# Patient Record
Sex: FEMALE | Race: WHITE | NOT HISPANIC OR LATINO | Employment: UNEMPLOYED | URBAN - NONMETROPOLITAN AREA
[De-identification: names, ages, dates, MRNs, and addresses within clinical notes are randomized per-mention and may not be internally consistent; named-entity substitution may affect disease eponyms.]

---

## 2020-04-17 ENCOUNTER — HISTORICAL (OUTPATIENT)
Dept: ADMINISTRATIVE | Facility: HOSPITAL | Age: 30
End: 2020-04-17

## 2020-04-17 LAB
ALBUMIN SERPL BCP-MCNC: 3.8 G/DL (ref 3.5–5)
ALBUMIN/GLOB SERPL ELPH: 1 {RATIO} (ref 1.5–2.2)
ALP SERPL-CCNC: 98 U/L (ref 45–117)
ALT SERPL W P-5'-P-CCNC: 50 U/L (ref 13–56)
ANION GAP SERPL CALC-SCNC: 11.3 MEQ/L (ref 10–20)
APPEARANCE, UA: NORMAL
AST SERPL-CCNC: 29 U/L (ref 15–37)
B-HCG UR QL: NEGATIVE
BACTERIA SPEC CULT: NORMAL /HPF
BASOPHILS NFR BLD: 0 10 (ref 0–0.1)
BASOPHILS NFR BLD: 0.5 % (ref 0–1.5)
BILIRUB SERPL-MCNC: 0.23 MG/DL (ref 0.2–1)
BILIRUB UR QL STRIP: NEGATIVE MG/DL
BUDDING YEAST: NORMAL /HPF
BUN SERPL-MCNC: 13 MG/DL (ref 7–18)
CALCIUM SERPL-MCNC: 8 MG/DL (ref 8.5–10.1)
CASTS, URINE MICROSCOPIC: >87 /LPF
CHLORIDE SERPL-SCNC: 106 MMOL/L (ref 98–107)
CO2 SERPL-SCNC: 26 MMOL/L (ref 22–32)
COLOR UR: NORMAL
CREAT SERPL-MCNC: 1.18 MG/DL (ref 0.55–1.02)
EGFR: 57 ML/MIN/1.73M
EOSINOPHIL NFR BLD: 0.3 10 (ref 0–0.7)
EOSINOPHIL NFR BLD: 5.3 % (ref 0–7)
EPITHELIAL, URINE MICROSCOPIC: 22 /HPF
ERYTHROCYTE [DISTWIDTH] IN BLOOD BY AUTOMATED COUNT: 12.7 % (ref 11.5–14.5)
GLOBULIN: 3.7 G/DL (ref 2.3–3.5)
GLUCOSE (UA): 100 MG/DL
GLUCOSE SERPL-MCNC: 239 MG/DL (ref 70–99)
GRAN #: 2.42 10 (ref 2–7.5)
GRAN%: 0.2 %
GRAN%: 42.9 % (ref 50–80)
HCT VFR BLD AUTO: 37.3 % (ref 37.7–47.9)
HGB BLD-MCNC: 12.1 G/DL (ref 12.2–16.2)
HGB UR QL STRIP: NORMAL ERY/UL
HYALINE CASTS #/AREA URNS LPF: NORMAL /[LPF]
IMMATURE GRANULOCYTES #: 0.01 10
INTERNAL NEG CONTROL: NEGATIVE
INTERNAL POS CONTROL: POSITIVE
KETONES UR QL STRIP: NEGATIVE MG/DL
LEUKOCYTE ESTERASE UR QL STRIP: NORMAL LEU/UL
LYMPH #: 2.6 10 (ref 1–3.5)
LYMPH%: 45.6 % (ref 12–50)
MCH RBC QN AUTO: 28.4 PG (ref 27–31)
MCHC RBC AUTO-ENTMCNC: 32.4 G% (ref 32–35)
MCV RBC AUTO: 87.6 FL (ref 80–97)
MONO #: 0.3 10 (ref 0–0.8)
MONO%: 5.5 % (ref 0–12)
MUCOUS, URINE MICROSCOPIC: NORMAL /HPF
NITRITE UR QL STRIP: NEGATIVE MG/DL
OSMOC: 287 MOSM/KG (ref 275–295)
PH UR STRIP: 5 [PH] (ref 5–7.5)
PMV BLD AUTO: 377 10 (ref 142–424)
PMV BLD AUTO: 8.9 FL (ref 7.4–10.4)
POTASSIUM SERPL-SCNC: 3.3 MMOL/L (ref 3.5–5.1)
PROT SERPL-MCNC: 7.5 G/DL (ref 6.4–8.2)
PROT UR QL STRIP: 100 MG/DL
RBC # BLD AUTO: 4.26 M/UL (ref 4.04–5.48)
RBC #/AREA URNS HPF: NEGATIVE /HPF
SODIUM BLD-SCNC: 140 MMOL/L (ref 136–145)
SP GR UR STRIP: 1.02 (ref 1–1.03)
SPERM, URINE MICROSCOPIC: NORMAL /HPF
TYPE OF SPECIMEN  (UA): NORMAL
UNCLASSIFIED CRYSTALS, UA: NORMAL /HPF
UROBILINOGEN UR STRIP-ACNC: 1 EU/L
WBC # BLD AUTO: 5.6 10 (ref 4–10.2)
WBC #/AREA URNS HPF: 735 /HPF

## 2020-10-17 ENCOUNTER — HOSPITAL ENCOUNTER (EMERGENCY)
Facility: HOSPITAL | Age: 30
Discharge: HOME | End: 2020-10-17
Attending: EMERGENCY MEDICINE
Payer: MEDICAID

## 2020-10-17 VITALS
RESPIRATION RATE: 20 BRPM | WEIGHT: 96.38 LBS | HEIGHT: 62 IN | HEART RATE: 106 BPM | OXYGEN SATURATION: 98 % | TEMPERATURE: 99 F | BODY MASS INDEX: 17.74 KG/M2 | SYSTOLIC BLOOD PRESSURE: 101 MMHG | DIASTOLIC BLOOD PRESSURE: 59 MMHG

## 2020-10-17 DIAGNOSIS — S91.302D OPEN WOUND OF LEFT FOOT EXCLUDING ONE OR MORE TOES, SUBSEQUENT ENCOUNTER: ICD-10-CM

## 2020-10-17 DIAGNOSIS — F19.10 SUBSTANCE ABUSE: Primary | ICD-10-CM

## 2020-10-17 LAB
ALBUMIN SERPL BCP-MCNC: 2.4 G/DL (ref 3.5–5.2)
ALP SERPL-CCNC: 119 U/L (ref 55–135)
ALT SERPL W/O P-5'-P-CCNC: 18 U/L (ref 10–44)
ANION GAP SERPL CALC-SCNC: 6 MMOL/L (ref 8–16)
AST SERPL-CCNC: 14 U/L (ref 10–40)
BASOPHILS # BLD AUTO: 0.03 K/UL (ref 0–0.2)
BASOPHILS NFR BLD: 0.3 % (ref 0–1.9)
BILIRUB SERPL-MCNC: 0.5 MG/DL (ref 0.1–1)
BUN SERPL-MCNC: 14 MG/DL (ref 6–20)
CALCIUM SERPL-MCNC: 8.6 MG/DL (ref 8.7–10.5)
CHLORIDE SERPL-SCNC: 106 MMOL/L (ref 95–110)
CO2 SERPL-SCNC: 27 MMOL/L (ref 23–29)
CREAT SERPL-MCNC: 0.6 MG/DL (ref 0.5–1.4)
DIFFERENTIAL METHOD: ABNORMAL
EOSINOPHIL # BLD AUTO: 0 K/UL (ref 0–0.5)
EOSINOPHIL NFR BLD: 0 % (ref 0–8)
ERYTHROCYTE [DISTWIDTH] IN BLOOD BY AUTOMATED COUNT: 15.6 % (ref 11.5–14.5)
EST. GFR  (AFRICAN AMERICAN): >60 ML/MIN/1.73 M^2
EST. GFR  (NON AFRICAN AMERICAN): >60 ML/MIN/1.73 M^2
GLUCOSE SERPL-MCNC: 103 MG/DL (ref 70–110)
HCT VFR BLD AUTO: 27.6 % (ref 37–48.5)
HGB BLD-MCNC: 8.7 G/DL (ref 12–16)
IMM GRANULOCYTES # BLD AUTO: 0.09 K/UL (ref 0–0.04)
IMM GRANULOCYTES NFR BLD AUTO: 0.8 % (ref 0–0.5)
LYMPHOCYTES # BLD AUTO: 0.8 K/UL (ref 1–4.8)
LYMPHOCYTES NFR BLD: 7.6 % (ref 18–48)
MCH RBC QN AUTO: 24.6 PG (ref 27–31)
MCHC RBC AUTO-ENTMCNC: 31.5 G/DL (ref 32–36)
MCV RBC AUTO: 78 FL (ref 82–98)
MONOCYTES # BLD AUTO: 0.4 K/UL (ref 0.3–1)
MONOCYTES NFR BLD: 3.8 % (ref 4–15)
NEUTROPHILS # BLD AUTO: 9.5 K/UL (ref 1.8–7.7)
NEUTROPHILS NFR BLD: 87.5 % (ref 38–73)
NRBC BLD-RTO: 0 /100 WBC
PLATELET # BLD AUTO: 209 K/UL (ref 150–350)
PMV BLD AUTO: 8.4 FL (ref 9.2–12.9)
POTASSIUM SERPL-SCNC: 3.8 MMOL/L (ref 3.5–5.1)
PROT SERPL-MCNC: 7.3 G/DL (ref 6–8.4)
RBC # BLD AUTO: 3.53 M/UL (ref 4–5.4)
SODIUM SERPL-SCNC: 139 MMOL/L (ref 136–145)
WBC # BLD AUTO: 10.83 K/UL (ref 3.9–12.7)

## 2020-10-17 PROCEDURE — 80320 DRUG SCREEN QUANTALCOHOLS: CPT

## 2020-10-17 PROCEDURE — 99283 EMERGENCY DEPT VISIT LOW MDM: CPT | Mod: 25

## 2020-10-17 PROCEDURE — 36000 PLACE NEEDLE IN VEIN: CPT

## 2020-10-17 PROCEDURE — 80053 COMPREHEN METABOLIC PANEL: CPT

## 2020-10-17 PROCEDURE — 36415 COLL VENOUS BLD VENIPUNCTURE: CPT

## 2020-10-17 PROCEDURE — 85025 COMPLETE CBC W/AUTO DIFF WBC: CPT

## 2020-10-17 RX ORDER — MUPIROCIN 20 MG/G
OINTMENT TOPICAL 3 TIMES DAILY
Qty: 30 G | Refills: 0 | Status: ON HOLD | OUTPATIENT
Start: 2020-10-17 | End: 2020-11-11 | Stop reason: HOSPADM

## 2020-10-17 NOTE — ED PROVIDER NOTES
"Encounter Date: 10/17/2020       History     Chief Complaint   Patient presents with    Fatigue     Patient states she has been off of heroin for 3 days and started feeling withdrawl so she ate a weed brownie and thinks it may be laced. Patient is drowsy, arousable to voice, denies SI     Pt was brought in by [facility], she was found asleep on side the road.  The pt is awake and alert, pt states she was tired and laid down on side of the road.  Pt denies any injury, syncope or hitting her head. Pt states she used heroin for the past few days and then ate a marijuana brownie she believes "might have been laced" because she has "felt more tired."  Pt denies wanting to harm herself or others.  Pt states she does not want help and would like to be discharged.         Review of patient's allergies indicates:  No Known Allergies  Past Medical History:   Diagnosis Date    Anxiety     Borderline personality disorder     PTSD (post-traumatic stress disorder)     Substance abuse      Past Surgical History:   Procedure Laterality Date     SECTION, LOW TRANSVERSE      x4    COSMETIC SURGERY       History reviewed. No pertinent family history.  Social History     Tobacco Use    Smoking status: Never Smoker   Substance Use Topics    Alcohol use: Yes     Comment: occ    Drug use: Yes     Types: IV, Marijuana     Comment: no heroin x3 days     Review of Systems   Constitutional: Positive for fatigue.   Psychiatric/Behavioral:        + drug use    All other systems reviewed and are negative.      Physical Exam     Initial Vitals [10/17/20 1824]   BP Pulse Resp Temp SpO2   102/69 (!) 117 16 98.5 °F (36.9 °C) 99 %      MAP       --         Physical Exam    Nursing note and vitals reviewed.  Constitutional: She is not diaphoretic. No distress.   HENT:   Head: Normocephalic.   Mouth/Throat: Oropharynx is clear and moist.   Eyes: Pupils are equal, round, and reactive to light.   Neck: Normal range of motion. Neck supple. "   Cardiovascular: Regular rhythm. Tachycardia present.    Pulmonary/Chest: Breath sounds normal. No respiratory distress. She has no wheezes.   Abdominal: Soft. Bowel sounds are normal. She exhibits no distension. There is no abdominal tenderness.   Musculoskeletal: Normal range of motion. No tenderness or edema.   Neurological: She is alert and oriented to person, place, and time. She has normal strength. GCS score is 15. GCS eye subscore is 4. GCS verbal subscore is 5. GCS motor subscore is 6.   Skin: Skin is warm and dry. Capillary refill takes less than 2 seconds.   Bruising and puncture wounds to left arm, pt states that is where she recently shot up heroin, no cellulitis no abscess     Left foot wound - see attached photo          ED Course   Procedures  Labs Reviewed   CBC W/ AUTO DIFFERENTIAL - Abnormal; Notable for the following components:       Result Value    RBC 3.53 (*)     Hemoglobin 8.7 (*)     Hematocrit 27.6 (*)     Mean Corpuscular Volume 78 (*)     Mean Corpuscular Hemoglobin 24.6 (*)     Mean Corpuscular Hemoglobin Conc 31.5 (*)     RDW 15.6 (*)     MPV 8.4 (*)     Immature Granulocytes 0.8 (*)     Gran # (ANC) 9.5 (*)     Immature Grans (Abs) 0.09 (*)     Lymph # 0.8 (*)     Gran% 87.5 (*)     Lymph% 7.6 (*)     Mono% 3.8 (*)     All other components within normal limits   COMPREHENSIVE METABOLIC PANEL - Abnormal; Notable for the following components:    Calcium 8.6 (*)     Albumin 2.4 (*)     Anion Gap 6 (*)     All other components within normal limits   ALCOHOL,MEDICAL (ETHANOL)   URINALYSIS, REFLEX TO URINE CULTURE   PREGNANCY TEST, URINE RAPID   DRUG SCREEN PANEL, URINE EMERGENCY          Imaging Results    None          Medical Decision Making:   Differential Diagnosis:   Poly substance abuse  Viral illness   ETOH use  Near Syncope   Clinical Tests:   Lab Tests: Ordered                   ED Course as of Oct 17 2020   Sat Oct 17, 2020   1922 Pt is awake and alert.  Pt is oriented to were  "she is.  Pt states she would like to be discharged, she does not want to be in the ER.  Discussed with ER MD, OK to dc pt.  Will dc when pt she has transportation.     [NL]   1948 Pt shows me a wound on her left foot/ankle, states it has been there over 1 month.  Pt states it started off as an are where she injected heroin, then turned in to a blister, abscess.  She states she was recently seen in [place] ED and placed on antibiotics.  Pt has not followed up with anyone regarding this wound. Spoke with ER MD Dr. Linares, pt does not tomas to be started on any antibiotics at this time, pt needs referral to wound care.  This was explained to pt who stated "ok."      [NL]   1952 Unable to find local referral for wound care so pt has been referred to podiatry.  Pt verbalized understanding.     [NL]   1952 Pt refusing to give UA sample, states she does not have to urinate.      [NL]   2018 Pt refusing IV, IV fluids, to give UA sample or in and other cath. Awaiting pt's father to get her for transportation.     [NL]      ED Course User Index  [NL] Valeria Wiggins NP            Clinical Impression:     ICD-10-CM ICD-9-CM   1. Substance abuse  F19.10 305.90   2. Open wound of left foot excluding one or more toes, subsequent encounter  S91.302D V58.89     892.0                      Disposition:   Disposition: Discharged  Condition: Stable     ED Disposition Condition    Discharge Stable        ED Prescriptions     Medication Sig Dispense Start Date End Date Auth. Provider    mupirocin (BACTROBAN) 2 % ointment Apply topically 3 (three) times daily. 30 g 10/17/2020  Valeria Wiggins NP        Follow-up Information     Follow up With Specialties Details Why Contact Info    Manuel Park DPM Podiatry Schedule an appointment as soon as possible for a visit in 1 day CONTINUATION OF CARE, fever, Further evaluation and treatment, re-evaluation [address] [place] [id number] [phone]      "                                    Valeria Wiggins, FELICITY  10/17/20 2020

## 2020-10-18 LAB — ETHANOL SERPL-MCNC: <3 MG/DL

## 2020-10-18 NOTE — ED NOTES
Iv attempt x 1 without success. Patient refuses further attempts to iv access and inteventions with ivf and urinalysis.  Patient wishes to go home but wants to have wound to ankle checked out.  Noted large wound to left medial ankle. Notified NP who came to bedside for assessment.

## 2020-10-18 NOTE — ED NOTES
Patient left prior to discharge instructions. Was verbally educated on following up with podiatry.

## 2020-10-19 ENCOUNTER — HOSPITAL ENCOUNTER (EMERGENCY)
Facility: HOSPITAL | Age: 30
Discharge: HOME | End: 2020-10-19
Attending: EMERGENCY MEDICINE
Payer: MEDICAID

## 2020-10-19 VITALS
DIASTOLIC BLOOD PRESSURE: 64 MMHG | RESPIRATION RATE: 16 BRPM | SYSTOLIC BLOOD PRESSURE: 102 MMHG | OXYGEN SATURATION: 98 % | HEART RATE: 105 BPM | WEIGHT: 96.81 LBS | BODY MASS INDEX: 17.81 KG/M2 | HEIGHT: 62 IN | TEMPERATURE: 99 F

## 2020-10-19 DIAGNOSIS — R51.9 ACUTE NONINTRACTABLE HEADACHE, UNSPECIFIED HEADACHE TYPE: Primary | ICD-10-CM

## 2020-10-19 DIAGNOSIS — T14.8XXA OPEN WOUND: ICD-10-CM

## 2020-10-19 LAB
ALBUMIN SERPL BCP-MCNC: 2.2 G/DL (ref 3.5–5.2)
ALP SERPL-CCNC: 115 U/L (ref 55–135)
ALT SERPL W/O P-5'-P-CCNC: 20 U/L (ref 10–44)
ANION GAP SERPL CALC-SCNC: 4 MMOL/L (ref 8–16)
AST SERPL-CCNC: 27 U/L (ref 10–40)
BASOPHILS # BLD AUTO: 0.04 K/UL (ref 0–0.2)
BASOPHILS NFR BLD: 0.3 % (ref 0–1.9)
BILIRUB SERPL-MCNC: 0.5 MG/DL (ref 0.1–1)
BUN SERPL-MCNC: 17 MG/DL (ref 6–20)
CALCIUM SERPL-MCNC: 8.3 MG/DL (ref 8.7–10.5)
CHLORIDE SERPL-SCNC: 100 MMOL/L (ref 95–110)
CO2 SERPL-SCNC: 29 MMOL/L (ref 23–29)
CREAT SERPL-MCNC: 0.9 MG/DL (ref 0.5–1.4)
DIFFERENTIAL METHOD: ABNORMAL
EOSINOPHIL # BLD AUTO: 0 K/UL (ref 0–0.5)
EOSINOPHIL NFR BLD: 0.2 % (ref 0–8)
ERYTHROCYTE [DISTWIDTH] IN BLOOD BY AUTOMATED COUNT: 15.3 % (ref 11.5–14.5)
EST. GFR  (AFRICAN AMERICAN): >60 ML/MIN/1.73 M^2
EST. GFR  (NON AFRICAN AMERICAN): >60 ML/MIN/1.73 M^2
GLUCOSE SERPL-MCNC: 119 MG/DL (ref 70–110)
HCT VFR BLD AUTO: 28.9 % (ref 37–48.5)
HGB BLD-MCNC: 9 G/DL (ref 12–16)
IMM GRANULOCYTES # BLD AUTO: 0.08 K/UL (ref 0–0.04)
IMM GRANULOCYTES NFR BLD AUTO: 0.6 % (ref 0–0.5)
LYMPHOCYTES # BLD AUTO: 0.8 K/UL (ref 1–4.8)
LYMPHOCYTES NFR BLD: 5.6 % (ref 18–48)
MCH RBC QN AUTO: 24.3 PG (ref 27–31)
MCHC RBC AUTO-ENTMCNC: 31.1 G/DL (ref 32–36)
MCV RBC AUTO: 78 FL (ref 82–98)
MONOCYTES # BLD AUTO: 0.4 K/UL (ref 0.3–1)
MONOCYTES NFR BLD: 3.1 % (ref 4–15)
NEUTROPHILS # BLD AUTO: 12.1 K/UL (ref 1.8–7.7)
NEUTROPHILS NFR BLD: 90.2 % (ref 38–73)
NRBC BLD-RTO: 0 /100 WBC
PLATELET # BLD AUTO: 119 K/UL (ref 150–350)
PMV BLD AUTO: 10.2 FL (ref 9.2–12.9)
POTASSIUM SERPL-SCNC: 3.5 MMOL/L (ref 3.5–5.1)
PROT SERPL-MCNC: 7.2 G/DL (ref 6–8.4)
RBC # BLD AUTO: 3.71 M/UL (ref 4–5.4)
SODIUM SERPL-SCNC: 133 MMOL/L (ref 136–145)
WBC # BLD AUTO: 13.46 K/UL (ref 3.9–12.7)

## 2020-10-19 PROCEDURE — 25000003 PHARM REV CODE 250: Performed by: CLINICAL NURSE SPECIALIST

## 2020-10-19 PROCEDURE — 80053 COMPREHEN METABOLIC PANEL: CPT

## 2020-10-19 PROCEDURE — 85025 COMPLETE CBC W/AUTO DIFF WBC: CPT

## 2020-10-19 PROCEDURE — 99284 EMERGENCY DEPT VISIT MOD MDM: CPT | Mod: 25

## 2020-10-19 PROCEDURE — 96372 THER/PROPH/DIAG INJ SC/IM: CPT

## 2020-10-19 PROCEDURE — 36415 COLL VENOUS BLD VENIPUNCTURE: CPT

## 2020-10-19 RX ORDER — ONDANSETRON 4 MG/1
4 TABLET, ORALLY DISINTEGRATING ORAL
Status: COMPLETED | OUTPATIENT
Start: 2020-10-19 | End: 2020-10-19

## 2020-10-19 RX ORDER — BUTALBITAL, ACETAMINOPHEN AND CAFFEINE 50; 325; 40 MG/1; MG/1; MG/1
1 TABLET ORAL
Status: COMPLETED | OUTPATIENT
Start: 2020-10-19 | End: 2020-10-19

## 2020-10-19 RX ORDER — CLINDAMYCIN HYDROCHLORIDE 300 MG/1
300 CAPSULE ORAL
Status: DISCONTINUED | OUTPATIENT
Start: 2020-10-19 | End: 2020-10-19

## 2020-10-19 RX ORDER — CLINDAMYCIN HYDROCHLORIDE 150 MG/1
300 CAPSULE ORAL 2 TIMES DAILY
Qty: 40 CAPSULE | Refills: 0 | Status: ON HOLD | OUTPATIENT
Start: 2020-10-19 | End: 2020-11-11 | Stop reason: HOSPADM

## 2020-10-19 RX ORDER — CLINDAMYCIN PHOSPHATE 150 MG/ML
600 INJECTION, SOLUTION INTRAVENOUS
Status: COMPLETED | OUTPATIENT
Start: 2020-10-19 | End: 2020-10-19

## 2020-10-19 RX ADMIN — CLINDAMYCIN PHOSPHATE 600 MG: 150 INJECTION, SOLUTION INTRAVENOUS at 04:10

## 2020-10-19 RX ADMIN — ONDANSETRON 4 MG: 4 TABLET, ORALLY DISINTEGRATING ORAL at 04:10

## 2020-10-19 RX ADMIN — BUTALBITAL, ACETAMINOPHEN, AND CAFFEINE 1 TABLET: 50; 325; 40 TABLET ORAL at 04:10

## 2020-10-19 NOTE — ED PROVIDER NOTES
Encounter Date: 10/19/2020       History     Chief Complaint   Patient presents with    Recurrent Skin Infections     3 weeks ago diagnosed with cellulitis to left inner ankle.  Completed Bactrim 4 days and not any better.  Also running fever.    Fever    Headache     migraine beginning today.  Reports photophobia and nausea.     30-year-old female presents to the ER with frontal headache with photophobia and nausea.  Headache began today, did not take any medicine over-the-counter for this issue.  Patient also has a history of open wound to left inner ankle.  Previous chart has a picture, looks much improved from last visit 2 days ago.  Patient denies being on any antibiotics currently.  States she had a fever close to 105.  Has been seen at [facility] the [facility] wound.  While reviewing her chart, she was referred to podiatry and wound care which patient did not follow-up for.  Pt states she was on Bactrim at one  point from [facility] which he did not improve.  Patient does have a history of substance abuse, which caused her open wound to left ankle due to injecting heroin.         Review of patient's allergies indicates:  No Known Allergies  Past Medical History:   Diagnosis Date    Anxiety     Borderline personality disorder     PTSD (post-traumatic stress disorder)     Substance abuse      Past Surgical History:   Procedure Laterality Date     SECTION, LOW TRANSVERSE      x4    COSMETIC SURGERY       History reviewed. No pertinent family history.  Social History     Tobacco Use    Smoking status: Never Smoker   Substance Use Topics    Alcohol use: Yes     Comment: occ    Drug use: Yes     Types: IV, Marijuana     Comment: no heroin x3 days     Review of Systems   Constitutional: Negative for fever.   HENT: Negative for sore throat.    Respiratory: Negative for shortness of breath.    Cardiovascular: Negative for chest pain.   Gastrointestinal:  Negative for nausea.   Genitourinary: Negative for dysuria.   Musculoskeletal: Negative for back pain.   Skin: Positive for color change and wound. Negative for rash.   Neurological: Positive for headaches. Negative for weakness.   Hematological: Does not bruise/bleed easily.   All other systems reviewed and are negative.      Physical Exam     Initial Vitals [10/19/20 1610]   BP Pulse Resp Temp SpO2   102/64 (!) 118 16 99.1 °F (37.3 °C) 98 %      MAP       --         Physical Exam    Nursing note and vitals reviewed.  Constitutional: She appears well-developed and well-nourished. She appears ill.   HENT:   Head: Normocephalic. Head is with raccoon's eyes.   Eyes: Pupils are equal, round, and reactive to light.   Neck: Normal range of motion.   Cardiovascular: Normal rate and regular rhythm.   Pulmonary/Chest: Breath sounds normal.   Abdominal: Soft. Bowel sounds are normal.   Musculoskeletal: Normal range of motion.   Neurological: She is alert and oriented to person, place, and time.   Skin: Skin is warm and dry. There is erythema.   Psychiatric: She has a normal mood and affect.         ED Course   Procedures  Labs Reviewed   CBC W/ AUTO DIFFERENTIAL - Abnormal; Notable for the following components:       Result Value    WBC 13.46 (*)     RBC 3.71 (*)     Hemoglobin 9.0 (*)     Hematocrit 28.9 (*)     Mean Corpuscular Volume 78 (*)     Mean Corpuscular Hemoglobin 24.3 (*)     Mean Corpuscular Hemoglobin Conc 31.1 (*)     RDW 15.3 (*)     Platelets 119 (*)     All other components within normal limits   COMPREHENSIVE METABOLIC PANEL - Abnormal; Notable for the following components:    Sodium 133 (*)     Glucose 119 (*)     Calcium 8.3 (*)     Albumin 2.2 (*)     Anion Gap 4 (*)     All other components within normal limits              Imaging Results    None          Medical Decision Making:   Differential Diagnosis:   Healing wound, migraine, headache, cellulitis  Clinical Tests:   Lab Tests: Ordered and  Reviewed                             Clinical Impression:     ICD-10-CM ICD-9-CM   1. Acute nonintractable headache, unspecified headache type  R51.9 784.0   2. Open wound  T14.8XXA 879.8                          ED Disposition Condition    Discharge Stable        ED Prescriptions     Medication Sig Dispense Start Date End Date Auth. Provider    clindamycin (CLEOCIN) 150 MG capsule Take 2 capsules (300 mg total) by mouth 2 (two) times a day. for 10 days 40 capsule 10/19/2020 10/29/2020 Sherry River NP        Follow-up Information     Follow up With Specialties Details Why Contact Info    Luis Felipe Hancock III, MD Cardiovascular Disease Schedule an appointment as soon as possible for a visit  If symptoms worsen [address] [facility] [address]  [phone]                                         Sherry River NP  10/19/20 5467

## 2020-10-23 PROCEDURE — 96374 THER/PROPH/DIAG INJ IV PUSH: CPT

## 2020-10-23 PROCEDURE — 99285 EMERGENCY DEPT VISIT HI MDM: CPT | Mod: 25

## 2020-10-23 PROCEDURE — 96375 TX/PRO/DX INJ NEW DRUG ADDON: CPT

## 2020-10-24 ENCOUNTER — HOSPITAL ENCOUNTER (INPATIENT)
Facility: HOSPITAL | Age: 30
LOS: 2 days | Discharge: ANOTHER HOSPITAL | DRG: 871 | End: 2020-10-26
Attending: EMERGENCY MEDICINE | Admitting: INTERNAL MEDICINE
Payer: MEDICAID

## 2020-10-24 DIAGNOSIS — J18.9 PNEUMONIA OF BOTH LUNGS DUE TO INFECTIOUS ORGANISM: ICD-10-CM

## 2020-10-24 DIAGNOSIS — J18.9 PNEUMONIA OF BOTH LUNGS DUE TO INFECTIOUS ORGANISM, UNSPECIFIED PART OF LUNG: Primary | ICD-10-CM

## 2020-10-24 DIAGNOSIS — R07.9 CHEST PAIN: ICD-10-CM

## 2020-10-24 DIAGNOSIS — L97.309 ULCER OF ANKLE: ICD-10-CM

## 2020-10-24 DIAGNOSIS — I38 ENDOCARDITIS: ICD-10-CM

## 2020-10-24 LAB
ABO + RH BLD: NORMAL
ALBUMIN SERPL BCP-MCNC: 1.5 G/DL (ref 3.5–5.2)
ALBUMIN SERPL BCP-MCNC: 1.8 G/DL (ref 3.5–5.2)
ALP SERPL-CCNC: 151 U/L (ref 55–135)
ALP SERPL-CCNC: 186 U/L (ref 55–135)
ALT SERPL W/O P-5'-P-CCNC: 18 U/L (ref 10–44)
ALT SERPL W/O P-5'-P-CCNC: 20 U/L (ref 10–44)
AMPHET+METHAMPHET UR QL: NEGATIVE
ANION GAP SERPL CALC-SCNC: 9 MMOL/L (ref 8–16)
ANION GAP SERPL CALC-SCNC: 9 MMOL/L (ref 8–16)
APTT BLDCRRT: 32 SEC (ref 21–32)
AST SERPL-CCNC: 33 U/L (ref 10–40)
AST SERPL-CCNC: 38 U/L (ref 10–40)
B-HCG UR QL: NEGATIVE
BACTERIA #/AREA URNS HPF: NEGATIVE /HPF
BARBITURATES UR QL SCN>200 NG/ML: NORMAL
BASOPHILS # BLD AUTO: ABNORMAL K/UL (ref 0–0.2)
BASOPHILS # BLD AUTO: ABNORMAL K/UL (ref 0–0.2)
BASOPHILS NFR BLD: 0 % (ref 0–1.9)
BASOPHILS NFR BLD: 0 % (ref 0–1.9)
BENZODIAZ UR QL SCN>200 NG/ML: NORMAL
BILIRUB SERPL-MCNC: 0.5 MG/DL (ref 0.1–1)
BILIRUB SERPL-MCNC: 0.5 MG/DL (ref 0.1–1)
BILIRUB UR QL STRIP: NEGATIVE
BLD GP AB SCN CELLS X3 SERPL QL: NORMAL
BLD PROD TYP BPU: NORMAL
BLD PROD TYP BPU: NORMAL
BLOOD UNIT EXPIRATION DATE: NORMAL
BLOOD UNIT EXPIRATION DATE: NORMAL
BLOOD UNIT TYPE CODE: 5100
BLOOD UNIT TYPE CODE: 5100
BLOOD UNIT TYPE: NORMAL
BLOOD UNIT TYPE: NORMAL
BUN SERPL-MCNC: 36 MG/DL (ref 6–20)
BUN SERPL-MCNC: 37 MG/DL (ref 6–20)
BZE UR QL SCN: NEGATIVE
CALCIUM SERPL-MCNC: 7.9 MG/DL (ref 8.7–10.5)
CALCIUM SERPL-MCNC: 8.3 MG/DL (ref 8.7–10.5)
CANNABINOIDS UR QL SCN: NORMAL
CHLORIDE SERPL-SCNC: 100 MMOL/L (ref 95–110)
CHLORIDE SERPL-SCNC: 103 MMOL/L (ref 95–110)
CK MB SERPL-MCNC: <1 NG/ML (ref 0.1–6.5)
CK MB SERPL-RTO: ABNORMAL % (ref 0–5)
CK SERPL-CCNC: 17 U/L (ref 20–180)
CK SERPL-CCNC: 17 U/L (ref 20–180)
CLARITY UR: ABNORMAL
CO2 SERPL-SCNC: 22 MMOL/L (ref 23–29)
CO2 SERPL-SCNC: 23 MMOL/L (ref 23–29)
CODING SYSTEM: NORMAL
CODING SYSTEM: NORMAL
COLOR UR: YELLOW
CREAT SERPL-MCNC: 1.3 MG/DL (ref 0.5–1.4)
CREAT SERPL-MCNC: 1.4 MG/DL (ref 0.5–1.4)
CREAT UR-MCNC: 158 MG/DL (ref 15–325)
CRP SERPL-MCNC: 31 MG/DL (ref 0–0.75)
D DIMER PPP IA.FEU-MCNC: 8.22 MG/L FEU
DIFFERENTIAL METHOD: ABNORMAL
DIFFERENTIAL METHOD: ABNORMAL
DISPENSE STATUS: NORMAL
DISPENSE STATUS: NORMAL
EOSINOPHIL # BLD AUTO: ABNORMAL K/UL (ref 0–0.5)
EOSINOPHIL # BLD AUTO: ABNORMAL K/UL (ref 0–0.5)
EOSINOPHIL NFR BLD: 0 % (ref 0–8)
EOSINOPHIL NFR BLD: 0 % (ref 0–8)
ERYTHROCYTE [DISTWIDTH] IN BLOOD BY AUTOMATED COUNT: 16 % (ref 11.5–14.5)
ERYTHROCYTE [DISTWIDTH] IN BLOOD BY AUTOMATED COUNT: 16.2 % (ref 11.5–14.5)
EST. GFR  (AFRICAN AMERICAN): 57.8 ML/MIN/1.73 M^2
EST. GFR  (AFRICAN AMERICAN): >60 ML/MIN/1.73 M^2
EST. GFR  (NON AFRICAN AMERICAN): 50.1 ML/MIN/1.73 M^2
EST. GFR  (NON AFRICAN AMERICAN): 54.8 ML/MIN/1.73 M^2
GLUCOSE SERPL-MCNC: 101 MG/DL (ref 70–110)
GLUCOSE SERPL-MCNC: 105 MG/DL (ref 70–110)
GLUCOSE UR QL STRIP: NEGATIVE
GRAN CASTS #/AREA URNS LPF: 0 /LPF
HCT VFR BLD AUTO: 20.2 % (ref 37–48.5)
HCT VFR BLD AUTO: 22.4 % (ref 37–48.5)
HGB BLD-MCNC: 6.5 G/DL (ref 12–16)
HGB BLD-MCNC: 7.2 G/DL (ref 12–16)
HGB UR QL STRIP: ABNORMAL
HYALINE CASTS #/AREA URNS LPF: 5 /LPF
IMM GRANULOCYTES # BLD AUTO: ABNORMAL K/UL (ref 0–0.04)
IMM GRANULOCYTES # BLD AUTO: ABNORMAL K/UL (ref 0–0.04)
IMM GRANULOCYTES NFR BLD AUTO: ABNORMAL % (ref 0–0.5)
IMM GRANULOCYTES NFR BLD AUTO: ABNORMAL % (ref 0–0.5)
INR PPP: 1.3 (ref 0.8–1.2)
KETONES UR QL STRIP: ABNORMAL
LEUKOCYTE ESTERASE UR QL STRIP: ABNORMAL
LYMPHOCYTES # BLD AUTO: ABNORMAL K/UL (ref 1–4.8)
LYMPHOCYTES # BLD AUTO: ABNORMAL K/UL (ref 1–4.8)
LYMPHOCYTES NFR BLD: 4 % (ref 18–48)
LYMPHOCYTES NFR BLD: 4 % (ref 18–48)
MCH RBC QN AUTO: 24.3 PG (ref 27–31)
MCH RBC QN AUTO: 24.3 PG (ref 27–31)
MCHC RBC AUTO-ENTMCNC: 32.1 G/DL (ref 32–36)
MCHC RBC AUTO-ENTMCNC: 32.2 G/DL (ref 32–36)
MCV RBC AUTO: 76 FL (ref 82–98)
MCV RBC AUTO: 76 FL (ref 82–98)
METHADONE UR QL SCN>300 NG/ML: NEGATIVE
MICROSCOPIC COMMENT: ABNORMAL
MONOCYTES # BLD AUTO: ABNORMAL K/UL (ref 0.3–1)
MONOCYTES # BLD AUTO: ABNORMAL K/UL (ref 0.3–1)
MONOCYTES NFR BLD: 1 % (ref 4–15)
MONOCYTES NFR BLD: 3 % (ref 4–15)
NEUTROPHILS NFR BLD: 74 % (ref 38–73)
NEUTROPHILS NFR BLD: 76 % (ref 38–73)
NEUTS BAND NFR BLD MANUAL: 19 %
NEUTS BAND NFR BLD MANUAL: 19 %
NITRITE UR QL STRIP: NEGATIVE
NRBC BLD-RTO: 0 /100 WBC
NRBC BLD-RTO: 0 /100 WBC
NUM UNITS TRANS PACKED RBC: NORMAL
NUM UNITS TRANS PACKED RBC: NORMAL
OPIATES UR QL SCN: NORMAL
PCP UR QL SCN>25 NG/ML: NEGATIVE
PH UR STRIP: 5 [PH] (ref 5–8)
PLATELET # BLD AUTO: 63 K/UL (ref 150–350)
PLATELET # BLD AUTO: 69 K/UL (ref 150–350)
PLATELET BLD QL SMEAR: ABNORMAL
PMV BLD AUTO: 11.1 FL (ref 9.2–12.9)
PMV BLD AUTO: 11.4 FL (ref 9.2–12.9)
POTASSIUM SERPL-SCNC: 3.8 MMOL/L (ref 3.5–5.1)
POTASSIUM SERPL-SCNC: 4 MMOL/L (ref 3.5–5.1)
PROT SERPL-MCNC: 6 G/DL (ref 6–8.4)
PROT SERPL-MCNC: 6.9 G/DL (ref 6–8.4)
PROT UR QL STRIP: ABNORMAL
PROTHROMBIN TIME: 13.2 SEC (ref 9–12.5)
RBC # BLD AUTO: 2.67 M/UL (ref 4–5.4)
RBC # BLD AUTO: 2.96 M/UL (ref 4–5.4)
RBC #/AREA URNS HPF: 2 /HPF (ref 0–4)
SARS-COV-2 RDRP RESP QL NAA+PROBE: NEGATIVE
SODIUM SERPL-SCNC: 132 MMOL/L (ref 136–145)
SODIUM SERPL-SCNC: 134 MMOL/L (ref 136–145)
SP GR UR STRIP: 1.02 (ref 1–1.03)
SQUAMOUS #/AREA URNS HPF: 12 /HPF
TOXICOLOGY INFORMATION: NORMAL
TROPONIN I SERPL DL<=0.01 NG/ML-MCNC: 0.06 NG/ML (ref 0–0.03)
TROPONIN I SERPL DL<=0.01 NG/ML-MCNC: <0.02 NG/ML (ref 0–0.03)
URN SPEC COLLECT METH UR: ABNORMAL
UROBILINOGEN UR STRIP-ACNC: 1 EU/DL
WBC # BLD AUTO: 16.81 K/UL (ref 3.9–12.7)
WBC # BLD AUTO: 19.67 K/UL (ref 3.9–12.7)
WBC #/AREA URNS HPF: 27 /HPF (ref 0–5)
WBC CASTS #/AREA URNS LPF: 4 /LPF

## 2020-10-24 PROCEDURE — 80053 COMPREHEN METABOLIC PANEL: CPT

## 2020-10-24 PROCEDURE — 85610 PROTHROMBIN TIME: CPT

## 2020-10-24 PROCEDURE — 81000 URINALYSIS NONAUTO W/SCOPE: CPT | Mod: 59

## 2020-10-24 PROCEDURE — 63600175 PHARM REV CODE 636 W HCPCS: Performed by: EMERGENCY MEDICINE

## 2020-10-24 PROCEDURE — 25000242 PHARM REV CODE 250 ALT 637 W/ HCPCS: Performed by: INTERNAL MEDICINE

## 2020-10-24 PROCEDURE — 87077 CULTURE AEROBIC IDENTIFY: CPT

## 2020-10-24 PROCEDURE — 87040 BLOOD CULTURE FOR BACTERIA: CPT

## 2020-10-24 PROCEDURE — 86920 COMPATIBILITY TEST SPIN: CPT

## 2020-10-24 PROCEDURE — U0002 COVID-19 LAB TEST NON-CDC: HCPCS

## 2020-10-24 PROCEDURE — 80307 DRUG TEST PRSMV CHEM ANLYZR: CPT

## 2020-10-24 PROCEDURE — 63600175 PHARM REV CODE 636 W HCPCS: Performed by: INTERNAL MEDICINE

## 2020-10-24 PROCEDURE — 93010 EKG 12-LEAD: ICD-10-PCS | Mod: 76,,, | Performed by: INTERNAL MEDICINE

## 2020-10-24 PROCEDURE — 84484 ASSAY OF TROPONIN QUANT: CPT

## 2020-10-24 PROCEDURE — 93010 ELECTROCARDIOGRAM REPORT: CPT | Mod: ,,, | Performed by: INTERNAL MEDICINE

## 2020-10-24 PROCEDURE — 86140 C-REACTIVE PROTEIN: CPT

## 2020-10-24 PROCEDURE — 80053 COMPREHEN METABOLIC PANEL: CPT | Mod: 91

## 2020-10-24 PROCEDURE — 94640 AIRWAY INHALATION TREATMENT: CPT

## 2020-10-24 PROCEDURE — 25000003 PHARM REV CODE 250: Performed by: INTERNAL MEDICINE

## 2020-10-24 PROCEDURE — 11000001 HC ACUTE MED/SURG PRIVATE ROOM

## 2020-10-24 PROCEDURE — 25500020 PHARM REV CODE 255: Performed by: INTERNAL MEDICINE

## 2020-10-24 PROCEDURE — 85027 COMPLETE CBC AUTOMATED: CPT | Mod: 91

## 2020-10-24 PROCEDURE — 82553 CREATINE MB FRACTION: CPT

## 2020-10-24 PROCEDURE — P9016 RBC LEUKOCYTES REDUCED: HCPCS

## 2020-10-24 PROCEDURE — 27000221 HC OXYGEN, UP TO 24 HOURS

## 2020-10-24 PROCEDURE — 25000003 PHARM REV CODE 250: Performed by: EMERGENCY MEDICINE

## 2020-10-24 PROCEDURE — 87086 URINE CULTURE/COLONY COUNT: CPT

## 2020-10-24 PROCEDURE — 85007 BL SMEAR W/DIFF WBC COUNT: CPT

## 2020-10-24 PROCEDURE — 82550 ASSAY OF CK (CPK): CPT

## 2020-10-24 PROCEDURE — 84484 ASSAY OF TROPONIN QUANT: CPT | Mod: 91

## 2020-10-24 PROCEDURE — 94761 N-INVAS EAR/PLS OXIMETRY MLT: CPT

## 2020-10-24 PROCEDURE — 99900035 HC TECH TIME PER 15 MIN (STAT)

## 2020-10-24 PROCEDURE — 87186 SC STD MICRODIL/AGAR DIL: CPT

## 2020-10-24 PROCEDURE — 93005 ELECTROCARDIOGRAM TRACING: CPT

## 2020-10-24 PROCEDURE — 86901 BLOOD TYPING SEROLOGIC RH(D): CPT

## 2020-10-24 PROCEDURE — 36415 COLL VENOUS BLD VENIPUNCTURE: CPT

## 2020-10-24 PROCEDURE — 85379 FIBRIN DEGRADATION QUANT: CPT

## 2020-10-24 PROCEDURE — 81025 URINE PREGNANCY TEST: CPT

## 2020-10-24 PROCEDURE — 85730 THROMBOPLASTIN TIME PARTIAL: CPT

## 2020-10-24 RX ORDER — MORPHINE SULFATE 2 MG/ML
1 INJECTION, SOLUTION INTRAMUSCULAR; INTRAVENOUS EVERY 4 HOURS PRN
Status: DISCONTINUED | OUTPATIENT
Start: 2020-10-24 | End: 2020-10-26 | Stop reason: HOSPADM

## 2020-10-24 RX ORDER — ACETAMINOPHEN 500 MG
1000 TABLET ORAL
Status: COMPLETED | OUTPATIENT
Start: 2020-10-24 | End: 2020-10-24

## 2020-10-24 RX ORDER — TRAMADOL HYDROCHLORIDE 50 MG/1
50 TABLET ORAL EVERY 6 HOURS PRN
Status: DISCONTINUED | OUTPATIENT
Start: 2020-10-24 | End: 2020-10-26 | Stop reason: HOSPADM

## 2020-10-24 RX ORDER — DIPHENHYDRAMINE HYDROCHLORIDE 50 MG/ML
25 INJECTION INTRAMUSCULAR; INTRAVENOUS ONCE
Status: COMPLETED | OUTPATIENT
Start: 2020-10-24 | End: 2020-10-24

## 2020-10-24 RX ORDER — IPRATROPIUM BROMIDE AND ALBUTEROL SULFATE 2.5; .5 MG/3ML; MG/3ML
3 SOLUTION RESPIRATORY (INHALATION) EVERY 6 HOURS
Status: DISCONTINUED | OUTPATIENT
Start: 2020-10-24 | End: 2020-10-24

## 2020-10-24 RX ORDER — VANCOMYCIN HCL IN 5 % DEXTROSE 1G/250ML
1000 PLASTIC BAG, INJECTION (ML) INTRAVENOUS ONCE
Status: DISCONTINUED | OUTPATIENT
Start: 2020-10-24 | End: 2020-10-24

## 2020-10-24 RX ORDER — VANCOMYCIN HCL IN 5 % DEXTROSE 1G/250ML
1000 PLASTIC BAG, INJECTION (ML) INTRAVENOUS ONCE
Status: COMPLETED | OUTPATIENT
Start: 2020-10-24 | End: 2020-10-24

## 2020-10-24 RX ORDER — ACETAMINOPHEN 325 MG/1
650 TABLET ORAL EVERY 8 HOURS PRN
Status: DISCONTINUED | OUTPATIENT
Start: 2020-10-24 | End: 2020-10-26 | Stop reason: HOSPADM

## 2020-10-24 RX ORDER — ALPRAZOLAM 0.5 MG/1
1 TABLET ORAL 3 TIMES DAILY PRN
Status: DISCONTINUED | OUTPATIENT
Start: 2020-10-24 | End: 2020-10-26 | Stop reason: HOSPADM

## 2020-10-24 RX ORDER — METOPROLOL TARTRATE 1 MG/ML
5 INJECTION, SOLUTION INTRAVENOUS ONCE
Status: COMPLETED | OUTPATIENT
Start: 2020-10-24 | End: 2020-10-24

## 2020-10-24 RX ORDER — HYDROCODONE BITARTRATE AND ACETAMINOPHEN 500; 5 MG/1; MG/1
TABLET ORAL
Status: DISCONTINUED | OUTPATIENT
Start: 2020-10-24 | End: 2020-10-26 | Stop reason: HOSPADM

## 2020-10-24 RX ORDER — SODIUM CHLORIDE 9 MG/ML
INJECTION, SOLUTION INTRAVENOUS CONTINUOUS
Status: DISCONTINUED | OUTPATIENT
Start: 2020-10-24 | End: 2020-10-26 | Stop reason: HOSPADM

## 2020-10-24 RX ORDER — LEVOFLOXACIN 5 MG/ML
500 INJECTION, SOLUTION INTRAVENOUS
Status: DISCONTINUED | OUTPATIENT
Start: 2020-10-24 | End: 2020-10-24

## 2020-10-24 RX ORDER — SODIUM CHLORIDE 0.9 % (FLUSH) 0.9 %
10 SYRINGE (ML) INJECTION
Status: DISCONTINUED | OUTPATIENT
Start: 2020-10-24 | End: 2020-10-26 | Stop reason: HOSPADM

## 2020-10-24 RX ORDER — LEVALBUTEROL 1.25 MG/.5ML
1.25 SOLUTION, CONCENTRATE RESPIRATORY (INHALATION) EVERY 4 HOURS
Status: DISCONTINUED | OUTPATIENT
Start: 2020-10-24 | End: 2020-10-26 | Stop reason: HOSPADM

## 2020-10-24 RX ORDER — VANCOMYCIN HCL IN 5 % DEXTROSE 1G/250ML
1000 PLASTIC BAG, INJECTION (ML) INTRAVENOUS
Status: DISCONTINUED | OUTPATIENT
Start: 2020-10-24 | End: 2020-10-24

## 2020-10-24 RX ORDER — ONDANSETRON 2 MG/ML
4 INJECTION INTRAMUSCULAR; INTRAVENOUS EVERY 8 HOURS PRN
Status: DISCONTINUED | OUTPATIENT
Start: 2020-10-24 | End: 2020-10-26 | Stop reason: HOSPADM

## 2020-10-24 RX ORDER — HYDROCODONE BITARTRATE AND ACETAMINOPHEN 10; 325 MG/1; MG/1
1 TABLET ORAL EVERY 6 HOURS PRN
Status: DISCONTINUED | OUTPATIENT
Start: 2020-10-24 | End: 2020-10-26 | Stop reason: HOSPADM

## 2020-10-24 RX ADMIN — LEVALBUTEROL 1.25 MG: 1.25 SOLUTION, CONCENTRATE RESPIRATORY (INHALATION) at 07:10

## 2020-10-24 RX ADMIN — ALPRAZOLAM 1 MG: 0.5 TABLET ORAL at 11:10

## 2020-10-24 RX ADMIN — ACETAMINOPHEN 1000 MG: 500 TABLET ORAL at 03:10

## 2020-10-24 RX ADMIN — ALPRAZOLAM 1 MG: 0.5 TABLET ORAL at 07:10

## 2020-10-24 RX ADMIN — LEVOFLOXACIN 500 MG: 500 INJECTION, SOLUTION INTRAVENOUS at 03:10

## 2020-10-24 RX ADMIN — LEVALBUTEROL 1.25 MG: 1.25 SOLUTION, CONCENTRATE RESPIRATORY (INHALATION) at 11:10

## 2020-10-24 RX ADMIN — PIPERACILLIN AND TAZOBACTAM 4.5 G: 4; .5 INJECTION, POWDER, LYOPHILIZED, FOR SOLUTION INTRAVENOUS; PARENTERAL at 08:10

## 2020-10-24 RX ADMIN — MORPHINE SULFATE: 2 INJECTION, SOLUTION INTRAMUSCULAR; INTRAVENOUS at 02:10

## 2020-10-24 RX ADMIN — DIPHENHYDRAMINE HYDROCHLORIDE 25 MG: 50 INJECTION INTRAMUSCULAR; INTRAVENOUS at 02:10

## 2020-10-24 RX ADMIN — MORPHINE SULFATE 1 MG: 2 INJECTION, SOLUTION INTRAMUSCULAR; INTRAVENOUS at 07:10

## 2020-10-24 RX ADMIN — HYDROCODONE BITARTRATE AND ACETAMINOPHEN 1 TABLET: 10; 325 TABLET ORAL at 05:10

## 2020-10-24 RX ADMIN — METOPROLOL TARTRATE 5 MG: 5 INJECTION, SOLUTION INTRAVENOUS at 02:10

## 2020-10-24 RX ADMIN — SODIUM CHLORIDE 1000 ML: 0.9 INJECTION, SOLUTION INTRAVENOUS at 02:10

## 2020-10-24 RX ADMIN — LEVALBUTEROL 1.25 MG: 1.25 SOLUTION, CONCENTRATE RESPIRATORY (INHALATION) at 04:10

## 2020-10-24 RX ADMIN — METHYLPREDNISOLONE SODIUM SUCCINATE 40 MG: 40 INJECTION, POWDER, FOR SOLUTION INTRAMUSCULAR; INTRAVENOUS at 02:10

## 2020-10-24 RX ADMIN — SODIUM CHLORIDE: 0.9 INJECTION, SOLUTION INTRAVENOUS at 05:10

## 2020-10-24 RX ADMIN — PIPERACILLIN AND TAZOBACTAM 4.5 G: 4; .5 INJECTION, POWDER, LYOPHILIZED, FOR SOLUTION INTRAVENOUS; PARENTERAL at 12:10

## 2020-10-24 RX ADMIN — IOHEXOL 100 ML: 350 INJECTION, SOLUTION INTRAVENOUS at 08:10

## 2020-10-24 RX ADMIN — HYDROCODONE BITARTRATE AND ACETAMINOPHEN 1 TABLET: 10; 325 TABLET ORAL at 10:10

## 2020-10-24 RX ADMIN — PIPERACILLIN AND TAZOBACTAM 4.5 G: 4; .5 INJECTION, POWDER, LYOPHILIZED, FOR SOLUTION INTRAVENOUS; PARENTERAL at 05:10

## 2020-10-24 RX ADMIN — VANCOMYCIN HYDROCHLORIDE 1000 MG: 1 INJECTION, POWDER, LYOPHILIZED, FOR SOLUTION INTRAVENOUS at 10:10

## 2020-10-24 NOTE — CARE UPDATE
10/24/20 0725   Patient Assessment/Suction   Level of Consciousness (AVPU) alert   Respiratory Effort Normal;Unlabored   Expansion/Accessory Muscles/Retractions no use of accessory muscles   All Lung Fields Breath Sounds clear   Rhythm/Pattern, Respiratory tachypneic   Cough Frequency no cough   PRE-TX-O2   O2 Device (Oxygen Therapy) nasal cannula   $ Is the patient on Low Flow Oxygen? Yes   Flow (L/min) 3  (decreased to 2lpm post neb tx)   Oxygen Concentration (%) 32   SpO2 97 %   Pulse Oximetry Type Intermittent   $ Pulse Oximetry - Multiple Charge Pulse Oximetry - Multiple   Pulse (!) 116   Resp (!) 30   Positioning HOB elevated 45 degrees   Aerosol Therapy   $ Aerosol Therapy Charges Aerosol Treatment   Daily Review of Necessity (SVN) completed   Respiratory Treatment Status (SVN) given   Treatment Route (SVN) mask   Patient Position (SVN) semi-Chatterjee's   Post Treatment Assessment (SVN) breath sounds unchanged   Signs of Intolerance (SVN) none   Breath Sounds Post-Respiratory Treatment   Throughout All Fields Post-Treatment All Fields   Throughout All Fields Post-Treatment no change   Post-treatment Heart Rate (beats/min) 107   Post-treatment Resp Rate (breaths/min) 30   Respiratory Evaluation   $ Care Plan Tech Time 15 min

## 2020-10-24 NOTE — H&P
[facility]    History & Physical      Patient Name: Марина Britton  MRN: [id number]  Admission Date: 10/24/2020  Attending Physician: Delmer Garcia III, MD   Primary Care Provider: Luis Felipe Jose Iii, MD         Patient information was obtained from patient and ER records.     Subjective:     Principal Problem:<principal problem not specified>    Chief Complaint:   Chief Complaint   Patient presents with    Foot Pain     Fever and left foot pain for the past 3 days. Foot pain worse today Here 3 days ago for foot abscess, prescribed clindamycin.    Chest Pain     Sharp chest pain when taking a deep breath that started earlier today, Denies n/v. +SOB        HPI: 31 y/o female with history of iv drug abuse presented to the ed with complaints of worsening cough and sob and nonhealing left ankle ulcera nd further work up suggestive of severe sepsis with multifocal pneumonia and symptomatic anemia and furthera dmitted on ivf along with iv abx and blood transfusions    Past Medical History:   Diagnosis Date    Anxiety     Borderline personality disorder     PTSD (post-traumatic stress disorder)     Substance abuse        Past Surgical History:   Procedure Laterality Date     SECTION, LOW TRANSVERSE      x4    COSMETIC SURGERY         Review of patient's allergies indicates:  No Known Allergies    No current facility-administered medications on file prior to encounter.      Current Outpatient Medications on File Prior to Encounter   Medication Sig    alprazolam (XANAX) 1 MG tablet Take 1 mg by mouth 3 (three) times daily as needed for Anxiety.    clindamycin (CLEOCIN) 150 MG capsule Take 2 capsules (300 mg total) by mouth 2 (two) times a day. for 10 days    hydrocodone-acetaminophen 10-325mg (NORCO)  mg Tab Take 1 tablet by mouth every 6 (six) hours as needed for Pain (as needed for pain).    mupirocin (BACTROBAN) 2 % ointment Apply topically 3 (three) times daily.     Family  History     None        Tobacco Use    Smoking status: Never Smoker   Substance and Sexual Activity    Alcohol use: Yes     Comment: occ    Drug use: Yes     Types: IV, Marijuana     Comment: no heroin x3 days    Sexual activity: Yes     Partners: Male     Birth control/protection: Partner-Vasectomy     Review of Systems   Constitutional: Positive for fatigue.   HENT: Negative.    Eyes: Negative.    Respiratory: Positive for shortness of breath.    Cardiovascular: Positive for palpitations.   Gastrointestinal: Negative.    Endocrine: Negative.    Genitourinary: Negative.    Musculoskeletal: Negative.    Skin: Positive for color change and wound.   Allergic/Immunologic: Negative.    Neurological: Negative.    Hematological: Negative.    Psychiatric/Behavioral: The patient is nervous/anxious.      Objective:     Vital Signs (Most Recent):  Temp: 98 °F (36.7 °C) (10/24/20 0709)  Pulse: (!) 116 (10/24/20 0725)  Resp: (!) 30 (10/24/20 0725)  BP: 115/84 (10/24/20 0709)  SpO2: 97 % (10/24/20 0725) Vital Signs (24h Range):  Temp:  [98 °F (36.7 °C)-99.9 °F (37.7 °C)] 98 °F (36.7 °C)  Pulse:  [116-155] 116  Resp:  [20-44] 30  SpO2:  [88 %-98 %] 97 %  BP: (101-121)/(71-84) 115/84     Weight: 43.9 kg (96 lb 12.5 oz)  Body mass index is 17.7 kg/m².    Physical Exam  Constitutional:       Appearance: Normal appearance.   HENT:      Head: Normocephalic and atraumatic.      Right Ear: Tympanic membrane normal.      Left Ear: Tympanic membrane normal.      Nose: Nose normal.      Mouth/Throat:      Mouth: Mucous membranes are dry.   Eyes:      Pupils: Pupils are equal, round, and reactive to light.   Neck:      Musculoskeletal: Normal range of motion and neck supple.   Cardiovascular:      Rate and Rhythm: Regular rhythm. Tachycardia present.      Pulses: Normal pulses.      Heart sounds: Normal heart sounds.   Pulmonary:      Effort: Respiratory distress present.      Breath sounds: Normal breath sounds.   Abdominal:       General: Abdomen is flat. Bowel sounds are normal.      Palpations: Abdomen is soft.   Musculoskeletal: Normal range of motion.         General: Swelling and tenderness present.   Skin:     General: Skin is warm and dry.      Capillary Refill: Capillary refill takes less than 2 seconds.   Neurological:      General: No focal deficit present.      Mental Status: She is alert.   Psychiatric:         Mood and Affect: Mood normal.         Behavior: Behavior normal.           CRANIAL NERVES     CN III, IV, VI   Pupils are equal, round, and reactive to light.      Significant Labs: All pertinent labs within the past 24 hours have been reviewed.  Lab Results   Component Value Date    WBC 16.81 (H) 10/24/2020    HGB 6.5 (L) 10/24/2020    HCT 20.2 (L) 10/24/2020    MCV 76 (L) 10/24/2020    PLT 63 (L) 10/24/2020       Recent Labs   Lab 10/24/20  0610   *   K 4.0      CO2 22*   BUN 36*   CREATININE 1.3   CALCIUM 7.9*       Significant Imaging: I have reviewed and interpreted all pertinent imaging results/findings within the past 24 hours.    Assessment/Plan:     Active Diagnoses:    Diagnosis Date Noted POA    Pneumonia of both lungs due to infectious organism [J18.9] 10/24/2020 Yes      Problems Resolved During this Admission:     VTE Risk Mitigation (From admission, onward)         Ordered     IP VTE LOW RISK PATIENT  Once      10/24/20 0501     Place KATHIA hose  Until discontinued      10/24/20 0501              Severe sepsis  Multifocal pneumonia  Anxiety  bioplar  H/o iv drug abuse  Symptomatic anemia  Tachycardia  thrombocytopenia    Plan :  ivf  Iv abx  Wean off o2 as yuliana  Labs in am  Transfuse 2 units prbc  Guarded prognosis  scds for dvt ppx  Code status full    Randal Ren MD  Department of Hospital Medicine   [facility]

## 2020-10-24 NOTE — NURSING
Patient complaints of chest pain unrelieved since dose of Lortab and Ativan. Patient has tremors. Appears pale, hr elevated, diaphoretic, SpO2 and blood pressure stable therefore I started blood transfusion at this time.   Respiratory notified for EKG, Dr. Tee notified of patients appearance and complaints of chest pain. New orders to pause blood and draw cardiac enzymes, administer solu-medrol 40mg once, benadryl 25mg once, morphine 1mg q4prn. EKG results given to Dr. Tee. Will continue to monitor.

## 2020-10-24 NOTE — PROGRESS NOTES
Pharmacokinetic Initial Assessment: IV Vancomycin    Assessment/Plan:    Initiate intravenous vancomycin with loading dose of 1000 mg once followed by a maintenance dose of vancomycin 750mg IV every 24 hours  Desired empiric serum trough concentration is 15 to 20 mcg/mL  Draw vancomycin trough prior to dose on 10/27 at approximately 1000.   Pharmacy will continue to follow and monitor vancomycin.      Please contact pharmacy at extension [phone] with any questions regarding this assessment.     Thank you for the consult,   BOLIVAR PACK       Patient brief summary:  Марина Britton is a 30 y.o. female initiated on antimicrobial therapy with IV Vancomycin for treatment of suspected  upper respiratory infection.     Drug Allergies:   Review of patient's allergies indicates:  No Known Allergies    Actual Body Weight:   43.9kg     Renal Function:   Estimated Creatinine Clearance: 43.5 mL/min (based on SCr of 1.3 mg/dL).,     Dialysis Method (if applicable):  N/A    CBC (last 72 hours):  Recent Labs   Lab Result Units 10/24/20  0145 10/24/20  0610   WBC K/uL 19.67* 16.81*   Hemoglobin g/dL 7.2* 6.5*   Hematocrit % 22.4* 20.2*   Platelets K/uL 69* 63*   Gran% % 74.0* 76.0*   Lymph% % 4.0* 4.0*   Mono% % 3.0* 1.0*   Eosinophil% % 0.0 0.0   Basophil% % 0.0 0.0   Differential Method  Manual Manual       Metabolic Panel (last 72 hours):  Recent Labs   Lab Result Units 10/24/20  0145 10/24/20  0224 10/24/20  0225 10/24/20  0610   Sodium mmol/L 132*  --   --  134*   Potassium mmol/L 3.8  --   --  4.0   Chloride mmol/L 100  --   --  103   CO2 mmol/L 23  --   --  22*   Glucose mg/dL 101  --   --  105   Glucose, UA   --   --  Negative  --    BUN, Bld mg/dL 37*  --   --  36*   Creatinine mg/dL 1.4  --   --  1.3   Creatinine, Random Ur mg/dL  --  158.0  --   --    Albumin g/dL 1.8*  --   --  1.5*   Total Bilirubin mg/dL 0.5  --   --  0.5   Alkaline Phosphatase U/L 186*  --   --  151*   AST U/L 38  --   --  33   ALT U/L 20  --   --  18        Drug levels (last 3 results):  No results for input(s): VANCOMYCINRA, VANCOMYCINPE, VANCOMYCINTR in the last 72 hours.    Microbiologic Results:  Microbiology Results (last 7 days)       Procedure Component Value Units Date/Time    Blood culture #2 **CANNOT BE ORDERED STAT** [[id number]] Collected: 10/24/20 0610    Order Status: Sent Specimen: Blood Updated: 10/24/20 0611    Urine culture [[id number]] Collected: 10/24/20 0225    Order Status: No result Specimen: Urine Updated: 10/24/20 0310    Blood culture #1 **CANNOT BE ORDERED STAT** [[id number]] Collected: 10/24/20 0145    Order Status: Sent Specimen: Blood Updated: 10/24/20 0145

## 2020-10-24 NOTE — PLAN OF CARE
Pt noted tachypneic, new order for neb treatment due to elevated heart rate. Wound to left foot/ankle clean and dry with slight redness and edema to the edges. HOB elevated, room temp adjusted, cool cloth to the forehead to cool pt off, voices feeling warm. O2 93% and temp 98.4. continue to observe.

## 2020-10-24 NOTE — NURSING
Pt requesting neb treatment, elevated heart rate. Dr. Tee informed, new order for Xopenex 1.25mg every 4 hours for now. No further orders at this time.

## 2020-10-24 NOTE — ED PROVIDER NOTES
Encounter Date: 10/23/2020       History     Chief Complaint   Patient presents with    Foot Pain     Fever and left foot pain for the past 3 days. Foot pain worse today Here 3 days ago for foot abscess, prescribed clindamycin.    Chest Pain     Sharp chest pain when taking a deep breath that started earlier today, Denies n/v. +SOB     30-year-old female presents with ulcer to left ankle.  Patient states has been present for over a month.  She states that she has been a a intravenous heroin user last use 6 days ago per patient.  She has been evaluated multiple times at [facility] for same ulcer.  She has been on several rounds of antibiotics clindamycin and Bactrim but has not followed up with Podiatry as instructed.  She states that several days ago she was running a fever of 105° F. she has no fever today.  She is also complaining of chest pains and palpitations        Review of patient's allergies indicates:  No Known Allergies  Past Medical History:   Diagnosis Date    Anxiety     Borderline personality disorder     PTSD (post-traumatic stress disorder)     Substance abuse      Past Surgical History:   Procedure Laterality Date     SECTION, LOW TRANSVERSE      x4    COSMETIC SURGERY       History reviewed. No pertinent family history.  Social History     Tobacco Use    Smoking status: Never Smoker   Substance Use Topics    Alcohol use: Yes     Comment: occ    Drug use: Yes     Types: IV, Marijuana     Comment: no heroin x3 days     Review of Systems   Constitutional: Positive for fever.   Cardiovascular: Positive for chest pain and palpitations.   Musculoskeletal:        Left ankle ulcer   All other systems reviewed and are negative.      Physical Exam     Initial Vitals   BP Pulse Resp Temp SpO2   10/24/20 0007 10/24/20 0028 10/24/20 0028 10/24/20 0007 10/24/20 0028   121/73 (!) 155 (!) 22 98.2 °F (36.8 °C) (!) 88 %      MAP       --                Physical  Exam    Nursing note and vitals reviewed.  Constitutional: She appears well-developed and well-nourished.   HENT:   Head: Atraumatic.   Mouth/Throat: Oropharynx is clear and moist.   Eyes: EOM are normal. Pupils are equal, round, and reactive to light.   Neck: Neck supple.   Cardiovascular: Regular rhythm and normal heart sounds.   Tachycardia heart rate approximately 150 beats per min   Pulmonary/Chest: Breath sounds normal. No respiratory distress.   Abdominal: Soft. There is no abdominal tenderness.   Musculoskeletal:      Comments: 2 cm ulcers to left medial ankle, there is no surrounding cellulitis, there is no purulence exudate   Neurological: She is alert and oriented to person, place, and time. GCS score is 15. GCS eye subscore is 4. GCS verbal subscore is 5. GCS motor subscore is 6.   Skin: Skin is warm and dry.         ED Course   Procedures  Labs Reviewed   CBC W/ AUTO DIFFERENTIAL - Abnormal; Notable for the following components:       Result Value    WBC 19.67 (*)     RBC 2.96 (*)     Hemoglobin 7.2 (*)     Hematocrit 22.4 (*)     Mean Corpuscular Volume 76 (*)     Mean Corpuscular Hemoglobin 24.3 (*)     RDW 16.2 (*)     Platelets 69 (*)     Gran% 74.0 (*)     Lymph% 4.0 (*)     Mono% 3.0 (*)     Platelet Estimate Decreased (*)     All other components within normal limits   COMPREHENSIVE METABOLIC PANEL - Abnormal; Notable for the following components:    Sodium 132 (*)     BUN, Bld 37 (*)     Calcium 8.3 (*)     Albumin 1.8 (*)     Alkaline Phosphatase 186 (*)     eGFR if  57.8 (*)     eGFR if non  50.1 (*)     All other components within normal limits   C-REACTIVE PROTEIN - Abnormal; Notable for the following components:    CRP 31.00 (*)     All other components within normal limits   D DIMER, QUANTITATIVE - Abnormal; Notable for the following components:    D-Dimer 8.22 (*)     All other components within normal limits   TROPONIN I - Abnormal; Notable for the  following components:    Troponin I 0.055 (*)     All other components within normal limits   PROTIME-INR - Abnormal; Notable for the following components:    Prothrombin Time 13.2 (*)     INR 1.3 (*)     All other components within normal limits   URINALYSIS, REFLEX TO URINE CULTURE - Abnormal; Notable for the following components:    Appearance, UA Cloudy (*)     Protein, UA 2+ (*)     Ketones, UA Trace (*)     Occult Blood UA Trace (*)     Leukocytes, UA 1+ (*)     All other components within normal limits    Narrative:     Specimen Source->Urine   URINALYSIS MICROSCOPIC - Abnormal; Notable for the following components:    WBC, UA 27 (*)     Hyaline Casts, UA 5 (*)     Wbc Casts, Ua 4 (*)     All other components within normal limits    Narrative:     Specimen Source->Urine   CULTURE, BLOOD   CULTURE, BLOOD   CULTURE, URINE   APTT   DRUG SCREEN PANEL, URINE EMERGENCY    Narrative:     Specimen Source->Urine   PREGNANCY TEST, URINE RAPID    Narrative:     Specimen Source->Urine   SARS-COV-2 RNA AMPLIFICATION, QUAL          Imaging Results          X-Ray Foot Complete Left (In process)                X-Ray Chest 1 View (Preliminary result)  Result time 10/24/20 01:59:52    ED Interpretation by Leonel Cochran MD (10/24/20 01:59:52, [facility], Emergency Medicine)    Bilateral patchy infiltrates                               Medical Decision Making:   ED Management:  Patient noted to have bilateral patchy infiltrates on chest x-ray.  He is also found to be anemic in with a [place] count of 71971.  Her chemistry also suggest a degree of dehydration she was given intravenous fluids and IV antibiotics and will be admitted to Internal Med                             Clinical Impression:       ICD-10-CM ICD-9-CM   1. Pneumonia of both lungs due to infectious organism, unspecified part of lung  J18.9 483.8   2. Ulcer of ankle  L97.309 707.13   3. Chest pain  R07.9 786.50                           ED Disposition Condition    Admit                             Leonel Cochran MD  10/24/20 5749

## 2020-10-25 LAB
ALBUMIN SERPL BCP-MCNC: 1.4 G/DL (ref 3.5–5.2)
ALP SERPL-CCNC: 154 U/L (ref 55–135)
ALT SERPL W/O P-5'-P-CCNC: 13 U/L (ref 10–44)
ANION GAP SERPL CALC-SCNC: 9 MMOL/L (ref 8–16)
AST SERPL-CCNC: 25 U/L (ref 10–40)
BASOPHILS # BLD AUTO: ABNORMAL K/UL (ref 0–0.2)
BASOPHILS NFR BLD: 0 % (ref 0–1.9)
BILIRUB SERPL-MCNC: 0.3 MG/DL (ref 0.1–1)
BLD PROD TYP BPU: NORMAL
BLD PROD TYP BPU: NORMAL
BLOOD UNIT EXPIRATION DATE: NORMAL
BLOOD UNIT EXPIRATION DATE: NORMAL
BLOOD UNIT TYPE CODE: 5100
BLOOD UNIT TYPE CODE: 5100
BLOOD UNIT TYPE: NORMAL
BLOOD UNIT TYPE: NORMAL
BUN SERPL-MCNC: 25 MG/DL (ref 6–20)
CALCIUM SERPL-MCNC: 7.7 MG/DL (ref 8.7–10.5)
CHLORIDE SERPL-SCNC: 109 MMOL/L (ref 95–110)
CO2 SERPL-SCNC: 23 MMOL/L (ref 23–29)
CODING SYSTEM: NORMAL
CODING SYSTEM: NORMAL
CORRECTED TEMPERATURE (PCO2): 33.7 MMHG
CORRECTED TEMPERATURE (PH): 7.43
CORRECTED TEMPERATURE (PO2): 67.9 MMHG
CREAT SERPL-MCNC: 0.8 MG/DL (ref 0.5–1.4)
DIFFERENTIAL METHOD: ABNORMAL
DISPENSE STATUS: NORMAL
DISPENSE STATUS: NORMAL
EOSINOPHIL # BLD AUTO: ABNORMAL K/UL (ref 0–0.5)
EOSINOPHIL NFR BLD: 0 % (ref 0–8)
ERYTHROCYTE [DISTWIDTH] IN BLOOD BY AUTOMATED COUNT: 16.4 % (ref 11.5–14.5)
EST. GFR  (AFRICAN AMERICAN): >60 ML/MIN/1.73 M^2
EST. GFR  (NON AFRICAN AMERICAN): >60 ML/MIN/1.73 M^2
FIO2: 28 %
GLUCOSE SERPL-MCNC: 154 MG/DL (ref 70–110)
HCO3 UR-SCNC: 23.3 MMOL/L
HCT VFR BLD AUTO: 23 % (ref 37–48.5)
HGB BLD-MCNC: 7.7 G/DL (ref 12–16)
IMM GRANULOCYTES # BLD AUTO: ABNORMAL K/UL (ref 0–0.04)
IMM GRANULOCYTES NFR BLD AUTO: ABNORMAL % (ref 0–0.5)
LPM: 2
LYMPHOCYTES # BLD AUTO: ABNORMAL K/UL (ref 1–4.8)
LYMPHOCYTES NFR BLD: 8 % (ref 18–48)
MCH RBC QN AUTO: 25.9 PG (ref 27–31)
MCHC RBC AUTO-ENTMCNC: 33.5 G/DL (ref 32–36)
MCV RBC AUTO: 77 FL (ref 82–98)
MONOCYTES # BLD AUTO: ABNORMAL K/UL (ref 0.3–1)
MONOCYTES NFR BLD: 2 % (ref 4–15)
NEUTROPHILS NFR BLD: 61 % (ref 38–73)
NEUTS BAND NFR BLD MANUAL: 29 %
NOTIFIED BY: ABNORMAL
NRBC BLD-RTO: 0 /100 WBC
NUM UNITS TRANS PACKED RBC: NORMAL
NUM UNITS TRANS PACKED RBC: NORMAL
O2DEVICE: ABNORMAL
PCO2 BLDA: 33.7 MMHG (ref 35–45)
PH SMN: 7.43 [PH] (ref 7.34–7.45)
PLATELET # BLD AUTO: 68 K/UL (ref 150–350)
PMV BLD AUTO: 11.5 FL (ref 9.2–12.9)
PO2 BLDA: 67.9 MMHG (ref 80–100)
POC BASE DEFICIT: -1.6 MMOL/L
POC NOTIFIED NOTE: ABNORMAL
POC PERFORMED BY: ABNORMAL
POC SATURATED O2: 94.7 %
POC TCO2: 21.5 MMOL/L
POC TEMPERATURE: 37 C
POTASSIUM SERPL-SCNC: 4 MMOL/L (ref 3.5–5.1)
PROT SERPL-MCNC: 6.1 G/DL (ref 6–8.4)
PROVIDER NOTIFIED: ABNORMAL
RBC # BLD AUTO: 2.97 M/UL (ref 4–5.4)
SODIUM SERPL-SCNC: 141 MMOL/L (ref 136–145)
SPECIMEN SOURCE: ABNORMAL
WBC # BLD AUTO: 25.66 K/UL (ref 3.9–12.7)

## 2020-10-25 PROCEDURE — 63600175 PHARM REV CODE 636 W HCPCS: Performed by: INTERNAL MEDICINE

## 2020-10-25 PROCEDURE — 25000003 PHARM REV CODE 250: Performed by: INTERNAL MEDICINE

## 2020-10-25 PROCEDURE — 82803 BLOOD GASES ANY COMBINATION: CPT

## 2020-10-25 PROCEDURE — 36600 WITHDRAWAL OF ARTERIAL BLOOD: CPT

## 2020-10-25 PROCEDURE — 20000000 HC ICU ROOM

## 2020-10-25 PROCEDURE — 80053 COMPREHEN METABOLIC PANEL: CPT

## 2020-10-25 PROCEDURE — 94640 AIRWAY INHALATION TREATMENT: CPT

## 2020-10-25 PROCEDURE — 36415 COLL VENOUS BLD VENIPUNCTURE: CPT

## 2020-10-25 PROCEDURE — 27000221 HC OXYGEN, UP TO 24 HOURS

## 2020-10-25 PROCEDURE — 86703 HIV-1/HIV-2 1 RESULT ANTBDY: CPT

## 2020-10-25 PROCEDURE — 85007 BL SMEAR W/DIFF WBC COUNT: CPT

## 2020-10-25 PROCEDURE — 99900035 HC TECH TIME PER 15 MIN (STAT)

## 2020-10-25 PROCEDURE — 94761 N-INVAS EAR/PLS OXIMETRY MLT: CPT

## 2020-10-25 PROCEDURE — 25000242 PHARM REV CODE 250 ALT 637 W/ HCPCS: Performed by: INTERNAL MEDICINE

## 2020-10-25 PROCEDURE — 85027 COMPLETE CBC AUTOMATED: CPT

## 2020-10-25 PROCEDURE — 80074 ACUTE HEPATITIS PANEL: CPT

## 2020-10-25 PROCEDURE — 36430 TRANSFUSION BLD/BLD COMPNT: CPT

## 2020-10-25 PROCEDURE — P9016 RBC LEUKOCYTES REDUCED: HCPCS

## 2020-10-25 RX ORDER — ENOXAPARIN SODIUM 100 MG/ML
1 INJECTION SUBCUTANEOUS ONCE
Status: COMPLETED | OUTPATIENT
Start: 2020-10-25 | End: 2020-10-25

## 2020-10-25 RX ORDER — ENOXAPARIN SODIUM 100 MG/ML
40 INJECTION SUBCUTANEOUS EVERY 12 HOURS
Status: DISCONTINUED | OUTPATIENT
Start: 2020-10-25 | End: 2020-10-26

## 2020-10-25 RX ORDER — LEVOFLOXACIN 5 MG/ML
500 INJECTION, SOLUTION INTRAVENOUS
Status: DISCONTINUED | OUTPATIENT
Start: 2020-10-25 | End: 2020-10-26 | Stop reason: HOSPADM

## 2020-10-25 RX ORDER — HYDROCODONE BITARTRATE AND ACETAMINOPHEN 500; 5 MG/1; MG/1
TABLET ORAL
Status: DISCONTINUED | OUTPATIENT
Start: 2020-10-25 | End: 2020-10-26 | Stop reason: HOSPADM

## 2020-10-25 RX ORDER — DIPHENHYDRAMINE HYDROCHLORIDE 50 MG/ML
25 INJECTION INTRAMUSCULAR; INTRAVENOUS EVERY 6 HOURS PRN
Status: DISCONTINUED | OUTPATIENT
Start: 2020-10-25 | End: 2020-10-26 | Stop reason: HOSPADM

## 2020-10-25 RX ADMIN — ALPRAZOLAM 1 MG: 0.5 TABLET ORAL at 08:10

## 2020-10-25 RX ADMIN — MORPHINE SULFATE 1 MG: 2 INJECTION, SOLUTION INTRAMUSCULAR; INTRAVENOUS at 03:10

## 2020-10-25 RX ADMIN — LEVALBUTEROL 1.25 MG: 1.25 SOLUTION, CONCENTRATE RESPIRATORY (INHALATION) at 08:10

## 2020-10-25 RX ADMIN — VANCOMYCIN HYDROCHLORIDE 750 MG: 750 INJECTION, POWDER, LYOPHILIZED, FOR SOLUTION INTRAVENOUS at 10:10

## 2020-10-25 RX ADMIN — LEVALBUTEROL 1.25 MG: 1.25 SOLUTION, CONCENTRATE RESPIRATORY (INHALATION) at 11:10

## 2020-10-25 RX ADMIN — TRAMADOL HYDROCHLORIDE 50 MG: 50 TABLET ORAL at 03:10

## 2020-10-25 RX ADMIN — TRAMADOL HYDROCHLORIDE 50 MG: 50 TABLET ORAL at 11:10

## 2020-10-25 RX ADMIN — ALPRAZOLAM 1 MG: 0.5 TABLET ORAL at 03:10

## 2020-10-25 RX ADMIN — PIPERACILLIN AND TAZOBACTAM 4.5 G: 4; .5 INJECTION, POWDER, LYOPHILIZED, FOR SOLUTION INTRAVENOUS; PARENTERAL at 01:10

## 2020-10-25 RX ADMIN — ENOXAPARIN SODIUM 40 MG: 40 INJECTION SUBCUTANEOUS at 09:10

## 2020-10-25 RX ADMIN — LEVALBUTEROL 1.25 MG: 1.25 SOLUTION, CONCENTRATE RESPIRATORY (INHALATION) at 03:10

## 2020-10-25 RX ADMIN — SODIUM CHLORIDE: 0.9 INJECTION, SOLUTION INTRAVENOUS at 02:10

## 2020-10-25 RX ADMIN — PIPERACILLIN AND TAZOBACTAM 4.5 G: 4; .5 INJECTION, POWDER, LYOPHILIZED, FOR SOLUTION INTRAVENOUS; PARENTERAL at 06:10

## 2020-10-25 RX ADMIN — MORPHINE SULFATE 1 MG: 2 INJECTION, SOLUTION INTRAMUSCULAR; INTRAVENOUS at 12:10

## 2020-10-25 RX ADMIN — PIPERACILLIN AND TAZOBACTAM 4.5 G: 4; .5 INJECTION, POWDER, LYOPHILIZED, FOR SOLUTION INTRAVENOUS; PARENTERAL at 09:10

## 2020-10-25 RX ADMIN — MORPHINE SULFATE 1 MG: 2 INJECTION, SOLUTION INTRAMUSCULAR; INTRAVENOUS at 08:10

## 2020-10-25 RX ADMIN — LEVOFLOXACIN 500 MG: 500 INJECTION, SOLUTION INTRAVENOUS at 12:10

## 2020-10-25 RX ADMIN — ENOXAPARIN SODIUM 40 MG: 60 INJECTION SUBCUTANEOUS at 12:10

## 2020-10-25 RX ADMIN — ALPRAZOLAM 1 MG: 0.5 TABLET ORAL at 11:10

## 2020-10-25 RX ADMIN — HYDROCODONE BITARTRATE AND ACETAMINOPHEN 1 TABLET: 10; 325 TABLET ORAL at 06:10

## 2020-10-25 RX ADMIN — DIPHENHYDRAMINE HYDROCHLORIDE 25 MG: 50 INJECTION INTRAMUSCULAR; INTRAVENOUS at 01:10

## 2020-10-25 RX ADMIN — DIPHENHYDRAMINE HYDROCHLORIDE 25 MG: 50 INJECTION INTRAMUSCULAR; INTRAVENOUS at 07:10

## 2020-10-25 RX ADMIN — MORPHINE SULFATE 1 MG: 2 INJECTION, SOLUTION INTRAMUSCULAR; INTRAVENOUS at 11:10

## 2020-10-25 RX ADMIN — ENOXAPARIN SODIUM 40 MG: 40 INJECTION SUBCUTANEOUS at 08:10

## 2020-10-25 RX ADMIN — MORPHINE SULFATE 1 MG: 2 INJECTION, SOLUTION INTRAMUSCULAR; INTRAVENOUS at 04:10

## 2020-10-25 RX ADMIN — MORPHINE SULFATE 1 MG: 2 INJECTION, SOLUTION INTRAMUSCULAR; INTRAVENOUS at 07:10

## 2020-10-25 RX ADMIN — HYDROCODONE BITARTRATE AND ACETAMINOPHEN 1 TABLET: 10; 325 TABLET ORAL at 02:10

## 2020-10-25 NOTE — PROGRESS NOTES
Principal Problem:<principal problem not specified>     Chief Complaint:        Chief Complaint   Patient presents with    Foot Pain       Fever and left foot pain for the past 3 days. Foot pain worse today Here 3 days ago for foot abscess, prescribed clindamycin.    Chest Pain       Sharp chest pain when taking a deep breath that started earlier today, Denies n/v. +SOB         HPI: 29 y/o female with history of iv drug abuse presented to the ed with complaints of worsening cough and sob and nonhealing left ankle ulcera nd further work up suggestive of severe sepsis with multifocal pneumonia and symptomatic anemia and furthera dmitted on ivf along with iv abx and blood transfusions    Today's info ; seen and examined, mirlande pe on ct, worse respi status  h and h not much improvement  Labile respi statsus          Past Medical History:   Diagnosis Date    Anxiety      Borderline personality disorder      PTSD (post-traumatic stress disorder)      Substance abuse                 Past Surgical History:   Procedure Laterality Date     SECTION, LOW TRANSVERSE         x4    COSMETIC SURGERY             Review of patient's allergies indicates:  No Known Allergies     No current facility-administered medications on file prior to encounter.            Current Outpatient Medications on File Prior to Encounter   Medication Sig    alprazolam (XANAX) 1 MG tablet Take 1 mg by mouth 3 (three) times daily as needed for Anxiety.    clindamycin (CLEOCIN) 150 MG capsule Take 2 capsules (300 mg total) by mouth 2 (two) times a day. for 10 days    hydrocodone-acetaminophen 10-325mg (NORCO)  mg Tab Take 1 tablet by mouth every 6 (six) hours as needed for Pain (as needed for pain).    mupirocin (BACTROBAN) 2 % ointment Apply topically 3 (three) times daily.          Family History      None                Tobacco Use    Smoking status: Never Smoker   Substance and Sexual Activity    Alcohol use: Yes       Comment:  occ    Drug use: Yes       Types: IV, Marijuana       Comment: no heroin x3 days    Sexual activity: Yes       Partners: Male       Birth control/protection: Partner-Vasectomy      Review of Systems   Constitutional: Positive for fatigue.   HENT: Negative.    Eyes: Negative.    Respiratory: Positive for shortness of breath.    Cardiovascular: Positive for palpitations.   Gastrointestinal: Negative.    Endocrine: Negative.    Genitourinary: Negative.    Musculoskeletal: Negative.    Skin: Positive for color change and wound.   Allergic/Immunologic: Negative.    Neurological: Negative.    Hematological: Negative.    Psychiatric/Behavioral: The patient is nervous/anxious.       Objective:      Vitals:    10/25/20 0408 10/25/20 0650 10/25/20 0807 10/25/20 0812   BP:  124/83     BP Location:  Left arm     Patient Position:  Lying     Pulse:  102  104   Resp: (!) 44 (!) 24 (!) 22 (!) 22   Temp:  98.2 °F (36.8 °C)     TempSrc:  Oral     SpO2:  96%  96%   Weight:       Height:           Weight: 43.9 kg (96 lb 12.5 oz)  Body mass index is 17.7 kg/m².     Physical Exam  Constitutional:       Appearance: Normal appearance.   HENT:      Head: Normocephalic and atraumatic.      Right Ear: Tympanic membrane normal.      Left Ear: Tympanic membrane normal.      Nose: Nose normal.      Mouth/Throat:      Mouth: Mucous membranes are dry.   Eyes:      Pupils: Pupils are equal, round, and reactive to light.   Neck:      Musculoskeletal: Normal range of motion and neck supple.   Cardiovascular:      Rate and Rhythm: Regular rhythm. Tachycardia present.      Pulses: Normal pulses.      Heart sounds: Normal heart sounds.   Pulmonary:      Effort: Respiratory distress present.      Breath sounds: Normal breath sounds.   Abdominal:      General: Abdomen is flat. Bowel sounds are normal.      Palpations: Abdomen is soft.   Musculoskeletal: Normal range of motion.         General: Swelling and tenderness present.   Skin:     General: Skin  is warm and dry.      Capillary Refill: Capillary refill takes less than 2 seconds.   Neurological:      General: No focal deficit present.      Mental Status: She is alert.   Psychiatric:         Mood and Affect: Mood normal.         Behavior: Behavior normal.               CRANIAL NERVES      CN III, IV, VI   Pupils are equal, round, and reactive to light.        Significant Labs: All pertinent labs within the past 24 hours have been reviewed.  Lab Results   Component Value Date    WBC 25.66 (H) 10/25/2020    HGB 7.7 (L) 10/25/2020    HCT 23.0 (L) 10/25/2020    MCV 77 (L) 10/25/2020    PLT 68 (L) 10/25/2020       Recent Labs   Lab 10/25/20  0528      K 4.0      CO2 23   BUN 25*   CREATININE 0.8   CALCIUM 7.7*          Significant Imaging: I have reviewed and interpreted all pertinent imaging results/findings within the past 24 hours.     Assessment/Plan:            Active Diagnoses:     Diagnosis Date Noted POA    Pneumonia of both lungs due to infectious organism [J18.9] 10/24/2020 Yes       Problems Resolved During this Admission:          VTE Risk Mitigation (From admission, onward)                  Ordered        IP VTE LOW RISK PATIENT  Once      10/24/20 0501        Place KATHIA hose  Until discontinued      10/24/20 0501                  Severe sepsis  Multifocal pneumonia  Anxiety  bioplar  H/o iv drug abuse  Symptomatic anemia  Tachycardia  thrombocytopenia     Plan :  ivf  Iv abx  Wean off o2 as yuliana  Labs in am  Transfuse 2 units prbc  Guarded prognosis  scds for dvt ppx  Code status full    dispo : bilateral pe on ct  transfuse 2 more units  Add levaquin for broader coverage  Transfer to icu  appreciate cardio input  Echo  abg stat  lovenox full dose  hiv and hep panel  ?opportunist infection cannot be excluded considering her iv drug use

## 2020-10-25 NOTE — NURSING
First unit of PRBCs complete. Line flushing with normal saline. Stop vitals WNL. Second unit requested from lab.

## 2020-10-25 NOTE — PLAN OF CARE
Pt noted with pulmonary emboli, in addition to pneumonia. Elevated respirations. No coughing noted. Increase c/o and request for pain med. Voices not feeling the pain med pain. Pt assured with prescribed meds we will manage her pain, pt in agreement. Will continue to observe.

## 2020-10-25 NOTE — NURSING
Lab unable to receive order for a 2nd unit of blood to transfuse. Order for both units have been released earlier today for preparation however only 1 unit administered. Dr. Tee called and verified that patient is to receive 2 units total. I placed an additional order for 1 unit of prbcs to prevent any further delay. New order for CTA of chest for possible PE. Will continue to monitor.

## 2020-10-25 NOTE — NURSING
Patient transferred to icu. Report given to MILLY Mike. Stable upon transfer with 2l of o2 via bed.

## 2020-10-25 NOTE — NURSING
Dr. Tee informed of positive blood cultures and pt's request to increase pain med. No new orders at this time.

## 2020-10-25 NOTE — NURSING
Patient denies any complaints. Tolerating blood transfusion administration well. 15 minute vitals WNL. Rate increased, infusing via pump. Instructed patient to call if needing anything, patient verbalized understanding.

## 2020-10-25 NOTE — CONSULTS
Cardiology Consultation Note  [facility]    Today's Date:  10/25/2020  Patient Name: Марина Britton    MRN: [id number]    Code Status: Full Code     Attending Physician: Delmer Garcia III, MD   Consulting Physician: BLANCA Marr MD    Reason for Consult:  1.  Pulmonary embolus  2.  Elevated troponin  3.  Sepsis  4.  Dyspnea  5.  Anemia    Subjective:   Patient doing very poorly this morning.  She is complaining of shortness of breath, and chest pain radiates throughout her upper chest.    Past Medical History:  Past Medical History:   Diagnosis Date    Anxiety     Borderline personality disorder     PTSD (post-traumatic stress disorder)     Substance abuse        Past Surgical History:   Procedure Laterality Date     SECTION, LOW TRANSVERSE      x4    COSMETIC SURGERY         History reviewed. No pertinent family history.    Social History     Socioeconomic History    Marital status: Single     Spouse name: Not on file    Number of children: Not on file    Years of education: Not on file    Highest education level: Not on file   Occupational History    Not on file   Social Needs    Financial resource strain: Not on file    Food insecurity     Worry: Not on file     Inability: Not on file    Transportation needs     Medical: Not on file     Non-medical: Not on file   Tobacco Use    Smoking status: Never Smoker   Substance and Sexual Activity    Alcohol use: Yes     Comment: occ    Drug use: Yes     Types: IV, Marijuana     Comment: no heroin x3 days    Sexual activity: Yes     Partners: Male     Birth control/protection: Partner-Vasectomy   Lifestyle    Physical activity     Days per week: Not on file     Minutes per session: Not on file    Stress: Not on file   Relationships    Social connections     Talks on phone: Not on file     Gets together: Not on file     Attends Jew service: Not on file     Active member of club or organization: Not on file     Attends meetings  of clubs or organizations: Not on file     Relationship status: Not on file   Other Topics Concern    Not on file   Social History Narrative    Not on file       Medications:  Current Facility-Administered Medications   Medication Dose Route Frequency Provider Last Rate Last Dose    0.9%  NaCl infusion (for blood administration)   Intravenous Q24H PRN Randal Ren MD        0.9%  NaCl infusion (for blood administration)   Intravenous Q24H PRN Randal Ren MD        0.9%  NaCl infusion   Intravenous Continuous Randal Ren  mL/hr at 10/24/20 0542      acetaminophen tablet 650 mg  650 mg Oral Q8H PRN Randal Ren MD        acetaminophen tablet 650 mg  650 mg Oral Q8H PRN Randal Ren MD        ALPRAZolam tablet 1 mg  1 mg Oral TID PRN Randal Ren MD   1 mg at 10/25/20 0808    enoxaparin injection 40 mg  40 mg Subcutaneous Q12H Randal Ren MD   40 mg at 10/25/20 0806    HYDROcodone-acetaminophen  mg per tablet 1 tablet  1 tablet Oral Q6H PRN Randal Ren MD   1 tablet at 10/25/20 0251    levalbuterol nebulizer solution 1.25 mg  1.25 mg Nebulization Q4H Randal Ren MD   1.25 mg at 10/25/20 0812    morphine injection 1 mg  1 mg Intravenous Q4H PRN Randal Ren MD   1 mg at 10/25/20 0807    ondansetron injection 4 mg  4 mg Intravenous Q8H PRN Randal Ren MD        piperacillin-tazobactam 4.5 g in dextrose 5 % 100 mL IVPB (ready to mix system)  4.5 g Intravenous Q8H Randal Ren MD 25 mL/hr at 10/25/20 0611 4.5 g at 10/25/20 0611    sodium chloride 0.9% flush 10 mL  10 mL Intravenous PRN Randal Ren MD        traMADoL tablet 50 mg  50 mg Oral Q6H PRN Randal Ren MD        vancomycin 750 mg in dextrose 5 % 250 mL IVPB (ready to mix system)  750 mg Intravenous Q24H Randal Ren  mL/hr at 10/25/20 1026 750 mg at 10/25/20 1026       Allergies:  Review of patient's  allergies indicates:  No Known Allergies     Review of Systems   Constitution: Positive for decreased appetite and malaise/fatigue. Negative for fever.   Cardiovascular: Positive for chest pain and dyspnea on exertion.   Respiratory: Positive for cough and shortness of breath.    Skin: Positive for suspicious lesions. Negative for nail changes.   Neurological: Positive for weakness.   Psychiatric/Behavioral: Positive for substance abuse.   Allergic/Immunologic: Positive for persistent infections.       Vital Signs (Most Recent):  Temp: 98.2 °F (36.8 °C) (10/25/20 0650)  Pulse: 104 (10/25/20 0812)  Resp: (!) 22 (10/25/20 0812)  BP: 124/83 (10/25/20 0650)  SpO2: 96 % (10/25/20 0812) Vital Signs (24h Range):  Temp:  [97.5 °F (36.4 °C)-99 °F (37.2 °C)] 98.2 °F (36.8 °C)  Pulse:  [102-126] 104  Resp:  [20-48] 22  SpO2:  [91 %-100 %] 96 %  BP: (101-124)/(58-83) 124/83     Weight: 43.9 kg (96 lb 12.5 oz)  Body mass index is 17.7 kg/m².    SpO2: 96 %  O2 Device (Oxygen Therapy): nasal cannula      Intake/Output Summary (Last 24 hours) at 10/25/2020 1029  Last data filed at 10/25/2020 0600  Gross per 24 hour   Intake 3164.25 ml   Output 3200 ml   Net -35.75 ml       Physical Exam  General:  Dyspneic; pale; emaciated; dry oral mucosa.  Heent:  No jugular venous distention or carotid bruits.  Lungs:  Without wheezing.  Heart:  Regular rhythm; tachycardic, grade 2 holosystolic murmer.  Extremities:  No lower extremity edema; track marks on both upper extremities.  No evidence of embolic phenomenon.    Labs:   CMP   Recent Labs   Lab 10/24/20  0145 10/24/20  0610 10/25/20  0528   * 134* 141   K 3.8 4.0 4.0    103 109   CO2 23 22* 23    105 154*   BUN 37* 36* 25*   CREATININE 1.4 1.3 0.8   CALCIUM 8.3* 7.9* 7.7*   PROT 6.9 6.0 6.1   ALBUMIN 1.8* 1.5* 1.4*   BILITOT 0.5 0.5 0.3   ALKPHOS 186* 151* 154*   AST 38 33 25   ALT 20 18 13   ANIONGAP 9 9 9   ESTGFRAFRICA 57.8* >60.0 >60.0   EGFRNONAA 50.1* 54.8* >60.0    , CBC   Recent Labs   Lab 10/24/20  0145 10/24/20  0610 10/25/20  0528   WBC 19.67* 16.81* 25.66*   HGB 7.2* 6.5* 7.7*   HCT 22.4* 20.2* 23.0*   PLT 69* 63* 68*    and INR   Recent Labs   Lab 10/24/20  0145   INR 1.3*     TECHNIQUE:  Pulmonary CT angiogram with IV contrast.  Coronal MIP reconstructions prepared.  Iterative reconstruction utilized.     FINDINGS:  Filling defects within left lower lobe pulmonary arterial branches and right upper lobe compatible with emboli.  No large central embolus.  Otherwise, suboptimal opacification of the pulmonary arterial system along with artifact degrading image quality.     Extensive multifocal areas consolidation within both lungs, small nodular opacities, and a few small cavitary lesions within the right upper and left lower lobes.     Mildly enlarged subcarinal lymph node.     Visualized liver is enlarged and fatty infiltrated.     Impression:     Positive for pulmonary emboli.  Peripheral right upper and left lower lobe emboli.  No large central emboli.     Extensive bilateral lung opacities compatible with pneumonia.  Given nodules and small cavitary lesions, septic emboli and metastatic disease are also within the differential     Minimal bilateral effusions, hepatic steatosis        Electronically signed by: Rubi Douglas MD  Date:                                            10/24/2020  Time:                                           20:31      COVID negative.    Assessment and Plan:   1.  Pneumonia:  Patient with bilateral infiltrates, with an elevated white count.  · Continue with supportive care i.e., antibiotic, fluid, and oxygen.    2.  Anemia:  Unsure of chronicity.  She is status post transfusion yesterday, but the patient remained dyspneic and profoundly anemic.  · Transfuse with 2 more units of packed red blood cells.    3.  Bilateral pulmonary emboli:  Patient with symptoms of chest pain, shortness of breath and probable hypoxemia.  Given history of iv  drug abuse, very concerned about septic emboli secondary to bacterial endocarditis (right side) with staph high in the differential.  · Arterial blood gas.  · Blood cultures  · Echocardiogram  · Blood transfusion  · Full dose anticoagulation for now.  · Pain management  · ransfer to the intensive care unit.    IV drug abuse:  Patient last used heroin approximately 1 week ago.  · Will need intensive counseling once clinically stable.      Discussed the case with Internal Medicine, who agrees with ICU transfer.  May need to transfer to larger facility for multi-team approach including cardiothoracic surgery.

## 2020-10-25 NOTE — NURSING
First unit of PRBCs started. Patient pretransfusion vitals WNL. Educated on blood transfusion and adverse effects to report. Patient verbalized understanding. Mother at bedside. Nurse to stay at bedside for first 15 minutes of transfusion to monitor patient.

## 2020-10-25 NOTE — NURSING
Beto adams at [facility] called with positive blood cultures in all four bottles; aerobic and anaerobic; gram positive cocci in cluster resembling staph.

## 2020-10-25 NOTE — NURSING
Dr. Tee informed of CTA chest results, new order to give full dose of Lovenox and consult cardiology in the AM.

## 2020-10-26 ENCOUNTER — CLINICAL SUPPORT (OUTPATIENT)
Dept: CARDIOLOGY | Facility: HOSPITAL | Age: 30
DRG: 871 | End: 2020-10-26
Attending: INTERNAL MEDICINE
Payer: MEDICAID

## 2020-10-26 ENCOUNTER — HOSPITAL ENCOUNTER (INPATIENT)
Facility: HOSPITAL | Age: 30
LOS: 29 days | Discharge: NURSING, REHAB OR LONG-TERM CARE | DRG: 870 | End: 2020-11-24
Attending: INTERNAL MEDICINE | Admitting: INTERNAL MEDICINE
Payer: MEDICAID

## 2020-10-26 VITALS
RESPIRATION RATE: 60 BRPM | WEIGHT: 96.81 LBS | SYSTOLIC BLOOD PRESSURE: 132 MMHG | DIASTOLIC BLOOD PRESSURE: 81 MMHG | HEIGHT: 62 IN | OXYGEN SATURATION: 90 % | TEMPERATURE: 102 F | BODY MASS INDEX: 17.81 KG/M2 | HEART RATE: 132 BPM

## 2020-10-26 DIAGNOSIS — I38 ENDOCARDITIS, UNSPECIFIED CHRONICITY, UNSPECIFIED ENDOCARDITIS TYPE: ICD-10-CM

## 2020-10-26 DIAGNOSIS — U07.1 COVID-19 VIRUS DETECTED: ICD-10-CM

## 2020-10-26 DIAGNOSIS — R00.0 TACHYCARDIA: ICD-10-CM

## 2020-10-26 DIAGNOSIS — F13.20 SEDATIVE, HYPNOTIC OR ANXIOLYTIC USE DISORDER, SEVERE, DEPENDENCE: Chronic | ICD-10-CM

## 2020-10-26 DIAGNOSIS — I26.99 PULMONARY EMBOLI: ICD-10-CM

## 2020-10-26 DIAGNOSIS — I26.99 PULMONARY EMBOLISM, UNSPECIFIED CHRONICITY, UNSPECIFIED PULMONARY EMBOLISM TYPE, UNSPECIFIED WHETHER ACUTE COR PULMONALE PRESENT: ICD-10-CM

## 2020-10-26 DIAGNOSIS — F12.20 CANNABIS USE DISORDER, MODERATE, DEPENDENCE: Chronic | ICD-10-CM

## 2020-10-26 DIAGNOSIS — I07.1 SEVERE TRICUSPID REGURGITATION BY PRIOR ECHOCARDIOGRAM: ICD-10-CM

## 2020-10-26 DIAGNOSIS — R78.81 BACTEREMIA: ICD-10-CM

## 2020-10-26 DIAGNOSIS — I07.1 TRICUSPID VALVE INSUFFICIENCY, UNSPECIFIED ETIOLOGY: ICD-10-CM

## 2020-10-26 DIAGNOSIS — J18.9 PNEUMONIA OF BOTH LUNGS DUE TO INFECTIOUS ORGANISM, UNSPECIFIED PART OF LUNG: ICD-10-CM

## 2020-10-26 DIAGNOSIS — B95.62 MRSA BACTEREMIA: ICD-10-CM

## 2020-10-26 DIAGNOSIS — F19.94 SUBSTANCE INDUCED MOOD DISORDER: Chronic | ICD-10-CM

## 2020-10-26 DIAGNOSIS — I38 ENDOCARDITIS: ICD-10-CM

## 2020-10-26 DIAGNOSIS — Z91.89 AT RISK FOR PROLONGED QT INTERVAL SYNDROME: ICD-10-CM

## 2020-10-26 DIAGNOSIS — I33.0 BACTERIAL ENDOCARDITIS, UNSPECIFIED CHRONICITY: ICD-10-CM

## 2020-10-26 DIAGNOSIS — I07.9 ENDOCARDITIS OF TRICUSPID VALVE: ICD-10-CM

## 2020-10-26 DIAGNOSIS — F19.90 IVDU (INTRAVENOUS DRUG USER): ICD-10-CM

## 2020-10-26 DIAGNOSIS — F11.20 OPIOID USE DISORDER, SEVERE, DEPENDENCE: Chronic | ICD-10-CM

## 2020-10-26 DIAGNOSIS — R78.81 MRSA BACTEREMIA: ICD-10-CM

## 2020-10-26 PROBLEM — A41.9 SEVERE SEPSIS: Status: ACTIVE | Noted: 2020-10-26

## 2020-10-26 PROBLEM — R06.82 TACHYPNEA: Status: ACTIVE | Noted: 2020-10-26

## 2020-10-26 PROBLEM — R65.20 SEVERE SEPSIS: Status: ACTIVE | Noted: 2020-10-26

## 2020-10-26 LAB
ALBUMIN SERPL BCP-MCNC: 1.3 G/DL (ref 3.5–5.2)
ALBUMIN SERPL BCP-MCNC: 1.5 G/DL (ref 3.5–5.2)
ALLENS TEST: ABNORMAL
ALLENS TEST: ABNORMAL
ALP SERPL-CCNC: 226 U/L (ref 55–135)
ALP SERPL-CCNC: 228 U/L (ref 55–135)
ALT SERPL W/O P-5'-P-CCNC: 34 U/L (ref 10–44)
ALT SERPL W/O P-5'-P-CCNC: 35 U/L (ref 10–44)
ANION GAP SERPL CALC-SCNC: 7 MMOL/L (ref 8–16)
ANION GAP SERPL CALC-SCNC: 9 MMOL/L (ref 8–16)
AORTIC ROOT ANNULUS: 2.34 CM
APTT BLDCRRT: 26.8 SEC (ref 21–32)
APTT BLDCRRT: 26.8 SEC (ref 21–32)
APTT BLDCRRT: 37.3 SEC (ref 21–32)
AST SERPL-CCNC: 70 U/L (ref 10–40)
AST SERPL-CCNC: 93 U/L (ref 10–40)
AV INDEX (PROSTH): 1.1
AV MEAN GRADIENT: 3 MMHG
AV PEAK GRADIENT: 8 MMHG
AV VALVE AREA: 3.34 CM2
AV VELOCITY RATIO: 0.94
BACTERIA UR CULT: NORMAL
BASOPHILS # BLD AUTO: 0.02 K/UL (ref 0–0.2)
BASOPHILS # BLD AUTO: 0.03 K/UL (ref 0–0.2)
BASOPHILS # BLD AUTO: 0.05 K/UL (ref 0–0.2)
BASOPHILS # BLD AUTO: 0.05 K/UL (ref 0–0.2)
BASOPHILS NFR BLD: 0.1 % (ref 0–1.9)
BASOPHILS NFR BLD: 0.2 % (ref 0–1.9)
BASOPHILS NFR BLD: 0.3 % (ref 0–1.9)
BASOPHILS NFR BLD: 0.3 % (ref 0–1.9)
BILIRUB SERPL-MCNC: 0.4 MG/DL (ref 0.1–1)
BILIRUB SERPL-MCNC: 0.9 MG/DL (ref 0.1–1)
BSA FOR ECHO PROCEDURE: 1.39 M2
BUN SERPL-MCNC: 25 MG/DL (ref 6–20)
BUN SERPL-MCNC: 26 MG/DL (ref 6–20)
CALCIUM SERPL-MCNC: 7.5 MG/DL (ref 8.7–10.5)
CALCIUM SERPL-MCNC: 7.8 MG/DL (ref 8.7–10.5)
CHLORIDE SERPL-SCNC: 103 MMOL/L (ref 95–110)
CHLORIDE SERPL-SCNC: 107 MMOL/L (ref 95–110)
CO2 SERPL-SCNC: 20 MMOL/L (ref 23–29)
CO2 SERPL-SCNC: 23 MMOL/L (ref 23–29)
CORRECTED TEMPERATURE (PCO2): 35.5 MMHG
CORRECTED TEMPERATURE (PCO2): 36.3 MMHG
CORRECTED TEMPERATURE (PH): 7.39
CORRECTED TEMPERATURE (PH): 7.41
CORRECTED TEMPERATURE (PO2): 61.4 MMHG
CORRECTED TEMPERATURE (PO2): 67.8 MMHG
CREAT SERPL-MCNC: 0.8 MG/DL (ref 0.5–1.4)
CREAT SERPL-MCNC: 0.8 MG/DL (ref 0.5–1.4)
CV ECHO LV RWT: 0.45 CM
DELSYS: ABNORMAL
DIFFERENTIAL METHOD: ABNORMAL
DOP CALC AO PEAK VEL: 1.38 M/S
DOP CALC AO VTI: 18.93 CM
DOP CALC LVOT AREA: 3 CM2
DOP CALC LVOT DIAMETER: 1.97 CM
DOP CALC LVOT PEAK VEL: 1.3 M/S
DOP CALC LVOT STROKE VOLUME: 63.22 CM3
DOP CALCLVOT PEAK VEL VTI: 20.75 CM
ECHO LV POSTERIOR WALL: 0.99 CM (ref 0.6–1.1)
EOSINOPHIL # BLD AUTO: 0 K/UL (ref 0–0.5)
EOSINOPHIL # BLD AUTO: 0 K/UL (ref 0–0.5)
EOSINOPHIL # BLD AUTO: 0.1 K/UL (ref 0–0.5)
EOSINOPHIL # BLD AUTO: 0.1 K/UL (ref 0–0.5)
EOSINOPHIL NFR BLD: 0 % (ref 0–8)
EOSINOPHIL NFR BLD: 0.1 % (ref 0–8)
EOSINOPHIL NFR BLD: 0.3 % (ref 0–8)
EOSINOPHIL NFR BLD: 0.3 % (ref 0–8)
ERYTHROCYTE [DISTWIDTH] IN BLOOD BY AUTOMATED COUNT: 16.3 % (ref 11.5–14.5)
ERYTHROCYTE [DISTWIDTH] IN BLOOD BY AUTOMATED COUNT: 16.5 % (ref 11.5–14.5)
ERYTHROCYTE [DISTWIDTH] IN BLOOD BY AUTOMATED COUNT: 16.8 % (ref 11.5–14.5)
ERYTHROCYTE [DISTWIDTH] IN BLOOD BY AUTOMATED COUNT: 16.8 % (ref 11.5–14.5)
ERYTHROCYTE [SEDIMENTATION RATE] IN BLOOD BY WESTERGREN METHOD: 30 MM/H
EST. GFR  (AFRICAN AMERICAN): >60 ML/MIN/1.73 M^2
EST. GFR  (AFRICAN AMERICAN): >60 ML/MIN/1.73 M^2
EST. GFR  (NON AFRICAN AMERICAN): >60 ML/MIN/1.73 M^2
EST. GFR  (NON AFRICAN AMERICAN): >60 ML/MIN/1.73 M^2
FIO2: 35 %
FIO2: 35 %
FIO2: 60
FRACTIONAL SHORTENING: 34 % (ref 28–44)
GLUCOSE SERPL-MCNC: 110 MG/DL (ref 70–110)
GLUCOSE SERPL-MCNC: 87 MG/DL (ref 70–110)
HAV IGM SERPL QL IA: NEGATIVE
HBV CORE IGM SERPL QL IA: NEGATIVE
HBV SURFACE AG SERPL QL IA: NEGATIVE
HCO3 UR-SCNC: 21.6 MMOL/L (ref 24–28)
HCO3 UR-SCNC: 22.3 MMOL/L
HCO3 UR-SCNC: 22.8 MMOL/L
HCO3 UR-SCNC: 22.9 MMOL/L (ref 24–28)
HCT VFR BLD AUTO: 24.7 % (ref 37–48.5)
HCT VFR BLD AUTO: 27.6 % (ref 37–48.5)
HCT VFR BLD AUTO: 27.6 % (ref 37–48.5)
HCT VFR BLD AUTO: 28.1 % (ref 37–48.5)
HCV AB SERPL QL IA: POSITIVE
HGB BLD-MCNC: 8.1 G/DL (ref 12–16)
HGB BLD-MCNC: 9.1 G/DL (ref 12–16)
HGB BLD-MCNC: 9.1 G/DL (ref 12–16)
HGB BLD-MCNC: 9.4 G/DL (ref 12–16)
HIV 1+2 AB+HIV1 P24 AG SERPL QL IA: NEGATIVE
IMM GRANULOCYTES # BLD AUTO: 0.4 K/UL (ref 0–0.04)
IMM GRANULOCYTES # BLD AUTO: 0.74 K/UL (ref 0–0.04)
IMM GRANULOCYTES # BLD AUTO: 0.74 K/UL (ref 0–0.04)
IMM GRANULOCYTES # BLD AUTO: 0.87 K/UL (ref 0–0.04)
IMM GRANULOCYTES NFR BLD AUTO: 2.6 % (ref 0–0.5)
IMM GRANULOCYTES NFR BLD AUTO: 3.8 % (ref 0–0.5)
IMM GRANULOCYTES NFR BLD AUTO: 3.8 % (ref 0–0.5)
IMM GRANULOCYTES NFR BLD AUTO: 4.7 % (ref 0–0.5)
INR PPP: 1.2 (ref 0.8–1.2)
INTERVENTRICULAR SEPTUM: 0.99 CM (ref 0.6–1.1)
LACTATE SERPL-SCNC: 1.8 MMOL/L (ref 0.5–2.2)
LEFT ATRIUM SIZE: 3.69 CM
LEFT INTERNAL DIMENSION IN SYSTOLE: 2.87 CM (ref 2.1–4)
LEFT VENTRICLE DIASTOLIC VOLUME INDEX: 61.69 ML/M2
LEFT VENTRICLE DIASTOLIC VOLUME: 86.62 ML
LEFT VENTRICLE MASS INDEX: 103 G/M2
LEFT VENTRICLE SYSTOLIC VOLUME INDEX: 22.4 ML/M2
LEFT VENTRICLE SYSTOLIC VOLUME: 31.47 ML
LEFT VENTRICULAR INTERNAL DIMENSION IN DIASTOLE: 4.38 CM (ref 3.5–6)
LEFT VENTRICULAR MASS: 144.73 G
LPM: 6
LYMPHOCYTES # BLD AUTO: 1.2 K/UL (ref 1–4.8)
LYMPHOCYTES # BLD AUTO: 1.6 K/UL (ref 1–4.8)
LYMPHOCYTES # BLD AUTO: 1.6 K/UL (ref 1–4.8)
LYMPHOCYTES # BLD AUTO: 1.8 K/UL (ref 1–4.8)
LYMPHOCYTES NFR BLD: 8 % (ref 18–48)
LYMPHOCYTES NFR BLD: 8.2 % (ref 18–48)
LYMPHOCYTES NFR BLD: 8.2 % (ref 18–48)
LYMPHOCYTES NFR BLD: 9.6 % (ref 18–48)
MAGNESIUM SERPL-MCNC: 2.1 MG/DL (ref 1.6–2.6)
MCH RBC QN AUTO: 26.2 PG (ref 27–31)
MCH RBC QN AUTO: 26.6 PG (ref 27–31)
MCH RBC QN AUTO: 26.8 PG (ref 27–31)
MCH RBC QN AUTO: 26.8 PG (ref 27–31)
MCHC RBC AUTO-ENTMCNC: 32.8 G/DL (ref 32–36)
MCHC RBC AUTO-ENTMCNC: 33 G/DL (ref 32–36)
MCHC RBC AUTO-ENTMCNC: 33 G/DL (ref 32–36)
MCHC RBC AUTO-ENTMCNC: 33.5 G/DL (ref 32–36)
MCV RBC AUTO: 80 FL (ref 82–98)
MCV RBC AUTO: 80 FL (ref 82–98)
MCV RBC AUTO: 81 FL (ref 82–98)
MCV RBC AUTO: 81 FL (ref 82–98)
MODE: ABNORMAL
MONOCYTES # BLD AUTO: 0.7 K/UL (ref 0.3–1)
MONOCYTES # BLD AUTO: 0.7 K/UL (ref 0.3–1)
MONOCYTES # BLD AUTO: 0.8 K/UL (ref 0.3–1)
MONOCYTES # BLD AUTO: 0.8 K/UL (ref 0.3–1)
MONOCYTES NFR BLD: 3.6 % (ref 4–15)
MONOCYTES NFR BLD: 4.2 % (ref 4–15)
MONOCYTES NFR BLD: 4.2 % (ref 4–15)
MONOCYTES NFR BLD: 4.3 % (ref 4–15)
NEUTROPHILS # BLD AUTO: 13.1 K/UL (ref 1.8–7.7)
NEUTROPHILS # BLD AUTO: 15.3 K/UL (ref 1.8–7.7)
NEUTROPHILS # BLD AUTO: 16.1 K/UL (ref 1.8–7.7)
NEUTROPHILS # BLD AUTO: 16.1 K/UL (ref 1.8–7.7)
NEUTROPHILS NFR BLD: 81.8 % (ref 38–73)
NEUTROPHILS NFR BLD: 83.2 % (ref 38–73)
NEUTROPHILS NFR BLD: 83.2 % (ref 38–73)
NEUTROPHILS NFR BLD: 85 % (ref 38–73)
NRBC BLD-RTO: 0 /100 WBC
NRBC BLD-RTO: 1 /100 WBC
O2DEVICE: ABNORMAL
O2DEVICE: ABNORMAL
PCO2 BLDA: 34 MMHG (ref 35–45)
PCO2 BLDA: 35.5 MMHG (ref 35–45)
PCO2 BLDA: 36.3 MMHG (ref 35–45)
PCO2 BLDA: 41.8 MMHG (ref 35–45)
PEAK FLOW: 6
PEEP: 8
PH SMN: 7.35 [PH] (ref 7.35–7.45)
PH SMN: 7.39 [PH] (ref 7.34–7.45)
PH SMN: 7.41 [PH] (ref 7.34–7.45)
PH SMN: 7.41 [PH] (ref 7.35–7.45)
PHOSPHATE SERPL-MCNC: 4.1 MG/DL (ref 2.7–4.5)
PISA MRMAX VEL: 0.03 M/S
PISA TR MAX VEL: 3.01 M/S
PLATELET # BLD AUTO: 52 K/UL (ref 150–350)
PLATELET # BLD AUTO: 71 K/UL (ref 150–350)
PLATELET # BLD AUTO: 76 K/UL (ref 150–350)
PLATELET # BLD AUTO: 76 K/UL (ref 150–350)
PLATELET BLD QL SMEAR: ABNORMAL
PMV BLD AUTO: 10.8 FL (ref 9.2–12.9)
PMV BLD AUTO: 10.9 FL (ref 9.2–12.9)
PMV BLD AUTO: 10.9 FL (ref 9.2–12.9)
PMV BLD AUTO: 11.4 FL (ref 9.2–12.9)
PO2 BLDA: 31 MMHG (ref 40–60)
PO2 BLDA: 61.4 MMHG (ref 80–100)
PO2 BLDA: 67.8 MMHG (ref 80–100)
PO2 BLDA: 74 MMHG (ref 80–100)
POC BASE DEFICIT: -2.2 MMOL/L
POC BASE DEFICIT: -2.7 MMOL/L
POC BE: -3 MMOL/L
POC BE: -3 MMOL/L
POC PERFORMED BY: ABNORMAL
POC PERFORMED BY: ABNORMAL
POC SATURATED O2: 56 % (ref 95–100)
POC SATURATED O2: 91.9 %
POC SATURATED O2: 94.1 %
POC SATURATED O2: 95 % (ref 95–100)
POC TCO2: 21.1 MMOL/L
POC TCO2: 21.3 MMOL/L
POC TCO2: 23 MMOL/L (ref 23–27)
POC TCO2: 24 MMOL/L (ref 24–29)
POC TEMPERATURE: 37 C
POC TEMPERATURE: 37 C
POCT GLUCOSE: 151 MG/DL (ref 70–110)
POTASSIUM SERPL-SCNC: 4.2 MMOL/L (ref 3.5–5.1)
POTASSIUM SERPL-SCNC: 5 MMOL/L (ref 3.5–5.1)
PROT SERPL-MCNC: 5.8 G/DL (ref 6–8.4)
PROT SERPL-MCNC: 6.4 G/DL (ref 6–8.4)
PROTHROMBIN TIME: 12.3 SEC (ref 9–12.5)
PV PEAK VELOCITY: 1.23 CM/S
RA PRESSURE: 15 MMHG
RBC # BLD AUTO: 3.09 M/UL (ref 4–5.4)
RBC # BLD AUTO: 3.39 M/UL (ref 4–5.4)
RBC # BLD AUTO: 3.39 M/UL (ref 4–5.4)
RBC # BLD AUTO: 3.53 M/UL (ref 4–5.4)
RIGHT VENTRICULAR END-DIASTOLIC DIMENSION: 3.27 CM
SAMPLE: ABNORMAL
SAMPLE: ABNORMAL
SARS-COV-2 RDRP RESP QL NAA+PROBE: NEGATIVE
SITE: ABNORMAL
SITE: ABNORMAL
SODIUM SERPL-SCNC: 132 MMOL/L (ref 136–145)
SODIUM SERPL-SCNC: 137 MMOL/L (ref 136–145)
SP02: 100
SPECIMEN SOURCE: ABNORMAL
SPECIMEN SOURCE: ABNORMAL
TR MAX PG: 36 MMHG
TV REST PULMONARY ARTERY PRESSURE: 51 MMHG
VT: 400
WBC # BLD AUTO: 15.45 K/UL (ref 3.9–12.7)
WBC # BLD AUTO: 18.64 K/UL (ref 3.9–12.7)
WBC # BLD AUTO: 19.32 K/UL (ref 3.9–12.7)
WBC # BLD AUTO: 19.32 K/UL (ref 3.9–12.7)

## 2020-10-26 PROCEDURE — 25000003 PHARM REV CODE 250

## 2020-10-26 PROCEDURE — 36600 WITHDRAWAL OF ARTERIAL BLOOD: CPT

## 2020-10-26 PROCEDURE — 80053 COMPREHEN METABOLIC PANEL: CPT | Mod: 91

## 2020-10-26 PROCEDURE — 94002 VENT MGMT INPAT INIT DAY: CPT

## 2020-10-26 PROCEDURE — 94761 N-INVAS EAR/PLS OXIMETRY MLT: CPT

## 2020-10-26 PROCEDURE — 25000003 PHARM REV CODE 250: Performed by: INTERNAL MEDICINE

## 2020-10-26 PROCEDURE — 85730 THROMBOPLASTIN TIME PARTIAL: CPT

## 2020-10-26 PROCEDURE — U0002 COVID-19 LAB TEST NON-CDC: HCPCS

## 2020-10-26 PROCEDURE — 20000000 HC ICU ROOM

## 2020-10-26 PROCEDURE — 94640 AIRWAY INHALATION TREATMENT: CPT

## 2020-10-26 PROCEDURE — 63600175 PHARM REV CODE 636 W HCPCS: Performed by: INTERNAL MEDICINE

## 2020-10-26 PROCEDURE — 99900035 HC TECH TIME PER 15 MIN (STAT)

## 2020-10-26 PROCEDURE — 83735 ASSAY OF MAGNESIUM: CPT

## 2020-10-26 PROCEDURE — 99291 CRITICAL CARE FIRST HOUR: CPT | Mod: ,,, | Performed by: EMERGENCY MEDICINE

## 2020-10-26 PROCEDURE — 36415 COLL VENOUS BLD VENIPUNCTURE: CPT

## 2020-10-26 PROCEDURE — 85730 THROMBOPLASTIN TIME PARTIAL: CPT | Mod: 91

## 2020-10-26 PROCEDURE — 99291 PR CRITICAL CARE, E/M 30-74 MINUTES: ICD-10-PCS | Mod: ,,, | Performed by: EMERGENCY MEDICINE

## 2020-10-26 PROCEDURE — 85025 COMPLETE CBC W/AUTO DIFF WBC: CPT | Mod: 91

## 2020-10-26 PROCEDURE — 63600175 PHARM REV CODE 636 W HCPCS: Performed by: STUDENT IN AN ORGANIZED HEALTH CARE EDUCATION/TRAINING PROGRAM

## 2020-10-26 PROCEDURE — 94660 CPAP INITIATION&MGMT: CPT

## 2020-10-26 PROCEDURE — 82803 BLOOD GASES ANY COMBINATION: CPT

## 2020-10-26 PROCEDURE — 84100 ASSAY OF PHOSPHORUS: CPT

## 2020-10-26 PROCEDURE — 87040 BLOOD CULTURE FOR BACTERIA: CPT | Mod: 59

## 2020-10-26 PROCEDURE — 63600175 PHARM REV CODE 636 W HCPCS

## 2020-10-26 PROCEDURE — 99900031 HC PATIENT EDUCATION (STAT)

## 2020-10-26 PROCEDURE — 93306 TTE W/DOPPLER COMPLETE: CPT

## 2020-10-26 PROCEDURE — 83605 ASSAY OF LACTIC ACID: CPT

## 2020-10-26 PROCEDURE — 80053 COMPREHEN METABOLIC PANEL: CPT

## 2020-10-26 PROCEDURE — 27000190 HC CPAP FULL FACE MASK W/VALVE

## 2020-10-26 PROCEDURE — 25000242 PHARM REV CODE 250 ALT 637 W/ HCPCS: Performed by: INTERNAL MEDICINE

## 2020-10-26 PROCEDURE — 27000221 HC OXYGEN, UP TO 24 HOURS

## 2020-10-26 PROCEDURE — 85610 PROTHROMBIN TIME: CPT

## 2020-10-26 RX ORDER — HEPARIN SODIUM,PORCINE/D5W 25000/250
18 INTRAVENOUS SOLUTION INTRAVENOUS CONTINUOUS
Status: DISCONTINUED | OUTPATIENT
Start: 2020-10-26 | End: 2020-10-27

## 2020-10-26 RX ORDER — HEPARIN SODIUM 10000 [USP'U]/100ML
17 INJECTION, SOLUTION INTRAVENOUS CONTINUOUS
Status: DISCONTINUED | OUTPATIENT
Start: 2020-10-26 | End: 2020-10-26

## 2020-10-26 RX ORDER — HEPARIN SODIUM,PORCINE/D5W 25000/250
18 INTRAVENOUS SOLUTION INTRAVENOUS CONTINUOUS
Status: DISCONTINUED | OUTPATIENT
Start: 2020-10-26 | End: 2020-10-26 | Stop reason: HOSPADM

## 2020-10-26 RX ORDER — PROPOFOL 10 MG/ML
INJECTION, EMULSION INTRAVENOUS
Status: COMPLETED
Start: 2020-10-26 | End: 2020-10-26

## 2020-10-26 RX ORDER — FENTANYL CITRATE-0.9 % NACL/PF 10 MCG/ML
PLASTIC BAG, INJECTION (ML) INTRAVENOUS CONTINUOUS
Status: DISCONTINUED | OUTPATIENT
Start: 2020-10-27 | End: 2020-10-31

## 2020-10-26 RX ORDER — SUCCINYLCHOLINE CHLORIDE 20 MG/ML
INJECTION INTRAMUSCULAR; INTRAVENOUS
Status: DISCONTINUED
Start: 2020-10-26 | End: 2020-10-26 | Stop reason: WASHOUT

## 2020-10-26 RX ORDER — PHENYLEPHRINE HCL IN 0.9% NACL 1 MG/10 ML
SYRINGE (ML) INTRAVENOUS
Status: DISCONTINUED
Start: 2020-10-26 | End: 2020-10-26 | Stop reason: WASHOUT

## 2020-10-26 RX ORDER — ROCURONIUM BROMIDE 10 MG/ML
INJECTION, SOLUTION INTRAVENOUS
Status: COMPLETED
Start: 2020-10-26 | End: 2020-10-26

## 2020-10-26 RX ORDER — ETOMIDATE 2 MG/ML
INJECTION INTRAVENOUS
Status: COMPLETED
Start: 2020-10-26 | End: 2020-10-26

## 2020-10-26 RX ORDER — NOREPINEPHRINE BITARTRATE/D5W 4MG/250ML
PLASTIC BAG, INJECTION (ML) INTRAVENOUS
Status: DISPENSED
Start: 2020-10-26 | End: 2020-10-27

## 2020-10-26 RX ORDER — CEFAZOLIN SODIUM 1 G/3ML
2 INJECTION, POWDER, FOR SOLUTION INTRAMUSCULAR; INTRAVENOUS
Status: DISCONTINUED | OUTPATIENT
Start: 2020-10-26 | End: 2020-10-27

## 2020-10-26 RX ORDER — PROPOFOL 10 MG/ML
INJECTION, EMULSION INTRAVENOUS
Status: COMPLETED
Start: 2020-10-26 | End: 2020-10-27

## 2020-10-26 RX ORDER — FENTANYL CITRATE 50 UG/ML
100 INJECTION, SOLUTION INTRAMUSCULAR; INTRAVENOUS ONCE AS NEEDED
Status: COMPLETED | OUTPATIENT
Start: 2020-10-26 | End: 2020-10-26

## 2020-10-26 RX ORDER — PHENYLEPHRINE HCL IN 0.9% NACL 1 MG/10 ML
100 SYRINGE (ML) INTRAVENOUS ONCE AS NEEDED
Status: DISCONTINUED | OUTPATIENT
Start: 2020-10-26 | End: 2020-11-03

## 2020-10-26 RX ORDER — NOREPINEPHRINE BITARTRATE/D5W 4MG/250ML
0.02 PLASTIC BAG, INJECTION (ML) INTRAVENOUS CONTINUOUS
Status: DISCONTINUED | OUTPATIENT
Start: 2020-10-27 | End: 2020-10-29

## 2020-10-26 RX ORDER — ENOXAPARIN SODIUM 100 MG/ML
1 INJECTION SUBCUTANEOUS
Status: DISCONTINUED | OUTPATIENT
Start: 2020-10-26 | End: 2020-10-26

## 2020-10-26 RX ORDER — SODIUM CHLORIDE 0.9 % (FLUSH) 0.9 %
10 SYRINGE (ML) INJECTION
Status: DISCONTINUED | OUTPATIENT
Start: 2020-10-26 | End: 2020-11-24 | Stop reason: HOSPADM

## 2020-10-26 RX ORDER — PROPOFOL 10 MG/ML
0-50 INJECTION, EMULSION INTRAVENOUS CONTINUOUS
Status: DISCONTINUED | OUTPATIENT
Start: 2020-10-27 | End: 2020-11-02

## 2020-10-26 RX ORDER — FENTANYL CITRATE 50 UG/ML
INJECTION, SOLUTION INTRAMUSCULAR; INTRAVENOUS
Status: COMPLETED
Start: 2020-10-26 | End: 2020-10-26

## 2020-10-26 RX ADMIN — ENOXAPARIN SODIUM 40 MG: 40 INJECTION SUBCUTANEOUS at 08:10

## 2020-10-26 RX ADMIN — HYDROCODONE BITARTRATE AND ACETAMINOPHEN 1 TABLET: 10; 325 TABLET ORAL at 12:10

## 2020-10-26 RX ADMIN — LEVALBUTEROL 1.25 MG: 1.25 SOLUTION, CONCENTRATE RESPIRATORY (INHALATION) at 11:10

## 2020-10-26 RX ADMIN — HYDROCODONE BITARTRATE AND ACETAMINOPHEN 1 TABLET: 10; 325 TABLET ORAL at 07:10

## 2020-10-26 RX ADMIN — ALPRAZOLAM 1 MG: 0.5 TABLET ORAL at 11:10

## 2020-10-26 RX ADMIN — PROPOFOL 30 MCG/KG/MIN: 10 INJECTION, EMULSION INTRAVENOUS at 11:10

## 2020-10-26 RX ADMIN — FENTANYL CITRATE 100 MCG: 50 INJECTION, SOLUTION INTRAMUSCULAR; INTRAVENOUS at 11:10

## 2020-10-26 RX ADMIN — MORPHINE SULFATE 1 MG: 2 INJECTION, SOLUTION INTRAMUSCULAR; INTRAVENOUS at 12:10

## 2020-10-26 RX ADMIN — ETOMIDATE: 40 INJECTION, SOLUTION INTRAVENOUS at 10:10

## 2020-10-26 RX ADMIN — DIPHENHYDRAMINE HYDROCHLORIDE 25 MG: 50 INJECTION INTRAMUSCULAR; INTRAVENOUS at 12:10

## 2020-10-26 RX ADMIN — TRAMADOL HYDROCHLORIDE 50 MG: 50 TABLET ORAL at 12:10

## 2020-10-26 RX ADMIN — LEVALBUTEROL 1.25 MG: 1.25 SOLUTION, CONCENTRATE RESPIRATORY (INHALATION) at 04:10

## 2020-10-26 RX ADMIN — CEFAZOLIN 2 G: 1 INJECTION, POWDER, FOR SOLUTION INTRAMUSCULAR; INTRAVENOUS at 11:10

## 2020-10-26 RX ADMIN — ROCURONIUM BROMIDE 50 MG: 10 INJECTION, SOLUTION INTRAVENOUS at 10:10

## 2020-10-26 RX ADMIN — PIPERACILLIN AND TAZOBACTAM 4.5 G: 4; .5 INJECTION, POWDER, LYOPHILIZED, FOR SOLUTION INTRAVENOUS; PARENTERAL at 04:10

## 2020-10-26 RX ADMIN — VANCOMYCIN HYDROCHLORIDE 750 MG: 750 INJECTION, POWDER, LYOPHILIZED, FOR SOLUTION INTRAVENOUS at 10:10

## 2020-10-26 RX ADMIN — MORPHINE SULFATE 1 MG: 2 INJECTION, SOLUTION INTRAMUSCULAR; INTRAVENOUS at 04:10

## 2020-10-26 RX ADMIN — TRAMADOL HYDROCHLORIDE 50 MG: 50 TABLET ORAL at 07:10

## 2020-10-26 RX ADMIN — ENOXAPARIN SODIUM 50 MG: 60 INJECTION SUBCUTANEOUS at 09:10

## 2020-10-26 RX ADMIN — FENTANYL CITRATE 100 MCG: 50 INJECTION INTRAMUSCULAR; INTRAVENOUS at 11:10

## 2020-10-26 RX ADMIN — MORPHINE SULFATE 1 MG: 2 INJECTION, SOLUTION INTRAMUSCULAR; INTRAVENOUS at 08:10

## 2020-10-26 RX ADMIN — LEVALBUTEROL 1.25 MG: 1.25 SOLUTION, CONCENTRATE RESPIRATORY (INHALATION) at 07:10

## 2020-10-26 RX ADMIN — LORAZEPAM 1 MG: 2 INJECTION INTRAMUSCULAR; INTRAVENOUS at 01:10

## 2020-10-26 RX ADMIN — PIPERACILLIN AND TAZOBACTAM 4.5 G: 4; .5 INJECTION, POWDER, LYOPHILIZED, FOR SOLUTION INTRAVENOUS; PARENTERAL at 05:10

## 2020-10-26 RX ADMIN — Medication 25 MCG: at 11:10

## 2020-10-26 RX ADMIN — HEPARIN SODIUM 18 UNITS/KG/HR: 10000 INJECTION, SOLUTION INTRAVENOUS at 11:10

## 2020-10-26 RX ADMIN — DIPHENHYDRAMINE HYDROCHLORIDE 25 MG: 50 INJECTION INTRAMUSCULAR; INTRAVENOUS at 07:10

## 2020-10-26 NOTE — PROGRESS NOTES
[facility] Hospital Medicine  Progress Note    Patient Name: Марина Britton  MRN: [id number]  Patient Class: IP- Inpatient   Admission Date: 10/24/2020  Length of Stay: 2 days  Attending Physician: Delmer Garcia III, MD  Primary Care Provider: Luis Felipe Jose Iii, MD        Subjective:     Principal Problem:Pneumonia of both lungs due to infectious organism        HPI:   31 y/o female with history of iv drug abuse presented to the ed with complaints of worsening cough and sob and nonhealing left ankle ulcera nd further work up suggestive of severe sepsis with multifocal pneumonia and symptomatic anemia and furthera dmitted on ivf along with iv abx and blood transfusions     Today's info ; seen and examined, mirlande pe on ct, worse respi status  h and h not much improvement  Labile respi statsus    Overview/Hospital Course:  10/26/20 FM:  Events of the weekend are noted.  Patient was admitted to our acute care facility with bilateral pulmonary emboli and there is a strong suspicion for infectious related emboli.  She has a history of 2 years of IV opiate abuse and Cardiology is what concerned with right-sided bacterial endocarditis.  The patient is severely tachypneic and we are attempting transfer to a higher level of care.    Interval History:  See HC.    Review of Systems   Constitutional: Positive for activity change, appetite change, chills and fever.   HENT: Negative for ear pain, mouth sores, nosebleeds and sore throat.    Eyes: Negative for visual disturbance.   Respiratory: Positive for cough, shortness of breath and wheezing.    Cardiovascular: Positive for chest pain. Negative for palpitations and leg swelling.   Gastrointestinal: Negative for abdominal distention, abdominal pain, blood in stool, constipation, diarrhea, nausea and vomiting.   Endocrine: Negative for polydipsia, polyphagia and polyuria.   Genitourinary: Negative for difficulty urinating, dysuria, flank pain and frequency.    Musculoskeletal: Positive for arthralgias and back pain. Negative for myalgias.   Neurological: Positive for weakness. Negative for dizziness, tremors, seizures, syncope, facial asymmetry, speech difficulty and headaches.   Hematological: Negative for adenopathy.   Psychiatric/Behavioral: Positive for agitation. Negative for confusion and hallucinations. The patient is nervous/anxious.      Objective:     Vital Signs (Most Recent):  Temp: 98.9 °F (37.2 °C) (10/26/20 0400)  Pulse: (!) 114 (10/26/20 0725)  Resp: (!) 70 (10/26/20 0831)  BP: (!) 144/109 (10/26/20 0600)  SpO2: (!) 93 % (10/26/20 0725) Vital Signs (24h Range):  Temp:  [97.7 °F (36.5 °C)-99.1 °F (37.3 °C)] 98.9 °F (37.2 °C)  Pulse:  [] 114  Resp:  [20-79] 70  SpO2:  [71 %-100 %] 93 %  BP: (115-149)/() 144/109     Weight: 43.9 kg (96 lb 12.5 oz)  Body mass index is 17.7 kg/m².    Intake/Output Summary (Last 24 hours) at 10/26/2020 0842  Last data filed at 10/26/2020 0500  Gross per 24 hour   Intake 1797.08 ml   Output 300 ml   Net 1497.08 ml      Physical Exam  Constitutional:       General: She is in acute distress.      Appearance: She is ill-appearing and toxic-appearing.   HENT:      Nose: No congestion or rhinorrhea.      Mouth/Throat:      Pharynx: No oropharyngeal exudate.   Eyes:      General: No scleral icterus.  Neck:      Musculoskeletal: No neck rigidity.      Vascular: No carotid bruit.   Cardiovascular:      Rate and Rhythm: Regular rhythm. Tachycardia present.      Heart sounds: Murmur present. No friction rub. No gallop.    Pulmonary:      Effort: Respiratory distress present.      Breath sounds: No stridor. Wheezing and rales present. No rhonchi.   Chest:      Chest wall: No tenderness.   Abdominal:      General: There is no distension.      Palpations: There is no mass.      Tenderness: There is no abdominal tenderness. There is no right CVA tenderness, left CVA tenderness, guarding or rebound.      Hernia: No hernia is  present.   Musculoskeletal:         General: No swelling, tenderness or deformity.      Right lower leg: No edema.      Left lower leg: No edema.   Lymphadenopathy:      Cervical: No cervical adenopathy.   Skin:     Capillary Refill: Capillary refill takes less than 2 seconds.      Coloration: Skin is pale. Skin is not jaundiced.      Findings: No rash.   Neurological:      Cranial Nerves: No cranial nerve deficit.      Sensory: No sensory deficit.      Motor: Weakness present.      Coordination: Coordination normal.      Gait: Gait normal.         Significant Labs: All pertinent labs within the past 24 hours have been reviewed.    Significant Imaging: I have reviewed all pertinent imaging results/findings within the past 24 hours.      Assessment/Plan:      * Pneumonia of both lungs due to infectious organism  Strong suspicion for bacterial emboli.  Patient is on broad-spectrum antibiotics and on antibiotics for MRSA infection.  Cultures are pending, echocardiogram is pending.  Cardiology is recommending transfer to a higher level of care.    IVDU (intravenous drug user)  Patient has visible track marks admits to IV drug use for the last 2 years.  I have attempted to contact her family an no phone calls were answered and voice mailbox is full.      Severe sepsis  On appropriate antibiotics and supportive care      Pulmonary embolism  Changing treatment to IV heparin which I do feel be more effective in this critically ill patient.  Again the suspicion is strong for bacterial emboli.        VTE Risk Mitigation (From admission, onward)         Ordered     heparin 25,000 units in dextrose 5% 250 mL (100 units/mL) infusion (heparin infusion - NO NOMOGRAM)  Continuous      10/26/20 0834     heparin 25,000 units in dextrose 5% (100 units/ml) IV bolus from bag - ADDITIONAL PRN BOLUS - 60 units/kg  As needed (PRN)     Question:  Heparin Infusion Adjustment (DO NOT MODIFY ANSWER)  Answer:   \\[email or web address]\epic\Images\Pharmacy\HeparinInfusions\heparin HIGH INTENSITY nomogram for [facility] [id number].pdf    10/26/20 0836     heparin 25,000 units in dextrose 5% (100 units/ml) IV bolus from bag - ADDITIONAL PRN BOLUS - 30 units/kg  As needed (PRN)     Question:  Heparin Infusion Adjustment (DO NOT MODIFY ANSWER)  Answer:  \\[email or web address]\epic\Images\Pharmacy\HeparinInfusions\heparin HIGH INTENSITY nomogram for [facility] [id number].pdf    10/26/20 0836     IP VTE LOW RISK PATIENT  Once      10/24/20 0501     Place KATHIA hose  Until discontinued      10/24/20 0501                Discharge Planning   VIANNEY:      Code Status: Full Code   Is the patient medically ready for discharge?:     Reason for patient still in hospital (select all that apply): Patient unstable                     Delmer Garcia Iii, MD  Department of Hospital Medicine   [facility] - ICU

## 2020-10-26 NOTE — PROGRESS NOTES
Cardiology Progress Note  [facility]    Today's Date:  10/26/2020  Patient Name: Марина Britton    MRN: [id number] status post 2 units packed red blood cells    Code Status: Full Code     Attending Physician: Delmer Garcia III, MD   Consulting Physician: BLANCA Marr MD    Hospital Course:   · No interval improvement clinically.  · Transfer to ICU.  · Hypoxic with continued respiratory failure:  NC increased to VM @ 35%.  · Blood pressure remains stable  · Antibiotic therapy has been expanded.  · Status post 3 units of packed red blood cells with persistent anemia.  · Continues to complain of chest pain particularly with deep inspiration.  Patient also complaining of bilateral knee pain this morning.   · Echocardiogram pending.    Review of Systems   Constitution: Positive for decreased appetite, malaise/fatigue and weight loss. Negative for chills and fever.   Cardiovascular: Positive for chest pain, dyspnea on exertion and palpitations.   Respiratory: Positive for shortness of breath. Negative for cough and wheezing.    Skin: Positive for poor wound healing and suspicious lesions. Negative for nail changes.   Musculoskeletal: Positive for joint pain. Negative for arthritis.   Neurological: Positive for weakness.   Psychiatric/Behavioral: The patient is nervous/anxious.          Vital Signs (Most Recent):  Temp: 98.9 °F (37.2 °C) (10/26/20 0400)  Pulse: (!) 114 (10/26/20 0725)  Resp: (!) 70 (10/26/20 0831)  BP: (!) 144/109 (10/26/20 0600)  SpO2: (!) 93 % (10/26/20 0725) Vital Signs (24h Range):  Temp:  [97.7 °F (36.5 °C)-99.1 °F (37.3 °C)] 98.9 °F (37.2 °C)  Pulse:  [] 114  Resp:  [20-79] 70  SpO2:  [71 %-100 %] 93 %  BP: (115-149)/() 144/109     Weight: 43.9 kg (96 lb 12.5 oz)  Body mass index is 17.7 kg/m².    SpO2: (!) 93 %  O2 Device (Oxygen Therapy): venti mask      Intake/Output Summary (Last 24 hours) at 10/26/2020 0833  Last data filed at 10/26/2020 0500  Gross per 24 hour   Intake  1797.08 ml   Output 300 ml   Net 1497.08 ml       Physical Exam  General:  Moderate respiratory distress; poor inspiratory effort; splinting from chest pain.  Heent:  No JVD.  Lungs:  Decreased breath sounds; no wheezing; grade 2 holosystolic murmer.  Heart:  Regular rhythm; tachycardic at 114 beats per minute.  Extremities:  No stigmata to suggest septic emboli.  Skin:  No evidence of embolic phenomenon.    Labs:   CMP   Recent Labs   Lab 10/25/20  0528 10/26/20  0356    137   K 4.0 4.2    107   CO2 23 23   * 110   BUN 25* 26*   CREATININE 0.8 0.8   CALCIUM 7.7* 7.8*   PROT 6.1 5.8*   ALBUMIN 1.4* 1.3*   BILITOT 0.3 0.4   ALKPHOS 154* 228*   AST 25 93*   ALT 13 35   ANIONGAP 9 7*   ESTGFRAFRICA >60.0 >60.0   EGFRNONAA >60.0 >60.0    and CBC   Recent Labs   Lab 10/25/20  0528 10/26/20  0356   WBC 25.66* 15.45*   HGB 7.7* 8.1*   HCT 23.0* 24.7*   PLT 68* 52*       Assessment and Plan:   1.  Sepsis:  Very concerned about right-sided endocarditis given history of IV drug abuse.  Her chest x-ray shows bilateral infiltrates (also see chest CT).  Clinical picture is consistent with right-sided endocarditis with septic emboli.  Antibiotic therapy has been expanded to cover staph..  (WBC has improved.)  Clinically she remains decompensated (tachypniec and hypoxemic).  · Echocardiogram pending.  · Would recommend consideration of  transfer to facility with Infectious Disease and cardiothoracic surgery.    · Will await confirmatory echocardiogram.     2.  Anemia:   s/p 3 units of packed red blood cells.    · Crossed over from lovenox to heparin.  · If echocardiogram confirms endocarditis, would recommend prophylactic doses of anticoagulation.      IV drug abuse:  Patient last used heroin approximately 1 week ago.  · Will need intensive counseling once clinically stable.     **Will make further recommendations after echocardiogram.     Addendum:    Echocardiogram:  · Mobile vegatation on tricuspid valve with  severe TR.  · Preserved LV function.  · Right-sided dilatation.

## 2020-10-26 NOTE — HOSPITAL COURSE
10/26/20 FM:  Events of the weekend are noted.  Patient was admitted to our acute care facility with bilateral pulmonary emboli and there is a strong suspicion for infectious related emboli.  She has a history of 2 years of IV opiate abuse and Cardiology is what concerned with right-sided bacterial endocarditis.  The patient is severely tachypneic and we are attempting transfer to a higher level of care.    Discharge Note FM:  Patient continued to deteriorate and her echocardiogram showed a large right-sided native valve vegetation.  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  Patient was transferred to high level of care for critical care services and infectious disease.

## 2020-10-26 NOTE — ASSESSMENT & PLAN NOTE
Patient has visible track marks admits to IV drug use for the last 2 years.  I have attempted to contact her family an no phone calls were answered and voice mailbox is full.

## 2020-10-26 NOTE — HPI
31 y/o female with history of iv drug abuse presented to the ed with complaints of worsening cough and sob and nonhealing left ankle ulcera nd further work up suggestive of severe sepsis with multifocal pneumonia and symptomatic anemia and furthera dmitted on ivf along with iv abx and blood transfusions     Today's info ; seen and examined, mirlande pe on ct, worse respi status  h and h not much improvement  Labile respi statsus

## 2020-10-26 NOTE — ASSESSMENT & PLAN NOTE
Strong suspicion for bacterial emboli.  Patient is on broad-spectrum antibiotics and on antibiotics for MRSA infection.  Cultures are pending, echocardiogram is pending.  Cardiology is recommending transfer to a higher level of care.

## 2020-10-26 NOTE — PLAN OF CARE
Problem: Adult Inpatient Plan of Care  Goal: Plan of Care Review  Outcome: Ongoing, Progressing  Goal: Patient-Specific Goal (Individualization)  Outcome: Ongoing, Progressing  Goal: Absence of Hospital-Acquired Illness or Injury  Outcome: Ongoing, Progressing  Goal: Optimal Comfort and Wellbeing  Outcome: Ongoing, Progressing  Goal: Readiness for Transition of Care  Outcome: Ongoing, Progressing  Goal: Rounds/Family Conference  Outcome: Ongoing, Progressing     Problem: Fluid Imbalance (Pneumonia)  Goal: Fluid Balance  Outcome: Ongoing, Progressing     Problem: Infection (Pneumonia)  Goal: Resolution of Infection Signs/Symptoms  Outcome: Ongoing, Progressing     Problem: Respiratory Compromise (Pneumonia)  Goal: Effective Oxygenation and Ventilation  Outcome: Ongoing, Progressing     Problem: Infection  Goal: Infection Symptom Resolution  Outcome: Ongoing, Progressing     Problem: Wound  Goal: Optimal Wound Healing  Outcome: Ongoing, Progressing     Problem: Fall Injury Risk  Goal: Absence of Fall and Fall-Related Injury  Outcome: Ongoing, Progressing     Problem: Skin Injury Risk Increased  Goal: Skin Health and Integrity  Outcome: Ongoing, Progressing     Problem: Gas Exchange Impaired  Goal: Optimal Gas Exchange  Outcome: Ongoing, Progressing     Problem: Pain Acute  Goal: Optimal Pain Control  Outcome: Ongoing, Progressing

## 2020-10-26 NOTE — ASSESSMENT & PLAN NOTE
Changing treatment to IV heparin which I do feel be more effective in this critically ill patient.  Again the suspicion is strong for bacterial emboli.

## 2020-10-26 NOTE — NURSING
Rescue squad to transfer. Report called to MILLY Powell for rm [id number]. Patient tachy @125hr, 50resp, and 95 [id number]..

## 2020-10-26 NOTE — NURSING
Pt has moaned all night calling help me. Has slept maybe 1 hr all night.  Several times she pulled mask apart and had it very loose on her face. This nurse adjusted it numerous times.  Pt asks for pain med frequently and says its just not working. I hurt horribly.  Dr. Tee called and ordered abg

## 2020-10-26 NOTE — SUBJECTIVE & OBJECTIVE
Interval History:  See HC.    Review of Systems   Constitutional: Positive for activity change, appetite change, chills and fever.   HENT: Negative for ear pain, mouth sores, nosebleeds and sore throat.    Eyes: Negative for visual disturbance.   Respiratory: Positive for cough, shortness of breath and wheezing.    Cardiovascular: Positive for chest pain. Negative for palpitations and leg swelling.   Gastrointestinal: Negative for abdominal distention, abdominal pain, blood in stool, constipation, diarrhea, nausea and vomiting.   Endocrine: Negative for polydipsia, polyphagia and polyuria.   Genitourinary: Negative for difficulty urinating, dysuria, flank pain and frequency.   Musculoskeletal: Positive for arthralgias and back pain. Negative for myalgias.   Neurological: Positive for weakness. Negative for dizziness, tremors, seizures, syncope, facial asymmetry, speech difficulty and headaches.   Hematological: Negative for adenopathy.   Psychiatric/Behavioral: Positive for agitation. Negative for confusion and hallucinations. The patient is nervous/anxious.      Objective:     Vital Signs (Most Recent):  Temp: 98.9 °F (37.2 °C) (10/26/20 0400)  Pulse: (!) 114 (10/26/20 0725)  Resp: (!) 70 (10/26/20 0831)  BP: (!) 144/109 (10/26/20 0600)  SpO2: (!) 93 % (10/26/20 0725) Vital Signs (24h Range):  Temp:  [97.7 °F (36.5 °C)-99.1 °F (37.3 °C)] 98.9 °F (37.2 °C)  Pulse:  [] 114  Resp:  [20-79] 70  SpO2:  [71 %-100 %] 93 %  BP: (115-149)/() 144/109     Weight: 43.9 kg (96 lb 12.5 oz)  Body mass index is 17.7 kg/m².    Intake/Output Summary (Last 24 hours) at 10/26/2020 0842  Last data filed at 10/26/2020 0500  Gross per 24 hour   Intake 1797.08 ml   Output 300 ml   Net 1497.08 ml      Physical Exam  Constitutional:       General: She is in acute distress.      Appearance: She is ill-appearing and toxic-appearing.   HENT:      Nose: No congestion or rhinorrhea.      Mouth/Throat:      Pharynx: No oropharyngeal  exudate.   Eyes:      General: No scleral icterus.  Neck:      Musculoskeletal: No neck rigidity.      Vascular: No carotid bruit.   Cardiovascular:      Rate and Rhythm: Regular rhythm. Tachycardia present.      Heart sounds: Murmur present. No friction rub. No gallop.    Pulmonary:      Effort: Respiratory distress present.      Breath sounds: No stridor. Wheezing and rales present. No rhonchi.   Chest:      Chest wall: No tenderness.   Abdominal:      General: There is no distension.      Palpations: There is no mass.      Tenderness: There is no abdominal tenderness. There is no right CVA tenderness, left CVA tenderness, guarding or rebound.      Hernia: No hernia is present.   Musculoskeletal:         General: No swelling, tenderness or deformity.      Right lower leg: No edema.      Left lower leg: No edema.   Lymphadenopathy:      Cervical: No cervical adenopathy.   Skin:     Capillary Refill: Capillary refill takes less than 2 seconds.      Coloration: Skin is pale. Skin is not jaundiced.      Findings: No rash.   Neurological:      Cranial Nerves: No cranial nerve deficit.      Sensory: No sensory deficit.      Motor: Weakness present.      Coordination: Coordination normal.      Gait: Gait normal.         Significant Labs: All pertinent labs within the past 24 hours have been reviewed.    Significant Imaging: I have reviewed all pertinent imaging results/findings within the past 24 hours.

## 2020-10-26 NOTE — PLAN OF CARE
Late Entry:     Spoke to Dr. Garcia who would like to initiate a transfer for higher level of care. Went to ICU and spoke to the patient who is okay with me beginning the process of transfer with the [facility]. Asked her if there was anyone I could call. She says her dad, America, at [phone]. No answer. Notified ICU nurse of this. Called the [facility] and spoke to Zoie about initiating transfer. She will begin the process. She has my number and Dr. Garcia's phone number. Will wait to hear back.     Addendum:   1125: I have been tracking the transfer progression through epic. It looks like a physician has accepted and we are waiting on a bed. Notified nurse in ICU.     Addendum:   1230: Updated patient and her mother who is at the bedside.    Addendum:   6358: Called and spoke with Arnie with the transfer center who says patient is accepted we are just waiting on a bed at [facility].     1544: Called and spoke to Nhi with the transfer center. Gave her the number to ICU to call when she gets a bed since I will be getting off shortly.    1545: Received a call from Wilda with the [facility] who says they will have a bed shortly. They will call ICU. She is unsure how long it will take to get the room cleaned and ready but will call ICU when the room is ready. Notified Mrs. Santa in ICU. Mrs. Santa will call me at home if she needs anything.

## 2020-10-26 NOTE — PLAN OF CARE
Outside Transfer Note / [facility]    Date of acceptance: 10/26/2020    Referring facility/ provider:[facility] Dr Delmer Garcia       Accepting Physician: Dr Abdoul Quick    Reason for transfer: Higher level of care /  Embolic pneumonia    Report from referring provider:    29 y/o woman with history of iv drug abuse admitted to [facility] on 10/24 with fever, cough, SOB and nonhealing left ankle ulcer.  On admission T 99.9°  /73 R 22 SpO2 88 (RA) > 93% (6 L).  WBC 16.8 Hb 6.5 platelets 63 D-dimer 8.2 Cr 1.3 LA 1.8 UPT neg HIV neg COVID neg ABGs pH 7.4 pCO2 33.7 pO2 67 FiO2 28. CTA chest pos for septic pulmonary emboli.  BC pos for Gram-pos cocci in clusters resembling Staph.  Patient started on IVF, heparin, vancomycin 750 Q 24, Zosyn 4.5 Q 8 levofloxacin 500 Q 24.  Over the next several days SBP has been in the 120s to 140s, HR in 110s to 140s, RR has increased to the 40s with SpO2 in the low 90s on 6 L (Venti mask).  ABG today pH 7.4 pO2 35.5 pO2 67 0.8 FiO2 35 on Venti mask.  Cr has improved from 1.3-0.8.  She has been given 4 U of pRBC with Hb 6.5 > > 9.4 (10/26 0852 h)     Temp:  [97.7 °F (36.5 °C)-99.1 °F (37.3 °C)]   Pulse:  []   Resp:  [21-79]   BP: (121-149)/()   SpO2:  [71 %-100 %]     Recent Labs   Lab 10/25/20  0528 10/26/20  0356 10/26/20  0852   WBC 25.66* 15.45* 18.64*   HGB 7.7* 8.1* 9.4*   HCT 23.0* 24.7* 28.1*   PLT 68* 52* 71*     Recent Labs   Lab 10/24/20  0610 10/25/20  0528 10/26/20  0356   * 141 137   K 4.0 4.0 4.2    109 107   CO2 22* 23 23   BUN 36* 25* 26*   CREATININE 1.3 0.8 0.8    154* 110   CALCIUM 7.9* 7.7* 7.8*     Recent Labs   Lab 10/24/20  0145 10/24/20  0610 10/25/20  0528 10/26/20  0356 10/26/20  0852   ALKPHOS 186* 151* 154* 228*  --    ALT 20 18 13 35  --    AST 38 33 25 93*  --    ALBUMIN 1.8* 1.5* 1.4* 1.3*  --    PROT 6.9 6.0 6.1 5.8*  --    BILITOT 0.5 0.5 0.3 0.4  --    INR  1.3*  --   --   --  1.2         Paulo Osullivan MD [facility]  / [facility]   [phone]

## 2020-10-27 PROBLEM — B95.62 MRSA BACTEREMIA: Status: ACTIVE | Noted: 2020-10-27

## 2020-10-27 PROBLEM — A41.9 SEPSIS WITH ACUTE HYPOXIC RESPIRATORY FAILURE: Status: RESOLVED | Noted: 2020-10-27 | Resolved: 2020-10-27

## 2020-10-27 PROBLEM — J96.01 SEPSIS WITH ACUTE HYPOXIC RESPIRATORY FAILURE: Status: ACTIVE | Noted: 2020-10-27

## 2020-10-27 PROBLEM — A41.9 SEPSIS WITH ACUTE HYPOXIC RESPIRATORY FAILURE: Status: ACTIVE | Noted: 2020-10-27

## 2020-10-27 PROBLEM — R78.81 MRSA BACTEREMIA: Status: ACTIVE | Noted: 2020-10-27

## 2020-10-27 PROBLEM — R65.20 SEPSIS WITH ACUTE HYPOXIC RESPIRATORY FAILURE: Status: RESOLVED | Noted: 2020-10-27 | Resolved: 2020-10-27

## 2020-10-27 PROBLEM — J96.01 SEPSIS WITH ACUTE HYPOXIC RESPIRATORY FAILURE: Status: RESOLVED | Noted: 2020-10-27 | Resolved: 2020-10-27

## 2020-10-27 PROBLEM — R65.20 SEPSIS WITH ACUTE HYPOXIC RESPIRATORY FAILURE: Status: ACTIVE | Noted: 2020-10-27

## 2020-10-27 LAB
ALBUMIN SERPL BCP-MCNC: 1.4 G/DL (ref 3.5–5.2)
ALLENS TEST: ABNORMAL
ALLENS TEST: ABNORMAL
ALP SERPL-CCNC: 202 U/L (ref 55–135)
ALT SERPL W/O P-5'-P-CCNC: 40 U/L (ref 10–44)
AMORPH CRY UR QL COMP ASSIST: ABNORMAL
ANION GAP SERPL CALC-SCNC: 10 MMOL/L (ref 8–16)
APTT BLDCRRT: 40.8 SEC (ref 21–32)
APTT BLDCRRT: 94.5 SEC (ref 21–32)
AST SERPL-CCNC: 100 U/L (ref 10–40)
B-HCG UR QL: NEGATIVE
BACTERIA #/AREA URNS AUTO: ABNORMAL /HPF
BACTERIA BLD CULT: ABNORMAL
BASOPHILS # BLD AUTO: 0.03 K/UL (ref 0–0.2)
BASOPHILS NFR BLD: 0.2 % (ref 0–1.9)
BILIRUB SERPL-MCNC: 0.8 MG/DL (ref 0.1–1)
BILIRUB UR QL STRIP: NEGATIVE
BUN SERPL-MCNC: 26 MG/DL (ref 6–20)
CALCIUM SERPL-MCNC: 7.7 MG/DL (ref 8.7–10.5)
CHLORIDE SERPL-SCNC: 104 MMOL/L (ref 95–110)
CLARITY UR REFRACT.AUTO: ABNORMAL
CO2 SERPL-SCNC: 20 MMOL/L (ref 23–29)
COLOR UR AUTO: ABNORMAL
CREAT SERPL-MCNC: 0.8 MG/DL (ref 0.5–1.4)
DELSYS: ABNORMAL
DELSYS: ABNORMAL
DIFFERENTIAL METHOD: ABNORMAL
EOSINOPHIL # BLD AUTO: 0.2 K/UL (ref 0–0.5)
EOSINOPHIL NFR BLD: 0.8 % (ref 0–8)
ERYTHROCYTE [DISTWIDTH] IN BLOOD BY AUTOMATED COUNT: 16.7 % (ref 11.5–14.5)
ERYTHROCYTE [SEDIMENTATION RATE] IN BLOOD BY WESTERGREN METHOD: 32 MM/H
ERYTHROCYTE [SEDIMENTATION RATE] IN BLOOD BY WESTERGREN METHOD: 32 MM/H
EST. GFR  (AFRICAN AMERICAN): >60 ML/MIN/1.73 M^2
EST. GFR  (NON AFRICAN AMERICAN): >60 ML/MIN/1.73 M^2
FIO2: 30
FIO2: 40
GLUCOSE SERPL-MCNC: 85 MG/DL (ref 70–110)
GLUCOSE UR QL STRIP: NEGATIVE
GRAN CASTS UR QL COMP ASSIST: 6 /LPF
HCO3 UR-SCNC: 20.8 MMOL/L (ref 24–28)
HCO3 UR-SCNC: 21.8 MMOL/L (ref 24–28)
HCT VFR BLD AUTO: 25.7 % (ref 37–48.5)
HGB BLD-MCNC: 8.4 G/DL (ref 12–16)
HGB UR QL STRIP: ABNORMAL
HYALINE CASTS UR QL AUTO: 1 /LPF
IMM GRANULOCYTES # BLD AUTO: 0.53 K/UL (ref 0–0.04)
IMM GRANULOCYTES NFR BLD AUTO: 3 % (ref 0–0.5)
INR PPP: 1.2 (ref 0.8–1.2)
KETONES UR QL STRIP: NEGATIVE
LEUKOCYTE ESTERASE UR QL STRIP: ABNORMAL
LYMPHOCYTES # BLD AUTO: 1.9 K/UL (ref 1–4.8)
LYMPHOCYTES NFR BLD: 10.9 % (ref 18–48)
MAGNESIUM SERPL-MCNC: 2 MG/DL (ref 1.6–2.6)
MCH RBC QN AUTO: 26.6 PG (ref 27–31)
MCHC RBC AUTO-ENTMCNC: 32.7 G/DL (ref 32–36)
MCV RBC AUTO: 81 FL (ref 82–98)
MICROSCOPIC COMMENT: ABNORMAL
MODE: ABNORMAL
MODE: ABNORMAL
MONOCYTES # BLD AUTO: 0.5 K/UL (ref 0.3–1)
MONOCYTES NFR BLD: 2.7 % (ref 4–15)
NEUTROPHILS # BLD AUTO: 14.6 K/UL (ref 1.8–7.7)
NEUTROPHILS NFR BLD: 82.4 % (ref 38–73)
NITRITE UR QL STRIP: NEGATIVE
NON-SQ EPI CELLS #/AREA URNS AUTO: <1 /HPF
NRBC BLD-RTO: 1 /100 WBC
PCO2 BLDA: 29.4 MMHG (ref 35–45)
PCO2 BLDA: 33.9 MMHG (ref 35–45)
PEEP: 8
PEEP: 8
PH SMN: 7.42 [PH] (ref 7.35–7.45)
PH SMN: 7.46 [PH] (ref 7.35–7.45)
PH UR STRIP: 5 [PH] (ref 5–8)
PHOSPHATE SERPL-MCNC: 4 MG/DL (ref 2.7–4.5)
PLATELET # BLD AUTO: 86 K/UL (ref 150–350)
PMV BLD AUTO: 11 FL (ref 9.2–12.9)
PO2 BLDA: 24 MMHG (ref 40–60)
PO2 BLDA: 67 MMHG (ref 80–100)
POC BE: -3 MMOL/L
POC BE: -3 MMOL/L
POC SATURATED O2: 45 % (ref 95–100)
POC SATURATED O2: 94 % (ref 95–100)
POC TCO2: 22 MMOL/L (ref 23–27)
POC TCO2: 23 MMOL/L (ref 24–29)
POTASSIUM SERPL-SCNC: 4.6 MMOL/L (ref 3.5–5.1)
PROT SERPL-MCNC: 6.1 G/DL (ref 6–8.4)
PROT UR QL STRIP: ABNORMAL
PROTHROMBIN TIME: 13.3 SEC (ref 9–12.5)
RBC # BLD AUTO: 3.16 M/UL (ref 4–5.4)
RBC #/AREA URNS AUTO: 66 /HPF (ref 0–4)
SAMPLE: ABNORMAL
SAMPLE: ABNORMAL
SITE: ABNORMAL
SITE: ABNORMAL
SODIUM SERPL-SCNC: 134 MMOL/L (ref 136–145)
SP GR UR STRIP: 1.02 (ref 1–1.03)
SP02: 100
SP02: 99
SQUAMOUS #/AREA URNS AUTO: 1 /HPF
URN SPEC COLLECT METH UR: ABNORMAL
VANCOMYCIN TROUGH SERPL-MCNC: 3.4 UG/ML (ref 10–22)
VT: 360
VT: 390
WBC # BLD AUTO: 17.71 K/UL (ref 3.9–12.7)
WBC #/AREA URNS AUTO: 25 /HPF (ref 0–5)

## 2020-10-27 PROCEDURE — 85610 PROTHROMBIN TIME: CPT

## 2020-10-27 PROCEDURE — 25000003 PHARM REV CODE 250: Performed by: EMERGENCY MEDICINE

## 2020-10-27 PROCEDURE — 25000003 PHARM REV CODE 250: Performed by: INTERNAL MEDICINE

## 2020-10-27 PROCEDURE — 99900035 HC TECH TIME PER 15 MIN (STAT)

## 2020-10-27 PROCEDURE — 99233 PR SUBSEQUENT HOSPITAL CARE,LEVL III: ICD-10-PCS | Mod: ,,, | Performed by: PHYSICIAN ASSISTANT

## 2020-10-27 PROCEDURE — 94761 N-INVAS EAR/PLS OXIMETRY MLT: CPT

## 2020-10-27 PROCEDURE — 63600175 PHARM REV CODE 636 W HCPCS: Performed by: INTERNAL MEDICINE

## 2020-10-27 PROCEDURE — 84100 ASSAY OF PHOSPHORUS: CPT

## 2020-10-27 PROCEDURE — 25000003 PHARM REV CODE 250: Performed by: STUDENT IN AN ORGANIZED HEALTH CARE EDUCATION/TRAINING PROGRAM

## 2020-10-27 PROCEDURE — 99291 PR CRITICAL CARE, E/M 30-74 MINUTES: ICD-10-PCS | Mod: ,,, | Performed by: INTERNAL MEDICINE

## 2020-10-27 PROCEDURE — 82803 BLOOD GASES ANY COMBINATION: CPT

## 2020-10-27 PROCEDURE — 27200966 HC CLOSED SUCTION SYSTEM

## 2020-10-27 PROCEDURE — 20000000 HC ICU ROOM

## 2020-10-27 PROCEDURE — 85025 COMPLETE CBC W/AUTO DIFF WBC: CPT

## 2020-10-27 PROCEDURE — 99291 CRITICAL CARE FIRST HOUR: CPT | Mod: ,,, | Performed by: INTERNAL MEDICINE

## 2020-10-27 PROCEDURE — 31500 INSERT EMERGENCY AIRWAY: CPT | Mod: ,,, | Performed by: EMERGENCY MEDICINE

## 2020-10-27 PROCEDURE — 83735 ASSAY OF MAGNESIUM: CPT

## 2020-10-27 PROCEDURE — 87086 URINE CULTURE/COLONY COUNT: CPT

## 2020-10-27 PROCEDURE — 63600175 PHARM REV CODE 636 W HCPCS: Performed by: STUDENT IN AN ORGANIZED HEALTH CARE EDUCATION/TRAINING PROGRAM

## 2020-10-27 PROCEDURE — 25000003 PHARM REV CODE 250

## 2020-10-27 PROCEDURE — 27000221 HC OXYGEN, UP TO 24 HOURS

## 2020-10-27 PROCEDURE — 81001 URINALYSIS AUTO W/SCOPE: CPT

## 2020-10-27 PROCEDURE — 63600175 PHARM REV CODE 636 W HCPCS: Performed by: EMERGENCY MEDICINE

## 2020-10-27 PROCEDURE — 80202 ASSAY OF VANCOMYCIN: CPT

## 2020-10-27 PROCEDURE — 63600175 PHARM REV CODE 636 W HCPCS

## 2020-10-27 PROCEDURE — 80053 COMPREHEN METABOLIC PANEL: CPT

## 2020-10-27 PROCEDURE — 85730 THROMBOPLASTIN TIME PARTIAL: CPT | Mod: 91

## 2020-10-27 PROCEDURE — 99233 SBSQ HOSP IP/OBS HIGH 50: CPT | Mod: ,,, | Performed by: PHYSICIAN ASSISTANT

## 2020-10-27 PROCEDURE — 31500 PR INSERT, EMERGENCY ENDOTRACH AIRWAY: ICD-10-PCS | Mod: ,,, | Performed by: EMERGENCY MEDICINE

## 2020-10-27 PROCEDURE — 85730 THROMBOPLASTIN TIME PARTIAL: CPT

## 2020-10-27 PROCEDURE — 81025 URINE PREGNANCY TEST: CPT

## 2020-10-27 PROCEDURE — 99900026 HC AIRWAY MAINTENANCE (STAT)

## 2020-10-27 RX ORDER — LIDOCAINE HYDROCHLORIDE 10 MG/ML
10 INJECTION INFILTRATION; PERINEURAL ONCE
Status: COMPLETED | OUTPATIENT
Start: 2020-10-27 | End: 2020-10-27

## 2020-10-27 RX ORDER — LIDOCAINE HYDROCHLORIDE 10 MG/ML
INJECTION INFILTRATION; PERINEURAL
Status: COMPLETED
Start: 2020-10-27 | End: 2020-10-27

## 2020-10-27 RX ORDER — FAMOTIDINE 20 MG/1
20 TABLET, FILM COATED ORAL 2 TIMES DAILY
Status: DISCONTINUED | OUTPATIENT
Start: 2020-10-27 | End: 2020-10-28

## 2020-10-27 RX ORDER — HEPARIN SODIUM 5000 [USP'U]/ML
5000 INJECTION, SOLUTION INTRAVENOUS; SUBCUTANEOUS EVERY 8 HOURS
Status: DISCONTINUED | OUTPATIENT
Start: 2020-10-27 | End: 2020-11-08

## 2020-10-27 RX ORDER — FAMOTIDINE 20 MG/1
20 TABLET, FILM COATED ORAL DAILY
Status: DISCONTINUED | OUTPATIENT
Start: 2020-10-28 | End: 2020-10-27

## 2020-10-27 RX ORDER — ACETAMINOPHEN 650 MG/1
650 SUPPOSITORY RECTAL EVERY 8 HOURS PRN
Status: DISCONTINUED | OUTPATIENT
Start: 2020-10-27 | End: 2020-10-28

## 2020-10-27 RX ADMIN — VANCOMYCIN HYDROCHLORIDE 750 MG: 750 INJECTION, POWDER, LYOPHILIZED, FOR SOLUTION INTRAVENOUS at 11:10

## 2020-10-27 RX ADMIN — PROPOFOL 50 MCG/KG/MIN: 10 INJECTION, EMULSION INTRAVENOUS at 03:10

## 2020-10-27 RX ADMIN — CEFAZOLIN 2 G: 1 INJECTION, POWDER, FOR SOLUTION INTRAMUSCULAR; INTRAVENOUS at 01:10

## 2020-10-27 RX ADMIN — SODIUM CHLORIDE 1000 ML: 0.9 INJECTION, SOLUTION INTRAVENOUS at 10:10

## 2020-10-27 RX ADMIN — SODIUM CHLORIDE, SODIUM LACTATE, POTASSIUM CHLORIDE, AND CALCIUM CHLORIDE 500 ML: .6; .31; .03; .02 INJECTION, SOLUTION INTRAVENOUS at 10:10

## 2020-10-27 RX ADMIN — VANCOMYCIN HYDROCHLORIDE 750 MG: 750 INJECTION, POWDER, LYOPHILIZED, FOR SOLUTION INTRAVENOUS at 04:10

## 2020-10-27 RX ADMIN — LIDOCAINE HYDROCHLORIDE 10 ML: 10 INJECTION, SOLUTION INFILTRATION; PERINEURAL at 12:10

## 2020-10-27 RX ADMIN — SODIUM CHLORIDE, SODIUM LACTATE, POTASSIUM CHLORIDE, AND CALCIUM CHLORIDE 500 ML: .6; .31; .03; .02 INJECTION, SOLUTION INTRAVENOUS at 04:10

## 2020-10-27 RX ADMIN — Medication 200 MCG/HR: at 05:10

## 2020-10-27 RX ADMIN — PROPOFOL 50 MCG/KG/MIN: 10 INJECTION, EMULSION INTRAVENOUS at 10:10

## 2020-10-27 RX ADMIN — ACETAMINOPHEN 650 MG: 650 SUPPOSITORY RECTAL at 10:10

## 2020-10-27 RX ADMIN — HEPARIN SODIUM 5000 UNITS: 5000 INJECTION INTRAVENOUS; SUBCUTANEOUS at 09:10

## 2020-10-27 RX ADMIN — FAMOTIDINE 20 MG: 20 TABLET, FILM COATED ORAL at 08:10

## 2020-10-27 RX ADMIN — HEPARIN SODIUM AND DEXTROSE 18 UNITS/KG/HR: 10000; 5 INJECTION INTRAVENOUS at 02:10

## 2020-10-27 RX ADMIN — LIDOCAINE HYDROCHLORIDE 10 ML: 10 INJECTION INFILTRATION; PERINEURAL at 12:10

## 2020-10-27 RX ADMIN — CEFAZOLIN 2 G: 1 INJECTION, POWDER, FOR SOLUTION INTRAMUSCULAR; INTRAVENOUS at 06:10

## 2020-10-27 RX ADMIN — SODIUM CHLORIDE, SODIUM LACTATE, POTASSIUM CHLORIDE, AND CALCIUM CHLORIDE 500 ML: .6; .31; .03; .02 INJECTION, SOLUTION INTRAVENOUS at 08:10

## 2020-10-27 RX ADMIN — Medication 200 MCG/HR: at 07:10

## 2020-10-27 RX ADMIN — PROPOFOL 40 MCG/KG/MIN: 10 INJECTION, EMULSION INTRAVENOUS at 04:10

## 2020-10-27 RX ADMIN — HEPARIN SODIUM AND DEXTROSE 18 UNITS/KG/HR: 10000; 5 INJECTION INTRAVENOUS at 08:10

## 2020-10-27 NOTE — PROCEDURES
"Марина Britton is a 30 y.o. female patient.    Temp: 97.8 °F (36.6 °C) (10/26/20 2148)  Pulse: 101 (10/27/20 0017)  Resp: (!) 30 (10/27/20 0017)  BP: 113/77 (10/27/20 0017)  SpO2: 100 % (10/27/20 0017)  Weight: 53.2 kg (117 lb 4.6 oz) (10/26/20 2148)  Height: 5' 2" (157.5 cm) (10/26/20 2148)       Intubation    Date/Time: 10/27/2020 1:24 AM  Location procedure was performed: [facility] CARDIAC MEDICAL ICU (ICU)  Performed by: Sepideh Chappell MD  Authorized by: Sepideh Chappell MD   Consent Done: Emergent Situation  Indications: respiratory failure and  airway protection  Intubation method: oral  Patient status: paralyzed (RSI)  Preoxygenation: BVM (+BIPAP)  Sedatives: etomidate  Paralytic: rocuronium  Laryngoscope size: Mac 3  Tube size: 7.5 mm  Tube type: cuffed  Number of attempts: 1  Cricoid pressure: no  Cords visualized: yes  Post-procedure assessment: chest rise and CO2 detector  Breath sounds: rales/crackles,  equal and equal and absent over the epigastrium  Cuff inflated: yes  ETT to teeth: 23 cm  Tube secured with: ETT oneal  Chest x-ray interpreted by me.  Chest x-ray findings: endotracheal tube too low  Tube repositioned: tube repositioned successfully  Patient tolerance: Patient tolerated the procedure well with no immediate complications  Complications: No    Central Line    Date/Time: 10/27/2020 1:25 AM  Performed by: Sepideh Chappell MD  Authorized by: Sepideh Chappell MD     Location procedure was performed:  [facility] CARDIAC MEDICAL ICU (ICU)  Consent Done ?:  Emergent Situation  Time out complete?: Verified correct patient, procedure, equipment, staff, and site/side    Indications:  Vascular access and med administration  Anesthesia:  Local infiltration  Local anesthetic:  Lidocaine 1% without epinephrine  Anesthetic total (ml):  5  Preparation:  Skin prepped with ChloraPrep  Skin prep agent dried: Skin prep agent completely dried prior to procedure    Sterile barriers: All five maximal sterile " barriers used - gloves, gown, cap, mask and large sterile sheet    Hand hygiene: Hand hygiene performed immediately prior to central venous catheter insertion    Location:  Right internal jugular  Catheter type:  Triple lumen  Catheter size:  12 Fr  Inserted Catheter Length (cm):  16  Ultrasound guidance: Yes    Vessel Caliber:  Large   patent  Comprressibility:  Normal  Needle advanced into vessel with real time ultrasound guidance.    Guidewire confirmed in vessel.    Steril sheath on probe.    Manometry: No    Number of attempts:  1  Securement:  Line sutured, chlorhexidine patch, sterile dressing applied and blood return through all ports  Estimated blood loss (mL):  5  XRay:  Placement verified by x-ray  Adverse Events:  None  Other Complications:  CXR demonstrated tip in right atrium.  CVC withdrawn 2cm.   CXR demonstrated tip in right atrium.  CVC withdrawn 2cm.        Sepideh Chappell  10/27/2020

## 2020-10-27 NOTE — PLAN OF CARE
10/27/20 0745   Final Note   Assessment Type Final Discharge Note   Anticipated Discharge Disposition Short Term  (Patient transferred to [facility] on 10/26/2020.)

## 2020-10-27 NOTE — PROGRESS NOTES
[facility]  Critical Care Medicine  Progress Note    Patient Name: Марина Britton  MRN: [id number]  Admission Date: 10/26/2020  Hospital Length of Stay: 1 days  Code Status: Full Code  Attending Provider: Abdoul Quick MD  Primary Care Provider: Luis Felipe Jose Iii, MD   Principal Problem: <principal problem not specified>    Subjective:     HPI:  30F with PMHx IVUD heroin (last use approx 2wk ago) presents as transfer from H for septic endocarditis with b/l PE requiring higher level of care.  Per patient and chart review, she has had progressively worsening chest and back pain over the past several days with a Tmax of 105F at home.  She presented to [facility] and was admitted for sepsis on 10/24.  Bcx showed GPC consistent with Staph Aureus, so she was started on Vanc/Zosyn.  She was stepped up to the ICU on 10/25 for worsening tachypnea.  She also received 2U pRBC x2 for anemia with a pavel Hb of 6.5.  She was transferred to [facility] 10/26 for a higher level of care.    Hospital/ICU Course:  10/27/2020 Patient extremely tachypneic >50 breaths per minute on arrival. Concern for respiratory distress and airway protection led to intubation. WBC trending down, persistently febrile tmax 103.2F, repeat cultures pending. OG tube to suction with bilious output. ID consulted    Interval History/Significant Events: Intubated overnight due to concerns for airway protection. Somewhat arousable but not following commands. Sedated on fentanyl, propofol    Review of Systems   Unable to perform ROS: Intubated     Objective:     Vital Signs (Most Recent):  Temp: (!) 101.1 °F (38.4 °C) (10/27/20 1145)  Pulse: (!) 122 (10/27/20 1200)  Resp: (!) 32 (10/27/20 1200)  BP: 123/77 (10/27/20 1200)  SpO2: 97 % (10/27/20 1200) Vital Signs (24h Range):  Temp:  [97.8 °F (36.6 °C)-103.2 °F (39.6 °C)] 101.1 °F (38.4 °C)  Pulse:  [] 122  Resp:  [30-70] 32  SpO2:  [90 %-100 %] 97 %  BP:  (110-141)/() 123/77   Weight: 53.2 kg (117 lb 4.6 oz)  Body mass index is 21.45 kg/m².      Intake/Output Summary (Last 24 hours) at 10/27/2020 1320  Last data filed at 10/27/2020 1200  Gross per 24 hour   Intake 2010.47 ml   Output 1125 ml   Net 885.47 ml       Physical Exam  Vitals signs and nursing note reviewed.   Constitutional:       Appearance: She is ill-appearing and toxic-appearing.      Comments: Intubated, sedated   HENT:      Head: Normocephalic.      Right Ear: External ear normal.      Left Ear: External ear normal.      Nose: Nose normal.      Mouth/Throat:      Mouth: Mucous membranes are moist.   Eyes:      Pupils: Pupils are equal, round, and reactive to light.   Neck:      Musculoskeletal: Normal range of motion. No neck rigidity.   Cardiovascular:      Rate and Rhythm: Regular rhythm. Tachycardia present.      Heart sounds: No murmur. No friction rub. No gallop.    Pulmonary:      Breath sounds: Rhonchi and rales present.      Comments: Intubated  Abdominal:      General: There is no distension.      Palpations: Abdomen is soft.      Tenderness: There is no abdominal tenderness.   Musculoskeletal:      Right lower leg: No edema.      Left lower leg: No edema.   Skin:     Capillary Refill: Capillary refill takes less than 2 seconds.      Comments: Left medial ankle ulcer   Neurological:      Mental Status: She is oriented to person, place, and time.         Vents:  Vent Mode: A/C (10/27/20 0709)  Ventilator Initiated: Yes (10/26/20 2315)  Set Rate: 32 BPM (10/27/20 0709)  Vt Set: 360 mL (10/27/20 0709)  Pressure Support: 0 cmH20 (10/27/20 0709)  PEEP/CPAP: 8 cmH20 (10/27/20 0709)  Oxygen Concentration (%): 30 (10/27/20 1200)  Peak Airway Pressure: 29 cmH2O (10/27/20 0709)  Plateau Pressure: 27 cmH20 (10/27/20 0709)  Total Ve: 12.5 mL (10/27/20 0709)  F/VT Ratio<105 (RSBI): (!) 85.79 (10/27/20 0709)  Lines/Drains/Airways     Central Venous Catheter Line            Percutaneous Central Line  Insertion/Assessment - Triple Lumen  10/27/20 0058 right internal jugular less than 1 day          Drain                 NG/OG Tube 10/26/20 2302 orogastric Center mouth less than 1 day         Urethral Catheter 10/26/20 2330 16 Fr. less than 1 day          Airway                 Airway - Non-Surgical 10/26/20 2259 Endotracheal Tube less than 1 day              Significant Labs:    CBC/Anemia Profile:  Recent Labs   Lab 10/26/20  0852 10/26/20  2034 10/27/20  0505   WBC 18.64* 19.32*  19.32* 17.71*   HGB 9.4* 9.1*  9.1* 8.4*   HCT 28.1* 27.6*  27.6* 25.7*   PLT 71* 76*  76* 86*   MCV 80* 81*  81* 81*   RDW 16.5* 16.8*  16.8* 16.7*        Chemistries:  Recent Labs   Lab 10/26/20  0356 10/26/20  2034 10/27/20  0323    132* 134*   K 4.2 5.0 4.6    103 104   CO2 23 20* 20*   BUN 26* 25* 26*   CREATININE 0.8 0.8 0.8   CALCIUM 7.8* 7.5* 7.7*   ALBUMIN 1.3* 1.5* 1.4*   PROT 5.8* 6.4 6.1   BILITOT 0.4 0.9 0.8   ALKPHOS 228* 226* 202*   ALT 35 34 40   AST 93* 70* 100*   MG  --  2.1 2.0   PHOS  --  4.1 4.0         Significant Imaging:  X-Ray Chest AP Portable [[id number]] Resulted: 10/27/20 0148   Order Status: Completed Updated: 10/27/20 0151   Narrative:     EXAMINATION:   XR CHEST AP PORTABLE     CLINICAL HISTORY:   s/p triple lumen cath insertion right IJ;     TECHNIQUE:   Single frontal view of the chest was performed.     COMPARISON:   Chest radiograph performed 10/26/2020, 23:10 hours     FINDINGS:   EKG leads overlie the chest.  A new right internal jugular approach central venous catheter tip likely projects within the right atrium.  Tip of endotracheal tube between thoracic inlet and lucien.  Side port and tip of a nasogastric tube beyond the field-of-view of this examination.  The appearance of the cardiomediastinal silhouette is unchanged.  Diffuse bilateral patchy airspace and interstitial opacities are again noted.  There may be some minor clearing at the lung bases relative to the prior study.   No definite pneumothorax is seen.  Query right pleural effusion.  Additional findings elsewhere not substantially changed.            ABG  Recent Labs   Lab 10/27/20  0453   PH 7.458*   PO2 67*   PCO2 29.4*   HCO3 20.8*   BE -3     Assessment/Plan:     Psychiatric  IVDU (intravenous drug user)  - will need counseling/resources at discharge  - monitor for withdrawal symptoms     Pulmonary  Pneumonia of both lungs due to infectious organism  CT chest demonstrating bilateral scattered opacities of varying size with notable cavitations concerning for septic emboli from endocarditis (large tricuspid vegetation noted on echocardiogram    - continue vanc, cefazolin  -intubated 10/27 for respiratory distress    Cardiac/Vascular  Endocarditis  Large tricuspid vegetation noted on TTE with eptic emboli to lungs bilaterally  -no need for AC at this time, but will continue to monitor  -will consider need for HARI    ID  Severe sepsis  WBC peaked at 25.66, persistently febrile to 103F, tachycardic to 120s, lactate wnl. Encephalopathic on arrival concerning for end-organ damage  -blood cultures 10/24 positive for MRSA, repeat pending  - S/p fluid resuscitation at [place]  - continue vanc, cefazolin  - ID consulted       Critical Care Daily Checklist:    A: Awake: RASS Goal/Actual Goal:    Actual: [place] Agitation Sedation Scale (RASS): Light sedation   B: Spontaneous Breathing Trial Performed?   n/a   C: SAT & SBT Coordinated?  n/a                      D: Delirium: CAM-ICU Overall CAM-ICU: Positive   E: Early Mobility Performed? No   F: Feeding Goal:    Status:     Current Diet Order   Procedures    Diet Adult Regular (IDDSI Level 7)      AS: Analgesia/Sedation Fentanyl, propofol   T: Thromboembolic Prophylaxis Heparin sq   H: HOB > 300 Yes   U: Stress Ulcer Prophylaxis (if needed) famotidine   G: Glucose Control n/a   B: Bowel Function Stool Occurrence: 0   I: Indwelling Catheter (Lines & Adkins) Necessity RIJ triple lumen,  zhen SABILLON: De-escalation of Antimicrobials/Pharmacotherapies Vanc, cefazolin    Plan for the day/ETD     Code Status:  Family/Goals of Care: Full Code         Critical secondary to Patient has a condition that poses threat to life and bodily function: Pulmonary Embolism and Severe Respiratory Distress      30 mins Critical care was time spent personally by me on the following activities: development of treatment plan with patient or surrogate and bedside caregivers, discussions with consultants, evaluation of patient's response to treatment, examination of patient, ordering and performing treatments and interventions, ordering and review of laboratory studies, ordering and review of radiographic studies, pulse oximetry, re-evaluation of patient's condition. This critical care time did not overlap with that of any other provider or involve time for any procedures.     Tito Mcintosh MD  Critical Care Medicine  [facility]

## 2020-10-27 NOTE — H&P
[facility]  Critical Care Medicine  History & Physical    Patient Name: Марина Britton  MRN: [id number]  Admission Date: 10/26/2020  Hospital Length of Stay: 0 days  Code Status: Full Code  Attending Physician: Abdoul Quick MD   Primary Care Provider: Luis Felipe Jose Iii, MD   Principal Problem: <principal problem not specified>    Subjective:     HPI:  30F with PMHx IVUD heroin (last use approx 2wk ago) presents as transfer from OSH for septic endocarditis with b/l PE requiring higher level of care.  Per patient and chart review, she has had progressively worsening chest and back pain over the past several days with a Tmax of 105F at home.  She presented to [facility] and was admitted for sepsis on 10/24.  Bcx showed GPC consistent with Staph Aureus, so she was started on Vanc/Zosyn.  She was stepped up to the ICU on 10/25 for worsening tachypnea.  She also received 2U pRBC x2 for anemia with a pavel Hb of 6.5.  She was transferred to [facility] 10/26 for a higher level of care.    Hospital/ICU Course:  No notes on file     No new subjective & objective note has been filed under this hospital service since the last note was generated.    Assessment/Plan:     Psychiatric  IVDU (intravenous drug user)  - will need counseling/resources at discharge  - monitor for withdrawal symptoms    Pulmonary  Pneumonia of both lungs due to infectious organism  - bcx with GPC, likely staph  - continue vanc, DC zosyn    ID  Severe sepsis  - S/p fluid resuscitation and abx at [place]  - continue treatment as above    Hematology  Pulmonary emboli  - diagnosed by CT at [place]  - full dose lovenox      Critical Care Daily Checklist:    A: Awake: RASS Goal/Actual Goal:    Actual:     B: Spontaneous Breathing Trial Performed?     C: SAT & SBT Coordinated?  Not intubated                      D: Delirium: CAM-ICU     E: Early Mobility Performed? Yes   F: Feeding Goal:    Status:     Current Diet Order    Procedures    Diet Adult Regular (IDDSI Level 7)      AS: Analgesia/Sedation None ordered   T: Thromboembolic Prophylaxis Full dose lovenox   H: HOB > 300 Yes   U: Stress Ulcer Prophylaxis (if needed) Not indicated   G: Glucose Control adequate   B: Bowel Function     I: Indwelling Catheter (Lines & Fontaine) Necessity No fontaine   D: De-escalation of Antimicrobials/Pharmacotherapies Pending sensititvities    Plan for the day/ETD Admit to ICU    Code Status:  Family/Goals of Care: Full Code         Critical secondary to Patient has a condition that poses threat to life and bodily function: Severe Respiratory Distress     Critical care was time spent personally by me on the following activities: development of treatment plan with patient or surrogate and bedside caregivers, discussions with consultants, evaluation of patient's response to treatment, examination of patient, ordering and performing treatments and interventions, ordering and review of laboratory studies, ordering and review of radiographic studies, pulse oximetry, re-evaluation of patient's condition. This critical care time did not overlap with that of any other provider or involve time for any procedures.     Sepideh Chappell MD  Critical Care Medicine  [facility]

## 2020-10-27 NOTE — HPI
"History obtained per chart review. Pt critically ill. Intubated, sedated.       30F with PMHx IVUD heroin (last use approx 2wk ago), Hepatitis C presents as transfer from OSH for septic endocarditis with b/l PE requiring higher level of care.  Per patient and chart review, she has had progressively worsening chest and back pain over the past several days with a Tmax of 105F at home.  She presented to [facility] and was admitted for sepsis on 10/24.  Bcx showed +MRSA and found to have large vegetation on tricuspid valve on TTE. Noted to have septic emobli on imaging studies. She was started on empiric vancomycin and zosyn. She was stepped up to the ICU on 10/25 for worsening tachypnea.  She also received 2U pRBC x2 for anemia with a pavel Hb of 6.5.  She was transferred to [facility] 10/26 for a higher level of care.    Per chart review. She has been seen in ED for fatigue and wound of her left foot. Per nursing note," Pt shows me a wound on her left foot/ankle, states it has been there over 1 month.  Pt states it started off as an are where she injected heroin, then turned in to a blister, abscess.  She states she was recently seen in [place] ED and placed on antibiotics.  Pt has not followed up with anyone regarding this wound. Spoke with ER MD Dr. Linares, pt does not tomas to be started on any antibiotics at this time, pt needs referral to wound care.  This was explained to pt who stated "ok."   she has been on different abx including bactrim and clindamycin without improvement. No follow up.   "

## 2020-10-27 NOTE — PLAN OF CARE
CM met with patient at the bedside to discuss D/C POC needs. Patient intubated and unable to verify demographics in the chart are correct. Called and spoke w/ mother (Carmel) via phone to complete admission assessment.  CM name and contact number listed on the patient's white board.  CM provided explanation of discharge plan process. CM left blue folder at the bedside with explanation of qualification for placement and facility resources. Patient/family expressed understanding. CM remains available for any further patient needs or concerns.   States that patient's father (Judi) lives at the home with her, the patient, and her 2 dependent kids.  Everyone can be reached at [phone].    Luis Felipe Jose Iii, MD  [address] [facility] [address]      [facility] [address] [place], [place] - [address] [facility]  [address] [facility] [address]  Phone: [phone] Fax: [phone]    [id number] [address] AT [facility] [address] [facility] [address]  [address]  [facility] [address]  Phone: [phone] Fax: [phone]    Extended Emergency Contact Information  Primary Emergency Contact: Mc Maloneraudel  Mobile Phone: [phone]  Relation: None   needed? No  Secondary Emergency Contact: APRIL TOMI  Mobile Phone: [phone]  Relation: Father  Preferred language: English   needed? No    Payor: MEDICAID / Plan: [place] [facility] [employer] / Product Type: Managed Medicaid /     Pulmonary emboli [I26.99]         10/27/20 1042   Discharge Assessment   Assessment Type Discharge Planning Assessment   Confirmed/corrected address and phone number on facesheet? Yes   Assessment information obtained from? Caregiver   Prior to hospitilization cognitive status: Alert/Oriented   Prior to hospitalization functional status: Independent   Current cognitive status: Unable to Assess;Coma/Sedated/Intubated   Current Functional Status: Completely Dependent   Facility  Arrived From: [facility]   Lives With parent(s);child(wolf), dependent   Able to Return to Prior Arrangements other (see comments)  (TBD)   Is patient able to care for self after discharge? Unable to determine at this time (comments)   Who are your caregiver(s) and their phone number(s)? America Malone (ftr) Carmel Malone ([facility] [phone]   Readmission Within the Last 30 Days no previous admission in last 30 days   Patient currently being followed by outpatient case management? No   Patient currently receives any other outside agency services? No   Equipment Currently Used at Home none   Do you have any problems affording any of your prescribed medications? No   Is the patient taking medications as prescribed? yes   Does the patient have transportation home? Yes   Transportation Anticipated family or friend will provide   Does the patient receive services at the [facility]? No   Discharge Plan A Home with family   Discharge Plan B Rehab       Seng Jimenes MSN, RN-BC  Critical Care-   Ext. [phone]

## 2020-10-27 NOTE — EICU
Intervention Initiated From:  Bedside    Giselle Communicated with Bedside Nurse regarding:  Time-Out      Comments: eLert received for timeout for emergent intubation.  Completed & documented.  No complications during procedure.  Given 50 mg rocuronium & 15 mg etomidate IV for induction.  Positive color change & equal mirlande breath sounds auscultated per MD.  CXR ordered per MD request.  No other needs or requests for eICU at this time.  eICU time in room [id number].

## 2020-10-27 NOTE — CONSULTS
Wound care consulted for left medial ankle ulceration  PMH:  Anxiety disorder, bipolar, PTSD, IVDU- heroin- pneumonia of both lungs, severe sepsis, pulmonary emboli endocarditis, tachypnea, MRSA bacteremia  Assessment:  Patient is lying on a low air loss surface, endo tube to vent, responds to painful stimuli.  The left medial ankle/foot ulceration has slough covering the wound bed which was easily removed during cleansing. The wound bed revealed is red/moist with open wound edges.  Recommendations:  Left foot- nursing to cleanse with sterile normal saline, place HydroferaBlue transfer dressing over the wound bed, cover with a foam dressing every Tues/Fri   Continue pressure prevention measures   Nursing to continue care  Wound care will follow-up prleo Flanagan RN, CWCN  [id number]      Before cleansing    After cleansing left medial foot   2 cm L x 2 cm W x 0.4 cm D

## 2020-10-27 NOTE — SUBJECTIVE & OBJECTIVE
Past Medical History:   Diagnosis Date    Anxiety     Borderline personality disorder     PTSD (post-traumatic stress disorder)     Substance abuse        Past Surgical History:   Procedure Laterality Date     SECTION, LOW TRANSVERSE      x4    COSMETIC SURGERY         Review of patient's allergies indicates:  No Known Allergies    Family History     None        Tobacco Use    Smoking status: Never Smoker   Substance and Sexual Activity    Alcohol use: Yes     Comment: occ    Drug use: Yes     Types: IV, Marijuana     Comment: no heroin x3 days    Sexual activity: Yes     Partners: Male     Birth control/protection: Partner-Vasectomy      Review of Systems   Constitutional: Positive for chills, fatigue and fever.   HENT: Negative for facial swelling.    Eyes: Negative for discharge.   Respiratory: Positive for shortness of breath.    Cardiovascular: Positive for chest pain.   Gastrointestinal: Negative for abdominal pain.   Genitourinary: Negative for dysuria.   Musculoskeletal: Positive for myalgias.   Skin: Positive for pallor.   Neurological: Negative for dizziness.   Psychiatric/Behavioral: Positive for confusion.     Objective:     Vital Signs (Most Recent):    Vital Signs (24h Range):  Temp:  [98 °F (36.7 °C)-101.9 °F (38.8 °C)] 101.9 °F (38.8 °C)  Pulse:  [] 132  Resp:  [21-74] 60  SpO2:  [71 %-100 %] 90 %  BP: (124-149)/() 132/81   Weight: 53.2 kg (117 lb 4.6 oz)  Body mass index is 21.45 kg/m².    No intake or output data in the 24 hours ending 10/26/20 2118    Physical Exam  Vitals signs reviewed.   Constitutional:       Appearance: She is ill-appearing.      Comments: Somnolent but arousable   HENT:      Head: Normocephalic.      Right Ear: External ear normal.      Left Ear: External ear normal.      Nose: Nose normal.      Mouth/Throat:      Mouth: Mucous membranes are moist.   Eyes:      Pupils: Pupils are equal, round, and reactive to light.   Neck:      Musculoskeletal:  Normal range of motion. No neck rigidity.   Cardiovascular:      Rate and Rhythm: Regular rhythm. Tachycardia present.      Heart sounds: No murmur. No friction rub. No gallop.    Pulmonary:      Breath sounds: Rales present.      Comments: Tachypnea, increased WOB  Abdominal:      General: There is no distension.      Palpations: Abdomen is soft.      Tenderness: There is no abdominal tenderness.   Musculoskeletal:      Right lower leg: No edema.      Left lower leg: No edema.   Skin:     Capillary Refill: Capillary refill takes less than 2 seconds.      Comments: Left medial ankle ulcer   Neurological:      Mental Status: She is oriented to person, place, and time.         Vents:     Lines/Drains/Airways     Peripheral Intravenous Line                 Midline Catheter Insertion/Assessment  - Single Lumen 10/24/20 0143 Right basilic vein (medial side of arm) 18g x 10cm 2 days              Significant Labs:    CBC/Anemia Profile:  Recent Labs   Lab 10/26/20  0356 10/26/20  0852 10/26/20  2034   WBC 15.45* 18.64* 19.32*  19.32*   HGB 8.1* 9.4* 9.1*  9.1*   HCT 24.7* 28.1* 27.6*  27.6*   PLT 52* 71* 76*  76*   MCV 80* 80* 81*  81*   RDW 16.3* 16.5* 16.8*  16.8*        Chemistries:  Recent Labs   Lab 10/25/20  0528 10/26/20  0356    137   K 4.0 4.2    107   CO2 23 23   BUN 25* 26*   CREATININE 0.8 0.8   CALCIUM 7.7* 7.8*   ALBUMIN 1.4* 1.3*   PROT 6.1 5.8*   BILITOT 0.3 0.4   ALKPHOS 154* 228*   ALT 13 35   AST 25 93*       All pertinent labs within the past 24 hours have been reviewed.    Significant Imaging: I have reviewed and interpreted all pertinent imaging results/findings within the past 24 hours.

## 2020-10-27 NOTE — NURSING
Notified Dr. Rhodes of pt's temp 103.2 oral, no acetaminophen orders on the MAR. She advised she will put in an order.

## 2020-10-27 NOTE — PLAN OF CARE
Attempted to call patient's listed contact, America Malone, to update him on the status of his daughter.  The person who answered the phone informed me that I had the incorrect number.      Williamsvillelynette Chappell  [facility] PGY-2  Emergency Medicine  11:52 PM 10/26/2020

## 2020-10-27 NOTE — SUBJECTIVE & OBJECTIVE
Past Medical History:   Diagnosis Date    Anxiety     Borderline personality disorder     PTSD (post-traumatic stress disorder)     Substance abuse        Past Surgical History:   Procedure Laterality Date     SECTION, LOW TRANSVERSE      x4    COSMETIC SURGERY         Review of patient's allergies indicates:  No Known Allergies    Medications:  Medications Prior to Admission   Medication Sig    alprazolam (XANAX) 1 MG tablet Take 1 mg by mouth 3 (three) times daily as needed for Anxiety.    clindamycin (CLEOCIN) 150 MG capsule Take 2 capsules (300 mg total) by mouth 2 (two) times a day. for 10 days    hydrocodone-acetaminophen 10-325mg (NORCO)  mg Tab Take 1 tablet by mouth every 6 (six) hours as needed for Pain (as needed for pain).    mupirocin (BACTROBAN) 2 % ointment Apply topically 3 (three) times daily.     Antibiotics (From admission, onward)    Start     Stop Route Frequency Ordered    10/27/20 1600  vancomycin 750 mg in dextrose 5 % 250 mL IVPB (ready to mix system)      -- IV Every 8 hours (non-standard times) 10/27/20 1438    10/26/20 2230  ceFAZolin injection 2 g      -- IV Every 8 hours (non-standard times) 10/26/20 2118        Antifungals (From admission, onward)    None        Antivirals (From admission, onward)    None             There is no immunization history on file for this patient.    Family History     None        Social History     Socioeconomic History    Marital status: Single     Spouse name: Not on file    Number of children: Not on file    Years of education: Not on file    Highest education level: Not on file   Occupational History    Not on file   Social Needs    Financial resource strain: Not on file    Food insecurity     Worry: Not on file     Inability: Not on file    Transportation needs     Medical: Not on file     Non-medical: Not on file   Tobacco Use    Smoking status: Never Smoker   Substance and Sexual Activity    Alcohol use: Yes      Comment: occ    Drug use: Yes     Types: IV, Marijuana     Comment: no heroin x3 days    Sexual activity: Yes     Partners: Male     Birth control/protection: Partner-Vasectomy   Lifestyle    Physical activity     Days per week: Not on file     Minutes per session: Not on file    Stress: Not on file   Relationships    Social connections     Talks on phone: Not on file     Gets together: Not on file     Attends [employer] service: Not on file     Active member of club or organization: Not on file     Attends meetings of clubs or organizations: Not on file     Relationship status: Not on file   Other Topics Concern    Not on file   Social History Narrative    Not on file     Review of Systems   Unable to perform ROS: Intubated     Objective:     Vital Signs (Most Recent):  Temp: (!) 101.1 °F (38.4 °C) (10/27/20 1145)  Pulse: (!) 121 (10/27/20 1400)  Resp: (!) 33 (10/27/20 1400)  BP: 120/73 (10/27/20 1400)  SpO2: 98 % (10/27/20 1400) Vital Signs (24h Range):  Temp:  [97.8 °F (36.6 °C)-103.2 °F (39.6 °C)] 101.1 °F (38.4 °C)  Pulse:  [] 121  Resp:  [30-70] 33  SpO2:  [90 %-100 %] 98 %  BP: (110-141)/() 120/73     Weight: 53.2 kg (117 lb 4.6 oz)  Body mass index is 21.45 kg/m².    Estimated Creatinine Clearance: 80.6 mL/min (based on SCr of 0.8 mg/dL).    Physical Exam  Vitals signs and nursing note reviewed.   Constitutional:       Comments: Intubated. Sedated. Critically ill   Cardiovascular:      Rate and Rhythm: Tachycardia present.      Heart sounds: Murmur present.   Pulmonary:      Comments: intubated  Abdominal:      General: Abdomen is flat. There is no distension.      Palpations: There is no mass.   Skin:     Comments: Janeway lesions on digits    Wound on ankle noted    Injection sites noted of upper extremities              Significant Labs: All pertinent labs within the past 24 hours have been reviewed.    Significant Imaging: I have reviewed all pertinent imaging results/findings within  the past 24 hours.

## 2020-10-27 NOTE — HOSPITAL COURSE
10/27/2020 Patient extremely tachypneic >50 breaths per minute on arrival. Concern for respiratory distress and airway protection led to intubation. WBC trending down, persistently febrile tmax 103.2F, repeat cultures pending. OG tube to suction with bilious output. ID consulted    10/28/2020 Blood cultures remain positive for MRSA. CTS evaluated and recommending 6 week IV abx prior to re-eval in clinic. ID following.     10/29/2020 No acute changes, continues to fail SBT due to apparent anxiety/discomfort. Added precedex and oral pain regimen. Hgb 6.8 given 1 unit PRBC, unclear source of bleeding, some hematuria. Hemolysis labs negative. Retroperitoneal u/s ordered. Started enteral feeds, tolerating well.    10/30/2020  Low dose levophed started due to low MAPs, uptitrating pain regimen. Continues to fail SBT despite minimal vent settings, remains 3/30%.    11/1/2020  No acute events. Failed SBT due to rapid, shallow breathing. Mild DERICK and fluids given    11/2/2020  SBT succeeded and patient tolerated extubation well. Midodrine added for BP support and completely weaned off of levophed. Cr continuing to rise to 1.5, held fluids for now.

## 2020-10-27 NOTE — EICU
Intervention Initiated From:  Bedside    Giselle Communicated with Bedside Nurse regarding:  Time-Out      Comments: eLert received for timeout for emergent right IJ triple lumen cath placement.  Completed & documented.  No complications during procedure.  VSS throughout.  Positive blood return & easy flush to all 3 ports.  CXR ordered per MD request.  No further needs or requests for eICU at this time.  eICU time in room [id number].

## 2020-10-27 NOTE — ASSESSMENT & PLAN NOTE
WBC peaked at 25.66, persistently febrile to 103F, tachycardic to 120s, lactate wnl. Encephalopathic on arrival concerning for end-organ damage  -blood cultures 10/24 positive for MRSA, repeat pending  - S/p fluid resuscitation at [place]  - continue vanc, cefazolin  - ID consulted

## 2020-10-27 NOTE — ASSESSMENT & PLAN NOTE
CT chest demonstrating bilateral scattered opacities of varying size with notable cavitations concerning for septic emboli from endocarditis (large tricuspid vegetation noted on echocardiogram    - continue vanc, cefazolin  -intubated 10/27 for respiratory distress

## 2020-10-27 NOTE — H&P
[facility]  Critical Care Medicine  History & Physical    Patient Name: Марина Britton  MRN: [id number]  Admission Date: 10/26/2020  Hospital Length of Stay: 0 days  Code Status: Full Code  Attending Physician: Abdoul Quick MD   Primary Care Provider: Luis Felipe Jose Iii, MD   Principal Problem: <principal problem not specified>    Subjective:     HPI:  30F with PMHx IVUD heroin (last use approx 2wk ago) presents as transfer from [facility] for septic endocarditis with b/l PE requiring higher level of care.  Per patient and chart review, she has had progressively worsening chest and back pain over the past several days with a Tmax of 105F at home.  She presented to [facility] and was admitted for sepsis on 10/24.  Bcx showed GPC consistent with Staph Aureus, so she was started on Vanc/Zosyn.  She was stepped up to the ICU on 10/25 for worsening tachypnea.  She also received 2U pRBC x2 for anemia with a pavel Hb of 6.5.  She was transferred to [facility] 10/26 for a higher level of care.    Hospital/ICU Course:  No notes on file     Past Medical History:   Diagnosis Date    Anxiety     Borderline personality disorder     PTSD (post-traumatic stress disorder)     Substance abuse        Past Surgical History:   Procedure Laterality Date     SECTION, LOW TRANSVERSE      x4    COSMETIC SURGERY         Review of patient's allergies indicates:  No Known Allergies    Family History     None        Tobacco Use    Smoking status: Never Smoker   Substance and Sexual Activity    Alcohol use: Yes     Comment: occ    Drug use: Yes     Types: IV, Marijuana     Comment: no heroin x3 days    Sexual activity: Yes     Partners: Male     Birth control/protection: Partner-Vasectomy      Review of Systems   Constitutional: Positive for chills, fatigue and fever.   HENT: Negative for facial swelling.    Eyes: Negative for discharge.   Respiratory: Positive for shortness of breath.     Cardiovascular: Positive for chest pain.   Gastrointestinal: Negative for abdominal pain.   Genitourinary: Negative for dysuria.   Musculoskeletal: Positive for myalgias.   Skin: Positive for pallor.   Neurological: Negative for dizziness.   Psychiatric/Behavioral: Positive for confusion.     Objective:     Vital Signs (Most Recent):    Vital Signs (24h Range):  Temp:  [98 °F (36.7 °C)-101.9 °F (38.8 °C)] 101.9 °F (38.8 °C)  Pulse:  [] 132  Resp:  [21-74] 60  SpO2:  [71 %-100 %] 90 %  BP: (124-149)/() 132/81   Weight: 53.2 kg (117 lb 4.6 oz)  Body mass index is 21.45 kg/m².    No intake or output data in the 24 hours ending 10/26/20 2118    Physical Exam  Vitals signs reviewed.   Constitutional:       Appearance: She is ill-appearing.      Comments: Somnolent but arousable   HENT:      Head: Normocephalic.      Right Ear: External ear normal.      Left Ear: External ear normal.      Nose: Nose normal.      Mouth/Throat:      Mouth: Mucous membranes are moist.   Eyes:      Pupils: Pupils are equal, round, and reactive to light.   Neck:      Musculoskeletal: Normal range of motion. No neck rigidity.   Cardiovascular:      Rate and Rhythm: Regular rhythm. Tachycardia present.      Heart sounds: No murmur. No friction rub. No gallop.    Pulmonary:      Breath sounds: Rales present.      Comments: Tachypnea, increased WOB  Abdominal:      General: There is no distension.      Palpations: Abdomen is soft.      Tenderness: There is no abdominal tenderness.   Musculoskeletal:      Right lower leg: No edema.      Left lower leg: No edema.   Skin:     Capillary Refill: Capillary refill takes less than 2 seconds.      Comments: Left medial ankle ulcer   Neurological:      Mental Status: She is oriented to person, place, and time.         Vents:     Lines/Drains/Airways     Peripheral Intravenous Line                 Midline Catheter Insertion/Assessment  - Single Lumen 10/24/20 0143 Right basilic vein (medial  side of arm) 18g x 10cm 2 days              Significant Labs:    CBC/Anemia Profile:  Recent Labs   Lab 10/26/20  0356 10/26/20  0852 10/26/20  2034   WBC 15.45* 18.64* 19.32*  19.32*   HGB 8.1* 9.4* 9.1*  9.1*   HCT 24.7* 28.1* 27.6*  27.6*   PLT 52* 71* 76*  76*   MCV 80* 80* 81*  81*   RDW 16.3* 16.5* 16.8*  16.8*        Chemistries:  Recent Labs   Lab 10/25/20  0528 10/26/20  0356    137   K 4.0 4.2    107   CO2 23 23   BUN 25* 26*   CREATININE 0.8 0.8   CALCIUM 7.7* 7.8*   ALBUMIN 1.4* 1.3*   PROT 6.1 5.8*   BILITOT 0.3 0.4   ALKPHOS 154* 228*   ALT 13 35   AST 25 93*       All pertinent labs within the past 24 hours have been reviewed.    Significant Imaging: I have reviewed and interpreted all pertinent imaging results/findings within the past 24 hours.    Assessment/Plan:     Psychiatric  IVDU (intravenous drug user)  - will need counseling/resources at discharge  - monitor for withdrawal symptoms    Pulmonary  Pneumonia of both lungs due to infectious organism  - bcx with GPC, likely staph  - continue vanc, DC zosyn    Cardiac/Vascular  Endocarditis  As above    ID  Severe sepsis  - S/p fluid resuscitation and abx at [place]  - continue treatment as above    Hematology  Pulmonary emboli  - diagnosed by CT at [place]  - full dose lovenox        Critical Care Daily Checklist:    A: Awake: RASS Goal/Actual Goal:    Actual:     B: Spontaneous Breathing Trial Performed?     C: SAT & SBT Coordinated?  N/A                      D: Delirium: CAM-ICU     E: Early Mobility Performed? Yes   F: Feeding Goal:    Status:     Current Diet Order   Procedures    Diet Adult Regular (IDDSI Level 7)      AS: Analgesia/Sedation N/A   T: Thromboembolic Prophylaxis UFH full dose   H: HOB > 300 Yes   U: Stress Ulcer Prophylaxis (if needed) N/A   G: Glucose Control adequate   B: Bowel Function     I: Indwelling Catheter (Lines & Adkins) Necessity N/A   D: De-escalation of Antimicrobials/Pharmacotherapies Pending  suceptibilities    Plan for the day/ETD Abx, f/u cultures/labs    Code Status:  Family/Goals of Care: Full Code         Critical secondary to Patient has a condition that poses threat to life and bodily function: Pulmonary Embolism     Critical care was time spent personally by me on the following activities: development of treatment plan with patient or surrogate and bedside caregivers, discussions with consultants, evaluation of patient's response to treatment, examination of patient, ordering and performing treatments and interventions, ordering and review of laboratory studies, ordering and review of radiographic studies, pulse oximetry, re-evaluation of patient's condition. This critical care time did not overlap with that of any other provider or involve time for any procedures.     eSpideh Chappell MD  Critical Care Medicine  [facility]

## 2020-10-27 NOTE — PROGRESS NOTES
Charge RN, Tete Phillips, called critical care fellow to experess concern of patient's work of breathing. MD states that he will come to bedside.    2250: MD at bedside. Will intubate per Dr. Trevizo. Both charge nurses at bedside. EICU called.     2255: entered all sedation orders per Dr. Trevizo verbal order.

## 2020-10-27 NOTE — HPI
30F with PMHx IVUD heroin (last use approx 2wk ago) presents as transfer from [facility] for septic endocarditis with b/l PE requiring higher level of care.  Per patient and chart review, she has had progressively worsening chest and back pain over the past several days with a Tmax of 105F at home.  She presented to [facility] and was admitted for sepsis on 10/24.  Bcx showed GPC consistent with Staph Aureus, so she was started on Vanc/Zosyn.  She was stepped up to the ICU on 10/25 for worsening tachypnea.  She also received 2U pRBC x2 for anemia with a pavel Hb of 6.5.  She was transferred to [facility] 10/26 for a higher level of care.

## 2020-10-27 NOTE — ASSESSMENT & PLAN NOTE
Large tricuspid vegetation noted on TTE with eptic emboli to lungs bilaterally  -no need for AC at this time, but will continue to monitor  -will consider need for HARI

## 2020-10-27 NOTE — SUBJECTIVE & OBJECTIVE
Interval History/Significant Events: Intubated overnight due to concerns for airway protection. Somewhat arousable but not following commands. Sedated on fentanyl, propofol    Review of Systems   Unable to perform ROS: Intubated     Objective:     Vital Signs (Most Recent):  Temp: (!) 101.1 °F (38.4 °C) (10/27/20 1145)  Pulse: (!) 122 (10/27/20 1200)  Resp: (!) 32 (10/27/20 1200)  BP: 123/77 (10/27/20 1200)  SpO2: 97 % (10/27/20 1200) Vital Signs (24h Range):  Temp:  [97.8 °F (36.6 °C)-103.2 °F (39.6 °C)] 101.1 °F (38.4 °C)  Pulse:  [] 122  Resp:  [30-70] 32  SpO2:  [90 %-100 %] 97 %  BP: (110-141)/() 123/77   Weight: 53.2 kg (117 lb 4.6 oz)  Body mass index is 21.45 kg/m².      Intake/Output Summary (Last 24 hours) at 10/27/2020 1320  Last data filed at 10/27/2020 1200  Gross per 24 hour   Intake 2010.47 ml   Output 1125 ml   Net 885.47 ml       Physical Exam  Vitals signs and nursing note reviewed.   Constitutional:       Appearance: She is ill-appearing and toxic-appearing.      Comments: Intubated, sedated   HENT:      Head: Normocephalic.      Right Ear: External ear normal.      Left Ear: External ear normal.      Nose: Nose normal.      Mouth/Throat:      Mouth: Mucous membranes are moist.   Eyes:      Pupils: Pupils are equal, round, and reactive to light.   Neck:      Musculoskeletal: Normal range of motion. No neck rigidity.   Cardiovascular:      Rate and Rhythm: Regular rhythm. Tachycardia present.      Heart sounds: No murmur. No friction rub. No gallop.    Pulmonary:      Breath sounds: Rhonchi and rales present.      Comments: Intubated  Abdominal:      General: There is no distension.      Palpations: Abdomen is soft.      Tenderness: There is no abdominal tenderness.   Musculoskeletal:      Right lower leg: No edema.      Left lower leg: No edema.   Skin:     Capillary Refill: Capillary refill takes less than 2 seconds.      Comments: Left medial ankle ulcer   Neurological:      Mental  Status: She is oriented to person, place, and time.         Vents:  Vent Mode: A/C (10/27/20 0709)  Ventilator Initiated: Yes (10/26/20 2315)  Set Rate: 32 BPM (10/27/20 0709)  Vt Set: 360 mL (10/27/20 0709)  Pressure Support: 0 cmH20 (10/27/20 0709)  PEEP/CPAP: 8 cmH20 (10/27/20 0709)  Oxygen Concentration (%): 30 (10/27/20 1200)  Peak Airway Pressure: 29 cmH2O (10/27/20 0709)  Plateau Pressure: 27 cmH20 (10/27/20 0709)  Total Ve: 12.5 mL (10/27/20 0709)  F/VT Ratio<105 (RSBI): (!) 85.79 (10/27/20 0709)  Lines/Drains/Airways     Central Venous Catheter Line            Percutaneous Central Line Insertion/Assessment - Triple Lumen  10/27/20 0058 right internal jugular less than 1 day          Drain                 NG/OG Tube 10/26/20 2302 orogastric Center mouth less than 1 day         Urethral Catheter 10/26/20 2330 16 Fr. less than 1 day          Airway                 Airway - Non-Surgical 10/26/20 2259 Endotracheal Tube less than 1 day              Significant Labs:    CBC/Anemia Profile:  Recent Labs   Lab 10/26/20  0852 10/26/20  2034 10/27/20  0505   WBC 18.64* 19.32*  19.32* 17.71*   HGB 9.4* 9.1*  9.1* 8.4*   HCT 28.1* 27.6*  27.6* 25.7*   PLT 71* 76*  76* 86*   MCV 80* 81*  81* 81*   RDW 16.5* 16.8*  16.8* 16.7*        Chemistries:  Recent Labs   Lab 10/26/20  0356 10/26/20  2034 10/27/20  0323    132* 134*   K 4.2 5.0 4.6    103 104   CO2 23 20* 20*   BUN 26* 25* 26*   CREATININE 0.8 0.8 0.8   CALCIUM 7.8* 7.5* 7.7*   ALBUMIN 1.3* 1.5* 1.4*   PROT 5.8* 6.4 6.1   BILITOT 0.4 0.9 0.8   ALKPHOS 228* 226* 202*   ALT 35 34 40   AST 93* 70* 100*   MG  --  2.1 2.0   PHOS  --  4.1 4.0         Significant Imaging:  X-Ray Chest AP Portable [[id number]] Resulted: 10/27/20 0148   Order Status: Completed Updated: 10/27/20 0151   Narrative:     EXAMINATION:   XR CHEST AP PORTABLE     CLINICAL HISTORY:   s/p triple lumen cath insertion right IJ;     TECHNIQUE:   Single frontal view of the chest was  performed.     COMPARISON:   Chest radiograph performed 10/26/2020, 23:10 hours     FINDINGS:   EKG leads overlie the chest.  A new right internal jugular approach central venous catheter tip likely projects within the right atrium.  Tip of endotracheal tube between thoracic inlet and lucien.  Side port and tip of a nasogastric tube beyond the field-of-view of this examination.  The appearance of the cardiomediastinal silhouette is unchanged.  Diffuse bilateral patchy airspace and interstitial opacities are again noted.  There may be some minor clearing at the lung bases relative to the prior study.  No definite pneumothorax is seen.  Query right pleural effusion.  Additional findings elsewhere not substantially changed.

## 2020-10-27 NOTE — CONSULTS
[facility]  Infectious Disease  Consult Note    Patient Name: Марина Britton  MRN: [id number]  Admission Date: 10/26/2020  Hospital Length of Stay: 1 days  Attending Physician: Abdoul Quick MD  Primary Care Provider: Luis Felipe Jose Iii, MD     Isolation Status: Contact    Inpatient consult to Infectious Diseases  Consult performed by: Vania Baxter PA-C  Consult ordered by: Sepideh Chappell MD        Assessment/Plan:     MRSA bacteremia  See endocarditis    Endocarditis  31 y/o female with IVDU, hepatitis C presents as a transfer from [facility] for septic shock secondary to MRSA endocarditis with associated septic emboli. She is currently on vancomycin and cefazolin. Blood cultures 10/24 +MRSA. TTE with Large mobile vegetation present on the posterior tricuspid leaflet. Remains febrile, intubated, sedated.      Recommendations  1. Continue vancomycin. Trough subtherapeutic. Pharmacy to dose vancomycin. Goal closer to 20. May discontinue cefazolin at this time  2. Repeat blood cultures 10/26 pending. Repeat blood cultures daily until clear.   3. CTS evaluation   4. No CT evidence of acute intracranial abnormality.  5. ID will follow with you     Pulmonary emboli  See endocarditis        Thank you for your consult. I will follow-up with patient. Please contact us if you have any additional questions.    Vania Baxter PA-C  Infectious Disease  [facility]  [id number]    Subjective:     Principal Problem: <principal problem not specified>    HPI: History obtained per chart review. Pt critically ill. Intubated, sedated.       30F with PMHx IVUD heroin (last use approx 2wk ago), Hepatitis C presents as transfer from [facility] for septic endocarditis with b/l PE requiring higher level of care.  Per patient and chart review, she has had progressively worsening chest and back pain over the past several days with a Tmax of 105F at home.  She presented to [facility] and  "was admitted for sepsis on 10/24.  Bcx showed +MRSA and found to have large vegetation on tricuspid valve on TTE. Noted to have septic emobli on imaging studies. She was started on empiric vancomycin and zosyn. She was stepped up to the ICU on 10/25 for worsening tachypnea.  She also received 2U pRBC x2 for anemia with a pavel Hb of 6.5.  She was transferred to [facility] 10/26 for a higher level of care.    Per chart review. She has been seen in ED for fatigue and wound of her left foot. Per nursing note," Pt shows me a wound on her left foot/ankle, states it has been there over 1 month.  Pt states it started off as an are where she injected heroin, then turned in to a blister, abscess.  She states she was recently seen in [place] ED and placed on antibiotics.  Pt has not followed up with anyone regarding this wound. Spoke with ER MD Dr. Linares, pt does not tomas to be started on any antibiotics at this time, pt needs referral to wound care.  This was explained to pt who stated "ok."   she has been on different abx including bactrim and clindamycin without improvement. No follow up.     Past Medical History:   Diagnosis Date    Anxiety     Borderline personality disorder     PTSD (post-traumatic stress disorder)     Substance abuse        Past Surgical History:   Procedure Laterality Date     SECTION, LOW TRANSVERSE      x4    COSMETIC SURGERY         Review of patient's allergies indicates:  No Known Allergies    Medications:  Medications Prior to Admission   Medication Sig    alprazolam (XANAX) 1 MG tablet Take 1 mg by mouth 3 (three) times daily as needed for Anxiety.    clindamycin (CLEOCIN) 150 MG capsule Take 2 capsules (300 mg total) by mouth 2 (two) times a day. for 10 days    hydrocodone-acetaminophen 10-325mg (NORCO)  mg Tab Take 1 tablet by mouth every 6 (six) hours as needed for Pain (as needed for pain).    mupirocin (BACTROBAN) 2 % ointment Apply topically 3 (three) times daily. "     Antibiotics (From admission, onward)    Start     Stop Route Frequency Ordered    10/27/20 1600  vancomycin 750 mg in dextrose 5 % 250 mL IVPB (ready to mix system)      -- IV Every 8 hours (non-standard times) 10/27/20 1438    10/26/20 2230  ceFAZolin injection 2 g      -- IV Every 8 hours (non-standard times) 10/26/20 2118        Antifungals (From admission, onward)    None        Antivirals (From admission, onward)    None             There is no immunization history on file for this patient.    Family History     None        Social History     Socioeconomic History    Marital status: Single     Spouse name: Not on file    Number of children: Not on file    Years of education: Not on file    Highest education level: Not on file   Occupational History    Not on file   Social Needs    Financial resource strain: Not on file    Food insecurity     Worry: Not on file     Inability: Not on file    Transportation needs     Medical: Not on file     Non-medical: Not on file   Tobacco Use    Smoking status: Never Smoker   Substance and Sexual Activity    Alcohol use: Yes     Comment: occ    Drug use: Yes     Types: IV, Marijuana     Comment: no heroin x3 days    Sexual activity: Yes     Partners: Male     Birth control/protection: Partner-Vasectomy   Lifestyle    Physical activity     Days per week: Not on file     Minutes per session: Not on file    Stress: Not on file   Relationships    Social connections     Talks on phone: Not on file     Gets together: Not on file     Attends Sabianism service: Not on file     Active member of club or organization: Not on file     Attends meetings of clubs or organizations: Not on file     Relationship status: Not on file   Other Topics Concern    Not on file   Social History Narrative    Not on file     Review of Systems   Unable to perform ROS: Intubated     Objective:     Vital Signs (Most Recent):  Temp: (!) 101.1 °F (38.4 °C) (10/27/20 1145)  Pulse: (!) 121  (10/27/20 1400)  Resp: (!) 33 (10/27/20 1400)  BP: 120/73 (10/27/20 1400)  SpO2: 98 % (10/27/20 1400) Vital Signs (24h Range):  Temp:  [97.8 °F (36.6 °C)-103.2 °F (39.6 °C)] 101.1 °F (38.4 °C)  Pulse:  [] 121  Resp:  [30-70] 33  SpO2:  [90 %-100 %] 98 %  BP: (110-141)/() 120/73     Weight: 53.2 kg (117 lb 4.6 oz)  Body mass index is 21.45 kg/m².    Estimated Creatinine Clearance: 80.6 mL/min (based on SCr of 0.8 mg/dL).    Physical Exam  Vitals signs and nursing note reviewed.   Constitutional:       Comments: Intubated. Sedated. Critically ill   Cardiovascular:      Rate and Rhythm: Tachycardia present.      Heart sounds: Murmur present.   Pulmonary:      Comments: intubated  Abdominal:      General: Abdomen is flat. There is no distension.      Palpations: There is no mass.   Skin:     Comments: Janeway lesions on digits    Wound on ankle noted    Injection sites noted of upper extremities              Significant Labs: All pertinent labs within the past 24 hours have been reviewed.    Significant Imaging: I have reviewed all pertinent imaging results/findings within the past 24 hours.

## 2020-10-27 NOTE — PROGRESS NOTES
Pharmacokinetic Assessment Follow Up: IV Vancomycin    Vancomycin serum concentration assessment(s):    Vancomycin trough level resulted at 3.4 mcg/mL drawn appropriately. Goal level is 15 to 20 mcg/mL, but closer to 20 mcg/mL per ID.     Drug levels (last 3 results):  Recent Labs   Lab Result Units 10/27/20  0959   Vancomycin-Trough ug/mL 3.4*     Vancomycin Regimen Plan:    Increase to vancomycin 750 mg IV every 8 hours to start now with next serum trough concentration measured on 10/28 1530.     Pharmacy will continue to follow and monitor vancomycin.    Please contact pharmacy at extension [phone] for questions regarding this assessment.    Thank you for the consult,   Suellen Mcnulty, PharmD, BCCCP             Patient brief summary:  Марина Britton is a 30 y.o. female initiated on antimicrobial therapy with IV vancomycin for treatment of MRSA endocarditis    Drug Allergies:   Review of patient's allergies indicates:  No Known Allergies    Actual Body Weight:   53.2 kg     Renal Function:   Estimated Creatinine Clearance: 80.6 mL/min (based on SCr of 0.8 mg/dL).    Dialysis Method (if applicable):  N/A    CBC (last 72 hours):  Recent Labs   Lab Result Units 10/25/20  0528 10/26/20  0356 10/26/20  0852 10/26/20  2034 10/27/20  0505   WBC K/uL 25.66* 15.45* 18.64* 19.32*  19.32* 17.71*   Hemoglobin g/dL 7.7* 8.1* 9.4* 9.1*  9.1* 8.4*   Hematocrit % 23.0* 24.7* 28.1* 27.6*  27.6* 25.7*   Platelets K/uL 68* 52* 71* 76*  76* 86*   Gran % % 61.0 85.0* 81.8* 83.2*  83.2* 82.4*   Lymph % % 8.0* 8.0* 9.6* 8.2*  8.2* 10.9*   Mono % % 2.0* 4.3 3.6* 4.2  4.2 2.7*   Eosinophil % % 0.0 0.0 0.1 0.3  0.3 0.8   Basophil % % 0.0 0.1 0.2 0.3  0.3 0.2   Differential Method  Manual Automated Automated Automated  Automated Automated       Metabolic Panel (last 72 hours):  Recent Labs   Lab Result Units 10/25/20  0528 10/26/20  0356 10/26/20  2034 10/27/20  0323 10/27/20  0455   Sodium mmol/L 141 137 132* 134*  --     Potassium mmol/L 4.0 4.2 5.0 4.6  --    Chloride mmol/L 109 107 103 104  --    CO2 mmol/L 23 23 20* 20*  --    Glucose mg/dL 154* 110 87 85  --    Glucose, UA   --   --   --   --  Negative   BUN mg/dL 25* 26* 25* 26*  --    Creatinine mg/dL 0.8 0.8 0.8 0.8  --    Albumin g/dL 1.4* 1.3* 1.5* 1.4*  --    Total Bilirubin mg/dL 0.3 0.4 0.9 0.8  --    Alkaline Phosphatase U/L 154* 228* 226* 202*  --    AST U/L 25 93* 70* 100*  --    ALT U/L 13 35 34 40  --    Magnesium mg/dL  --   --  2.1 2.0  --    Phosphorus mg/dL  --   --  4.1 4.0  --        Vancomycin Administrations:  vancomycin given in the last 96 hours                   vancomycin 750 mg in dextrose 5 % 250 mL IVPB (ready to mix system) (mg) 750 mg New Bag 10/27/20 1639    vancomycin 750 mg in dextrose 5 % 250 mL IVPB (ready to mix system) (mg) 750 mg New Bag 10/27/20 1123    vancomycin 750 mg in dextrose 5 % 250 mL IVPB (ready to mix system) (mg) 750 mg New Bag 10/26/20 1059     750 mg New Bag 10/25/20 1026    vancomycin in dextrose 5 % 1 gram/250 mL IVPB 1,000 mg (mg) 1,000 mg New Bag 10/24/20 1016                Microbiologic Results:  Microbiology Results (last 7 days)     Procedure Component Value Units Date/Time    Blood culture [[id number]] Collected: 10/26/20 2232    Order Status: Completed Specimen: Blood from Peripheral, Antecubital, Left Updated: 10/27/20 0915     Blood Culture, Routine No Growth to date    Urine culture [[id number]] Collected: 10/27/20 0455    Order Status: No result Specimen: Urine Updated: 10/27/20 0525    Blood culture [[id number]] Collected: 10/26/20 2035    Order Status: Completed Specimen: Blood from Peripheral, Forearm, Right Updated: 10/27/20 0515     Blood Culture, Routine No Growth to date    Blood culture [[id number]]     Order Status: Sent Specimen: Blood

## 2020-10-27 NOTE — PLAN OF CARE
Pt is sedated and intubated. Breath sounds are rhonchus and coarse. Pt received Tylenol today for a fever of 103.2 orally and temp came down to 100.1 orally. Pt is receiving IV antibiotics and is on contact precautions for MRSA in the blood. Pt received some fluid boluses today, for sepsis and low UOP. UOP improved to 30-40 ml/hr per fontaine. Urine is thom and cloudy. No BM, hypoactive bowel sounds. OGT to LIS and drained 475ml of green fluid. Heparin drip was discontinued. Pt is on Propofol and Fentanyl. Pt is restrained and withdraws to pain.

## 2020-10-27 NOTE — PROGRESS NOTES
Pharmacokinetic Initial Assessment: IV Vancomycin    Assessment/Plan:    NOTE: VANCOMYCIN WAS STARTED ON 10/24/2020 AT [facility] AND WE ARE CONTINUING THE SAME DOSAGE THAT PATIENT RECEIVED AT THAT FACILITY.  Initiate intravenous vancomycin with loading dose of 1000 mg once followed by a maintenance dose of vancomycin 750mg IV every 24 hours  Desired empiric serum trough concentration is 15 to 20 mcg/mL  Draw vancomycin trough level 60 min prior to fourth dose on 10/27/20 at approximately 1000.  Pharmacy will continue to follow and monitor vancomycin.      Please contact pharmacy at extension [phone] with any questions regarding this assessment.     Thank you for the consult,   Suresh Gasca       Patient brief summary:  Марина Britton is a 30 y.o. female initiated on antimicrobial therapy with IV Vancomycin for treatment of suspected endocarditis    Drug Allergies:   Review of patient's allergies indicates:  No Known Allergies    Actual Body Weight:   53.2 KG    Renal Function:   Estimated Creatinine Clearance: 80.6 mL/min (based on SCr of 0.8 mg/dL).,     Dialysis Method (if applicable):  N/A    CBC (last 72 hours):  Recent Labs   Lab Result Units 10/24/20  0145 10/24/20  0610 10/25/20  0528 10/26/20  0356 10/26/20  0852   WBC K/uL 19.67* 16.81* 25.66* 15.45* 18.64*   Hemoglobin g/dL 7.2* 6.5* 7.7* 8.1* 9.4*   Hematocrit % 22.4* 20.2* 23.0* 24.7* 28.1*   Platelets K/uL 69* 63* 68* 52* 71*   Gran% % 74.0* 76.0* 61.0 85.0* 81.8*   Lymph% % 4.0* 4.0* 8.0* 8.0* 9.6*   Mono% % 3.0* 1.0* 2.0* 4.3 3.6*   Eosinophil% % 0.0 0.0 0.0 0.0 0.1   Basophil% % 0.0 0.0 0.0 0.1 0.2   Differential Method  Manual Manual Manual Automated Automated       Metabolic Panel (last 72 hours):  Recent Labs   Lab Result Units 10/24/20  0145 10/24/20  0224 10/24/20  0225 10/24/20  0610 10/25/20  0528 10/26/20  0356   Sodium mmol/L 132*  --   --  134* 141 137   Potassium mmol/L 3.8  --   --  4.0 4.0 4.2   Chloride mmol/L 100  --   --  103 109 107    CO2 mmol/L 23  --   --  22* 23 23   Glucose mg/dL 101  --   --  105 154* 110   Glucose, UA   --   --  Negative  --   --   --    BUN, Bld mg/dL 37*  --   --  36* 25* 26*   Creatinine mg/dL 1.4  --   --  1.3 0.8 0.8   Creatinine, Random Ur mg/dL  --  158.0  --   --   --   --    Albumin g/dL 1.8*  --   --  1.5* 1.4* 1.3*   Total Bilirubin mg/dL 0.5  --   --  0.5 0.3 0.4   Alkaline Phosphatase U/L 186*  --   --  151* 154* 228*   AST U/L 38  --   --  33 25 93*   ALT U/L 20  --   --  18 13 35       Drug levels (last 3 results):  No results for input(s): VANCOMYCINRA, VANCOMYCINPE, VANCOMYCINTR in the last 72 hours.    Microbiologic Results:  Microbiology Results (last 7 days)       Procedure Component Value Units Date/Time    Blood culture [[id number]] Collected: 10/26/20 2035    Order Status: Sent Specimen: Blood from Peripheral, Forearm, Right Updated: 10/26/20 2036    Blood culture [[id number]]     Order Status: Sent Specimen: Blood     Blood culture [[id number]]     Order Status: Sent Specimen: Blood

## 2020-10-27 NOTE — ASSESSMENT & PLAN NOTE
31 y/o female with IVDU, hepatitis C presents as a transfer from [facility] for septic shock secondary to MRSA endocarditis with associated septic emboli. She is currently on vancomycin and cefazolin. Blood cultures 10/24 +MRSA. TTE with Large mobile vegetation present on the posterior tricuspid leaflet. Remains febrile, intubated, sedated.      Recommendations  1. Continue vancomycin. Trough subtherapeutic. Pharmacy to dose vancomycin. Goal closer to 20. May discontinue cefazolin at this time  2. Repeat blood cultures 10/26 pending. Repeat blood cultures daily until clear.   3. CTS evaluation   4. No CT evidence of acute intracranial abnormality.  5. ID will follow with you

## 2020-10-28 LAB
ALBUMIN SERPL BCP-MCNC: 1.1 G/DL (ref 3.5–5.2)
ALP SERPL-CCNC: 149 U/L (ref 55–135)
ALT SERPL W/O P-5'-P-CCNC: 39 U/L (ref 10–44)
ANION GAP SERPL CALC-SCNC: 9 MMOL/L (ref 8–16)
AST SERPL-CCNC: 89 U/L (ref 10–40)
BACTERIA UR CULT: NO GROWTH
BASOPHILS # BLD AUTO: 0.01 K/UL (ref 0–0.2)
BASOPHILS NFR BLD: 0.1 % (ref 0–1.9)
BILIRUB SERPL-MCNC: 2.4 MG/DL (ref 0.1–1)
BUN SERPL-MCNC: 22 MG/DL (ref 6–20)
CALCIUM SERPL-MCNC: 7 MG/DL (ref 8.7–10.5)
CHLORIDE SERPL-SCNC: 103 MMOL/L (ref 95–110)
CO2 SERPL-SCNC: 19 MMOL/L (ref 23–29)
CREAT SERPL-MCNC: 0.7 MG/DL (ref 0.5–1.4)
DIFFERENTIAL METHOD: ABNORMAL
EOSINOPHIL # BLD AUTO: 0.2 K/UL (ref 0–0.5)
EOSINOPHIL NFR BLD: 0.8 % (ref 0–8)
ERYTHROCYTE [DISTWIDTH] IN BLOOD BY AUTOMATED COUNT: 17.2 % (ref 11.5–14.5)
EST. GFR  (AFRICAN AMERICAN): >60 ML/MIN/1.73 M^2
EST. GFR  (NON AFRICAN AMERICAN): >60 ML/MIN/1.73 M^2
GLUCOSE SERPL-MCNC: 95 MG/DL (ref 70–110)
HAPTOGLOB SERPL-MCNC: 284 MG/DL (ref 30–250)
HCT VFR BLD AUTO: 23.1 % (ref 37–48.5)
HGB BLD-MCNC: 7.4 G/DL (ref 12–16)
IMM GRANULOCYTES # BLD AUTO: 0.48 K/UL (ref 0–0.04)
IMM GRANULOCYTES NFR BLD AUTO: 2.4 % (ref 0–0.5)
LDH SERPL L TO P-CCNC: 382 U/L (ref 110–260)
LYMPHOCYTES # BLD AUTO: 1.4 K/UL (ref 1–4.8)
LYMPHOCYTES NFR BLD: 7.2 % (ref 18–48)
MAGNESIUM SERPL-MCNC: 1.9 MG/DL (ref 1.6–2.6)
MCH RBC QN AUTO: 26.7 PG (ref 27–31)
MCHC RBC AUTO-ENTMCNC: 32 G/DL (ref 32–36)
MCV RBC AUTO: 83 FL (ref 82–98)
MONOCYTES # BLD AUTO: 0.6 K/UL (ref 0.3–1)
MONOCYTES NFR BLD: 3.1 % (ref 4–15)
NEUTROPHILS # BLD AUTO: 17.2 K/UL (ref 1.8–7.7)
NEUTROPHILS NFR BLD: 86.4 % (ref 38–73)
NRBC BLD-RTO: 0 /100 WBC
PHOSPHATE SERPL-MCNC: 3.8 MG/DL (ref 2.7–4.5)
PLATELET # BLD AUTO: 79 K/UL (ref 150–350)
PMV BLD AUTO: 10.7 FL (ref 9.2–12.9)
POTASSIUM SERPL-SCNC: 4.4 MMOL/L (ref 3.5–5.1)
PROT SERPL-MCNC: 5.3 G/DL (ref 6–8.4)
RBC # BLD AUTO: 2.77 M/UL (ref 4–5.4)
RETICS/RBC NFR AUTO: 2.4 % (ref 0.5–2.5)
SODIUM SERPL-SCNC: 131 MMOL/L (ref 136–145)
WBC # BLD AUTO: 19.92 K/UL (ref 3.9–12.7)

## 2020-10-28 PROCEDURE — 99291 PR CRITICAL CARE, E/M 30-74 MINUTES: ICD-10-PCS | Mod: ,,, | Performed by: INTERNAL MEDICINE

## 2020-10-28 PROCEDURE — 87077 CULTURE AEROBIC IDENTIFY: CPT

## 2020-10-28 PROCEDURE — 83010 ASSAY OF HAPTOGLOBIN QUANT: CPT

## 2020-10-28 PROCEDURE — 27200966 HC CLOSED SUCTION SYSTEM

## 2020-10-28 PROCEDURE — 87040 BLOOD CULTURE FOR BACTERIA: CPT | Mod: 59

## 2020-10-28 PROCEDURE — 25000003 PHARM REV CODE 250: Performed by: STUDENT IN AN ORGANIZED HEALTH CARE EDUCATION/TRAINING PROGRAM

## 2020-10-28 PROCEDURE — 99900035 HC TECH TIME PER 15 MIN (STAT)

## 2020-10-28 PROCEDURE — 94003 VENT MGMT INPAT SUBQ DAY: CPT

## 2020-10-28 PROCEDURE — 94761 N-INVAS EAR/PLS OXIMETRY MLT: CPT

## 2020-10-28 PROCEDURE — 83735 ASSAY OF MAGNESIUM: CPT

## 2020-10-28 PROCEDURE — 99233 SBSQ HOSP IP/OBS HIGH 50: CPT | Mod: ,,, | Performed by: NURSE PRACTITIONER

## 2020-10-28 PROCEDURE — 25000003 PHARM REV CODE 250: Performed by: EMERGENCY MEDICINE

## 2020-10-28 PROCEDURE — 99291 CRITICAL CARE FIRST HOUR: CPT | Mod: ,,, | Performed by: INTERNAL MEDICINE

## 2020-10-28 PROCEDURE — 63600175 PHARM REV CODE 636 W HCPCS: Performed by: EMERGENCY MEDICINE

## 2020-10-28 PROCEDURE — 85025 COMPLETE CBC W/AUTO DIFF WBC: CPT

## 2020-10-28 PROCEDURE — 27000221 HC OXYGEN, UP TO 24 HOURS

## 2020-10-28 PROCEDURE — 85045 AUTOMATED RETICULOCYTE COUNT: CPT

## 2020-10-28 PROCEDURE — 20000000 HC ICU ROOM

## 2020-10-28 PROCEDURE — 83615 LACTATE (LD) (LDH) ENZYME: CPT

## 2020-10-28 PROCEDURE — 25000003 PHARM REV CODE 250: Performed by: INTERNAL MEDICINE

## 2020-10-28 PROCEDURE — 63600175 PHARM REV CODE 636 W HCPCS: Performed by: INTERNAL MEDICINE

## 2020-10-28 PROCEDURE — 87186 SC STD MICRODIL/AGAR DIL: CPT

## 2020-10-28 PROCEDURE — 80053 COMPREHEN METABOLIC PANEL: CPT

## 2020-10-28 PROCEDURE — 84100 ASSAY OF PHOSPHORUS: CPT

## 2020-10-28 PROCEDURE — 63600175 PHARM REV CODE 636 W HCPCS: Performed by: STUDENT IN AN ORGANIZED HEALTH CARE EDUCATION/TRAINING PROGRAM

## 2020-10-28 PROCEDURE — 99900026 HC AIRWAY MAINTENANCE (STAT)

## 2020-10-28 PROCEDURE — 99233 PR SUBSEQUENT HOSPITAL CARE,LEVL III: ICD-10-PCS | Mod: ,,, | Performed by: NURSE PRACTITIONER

## 2020-10-28 RX ORDER — ACETAMINOPHEN 325 MG/1
650 TABLET ORAL EVERY 8 HOURS PRN
Status: DISCONTINUED | OUTPATIENT
Start: 2020-10-28 | End: 2020-11-03

## 2020-10-28 RX ORDER — FAMOTIDINE 10 MG/ML
20 INJECTION INTRAVENOUS 2 TIMES DAILY
Status: DISCONTINUED | OUTPATIENT
Start: 2020-10-28 | End: 2020-10-30

## 2020-10-28 RX ORDER — METOPROLOL TARTRATE 1 MG/ML
2.5 INJECTION, SOLUTION INTRAVENOUS ONCE
Status: COMPLETED | OUTPATIENT
Start: 2020-10-28 | End: 2020-10-28

## 2020-10-28 RX ADMIN — FAMOTIDINE 20 MG: 10 INJECTION INTRAVENOUS at 08:10

## 2020-10-28 RX ADMIN — HEPARIN SODIUM 5000 UNITS: 5000 INJECTION INTRAVENOUS; SUBCUTANEOUS at 05:10

## 2020-10-28 RX ADMIN — METOROPROLOL TARTRATE 2.5 MG: 5 INJECTION, SOLUTION INTRAVENOUS at 03:10

## 2020-10-28 RX ADMIN — ACETAMINOPHEN 650 MG: 325 TABLET ORAL at 05:10

## 2020-10-28 RX ADMIN — ACETAMINOPHEN 650 MG: 325 TABLET ORAL at 03:10

## 2020-10-28 RX ADMIN — FAMOTIDINE 20 MG: 10 INJECTION INTRAVENOUS at 09:10

## 2020-10-28 RX ADMIN — HEPARIN SODIUM 5000 UNITS: 5000 INJECTION INTRAVENOUS; SUBCUTANEOUS at 11:10

## 2020-10-28 RX ADMIN — SODIUM CHLORIDE, SODIUM LACTATE, POTASSIUM CHLORIDE, AND CALCIUM CHLORIDE 1000 ML: .6; .31; .03; .02 INJECTION, SOLUTION INTRAVENOUS at 06:10

## 2020-10-28 RX ADMIN — HEPARIN SODIUM 5000 UNITS: 5000 INJECTION INTRAVENOUS; SUBCUTANEOUS at 03:10

## 2020-10-28 RX ADMIN — PROPOFOL 40 MCG/KG/MIN: 10 INJECTION, EMULSION INTRAVENOUS at 03:10

## 2020-10-28 RX ADMIN — VANCOMYCIN HYDROCHLORIDE 750 MG: 750 INJECTION, POWDER, LYOPHILIZED, FOR SOLUTION INTRAVENOUS at 05:10

## 2020-10-28 RX ADMIN — Medication 85 MCG/HR: at 08:10

## 2020-10-28 RX ADMIN — VANCOMYCIN HYDROCHLORIDE 750 MG: 750 INJECTION, POWDER, LYOPHILIZED, FOR SOLUTION INTRAVENOUS at 08:10

## 2020-10-28 NOTE — ASSESSMENT & PLAN NOTE
WBC peaked at 25.66, persistently febrile to 103F, tachycardic to 120s, lactate wnl. Encephalopathic on arrival concerning for end-organ damage    WBC 25>18>19>17>19.9    -blood cultures 10/24 and 10/26 positive for MRSA, will continue daily blood cultures  - S/p fluid resuscitation at [place]  - continue vanc, cefazolin  - ID consulted- recommended CTS consult and daily blood cultures until clearance

## 2020-10-28 NOTE — PROGRESS NOTES
[facility]  Critical Care Medicine  Progress Note    Patient Name: Марина Britton  MRN: [id number]  Admission Date: 10/26/2020  Hospital Length of Stay: 2 days  Code Status: Full Code  Attending Provider: Abdoul Quick MD  Primary Care Provider: Luis Felipe Jose Iii, MD   Principal Problem: <principal problem not specified>    Subjective:     HPI:  30F with PMHx IVUD heroin (last use approx 2wk ago) [facility] for septic endocarditis with b/l PE requiring higher level of care.  Per patient and chart review, she has had progressively worsening chest and back pain over the past several days with a Tmax of 105F at home.  She presented to [facility] and was admitted for sepsis on 10/24.  Bcx showed GPC consistent with Staph Aureus, so she was started on Vanc/Zosyn.  She was stepped up to the ICU on 10/25 for worsening tachypnea.  She also received 2U pRBC x2 for anemia with a pavel Hb of 6.5.  She was transferred to [facility] 10/26 for a higher level of care.    Hospital/ICU Course:  10/27/2020 Patient extremely tachypneic >50 breaths per minute on arrival. Concern for respiratory distress and airway protection led to intubation. WBC trending down, persistently febrile tmax 103.2F, repeat cultures pending. OG tube to suction with bilious output. ID consulted    10/28/2020 Blood cultures remain positive for MRSA. CTS evaluated and recommending 6 week IV abx prior to re-eval in clinic. ID following.     Interval History/Significant Events: Intermittently tachycardic. Not on pressors. Sedated but arousable to verbal stimulation. Nods yes and no to questions, follows commands. Nods yes to being uncomfortable, will uptitrate fentanyl, propofol. Failed SBT this AM, will re-attempt daily.      Objective:     Vital Signs (Most Recent):  Temp: 98.3 °F (36.8 °C) (10/28/20 1130)  Pulse: (!) 114 (10/28/20 1235)  Resp: (!) 32 (10/28/20 1235)  BP: 100/66 (10/28/20 1230)  SpO2: 100 %  (10/28/20 1235) Vital Signs (24h Range):  Temp:  [98.3 °F (36.8 °C)-100.7 °F (38.2 °C)] 98.3 °F (36.8 °C)  Pulse:  [] 114  Resp:  [32-35] 32  SpO2:  [97 %-100 %] 100 %  BP: ()/(55-76) 100/66   Weight: 53.2 kg (117 lb 4.6 oz)  Body mass index is 21.45 kg/m².      Intake/Output Summary (Last 24 hours) at 10/28/2020 1359  Last data filed at 10/28/2020 1200  Gross per 24 hour   Intake 1976.81 ml   Output 1228 ml   Net 748.81 ml       Physical Exam  Vitals signs and nursing note reviewed.   Constitutional:       Appearance: She is ill-appearing and toxic-appearing.      Comments: Intubated, sedated   HENT:      Head: Normocephalic.      Right Ear: External ear normal.      Left Ear: External ear normal.      Nose: Nose normal.      Mouth/Throat:      Mouth: Mucous membranes are moist.   Eyes:      Pupils: Pupils are equal, round, and reactive to light.   Neck:      Musculoskeletal: Normal range of motion. No neck rigidity.   Cardiovascular:      Rate and Rhythm: Regular rhythm. Tachycardia present.      Heart sounds: No murmur. No friction rub. No gallop.    Pulmonary:      Breath sounds: Rhonchi and rales present.      Comments: Intubated  Abdominal:      General: There is no distension.      Palpations: Abdomen is soft.      Tenderness: There is no abdominal tenderness.   Musculoskeletal:      Right lower leg: No edema.      Left lower leg: No edema.   Skin:     Capillary Refill: Capillary refill takes less than 2 seconds.      Comments: Left medial ankle ulcer   Neurological:      Comments: Arousable, nods yes and no to questions, follows commands.         Vents:  Vent Mode: A/C (10/28/20 1235)  Ventilator Initiated: Yes(chart correction) (10/26/20 2315)  Set Rate: 32 BPM (10/28/20 1235)  Vt Set: 360 mL (10/28/20 1235)  Pressure Support: 0 cmH20 (10/28/20 1235)  PEEP/CPAP: 8 cmH20 (10/28/20 1235)  Oxygen Concentration (%): 30 (10/28/20 1235)  Peak Airway Pressure: 28 cmH2O (10/28/20 1235)  Plateau  Pressure: 27 cmH20 (10/28/20 1235)  Total Ve: 12.2 mL (10/28/20 1235)  F/VT Ratio<105 (RSBI): (!) 86.96 (10/28/20 1235)  Lines/Drains/Airways     Central Venous Catheter Line            Percutaneous Central Line Insertion/Assessment - Triple Lumen  10/27/20 0058 right internal jugular 1 day          Drain                 NG/OG Tube 10/26/20 2302 orogastric Center mouth 1 day         Urethral Catheter 10/26/20 2330 16 Fr. 1 day          Airway                 Airway - Non-Surgical 10/26/20 2259 Endotracheal Tube 1 day              Significant Labs:    CBC/Anemia Profile:  Recent Labs   Lab 10/26/20  2034 10/27/20  0505 10/28/20  0247 10/28/20  1014   WBC 19.32*  19.32* 17.71* 19.92*  --    HGB 9.1*  9.1* 8.4* 7.4*  --    HCT 27.6*  27.6* 25.7* 23.1*  --    PLT 76*  76* 86* 79*  --    MCV 81*  81* 81* 83  --    RDW 16.8*  16.8* 16.7* 17.2*  --    RETIC  --   --   --  2.4        Chemistries:  Recent Labs   Lab 10/26/20  2034 10/27/20  0323 10/28/20  0247   * 134* 131*   K 5.0 4.6 4.4    104 103   CO2 20* 20* 19*   BUN 25* 26* 22*   CREATININE 0.8 0.8 0.7   CALCIUM 7.5* 7.7* 7.0*   ALBUMIN 1.5* 1.4* 1.1*   PROT 6.4 6.1 5.3*   BILITOT 0.9 0.8 2.4*   ALKPHOS 226* 202* 149*   ALT 34 40 39   AST 70* 100* 89*   MG 2.1 2.0 1.9   PHOS 4.1 4.0 3.8               ABG  Recent Labs   Lab 10/27/20  0453   PH 7.458*   PO2 67*   PCO2 29.4*   HCO3 20.8*   BE -3     Assessment/Plan:     Psychiatric  IVDU (intravenous drug user)  - will need counseling/resources at discharge  - monitor for withdrawal symptoms     Pulmonary  Pneumonia of both lungs due to infectious organism  CT chest demonstrating bilateral scattered opacities of varying size with notable cavitations concerning for septic emboli from endocarditis (large tricuspid vegetation noted on echocardiogram    - continue vanc, cefazolin  -intubated 10/27 for respiratory distress    Cardiac/Vascular  Endocarditis  Large tricuspid vegetation noted on TTE with eptic  emboli to lungs bilaterally  -no need for AC at this time, but will continue to monitor  -will consider need for HARI     ID  Severe sepsis  WBC peaked at 25.66, persistently febrile to 103F, tachycardic to 120s, lactate wnl. Encephalopathic on arrival concerning for end-organ damage    WBC 25>18>19>17>19.9    -blood cultures 10/24 and 10/26 positive for MRSA, will continue daily blood cultures  - S/p fluid resuscitation at [place]  - continue vanc, cefazolin  - ID consulted- recommended CTS consult and daily blood cultures until clearance       Critical Care Daily Checklist:    A: Awake: RASS Goal/Actual Goal:    Actual: [place] Agitation Sedation Scale (RASS): Drowsy   B: Spontaneous Breathing Trial Performed? Spon. Breathing Trial Initiated?: Not initiated (10/28/20 1235)   C: SAT & SBT Coordinated?  Failed                      D: Delirium: CAM-ICU Overall CAM-ICU: Positive   E: Early Mobility Performed? No   F: Feeding Goal:    Status:     Current Diet Order   Procedures    Diet Adult Regular (IDDSI Level 7)      AS: Analgesia/Sedation Fentanyl, propofol   T: Thromboembolic Prophylaxis heparin   H: HOB > 300 Yes   U: Stress Ulcer Prophylaxis (if needed) famotidine   G: Glucose Control    B: Bowel Function Stool Occurrence: 0   I: Indwelling Catheter (Lines & Fontaine) Necessity RIJ triple lumen, fontaine   D: De-escalation of Antimicrobials/Pharmacotherapies Vanc, cefazolin    Plan for the day/ETD     Code Status:  Family/Goals of Care: Full Code         Critical secondary to Patient has a condition that poses threat to life and bodily function: Pulmonary Embolism      Critical care was time spent personally by me on the following activities: development of treatment plan with patient or surrogate and bedside caregivers, discussions with consultants, evaluation of patient's response to treatment, examination of patient, ordering and performing treatments and interventions, ordering and review of laboratory studies,  ordering and review of radiographic studies, pulse oximetry, re-evaluation of patient's condition. This critical care time did not overlap with that of any other provider or involve time for any procedures.       Tito Mcintosh MD  Critical Care Medicine  [facility]

## 2020-10-28 NOTE — HPI
30F with PMHx IVUD heroin (last use approx 2wk ago) presents as transfer from OSH for septic endocarditis with b/l PE requiring higher level of care.  Per patient and chart review, she has had progressively worsening chest and back pain over the past several days with a Tmax of 105F at home.  She presented to [facility] and was admitted for sepsis on 10/24.  Bcx showed GPC consistent with Staph Aureus, so she was started on Vanc/Zosyn.  She was stepped up to the ICU on 10/25 for worsening tachypnea.  She also received 2U pRBC x2 for anemia  Hb of 6.5.  She was [place] [facility] [place]26 for a higher level of care. CTS was consulted for endocarditis.

## 2020-10-28 NOTE — ASSESSMENT & PLAN NOTE
Concerns with patient needing intubating this am, thrombocytopenia plt 79, HEP C hx T bili 2.4,   Dr. Pritchett to review and final recommendations

## 2020-10-28 NOTE — PLAN OF CARE
CMICU DAILY GOALS       A: Awake    RASS: Goal -  RASS -4  Actual - RASS (Diallo Agitation-Sedation Scale): -4-->deep sedation   Restraint necessity: Clinical Justification: Removing medical devices  B: Breath   SBT: Not attempted   C: Coordinate A & B, analgesics/sedatives   Pain: managed    SAT: Not attempted  D: Delirium   CAM-ICU: Overall CAM-ICU: Positive  E: Early(intubated/ Progressive (non-intubated) Mobility   MOVE Screen: Fail   Activity: Activity Management: patient unable to perform activities  FAS: Feeding/Nutrition   Diet order: Diet/Nutrition Received: NPO,   Fluid restriction:    T: Thrombus   DVT prophylaxis: VTE Required Core Measure: Pharmacological prophylaxis initiated/maintained  H: HOB Elevation   Head of Bed (HOB): HOB at 30 degrees  U: Ulcer Prophylaxis   GI: yes  G: Glucose control   managed Glycemic Management: blood glucose monitoring  S: Skin   Bundle compliance: yes   Bathing/Skin Care: bath, chlorhexidine, linen changed Date: 10/27 AM  B: Bowel Function   constipation   I: Indwelling Catheters   Adkins necessity:      Urethral Catheter 10/26/20 2330 16 Fr.-Reason for Continuing Urinary Catheterization: Critically ill in ICU requiring intensive monitoring   CVC necessity: Yes   IPAD offered: Not appropriate  D: De-escalation Antibx   Yes  Plan for the day   SBT/SAT trial to be done in the AM shift. Propofol and fentanyl weaned accordingly. Patient max temp 100.7.  Family/Goals of care/Code Status   Code Status: Full Code     No acute events throughout day, VS and assessment per flow sheet, patient progressing towards goals as tolerated, plan of care reviewed with Марина Britton and family, all concerns addressed, will continue to monitor.

## 2020-10-28 NOTE — PHYSICIAN QUERY
PT Name: Марина Britton  MR #: [id number]    CAUSE AND EFFECT RELATIONSHIP CLARIFICATION     CDS: Galindo Chambers RN CCDS               Contact information: [email or web address]     This form is a permanent document in the medical record.     Query Date: October 28, 2020    By submitting this query, we are merely seeking further clarification of documentation. Please utilize your independent clinical judgment when addressing the question(s) below.    Supporting Clinical Findings Location in Medical Record   Severe sepsis    Antibiotic therapy has been expanded to cover staph..  (WBC has improved.)   10/24/2020 H&P Randal Ren MD   concerned with right-sided bacterial endocarditis  IVDU (intravenous drug user)  Patient is on broad-spectrum antibiotics and on antibiotics for MRSA infection. 10/26/2020 Hospital Medicine progress notes Delmer Garcia III, MD   Blood cultures x 2 positive for METHICILLIN RESISTANT STAPHYLOCOCCUS AUREUS  Susceptibility     Methicillin resistant Staphylococcus aureus    CULTURE, BLOOD    Vancomycin 1 mcg/mL Sensitive 10/24/2020 Microbiology reports   Large mobile vegetation present on the posterior tricuspid leaflet 10/26/2020 Echo report   vancomycin 750 mg IVPB q24h  levoFLOXacin 500 mg IVPB daily  piperacillin-tazobactam 4.5 g IVPB q8h 10/24-10/27/2020 Medication  10/24-10/25/2020 Medication  10/24-10/26/2020 Medication       Provider, please clarify if there is any clinical correlation between Severe sepsis and MRSA.           Are the conditions:      [  x ] Due to or associated with each other   [   ] Unrelated to each other   [   ] Other explanation (Please Specify): ______________   [  ] Clinically Undetermined                                                                               Please document in your progress notes daily for the duration of treatment until resolved and include in your discharge summary.

## 2020-10-28 NOTE — CONSULTS
[facility]  Cardiothoracic Surgery  Consult Note    Patient Name: Марина Britton  MRN: [id number]  Admission Date: 10/26/2020  Attending Physician: Abdoul Quick MD  Referring Provider: Delmer Garcia III, MD    Patient information was obtained from past medical records.     Inpatient consult to Cardiothoracic Surgery  Consult performed by: Leslie Silva NP  Consult ordered by: Tito Mcintosh MD  Reason for consult: Endocarditits         Subjective:     Principal Problem: <principal problem not specified>    History of Present Illness: 30F with PMHx IVUD heroin (last use approx 2wk ago) presents as transfer from [facility] for septic endocarditis with b/l PE requiring higher level of care.  Per patient and chart review, she has had progressively worsening chest and back pain over the past several days with a Tmax of 105F at home.  She presented to [facility] and was admitted for sepsis on 10/24.  Bcx showed GPC consistent with Staph Aureus, so she was started on Vanc/Zosyn.  She was stepped up to the ICU on 10/25 for worsening tachypnea.  She also received 2U pRBC x2 for anemia  Hb of 6.5.  She was transferred to [facility] 10/26 for a higher level of care. CTS was consulted for endocarditis.     No current facility-administered medications on file prior to encounter.      Current Outpatient Medications on File Prior to Encounter   Medication Sig    alprazolam (XANAX) 1 MG tablet Take 1 mg by mouth 3 (three) times daily as needed for Anxiety.    clindamycin (CLEOCIN) 150 MG capsule Take 2 capsules (300 mg total) by mouth 2 (two) times a day. for 10 days    hydrocodone-acetaminophen 10-325mg (NORCO)  mg Tab Take 1 tablet by mouth every 6 (six) hours as needed for Pain (as needed for pain).    mupirocin (BACTROBAN) 2 % ointment Apply topically 3 (three) times daily.       Review of patient's allergies indicates:  No Known Allergies    Past Medical History:   Diagnosis Date     Anxiety     Borderline personality disorder     PTSD (post-traumatic stress disorder)     Substance abuse      Past Surgical History:   Procedure Laterality Date     SECTION, LOW TRANSVERSE      x4    COSMETIC SURGERY       Family History     None        Tobacco Use    Smoking status: Never Smoker   Substance and Sexual Activity    Alcohol use: Yes     Comment: occ    Drug use: Yes     Types: IV, Marijuana     Comment: no heroin x3 days    Sexual activity: Yes     Partners: Male     Birth control/protection: Partner-Vasectomy     Review of Systems   Unable to perform ROS: Intubated     Objective:     Vital Signs (Most Recent):  Temp: 98.3 °F (36.8 °C) (10/28/20 0800)  Pulse: 93 (10/28/20 09)  Resp: (!) 32 (10/28/20 0900)  BP: 91/60 (10/28/20 0900)  SpO2: 100 % (10/28/20 0900) Vital Signs (24h Range):  Temp:  [98.3 °F (36.8 °C)-101.1 °F (38.4 °C)] 98.3 °F (36.8 °C)  Pulse:  [] 93  Resp:  [32-35] 32  SpO2:  [96 %-100 %] 100 %  BP: ()/(55-77) 91/60     Weight: 53.2 kg (117 lb 4.6 oz)  Body mass index is 21.45 kg/m².    SpO2: 100 %  O2 Device (Oxygen Therapy): ventilator     Intake/Output - Last 3 Shifts       10/26 0700 - 10/27 0659 10/27 07 - 10/28 0659 10/28 07 - 10/29 0659    P.O.   0    I.V. (mL/kg)  870.5 (16.4) 76.7 (1.4)    IV Piggyback  2750 250    Total Intake(mL/kg)  3620.5 (68.1) 326.7 (6.1)    Urine (mL/kg/hr) 950 788 (0.6) 90 (0.4)    Drains  475     Stool 0 0     Total Output 950 1263 90    Net -950 +2357.5 +236.7           Stool Occurrence 0 x 0 x            Lines/Drains/Airways     Central Venous Catheter Line            Percutaneous Central Line Insertion/Assessment - Triple Lumen  10/27/20 0058 right internal jugular 1 day          Drain                 NG/OG Tube 10/26/20 2302 orogastric Center mouth 1 day         Urethral Catheter 10/26/20 2330 16 Fr. 1 day          Airway                 Airway - Non-Surgical 10/26/20 7968 Endotracheal Tube 1 day               Physical Exam  Constitutional:       Appearance: She is well-developed.   HENT:      Head: Normocephalic.   Eyes:      Pupils: Pupils are equal, round, and reactive to light.   Cardiovascular:      Rate and Rhythm: Regular rhythm. Tachycardia present.      Heart sounds: Normal heart sounds.   Pulmonary:      Effort: Pulmonary effort is normal.      Breath sounds: Normal breath sounds.   Abdominal:      Palpations: Abdomen is soft.   Musculoskeletal: Normal range of motion.   Skin:     General: Skin is warm and dry.      Capillary Refill: Capillary refill takes 2 to 3 seconds.   Neurological:      Mental Status: She is alert and oriented to person, place, and time.         Significant Labs:  BMP:   Recent Labs   Lab 10/28/20  0247   GLU 95   *   K 4.4      CO2 19*   BUN 22*   CREATININE 0.7   CALCIUM 7.0*   MG 1.9     CBC:   Recent Labs   Lab 10/28/20  0247   WBC 19.92*   RBC 2.77*   HGB 7.4*   HCT 23.1*   PLT 79*   MCV 83   MCH 26.7*   MCHC 32.0     Coagulation:   Recent Labs   Lab 10/27/20  0323 10/27/20  0922   INR 1.2  --    APTT 94.5* 40.8*       Significant Diagnostics:  I have reviewed all pertinent imaging results/findings within the past 24 hours.     ECHO:   · The left ventricle is mildly enlarged with normal systolic function. The estimated ejection fraction is 65%.  · Moderate right atrial enlargement.  · Large mobile vegetation present on the posterior tricuspid leaflet.  · Severe tricuspid regurgitation.  · There is pulmonary hypertension.  · Mild right ventricular enlargement with ventricular septal wall flattening.  · Normal right ventricular systolic function.  · Mild left atrial enlargement.  · There is no right atrial thrombus present.  · No right atrial mass present.  · Small posterior pericardial effusion. Effusion is fluid.    Assessment/Plan:     NYHA Score: NYHA I: cardiac disease, but without resulting limitations of physical activity    Endocarditis  Concerns with patient  needing intubating this am, thrombocytopenia plt 79 and HEP C hx T bili 2.4 for surgical intervention. Blood culture + 10/24 for MRSA, repeat cultures pending. Dr. Pritchett to review and final recommendations    Thank you for your consult. I will follow-up with patient. Please contact us if you have any additional questions.    Leslie Silva NP  Cardiothoracic Surgery  [facility]    I have seen the patient and reviewed the nurse practitioner's note above. I have personally interviewed and examined the patient at bedside and agree with the findings.     Ms. Britton is a 30 year old female IV drug abuser with hepatitis C who presented with tricuspid valve endocarditis, MRSA sepsis, and bilateral pulmonary emboli, then went into respiratory failure and intubated.  At this time, I recommend medical management with IV antibiotics x 6 weeks and she can see me in clinic with a repeat transthoracic echocardiogram on December 9, 2020.      Abdoul Pritchett MD  Cardiothoracic Surgery  [facility]

## 2020-10-28 NOTE — PLAN OF CARE
TV endocarditis, MRSA bacteremia on vanc.  Last trough 3.4 adjusted to 750 q 8 per pharmacy.      BC + 10/24, 10/26; repeat cultures x 2 10/28 have been drawn    No additional recommendations at this time, continue current Rx.

## 2020-10-28 NOTE — SUBJECTIVE & OBJECTIVE
Interval History/Significant Events: Intermittently tachycardic. Not on pressors. Sedated but arousable to verbal stimulation. Nods yes and no to questions, follows commands. Nods yes to being uncomfortable, will uptitrate fentanyl, propofol. Failed SBT this AM, will re-attempt daily.      Objective:     Vital Signs (Most Recent):  Temp: 98.3 °F (36.8 °C) (10/28/20 1130)  Pulse: (!) 114 (10/28/20 1235)  Resp: (!) 32 (10/28/20 1235)  BP: 100/66 (10/28/20 1230)  SpO2: 100 % (10/28/20 1235) Vital Signs (24h Range):  Temp:  [98.3 °F (36.8 °C)-100.7 °F (38.2 °C)] 98.3 °F (36.8 °C)  Pulse:  [] 114  Resp:  [32-35] 32  SpO2:  [97 %-100 %] 100 %  BP: ()/(55-76) 100/66   Weight: 53.2 kg (117 lb 4.6 oz)  Body mass index is 21.45 kg/m².      Intake/Output Summary (Last 24 hours) at 10/28/2020 1359  Last data filed at 10/28/2020 1200  Gross per 24 hour   Intake 1976.81 ml   Output 1228 ml   Net 748.81 ml       Physical Exam  Vitals signs and nursing note reviewed.   Constitutional:       Appearance: She is ill-appearing and toxic-appearing.      Comments: Intubated, sedated   HENT:      Head: Normocephalic.      Right Ear: External ear normal.      Left Ear: External ear normal.      Nose: Nose normal.      Mouth/Throat:      Mouth: Mucous membranes are moist.   Eyes:      Pupils: Pupils are equal, round, and reactive to light.   Neck:      Musculoskeletal: Normal range of motion. No neck rigidity.   Cardiovascular:      Rate and Rhythm: Regular rhythm. Tachycardia present.      Heart sounds: No murmur. No friction rub. No gallop.    Pulmonary:      Breath sounds: Rhonchi and rales present.      Comments: Intubated  Abdominal:      General: There is no distension.      Palpations: Abdomen is soft.      Tenderness: There is no abdominal tenderness.   Musculoskeletal:      Right lower leg: No edema.      Left lower leg: No edema.   Skin:     Capillary Refill: Capillary refill takes less than 2 seconds.      Comments:  Left medial ankle ulcer   Neurological:      Comments: Arousable, nods yes and no to questions, follows commands.         Vents:  Vent Mode: A/C (10/28/20 1235)  Ventilator Initiated: Yes(chart correction) (10/26/20 2315)  Set Rate: 32 BPM (10/28/20 1235)  Vt Set: 360 mL (10/28/20 1235)  Pressure Support: 0 cmH20 (10/28/20 1235)  PEEP/CPAP: 8 cmH20 (10/28/20 1235)  Oxygen Concentration (%): 30 (10/28/20 1235)  Peak Airway Pressure: 28 cmH2O (10/28/20 1235)  Plateau Pressure: 27 cmH20 (10/28/20 1235)  Total Ve: 12.2 mL (10/28/20 1235)  F/VT Ratio<105 (RSBI): (!) 86.96 (10/28/20 1235)  Lines/Drains/Airways     Central Venous Catheter Line            Percutaneous Central Line Insertion/Assessment - Triple Lumen  10/27/20 0058 right internal jugular 1 day          Drain                 NG/OG Tube 10/26/20 2302 orogastric Center mouth 1 day         Urethral Catheter 10/26/20 2330 16 Fr. 1 day          Airway                 Airway - Non-Surgical 10/26/20 2259 Endotracheal Tube 1 day              Significant Labs:    CBC/Anemia Profile:  Recent Labs   Lab 10/26/20  2034 10/27/20  0505 10/28/20  0247 10/28/20  1014   WBC 19.32*  19.32* 17.71* 19.92*  --    HGB 9.1*  9.1* 8.4* 7.4*  --    HCT 27.6*  27.6* 25.7* 23.1*  --    PLT 76*  76* 86* 79*  --    MCV 81*  81* 81* 83  --    RDW 16.8*  16.8* 16.7* 17.2*  --    RETIC  --   --   --  2.4        Chemistries:  Recent Labs   Lab 10/26/20  2034 10/27/20  0323 10/28/20  0247   * 134* 131*   K 5.0 4.6 4.4    104 103   CO2 20* 20* 19*   BUN 25* 26* 22*   CREATININE 0.8 0.8 0.7   CALCIUM 7.5* 7.7* 7.0*   ALBUMIN 1.5* 1.4* 1.1*   PROT 6.4 6.1 5.3*   BILITOT 0.9 0.8 2.4*   ALKPHOS 226* 202* 149*   ALT 34 40 39   AST 70* 100* 89*   MG 2.1 2.0 1.9   PHOS 4.1 4.0 3.8

## 2020-10-28 NOTE — PHYSICIAN QUERY
PT Name: Марина Britton  MR #: [id number]     RESPIRATORY CONDITION CLARIFICATION     CDS: Galindo Chambers RN CCDS               Contact information: [email or web address]   This form is a permanent document in the medical record.     Query Date: October 28, 2020    By submitting this query, we are merely seeking further clarification of documentation.  Please utilize your independent clinical judgment when addressing the question(s) below.  The Medical Record contains the following   Indicators   Supporting Clinical Findings Location in Medical Record     x SOB, TUCKER, Wheezing, Productive Cough, Use of Accessory Muscles, etc. worsening cough and sob  Wheezing and rales present 10/24/2020 H&P Randal Ren MD  10/26/2020 Hospital Medicine progress notes Delmer Garcia III, MD     x RR         ABGs         O2 sat O2 sat 88% on RA, RR 22-44  71% O2 sat on ventimask with FIO2 @ 35%, RR 21-71     10/26/2020 06:04   POC PH 7.395   POC PCO2 36.3   POC PO2 61.4 (L)   POC HCO3 22.3   POC SATURATED O2 91.9   POC TCO2 21.1   FiO2 35.0   O2DEVICE Venti Mask    10/24/2020 Vitals  10/26/2020 Vitals    ABGs on FIO2 @ 35%     x Hypoxia/Hypercapnia Hypoxic 10/26/2020 Cardiology progress notes Francisco Marr MD    BiPAP/Intubation/Mechanical Ventilation       x Supplemental O2 Supplemental O2 @ 2L  FIO2 @ 40% via ventimask 10/24/2020 Vitals  10/26/2020 Vitals    Home O2, Oxygen Dependence       x Respiratory Distress or Failure respiratory failure    Respiratory distress 10/26/2020 Cardiology progress notes Francisco Marr MD  10/24/2020 H&P Randal Ren MD     x Radiology Findings Diffuse heterogeneous bilateral pulmonary infiltrates. 10/24/2020 Chest x-ray     x Acute/Chronic Illness Severe sepsis  Multifocal pneumonia  Symptomatic anemia  Clinical picture is consistent with right-sided endocarditis with septic emboli.    Bilateral pulmonary emboli 10/24/2020 H&P Randal Ren MD      10/26/2020  Cardiology progress notes Francisco Marr MD  10/26/2020 [facility] progress notes Delmer Garcia III, MD    Treatment      Other       Respiratory failure can be acute, chronic or both. It is generally further specified as hypoxic, hypercapnic or both. Lastly, it is important to identify an etiology, if known or suspected.   References:: https://www.acphospitalist.org/archives/2013/10/coding.htm    http://[employer]/acute-respiratory-failure-know    The noted clinical guidelines are only system guidelines and do not replace the providers clinical judgment.    Provider, please specify diagnosis or diagnoses associated with above clinical findings.     Provider, please further specify the Type and Acuity of Respiratory failure associated with above clinical findings.       [  x  ] Acute Respiratory Failure with Hypoxia - ABG pO2 < 60 mmHg or O2 sat of <91% on room air and respiratory symptoms documented   [    ] Acute Respiratory Failure, unspecified whether with hypoxia or hypercapnia   [    ] Other Respiratory Diagnosis (please specify): _________________   [   ] Clinically Undetermined            Please document in your progress notes daily for the duration of treatment until resolved and include in your discharge summary.

## 2020-10-28 NOTE — SUBJECTIVE & OBJECTIVE
No current facility-administered medications on file prior to encounter.      Current Outpatient Medications on File Prior to Encounter   Medication Sig    alprazolam (XANAX) 1 MG tablet Take 1 mg by mouth 3 (three) times daily as needed for Anxiety.    clindamycin (CLEOCIN) 150 MG capsule Take 2 capsules (300 mg total) by mouth 2 (two) times a day. for 10 days    hydrocodone-acetaminophen 10-325mg (NORCO)  mg Tab Take 1 tablet by mouth every 6 (six) hours as needed for Pain (as needed for pain).    mupirocin (BACTROBAN) 2 % ointment Apply topically 3 (three) times daily.       Review of patient's allergies indicates:  No Known Allergies    Past Medical History:   Diagnosis Date    Anxiety     Borderline personality disorder     PTSD (post-traumatic stress disorder)     Substance abuse      Past Surgical History:   Procedure Laterality Date     SECTION, LOW TRANSVERSE      x4    COSMETIC SURGERY       Family History     None        Tobacco Use    Smoking status: Never Smoker   Substance and Sexual Activity    Alcohol use: Yes     Comment: occ    Drug use: Yes     Types: IV, Marijuana     Comment: no heroin x3 days    Sexual activity: Yes     Partners: Male     Birth control/protection: Partner-Vasectomy     Review of Systems   Unable to perform ROS: Intubated     Objective:     Vital Signs (Most Recent):  Temp: 98.3 °F (36.8 °C) (10/28/20 0800)  Pulse: 93 (10/28/20 0900)  Resp: (!) 32 (10/28/20 09)  BP: 91/60 (10/28/20 0900)  SpO2: 100 % (10/28/20 0900) Vital Signs (24h Range):  Temp:  [98.3 °F (36.8 °C)-101.1 °F (38.4 °C)] 98.3 °F (36.8 °C)  Pulse:  [] 93  Resp:  [32-35] 32  SpO2:  [96 %-100 %] 100 %  BP: ()/(55-77) 91/60     Weight: 53.2 kg (117 lb 4.6 oz)  Body mass index is 21.45 kg/m².    SpO2: 100 %  O2 Device (Oxygen Therapy): ventilator     Intake/Output - Last 3 Shifts       10/26 0700 - 10/27 0659 10/27 0700 - 10/28 0659 10/28 0700 - 10/29 06    P.O.   0    I.V.  (mL/kg)  870.5 (16.4) 76.7 (1.4)    IV Piggyback  2750 250    Total Intake(mL/kg)  3620.5 (68.1) 326.7 (6.1)    Urine (mL/kg/hr) 950 788 (0.6) 90 (0.4)    Drains  475     Stool 0 0     Total Output 950 1263 90    Net -950 +2357.5 +236.7           Stool Occurrence 0 x 0 x            Lines/Drains/Airways     Central Venous Catheter Line            Percutaneous Central Line Insertion/Assessment - Triple Lumen  10/27/20 0058 right internal jugular 1 day          Drain                 NG/OG Tube 10/26/20 2302 orogastric Center mouth 1 day         Urethral Catheter 10/26/20 2330 16 Fr. 1 day          Airway                 Airway - Non-Surgical 10/26/20 2259 Endotracheal Tube 1 day              Physical Exam  Constitutional:       Appearance: She is well-developed.   HENT:      Head: Normocephalic.   Eyes:      Pupils: Pupils are equal, round, and reactive to light.   Cardiovascular:      Rate and Rhythm: Regular rhythm. Tachycardia present.      Heart sounds: Normal heart sounds.   Pulmonary:      Effort: Pulmonary effort is normal.      Breath sounds: Normal breath sounds.   Abdominal:      Palpations: Abdomen is soft.   Musculoskeletal: Normal range of motion.   Skin:     General: Skin is warm and dry.      Capillary Refill: Capillary refill takes 2 to 3 seconds.   Neurological:      Mental Status: She is alert and oriented to person, place, and time.         Significant Labs:  BMP:   Recent Labs   Lab 10/28/20  0247   GLU 95   *   K 4.4      CO2 19*   BUN 22*   CREATININE 0.7   CALCIUM 7.0*   MG 1.9     CBC:   Recent Labs   Lab 10/28/20  0247   WBC 19.92*   RBC 2.77*   HGB 7.4*   HCT 23.1*   PLT 79*   MCV 83   MCH 26.7*   MCHC 32.0     Coagulation:   Recent Labs   Lab 10/27/20  0323 10/27/20  0922   INR 1.2  --    APTT 94.5* 40.8*       Significant Diagnostics:  I have reviewed all pertinent imaging results/findings within the past 24 hours.     ECHO:   · The left ventricle is mildly enlarged with normal  systolic function. The estimated ejection fraction is 65%.  · Moderate right atrial enlargement.  · Large mobile vegetation present on the posterior tricuspid leaflet.  · Severe tricuspid regurgitation.  · There is pulmonary hypertension.  · Mild right ventricular enlargement with ventricular septal wall flattening.  · Normal right ventricular systolic function.  · Mild left atrial enlargement.  · There is no right atrial thrombus present.  · No right atrial mass present.  · Small posterior pericardial effusion. Effusion is fluid.

## 2020-10-29 DIAGNOSIS — B33.21 SUBACUTE VIRAL ENDOCARDITIS: Primary | ICD-10-CM

## 2020-10-29 LAB
ABO + RH BLD: NORMAL
ALBUMIN SERPL BCP-MCNC: 1.1 G/DL (ref 3.5–5.2)
ALP SERPL-CCNC: 125 U/L (ref 55–135)
ALT SERPL W/O P-5'-P-CCNC: 22 U/L (ref 10–44)
AMORPH CRY UR QL COMP ASSIST: ABNORMAL
ANION GAP SERPL CALC-SCNC: 5 MMOL/L (ref 8–16)
AST SERPL-CCNC: 31 U/L (ref 10–40)
BACTERIA #/AREA URNS AUTO: ABNORMAL /HPF
BACTERIA BLD CULT: ABNORMAL
BASOPHILS # BLD AUTO: 0.02 K/UL (ref 0–0.2)
BASOPHILS NFR BLD: 0.1 % (ref 0–1.9)
BILIRUB SERPL-MCNC: 1.5 MG/DL (ref 0.1–1)
BILIRUB UR QL STRIP: NEGATIVE
BLD GP AB SCN CELLS X3 SERPL QL: NORMAL
BLD PROD TYP BPU: NORMAL
BLOOD UNIT EXPIRATION DATE: NORMAL
BLOOD UNIT TYPE CODE: 5100
BLOOD UNIT TYPE: NORMAL
BUN SERPL-MCNC: 28 MG/DL (ref 6–20)
CALCIUM SERPL-MCNC: 7 MG/DL (ref 8.7–10.5)
CHLORIDE SERPL-SCNC: 103 MMOL/L (ref 95–110)
CK SERPL-CCNC: 55 U/L (ref 20–180)
CLARITY UR REFRACT.AUTO: ABNORMAL
CO2 SERPL-SCNC: 21 MMOL/L (ref 23–29)
CODING SYSTEM: NORMAL
COLOR UR AUTO: YELLOW
CREAT SERPL-MCNC: 0.9 MG/DL (ref 0.5–1.4)
DIFFERENTIAL METHOD: ABNORMAL
DISPENSE STATUS: NORMAL
EOSINOPHIL # BLD AUTO: 0.2 K/UL (ref 0–0.5)
EOSINOPHIL NFR BLD: 0.8 % (ref 0–8)
ERYTHROCYTE [DISTWIDTH] IN BLOOD BY AUTOMATED COUNT: 16.8 % (ref 11.5–14.5)
EST. GFR  (AFRICAN AMERICAN): >60 ML/MIN/1.73 M^2
EST. GFR  (NON AFRICAN AMERICAN): >60 ML/MIN/1.73 M^2
GLUCOSE SERPL-MCNC: 86 MG/DL (ref 70–110)
GLUCOSE UR QL STRIP: NEGATIVE
HCT VFR BLD AUTO: 21.4 % (ref 37–48.5)
HGB BLD-MCNC: 6.8 G/DL (ref 12–16)
HGB BLD-MCNC: 8.5 G/DL (ref 12–16)
HGB UR QL STRIP: ABNORMAL
HYALINE CASTS UR QL AUTO: 0 /LPF
IMM GRANULOCYTES # BLD AUTO: 0.7 K/UL (ref 0–0.04)
IMM GRANULOCYTES NFR BLD AUTO: 3.5 % (ref 0–0.5)
KETONES UR QL STRIP: NEGATIVE
LEUKOCYTE ESTERASE UR QL STRIP: ABNORMAL
LYMPHOCYTES # BLD AUTO: 1.6 K/UL (ref 1–4.8)
LYMPHOCYTES NFR BLD: 8.2 % (ref 18–48)
MAGNESIUM SERPL-MCNC: 1.9 MG/DL (ref 1.6–2.6)
MCH RBC QN AUTO: 26.2 PG (ref 27–31)
MCHC RBC AUTO-ENTMCNC: 31.8 G/DL (ref 32–36)
MCV RBC AUTO: 82 FL (ref 82–98)
MICROSCOPIC COMMENT: ABNORMAL
MONOCYTES # BLD AUTO: 0.9 K/UL (ref 0.3–1)
MONOCYTES NFR BLD: 4.7 % (ref 4–15)
NEUTROPHILS # BLD AUTO: 16.5 K/UL (ref 1.8–7.7)
NEUTROPHILS NFR BLD: 82.7 % (ref 38–73)
NITRITE UR QL STRIP: NEGATIVE
NRBC BLD-RTO: 0 /100 WBC
PH UR STRIP: 6 [PH] (ref 5–8)
PHOSPHATE SERPL-MCNC: 4.1 MG/DL (ref 2.7–4.5)
PLATELET # BLD AUTO: 92 K/UL (ref 150–350)
PMV BLD AUTO: 11.9 FL (ref 9.2–12.9)
POTASSIUM SERPL-SCNC: 4.4 MMOL/L (ref 3.5–5.1)
PROT SERPL-MCNC: 5.4 G/DL (ref 6–8.4)
PROT UR QL STRIP: ABNORMAL
RBC # BLD AUTO: 2.6 M/UL (ref 4–5.4)
RBC #/AREA URNS AUTO: 8 /HPF (ref 0–4)
SODIUM SERPL-SCNC: 129 MMOL/L (ref 136–145)
SP GR UR STRIP: 1.02 (ref 1–1.03)
SQUAMOUS #/AREA URNS AUTO: 5 /HPF
TRANS ERYTHROCYTES VOL PATIENT: NORMAL ML
URN SPEC COLLECT METH UR: ABNORMAL
VANCOMYCIN TROUGH SERPL-MCNC: 21.5 UG/ML (ref 10–22)
WBC # BLD AUTO: 19.89 K/UL (ref 3.9–12.7)
WBC #/AREA URNS AUTO: 16 /HPF (ref 0–5)

## 2020-10-29 PROCEDURE — 27000221 HC OXYGEN, UP TO 24 HOURS

## 2020-10-29 PROCEDURE — 25000003 PHARM REV CODE 250: Performed by: STUDENT IN AN ORGANIZED HEALTH CARE EDUCATION/TRAINING PROGRAM

## 2020-10-29 PROCEDURE — 80202 ASSAY OF VANCOMYCIN: CPT

## 2020-10-29 PROCEDURE — 25000003 PHARM REV CODE 250: Performed by: INTERNAL MEDICINE

## 2020-10-29 PROCEDURE — 99233 PR SUBSEQUENT HOSPITAL CARE,LEVL III: ICD-10-PCS | Mod: ,,, | Performed by: PHYSICIAN ASSISTANT

## 2020-10-29 PROCEDURE — 81001 URINALYSIS AUTO W/SCOPE: CPT

## 2020-10-29 PROCEDURE — 85025 COMPLETE CBC W/AUTO DIFF WBC: CPT

## 2020-10-29 PROCEDURE — 99291 PR CRITICAL CARE, E/M 30-74 MINUTES: ICD-10-PCS | Mod: ,,, | Performed by: INTERNAL MEDICINE

## 2020-10-29 PROCEDURE — 20000000 HC ICU ROOM

## 2020-10-29 PROCEDURE — 85018 HEMOGLOBIN: CPT

## 2020-10-29 PROCEDURE — 80053 COMPREHEN METABOLIC PANEL: CPT

## 2020-10-29 PROCEDURE — 87086 URINE CULTURE/COLONY COUNT: CPT

## 2020-10-29 PROCEDURE — 84100 ASSAY OF PHOSPHORUS: CPT

## 2020-10-29 PROCEDURE — 86920 COMPATIBILITY TEST SPIN: CPT

## 2020-10-29 PROCEDURE — 63600175 PHARM REV CODE 636 W HCPCS: Performed by: EMERGENCY MEDICINE

## 2020-10-29 PROCEDURE — P9021 RED BLOOD CELLS UNIT: HCPCS

## 2020-10-29 PROCEDURE — 94761 N-INVAS EAR/PLS OXIMETRY MLT: CPT

## 2020-10-29 PROCEDURE — 25000003 PHARM REV CODE 250: Performed by: EMERGENCY MEDICINE

## 2020-10-29 PROCEDURE — 63600175 PHARM REV CODE 636 W HCPCS: Performed by: STUDENT IN AN ORGANIZED HEALTH CARE EDUCATION/TRAINING PROGRAM

## 2020-10-29 PROCEDURE — 83735 ASSAY OF MAGNESIUM: CPT

## 2020-10-29 PROCEDURE — 31720 CLEARANCE OF AIRWAYS: CPT

## 2020-10-29 PROCEDURE — 82550 ASSAY OF CK (CPK): CPT

## 2020-10-29 PROCEDURE — 99900035 HC TECH TIME PER 15 MIN (STAT)

## 2020-10-29 PROCEDURE — 99900026 HC AIRWAY MAINTENANCE (STAT)

## 2020-10-29 PROCEDURE — 86901 BLOOD TYPING SEROLOGIC RH(D): CPT

## 2020-10-29 PROCEDURE — 63600175 PHARM REV CODE 636 W HCPCS: Performed by: INTERNAL MEDICINE

## 2020-10-29 PROCEDURE — 94003 VENT MGMT INPAT SUBQ DAY: CPT

## 2020-10-29 PROCEDURE — 99291 CRITICAL CARE FIRST HOUR: CPT | Mod: ,,, | Performed by: INTERNAL MEDICINE

## 2020-10-29 PROCEDURE — 99233 SBSQ HOSP IP/OBS HIGH 50: CPT | Mod: ,,, | Performed by: PHYSICIAN ASSISTANT

## 2020-10-29 RX ORDER — OXYCODONE HYDROCHLORIDE 10 MG/1
10 TABLET ORAL EVERY 6 HOURS
Status: DISCONTINUED | OUTPATIENT
Start: 2020-10-29 | End: 2020-10-30

## 2020-10-29 RX ORDER — OXYCODONE HYDROCHLORIDE 5 MG/1
5 TABLET ORAL EVERY 4 HOURS PRN
Status: DISCONTINUED | OUTPATIENT
Start: 2020-10-29 | End: 2020-11-01

## 2020-10-29 RX ORDER — HYDROCODONE BITARTRATE AND ACETAMINOPHEN 500; 5 MG/1; MG/1
TABLET ORAL
Status: DISCONTINUED | OUTPATIENT
Start: 2020-10-29 | End: 2020-10-29

## 2020-10-29 RX ORDER — CEFTRIAXONE 1 G/1
1 INJECTION, POWDER, FOR SOLUTION INTRAMUSCULAR; INTRAVENOUS
Status: COMPLETED | OUTPATIENT
Start: 2020-10-29 | End: 2020-10-31

## 2020-10-29 RX ORDER — VANCOMYCIN HCL IN 5 % DEXTROSE 1G/250ML
20 PLASTIC BAG, INJECTION (ML) INTRAVENOUS
Status: DISCONTINUED | OUTPATIENT
Start: 2020-10-29 | End: 2020-10-30

## 2020-10-29 RX ORDER — DEXMEDETOMIDINE HYDROCHLORIDE 4 UG/ML
0.2 INJECTION, SOLUTION INTRAVENOUS CONTINUOUS
Status: DISCONTINUED | OUTPATIENT
Start: 2020-10-29 | End: 2020-11-03

## 2020-10-29 RX ORDER — OXYCODONE HYDROCHLORIDE 10 MG/1
10 TABLET ORAL EVERY 6 HOURS
Status: DISCONTINUED | OUTPATIENT
Start: 2020-10-29 | End: 2020-10-29

## 2020-10-29 RX ORDER — SENNOSIDES 8.6 MG/1
8.6 TABLET ORAL DAILY
Status: DISCONTINUED | OUTPATIENT
Start: 2020-10-29 | End: 2020-11-08

## 2020-10-29 RX ORDER — POLYETHYLENE GLYCOL 3350 17 G/17G
17 POWDER, FOR SOLUTION ORAL DAILY
Status: DISCONTINUED | OUTPATIENT
Start: 2020-10-29 | End: 2020-11-02

## 2020-10-29 RX ADMIN — ACETAMINOPHEN 650 MG: 325 TABLET ORAL at 02:10

## 2020-10-29 RX ADMIN — PROPOFOL 30 MCG/KG/MIN: 10 INJECTION, EMULSION INTRAVENOUS at 07:10

## 2020-10-29 RX ADMIN — HEPARIN SODIUM 5000 UNITS: 5000 INJECTION INTRAVENOUS; SUBCUTANEOUS at 05:10

## 2020-10-29 RX ADMIN — DEXMEDETOMIDINE HYDROCHLORIDE 0.2 MCG/KG/HR: 4 INJECTION, SOLUTION INTRAVENOUS at 10:10

## 2020-10-29 RX ADMIN — HEPARIN SODIUM 5000 UNITS: 5000 INJECTION INTRAVENOUS; SUBCUTANEOUS at 02:10

## 2020-10-29 RX ADMIN — VANCOMYCIN HYDROCHLORIDE 1000 MG: 1 INJECTION, POWDER, LYOPHILIZED, FOR SOLUTION INTRAVENOUS at 05:10

## 2020-10-29 RX ADMIN — Medication 100 MCG: at 06:10

## 2020-10-29 RX ADMIN — PROPOFOL 30 MCG/KG/MIN: 10 INJECTION, EMULSION INTRAVENOUS at 12:10

## 2020-10-29 RX ADMIN — CEFTRIAXONE SODIUM 1 G: 1 INJECTION, POWDER, FOR SOLUTION INTRAMUSCULAR; INTRAVENOUS at 06:10

## 2020-10-29 RX ADMIN — POLYETHYLENE GLYCOL 3350 17 G: 17 POWDER, FOR SOLUTION ORAL at 10:10

## 2020-10-29 RX ADMIN — VANCOMYCIN HYDROCHLORIDE 1000 MG: 1 INJECTION, POWDER, LYOPHILIZED, FOR SOLUTION INTRAVENOUS at 06:10

## 2020-10-29 RX ADMIN — OXYCODONE 5 MG: 5 TABLET ORAL at 10:10

## 2020-10-29 RX ADMIN — HEPARIN SODIUM 5000 UNITS: 5000 INJECTION INTRAVENOUS; SUBCUTANEOUS at 10:10

## 2020-10-29 RX ADMIN — SENNOSIDES 8.6 MG: 8.6 TABLET, FILM COATED ORAL at 10:10

## 2020-10-29 RX ADMIN — FAMOTIDINE 20 MG: 10 INJECTION INTRAVENOUS at 10:10

## 2020-10-29 RX ADMIN — OXYCODONE HYDROCHLORIDE 10 MG: 10 TABLET ORAL at 06:10

## 2020-10-29 NOTE — PLAN OF CARE
CMICU DAILY GOALS       A: Awake    RASS: Goal - RASS Goal: 0-->alert and calm  Actual - RASS (Diallo Agitation-Sedation Scale): -1-->drowsy   Restraint necessity: Clinical Justification: Removing medical devices, Climbing out of bed, Treatment Interference  B: Breath   SBT: Not attempted   C: Coordinate A & B, analgesics/sedatives   Pain: managed    SAT: Not attempted  D: Delirium   CAM-ICU: Overall CAM-ICU: Positive  E: Early(intubated/ Progressive (non-intubated) Mobility   MOVE Screen: Fail   Activity: Activity Management: rolling - L1  FAS: Feeding/Nutrition   Diet order: Diet/Nutrition Received: NPO,   Fluid restriction:    T: Thrombus   DVT prophylaxis: VTE Required Core Measure: (SCDs) Sequential compression device initiated/maintained, Pharmacological prophylaxis initiated/maintained  H: HOB Elevation   Head of Bed (HOB): HOB at 30 degrees  U: Ulcer Prophylaxis   GI: yes  G: Glucose control   uncontrolled Glycemic Management: blood glucose monitoring  S: Skin   Bundle compliance: yes   Bathing/Skin Care: bath, chlorhexidine, linen changed Date: Will be night bath on 10292020  B: Bowel Function   constipation   I: Indwelling Catheters   Adkins necessity:      Urethral Catheter 10/26/20 2330 16 Fr.-Reason for Continuing Urinary Catheterization: Critically ill in ICU requiring intensive monitoring   CVC necessity: Yes   IPAD offered: No  D: De-escalation Antibx   Yes  Plan for the day   Possible SBT  Family/Goals of care/Code Status   Code Status: Full Code     No acute events throughout day, VS and assessment per flow sheet, patient progressing towards goals as tolerated, plan of care reviewed with Марина Britton and family, all concerns addressed, will continue to monitor.

## 2020-10-29 NOTE — DISCHARGE SUMMARY
[facility] ICU Hospital Medicine  Discharge Summary      Patient Name: Марина Britton  MRN: [id number]  Admission Date: 10/24/2020  Hospital Length of Stay: 2 days  Discharge Date and Time: 10/26/2020  6:33 PM  Attending Physician: No att. providers found   Discharging Provider: Delmer Garcia Iii, MD  Primary Care Provider: Luis Felipe Jose Iii, MD      HPI:    31 y/o female with history of iv drug abuse presented to the ed with complaints of worsening cough and sob and nonhealing left ankle ulcera nd further work up suggestive of severe sepsis with multifocal pneumonia and symptomatic anemia and furthera dmitted on ivf along with iv abx and blood transfusions     Today's info ; seen and examined, mirlande pe on ct, worse respi status  h and h not much improvement  Labile respi statsus    * No surgery found *      Hospital Course:   10/26/20 FM:  Events of the weekend are noted.  Patient was admitted to our acute care facility with bilateral pulmonary emboli and there is a strong suspicion for infectious related emboli.  She has a history of 2 years of IV opiate abuse and Cardiology is what concerned with right-sided bacterial endocarditis.  The patient is severely tachypneic and we are attempting transfer to a higher level of care.    Discharge Note FM:  Patient continued to deteriorate and her echocardiogram showed a large right-sided native valve vegetation.  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  Patient was transferred to high level of care for critical care services and infectious disease.     Consults:   Consults (From admission, onward)        Status Ordering Provider     Inpatient consult to Cardiology  Once     Provider:  Francisco Marr MD    Completed ROSIO LARRY          * Pneumonia of both lungs due to infectious organism  Strong suspicion for bacterial emboli.  Patient is on broad-spectrum antibiotics and on antibiotics for MRSA  infection.  Cultures are pending, echocardiogram is pending.  Cardiology is recommending transfer to a higher level of care.    IVDU (intravenous drug user)  Patient has visible track marks admits to IV drug use for the last 2 years.  I have attempted to contact her family an no phone calls were answered and voice mailbox is full.      Severe sepsis  On appropriate antibiotics and supportive care        Final Active Diagnoses:    Diagnosis Date Noted POA    PRINCIPAL PROBLEM:  Pneumonia of both lungs due to infectious organism [J18.9] 10/24/2020 Yes    Pulmonary embolism [I26.99] 10/26/2020 Yes    Severe sepsis [A41.9, R65.20] 10/26/2020 Yes    IVDU (intravenous drug user) [F19.90] 10/26/2020 Yes      Problems Resolved During this Admission:       Discharged Condition: critical    Disposition: Discharged to Other Faci*    Follow Up:    Patient Instructions:   No discharge procedures on file.    Significant Diagnostic Studies: Noted    Pending Diagnostic Studies:     None         Medications:  Transfer Medications (for Discharge Readmit only):   No current facility-administered medications for this encounter.      No current outpatient medications on file.     Facility-Administered Medications Ordered in Other Encounters   Medication Dose Route Frequency Provider Last Rate Last Dose    0.9%  NaCl infusion (for blood administration)   Intravenous Q24H PRN Sepideh Chappell MD        acetaminophen tablet 650 mg  650 mg Per NG tube Q8H PRN Sepideh Chappell MD   650 mg at 10/28/20 1753    famotidine (PF) injection 20 mg  20 mg Intravenous BID Sepideh Chappell MD   20 mg at 10/28/20 2131    fentaNYL 2500 mcg in 0.9% sodium chloride 250 mL infusion premix (titrating)   Intravenous Continuous Garrett Trevizo MD 10 mL/hr at 10/29/20 0636 100 mcg at 10/29/20 0636    heparin (porcine) injection 5,000 Units  5,000 Units Subcutaneous Q8H iTto Mcintosh MD   5,000 Units at 10/29/20 0543    norepinephrine 4 mg in  dextrose 5% 250 mL infusion (premix) (titrating)  0.02 mcg/kg/min Intravenous Continuous Garrett Trevizo MD   Stopped at 10/26/20 0046    phenylephrine HCl in 0.9% NaCl 1 mg/10 mL (100 mcg/mL) syringe 100 mcg  100 mcg Intravenous Once PRN Garrett Trevizo MD        polyethylene glycol packet 17 g  17 g Per NG tube Daily Ttio Mcintosh MD        propofol (DIPRIVAN) 10 mg/mL infusion  0-50 mcg/kg/min Intravenous Continuous Garrett Trevizo MD 9.6 mL/hr at 10/29/20 0735 30 mcg/kg/min at 10/29/20 0735    senna tablet 8.6 mg  8.6 mg Per OG tube Daily Tito Mcintosh MD        sodium chloride 0.9% flush 10 mL  10 mL Intravenous PRN Sepideh Chappell MD        vancomycin in dextrose 5 % 1 gram/250 mL IVPB 1,000 mg  20 mg/kg Intravenous Q12H Abdoul Quick .7 mL/hr at 10/29/20 0541 1,000 mg at 10/29/20 0541       Indwelling Lines/Drains at time of discharge:   Lines/Drains/Airways     None                 Time spent on the discharge of patient: 45 minutes  Patient was seen and examined on the date of discharge and determined to be suitable for discharge.         Delmer Garcia Iii, MD  Department of Hospital Medicine  [facility] - ICU

## 2020-10-29 NOTE — SUBJECTIVE & OBJECTIVE
Interval History:   Remains intubated. Failed SBT today   Blood cultures are persistently positive  ua with hematuria       Review of Systems   Unable to perform ROS: Intubated     Objective:     Vital Signs (Most Recent):  Temp: (!) 101.9 °F (38.8 °C) (10/29/20 1449)  Pulse: (!) 114 (10/29/20 1442)  Resp: (!) 27 (10/29/20 1442)  BP: 107/60 (10/29/20 1442)  SpO2: 98 % (10/29/20 1618) Vital Signs (24h Range):  Temp:  [98 °F (36.7 °C)-102 °F (38.9 °C)] 101.9 °F (38.8 °C)  Pulse:  [] 114  Resp:  [24-45] 27  SpO2:  [97 %-100 %] 98 %  BP: ()/(51-68) 107/60     Weight: 53.2 kg (117 lb 4.6 oz)  Body mass index is 21.45 kg/m².    Estimated Creatinine Clearance: 71.6 mL/min (based on SCr of 0.9 mg/dL).    Physical Exam  Vitals signs and nursing note reviewed.   Constitutional:       Comments: Intubated. Alert.    Cardiovascular:      Rate and Rhythm: Tachycardia present.      Heart sounds: Murmur present.   Pulmonary:      Comments: intubated  Abdominal:      General: Abdomen is flat. There is no distension.      Palpations: There is no mass.   Skin:     Comments: Janeway lesions on digits    Wound on ankle noted    Injection sites noted of upper extremities          Significant Labs: All pertinent labs within the past 24 hours have been reviewed.    Significant Imaging: I have reviewed all pertinent imaging results/findings within the past 24 hours.

## 2020-10-29 NOTE — SUBJECTIVE & OBJECTIVE
Interval History/Significant Events: NAEO. Hg 6.8 transfused 1 unit, Started precedex, failed SBT. Following blood cultures daily      Objective:     Vital Signs (Most Recent):  Temp: (!) 101.9 °F (38.8 °C) (10/29/20 1449)  Pulse: (!) 114 (10/29/20 1442)  Resp: (!) 27 (10/29/20 1442)  BP: 107/60 (10/29/20 1442)  SpO2: 97 % (10/29/20 1442) Vital Signs (24h Range):  Temp:  [98 °F (36.7 °C)-102 °F (38.9 °C)] 101.9 °F (38.8 °C)  Pulse:  [] 114  Resp:  [24-45] 27  SpO2:  [97 %-100 %] 97 %  BP: ()/(51-68) 107/60   Weight: 53.2 kg (117 lb 4.6 oz)  Body mass index is 21.45 kg/m².      Intake/Output Summary (Last 24 hours) at 10/29/2020 1508  Last data filed at 10/29/2020 1300  Gross per 24 hour   Intake 2323.76 ml   Output 737 ml   Net 1586.76 ml       Physical Exam  Vitals signs and nursing note reviewed.   Constitutional:       Appearance: She is ill-appearing and toxic-appearing.      Comments: Intubated, sedated   HENT:      Head: Normocephalic.      Right Ear: External ear normal.      Left Ear: External ear normal.      Nose: Nose normal.      Mouth/Throat:      Mouth: Mucous membranes are moist.   Eyes:      Pupils: Pupils are equal, round, and reactive to light.   Neck:      Musculoskeletal: Normal range of motion. No neck rigidity.   Cardiovascular:      Rate and Rhythm: Regular rhythm. Tachycardia present.      Heart sounds: No murmur. No friction rub. No gallop.    Pulmonary:      Breath sounds: Rhonchi and rales present.      Comments: Intubated  Abdominal:      General: There is no distension.      Palpations: Abdomen is soft.      Tenderness: There is no abdominal tenderness.   Musculoskeletal:      Right lower leg: No edema.      Left lower leg: No edema.   Skin:     Capillary Refill: Capillary refill takes less than 2 seconds.      Comments: Left medial ankle ulcer   Neurological:      Comments: Arousable, nods yes and no to questions, follows commands.         Vents:  Vent Mode: A/C (10/29/20  1134)  Ventilator Initiated: Yes(chart correction) (10/26/20 2315)  Set Rate: 24 BPM (10/29/20 1134)  Vt Set: 360 mL (10/29/20 1134)  Pressure Support: 0 cmH20 (10/29/20 1134)  PEEP/CPAP: 5 cmH20 (10/29/20 1134)  Oxygen Concentration (%): 30 (10/29/20 1442)  Peak Airway Pressure: 25 cmH2O (10/29/20 1134)  Plateau Pressure: 27 cmH20 (10/29/20 1134)  Total Ve: 2.68 mL (10/29/20 1134)  F/VT Ratio<105 (RSBI): (!) 50.19 (10/29/20 1134)  Lines/Drains/Airways     Central Venous Catheter Line            Percutaneous Central Line Insertion/Assessment - Triple Lumen  10/27/20 0058 right internal jugular 2 days          Drain                 NG/OG Tube 10/26/20 2302 orogastric Center mouth 2 days         Urethral Catheter 10/26/20 2330 16 Fr. 2 days          Airway                 Airway - Non-Surgical 10/26/20 2259 Endotracheal Tube 2 days              Significant Labs:    CBC/Anemia Profile:  Recent Labs   Lab 10/28/20  0247 10/28/20  1014 10/29/20  0132 10/29/20  1357   WBC 19.92*  --  19.89*  --    HGB 7.4*  --  6.8* 8.5*   HCT 23.1*  --  21.4*  --    PLT 79*  --  92*  --    MCV 83  --  82  --    RDW 17.2*  --  16.8*  --    RETIC  --  2.4  --   --         Chemistries:  Recent Labs   Lab 10/28/20  0247 10/29/20  0132   * 129*   K 4.4 4.4    103   CO2 19* 21*   BUN 22* 28*   CREATININE 0.7 0.9   CALCIUM 7.0* 7.0*   ALBUMIN 1.1* 1.1*   PROT 5.3* 5.4*   BILITOT 2.4* 1.5*   ALKPHOS 149* 125   ALT 39 22   AST 89* 31   MG 1.9 1.9   PHOS 3.8 4.1       Blood Culture:   Recent Labs   Lab 10/28/20  0955 10/28/20  1018   LABBLOO Gram stain richard bottle: Gram positive cocci in clusters resembling Staph   Results called to and read back by: Rica Medrano RN 10/29/2020  12:32 No Growth to date  No Growth to date

## 2020-10-29 NOTE — CARE UPDATE
Called and spoke with patient's sister, Estrellita regarding her care. Discussed that we are continuing to treat the bacteria in her heart, blood, and lungs with antibiotics and that overall, her condition, vitals and labs have been relatively stable. Sister noted that patient is an heavy IV heroin user. Also addressed that surgery was not currently indicated but patient was evaluated by the cardiothoracic surgeons who will follow-up.    Patient's sister's questions and concerns were addressed. Will continue with ongoing communication.      Tito Mcintosh M.D.  Internal Medicine PGY-2  [facility]

## 2020-10-29 NOTE — ASSESSMENT & PLAN NOTE
31 y/o female with IVDU, hepatitis C presents as a transfer from [facility] for septic shock secondary to MRSA endocarditis with associated septic emboli. She is currently on vancomycin and cefazolin. Blood cultures 10/24 +MRSA. TTE with Large mobile vegetation present on the posterior tricuspid leaflet. Remains febrile, intubated. Blood cultures persisently positive.       Recommendations  1. Continue vancomycin.  Pharmacy to dose vancomycin. Goal closer to 20.   2. Repeat blood cultures daily until clear .   3. CTS evaluation -no acute surgical intervention at this time    4. No CT evidence of acute intracranial abnormality.  5. Anticipate 6 weeks of IV abx therapy  6. Ceftriaxone added per primary team for UTI.  Discussed with primary team and ID staff.

## 2020-10-29 NOTE — PROGRESS NOTES
Pharmacokinetic Assessment Follow Up: IV Vancomycin    Vancomycin serum concentration assessment(s):    The trough level was drawn correctly and can be used to guide therapy at this time. The measurement is above the desired definitive target range of 15 to 20 mcg/mL.    Vancomycin Regimen Plan:    Change regimen to Vancomycin 1000 mg IV every 12 hours with next serum trough concentration measured at 0500 prior to third dose on 10/30.     Drug levels (last 3 results):  Recent Labs   Lab Result Units 10/27/20  0959 10/29/20  0128   Vancomycin-Trough ug/mL 3.4* 21.5       Pharmacy will continue to follow and monitor vancomycin.    Please contact pharmacy at extension [phone] for questions regarding this assessment.    Thank you for the consult,   Fletcher Oliva, PharmD, [facility]       Patient brief summary:  Марина Britton is a 30 y.o. female initiated on antimicrobial therapy with IV Vancomycin for treatment of endocarditis    The patient's current regimen is 750 mg IV q8h however the patient resulted slightly supratherapeutic. Will change patient to 20 mg/kg (1000 mg) IV q12h. Plan for next trough before third dose on 10/30 at 0500. Will start vancomycin 1000 mg IV q12h twelve hours after last 750 mg dose given 10/29 at 1755.    Drug Allergies:   Review of patient's allergies indicates:  No Known Allergies    Actual Body Weight:   53.2 kg    Renal Function:   Estimated Creatinine Clearance: 71.6 mL/min (based on SCr of 0.9 mg/dL).,     Dialysis Method (if applicable):  N/A    CBC (last 72 hours):  Recent Labs   Lab Result Units 10/26/20  0356 10/26/20  0852 10/26/20  2034 10/27/20  0505 10/28/20  0247 10/29/20  0132   WBC K/uL 15.45* 18.64* 19.32*  19.32* 17.71* 19.92* 19.89*   Hemoglobin g/dL 8.1* 9.4* 9.1*  9.1* 8.4* 7.4* 6.8*   Hematocrit % 24.7* 28.1* 27.6*  27.6* 25.7* 23.1* 21.4*   Platelets K/uL 52* 71* 76*  76* 86* 79* 92*   Gran % % 85.0* 81.8* 83.2*  83.2* 82.4* 86.4* 82.7*   Lymph % % 8.0* 9.6* 8.2*   8.2* 10.9* 7.2* 8.2*   Mono % % 4.3 3.6* 4.2  4.2 2.7* 3.1* 4.7   Eosinophil % % 0.0 0.1 0.3  0.3 0.8 0.8 0.8   Basophil % % 0.1 0.2 0.3  0.3 0.2 0.1 0.1   Differential Method  Automated Automated Automated  Automated Automated Automated Automated       Metabolic Panel (last 72 hours):  Recent Labs   Lab Result Units 10/26/20  0356 10/26/20  2034 10/27/20  0323 10/27/20  0455 10/28/20  0247 10/29/20  0132   Sodium mmol/L 137 132* 134*  --  131* 129*   Potassium mmol/L 4.2 5.0 4.6  --  4.4 4.4   Chloride mmol/L 107 103 104  --  103 103   CO2 mmol/L 23 20* 20*  --  19* 21*   Glucose mg/dL 110 87 85  --  95 86   Glucose, UA   --   --   --  Negative  --   --    BUN mg/dL 26* 25* 26*  --  22* 28*   Creatinine mg/dL 0.8 0.8 0.8  --  0.7 0.9   Albumin g/dL 1.3* 1.5* 1.4*  --  1.1* 1.1*   Total Bilirubin mg/dL 0.4 0.9 0.8  --  2.4* 1.5*   Alkaline Phosphatase U/L 228* 226* 202*  --  149* 125   AST U/L 93* 70* 100*  --  89* 31   ALT U/L 35 34 40  --  39 22   Magnesium mg/dL  --  2.1 2.0  --  1.9 1.9   Phosphorus mg/dL  --  4.1 4.0  --  3.8 4.1       Vancomycin Administrations:  vancomycin given in the last 96 hours                     vancomycin 750 mg in dextrose 5 % 250 mL IVPB (ready to mix system) (mg) 750 mg New Bag 10/28/20 1755     750 mg New Bag  0847     750 mg New Bag 10/27/20 2305     750 mg New Bag  1639    vancomycin 750 mg in dextrose 5 % 250 mL IVPB (ready to mix system) (mg) 750 mg New Bag 10/27/20 1123    vancomycin 750 mg in dextrose 5 % 250 mL IVPB (ready to mix system) (mg) 750 mg New Bag 10/26/20 1059     750 mg New Bag 10/25/20 1026                    Microbiologic Results:  Microbiology Results (last 7 days)       Procedure Component Value Units Date/Time    Blood culture [[id number]]     Order Status: No result Specimen: Blood     Blood culture [[id number]]     Order Status: No result Specimen: Blood     Blood culture [[id number]] Collected: 10/28/20 0955    Order Status: Completed Specimen: Blood  from Peripheral, Antecubital, Right Updated: 10/28/20 1915     Blood Culture, Routine No Growth to date    Blood culture [[id number]] Collected: 10/28/20 1018    Order Status: Completed Specimen: Blood from Peripheral, Antecubital, Left Updated: 10/28/20 1915     Blood Culture, Routine No Growth to date    Urine culture [[id number]] Collected: 10/27/20 0455    Order Status: Completed Specimen: Urine Updated: 10/28/20 0849     Urine Culture, Routine No growth    Narrative:      Specimen Source->Urine    Blood culture [[id number]]  (Abnormal) Collected: 10/26/20 2232    Order Status: Completed Specimen: Blood from Peripheral, Antecubital, Left Updated: 10/28/20 0819     Blood Culture, Routine Gram stain aer bottle: Gram positive cocci in clusters resembling Staph      Results called to and read back by:Sherry Cohen RN 10/27/2020  18:07      METHICILLIN RESISTANT STAPHYLOCOCCUS AUREUS  ID consult required at [facility].  For susceptibility see order #[id number]      Blood culture [[id number]] Collected: 10/26/20 2035    Order Status: Completed Specimen: Blood from Peripheral, Forearm, Right Updated: 10/28/20 0502     Blood Culture, Routine Gram stain aer bottle: Gram positive cocci in clusters resembling Staph       Positive results previously called 10/28/2020  05:02    Blood culture [[id number]]     Order Status: Canceled Specimen: Blood

## 2020-10-29 NOTE — PROGRESS NOTES
[facility]  Infectious Disease  Progress Note    Patient Name: Марина Britton  MRN: [id number]  Admission Date: 10/26/2020  Length of Stay: 3 days  Attending Physician: Abdoul Quick MD  Primary Care Provider: Luis Felipe Jose Iii, MD    Isolation Status: Contact  Assessment/Plan:      Endocarditis  29 y/o female with IVDU, hepatitis C presents as a transfer from [facility] for septic shock secondary to MRSA endocarditis with associated septic emboli. She is currently on vancomycin and cefazolin. Blood cultures 10/24 +MRSA. TTE with Large mobile vegetation present on the posterior tricuspid leaflet. Remains febrile, intubated. Blood cultures persisently positive.       Recommendations  1. Continue vancomycin.  Pharmacy to dose vancomycin. Goal closer to 20.   2. Repeat blood cultures daily until clear .   3. CTS evaluation -no acute surgical intervention at this time    4. No CT evidence of acute intracranial abnormality.  5. Anticipate 6 weeks of IV abx therapy  6. Ceftriaxone added per primary team for UTI.  Discussed with primary team and ID staff.     Pulmonary emboli  See endocarditis        Thank you for your consult. I will follow-up with patient. Please contact us if you have any additional questions.    Vania Baxter PA-C  Infectious Disease  [facility]    Subjective:     Principal Problem:<principal problem not specified>    HPI: History obtained per chart review. Pt critically ill. Intubated, sedated.       30F with PMHx IVUD heroin (last use approx 2wk ago), Hepatitis C presents as transfer from [facility] for septic endocarditis with b/l PE requiring higher level of care.  Per patient and chart review, she has had progressively worsening chest and back pain over the past several days with a Tmax of 105F at home.  She presented to [facility] and was admitted for sepsis on 10/24.  Bcx showed +MRSA and found to have large vegetation on tricuspid valve on  "TTE. Noted to have septic emobli on imaging studies. She was started on empiric vancomycin and zosyn. She was stepped up to the ICU on 10/25 for worsening tachypnea.  She also received 2U pRBC x2 for anemia with a pavel Hb of 6.5.  She was transferred to [facility] 10/26 for a higher level of care.    Per chart review. She has been seen in ED for fatigue and wound of her left foot. Per nursing note," Pt shows me a wound on her left foot/ankle, states it has been there over 1 month.  Pt states it started off as an are where she injected heroin, then turned in to a blister, abscess.  She states she was recently seen in [place] ED and placed on antibiotics.  Pt has not followed up with anyone regarding this wound. Spoke with ER MD Dr. Linares, pt does not tomas to be started on any antibiotics at this time, pt needs referral to wound care.  This was explained to pt who stated "ok."   she has been on different abx including bactrim and clindamycin without improvement. No follow up.   Interval History:   Remains intubated. Failed SBT today   Blood cultures are persistently positive  ua with hematuria       Review of Systems   Unable to perform ROS: Intubated     Objective:     Vital Signs (Most Recent):  Temp: (!) 101.9 °F (38.8 °C) (10/29/20 1449)  Pulse: (!) 114 (10/29/20 1442)  Resp: (!) 27 (10/29/20 1442)  BP: 107/60 (10/29/20 1442)  SpO2: 98 % (10/29/20 1618) Vital Signs (24h Range):  Temp:  [98 °F (36.7 °C)-102 °F (38.9 °C)] 101.9 °F (38.8 °C)  Pulse:  [] 114  Resp:  [24-45] 27  SpO2:  [97 %-100 %] 98 %  BP: ()/(51-68) 107/60     Weight: 53.2 kg (117 lb 4.6 oz)  Body mass index is 21.45 kg/m².    Estimated Creatinine Clearance: 71.6 mL/min (based on SCr of 0.9 mg/dL).    Physical Exam  Vitals signs and nursing note reviewed.   Constitutional:       Comments: Intubated. Alert.    Cardiovascular:      Rate and Rhythm: Tachycardia present.      Heart sounds: Murmur present.   Pulmonary:      Comments: " intubated  Abdominal:      General: Abdomen is flat. There is no distension.      Palpations: There is no mass.   Skin:     Comments: Janeway lesions on digits    Wound on ankle noted    Injection sites noted of upper extremities          Significant Labs: All pertinent labs within the past 24 hours have been reviewed.    Significant Imaging: I have reviewed all pertinent imaging results/findings within the past 24 hours.

## 2020-10-29 NOTE — PROGRESS NOTES
[facility]  Critical Care Medicine  Progress Note    Patient Name: Марина Britton  MRN: [id number]  Admission Date: 10/26/2020  Hospital Length of Stay: 3 days  Code Status: Full Code  Attending Provider: Abdoul Quick MD  Primary Care Provider: Luis Felipe Jose Iii, MD   Principal Problem: <principal problem not specified>    Subjective:     HPI:  30F with PMHx IVUD heroin (last use approx 2wk ago) presents as transfer from OSH for septic endocarditis with b/l PE requiring higher level of care.  Per patient and chart review, she has had progressively worsening chest and back pain over the past several days with a Tmax of 105F at home.  She presented to [facility] and was admitted for sepsis on 10/24.  Bcx showed GPC consistent with Staph Aureus, so she was started on Vanc/Zosyn.  She was stepped up to the ICU on 10/25 for worsening tachypnea.  She also received 2U pRBC x2 for anemia with a pavel Hb of 6.5.  She was transferred to [facility] 10/26 for a higher level of care.    Hospital/ICU Course:  10/27/2020 Patient extremely tachypneic >50 breaths per minute on arrival. Concern for respiratory distress and airway protection led to intubation. WBC trending down, persistently febrile tmax 103.2F, repeat cultures pending. OG tube to suction with bilious output. ID consulted    10/28/2020 Blood cultures remain positive for MRSA. CTS evaluated and recommending 6 week IV abx prior to re-eval in clinic. ID following.     10/29/2020 No acute changes, continues to fail SBT due to apparent anxiety/discomfort. Added precedex and oral pain regimen. Hgb 6.8 given 1 unit PRBC, unclear source of bleeding, some hematuria. Hemolysis labs negative. Retroperitoneal u/s ordered. Started enteral trickle feeds    Interval History/Significant Events: NAEO. Hg 6.8 transfused 1 unit, Started precedex, failed SBT. Following blood cultures daily. Trickle feeds started.      Objective:     Vital Signs (Most  Recent):  Temp: (!) 101.9 °F (38.8 °C) (10/29/20 1449)  Pulse: (!) 114 (10/29/20 1442)  Resp: (!) 27 (10/29/20 1442)  BP: 107/60 (10/29/20 1442)  SpO2: 97 % (10/29/20 1442) Vital Signs (24h Range):  Temp:  [98 °F (36.7 °C)-102 °F (38.9 °C)] 101.9 °F (38.8 °C)  Pulse:  [] 114  Resp:  [24-45] 27  SpO2:  [97 %-100 %] 97 %  BP: ()/(51-68) 107/60   Weight: 53.2 kg (117 lb 4.6 oz)  Body mass index is 21.45 kg/m².      Intake/Output Summary (Last 24 hours) at 10/29/2020 1508  Last data filed at 10/29/2020 1300  Gross per 24 hour   Intake 2323.76 ml   Output 737 ml   Net 1586.76 ml       Physical Exam  Vitals signs and nursing note reviewed.   Constitutional:       Appearance: She is ill-appearing and toxic-appearing.      Comments: Intubated, sedated   HENT:      Head: Normocephalic.      Right Ear: External ear normal.      Left Ear: External ear normal.      Nose: Nose normal.      Mouth/Throat:      Mouth: Mucous membranes are moist.   Eyes:      Pupils: Pupils are equal, round, and reactive to light.   Neck:      Musculoskeletal: Normal range of motion. No neck rigidity.   Cardiovascular:      Rate and Rhythm: Regular rhythm. Tachycardia present.      Heart sounds: No murmur. No friction rub. No gallop.    Pulmonary:      Breath sounds: Rhonchi and rales present.      Comments: Intubated  Abdominal:      General: There is no distension.      Palpations: Abdomen is soft.      Tenderness: There is no abdominal tenderness.   Musculoskeletal:      Right lower leg: No edema.      Left lower leg: No edema.   Skin:     Capillary Refill: Capillary refill takes less than 2 seconds.      Comments: Left medial ankle ulcer   Neurological:      Comments: Arousable, nods yes and no to questions, follows commands.         Vents:  Vent Mode: A/C (10/29/20 0394)  Ventilator Initiated: Yes(chart correction) (10/26/20 2975)  Set Rate: 24 BPM (10/29/20 1134)  Vt Set: 360 mL (10/29/20 1134)  Pressure Support: 0 cmH20 (10/29/20  1134)  PEEP/CPAP: 5 cmH20 (10/29/20 1134)  Oxygen Concentration (%): 30 (10/29/20 1442)  Peak Airway Pressure: 25 cmH2O (10/29/20 1134)  Plateau Pressure: 27 cmH20 (10/29/20 1134)  Total Ve: 2.68 mL (10/29/20 1134)  F/VT Ratio<105 (RSBI): (!) 50.19 (10/29/20 1134)  Lines/Drains/Airways     Central Venous Catheter Line            Percutaneous Central Line Insertion/Assessment - Triple Lumen  10/27/20 0058 right internal jugular 2 days          Drain                 NG/OG Tube 10/26/20 2302 orogastric Center mouth 2 days         Urethral Catheter 10/26/20 2330 16 Fr. 2 days          Airway                 Airway - Non-Surgical 10/26/20 2259 Endotracheal Tube 2 days              Significant Labs:    CBC/Anemia Profile:  Recent Labs   Lab 10/28/20  0247 10/28/20  1014 10/29/20  0132 10/29/20  1357   WBC 19.92*  --  19.89*  --    HGB 7.4*  --  6.8* 8.5*   HCT 23.1*  --  21.4*  --    PLT 79*  --  92*  --    MCV 83  --  82  --    RDW 17.2*  --  16.8*  --    RETIC  --  2.4  --   --         Chemistries:  Recent Labs   Lab 10/28/20  0247 10/29/20  0132   * 129*   K 4.4 4.4    103   CO2 19* 21*   BUN 22* 28*   CREATININE 0.7 0.9   CALCIUM 7.0* 7.0*   ALBUMIN 1.1* 1.1*   PROT 5.3* 5.4*   BILITOT 2.4* 1.5*   ALKPHOS 149* 125   ALT 39 22   AST 89* 31   MG 1.9 1.9   PHOS 3.8 4.1       Blood Culture:   Recent Labs   Lab 10/28/20  0955 10/28/20  1018   LABBLOO Gram stain richard bottle: Gram positive cocci in clusters resembling Staph   Results called to and read back by: Rica Medrano RN 10/29/2020  12:32 No Growth to date  No Growth to date           ABG  Recent Labs   Lab 10/27/20  0453   PH 7.458*   PO2 67*   PCO2 29.4*   HCO3 20.8*   BE -3     Assessment/Plan:     Psychiatric  IVDU (intravenous drug user)  - will need counseling/resources at discharge  - monitor for withdrawal symptoms      Pulmonary  Pneumonia of both lungs due to infectious organism  CT chest demonstrating bilateral scattered opacities of varying size  with notable cavitations concerning for septic emboli from endocarditis (large tricuspid vegetation noted on echocardiogram    - continue vanc, cefazolin   -intubated 10/27 for respiratory distress    Cardiac/Vascular  Endocarditis  Large tricuspid vegetation noted on TTE with eptic emboli to lungs bilaterally  -no need for AC at this time, but will continue to monitor  -will consider need for HARI      ID  Severe sepsis  WBC peaked at 25.66, persistently febrile to 103F, tachycardic to 120s, lactate wnl. Encephalopathic on arrival concerning for end-organ damage    WBC 25>18>19>17>19.9> 19.8    -blood cultures 10/24,10/26, 10/28 positive for MRSA, will continue daily blood cultures  - S/p fluid resuscitation at [place]  - continue vanc day 5, cefazolin d/c'd  -added ceftriaxone 10/29 due to bacteruria with hematuria  - ID on board- recommended CTS consult and daily blood cultures until clearance       Critical Care Daily Checklist:    A: Awake: RASS Goal/Actual Goal: RASS Goal: 0-->alert and calm  Actual: Diallo Agitation Sedation Scale (RASS): Drowsy   B: Spontaneous Breathing Trial Performed? Spon. Breathing Trial Initiated?: Initiated (10/29/20 0938)   C: SAT & SBT Coordinated?  Failed                      D: Delirium: CAM-ICU Overall CAM-ICU: Positive   E: Early Mobility Performed? No   F: Feeding Goal:    Status:     Current Diet Order   Procedures    Diet Adult Regular (IDDSI Level 7)      AS: Analgesia/Sedation Precedex, fentanyl, propofol   T: Thromboembolic Prophylaxis heparin   H: HOB > 300 Yes   U: Stress Ulcer Prophylaxis (if needed) famotidine   G: Glucose Control n/a   B: Bowel Function Stool Occurrence: 0   I: Indwelling Catheter (Lines & Adkins) Necessity NATHAN Adkins   D: De-escalation of Antimicrobials/Pharmacotherapies Vanc, ceftriaxone    Plan for the day/ETD     Code Status:  Family/Goals of Care: Full Code         Critical secondary to Patient has a condition that poses threat to life and  bodily function: Pulmonary Embolism and Endocarditis      Critical care was time spent personally by me on the following activities: development of treatment plan with patient or surrogate and bedside caregivers, discussions with consultants, evaluation of patient's response to treatment, examination of patient, ordering and performing treatments and interventions, ordering and review of laboratory studies, ordering and review of radiographic studies, pulse oximetry, re-evaluation of patient's condition. This critical care time did not overlap with that of any other provider or involve time for any procedures.       Tito Mcintosh MD  Critical Care Medicine  [facility]

## 2020-10-29 NOTE — ASSESSMENT & PLAN NOTE
WBC peaked at 25.66, persistently febrile to 103F, tachycardic to 120s, lactate wnl. Encephalopathic on arrival concerning for end-organ damage    WBC 25>18>19>17>19.9> 19.8    -blood cultures 10/24,10/26, 10/28 positive for MRSA, will continue daily blood cultures  - S/p fluid resuscitation at [place]  - continue vanc day 5, cefazolin d/c'd  -added ceftriaxone 10/29 due to bacteruria with hematuria  - ID on board- recommended CTS consult and daily blood cultures until clearance

## 2020-10-29 NOTE — NURSING
CMICU DAILY GOALS       A: Awake    RASS: Goal -    Actual - RASS (Diallo Agitation-Sedation Scale): -1-->drowsy   Restraint necessity: Clinical Justification: Removing medical devices, Treatment Interference Restraints in place.  Order good until tonight at 2228   B: Breath   SBT: Not done   C: Coordinate A & B, analgesics/sedatives   Pain: Managed with PRN fentanyl pushes    SAT: None done.  Decreased both sedatives to assess neuro status - patient appears neuro intact   D: Delirium   CAM-ICU: Overall CAM-ICU: Positive  E: Early(intubated/ Progressive (non-intubated) Mobility   MOVE Screen: Pass    Activity: Activity Management: rolling - L1  FAS: Feeding/Nutrition   Diet order: Diet/Nutrition Received: NPO,   Fluid restriction:  None ordered   T: Thrombus   DVT prophylaxis: VTE Required Core Measure: (SCDs) Sequential compression device initiated/maintained  H: HOB Elevation   Head of Bed (HOB): HOB at 30-45 degrees  U: Ulcer Prophylaxis   GI: IV pepcid per eMAR   G: Glucose control   Managed per labs  Glycemic Management: blood glucose monitoring  S: Skin   Bundle compliance: Yes - waffle mattress in place, heel boots, foam dressings, patient turned q 2 hours   Bathing/Skin Care: bath, chlorhexidine, linen changed Date: Patient to be bathed tonight (10/28)   B: Bowel Function   Per Epic, last BM 10/22 - unsure of the accuracy of this info   I: Indwelling Catheters   Adkins necessity:      Urethral Catheter 10/26/20 2330 16 Fr.-Reason for Continuing Urinary Catheterization: Critically ill in ICU requiring intensive monitoring - Adkins intact.  Low UO today (total of 275cc of thom urine) -  1L LR bolus administered    CVC necessity: Perhaps we can remove in the coming days?  Patient on sedation and ABX per eMAR    IPAD offered: Not appropriate   D: De-escalation Antibx   Vanc per eMAR   Plan for the day   Keep patient comfortable with sedation.  ABX per eMAR.    Family/Goals of care/Code Status   Code Status:  Full Code     No acute events throughout day, VS and assessment per flow sheet, patient progressing towards goals as tolerated, plan of care reviewed with Марина Britton and sister, all concerns addressed, will continue to monitor.

## 2020-10-30 LAB
ALBUMIN SERPL BCP-MCNC: 1.1 G/DL (ref 3.5–5.2)
ALP SERPL-CCNC: 127 U/L (ref 55–135)
ALT SERPL W/O P-5'-P-CCNC: 17 U/L (ref 10–44)
ANION GAP SERPL CALC-SCNC: 7 MMOL/L (ref 8–16)
AST SERPL-CCNC: 24 U/L (ref 10–40)
BACTERIA BLD CULT: ABNORMAL
BACTERIA UR CULT: NO GROWTH
BASOPHILS # BLD AUTO: 0.04 K/UL (ref 0–0.2)
BASOPHILS NFR BLD: 0.2 % (ref 0–1.9)
BILIRUB SERPL-MCNC: 1.1 MG/DL (ref 0.1–1)
BUN SERPL-MCNC: 38 MG/DL (ref 6–20)
CALCIUM SERPL-MCNC: 7.3 MG/DL (ref 8.7–10.5)
CHLORIDE SERPL-SCNC: 101 MMOL/L (ref 95–110)
CO2 SERPL-SCNC: 20 MMOL/L (ref 23–29)
CREAT SERPL-MCNC: 0.9 MG/DL (ref 0.5–1.4)
DIFFERENTIAL METHOD: ABNORMAL
EOSINOPHIL # BLD AUTO: 0.1 K/UL (ref 0–0.5)
EOSINOPHIL NFR BLD: 0.5 % (ref 0–8)
ERYTHROCYTE [DISTWIDTH] IN BLOOD BY AUTOMATED COUNT: 16.5 % (ref 11.5–14.5)
EST. GFR  (AFRICAN AMERICAN): >60 ML/MIN/1.73 M^2
EST. GFR  (NON AFRICAN AMERICAN): >60 ML/MIN/1.73 M^2
GLUCOSE SERPL-MCNC: 97 MG/DL (ref 70–110)
HCT VFR BLD AUTO: 27.1 % (ref 37–48.5)
HGB BLD-MCNC: 9 G/DL (ref 12–16)
IMM GRANULOCYTES # BLD AUTO: 0.56 K/UL (ref 0–0.04)
IMM GRANULOCYTES NFR BLD AUTO: 2.8 % (ref 0–0.5)
LYMPHOCYTES # BLD AUTO: 1.3 K/UL (ref 1–4.8)
LYMPHOCYTES NFR BLD: 6.5 % (ref 18–48)
MAGNESIUM SERPL-MCNC: 2.1 MG/DL (ref 1.6–2.6)
MCH RBC QN AUTO: 27.6 PG (ref 27–31)
MCHC RBC AUTO-ENTMCNC: 33.2 G/DL (ref 32–36)
MCV RBC AUTO: 83 FL (ref 82–98)
MONOCYTES # BLD AUTO: 0.9 K/UL (ref 0.3–1)
MONOCYTES NFR BLD: 4.5 % (ref 4–15)
NEUTROPHILS # BLD AUTO: 17.1 K/UL (ref 1.8–7.7)
NEUTROPHILS NFR BLD: 85.5 % (ref 38–73)
NRBC BLD-RTO: 0 /100 WBC
PHOSPHATE SERPL-MCNC: 3.9 MG/DL (ref 2.7–4.5)
PLATELET # BLD AUTO: 157 K/UL (ref 150–350)
PMV BLD AUTO: 11.3 FL (ref 9.2–12.9)
POTASSIUM SERPL-SCNC: 4.8 MMOL/L (ref 3.5–5.1)
PROT SERPL-MCNC: 5.9 G/DL (ref 6–8.4)
RBC # BLD AUTO: 3.26 M/UL (ref 4–5.4)
SODIUM SERPL-SCNC: 128 MMOL/L (ref 136–145)
VANCOMYCIN TROUGH SERPL-MCNC: 26.6 UG/ML (ref 10–22)
WBC # BLD AUTO: 19.96 K/UL (ref 3.9–12.7)

## 2020-10-30 PROCEDURE — 83735 ASSAY OF MAGNESIUM: CPT

## 2020-10-30 PROCEDURE — 25000003 PHARM REV CODE 250: Performed by: STUDENT IN AN ORGANIZED HEALTH CARE EDUCATION/TRAINING PROGRAM

## 2020-10-30 PROCEDURE — 27000221 HC OXYGEN, UP TO 24 HOURS

## 2020-10-30 PROCEDURE — 99233 SBSQ HOSP IP/OBS HIGH 50: CPT | Mod: ,,, | Performed by: PHYSICIAN ASSISTANT

## 2020-10-30 PROCEDURE — 63600175 PHARM REV CODE 636 W HCPCS: Performed by: STUDENT IN AN ORGANIZED HEALTH CARE EDUCATION/TRAINING PROGRAM

## 2020-10-30 PROCEDURE — 84100 ASSAY OF PHOSPHORUS: CPT

## 2020-10-30 PROCEDURE — 99900026 HC AIRWAY MAINTENANCE (STAT)

## 2020-10-30 PROCEDURE — 25000003 PHARM REV CODE 250: Performed by: EMERGENCY MEDICINE

## 2020-10-30 PROCEDURE — 94003 VENT MGMT INPAT SUBQ DAY: CPT

## 2020-10-30 PROCEDURE — 85025 COMPLETE CBC W/AUTO DIFF WBC: CPT

## 2020-10-30 PROCEDURE — 99291 PR CRITICAL CARE, E/M 30-74 MINUTES: ICD-10-PCS | Mod: ,,, | Performed by: INTERNAL MEDICINE

## 2020-10-30 PROCEDURE — 99233 PR SUBSEQUENT HOSPITAL CARE,LEVL III: ICD-10-PCS | Mod: ,,, | Performed by: PHYSICIAN ASSISTANT

## 2020-10-30 PROCEDURE — 20000000 HC ICU ROOM

## 2020-10-30 PROCEDURE — 80202 ASSAY OF VANCOMYCIN: CPT

## 2020-10-30 PROCEDURE — 99291 CRITICAL CARE FIRST HOUR: CPT | Mod: ,,, | Performed by: INTERNAL MEDICINE

## 2020-10-30 PROCEDURE — 94761 N-INVAS EAR/PLS OXIMETRY MLT: CPT

## 2020-10-30 PROCEDURE — 80053 COMPREHEN METABOLIC PANEL: CPT

## 2020-10-30 PROCEDURE — 97802 MEDICAL NUTRITION INDIV IN: CPT

## 2020-10-30 PROCEDURE — 99900035 HC TECH TIME PER 15 MIN (STAT)

## 2020-10-30 PROCEDURE — 63600175 PHARM REV CODE 636 W HCPCS: Performed by: INTERNAL MEDICINE

## 2020-10-30 PROCEDURE — 25000003 PHARM REV CODE 250: Performed by: INTERNAL MEDICINE

## 2020-10-30 RX ORDER — FAMOTIDINE 20 MG/1
20 TABLET, FILM COATED ORAL 2 TIMES DAILY
Status: DISCONTINUED | OUTPATIENT
Start: 2020-10-30 | End: 2020-11-02

## 2020-10-30 RX ORDER — LACTULOSE 10 G/15ML
30 SOLUTION ORAL 2 TIMES DAILY
Status: DISCONTINUED | OUTPATIENT
Start: 2020-10-30 | End: 2020-11-02

## 2020-10-30 RX ORDER — FENTANYL CITRATE 50 UG/ML
50 INJECTION, SOLUTION INTRAMUSCULAR; INTRAVENOUS
Status: DISCONTINUED | OUTPATIENT
Start: 2020-11-04 | End: 2020-11-01

## 2020-10-30 RX ORDER — NOREPINEPHRINE BITARTRATE/D5W 4MG/250ML
0.02 PLASTIC BAG, INJECTION (ML) INTRAVENOUS CONTINUOUS
Status: DISCONTINUED | OUTPATIENT
Start: 2020-10-30 | End: 2020-11-03

## 2020-10-30 RX ORDER — FENTANYL CITRATE 50 UG/ML
25 INJECTION, SOLUTION INTRAMUSCULAR; INTRAVENOUS
Status: DISCONTINUED | OUTPATIENT
Start: 2020-10-30 | End: 2020-11-01

## 2020-10-30 RX ORDER — LACTULOSE 10 G/15ML
30 SOLUTION ORAL 2 TIMES DAILY
Status: DISCONTINUED | OUTPATIENT
Start: 2020-10-30 | End: 2020-10-30

## 2020-10-30 RX ADMIN — FAMOTIDINE 20 MG: 10 INJECTION INTRAVENOUS at 09:10

## 2020-10-30 RX ADMIN — OXYCODONE HYDROCHLORIDE 15 MG: 10 TABLET ORAL at 06:10

## 2020-10-30 RX ADMIN — LACTULOSE 30 G: 10 SOLUTION ORAL at 09:10

## 2020-10-30 RX ADMIN — VANCOMYCIN HYDROCHLORIDE 1000 MG: 1 INJECTION, POWDER, LYOPHILIZED, FOR SOLUTION INTRAVENOUS at 05:10

## 2020-10-30 RX ADMIN — HEPARIN SODIUM 5000 UNITS: 5000 INJECTION INTRAVENOUS; SUBCUTANEOUS at 01:10

## 2020-10-30 RX ADMIN — DEXMEDETOMIDINE HYDROCHLORIDE 0.8 MCG/KG/HR: 4 INJECTION, SOLUTION INTRAVENOUS at 04:10

## 2020-10-30 RX ADMIN — HEPARIN SODIUM 5000 UNITS: 5000 INJECTION INTRAVENOUS; SUBCUTANEOUS at 09:10

## 2020-10-30 RX ADMIN — DEXMEDETOMIDINE HYDROCHLORIDE 0.2 MCG/KG/HR: 4 INJECTION, SOLUTION INTRAVENOUS at 09:10

## 2020-10-30 RX ADMIN — OXYCODONE HYDROCHLORIDE 15 MG: 10 TABLET ORAL at 11:10

## 2020-10-30 RX ADMIN — HEPARIN SODIUM 5000 UNITS: 5000 INJECTION INTRAVENOUS; SUBCUTANEOUS at 05:10

## 2020-10-30 RX ADMIN — ACETAMINOPHEN 650 MG: 325 TABLET ORAL at 01:10

## 2020-10-30 RX ADMIN — SODIUM CHLORIDE, SODIUM LACTATE, POTASSIUM CHLORIDE, AND CALCIUM CHLORIDE 500 ML: .6; .31; .03; .02 INJECTION, SOLUTION INTRAVENOUS at 03:10

## 2020-10-30 RX ADMIN — FENTANYL CITRATE 25 MCG: 50 INJECTION, SOLUTION INTRAMUSCULAR; INTRAVENOUS at 07:10

## 2020-10-30 RX ADMIN — SENNOSIDES 8.6 MG: 8.6 TABLET, FILM COATED ORAL at 09:10

## 2020-10-30 RX ADMIN — FAMOTIDINE 20 MG: 20 TABLET, FILM COATED ORAL at 09:10

## 2020-10-30 RX ADMIN — Medication 175 MCG/HR: at 11:10

## 2020-10-30 RX ADMIN — FENTANYL CITRATE 25 MCG: 50 INJECTION, SOLUTION INTRAMUSCULAR; INTRAVENOUS at 09:10

## 2020-10-30 RX ADMIN — OXYCODONE HYDROCHLORIDE 10 MG: 10 TABLET ORAL at 12:10

## 2020-10-30 RX ADMIN — Medication 2500 MCG: at 08:10

## 2020-10-30 RX ADMIN — FENTANYL CITRATE 50 MCG: 50 INJECTION, SOLUTION INTRAMUSCULAR; INTRAVENOUS at 11:10

## 2020-10-30 RX ADMIN — OXYCODONE HYDROCHLORIDE 10 MG: 10 TABLET ORAL at 05:10

## 2020-10-30 RX ADMIN — CEFTRIAXONE SODIUM 1 G: 1 INJECTION, POWDER, FOR SOLUTION INTRAMUSCULAR; INTRAVENOUS at 04:10

## 2020-10-30 RX ADMIN — DEXMEDETOMIDINE HYDROCHLORIDE 0.8 MCG/KG/HR: 4 INJECTION, SOLUTION INTRAVENOUS at 10:10

## 2020-10-30 RX ADMIN — FENTANYL CITRATE 25 MCG: 50 INJECTION, SOLUTION INTRAMUSCULAR; INTRAVENOUS at 11:10

## 2020-10-30 RX ADMIN — Medication 0.02 MCG/KG/MIN: at 04:10

## 2020-10-30 RX ADMIN — OXYCODONE 5 MG: 5 TABLET ORAL at 09:10

## 2020-10-30 NOTE — PLAN OF CARE
Recommendations     1. As tolerated, increase TF rate (of Isosource 1.5) to 50 mL/hr to provide 1800 calories, 82 grams of protein, 917 mL fluid.   2. RD to monitor & follow-up.     Goals: Meet % EEN, EPN by RD f/u date  Nutrition Goal Status: new  Communication of RD Recs: reviewed with RN

## 2020-10-30 NOTE — PHYSICIAN QUERY
PT Name: Марина Britton  MR #: [id number]    Consultant Diagnosis Clarification     CDS: Faye Gonzalez RN, CCDS         Contact information :ext [phone] [email or web address]     This form is a permanent document in the medical record.    Query Date: October 30, 2020      By submitting this query, we are merely seeking further clarification of documentation.  Please utilize your independent clinical judgment when addressing the question(s) below.    The Medical Record reflects the following:    Clinical Information Location in Medical Record       transfer from [facility] for septic shock secondary to MRSA endocarditis with associated septic emboli      29 y/o woman with history of iv drug abuse admitted to [facility] on 10/24 with fever, cough, SOB and nonhealing left ankle ulcer. On admission T 99.9°  /73 R 22 SpO2 88 (RA) > 93% (6 L). WBC 16.8 Hb 6.5 platelets 63 D-dimer 8.2 Cr 1.3 LA 1.8 UPT neg HIV neg COVID neg ABGs pH 7.4 pCO2 33.7 pO2 67 FiO2 28. CTA chest pos for septic pulmonary emboli. BC pos for Gram-pos cocci in clusters resembling Staph. Patient started on IVF, heparin, vancomycin 750 Q 24, Zosyn 4.5 Q 8 levofloxacin 500 Q 24. Over the next several days SBP has been in the 120s to 140s, HR in 110s to 140s, RR has increased to the 40s with SpO2 in the low 90s on 6 L (Venti mask). ABG today pH 7.4 pO2 35.5 pO2 67 0.8 FiO2 35 on Venti mask. Cr has improved from 1.3-0.8. She has been given 4 U of pRBC with Hb 6.5 > > 9.4 (10/26 0852 h)       30F with PMHx IVUD heroin (last use approx 2wk ago) presents as transfer from OSH for septic endocarditis with b/l PE requiring higher level of care.  Per patient and chart review, she has had progressively worsening chest and back pain over the past several days with a Tmax of 105F at home.  She presented to [facility] and was admitted for sepsis on 10/24.  Bcx showed GPC consistent with Staph Aureus, so she was started on  Miller/Zosyn.  She was stepped up to the ICU on 10/25 for worsening tachypnea.  She also received 2U pRBC x2 for anemia with a pavel Hb of 6.5.  She was transferred to [facility] 10/26 for a higher level of care.  Vital Signs (24h Range):  Temp:  [98 °F (36.7 °C)-101.9 °F (38.8 °C)] 101.9 °F (38.8 °C)  Pulse:  [] 132  Resp:  [21-74] 60  SpO2:  [71 %-100 %] 90 %  BP: (124-149)/() 132/81     ID consult 10/27/20        Internal Medicine POC/Transfer note 10/26/20                          H&P 10/27/20                                         Please clarify/confirm the Consultants diagnosis of Septic shock:     [ x  ] Septic shock Diagnosis ruled in, current diagnosis being monitored , evaluated , treated   [   ] Septic shock is past medical history from transferring facility, now resolved   [   ] Septic shock Diagnosis ruled out   [   ] Other clarification   [   ] Other diagnosis (please specify): _____________________________   [  ] Clinically undetermined

## 2020-10-30 NOTE — PROGRESS NOTES
[facility]  Infectious Disease  Progress Note    Patient Name: Марина Britton  MRN: [id number]  Admission Date: 10/26/2020  Length of Stay: 4 days  Attending Physician: Abdoul Quick MD  Primary Care Provider: Luis Felipe Jose Iii, MD    Isolation Status: Contact  Assessment/Plan:      Endocarditis  29 y/o female with IVDU, hepatitis C presents as a transfer from [facility] for septic shock secondary to MRSA endocarditis with associated septic emboli. She is currently on vancomycin and cefazolin. Blood cultures 10/24 +MRSA. TTE with Large mobile vegetation present on the posterior tricuspid leaflet. Remains febrile, intubated. Blood cultures persisently positive. Repeat CXR Improving pulmonary edema pneumonia aspiration or sepsis.      Recommendations  1. Continue vancomycin.  Pharmacy to dose vancomycin. Goal closer to 20.   2. Repeat blood cultures daily until clear .   3. CTS evaluation -no acute surgical intervention at this time    4. No CT evidence of acute intracranial abnormality.  5. Anticipate 6 weeks of IV abx therapy  6. Ceftriaxone added per primary team for UTI. Would discontinue if without growth on urine cultures  7. Continue vancomycin for now, if cultures remain positive after day 7 or day 8 of IV vancomycin would consider switching to daptomycin and ceftaroline.  8. Discussed with ID staff. ID will follow with you     Pulmonary emboli  See endocarditis        Thank you for your consult. I will follow-up with patient. Please contact us if you have any additional questions.    Vania Baxter PA-C  Infectious Disease  [facility]    Subjective:     Principal Problem:<principal problem not specified>    HPI: History obtained per chart review. Pt critically ill. Intubated, sedated.       30F with PMHx IVUD heroin (last use approx 2wk ago), Hepatitis C presents as transfer from [facility] for septic endocarditis with b/l PE requiring higher level of care.   "Per patient and chart review, she has had progressively worsening chest and back pain over the past several days with a Tmax of 105F at home.  She presented to [facility] and was admitted for sepsis on 10/24.  Bcx showed +MRSA and found to have large vegetation on tricuspid valve on TTE. Noted to have septic emobli on imaging studies. She was started on empiric vancomycin and zosyn. She was stepped up to the ICU on 10/25 for worsening tachypnea.  She also received 2U pRBC x2 for anemia with a pavel Hb of 6.5.  She was transferred to [facility] 10/26 for a higher level of care.    Per chart review. She has been seen in ED for fatigue and wound of her left foot. Per nursing note," Pt shows me a wound on her left foot/ankle, states it has been there over 1 month.  Pt states it started off as an are where she injected heroin, then turned in to a blister, abscess.  She states she was recently seen in [place] ED and placed on antibiotics.  Pt has not followed up with anyone regarding this wound. Spoke with ER MD Dr. Linares, pt does not tomas to be started on any antibiotics at this time, pt needs referral to wound care.  This was explained to pt who stated "ok."   she has been on different abx including bactrim and clindamycin without improvement. No follow up.   Interval History:   Alert  Answering questions appropriately  Denies injection of lower extremities  All questions answered      Review of Systems   Unable to perform ROS: Intubated   Respiratory: Positive for cough and shortness of breath.      Objective:     Vital Signs (Most Recent):  Temp: 98.7 °F (37.1 °C) (10/30/20 0800)  Pulse: 100 (10/30/20 1100)  Resp: 20 (10/30/20 1159)  BP: (!) 92/57 (10/30/20 1100)  SpO2: 100 % (10/30/20 1100) Vital Signs (24h Range):  Temp:  [98 °F (36.7 °C)-101.9 °F (38.8 °C)] 98.7 °F (37.1 °C)  Pulse:  [] 100  Resp:  [14-35] 20  SpO2:  [95 %-100 %] 100 %  BP: ()/(52-85) 92/57     Weight: 53.2 kg (117 lb 4.6 " oz)  Body mass index is 21.45 kg/m².    Estimated Creatinine Clearance: 71.6 mL/min (based on SCr of 0.9 mg/dL).    Physical Exam  Vitals signs and nursing note reviewed.   Constitutional:       Comments: Intubated. Alert.    Cardiovascular:      Rate and Rhythm: Tachycardia present.      Heart sounds: Murmur present.   Pulmonary:      Comments: intubated  Abdominal:      General: Abdomen is flat. There is no distension.      Palpations: There is no mass.   Skin:     Comments: Janeway lesions on digits    Wound on ankle noted    Injection sites noted of upper extremities          Significant Labs: All pertinent labs within the past 24 hours have been reviewed.    Significant Imaging: I have reviewed all pertinent imaging results/findings within the past 24 hours.

## 2020-10-30 NOTE — ASSESSMENT & PLAN NOTE
29 y/o female with IVDU, hepatitis C presents as a transfer from [facility] for septic shock secondary to MRSA endocarditis with associated septic emboli. She is currently on vancomycin and cefazolin. Blood cultures 10/24 +MRSA. TTE with Large mobile vegetation present on the posterior tricuspid leaflet. Remains febrile, intubated. Blood cultures persisently positive. Repeat CXR Improving pulmonary edema pneumonia aspiration or sepsis.      Recommendations  1. Continue vancomycin.  Pharmacy to dose vancomycin. Goal closer to 20.   2. Repeat blood cultures daily until clear .   3. CTS evaluation -no acute surgical intervention at this time    4. No CT evidence of acute intracranial abnormality.  5. Anticipate 6 weeks of IV abx therapy  6. Ceftriaxone added per primary team for UTI. Would discontinue if without growth on urine cultures  7. Continue vancomycin for now, if cultures remain positive after day 7 or day 8 of IV vancomycin would consider switching to daptomycin and ceftaroline.  8. Discussed with ID staff. ID will follow with you

## 2020-10-30 NOTE — PROGRESS NOTES
[facility]  Critical Care Medicine  Progress Note    Patient Name: Марина Britton  MRN: [id number]  Admission Date: 10/26/2020  Hospital Length of Stay: 4 days  Code Status: Full Code  Attending Provider: Abdoul Quick MD  Primary Care Provider: Luis Felipe Jose Iii, MD   Principal Problem: <principal problem not specified>    Subjective:     HPI:  30F with PMHx IVUD heroin (last use approx 2wk ago) presents as transfer from OSH for septic endocarditis with b/l PE requiring higher level of care.  Per patient and chart review, she has had progressively worsening chest and back pain over the past several days with a Tmax of 105F at home.  She presented to [facility] and was admitted for sepsis on 10/24.  Bcx showed GPC consistent with Staph Aureus, so she was started on Vanc/Zosyn.  She was stepped up to the ICU on 10/25 for worsening tachypnea.  She also received 2U pRBC x2 for anemia with a pavel Hb of 6.5.  She was transferred to [facility] 10/26 for a higher level of care.    Hospital/ICU Course:  10/27/2020 Patient extremely tachypneic >50 breaths per minute on arrival. Concern for respiratory distress and airway protection led to intubation. WBC trending down, persistently febrile tmax 103.2F, repeat cultures pending. OG tube to suction with bilious output. ID consulted    10/28/2020 Blood cultures remain positive for MRSA. CTS evaluated and recommending 6 week IV abx prior to re-eval in clinic. ID following.     10/29/2020 No acute changes, continues to fail SBT due to apparent anxiety/discomfort. Added precedex and oral pain regimen. Hgb 6.8 given 1 unit PRBC, unclear source of bleeding, some hematuria. Hemolysis labs negative. Retroperitoneal u/s ordered. Started enteral feeds, tolerating well.    10/30/2020  Low dose levophed started due to low MAPs, uptitrating pain regimen. Continues to fail SBT despite minimal vent settings, remains 3/30%.    Interval History/Significant  Events: NAEO. MAPS in the 50s this afternoon, started levophed. Increasing bowel regimen. Failed SBT. Attempting to make more comfortable.     Objective:     Vital Signs (Most Recent):  Temp: 98.6 °F (37 °C) (10/30/20 1515)  Pulse: 70 (10/30/20 1620)  Resp: (!) 27 (10/30/20 1620)  BP: (!) 78/48 (10/30/20 1600)  SpO2: 98 % (10/30/20 1620) Vital Signs (24h Range):  Temp:  [98 °F (36.7 °C)-101.4 °F (38.6 °C)] 98.6 °F (37 °C)  Pulse:  [] 70  Resp:  [14-31] 27  SpO2:  [95 %-100 %] 98 %  BP: ()/(48-85) 78/48   Weight: 53.2 kg (117 lb 4.6 oz)  Body mass index is 21.45 kg/m².      Intake/Output Summary (Last 24 hours) at 10/30/2020 1735  Last data filed at 10/30/2020 1000  Gross per 24 hour   Intake 865.92 ml   Output 476 ml   Net 389.92 ml       Physical Exam  Vitals signs and nursing note reviewed.   Constitutional:       Appearance: Normal appearance. She is ill-appearing and toxic-appearing.      Comments: Intubated, sedated   HENT:      Head: Normocephalic and atraumatic.      Right Ear: External ear normal.      Left Ear: External ear normal.      Nose: Nose normal.      Mouth/Throat:      Mouth: Mucous membranes are moist.   Eyes:      Extraocular Movements: Extraocular movements intact.      Pupils: Pupils are equal, round, and reactive to light.   Neck:      Musculoskeletal: Normal range of motion. No neck rigidity.   Cardiovascular:      Rate and Rhythm: Normal rate and regular rhythm.      Heart sounds: No murmur. No friction rub. No gallop.    Pulmonary:      Breath sounds: Rhonchi and rales present.      Comments: Intubated  Abdominal:      General: There is no distension.      Palpations: Abdomen is soft.      Tenderness: There is no abdominal tenderness.   Musculoskeletal:      Right lower leg: No edema.      Left lower leg: No edema.   Skin:     Capillary Refill: Capillary refill takes less than 2 seconds.      Comments: Left medial ankle ulcer   Neurological:      Mental Status: She is alert.       Comments: Arousable, nods yes and no to questions, follows commands.         Vents:  Vent Mode: A/C (10/30/20 1620)  Ventilator Initiated: Yes(chart correction) (10/26/20 2315)  Set Rate: 18 BPM (10/30/20 1620)  Vt Set: 0 mL (10/30/20 1620)  Pressure Support: 0 cmH20 (10/30/20 1620)  PEEP/CPAP: 5 cmH20 (10/30/20 1620)  Oxygen Concentration (%): 30 (10/30/20 1620)  Peak Airway Pressure: 17 cmH2O (10/30/20 1620)  Plateau Pressure: 19 cmH20 (10/30/20 1620)  Total Ve: 6.26 mL (10/30/20 1620)  F/VT Ratio<105 (RSBI): (!) 95.74 (10/30/20 1620)  Lines/Drains/Airways     Central Venous Catheter Line            Percutaneous Central Line Insertion/Assessment - Triple Lumen  10/27/20 0058 right internal jugular 3 days          Drain                 NG/OG Tube 10/26/20 2302 orogastric Center mouth 3 days         Urethral Catheter 10/29/20 1415 Latex 1 day          Airway                 Airway - Non-Surgical 10/26/20 2259 Endotracheal Tube 3 days              Significant Labs:    CBC/Anemia Profile:  Recent Labs   Lab 10/29/20  0132 10/29/20  1357 10/30/20  0357   WBC 19.89*  --  19.96*   HGB 6.8* 8.5* 9.0*   HCT 21.4*  --  27.1*   PLT 92*  --  157   MCV 82  --  83   RDW 16.8*  --  16.5*        Chemistries:  Recent Labs   Lab 10/29/20  0132 10/30/20  0357   * 128*   K 4.4 4.8    101   CO2 21* 20*   BUN 28* 38*   CREATININE 0.9 0.9   CALCIUM 7.0* 7.3*   ALBUMIN 1.1* 1.1*   PROT 5.4* 5.9*   BILITOT 1.5* 1.1*   ALKPHOS 125 127   ALT 22 17   AST 31 24   MG 1.9 2.1   PHOS 4.1 3.9           ABG  Recent Labs   Lab 10/27/20  0453   PH 7.458*   PO2 67*   PCO2 29.4*   HCO3 20.8*   BE -3     Assessment/Plan:     Psychiatric  IVDU (intravenous drug user)  - will need counseling/resources at discharge  - monitor for withdrawal symptoms       Pulmonary  Pneumonia of both lungs due to infectious organism  CT chest demonstrating bilateral scattered opacities of varying size with notable cavitations concerning for septic emboli  from endocarditis (large tricuspid vegetation noted on echocardiogram    - continue vanc, cefazolin   -intubated 10/27 for respiratory distress     Cardiac/Vascular  Endocarditis  Large tricuspid vegetation noted on TTE with eptic emboli to lungs bilaterally  -no need for AC at this time, but will continue to monitor  -will consider need for HARI      ID  Severe sepsis  WBC peaked at 25.66, persistently febrile to 103F, tachycardic to 120s, lactate wnl. Encephalopathic on arrival concerning for end-organ damage    WBC 25>18>19>17>19.9> 19.8    -blood cultures 10/24,10/26, 10/28 positive for MRSA, will continue daily blood cultures  - S/p fluid resuscitation at [place]  - continue vanc day 6, cefazolin d/c'd  -added ceftriaxone 10/29 due to bacteruria with hematuria- 3 day course  - ID on board- recommended CTS consult and daily blood cultures until clearance       Critical Care Daily Checklist:    A: Awake: RASS Goal/Actual Goal: RASS Goal: 0-->alert and calm  Actual: Diallo Agitation Sedation Scale (RASS): Drowsy   B: Spontaneous Breathing Trial Performed? Spon. Breathing Trial Initiated?: Not initiated (10/30/20 0811)   C: SAT & SBT Coordinated?  SBT                      D: Delirium: CAM-ICU Overall CAM-ICU: Positive   E: Early Mobility Performed? No   F: Feeding Goal: Goals: Meet % EEN, EPN by RD f/u date  Status: Nutrition Goal Status: new   Current Diet Order   Procedures    Diet Adult Regular (IDDSI Level 7)      AS: Analgesia/Sedation Fentanyl, precedex   T: Thromboembolic Prophylaxis heparin   H: HOB > 300 Yes   U: Stress Ulcer Prophylaxis (if needed) famotidine   G: Glucose Control    B: Bowel Function Stool Occurrence: 0   I: Indwelling Catheter (Lines & Adkins) Necessity    D: De-escalation of Antimicrobials/Pharmacotherapies vanc day 6, rocephin day 2    Plan for the day/ETD     Code Status:  Family/Goals of Care: Full Code         Critical secondary to Patient has a condition that poses threat  to life and bodily function: Severe Respiratory Distress and Endocarditis      Critical care was time spent personally by me on the following activities: development of treatment plan with patient or surrogate and bedside caregivers, discussions with consultants, evaluation of patient's response to treatment, examination of patient, ordering and performing treatments and interventions, ordering and review of laboratory studies, ordering and review of radiographic studies, pulse oximetry, re-evaluation of patient's condition. This critical care time did not overlap with that of any other provider or involve time for any procedures.     Tito Mcintosh MD  Critical Care Medicine  [facility]

## 2020-10-30 NOTE — SUBJECTIVE & OBJECTIVE
Interval History/Significant Events: NAEO. MAPS in the 50s this afternoon, started levophed. Increasing bowel regimen. Failed SBT. Attempting to make more comfortable.     Objective:     Vital Signs (Most Recent):  Temp: 98.6 °F (37 °C) (10/30/20 1515)  Pulse: 70 (10/30/20 1620)  Resp: (!) 27 (10/30/20 1620)  BP: (!) 78/48 (10/30/20 1600)  SpO2: 98 % (10/30/20 1620) Vital Signs (24h Range):  Temp:  [98 °F (36.7 °C)-101.4 °F (38.6 °C)] 98.6 °F (37 °C)  Pulse:  [] 70  Resp:  [14-31] 27  SpO2:  [95 %-100 %] 98 %  BP: ()/(48-85) 78/48   Weight: 53.2 kg (117 lb 4.6 oz)  Body mass index is 21.45 kg/m².      Intake/Output Summary (Last 24 hours) at 10/30/2020 1735  Last data filed at 10/30/2020 1000  Gross per 24 hour   Intake 865.92 ml   Output 476 ml   Net 389.92 ml       Physical Exam  Vitals signs and nursing note reviewed.   Constitutional:       Appearance: Normal appearance. She is ill-appearing and toxic-appearing.      Comments: Intubated, sedated   HENT:      Head: Normocephalic and atraumatic.      Right Ear: External ear normal.      Left Ear: External ear normal.      Nose: Nose normal.      Mouth/Throat:      Mouth: Mucous membranes are moist.   Eyes:      Extraocular Movements: Extraocular movements intact.      Pupils: Pupils are equal, round, and reactive to light.   Neck:      Musculoskeletal: Normal range of motion. No neck rigidity.   Cardiovascular:      Rate and Rhythm: Normal rate and regular rhythm.      Heart sounds: No murmur. No friction rub. No gallop.    Pulmonary:      Breath sounds: Rhonchi and rales present.      Comments: Intubated  Abdominal:      General: There is no distension.      Palpations: Abdomen is soft.      Tenderness: There is no abdominal tenderness.   Musculoskeletal:      Right lower leg: No edema.      Left lower leg: No edema.   Skin:     Capillary Refill: Capillary refill takes less than 2 seconds.      Comments: Left medial ankle ulcer   Neurological:       Mental Status: She is alert.      Comments: Arousable, nods yes and no to questions, follows commands.         Vents:  Vent Mode: A/C (10/30/20 1620)  Ventilator Initiated: Yes(chart correction) (10/26/20 2315)  Set Rate: 18 BPM (10/30/20 1620)  Vt Set: 0 mL (10/30/20 1620)  Pressure Support: 0 cmH20 (10/30/20 1620)  PEEP/CPAP: 5 cmH20 (10/30/20 1620)  Oxygen Concentration (%): 30 (10/30/20 1620)  Peak Airway Pressure: 17 cmH2O (10/30/20 1620)  Plateau Pressure: 19 cmH20 (10/30/20 1620)  Total Ve: 6.26 mL (10/30/20 1620)  F/VT Ratio<105 (RSBI): (!) 95.74 (10/30/20 1620)  Lines/Drains/Airways     Central Venous Catheter Line            Percutaneous Central Line Insertion/Assessment - Triple Lumen  10/27/20 0058 right internal jugular 3 days          Drain                 NG/OG Tube 10/26/20 2302 orogastric Center mouth 3 days         Urethral Catheter 10/29/20 1415 Latex 1 day          Airway                 Airway - Non-Surgical 10/26/20 2259 Endotracheal Tube 3 days              Significant Labs:    CBC/Anemia Profile:  Recent Labs   Lab 10/29/20  0132 10/29/20  1357 10/30/20  0357   WBC 19.89*  --  19.96*   HGB 6.8* 8.5* 9.0*   HCT 21.4*  --  27.1*   PLT 92*  --  157   MCV 82  --  83   RDW 16.8*  --  16.5*        Chemistries:  Recent Labs   Lab 10/29/20  0132 10/30/20  0357   * 128*   K 4.4 4.8    101   CO2 21* 20*   BUN 28* 38*   CREATININE 0.9 0.9   CALCIUM 7.0* 7.3*   ALBUMIN 1.1* 1.1*   PROT 5.4* 5.9*   BILITOT 1.5* 1.1*   ALKPHOS 125 127   ALT 22 17   AST 31 24   MG 1.9 2.1   PHOS 4.1 3.9

## 2020-10-30 NOTE — PROGRESS NOTES
Pharmacokinetic Assessment Follow Up: IV Vancomycin    Vancomycin Regimen Assessment/Plan:    - Vancomycin trough level resulted supra-therapeutic as 26.6 mg/dl, level drawn ~ 10 hours after the previous dose.  True trough still likely above goal. Goal 15-20 mg/dl.  - Patient received next dose of scheduled vancomycin 1000 mg on 10/30 at 05:52.    - Hold vancomycin today.   - A random level is ordered with AM labs tomorrow.  Pharmacy to re-start once level is < 20 mg/dl.     Drug levels (last 3 results):  Recent Labs   Lab Result Units 10/27/20  0959 10/29/20  0128 10/30/20  0357   Vancomycin-Trough ug/mL 3.4* 21.5 26.6*       Pharmacy will continue to follow and monitor vancomycin.    Please contact pharmacy at extension [phone] for questions regarding this assessment.    Thank you for the consult,   Marti Daly, PharmD, BCCCP       Patient brief summary:  Марина Britton is a 30 y.o. female initiated on antimicrobial therapy with IV Vancomycin for treatment of endocarditis    Drug Allergies:   Review of patient's allergies indicates:  No Known Allergies    Actual Body Weight:   53.2 kg    Renal Function:   Estimated Creatinine Clearance: 71.6 mL/min (based on SCr of 0.9 mg/dL).,     Dialysis Method (if applicable):  N/A

## 2020-10-30 NOTE — PLAN OF CARE
CMICU DAILY GOALS     A: Awake               RASS: Goal - RASS Goal: 0-->alert and calm  Actual - RASS (Diallo Agitation-Sedation Scale): -1-->drowsy              Restraint necessity: Clinical Justification: Removing medical devices, Climbing out of bed, Treatment Interference  B: Breath              SBT: Not attempted   C: Coordinate A & B, analgesics/sedatives              Pain: managed               SAT: Not attempted  D: Delirium              CAM-ICU: Overall CAM-ICU: Positive  E: Early(intubated/ Progressive (non-intubated) Mobility              MOVE Screen: Fail              Activity: Activity Management: rolling - L1  FAS: Feeding/Nutrition              Diet order: Diet/Nutrition Received: NPO,   Fluid restriction:    T: Thrombus              DVT prophylaxis: VTE Required Core Measure: (SCDs) Sequential compression device initiated/maintained, Pharmacological prophylaxis initiated/maintained  H: HOB Elevation              Head of Bed (HOB): HOB at 30 degrees  U: Ulcer Prophylaxis              GI: yes  G: Glucose control              uncontrolled Glycemic Management: blood glucose monitoring  S: Skin              Bundle compliance: yes   Bathing/Skin Care: bath, chlorhexidine, linen changed Date: Will be day bath on 10302020  B: Bowel Function              constipation   I: Indwelling Catheters              Adkins necessity:      Urethral Catheter 10/26/20 2330 16 Fr.-Reason for Continuing Urinary Catheterization: Critically ill in ICU requiring intensive monitoring              CVC necessity: Yes              IPAD offered: No  D: De-escalation Antibx              Yes  Plan for the day              Possible SBT  Family/Goals of care/Code Status              Code Status: Full Code                No acute events throughout day, VS and assessment per flow sheet, patient progressing towards goals as tolerated, plan of care reviewed with Марина Britton and family, all concerns addressed, will continue to  monitor.    Please plan for team to obtain blood cultures. Several RN and charge have attempted but there is limited access.     Possible SBT/SAT

## 2020-10-30 NOTE — SUBJECTIVE & OBJECTIVE
Interval History:   Alert  Answering questions appropriately  Denies injection of lower extremities  All questions answered      Review of Systems   Unable to perform ROS: Intubated   Respiratory: Positive for cough and shortness of breath.      Objective:     Vital Signs (Most Recent):  Temp: 98.7 °F (37.1 °C) (10/30/20 0800)  Pulse: 100 (10/30/20 1100)  Resp: 20 (10/30/20 1159)  BP: (!) 92/57 (10/30/20 1100)  SpO2: 100 % (10/30/20 1100) Vital Signs (24h Range):  Temp:  [98 °F (36.7 °C)-101.9 °F (38.8 °C)] 98.7 °F (37.1 °C)  Pulse:  [] 100  Resp:  [14-35] 20  SpO2:  [95 %-100 %] 100 %  BP: ()/(52-85) 92/57     Weight: 53.2 kg (117 lb 4.6 oz)  Body mass index is 21.45 kg/m².    Estimated Creatinine Clearance: 71.6 mL/min (based on SCr of 0.9 mg/dL).    Physical Exam  Vitals signs and nursing note reviewed.   Constitutional:       Comments: Intubated. Alert.    Cardiovascular:      Rate and Rhythm: Tachycardia present.      Heart sounds: Murmur present.   Pulmonary:      Comments: intubated  Abdominal:      General: Abdomen is flat. There is no distension.      Palpations: There is no mass.   Skin:     Comments: Janeway lesions on digits    Wound on ankle noted    Injection sites noted of upper extremities          Significant Labs: All pertinent labs within the past 24 hours have been reviewed.    Significant Imaging: I have reviewed all pertinent imaging results/findings within the past 24 hours.

## 2020-10-30 NOTE — ASSESSMENT & PLAN NOTE
WBC peaked at 25.66, persistently febrile to 103F, tachycardic to 120s, lactate wnl. Encephalopathic on arrival concerning for end-organ damage    WBC 25>18>19>17>19.9> 19.8    -blood cultures 10/24,10/26, 10/28 positive for MRSA, will continue daily blood cultures  - S/p fluid resuscitation at [place]  - continue vanc day 6, cefazolin d/c'd  -added ceftriaxone 10/29 due to bacteruria with hematuria- 3 day course  - ID on board- recommended CTS consult and daily blood cultures until clearance

## 2020-10-30 NOTE — CONSULTS
"  [facility]  Adult Nutrition  Consult Note    SUMMARY     Recommendations    1. As tolerated, increase TF rate (of Isosource 1.5) to 50 mL/hr to provide 1800 calories, 82 grams of protein, 917 mL fluid.   2. RD to monitor & follow-up.    Goals: Meet % EEN, EPN by RD f/u date  Nutrition Goal Status: new  Communication of RD Recs: reviewed with RN    Reason for Assessment    Reason For Assessment: consult, new tube feeding  Diagnosis: other (see comments)(Pulmonary embolism)  Relevant Medical History: IVDU  Interdisciplinary Rounds: attended    General Information Comments: Pt intubated/sedated, tolerating TFs at trickle. No family at bedside. Unsure of PO intake PTA, # per chart review. NFPE not complete 2/2 team at bedside - RD unable to assess for malnutrition.  Nutrition Discharge Planning: Unable to determine    Nutrition/Diet History    Factors Affecting Nutritional Intake: NPO, on mechanical ventilation    Anthropometrics    Temp: 98.7 °F (37.1 °C)  Height: 5' 2" (157.5 cm)  Height (inches): 62 in  Weight Method: Bed Scale  Weight: 53.2 kg (117 lb 4.6 oz)  Weight (lb): 117.29 lb  Ideal Body Weight (IBW), Female: 110 lb  % Ideal Body Weight, Female (lb): 106.63 %  BMI (Calculated): 21.4  BMI Grade: 18.5-24.9 - normal    Lab/Procedures/Meds    Pertinent Labs Reviewed: reviewed  Pertinent Labs Comments: Na 128  Pertinent Medications Reviewed: reviewed  Pertinent Medications Comments: Precedex, Fentanyl    Estimated/Assessed Needs    Weight Used For Calorie Calculations: 53.2 kg (117 lb 4.6 oz)     Energy Calorie Requirements (kcal): 1751 kcal/d  Energy Need Method: [facility]     Protein Requirements: 64-80 g/d (1.2-1.5 g/kg)  Weight Used For Protein Calculations: 53.2 kg (117 lb 4.6 oz)     Estimated Fluid Requirement Method: other (see comments)(Per MD or 1 mL/kcal)  RDA Method (mL): 1751    Nutrition Prescription Ordered    Current Diet Order: NPO  Current Nutrition Support " Formula Ordered: Isosource 1.5  Current Nutrition Support Rate Ordered: 10 mL/hr    Evaluation of Received Nutrient/Fluid Intake    Enteral Calories (kcal): 360  Enteral Protein (gm): 17  Enteral (Free Water) Fluid (mL): 184    Energy Calories Required: not meeting needs  Protein Required: not meeting needs  Fluid Required: other (see comments)(Per MD or 1 mL/kcal)    Comments: LBM: 10/22    Tolerance: tolerating    Nutrition Risk    Level of Risk/Frequency of Follow-up: (2x/week)     Assessment and Plan    Nutrition Problem  Inadequate energy intake    Related to (etiology):   Inability to consume sufficient energy    Signs and Symptoms (as evidenced by):   NPO    Interventions(treatment strategy):  Collaboration of nutrition care w/ other providers     Nutrition Diagnosis Status:   New     Monitor and Evaluation    Food and Nutrient Intake: energy intake, food and beverage intake, enteral nutrition intake  Food and Nutrient Adminstration: diet order, enteral and parenteral nutrition administration  Physical Activity and Function: nutrition-related ADLs and IADLs  Anthropometric Measurements: weight, weight change  Biochemical Data, Medical Tests and Procedures: inflammatory profile, lipid profile, glucose/endocrine profile, gastrointestinal profile, electrolyte and renal panel  Nutrition-Focused Physical Findings: overall appearance     Nutrition Follow-Up    RD Follow-up?: Yes

## 2020-10-30 NOTE — PLAN OF CARE
CMICU DAILY GOALS       A: Awake    RASS: Goal - RASS Goal: 0-->alert and calm  Actual - RASS (Diallo Agitation-Sedation Scale): -1-->drowsy   Restraint necessity: Clinical Justification: Removing medical devices, Climbing out of bed, Treatment Interference  B: Breath   SBT: Fail   C: Coordinate A & B, analgesics/sedatives   Pain: managed    SAT: Fail  D: Delirium   CAM-ICU: Overall CAM-ICU: Positive  E: Early(intubated/ Progressive (non-intubated) Mobility   MOVE Screen: Pass   Activity: Activity Management: rolling - L1  FAS: Feeding/Nutrition   Diet order: Diet/Nutrition Received: NPO, tube feeding,   Fluid restriction:    T: Thrombus   DVT prophylaxis: VTE Required Core Measure: (SCDs) Sequential compression device initiated/maintained, Pharmacological prophylaxis initiated/maintained  H: HOB Elevation   Head of Bed (HOB): HOB at 30-45 degrees  U: Ulcer Prophylaxis   GI: yes  G: Glucose control   managed Glycemic Management: blood glucose monitoring  S: Skin   Bundle compliance: yes   Bathing/Skin Care: bath, chlorhexidine, bath, complete, dressed/undressed, linen changed Date: 10/29/20  B: Bowel Function   constipation   I: Indwelling Catheters   Fontaine necessity:      Urethral Catheter 10/29/20 1415 Latex-Reason for Continuing Urinary Catheterization: Critically ill in ICU requiring intensive monitoring  [REMOVED]      Urethral Catheter 10/26/20 2330 16 Fr.-Reason for Continuing Urinary Catheterization: Critically ill in ICU requiring intensive monitoring   CVC necessity: Yes   IPAD offered: none availbale  D: De-escalation Antibx   No  Plan for the day   Increased antibx coverage; changed out fontaine. Need blood culture still (Dr Quick and team made aware in rounds).    Family/Goals of care/Code Status   Code Status: Full Code     No acute events throughout day, VS and assessment per flow sheet, patient progressing towards goals as tolerated, plan of care reviewed with Марина Britton (no family at  bedside, only Ariane via phone was updated)concerns addressed, care assumed by pm rn

## 2020-10-31 LAB
ALBUMIN SERPL BCP-MCNC: 1.2 G/DL (ref 3.5–5.2)
ALP SERPL-CCNC: 170 U/L (ref 55–135)
ALT SERPL W/O P-5'-P-CCNC: 16 U/L (ref 10–44)
ANION GAP SERPL CALC-SCNC: 6 MMOL/L (ref 8–16)
ANISOCYTOSIS BLD QL SMEAR: SLIGHT
AST SERPL-CCNC: 31 U/L (ref 10–40)
BACTERIA BLD CULT: ABNORMAL
BASOPHILS # BLD AUTO: 0.08 K/UL (ref 0–0.2)
BASOPHILS NFR BLD: 0.3 % (ref 0–1.9)
BILIRUB SERPL-MCNC: 1.1 MG/DL (ref 0.1–1)
BUN SERPL-MCNC: 40 MG/DL (ref 6–20)
CALCIUM SERPL-MCNC: 7.2 MG/DL (ref 8.7–10.5)
CHLORIDE SERPL-SCNC: 101 MMOL/L (ref 95–110)
CO2 SERPL-SCNC: 21 MMOL/L (ref 23–29)
CREAT SERPL-MCNC: 1 MG/DL (ref 0.5–1.4)
DIFFERENTIAL METHOD: ABNORMAL
EOSINOPHIL # BLD AUTO: 0.2 K/UL (ref 0–0.5)
EOSINOPHIL NFR BLD: 0.6 % (ref 0–8)
ERYTHROCYTE [DISTWIDTH] IN BLOOD BY AUTOMATED COUNT: 16.8 % (ref 11.5–14.5)
EST. GFR  (AFRICAN AMERICAN): >60 ML/MIN/1.73 M^2
EST. GFR  (NON AFRICAN AMERICAN): >60 ML/MIN/1.73 M^2
GLUCOSE SERPL-MCNC: 122 MG/DL (ref 70–110)
HCT VFR BLD AUTO: 31.2 % (ref 37–48.5)
HGB BLD-MCNC: 9.8 G/DL (ref 12–16)
IMM GRANULOCYTES # BLD AUTO: 0.67 K/UL (ref 0–0.04)
IMM GRANULOCYTES NFR BLD AUTO: 2.6 % (ref 0–0.5)
LYMPHOCYTES # BLD AUTO: 1.4 K/UL (ref 1–4.8)
LYMPHOCYTES NFR BLD: 5.4 % (ref 18–48)
MAGNESIUM SERPL-MCNC: 2.5 MG/DL (ref 1.6–2.6)
MCH RBC QN AUTO: 26.8 PG (ref 27–31)
MCHC RBC AUTO-ENTMCNC: 31.4 G/DL (ref 32–36)
MCV RBC AUTO: 86 FL (ref 82–98)
MONOCYTES # BLD AUTO: 1.3 K/UL (ref 0.3–1)
MONOCYTES NFR BLD: 5.2 % (ref 4–15)
NEUTROPHILS # BLD AUTO: 22.2 K/UL (ref 1.8–7.7)
NEUTROPHILS NFR BLD: 85.9 % (ref 38–73)
NRBC BLD-RTO: 0 /100 WBC
PHOSPHATE SERPL-MCNC: 4.1 MG/DL (ref 2.7–4.5)
PLATELET # BLD AUTO: 147 K/UL (ref 150–350)
PLATELET BLD QL SMEAR: ABNORMAL
PMV BLD AUTO: 10.4 FL (ref 9.2–12.9)
POLYCHROMASIA BLD QL SMEAR: ABNORMAL
POTASSIUM SERPL-SCNC: 4.8 MMOL/L (ref 3.5–5.1)
PROT SERPL-MCNC: 5.8 G/DL (ref 6–8.4)
RBC # BLD AUTO: 3.65 M/UL (ref 4–5.4)
SODIUM SERPL-SCNC: 128 MMOL/L (ref 136–145)
VANCOMYCIN SERPL-MCNC: 17.3 UG/ML
WBC # BLD AUTO: 25.79 K/UL (ref 3.9–12.7)

## 2020-10-31 PROCEDURE — 25000003 PHARM REV CODE 250: Performed by: STUDENT IN AN ORGANIZED HEALTH CARE EDUCATION/TRAINING PROGRAM

## 2020-10-31 PROCEDURE — 63600175 PHARM REV CODE 636 W HCPCS: Performed by: INTERNAL MEDICINE

## 2020-10-31 PROCEDURE — 20000000 HC ICU ROOM

## 2020-10-31 PROCEDURE — 99900035 HC TECH TIME PER 15 MIN (STAT)

## 2020-10-31 PROCEDURE — 63600175 PHARM REV CODE 636 W HCPCS: Performed by: STUDENT IN AN ORGANIZED HEALTH CARE EDUCATION/TRAINING PROGRAM

## 2020-10-31 PROCEDURE — 27200966 HC CLOSED SUCTION SYSTEM

## 2020-10-31 PROCEDURE — 80202 ASSAY OF VANCOMYCIN: CPT

## 2020-10-31 PROCEDURE — 83735 ASSAY OF MAGNESIUM: CPT

## 2020-10-31 PROCEDURE — 99291 CRITICAL CARE FIRST HOUR: CPT | Mod: ,,, | Performed by: INTERNAL MEDICINE

## 2020-10-31 PROCEDURE — 84100 ASSAY OF PHOSPHORUS: CPT

## 2020-10-31 PROCEDURE — 25000003 PHARM REV CODE 250: Performed by: INTERNAL MEDICINE

## 2020-10-31 PROCEDURE — 94003 VENT MGMT INPAT SUBQ DAY: CPT

## 2020-10-31 PROCEDURE — 85025 COMPLETE CBC W/AUTO DIFF WBC: CPT

## 2020-10-31 PROCEDURE — 27000221 HC OXYGEN, UP TO 24 HOURS

## 2020-10-31 PROCEDURE — 94761 N-INVAS EAR/PLS OXIMETRY MLT: CPT

## 2020-10-31 PROCEDURE — 99233 SBSQ HOSP IP/OBS HIGH 50: CPT | Mod: ,,, | Performed by: PHYSICIAN ASSISTANT

## 2020-10-31 PROCEDURE — 99233 PR SUBSEQUENT HOSPITAL CARE,LEVL III: ICD-10-PCS | Mod: ,,, | Performed by: PHYSICIAN ASSISTANT

## 2020-10-31 PROCEDURE — 99291 PR CRITICAL CARE, E/M 30-74 MINUTES: ICD-10-PCS | Mod: ,,, | Performed by: INTERNAL MEDICINE

## 2020-10-31 PROCEDURE — 99900026 HC AIRWAY MAINTENANCE (STAT)

## 2020-10-31 PROCEDURE — 87040 BLOOD CULTURE FOR BACTERIA: CPT

## 2020-10-31 PROCEDURE — 80053 COMPREHEN METABOLIC PANEL: CPT

## 2020-10-31 RX ORDER — NAPROXEN 250 MG/1
250 TABLET ORAL ONCE
Status: COMPLETED | OUTPATIENT
Start: 2020-10-31 | End: 2020-10-31

## 2020-10-31 RX ORDER — NAPROXEN 250 MG/1
250 TABLET ORAL EVERY 6 HOURS
Status: DISCONTINUED | OUTPATIENT
Start: 2020-10-31 | End: 2020-11-01

## 2020-10-31 RX ORDER — FENTANYL CITRATE-0.9 % NACL/PF 10 MCG/ML
PLASTIC BAG, INJECTION (ML) INTRAVENOUS CONTINUOUS
Status: DISCONTINUED | OUTPATIENT
Start: 2020-10-31 | End: 2020-11-01

## 2020-10-31 RX ORDER — ACETAMINOPHEN 650 MG/1
650 SUPPOSITORY RECTAL EVERY 8 HOURS PRN
Status: DISCONTINUED | OUTPATIENT
Start: 2020-10-31 | End: 2020-11-24 | Stop reason: HOSPADM

## 2020-10-31 RX ADMIN — FAMOTIDINE 20 MG: 20 TABLET, FILM COATED ORAL at 09:10

## 2020-10-31 RX ADMIN — OXYCODONE 5 MG: 5 TABLET ORAL at 01:10

## 2020-10-31 RX ADMIN — OXYCODONE HYDROCHLORIDE 15 MG: 10 TABLET ORAL at 06:10

## 2020-10-31 RX ADMIN — SODIUM CHLORIDE, SODIUM LACTATE, POTASSIUM CHLORIDE, AND CALCIUM CHLORIDE 500 ML: .6; .31; .03; .02 INJECTION, SOLUTION INTRAVENOUS at 11:10

## 2020-10-31 RX ADMIN — NAPROXEN 250 MG: 250 TABLET ORAL at 11:10

## 2020-10-31 RX ADMIN — VANCOMYCIN HYDROCHLORIDE 1250 MG: 1.25 INJECTION, POWDER, LYOPHILIZED, FOR SOLUTION INTRAVENOUS at 09:10

## 2020-10-31 RX ADMIN — HEPARIN SODIUM 5000 UNITS: 5000 INJECTION INTRAVENOUS; SUBCUTANEOUS at 06:10

## 2020-10-31 RX ADMIN — DEXMEDETOMIDINE HYDROCHLORIDE 1.4 MCG/KG/HR: 4 INJECTION, SOLUTION INTRAVENOUS at 09:10

## 2020-10-31 RX ADMIN — HEPARIN SODIUM 5000 UNITS: 5000 INJECTION INTRAVENOUS; SUBCUTANEOUS at 01:10

## 2020-10-31 RX ADMIN — LACTULOSE 30 G: 10 SOLUTION ORAL at 09:10

## 2020-10-31 RX ADMIN — OXYCODONE HYDROCHLORIDE 15 MG: 10 TABLET ORAL at 11:10

## 2020-10-31 RX ADMIN — DEXMEDETOMIDINE HYDROCHLORIDE 1.4 MCG/KG/HR: 4 INJECTION, SOLUTION INTRAVENOUS at 04:10

## 2020-10-31 RX ADMIN — CEFTRIAXONE SODIUM 1 G: 1 INJECTION, POWDER, FOR SOLUTION INTRAMUSCULAR; INTRAVENOUS at 03:10

## 2020-10-31 RX ADMIN — SENNOSIDES 8.6 MG: 8.6 TABLET, FILM COATED ORAL at 09:10

## 2020-10-31 RX ADMIN — HEPARIN SODIUM 5000 UNITS: 5000 INJECTION INTRAVENOUS; SUBCUTANEOUS at 09:10

## 2020-10-31 RX ADMIN — POLYETHYLENE GLYCOL 3350 17 G: 17 POWDER, FOR SOLUTION ORAL at 09:10

## 2020-10-31 RX ADMIN — NAPROXEN 250 MG: 250 TABLET ORAL at 06:10

## 2020-10-31 RX ADMIN — Medication 300 MCG/HR: at 06:10

## 2020-10-31 RX ADMIN — DEXMEDETOMIDINE HYDROCHLORIDE 1 MCG/KG/HR: 4 INJECTION, SOLUTION INTRAVENOUS at 07:10

## 2020-10-31 RX ADMIN — DEXMEDETOMIDINE HYDROCHLORIDE 1.4 MCG/KG/HR: 4 INJECTION, SOLUTION INTRAVENOUS at 02:10

## 2020-10-31 RX ADMIN — OXYCODONE 5 MG: 5 TABLET ORAL at 07:10

## 2020-10-31 RX ADMIN — SODIUM PHOSPHATE, DIBASIC AND SODIUM PHOSPHATE, MONOBASIC 1 ENEMA: 7; 19 ENEMA RECTAL at 10:10

## 2020-10-31 NOTE — ASSESSMENT & PLAN NOTE
WBC peaked at 25.66, persistently febrile to 103F, tachycardic to 120s, lactate wnl. Encephalopathic on arrival concerning for end-organ damage    WBC 25>18>19>17>19.9> 19.8    -blood cultures 10/24,10/26, 10/28 positive for MRSA, will continue daily blood cultures (difficult to obtain)  - S/p fluid resuscitation at [place]  - continue vanc day 7, cefazolin d/c'd  -added ceftriaxone 10/29 due to bacteruria with hematuria- 3 day course  -per ID, if cultures positive at day 7 (10/31) will need to escalate to daptomycin  - ID on board- recommended CTS consult and daily blood cultures until clearance

## 2020-10-31 NOTE — PROGRESS NOTES
Pharmacokinetic Assessment Follow Up: IV Vancomycin    Vancomycin Regimen Assessment & Plan:  - Vancomycin level drawn ~23h from 1000 mg IV dose resulted as 17.3 ug/mL.  - Changing from pulse dosing to scheduled vancomycin 1250 mg IV q24h.  - Draw vancomycin trough on 11/2 @0800 prior to 3rd dose of this new regimen, goal 15-20 ug/mL.    Drug levels (last 3 results):  Recent Labs   Lab Result Units 10/29/20  0128 10/30/20  0357 10/31/20  0452   Vancomycin, Random ug/mL  --   --  17.3   Vancomycin-Trough ug/mL 21.5 26.6*  --      Pharmacy will continue to follow and monitor vancomycin.    Please contact pharmacy at extension [phone] for questions regarding this assessment.    Thank you for the consult,   Homer Ford     Patient brief summary:  Марина Britton is a 30 y.o. female initiated on antimicrobial therapy with IV Vancomycin for treatment of endocarditis    Drug Allergies:   Review of patient's allergies indicates:  No Known Allergies    Actual Body Weight:   53.2 kg    Renal Function:   Estimated Creatinine Clearance: 64.5 mL/min (based on SCr of 1 mg/dL).,     Dialysis Method (if applicable):  N/A

## 2020-10-31 NOTE — SUBJECTIVE & OBJECTIVE
Interval History/Significant Events: NAEO. WBC up today. Still unable to obtain cultures, will attempt with US guidance today. More aggressive bowel regimen. Added naproxen for pleuritic pain control      Objective:     Vital Signs (Most Recent):  Temp: 99.8 °F (37.7 °C) (10/31/20 1100)  Pulse: 74 (10/31/20 1200)  Resp: 14 (10/31/20 1200)  BP: (!) 93/51 (10/31/20 1200)  SpO2: 97 % (10/31/20 1200) Vital Signs (24h Range):  Temp:  [97.3 °F (36.3 °C)-101.4 °F (38.6 °C)] 99.8 °F (37.7 °C)  Pulse:  [] 74  Resp:  [6-45] 14  SpO2:  [96 %-100 %] 97 %  BP: ()/(48-99) 93/51   Weight: 53.2 kg (117 lb 4.6 oz)  Body mass index is 21.45 kg/m².      Intake/Output Summary (Last 24 hours) at 10/31/2020 1241  Last data filed at 10/31/2020 1123  Gross per 24 hour   Intake 2858.53 ml   Output 690 ml   Net 2168.53 ml       Physical Exam  Vitals signs and nursing note reviewed.   Constitutional:       Appearance: Normal appearance. She is ill-appearing and toxic-appearing.      Comments: Intubated, sedated   HENT:      Head: Normocephalic and atraumatic.      Right Ear: External ear normal.      Left Ear: External ear normal.      Nose: Nose normal.      Mouth/Throat:      Mouth: Mucous membranes are moist.   Eyes:      Extraocular Movements: Extraocular movements intact.      Pupils: Pupils are equal, round, and reactive to light.   Neck:      Musculoskeletal: Normal range of motion. No neck rigidity.   Cardiovascular:      Rate and Rhythm: Normal rate and regular rhythm.      Heart sounds: No murmur. No friction rub. No gallop.    Pulmonary:      Breath sounds: Rhonchi and rales present.      Comments: Intubated  Abdominal:      General: There is no distension.      Palpations: Abdomen is soft.      Tenderness: There is no abdominal tenderness.   Musculoskeletal:      Right lower leg: No edema.      Left lower leg: No edema.   Skin:     Capillary Refill: Capillary refill takes less than 2 seconds.      Comments: Left medial  ankle ulcer   Neurological:      Mental Status: She is alert.      Comments: Arousable, nods yes and no to questions, follows commands.         Vents:  Vent Mode: A/C (10/31/20 0737)  Ventilator Initiated: Yes(chart correction) (10/26/20 2315)  Set Rate: 18 BPM (10/31/20 0737)  Vt Set: 0 mL (10/31/20 0737)  Pressure Support: 0 cmH20 (10/31/20 0737)  PEEP/CPAP: 5 cmH20 (10/31/20 0737)  Oxygen Concentration (%): 30 (10/31/20 1200)  Peak Airway Pressure: 22 cmH2O (10/31/20 0737)  Plateau Pressure: 20 cmH20 (10/31/20 0737)  Total Ve: 6.68 mL (10/31/20 0737)  F/VT Ratio<105 (RSBI): 153.33 (10/31/20 0737)  Lines/Drains/Airways     Central Venous Catheter Line            Percutaneous Central Line Insertion/Assessment - Triple Lumen  10/27/20 0058 right internal jugular 4 days          Drain                 NG/OG Tube 10/26/20 2302 orogastric Center mouth 4 days         Urethral Catheter 10/29/20 1415 Latex 1 day          Airway                 Airway - Non-Surgical 10/26/20 2259 Endotracheal Tube 4 days              Significant Labs:    CBC/Anemia Profile:  Recent Labs   Lab 10/29/20  1357 10/30/20  0357 10/31/20  0452   WBC  --  19.96* 25.79*   HGB 8.5* 9.0* 9.8*   HCT  --  27.1* 31.2*   PLT  --  157 147*   MCV  --  83 86   RDW  --  16.5* 16.8*        Chemistries:  Recent Labs   Lab 10/30/20  0357 10/31/20  0452   * 128*   K 4.8 4.8    101   CO2 20* 21*   BUN 38* 40*   CREATININE 0.9 1.0   CALCIUM 7.3* 7.2*   ALBUMIN 1.1* 1.2*   PROT 5.9* 5.8*   BILITOT 1.1* 1.1*   ALKPHOS 127 170*   ALT 17 16   AST 24 31   MG 2.1 2.5   PHOS 3.9 4.1

## 2020-10-31 NOTE — PLAN OF CARE
"CMICU PLAN OF CARE NOTE    Dx: <principal problem not specified>    Shift Events: Up titrated fentanyl, precedex for comfort, vent synchronization. Add levo for MAP>65. Failed SBT trial by Abdelrahman GIFFORD this AM.    Goals of Care: maintain synchronization with vent, aggressively manage pain    Neuro: Sedated, Arouses to Voice, Follows Commands, and Moves All Extremities    Vital Signs: BP 91/60   Pulse 73   Temp 98.6 °F (37 °C) (Oral)   Resp (!) 23   Ht 5' 2" (1.575 m)   Wt 53.2 kg (117 lb 4.6 oz)   LMP  (LMP Unknown)   SpO2 98%   Breastfeeding No Comment: pt unable to answer  BMI 21.45 kg/m²     Respiratory: Ventilator    Diet: Tube Feeds 20cc/hr    Gtts: Fentanyl, Precedex, and Norepinephrine    Urine Output: Urinary Catheter 250 cc/shift    Drains: NG/OG Tube 20 cc /  hour tube feeds    Access: R IJ 3 lumen PICC    Restraints  10/31 at  1342     Labs/Accuchecks: daily labs, vanc random (final at 10/31 at 0300).    Skin: Wound on L anterior foot      "

## 2020-10-31 NOTE — PROGRESS NOTES
[facility]  Critical Care Medicine  Progress Note    Patient Name: Марина Britton  MRN: [id number]  Admission Date: 10/26/2020  Hospital Length of Stay: 5 days  Code Status: Full Code  Attending Provider: Abdoul Quick MD  Primary Care Provider: Luis Felipe Jose Iii, MD   Principal Problem: <principal problem not specified>    Subjective:     HPI:  30F with PMHx IVUD heroin (last use approx 2wk ago) presents as transfer from OSH for septic endocarditis with b/l PE requiring higher level of care.  Per patient and chart review, she has had progressively worsening chest and back pain over the past several days with a Tmax of 105F at home.  She presented to [facility] and was admitted for sepsis on 10/24.  Bcx showed GPC consistent with Staph Aureus, so she was started on Vanc/Zosyn.  She was stepped up to the ICU on 10/25 for worsening tachypnea.  She also received 2U pRBC x2 for anemia with a pavel Hb of 6.5.  She was transferred to [facility] 10/26 for a higher level of care.    Hospital/ICU Course:  10/27/2020 Patient extremely tachypneic >50 breaths per minute on arrival. Concern for respiratory distress and airway protection led to intubation. WBC trending down, persistently febrile tmax 103.2F, repeat cultures pending. OG tube to suction with bilious output. ID consulted    10/28/2020 Blood cultures remain positive for MRSA. CTS evaluated and recommending 6 week IV abx prior to re-eval in clinic. ID following.     10/29/2020 No acute changes, continues to fail SBT due to apparent anxiety/discomfort. Added precedex and oral pain regimen. Hgb 6.8 given 1 unit PRBC, unclear source of bleeding, some hematuria. Hemolysis labs negative. Retroperitoneal u/s ordered. Started enteral feeds, tolerating well.    10/30/2020  Low dose levophed started due to low MAPs, uptitrating pain regimen. Continues to fail SBT despite minimal vent settings, remains 3/30%.    Interval History/Significant  Events: NAEO. WBC up today. Still unable to obtain cultures, will attempt with US guidance today. More aggressive bowel regimen. Added naproxen for pleuritic pain control      Objective:     Vital Signs (Most Recent):  Temp: 99.8 °F (37.7 °C) (10/31/20 1100)  Pulse: 74 (10/31/20 1200)  Resp: 14 (10/31/20 1200)  BP: (!) 93/51 (10/31/20 1200)  SpO2: 97 % (10/31/20 1200) Vital Signs (24h Range):  Temp:  [97.3 °F (36.3 °C)-101.4 °F (38.6 °C)] 99.8 °F (37.7 °C)  Pulse:  [] 74  Resp:  [6-45] 14  SpO2:  [96 %-100 %] 97 %  BP: ()/(48-99) 93/51   Weight: 53.2 kg (117 lb 4.6 oz)  Body mass index is 21.45 kg/m².      Intake/Output Summary (Last 24 hours) at 10/31/2020 1241  Last data filed at 10/31/2020 1123  Gross per 24 hour   Intake 2858.53 ml   Output 690 ml   Net 2168.53 ml       Physical Exam  Vitals signs and nursing note reviewed.   Constitutional:       Appearance: Normal appearance. She is ill-appearing and toxic-appearing.      Comments: Intubated, sedated   HENT:      Head: Normocephalic and atraumatic.      Right Ear: External ear normal.      Left Ear: External ear normal.      Nose: Nose normal.      Mouth/Throat:      Mouth: Mucous membranes are moist.   Eyes:      Extraocular Movements: Extraocular movements intact.      Pupils: Pupils are equal, round, and reactive to light.   Neck:      Musculoskeletal: Normal range of motion. No neck rigidity.   Cardiovascular:      Rate and Rhythm: Normal rate and regular rhythm.      Heart sounds: No murmur. No friction rub. No gallop.    Pulmonary:      Breath sounds: Rhonchi and rales present.      Comments: Intubated  Abdominal:      General: There is no distension.      Palpations: Abdomen is soft.      Tenderness: There is no abdominal tenderness.   Musculoskeletal:      Right lower leg: No edema.      Left lower leg: No edema.   Skin:     Capillary Refill: Capillary refill takes less than 2 seconds.      Comments: Left medial ankle ulcer   Neurological:       Mental Status: She is alert.      Comments: Arousable, nods yes and no to questions, follows commands.         Vents:  Vent Mode: A/C (10/31/20 0737)  Ventilator Initiated: Yes(chart correction) (10/26/20 2315)  Set Rate: 18 BPM (10/31/20 0737)  Vt Set: 0 mL (10/31/20 0737)  Pressure Support: 0 cmH20 (10/31/20 0737)  PEEP/CPAP: 5 cmH20 (10/31/20 0737)  Oxygen Concentration (%): 30 (10/31/20 1200)  Peak Airway Pressure: 22 cmH2O (10/31/20 0737)  Plateau Pressure: 20 cmH20 (10/31/20 0737)  Total Ve: 6.68 mL (10/31/20 0737)  F/VT Ratio<105 (RSBI): 153.33 (10/31/20 0737)  Lines/Drains/Airways     Central Venous Catheter Line            Percutaneous Central Line Insertion/Assessment - Triple Lumen  10/27/20 0058 right internal jugular 4 days          Drain                 NG/OG Tube 10/26/20 2302 orogastric Center mouth 4 days         Urethral Catheter 10/29/20 1415 Latex 1 day          Airway                 Airway - Non-Surgical 10/26/20 2259 Endotracheal Tube 4 days              Significant Labs:    CBC/Anemia Profile:  Recent Labs   Lab 10/29/20  1357 10/30/20  0357 10/31/20  0452   WBC  --  19.96* 25.79*   HGB 8.5* 9.0* 9.8*   HCT  --  27.1* 31.2*   PLT  --  157 147*   MCV  --  83 86   RDW  --  16.5* 16.8*        Chemistries:  Recent Labs   Lab 10/30/20  0357 10/31/20  0452   * 128*   K 4.8 4.8    101   CO2 20* 21*   BUN 38* 40*   CREATININE 0.9 1.0   CALCIUM 7.3* 7.2*   ALBUMIN 1.1* 1.2*   PROT 5.9* 5.8*   BILITOT 1.1* 1.1*   ALKPHOS 127 170*   ALT 17 16   AST 24 31   MG 2.1 2.5   PHOS 3.9 4.1             ABG  Recent Labs   Lab 10/27/20  0453   PH 7.458*   PO2 67*   PCO2 29.4*   HCO3 20.8*   BE -3     Assessment/Plan:     Psychiatric  IVDU (intravenous drug user)  - will need counseling/resources at discharge  - monitor for withdrawal symptoms        Pulmonary  Pneumonia of both lungs due to infectious organism  CT chest demonstrating bilateral scattered opacities of varying size with notable  cavitations concerning for septic emboli from endocarditis (large tricuspid vegetation noted on echocardiogram    Patient with severe pleuritic chest pain, treating with oral oxy 15mg q6, added naproxen for pleurisy    - continue vanc, cefazolin   -intubated 10/27 for respiratory distress      Cardiac/Vascular  Endocarditis  Large tricuspid vegetation noted on TTE with eptic emboli to lungs bilaterally  -no need for AC at this time, but will continue to monitor  -will consider need for HARI      ID  Severe sepsis  WBC peaked at 25.66, persistently febrile to 103F, tachycardic to 120s, lactate wnl. Encephalopathic on arrival concerning for end-organ damage    WBC 25>18>19>17>19.9> 19.8    -blood cultures 10/24,10/26, 10/28 positive for MRSA, will continue daily blood cultures (difficult to obtain)  - S/p fluid resuscitation at [place]  - continue vanc day 7, cefazolin d/c'd  -added ceftriaxone 10/29 due to bacteruria with hematuria- 3 day course  -per ID, if cultures positive at day 7 (10/31) will need to escalate to daptomycin  - ID on board- recommended CTS consult and daily blood cultures until clearance       Critical Care Daily Checklist:    A: Awake: RASS Goal/Actual Goal: RASS Goal: 0-->alert and calm  Actual: Diallo Agitation Sedation Scale (RASS): Drowsy   B: Spontaneous Breathing Trial Performed? Spon. Breathing Trial Initiated?: Not initiated (10/31/20 0737)   C: SAT & SBT Coordinated?  Failed                      D: Delirium: CAM-ICU Overall CAM-ICU: Negative   E: Early Mobility Performed? Yes   F: Feeding Goal: Goals: Meet % EEN, EPN by RD f/u date  Status: Nutrition Goal Status: new   Current Diet Order   Procedures    Diet Adult Regular (IDDSI Level 7)      AS: Analgesia/Sedation fentanyl   T: Thromboembolic Prophylaxis heparin   H: HOB > 300 Yes   U: Stress Ulcer Prophylaxis (if needed) famotidine   G: Glucose Control    B: Bowel Function Stool Occurrence: 0   I: Indwelling Catheter (Lines &  Jed) Necessity    D: De-escalation of Antimicrobials/Pharmacotherapies Vanc day 7    Plan for the day/ETD     Code Status:  Family/Goals of Care: Full Code         Critical secondary to Patient has a condition that poses threat to life and bodily function: septic endocarditis      Critical care was time spent personally by me on the following activities: development of treatment plan with patient or surrogate and bedside caregivers, discussions with consultants, evaluation of patient's response to treatment, examination of patient, ordering and performing treatments and interventions, ordering and review of laboratory studies, ordering and review of radiographic studies, pulse oximetry, re-evaluation of patient's condition. This critical care time did not overlap with that of any other provider or involve time for any procedures.     Tito Mcintosh MD  Critical Care Medicine  [facility]

## 2020-10-31 NOTE — PROGRESS NOTES
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 Course complicated by respiratory failure and she is intubated. Blood cultures have remained positive from 10/24 - 10/28. Have not been able to obtain repeat blood cultures since this time. Urine cx negative. She is currently on vancomycin and ceftriaxone. Now afebrile over the last 24 hours. Leukocytosis increasing. CTS has evaluated the patient and does not plan for acute surgical intervention.       Recommendations  - Continue Vancomycin 1250 mg IV q 24 hours. Vanc trough goal 15-20. Trough due 11/2.  - Can discontinue Ceftriaxone  - Please send repeat blood cultures ASAP  - If repeat blood cultures remain persistently positive, recommend stopping Vancomycin and starting Daptomycin and Ceftaroline  - Anticipate at minimum 6 weeks of IV abx therapy  - If she fails to clear her bacteremia, recommend re-consulting CTS as likely due to lack of source control  - ID will follow.        Please call for any questions. Thank you.  Evelyne Brand PA-C  Phone: [phone]  Pager: [phone]      Subjective:     Principal Problem:<principal problem not specified>    HPI: History obtained per chart review. Pt critically ill. Intubated, sedated.       30F with PMHx IVUD heroin (last use approx 2wk ago), Hepatitis C presents as transfer from OSH for septic endocarditis with b/l PE requiring higher level of care.  Per patient and chart review, she has had progressively worsening chest  "and back pain over the past several days with a Tmax of 105F at home.  She presented to [facility] and was admitted for sepsis on 10/24.  Bcx showed +MRSA and found to have large vegetation on tricuspid valve on TTE. Noted to have septic emobli on imaging studies. She was started on empiric vancomycin and zosyn. She was stepped up to the ICU on 10/25 for worsening tachypnea.  She also received 2U pRBC x2 for anemia with a pavel Hb of 6.5.  She was transferred to [facility] 10/26 for a higher level of care.    Per chart review. She has been seen in ED for fatigue and wound of her left foot. Per nursing note," Pt shows me a wound on her left foot/ankle, states it has been there over 1 month.  Pt states it started off as an are where she injected heroin, then turned in to a blister, abscess.  She states she was recently seen in [place] ED and placed on antibiotics.  Pt has not followed up with anyone regarding this wound. Spoke with ER MD Dr. Linares, pt does not tomas to be started on any antibiotics at this time, pt needs referral to wound care.  This was explained to pt who stated "ok."   she has been on different abx including bactrim and clindamycin without improvement. No follow up.   Interval History:   Patient afebrile over last 24 hours. WBC increasing. Urine cx 10/29 negative. Still have not been able to get repeat blood cultures.  Remains intubated. Alert.    Review of Systems   Unable to perform ROS: Intubated   Respiratory: Positive for cough and shortness of breath.      Objective:     Vital Signs (Most Recent):  Temp: 99.8 °F (37.7 °C) (10/31/20 1101)  Pulse: 74 (10/31/20 1300)  Resp: (!) 21 (10/31/20 1351)  BP: (!) 91/59 (10/31/20 1300)  SpO2: 98 % (10/31/20 1300) Vital Signs (24h Range):  Temp:  [97.3 °F (36.3 °C)-99.8 °F (37.7 °C)] 99.8 °F (37.7 °C)  Pulse:  [] 74  Resp:  [6-45] 21  SpO2:  [96 %-100 %] 98 %  BP: ()/(48-99) 91/59     Weight: 53.2 kg (117 lb 4.6 oz)  Body mass index is " 21.45 kg/m².    Estimated Creatinine Clearance: 64.5 mL/min (based on SCr of 1 mg/dL).    Physical Exam  Vitals signs and nursing note reviewed.   Constitutional:       Appearance: She is ill-appearing.      Interventions: She is intubated.   Neck:      Comments: RIJ c/d/i  Cardiovascular:      Rate and Rhythm: Normal rate and regular rhythm.      Heart sounds: Murmur present.   Pulmonary:      Effort: She is intubated.      Breath sounds: Rales present.   Abdominal:      General: Abdomen is flat. There is no distension.      Palpations: There is no mass.   Skin:     Comments: Janeway lesions on digits  Wound on ankle noted  Track marks BUYUMI   Neurological:      Mental Status: She is alert.         Significant Labs: All pertinent labs within the past 24 hours have been reviewed.    Significant Imaging: I have reviewed all pertinent imaging results/findings within the past 24 hours.

## 2020-10-31 NOTE — PLAN OF CARE
CMICU DAILY GOALS   Sedation issues overnight with patient becoming asynchronous with vent and pulling reduced tidal volumes. Sedation increased. BP lowered with increased sedation required slight increase of vasopressors. UOP continues to be low, approximately 25ml/hr. Not tolerating tube feedings with residuals from 100 to 350 through shift. Tube feedings held.     A: Awake    RASS: Goal - RASS Goal: 0-->alert and calm  Actual - RASS (Diallo Agitation-Sedation Scale): -1-->drowsy   Restraint necessity: Clinical Justification: Removing medical devices  B: Breath   SBT: Not attempted   C: Coordinate A & B, analgesics/sedatives   Pain: managed    SAT: Not attempted  D: Delirium   CAM-ICU: Overall CAM-ICU: Negative  E: Early(intubated/ Progressive (non-intubated) Mobility   MOVE Screen: Fail   Activity: Activity Management: patient unable to perform activities  FAS: Feeding/Nutrition   Diet order: Diet/Nutrition Received: tube feeding,   Fluid restriction:    T: Thrombus   DVT prophylaxis: VTE Required Core Measure: (SCDs) Sequential compression device initiated/maintained  H: HOB Elevation   Head of Bed (HOB): HOB at 30-45 degrees  U: Ulcer Prophylaxis   GI: yes  G: Glucose control   managed Glycemic Management: blood glucose monitoring  S: Skin   Bundle compliance: yes   Bathing/Skin Care: bath, chlorhexidine, bath, complete, dressed/undressed, incontinence care, linen changed Date: 10/30  B: Bowel Function   constipation   I: Indwelling Catheters   Adkins necessity:      Urethral Catheter 10/29/20 1415 Latex-Reason for Continuing Urinary Catheterization: Critically ill in ICU requiring intensive monitoring  [REMOVED]      Urethral Catheter 10/26/20 2330 16 Fr.-Reason for Continuing Urinary Catheterization: Critically ill in ICU requiring intensive monitoring   CVC necessity: Yes   IPAD offered: No  D: De-escalation Antibx   No  Plan for the day   Maintain synchrony with vent with focus on pain  management.  Family/Goals of care/Code Status   Code Status: Full Code     No acute events throughout day, VS and assessment per flow sheet, patient progressing towards goals as tolerated, plan of care reviewed with Марина Britton and family, all concerns addressed, will continue to monitor.

## 2020-10-31 NOTE — NURSING
Pt tachypneic RR 40 with 250 volume. Fentanyl increased, precedex increased, and prn dose given without change. Respiratory notified.    2344: RT at bedside, pt remains tachypneic, no mucous plug and easy to bag. MD notified, Fentanyl 50mcg bolus given, remains tachypneic. Fentanyl max dose increased to 300mcg/hr as per md. Rate increased.

## 2020-10-31 NOTE — PLAN OF CARE
CMICU DAILY GOALS       A: Awake    RASS: Goal - RASS Goal: 0-->alert and calm  Actual - RASS (Diallo Agitation-Sedation Scale): -2-->light sedation   Restraint necessity: Clinical Justification: Removing medical devices  B: Breath   SBT: Not attempted   C: Coordinate A & B, analgesics/sedatives   Pain: managed    SAT: Pass  D: Delirium   CAM-ICU: Overall CAM-ICU: Negative  E: Early(intubated/ Progressive (non-intubated) Mobility   MOVE Screen: Pass   Activity: Activity Management: patient unable to perform activities  FAS: Feeding/Nutrition   Diet order: Diet/Nutrition Received: tube feeding,   Fluid restriction:    T: Thrombus   DVT prophylaxis: VTE Required Core Measure: (SCDs) Sequential compression device initiated/maintained  H: HOB Elevation   Head of Bed (HOB): HOB at 30-45 degrees  U: Ulcer Prophylaxis   GI: yes  G: Glucose control   managed Glycemic Management: blood glucose monitoring  S: Skin   Bundle compliance: yes   Bathing/Skin Care: bath, chlorhexidine, bath, complete, dressed/undressed, incontinence care, linen changed Date:  B: Bowel Function   no issues   I: Indwelling Catheters   Adkins necessity:      Urethral Catheter 10/29/20 1415 Latex-Reason for Continuing Urinary Catheterization: Critically ill in ICU requiring intensive monitoring  [REMOVED]      Urethral Catheter 10/26/20 2330 16 Fr.-Reason for Continuing Urinary Catheterization: Critically ill in ICU requiring intensive monitoring   CVC necessity: Yes   IPAD offered: Not appropriate  D: De-escalation Antibx   No  Plan for the day   Manage pain, repeat SBT in morning  Family/Goals of care/Code Status   Code Status: Full Code     No acute events throughout day, VS and assessment per flow sheet, patient progressing towards goals as tolerated, plan of care reviewed with Марина Britton and family, all concerns addressed, will continue to monitor.

## 2020-10-31 NOTE — ASSESSMENT & PLAN NOTE
CT chest demonstrating bilateral scattered opacities of varying size with notable cavitations concerning for septic emboli from endocarditis (large tricuspid vegetation noted on echocardiogram    Patient with severe pleuritic chest pain, treating with oral oxy 15mg q6, added naproxen for pleurisy    - continue vanc, cefazolin   -intubated 10/27 for respiratory distress

## 2020-11-01 LAB
ALBUMIN SERPL BCP-MCNC: 1.1 G/DL (ref 3.5–5.2)
ALP SERPL-CCNC: 150 U/L (ref 55–135)
ALT SERPL W/O P-5'-P-CCNC: 13 U/L (ref 10–44)
ANION GAP SERPL CALC-SCNC: 8 MMOL/L (ref 8–16)
ANION GAP SERPL CALC-SCNC: 9 MMOL/L (ref 8–16)
AST SERPL-CCNC: 25 U/L (ref 10–40)
BACTERIA BLD CULT: ABNORMAL
BASOPHILS # BLD AUTO: 0.09 K/UL (ref 0–0.2)
BASOPHILS NFR BLD: 0.5 % (ref 0–1.9)
BILIRUB SERPL-MCNC: 0.7 MG/DL (ref 0.1–1)
BUN SERPL-MCNC: 42 MG/DL (ref 6–20)
BUN SERPL-MCNC: 44 MG/DL (ref 6–20)
CALCIUM SERPL-MCNC: 7.2 MG/DL (ref 8.7–10.5)
CALCIUM SERPL-MCNC: 7.5 MG/DL (ref 8.7–10.5)
CHLORIDE SERPL-SCNC: 103 MMOL/L (ref 95–110)
CHLORIDE SERPL-SCNC: 104 MMOL/L (ref 95–110)
CO2 SERPL-SCNC: 18 MMOL/L (ref 23–29)
CO2 SERPL-SCNC: 19 MMOL/L (ref 23–29)
CREAT SERPL-MCNC: 1.3 MG/DL (ref 0.5–1.4)
CREAT SERPL-MCNC: 1.4 MG/DL (ref 0.5–1.4)
DIFFERENTIAL METHOD: ABNORMAL
EOSINOPHIL # BLD AUTO: 0.2 K/UL (ref 0–0.5)
EOSINOPHIL NFR BLD: 1.2 % (ref 0–8)
ERYTHROCYTE [DISTWIDTH] IN BLOOD BY AUTOMATED COUNT: 17.1 % (ref 11.5–14.5)
EST. GFR  (AFRICAN AMERICAN): 57.8 ML/MIN/1.73 M^2
EST. GFR  (AFRICAN AMERICAN): >60 ML/MIN/1.73 M^2
EST. GFR  (NON AFRICAN AMERICAN): 50.1 ML/MIN/1.73 M^2
EST. GFR  (NON AFRICAN AMERICAN): 54.8 ML/MIN/1.73 M^2
GLUCOSE SERPL-MCNC: 114 MG/DL (ref 70–110)
GLUCOSE SERPL-MCNC: 93 MG/DL (ref 70–110)
HCT VFR BLD AUTO: 28.9 % (ref 37–48.5)
HGB BLD-MCNC: 8.8 G/DL (ref 12–16)
IMM GRANULOCYTES # BLD AUTO: 0.47 K/UL (ref 0–0.04)
IMM GRANULOCYTES NFR BLD AUTO: 2.4 % (ref 0–0.5)
LYMPHOCYTES # BLD AUTO: 1.4 K/UL (ref 1–4.8)
LYMPHOCYTES NFR BLD: 7.1 % (ref 18–48)
MAGNESIUM SERPL-MCNC: 2.4 MG/DL (ref 1.6–2.6)
MCH RBC QN AUTO: 26.5 PG (ref 27–31)
MCHC RBC AUTO-ENTMCNC: 30.4 G/DL (ref 32–36)
MCV RBC AUTO: 87 FL (ref 82–98)
MONOCYTES # BLD AUTO: 1.2 K/UL (ref 0.3–1)
MONOCYTES NFR BLD: 6 % (ref 4–15)
NEUTROPHILS # BLD AUTO: 16.3 K/UL (ref 1.8–7.7)
NEUTROPHILS NFR BLD: 82.8 % (ref 38–73)
NRBC BLD-RTO: 0 /100 WBC
PHOSPHATE SERPL-MCNC: 5.4 MG/DL (ref 2.7–4.5)
PLATELET # BLD AUTO: 164 K/UL (ref 150–350)
PMV BLD AUTO: 10.8 FL (ref 9.2–12.9)
POTASSIUM SERPL-SCNC: 4.5 MMOL/L (ref 3.5–5.1)
POTASSIUM SERPL-SCNC: 4.8 MMOL/L (ref 3.5–5.1)
PROT SERPL-MCNC: 5.1 G/DL (ref 6–8.4)
RBC # BLD AUTO: 3.32 M/UL (ref 4–5.4)
SODIUM SERPL-SCNC: 130 MMOL/L (ref 136–145)
SODIUM SERPL-SCNC: 131 MMOL/L (ref 136–145)
WBC # BLD AUTO: 19.68 K/UL (ref 3.9–12.7)

## 2020-11-01 PROCEDURE — 63600175 PHARM REV CODE 636 W HCPCS: Performed by: STUDENT IN AN ORGANIZED HEALTH CARE EDUCATION/TRAINING PROGRAM

## 2020-11-01 PROCEDURE — 25000003 PHARM REV CODE 250: Performed by: INTERNAL MEDICINE

## 2020-11-01 PROCEDURE — 80053 COMPREHEN METABOLIC PANEL: CPT

## 2020-11-01 PROCEDURE — 85025 COMPLETE CBC W/AUTO DIFF WBC: CPT

## 2020-11-01 PROCEDURE — 25000003 PHARM REV CODE 250: Performed by: STUDENT IN AN ORGANIZED HEALTH CARE EDUCATION/TRAINING PROGRAM

## 2020-11-01 PROCEDURE — 99233 SBSQ HOSP IP/OBS HIGH 50: CPT | Mod: ,,, | Performed by: INTERNAL MEDICINE

## 2020-11-01 PROCEDURE — 84100 ASSAY OF PHOSPHORUS: CPT

## 2020-11-01 PROCEDURE — 80048 BASIC METABOLIC PNL TOTAL CA: CPT

## 2020-11-01 PROCEDURE — 63600175 PHARM REV CODE 636 W HCPCS: Performed by: INTERNAL MEDICINE

## 2020-11-01 PROCEDURE — 94003 VENT MGMT INPAT SUBQ DAY: CPT

## 2020-11-01 PROCEDURE — 20000000 HC ICU ROOM

## 2020-11-01 PROCEDURE — 99900035 HC TECH TIME PER 15 MIN (STAT)

## 2020-11-01 PROCEDURE — 99233 PR SUBSEQUENT HOSPITAL CARE,LEVL III: ICD-10-PCS | Mod: ,,, | Performed by: INTERNAL MEDICINE

## 2020-11-01 PROCEDURE — 27000221 HC OXYGEN, UP TO 24 HOURS

## 2020-11-01 PROCEDURE — 99900026 HC AIRWAY MAINTENANCE (STAT)

## 2020-11-01 PROCEDURE — 83735 ASSAY OF MAGNESIUM: CPT

## 2020-11-01 PROCEDURE — 27200966 HC CLOSED SUCTION SYSTEM

## 2020-11-01 PROCEDURE — 94761 N-INVAS EAR/PLS OXIMETRY MLT: CPT

## 2020-11-01 PROCEDURE — 87040 BLOOD CULTURE FOR BACTERIA: CPT

## 2020-11-01 RX ORDER — HYDROXYZINE HYDROCHLORIDE 25 MG/1
25 TABLET, FILM COATED ORAL DAILY PRN
Status: DISCONTINUED | OUTPATIENT
Start: 2020-11-01 | End: 2020-11-01

## 2020-11-01 RX ORDER — HYDROXYZINE HYDROCHLORIDE 25 MG/1
25 TABLET, FILM COATED ORAL DAILY PRN
Status: DISCONTINUED | OUTPATIENT
Start: 2020-11-01 | End: 2020-11-24 | Stop reason: HOSPADM

## 2020-11-01 RX ORDER — OXYCODONE HYDROCHLORIDE 10 MG/1
10 TABLET ORAL EVERY 4 HOURS PRN
Status: DISCONTINUED | OUTPATIENT
Start: 2020-11-01 | End: 2020-11-03

## 2020-11-01 RX ORDER — OXYCODONE HYDROCHLORIDE 10 MG/1
20 TABLET ORAL EVERY 6 HOURS
Status: DISCONTINUED | OUTPATIENT
Start: 2020-11-01 | End: 2020-11-19

## 2020-11-01 RX ORDER — NAPROXEN 250 MG/1
250 TABLET ORAL EVERY 12 HOURS
Status: DISCONTINUED | OUTPATIENT
Start: 2020-11-01 | End: 2020-11-01

## 2020-11-01 RX ADMIN — OXYCODONE HYDROCHLORIDE 20 MG: 10 TABLET ORAL at 05:11

## 2020-11-01 RX ADMIN — NAPROXEN 250 MG: 250 TABLET ORAL at 05:11

## 2020-11-01 RX ADMIN — SODIUM CHLORIDE, SODIUM LACTATE, POTASSIUM CHLORIDE, AND CALCIUM CHLORIDE 500 ML: .6; .31; .03; .02 INJECTION, SOLUTION INTRAVENOUS at 01:11

## 2020-11-01 RX ADMIN — LACTULOSE 30 G: 10 SOLUTION ORAL at 08:11

## 2020-11-01 RX ADMIN — NAPROXEN 250 MG: 250 TABLET ORAL at 12:11

## 2020-11-01 RX ADMIN — OXYCODONE HYDROCHLORIDE 10 MG: 10 TABLET ORAL at 02:11

## 2020-11-01 RX ADMIN — FAMOTIDINE 20 MG: 20 TABLET, FILM COATED ORAL at 08:11

## 2020-11-01 RX ADMIN — OXYCODONE 5 MG: 5 TABLET ORAL at 01:11

## 2020-11-01 RX ADMIN — OXYCODONE HYDROCHLORIDE 15 MG: 10 TABLET ORAL at 12:11

## 2020-11-01 RX ADMIN — DEXMEDETOMIDINE HYDROCHLORIDE 0.8 MCG/KG/HR: 4 INJECTION, SOLUTION INTRAVENOUS at 11:11

## 2020-11-01 RX ADMIN — POLYETHYLENE GLYCOL 3350 17 G: 17 POWDER, FOR SOLUTION ORAL at 08:11

## 2020-11-01 RX ADMIN — LACTULOSE 30 G: 10 SOLUTION ORAL at 09:11

## 2020-11-01 RX ADMIN — FAMOTIDINE 20 MG: 20 TABLET, FILM COATED ORAL at 09:11

## 2020-11-01 RX ADMIN — Medication 100 MCG/HR: at 12:11

## 2020-11-01 RX ADMIN — OXYCODONE HYDROCHLORIDE 20 MG: 10 TABLET ORAL at 12:11

## 2020-11-01 RX ADMIN — HEPARIN SODIUM 5000 UNITS: 5000 INJECTION INTRAVENOUS; SUBCUTANEOUS at 01:11

## 2020-11-01 RX ADMIN — OXYCODONE HYDROCHLORIDE 15 MG: 10 TABLET ORAL at 05:11

## 2020-11-01 RX ADMIN — DEXMEDETOMIDINE HYDROCHLORIDE 0.8 MCG/KG/HR: 4 INJECTION, SOLUTION INTRAVENOUS at 08:11

## 2020-11-01 RX ADMIN — OXYCODONE HYDROCHLORIDE 10 MG: 10 TABLET ORAL at 09:11

## 2020-11-01 RX ADMIN — HYDROXYZINE HYDROCHLORIDE 25 MG: 25 TABLET, FILM COATED ORAL at 01:11

## 2020-11-01 RX ADMIN — SENNOSIDES 8.6 MG: 8.6 TABLET, FILM COATED ORAL at 08:11

## 2020-11-01 RX ADMIN — OXYCODONE HYDROCHLORIDE 10 MG: 10 TABLET ORAL at 08:11

## 2020-11-01 RX ADMIN — VANCOMYCIN HYDROCHLORIDE 1250 MG: 1.25 INJECTION, POWDER, LYOPHILIZED, FOR SOLUTION INTRAVENOUS at 08:11

## 2020-11-01 RX ADMIN — OXYCODONE HYDROCHLORIDE 20 MG: 10 TABLET ORAL at 11:11

## 2020-11-01 RX ADMIN — SODIUM CHLORIDE, SODIUM LACTATE, POTASSIUM CHLORIDE, AND CALCIUM CHLORIDE 500 ML: .6; .31; .03; .02 INJECTION, SOLUTION INTRAVENOUS at 08:11

## 2020-11-01 RX ADMIN — HEPARIN SODIUM 5000 UNITS: 5000 INJECTION INTRAVENOUS; SUBCUTANEOUS at 09:11

## 2020-11-01 RX ADMIN — HEPARIN SODIUM 5000 UNITS: 5000 INJECTION INTRAVENOUS; SUBCUTANEOUS at 05:11

## 2020-11-01 NOTE — NURSING
Frequent bradycardia noted.  Down titration of precedex did not resolve. Spoke with CCS MD and Ok to hold precedex.

## 2020-11-01 NOTE — PROGRESS NOTES
[facility]  Critical Care Medicine  Progress Note    Patient Name: Марина Britton  MRN: [id number]  Admission Date: 10/26/2020  Hospital Length of Stay: 6 days  Code Status: Full Code  Attending Provider: Abdoul Quick MD  Primary Care Provider: Luis Felipe Jose Iii, MD   Principal Problem: <principal problem not specified>    Subjective:     HPI:  30F with PMHx IVUD heroin (last use approx 2wk ago) presents as transfer from OSH for septic endocarditis with b/l PE requiring higher level of care.  Per patient and chart review, she has had progressively worsening chest and back pain over the past several days with a Tmax of 105F at home.  She presented to [facility] and was admitted for sepsis on 10/24.  Bcx showed GPC consistent with Staph Aureus, so she was started on Vanc/Zosyn.  She was stepped up to the ICU on 10/25 for worsening tachypnea.  She also received 2U pRBC x2 for anemia with a pavel Hb of 6.5.  She was transferred to [facility] 10/26 for a higher level of care.    Hospital/ICU Course:  10/27/2020 Patient extremely tachypneic >50 breaths per minute on arrival. Concern for respiratory distress and airway protection led to intubation. WBC trending down, persistently febrile tmax 103.2F, repeat cultures pending. OG tube to suction with bilious output. ID consulted    10/28/2020 Blood cultures remain positive for MRSA. CTS evaluated and recommending 6 week IV abx prior to re-eval in clinic. ID following.     10/29/2020 No acute changes, continues to fail SBT due to apparent anxiety/discomfort. Added precedex and oral pain regimen. Hgb 6.8 given 1 unit PRBC, unclear source of bleeding, some hematuria. Hemolysis labs negative. Retroperitoneal u/s ordered. Started enteral feeds, tolerating well.    10/30/2020  Low dose levophed started due to low MAPs, uptitrating pain regimen. Continues to fail SBT despite minimal vent settings, remains 3/30%.    Interval History/Significant  Events: NAEO. Patient continues to have rapid, shallow breathing on SBT, appears to be related to pain. Mild DERICK with Cr to 1.3. Will give fluid bolus and hold naproxen. Uptitrating pain regiment      Objective:     Vital Signs (Most Recent):  Temp: 96 °F (35.6 °C) (11/01/20 1501)  Pulse: 65 (11/01/20 1501)  Resp: (!) 23 (11/01/20 1501)  BP: (!) 92/54 (11/01/20 1501)  SpO2: 99 % (11/01/20 1501) Vital Signs (24h Range):  Temp:  [95.9 °F (35.5 °C)-98.6 °F (37 °C)] 96 °F (35.6 °C)  Pulse:  [48-96] 65  Resp:  [14-40] 23  SpO2:  [97 %-100 %] 99 %  BP: ()/(50-74) 92/54   Weight: 53.2 kg (117 lb 4.6 oz)  Body mass index is 21.45 kg/m².      Intake/Output Summary (Last 24 hours) at 11/1/2020 1631  Last data filed at 11/1/2020 1618  Gross per 24 hour   Intake 1905.24 ml   Output 620 ml   Net 1285.24 ml       Physical Exam  Vitals signs and nursing note reviewed.   Constitutional:       Appearance: Normal appearance. She is ill-appearing and toxic-appearing.      Comments: Intubated, sedated   HENT:      Head: Normocephalic and atraumatic.      Right Ear: External ear normal.      Left Ear: External ear normal.      Nose: Nose normal.      Mouth/Throat:      Mouth: Mucous membranes are moist.   Eyes:      Extraocular Movements: Extraocular movements intact.      Pupils: Pupils are equal, round, and reactive to light.   Neck:      Musculoskeletal: Normal range of motion. No neck rigidity.   Cardiovascular:      Rate and Rhythm: Normal rate and regular rhythm.      Heart sounds: No murmur. No friction rub. No gallop.    Pulmonary:      Breath sounds: Rhonchi and rales present.      Comments: Intubated  Abdominal:      General: There is no distension.      Palpations: Abdomen is soft.      Tenderness: There is no abdominal tenderness.   Musculoskeletal:      Right lower leg: No edema.      Left lower leg: No edema.   Skin:     Capillary Refill: Capillary refill takes less than 2 seconds.      Comments: Left medial ankle  ulcer   Neurological:      Mental Status: She is alert.      Comments: Arousable, nods yes and no to questions, follows commands.         Vents:  Vent Mode: Spont (11/01/20 1216)  Ventilator Initiated: Yes(chart correction) (10/26/20 1635)  Set Rate: 0 BPM (11/01/20 1216)  Vt Set: 0 mL (11/01/20 1216)  Pressure Support: 8 cmH20 (11/01/20 1216)  PEEP/CPAP: 5 cmH20 (11/01/20 1216)  Oxygen Concentration (%): 25 (11/01/20 1501)  Peak Airway Pressure: 13 cmH2O (11/01/20 1216)  Plateau Pressure: 24 cmH20 (11/01/20 1216)  Total Ve: 7.16 mL (11/01/20 1216)  F/VT Ratio<105 (RSBI): (!) 70.12 (11/01/20 1216)  Lines/Drains/Airways     Central Venous Catheter Line            Percutaneous Central Line Insertion/Assessment - Triple Lumen  10/27/20 0058 right internal jugular 5 days          Drain                 NG/OG Tube 10/26/20 2302 orogastric Center mouth 5 days         Urethral Catheter 10/29/20 1415 Latex 3 days         Rectal Tube 10/31/20 1200 1 day          Airway                 Airway - Non-Surgical 10/26/20 2259 Endotracheal Tube 5 days              Significant Labs:    CBC/Anemia Profile:  Recent Labs   Lab 10/31/20  0452 11/01/20  0155   WBC 25.79* 19.68*   HGB 9.8* 8.8*   HCT 31.2* 28.9*   * 164   MCV 86 87   RDW 16.8* 17.1*        Chemistries:  Recent Labs   Lab 10/31/20  0452 11/01/20  0155 11/01/20  1305   * 131* 130*   K 4.8 4.5 4.8    104 103   CO2 21* 18* 19*   BUN 40* 44* 42*   CREATININE 1.0 1.3 1.4   CALCIUM 7.2* 7.2* 7.5*   ALBUMIN 1.2* 1.1*  --    PROT 5.8* 5.1*  --    BILITOT 1.1* 0.7  --    ALKPHOS 170* 150*  --    ALT 16 13  --    AST 31 25  --    MG 2.5 2.4  --    PHOS 4.1 5.4*  --            ABG  Recent Labs   Lab 10/27/20  0453   PH 7.458*   PO2 67*   PCO2 29.4*   HCO3 20.8*   BE -3     Assessment/Plan:     Psychiatric  IVDU (intravenous drug user)  - will need counseling/resources at discharge  - monitor for withdrawal symptoms        Pulmonary  Pneumonia of both lungs due to  infectious organism  CT chest demonstrating bilateral scattered opacities of varying size with notable cavitations concerning for septic emboli from endocarditis (large tricuspid vegetation noted on echocardiogram    Patient with severe pleuritic chest pain, treating with oral oxy 20mg q6 with 10mg PRN, added naproxen for pleurisy briefly, held due to DERICK    Vent Mode: Spont  Oxygen Concentration (%):  [21-30] 25  Resp Rate Total:  [19 br/min-35 br/min] 30 br/min  Vt Set:  [0 mL] 0 mL  PEEP/CPAP:  [5 cmH20] 5 cmH20  Pressure Support:  [0 cmH20-8 cmH20] 8 cmH20  Mean Airway Pressure:  [6.5 cmH20-10 cmH20] 6.5 cmH20      - continue vanc, cefazolin   -intubated 10/27 for respiratory distress    -difficulty weaning due to pain  -SBT daily    Cardiac/Vascular  Endocarditis  Large tricuspid vegetation noted on TTE with eptic emboli to lungs bilaterally  -no need for AC at this time, but will continue to monitor  -will consider need for HARI      ID  Severe sepsis  WBC peaked at 25.66, persistently febrile to 103F, tachycardic to 120s, lactate wnl. Encephalopathic on arrival concerning for end-organ damage    WBC 25>18>19>17>19.9> 19.8> 25.7>19.6    -blood cultures 10/24,10/26, 10/28 positive for MRSA, will continue daily blood cultures (difficult to obtain)  - S/p fluid resuscitation at [place]  - continue vanc day 7, cefazolin d/c'd  -added ceftriaxone 10/29 due to bacteruria with hematuria- completed 3 day course  -per ID, if cultures positive at day 7 (10/31) will need to escalate to daptomycin  - ID on board- recommended CTS consult and daily blood cultures until clearance       Critical Care Daily Checklist:    A: Awake: RASS Goal/Actual Goal: RASS Goal: 0-->alert and calm  Actual: Diallo Agitation Sedation Scale (RASS): Light sedation   B: Spontaneous Breathing Trial Performed? Spon. Breathing Trial Initiated?: Not initiated (10/31/20 1144)   C: SAT & SBT Coordinated?   Failed     D: Delirium: CAM-ICU Overall  CAM-ICU: Negative   E: Early Mobility Performed? No   F: Feeding Goal: Goals: Meet % EEN, EPN by RD f/u date  Status: Nutrition Goal Status: new   Current Diet Order   Procedures    Diet Adult Regular (IDDSI Level 7)      AS: Analgesia/Sedation Off sedation   T: Thromboembolic Prophylaxis heparin   H: HOB > 300 Yes   U: Stress Ulcer Prophylaxis (if needed) famotidine   G: Glucose Control    B: Bowel Function Stool Occurrence: 0   I: Indwelling Catheter (Lines & Fontaine) Necessity R IJ, fontaine,   D: De-escalation of Antimicrobials/Pharmacotherapies vanc day 8    Plan for the day/ETD     Code Status:  Family/Goals of Care: Full Code         Critical secondary to Patient has a condition that poses threat to life and bodily function: Pulmonary Embolism and Severe Respiratory Distress      Critical care was time spent personally by me on the following activities: development of treatment plan with patient or surrogate and bedside caregivers, discussions with consultants, evaluation of patient's response to treatment, examination of patient, ordering and performing treatments and interventions, ordering and review of laboratory studies, ordering and review of radiographic studies, pulse oximetry, re-evaluation of patient's condition. This critical care time did not overlap with that of any other provider or involve time for any procedures.     Tito Mcintosh MD  Critical Care Medicine  [facility]

## 2020-11-01 NOTE — ASSESSMENT & PLAN NOTE
CT chest demonstrating bilateral scattered opacities of varying size with notable cavitations concerning for septic emboli from endocarditis (large tricuspid vegetation noted on echocardiogram    Patient with severe pleuritic chest pain, treating with oral oxy 20mg q6 with 10mg PRN, added naproxen for pleurisy briefly, held due to DERICK    Vent Mode: Spont  Oxygen Concentration (%):  [21-30] 25  Resp Rate Total:  [19 br/min-35 br/min] 30 br/min  Vt Set:  [0 mL] 0 mL  PEEP/CPAP:  [5 cmH20] 5 cmH20  Pressure Support:  [0 cmH20-8 cmH20] 8 cmH20  Mean Airway Pressure:  [6.5 cmH20-10 cmH20] 6.5 cmH20      - continue vanc, cefazolin   -intubated 10/27 for respiratory distress    -difficulty weaning due to pain  -SBT daily

## 2020-11-01 NOTE — SUBJECTIVE & OBJECTIVE
Interval History/Significant Events: NAEO. Patient continues to have rapid, shallow breathing on SBT, appears to be related to pain. Mild DERICK with Cr to 1.3. Will give fluid bolus and hold naproxen. Uptitrating pain regiment      Objective:     Vital Signs (Most Recent):  Temp: 96 °F (35.6 °C) (11/01/20 1501)  Pulse: 65 (11/01/20 1501)  Resp: (!) 23 (11/01/20 1501)  BP: (!) 92/54 (11/01/20 1501)  SpO2: 99 % (11/01/20 1501) Vital Signs (24h Range):  Temp:  [95.9 °F (35.5 °C)-98.6 °F (37 °C)] 96 °F (35.6 °C)  Pulse:  [48-96] 65  Resp:  [14-40] 23  SpO2:  [97 %-100 %] 99 %  BP: ()/(50-74) 92/54   Weight: 53.2 kg (117 lb 4.6 oz)  Body mass index is 21.45 kg/m².      Intake/Output Summary (Last 24 hours) at 11/1/2020 1631  Last data filed at 11/1/2020 1618  Gross per 24 hour   Intake 1905.24 ml   Output 620 ml   Net 1285.24 ml       Physical Exam  Vitals signs and nursing note reviewed.   Constitutional:       Appearance: Normal appearance. She is ill-appearing and toxic-appearing.      Comments: Intubated, sedated   HENT:      Head: Normocephalic and atraumatic.      Right Ear: External ear normal.      Left Ear: External ear normal.      Nose: Nose normal.      Mouth/Throat:      Mouth: Mucous membranes are moist.   Eyes:      Extraocular Movements: Extraocular movements intact.      Pupils: Pupils are equal, round, and reactive to light.   Neck:      Musculoskeletal: Normal range of motion. No neck rigidity.   Cardiovascular:      Rate and Rhythm: Normal rate and regular rhythm.      Heart sounds: No murmur. No friction rub. No gallop.    Pulmonary:      Breath sounds: Rhonchi and rales present.      Comments: Intubated  Abdominal:      General: There is no distension.      Palpations: Abdomen is soft.      Tenderness: There is no abdominal tenderness.   Musculoskeletal:      Right lower leg: No edema.      Left lower leg: No edema.   Skin:     Capillary Refill: Capillary refill takes less than 2 seconds.       Comments: Left medial ankle ulcer   Neurological:      Mental Status: She is alert.      Comments: Arousable, nods yes and no to questions, follows commands.         Vents:  Vent Mode: Spont (11/01/20 1216)  Ventilator Initiated: Yes(chart correction) (10/26/20 4165)  Set Rate: 0 BPM (11/01/20 1216)  Vt Set: 0 mL (11/01/20 1216)  Pressure Support: 8 cmH20 (11/01/20 1216)  PEEP/CPAP: 5 cmH20 (11/01/20 1216)  Oxygen Concentration (%): 25 (11/01/20 1501)  Peak Airway Pressure: 13 cmH2O (11/01/20 1216)  Plateau Pressure: 24 cmH20 (11/01/20 1216)  Total Ve: 7.16 mL (11/01/20 1216)  F/VT Ratio<105 (RSBI): (!) 70.12 (11/01/20 1216)  Lines/Drains/Airways     Central Venous Catheter Line            Percutaneous Central Line Insertion/Assessment - Triple Lumen  10/27/20 0058 right internal jugular 5 days          Drain                 NG/OG Tube 10/26/20 2302 orogastric Center mouth 5 days         Urethral Catheter 10/29/20 1415 Latex 3 days         Rectal Tube 10/31/20 1200 1 day          Airway                 Airway - Non-Surgical 10/26/20 2259 Endotracheal Tube 5 days              Significant Labs:    CBC/Anemia Profile:  Recent Labs   Lab 10/31/20  0452 11/01/20  0155   WBC 25.79* 19.68*   HGB 9.8* 8.8*   HCT 31.2* 28.9*   * 164   MCV 86 87   RDW 16.8* 17.1*        Chemistries:  Recent Labs   Lab 10/31/20  0452 11/01/20  0155 11/01/20  1305   * 131* 130*   K 4.8 4.5 4.8    104 103   CO2 21* 18* 19*   BUN 40* 44* 42*   CREATININE 1.0 1.3 1.4   CALCIUM 7.2* 7.2* 7.5*   ALBUMIN 1.2* 1.1*  --    PROT 5.8* 5.1*  --    BILITOT 1.1* 0.7  --    ALKPHOS 170* 150*  --    ALT 16 13  --    AST 31 25  --    MG 2.5 2.4  --    PHOS 4.1 5.4*  --

## 2020-11-01 NOTE — ASSESSMENT & PLAN NOTE
WBC peaked at 25.66, persistently febrile to 103F, tachycardic to 120s, lactate wnl. Encephalopathic on arrival concerning for end-organ damage    WBC 25>18>19>17>19.9> 19.8> 25.7>19.6    -blood cultures 10/24,10/26, 10/28 positive for MRSA, will continue daily blood cultures (difficult to obtain)  - S/p fluid resuscitation at [place]  - continue vanc day 7, cefazolin d/c'd  -added ceftriaxone 10/29 due to bacteruria with hematuria- completed 3 day course  -per ID, if cultures positive at day 7 (10/31) will need to escalate to daptomycin  - ID on board- recommended CTS consult and daily blood cultures until clearance

## 2020-11-02 LAB
ALBUMIN SERPL BCP-MCNC: 1.1 G/DL (ref 3.5–5.2)
ALP SERPL-CCNC: 182 U/L (ref 55–135)
ALT SERPL W/O P-5'-P-CCNC: 12 U/L (ref 10–44)
ANION GAP SERPL CALC-SCNC: 8 MMOL/L (ref 8–16)
AST SERPL-CCNC: 19 U/L (ref 10–40)
BASOPHILS # BLD AUTO: 0.07 K/UL (ref 0–0.2)
BASOPHILS NFR BLD: 0.4 % (ref 0–1.9)
BILIRUB SERPL-MCNC: 0.8 MG/DL (ref 0.1–1)
BUN SERPL-MCNC: 43 MG/DL (ref 6–20)
CALCIUM SERPL-MCNC: 7.1 MG/DL (ref 8.7–10.5)
CHLORIDE SERPL-SCNC: 103 MMOL/L (ref 95–110)
CO2 SERPL-SCNC: 18 MMOL/L (ref 23–29)
CREAT SERPL-MCNC: 1.5 MG/DL (ref 0.5–1.4)
DIFFERENTIAL METHOD: ABNORMAL
EOSINOPHIL # BLD AUTO: 0.3 K/UL (ref 0–0.5)
EOSINOPHIL NFR BLD: 1.3 % (ref 0–8)
ERYTHROCYTE [DISTWIDTH] IN BLOOD BY AUTOMATED COUNT: 17.3 % (ref 11.5–14.5)
EST. GFR  (AFRICAN AMERICAN): 53.1 ML/MIN/1.73 M^2
EST. GFR  (NON AFRICAN AMERICAN): 46.1 ML/MIN/1.73 M^2
GLUCOSE SERPL-MCNC: 109 MG/DL (ref 70–110)
HCT VFR BLD AUTO: 29.4 % (ref 37–48.5)
HGB BLD-MCNC: 9 G/DL (ref 12–16)
IMM GRANULOCYTES # BLD AUTO: 0.34 K/UL (ref 0–0.04)
IMM GRANULOCYTES NFR BLD AUTO: 1.7 % (ref 0–0.5)
LYMPHOCYTES # BLD AUTO: 0.9 K/UL (ref 1–4.8)
LYMPHOCYTES NFR BLD: 4.3 % (ref 18–48)
MAGNESIUM SERPL-MCNC: 2.3 MG/DL (ref 1.6–2.6)
MCH RBC QN AUTO: 26.2 PG (ref 27–31)
MCHC RBC AUTO-ENTMCNC: 30.6 G/DL (ref 32–36)
MCV RBC AUTO: 86 FL (ref 82–98)
MONOCYTES # BLD AUTO: 0.9 K/UL (ref 0.3–1)
MONOCYTES NFR BLD: 4.8 % (ref 4–15)
NEUTROPHILS # BLD AUTO: 17.2 K/UL (ref 1.8–7.7)
NEUTROPHILS NFR BLD: 87.5 % (ref 38–73)
NRBC BLD-RTO: 0 /100 WBC
PHOSPHATE SERPL-MCNC: 5.5 MG/DL (ref 2.7–4.5)
PLATELET # BLD AUTO: 245 K/UL (ref 150–350)
PMV BLD AUTO: 10.4 FL (ref 9.2–12.9)
POTASSIUM SERPL-SCNC: 4.8 MMOL/L (ref 3.5–5.1)
PROT SERPL-MCNC: 5.2 G/DL (ref 6–8.4)
RBC # BLD AUTO: 3.44 M/UL (ref 4–5.4)
SODIUM SERPL-SCNC: 129 MMOL/L (ref 136–145)
VANCOMYCIN SERPL-MCNC: 27.3 UG/ML
WBC # BLD AUTO: 19.64 K/UL (ref 3.9–12.7)

## 2020-11-02 PROCEDURE — 84100 ASSAY OF PHOSPHORUS: CPT

## 2020-11-02 PROCEDURE — 99900026 HC AIRWAY MAINTENANCE (STAT)

## 2020-11-02 PROCEDURE — 20000000 HC ICU ROOM

## 2020-11-02 PROCEDURE — 99900035 HC TECH TIME PER 15 MIN (STAT)

## 2020-11-02 PROCEDURE — 25000003 PHARM REV CODE 250: Performed by: STUDENT IN AN ORGANIZED HEALTH CARE EDUCATION/TRAINING PROGRAM

## 2020-11-02 PROCEDURE — 99233 SBSQ HOSP IP/OBS HIGH 50: CPT | Mod: ,,, | Performed by: INTERNAL MEDICINE

## 2020-11-02 PROCEDURE — 80202 ASSAY OF VANCOMYCIN: CPT

## 2020-11-02 PROCEDURE — 80053 COMPREHEN METABOLIC PANEL: CPT

## 2020-11-02 PROCEDURE — 99233 SBSQ HOSP IP/OBS HIGH 50: CPT | Mod: ,,, | Performed by: PHYSICIAN ASSISTANT

## 2020-11-02 PROCEDURE — 99233 PR SUBSEQUENT HOSPITAL CARE,LEVL III: ICD-10-PCS | Mod: ,,, | Performed by: PHYSICIAN ASSISTANT

## 2020-11-02 PROCEDURE — 94761 N-INVAS EAR/PLS OXIMETRY MLT: CPT

## 2020-11-02 PROCEDURE — 94150 VITAL CAPACITY TEST: CPT

## 2020-11-02 PROCEDURE — 27000190 HC CPAP FULL FACE MASK W/VALVE

## 2020-11-02 PROCEDURE — 27200966 HC CLOSED SUCTION SYSTEM

## 2020-11-02 PROCEDURE — 83735 ASSAY OF MAGNESIUM: CPT

## 2020-11-02 PROCEDURE — 94660 CPAP INITIATION&MGMT: CPT

## 2020-11-02 PROCEDURE — 25000003 PHARM REV CODE 250: Performed by: INTERNAL MEDICINE

## 2020-11-02 PROCEDURE — 99233 PR SUBSEQUENT HOSPITAL CARE,LEVL III: ICD-10-PCS | Mod: ,,, | Performed by: INTERNAL MEDICINE

## 2020-11-02 PROCEDURE — 94010 BREATHING CAPACITY TEST: CPT

## 2020-11-02 PROCEDURE — 94003 VENT MGMT INPAT SUBQ DAY: CPT

## 2020-11-02 PROCEDURE — 85025 COMPLETE CBC W/AUTO DIFF WBC: CPT

## 2020-11-02 PROCEDURE — 63600175 PHARM REV CODE 636 W HCPCS: Performed by: STUDENT IN AN ORGANIZED HEALTH CARE EDUCATION/TRAINING PROGRAM

## 2020-11-02 PROCEDURE — 27000221 HC OXYGEN, UP TO 24 HOURS

## 2020-11-02 RX ORDER — FAMOTIDINE 20 MG/1
20 TABLET, FILM COATED ORAL DAILY
Status: DISCONTINUED | OUTPATIENT
Start: 2020-11-03 | End: 2020-11-03

## 2020-11-02 RX ORDER — MIDODRINE HYDROCHLORIDE 5 MG/1
15 TABLET ORAL 3 TIMES DAILY
Status: DISCONTINUED | OUTPATIENT
Start: 2020-11-02 | End: 2020-11-05

## 2020-11-02 RX ORDER — POLYETHYLENE GLYCOL 3350 17 G/17G
17 POWDER, FOR SOLUTION ORAL 2 TIMES DAILY
Status: DISCONTINUED | OUTPATIENT
Start: 2020-11-02 | End: 2020-11-03

## 2020-11-02 RX ADMIN — SENNOSIDES 8.6 MG: 8.6 TABLET, FILM COATED ORAL at 08:11

## 2020-11-02 RX ADMIN — POLYETHYLENE GLYCOL 3350 17 G: 17 POWDER, FOR SOLUTION ORAL at 08:11

## 2020-11-02 RX ADMIN — HEPARIN SODIUM 5000 UNITS: 5000 INJECTION INTRAVENOUS; SUBCUTANEOUS at 08:11

## 2020-11-02 RX ADMIN — HEPARIN SODIUM 5000 UNITS: 5000 INJECTION INTRAVENOUS; SUBCUTANEOUS at 02:11

## 2020-11-02 RX ADMIN — MIDODRINE HYDROCHLORIDE 15 MG: 5 TABLET ORAL at 10:11

## 2020-11-02 RX ADMIN — OXYCODONE HYDROCHLORIDE 20 MG: 10 TABLET ORAL at 02:11

## 2020-11-02 RX ADMIN — OXYCODONE HYDROCHLORIDE 20 MG: 10 TABLET ORAL at 05:11

## 2020-11-02 RX ADMIN — MIDODRINE HYDROCHLORIDE 15 MG: 5 TABLET ORAL at 08:11

## 2020-11-02 RX ADMIN — HEPARIN SODIUM 5000 UNITS: 5000 INJECTION INTRAVENOUS; SUBCUTANEOUS at 05:11

## 2020-11-02 RX ADMIN — OXYCODONE HYDROCHLORIDE 10 MG: 10 TABLET ORAL at 08:11

## 2020-11-02 RX ADMIN — FAMOTIDINE 20 MG: 20 TABLET, FILM COATED ORAL at 08:11

## 2020-11-02 RX ADMIN — ACETAMINOPHEN 650 MG: 325 TABLET ORAL at 08:11

## 2020-11-02 RX ADMIN — DEXMEDETOMIDINE HYDROCHLORIDE 1 MCG/KG/HR: 4 INJECTION, SOLUTION INTRAVENOUS at 08:11

## 2020-11-02 RX ADMIN — OXYCODONE HYDROCHLORIDE 20 MG: 10 TABLET ORAL at 11:11

## 2020-11-02 RX ADMIN — OXYCODONE HYDROCHLORIDE 10 MG: 10 TABLET ORAL at 02:11

## 2020-11-02 RX ADMIN — LACTULOSE 30 G: 10 SOLUTION ORAL at 08:11

## 2020-11-02 RX ADMIN — MIDODRINE HYDROCHLORIDE 15 MG: 5 TABLET ORAL at 02:11

## 2020-11-02 NOTE — RESPIRATORY THERAPY
Patient extubated w/ parameters.  Cuff leak was present.  Patient extubated to BIPAP per order. Patient tolerating well.  Will continue to monitor.

## 2020-11-02 NOTE — PLAN OF CARE
CMICU DAILY GOALS   Remains intubated on precedex for anxiety. Pain management primary issue with swap to PO analgesia. Pain remains 9 or 10 out of 10 even after prn medication administration.     A: Awake    RASS: Goal - RASS Goal: -2-->light sedation  Actual - RASS (Diallo Agitation-Sedation Scale): -1-->drowsy   Restraint necessity: Clinical Justification: Removing medical devices  B: Breath   SBT: Not attempted   C: Coordinate A & B, analgesics/sedatives   Pain: uncontrolled    SAT: Not attempted  D: Delirium   CAM-ICU: Overall CAM-ICU: Negative  E: Early(intubated/ Progressive (non-intubated) Mobility   MOVE Screen: Fail   Activity: Activity Management: rolling - L1  FAS: Feeding/Nutrition   Diet order: Diet/Nutrition Received: tube feeding,   Fluid restriction:    T: Thrombus   DVT prophylaxis: VTE Required Core Measure: (SCDs) Sequential compression device initiated/maintained  H: HOB Elevation   Head of Bed (HOB): HOB at 30-45 degrees  U: Ulcer Prophylaxis   GI: yes  G: Glucose control   managed Glycemic Management: blood glucose monitoring  S: Skin   Bundle compliance: yes   Bathing/Skin Care: bath, complete, bath, chlorhexidine, dressed/undressed, linen changed, incontinence care Date: t-1  B: Bowel Function   constipation   I: Indwelling Catheters   [place] necessity:      Urethral Catheter 10/29/20 1415 Latex-Reason for Continuing Urinary Catheterization: Critically ill in ICU requiring intensive monitoring  [REMOVED]      Urethral Catheter 10/26/20 2330 16 Fr.-Reason for Continuing Urinary Catheterization: Critically ill in ICU requiring intensive monitoring   CVC necessity: Yes   IPAD offered: No  D: De-escalation Antibx   No  Plan for the day   Wean vent and possible extubation.  Family/Goals of care/Code Status   Code Status: Full Code     No acute events throughout day, VS and assessment per flow sheet, patient progressing towards goals as tolerated, plan of care reviewed with Марина Britton and  family, all concerns addressed, will continue to monitor.

## 2020-11-02 NOTE — SUBJECTIVE & OBJECTIVE
Interval History:   Patient afebrile over last 24 hours. WBC down trending. Extubated today to BIPAP. Off pressors. Feeling very weak. No new complaints.      Review of Systems   Constitutional: Positive for fatigue. Negative for fever.   Respiratory: Positive for cough and shortness of breath.    Neurological: Positive for weakness.     Objective:     Vital Signs (Most Recent):  Temp: 97.6 °F (36.4 °C) (11/02/20 1100)  Pulse: 67 (11/02/20 1300)  Resp: (!) 24 (11/02/20 1300)  BP: (!) 88/59 (11/02/20 1300)  SpO2: 99 % (11/02/20 1300) Vital Signs (24h Range):  Temp:  [96 °F (35.6 °C)-98.4 °F (36.9 °C)] 97.6 °F (36.4 °C)  Pulse:  [] 67  Resp:  [17-35] 24  SpO2:  [95 %-100 %] 99 %  BP: ()/(44-84) 88/59     Weight: 53.2 kg (117 lb 4.6 oz)  Body mass index is 21.45 kg/m².    Estimated Creatinine Clearance: 43 mL/min (A) (based on SCr of 1.5 mg/dL (H)).    Physical Exam  Vitals signs and nursing note reviewed.   Constitutional:       Appearance: She is ill-appearing.   Eyes:      Conjunctiva/sclera: Conjunctivae normal.   Neck:      Comments: RIJ c/d/i  Cardiovascular:      Rate and Rhythm: Normal rate and regular rhythm.      Heart sounds: Murmur present.   Pulmonary:      Effort: Tachypnea present.      Breath sounds: Rales present.      Comments: Extubated to BIPAP  Increased work of breathing  Abdominal:      General: Abdomen is flat. There is no distension.      Palpations: There is no mass.   Skin:     General: Skin is warm and dry.      Comments: Janeway lesions on digits  Wound on ankle noted  Tattoos   Neurological:      Mental Status: She is alert.   Psychiatric:         Mood and Affect: Mood normal.         Behavior: Behavior normal.         Thought Content: Thought content normal.         Significant Labs: All pertinent labs within the past 24 hours have been reviewed.    Significant Imaging: I have reviewed all pertinent imaging results/findings within the past 24 hours.

## 2020-11-02 NOTE — PROGRESS NOTES
[facility]  Critical Care Medicine  Progress Note    Patient Name: Марина Britton  MRN: [id number]  Admission Date: 10/26/2020  Hospital Length of Stay: 7 days  Code Status: Full Code  Attending Provider: Helena Loomis MD  Primary Care Provider: Luis Felipe Jose Iii, MD   Principal Problem: <principal problem not specified>    Subjective:     HPI:  30F with PMHx IVUD heroin (last use approx 2wk ago) presents as transfer from OSH for septic endocarditis with b/l PE requiring higher level of care.  Per patient and chart review, she has had progressively worsening chest and back pain over the past several days with a Tmax of 105F at home.  She presented to [facility] and was admitted for sepsis on 10/24.  Bcx showed GPC consistent with Staph Aureus, so she was started on Vanc/Zosyn.  She was stepped up to the ICU on 10/25 for worsening tachypnea.  She also received 2U pRBC x2 for anemia with a pavel Hb of 6.5.  She was transferred to [facility] 10/26 for a higher level of care.    Hospital/ICU Course:  10/27/2020 Patient extremely tachypneic >50 breaths per minute on arrival. Concern for respiratory distress and airway protection led to intubation. WBC trending down, persistently febrile tmax 103.2F, repeat cultures pending. OG tube to suction with bilious output. ID consulted    10/28/2020 Blood cultures remain positive for MRSA. CTS evaluated and recommending 6 week IV abx prior to re-eval in clinic. ID following.     10/29/2020 No acute changes, continues to fail SBT due to apparent anxiety/discomfort. Added precedex and oral pain regimen. Hgb 6.8 given 1 unit PRBC, unclear source of bleeding, some hematuria. Hemolysis labs negative. Retroperitoneal u/s ordered. Started enteral feeds, tolerating well.    10/30/2020  Low dose levophed started due to low MAPs, uptitrating pain regimen. Continues to fail SBT despite minimal vent settings, remains 3/30%.    11/1/2020  No acute events. Failed  SBT due to rapid, shallow breathing. Mild DERICK and fluids given      Interval History/Significant Events: NAEO. Patient continues to have rapid, shallow breathing on SBT, appears to be related to pain. Mild DERICK with Cr to 1.3. Will give fluid bolus and hold naproxen. Uptitrating pain regiment    Review of Systems   Unable to perform ROS: Intubated     Objective:     Vital Signs (Most Recent):  Temp: 97.6 °F (36.4 °C) (11/02/20 1100)  Pulse: 68 (11/02/20 1200)  Resp: (!) 23 (11/02/20 1200)  BP: 101/63 (11/02/20 1200)  SpO2: 97 % (11/02/20 1200) Vital Signs (24h Range):  Temp:  [96 °F (35.6 °C)-98.4 °F (36.9 °C)] 97.6 °F (36.4 °C)  Pulse:  [] 68  Resp:  [17-35] 23  SpO2:  [95 %-100 %] 97 %  BP: ()/(44-84) 101/63   Weight: 53.2 kg (117 lb 4.6 oz)  Body mass index is 21.45 kg/m².      Intake/Output Summary (Last 24 hours) at 11/2/2020 1248  Last data filed at 11/2/2020 1200  Gross per 24 hour   Intake 918.17 ml   Output 544 ml   Net 374.17 ml       Physical Exam  Vitals signs and nursing note reviewed.   Constitutional:       Appearance: Normal appearance. She is ill-appearing.      Comments: Intubated, nods to questions, types on phone to answer questions   HENT:      Head: Normocephalic and atraumatic.      Right Ear: External ear normal.      Left Ear: External ear normal.      Nose: Nose normal.      Mouth/Throat:      Mouth: Mucous membranes are moist.   Eyes:      Extraocular Movements: Extraocular movements intact.      Pupils: Pupils are equal, round, and reactive to light.   Neck:      Musculoskeletal: Normal range of motion. No neck rigidity.   Cardiovascular:      Rate and Rhythm: Normal rate and regular rhythm.      Heart sounds: Murmur (left lower sternal border) present. No friction rub. No gallop.    Pulmonary:      Comments: Intubated, mechanically ventilated breath sounds  Abdominal:      General: There is no distension.      Palpations: Abdomen is soft.      Tenderness: There is no abdominal  tenderness.   Musculoskeletal:      Right lower leg: No edema.      Left lower leg: No edema.   Skin:     Capillary Refill: Capillary refill takes less than 2 seconds.      Comments: Left medial ankle ulcer   Neurological:      Mental Status: She is alert.      Comments: Arousable, nods yes and no to questions, follows commands.         Vents:  Vent Mode: Spont (11/02/20 0945)  Ventilator Initiated: Yes(chart correction) (10/26/20 2315)  Set Rate: 0 BPM (11/02/20 0945)  Vt Set: 0 mL (11/02/20 0945)  Pressure Support: 8 cmH20 (11/02/20 0945)  PEEP/CPAP: 5 cmH20 (11/02/20 0945)  Oxygen Concentration (%): 25 (11/02/20 1200)  Peak Airway Pressure: 13 cmH2O (11/02/20 0945)  Plateau Pressure: 24 cmH20 (11/02/20 0945)  Total Ve: 7.8 mL (11/02/20 0945)  Negative Inspiratory Force (cm H2O): -25 (11/02/20 1050)  F/VT Ratio<105 (RSBI): (!) 89.4 (11/02/20 0945)  Lines/Drains/Airways     Central Venous Catheter Line            Percutaneous Central Line Insertion/Assessment - Triple Lumen  10/27/20 0058 right internal jugular 6 days          Drain                 Urethral Catheter 10/29/20 1415 Latex 3 days              Significant Labs:    CBC/Anemia Profile:  Recent Labs   Lab 11/01/20  0155 11/02/20  0400   WBC 19.68* 19.64*   HGB 8.8* 9.0*   HCT 28.9* 29.4*    245   MCV 87 86   RDW 17.1* 17.3*        Chemistries:  Recent Labs   Lab 11/01/20  0155 11/01/20  1305 11/02/20  0400   * 130* 129*   K 4.5 4.8 4.8    103 103   CO2 18* 19* 18*   BUN 44* 42* 43*   CREATININE 1.3 1.4 1.5*   CALCIUM 7.2* 7.5* 7.1*   ALBUMIN 1.1*  --  1.1*   PROT 5.1*  --  5.2*   BILITOT 0.7  --  0.8   ALKPHOS 150*  --  182*   ALT 13  --  12   AST 25  --  19   MG 2.4  --  2.3   PHOS 5.4*  --  5.5*       ABGs: No results for input(s): PH, PCO2, HCO3, POCSATURATED, BE in the last 48 hours.    Significant Imaging:  I have reviewed all pertinent imaging results/findings within the past 24 hours.      ABG  Recent Labs   Lab 10/27/20  0459    PH 7.458*   PO2 67*   PCO2 29.4*   HCO3 20.8*   BE -3     Assessment/Plan:     Psychiatric  IVDU (intravenous drug user)  - will need counseling/resources at discharge  - monitor for withdrawal symptoms        Pulmonary  Pneumonia of both lungs due to infectious organism  CT chest demonstrating bilateral scattered opacities of varying size with notable cavitations concerning for septic emboli from endocarditis (large tricuspid vegetation noted on echocardiogram    Patient with severe pleuritic chest pain, treating with oral oxy 20mg q6 with 10mg q4 PRN    Vent Mode: Spont  Oxygen Concentration (%):  [25] 25  Resp Rate Total:  [21 br/min-35 br/min] 30 br/min  Vt Set:  [0 mL] 0 mL  PEEP/CPAP:  [5 cmH20] 5 cmH20  Pressure Support:  [0 cmH20-8 cmH20] 8 cmH20  Mean Airway Pressure:  [7.7 cmH20-10 cmH20] 7.7 cmH20      - continue vanc  -intubated 10/27 for respiratory distress; extubated 11/2 to BiPAP  - tolerating BiPAP and consider weaning to nasal cannula    Cardiac/Vascular  Endocarditis  Large tricuspid vegetation noted on TTE with septic emboli to lungs bilaterally  -no need for AC at this time, but will continue to monitor  -will consider need for HARI   - See severe sepsis; managing with vancomycin    ID  Severe sepsis  WBC peaked at 25.66, persistently febrile to 103F, tachycardic to 120s, lactate wnl. Encephalopathic on arrival concerning for end-organ damage    WBC 25>18>19>17>19.9> 19.8> 25.7>19.64 > 19.68    -blood cultures 10/24,10/26, 10/28 positive for MRSA, will continue daily blood cultures (difficult to obtain)  - BCx NGTD from 10/31 and 11/1  - S/p fluid resuscitation at [facility]  - ID on board- recommended following blood cultures daily and if not clearing bacteremia consider daptomycin; as of 11/2 BCx x2 (10/31 and 11/1) NGTD, will continue vancomycin  - Requiring small amount of levophed at 0.02; will add midodrine 15mg TID       Critical Care Daily Checklist:    A: Awake: RASS Goal/Actual Goal:  RASS Goal: 0-->alert and calm  Actual: Diallo Agitation Sedation Scale (RASS): Drowsy   B: Spontaneous Breathing Trial Performed? Spon. Breathing Trial Initiated?: Initiated (11/02/20 3145)   C: SAT & SBT Coordinated?  Yes, extubated to BiPAP 11/2                    D: Delirium: CAM-ICU Overall CAM-ICU: Negative   E: Early Mobility Performed? No   F: Feeding Goal: Goals: Meet % EEN, EPN by RD f/u date  Status: Nutrition Goal Status: new   Current Diet Order   Procedures    Diet Adult Regular (IDDSI Level 7)      AS: Analgesia/Sedation Oxycodone 20mg q6hrs; 10mg q4hrs PRN   T: Thromboembolic Prophylaxis heparin   H: HOB > 300 Yes   U: Stress Ulcer Prophylaxis (if needed) famotidine   G: Glucose Control N/A   B: Bowel Function Stool Occurrence: 0   I: Indwelling Catheter (Lines & Fontaine) Necessity RIJ, fontaine   D: De-escalation of Antimicrobials/Pharmacotherapies vancomycin    Plan for the day/ETD Extubate, wean levophed with midodrine    Code Status:  Family/Goals of Care: Full Code       Critical secondary to Patient has a condition that poses threat to life and bodily function: Severe Respiratory Distress and Sepsis requiring vasopressor      Critical care was time spent personally by me on the following activities: development of treatment plan with patient or surrogate and bedside caregivers, discussions with consultants, evaluation of patient's response to treatment, examination of patient, ordering and performing treatments and interventions, ordering and review of laboratory studies, ordering and review of radiographic studies, pulse oximetry, re-evaluation of patient's condition. This critical care time did not overlap with that of any other provider or involve time for any procedures.     Letitia Leon MD  Critical Care Medicine  [facility]

## 2020-11-02 NOTE — PROGRESS NOTES
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 Course complicated by respiratory failure requiring intubation. Blood cultures have remained positive from 10/24 - 10/28. She is currently on Vancomycin. Repeat blood cx of 10/31 thus far are NGTD. Now afebrile over the last 48 hours. Leukocytosis decreasing. Extubated to BIPAP today. Off pressors. CTS has evaluated the patient and does not plan for acute surgical intervention.       Recommendations  - Vancomycin on hold due to supratherapeutic trough. Vanc level ordered for a.m. Re-dose once Vanc level is below 20.   - Follow repeat blood cultures to assess for clearance  - If repeat blood cultures remain persistently positive, will stop Vancomycin and starting Daptomycin and Ceftaroline  - Anticipate at minimum 6 weeks of IV abx therapy  - ID will follow.        Please call for any questions. Thank you.  Evelyne Brand PA-C  Phone: [phone]  Pager: [phone]    Subjective:     Principal Problem:<principal problem not specified>    HPI: History obtained per chart review. Pt critically ill. Intubated, sedated.       30F with PMHx IVUD heroin (last use approx 2wk ago), Hepatitis C presents as transfer from OS for septic endocarditis with b/l PE requiring higher level of care.  Per patient and chart review, she has had progressively worsening chest and back pain over the past several days with a Tmax of 105F at home.  She presented to [facility] and was  "admitted for sepsis on 10/24.  Bcx showed +MRSA and found to have large vegetation on tricuspid valve on TTE. Noted to have septic emobli on imaging studies. She was started on empiric vancomycin and zosyn. She was stepped up to the ICU on 10/25 for worsening tachypnea.  She also received 2U pRBC x2 for anemia with a pavel Hb of 6.5.  She was transferred to [facility] 10/26 for a higher level of care.    Per chart review. She has been seen in ED for fatigue and wound of her left foot. Per nursing note," Pt shows me a wound on her left foot/ankle, states it has been there over 1 month.  Pt states it started off as an are where she injected heroin, then turned in to a blister, abscess.  She states she was recently seen in [facility] ED and placed on antibiotics.  Pt has not followed up with anyone regarding this wound. Spoke with ER MD Dr. Linares, pt does not tomas to be started on any antibiotics at this time, pt needs referral to wound care.  This was explained to pt who stated "ok."   she has been on different abx including bactrim and clindamycin without improvement. No follow up.   Interval History:   Patient afebrile over last 24 hours. WBC down trending. Extubated today to BIPAP. Off pressors. Feeling very weak. No new complaints.      Review of Systems   Constitutional: Positive for fatigue. Negative for fever.   Respiratory: Positive for cough and shortness of breath.    Neurological: Positive for weakness.     Objective:     Vital Signs (Most Recent):  Temp: 97.6 °F (36.4 °C) (11/02/20 1100)  Pulse: 67 (11/02/20 1300)  Resp: (!) 24 (11/02/20 1300)  BP: (!) 88/59 (11/02/20 1300)  SpO2: 99 % (11/02/20 1300) Vital Signs (24h Range):  Temp:  [96 °F (35.6 °C)-98.4 °F (36.9 °C)] 97.6 °F (36.4 °C)  Pulse:  [] 67  Resp:  [17-35] 24  SpO2:  [95 %-100 %] 99 %  BP: ()/(44-84) 88/59     Weight: 53.2 kg (117 lb 4.6 oz)  Body mass index is 21.45 kg/m².    Estimated Creatinine Clearance: 43 mL/min (A) (based on SCr " of 1.5 mg/dL (H)).    Physical Exam  Vitals signs and nursing note reviewed.   Constitutional:       Appearance: She is ill-appearing.   Eyes:      Conjunctiva/sclera: Conjunctivae normal.   Neck:      Comments: RIJ c/d/i  Cardiovascular:      Rate and Rhythm: Normal rate and regular rhythm.      Heart sounds: Murmur present.   Pulmonary:      Effort: Tachypnea present.      Breath sounds: Rales present.      Comments: Extubated to BIPAP  Increased work of breathing  Abdominal:      General: Abdomen is flat. There is no distension.      Palpations: There is no mass.   Skin:     General: Skin is warm and dry.      Comments: Janeway lesions on digits  Wound on ankle noted  Tattoos   Neurological:      Mental Status: She is alert.   Psychiatric:         Mood and Affect: Mood normal.         Behavior: Behavior normal.         Thought Content: Thought content normal.         Significant Labs: All pertinent labs within the past 24 hours have been reviewed.    Significant Imaging: I have reviewed all pertinent imaging results/findings within the past 24 hours.

## 2020-11-02 NOTE — ASSESSMENT & PLAN NOTE
CT chest demonstrating bilateral scattered opacities of varying size with notable cavitations concerning for septic emboli from endocarditis (large tricuspid vegetation noted on echocardiogram    Patient with severe pleuritic chest pain, treating with oral oxy 20mg q6 with 10mg q4 PRN    Vent Mode: Spont  Oxygen Concentration (%):  [25] 25  Resp Rate Total:  [21 br/min-35 br/min] 30 br/min  Vt Set:  [0 mL] 0 mL  PEEP/CPAP:  [5 cmH20] 5 cmH20  Pressure Support:  [0 cmH20-8 cmH20] 8 cmH20  Mean Airway Pressure:  [7.7 cmH20-10 cmH20] 7.7 cmH20      - continue vanc  -intubated 10/27 for respiratory distress; extubated 11/2 to BiPAP  - tolerating BiPAP and consider weaning to nasal cannula

## 2020-11-02 NOTE — CONSULTS
Wound care consulted for right buttocks.  Follow-up on left interior heel.  PMH:  Pneumonia of both lungs due to infectious organism, severe sepsis, IVDU, pulmonary emboli, endocarditis, tachypnea, MRSA bacteremia  Assessment:  The patient is lying with HOB elevated 30 degrees, ENDO tube to vent- plans to be extubated this am. Pillows for support and heel protectors in place.   The right buttock has scattered intact raised redness , no drainage- patient has a history of MRSA. .   The left medial ankle/foot has an ulceration with red/moist tissue, open wound edges.    Recommendations:  Right buttock- nursing to cleanse with cleansing cloths, apply barrier cream (purple top) to skin to protect from moisture/shearing.  Left inner ankle/foot- continue HydroferaBlue Ready dressing every Monday and Thursday, cover with foam dressing.   Nursing to continue care, wound care will follow-up prn.  AGUILAR Flanagan RN, CWCN  [id number]    Right buttock      Left inner ankle/foot  2 cm L x 2cm W

## 2020-11-02 NOTE — PLAN OF CARE
CMICU DAILY GOALS       A: Awake    RASS: Goal - RASS Goal: 0-->alert and calm  Actual - RASS (Diallo Agitation-Sedation Scale): 0-->alert and calm   Restraint necessity: Clinical Justification: Removing medical devices  B: Breath   SBT: Pass   C: Coordinate A & B, analgesics/sedatives   Pain: managed    SAT: Pass  D: Delirium   CAM-ICU: Overall CAM-ICU: Negative  E: Early(intubated/ Progressive (non-intubated) Mobility   MOVE Screen: Pass   Activity: Activity Management: rolling - L1  FAS: Feeding/Nutrition   Diet order: Diet/Nutrition Received: tube feeding,   Fluid restriction:    T: Thrombus   DVT prophylaxis: VTE Required Core Measure: (SCDs) Sequential compression device initiated/maintained  H: HOB Elevation   Head of Bed (HOB): HOB at 30-45 degrees  U: Ulcer Prophylaxis   GI: yes  G: Glucose control   managed Glycemic Management: blood glucose monitoring  S: Skin   Bundle compliance: yes   Bathing/Skin Care: bath, complete, bath, chlorhexidine, dressed/undressed, linen changed, incontinence care Date: 11/1  B: Bowel Function   no issues   I: Indwelling Catheters   Adkins necessity:      Urethral Catheter 10/29/20 1415 Latex-Reason for Continuing Urinary Catheterization: Critically ill in ICU requiring intensive monitoring  [REMOVED]      Urethral Catheter 10/26/20 2330 16 Fr.-Reason for Continuing Urinary Catheterization: Critically ill in ICU requiring intensive monitoring   CVC necessity: Yes   IPAD offered: Not appropriate  D: De-escalation Antibx   No  Plan for the day   Control pain and repeat SBT, monitor urine output  Family/Goals of care/Code Status   Code Status: Full Code     No acute events throughout day, VS and assessment per flow sheet, patient progressing towards goals as tolerated, plan of care reviewed with Марина Britton and family, all concerns addressed, will continue to monitor.

## 2020-11-02 NOTE — PROGRESS NOTES
Pharmacokinetic Assessment Follow Up: IV Vancomycin    Vancomycin Regimen Assessment & Plan:  - Vancomycin level drawn ~23h from 1250 mg IV dose resulted as 27.3 ug/mL. Goal trough 15-20 ug/mL.  - Patient with up trending SCr and low UOP. Changing from scheduled regimen to pulse dosing.  - No need for vancomycin dose today given level. Draw vancomycin level with morning labs tomorrow. Will re-dose as needed depending on level and renal function.    Drug levels (last 3 results):  Recent Labs   Lab Result Units 10/31/20  0452 11/02/20  0807   Vancomycin, Random ug/mL 17.3 27.3     Pharmacy will continue to follow and monitor vancomycin.    Please contact pharmacy at extension [phone] for questions regarding this assessment.    Thank you for the consult,   Homer Ford     Patient brief summary:  Марина Britton is a 30 y.o. female initiated on antimicrobial therapy with IV Vancomycin for treatment of endocarditis    Drug Allergies:   Review of patient's allergies indicates:  No Known Allergies    Actual Body Weight:   53.2 kg    Renal Function:   Estimated Creatinine Clearance: 43 mL/min (A) (based on SCr of 1.5 mg/dL (H)).,     Dialysis Method (if applicable):  N/A

## 2020-11-02 NOTE — PLAN OF CARE
No acute events throughout day, VS and assessment per flow sheet, see below for updates:    Pulmonary: successfully extubated and weaned to 3L NC; no reports of SOB; does complain of pleuritic pain - treated with scheduled and PRN PO meds; SpO2 maintaining >95% throughout shift    Cardiovascular: NSR 70s-80s; weaned off Levo after extubation with midodrine added - MAPs >65    Neurological: AAOx4; moves all extremities spontaneously without difficulty; afebrile    Gastrointestinal: TF stopped for extubation; passed bedside lowery; administered bowel regimen as ordered    Genitourinary: fontaine in place with UOP >30mL/hr    Skin/Bath: wound to lateral interior heel and maroon to R buttock - barrier cream applied    Date of last CHG bath given: 11/2/20 1600    Infusions: Levo and precedex weaned off    Patient progressing towards goals as tolerated, plan of care communicated and reviewed with Марина Britton and family, all concerns addressed, will continue to monitor.     Marti Colón RN    CMICU DAILY GOALS       A: Awake    RASS: Goal - RASS Goal: 0-->alert and calm  Actual - RASS (Diallo Agitation-Sedation Scale): -1-->drowsy   Restraint necessity: Clinical Justification: Removing medical devices  B: Breath   SBT: Pass   C: Coordinate A & B, analgesics/sedatives   Pain: managed    SAT: Pass  D: Delirium   CAM-ICU: Overall CAM-ICU: Negative  E: Early(intubated/ Progressive (non-intubated) Mobility   MOVE Screen: Pass   Activity: Activity Management: arm raise - L1  FAS: Feeding/Nutrition   Diet order: Diet/Nutrition Received: NPO,   Fluid restriction:    T: Thrombus   DVT prophylaxis: VTE Required Core Measure: Pharmacological prophylaxis initiated/maintained  H: HOB Elevation   Head of Bed (HOB): HOB at 30-45 degrees  U: Ulcer Prophylaxis   GI: yes  G: Glucose control   managed Glycemic Management: blood glucose monitoring  S: Skin   Bundle compliance: yes   Bathing/Skin Care: bath, complete, bath,  chlorhexidine, dressed/undressed, linen changed, incontinence care Date: 11/2/20 1600  B: Bowel Function   no issues   I: Indwelling Catheters   Adkins necessity:      Urethral Catheter 10/29/20 1415 Latex-Reason for Continuing Urinary Catheterization: Critically ill in ICU and requiring hourly monitoring of intake/output  [REMOVED]      Urethral Catheter 10/26/20 2330 16 Fr.-Reason for Continuing Urinary Catheterization: Critically ill in ICU requiring intensive monitoring   CVC necessity: Yes   IPAD offered: Not appropriate  D: De-escalation Antibx   Yes  Plan for the day   Successfully extubated; SD nathan  Family/Goals of care/Code Status   Code Status: Full Code     No acute events throughout day, VS and assessment per flow sheet, patient progressing towards goals as tolerated, plan of care reviewed with Марина Britton and family, all concerns addressed, will continue to monitor.

## 2020-11-02 NOTE — PLAN OF CARE
Patient not medically stable for stepdown at this time. Patient continues to require [facility] service for:   Extubated this AM  Sedation    CM remains available for any further patient/family needs or concerns.        11/02/20 1150   Discharge Reassessment   Assessment Type Discharge Planning Reassessment   Do you have any problems affording any of your prescribed medications? No   Discharge Plan A Home with family   Discharge Plan B Rehab   DME Needed Upon Discharge  other (see comments)  (TBD)   Anticipated Discharge Disposition Home   Can the patient/caregiver answer the patient profile reliably? No, cognitively impaired   Post-Acute Status   Post-Acute Authorization Other   Other Status No Post-Acute Service Needs   Discharge Delays None known at this time       Seng Jimenes MSN, RN-BC  Critical Care-   Ext. [phone]

## 2020-11-02 NOTE — ASSESSMENT & PLAN NOTE
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 Course complicated by respiratory failure requiring intubation. Blood cultures have remained positive from 10/24 - 10/28. She is currently on Vancomycin. Repeat blood cx of 10/31 thus far are NGTD. Now afebrile over the last 48 hours. Leukocytosis decreasing. Extubated to BIPAP today. Off pressors. CTS has evaluated the patient and does not plan for acute surgical intervention.       Recommendations  - Vancomycin on hold due to supratherapeutic trough. Vanc level ordered for a.m. Re-dose once Vanc level is below 20.   - Follow repeat blood cultures to assess for clearance  - If repeat blood cultures remain persistently positive, will stop Vancomycin and starting Daptomycin and Ceftaroline  - Anticipate at minimum 6 weeks of IV abx therapy  - ID will follow.

## 2020-11-02 NOTE — ASSESSMENT & PLAN NOTE
WBC peaked at 25.66, persistently febrile to 103F, tachycardic to 120s, lactate wnl. Encephalopathic on arrival concerning for end-organ damage    WBC 25>18>19>17>19.9> 19.8> 25.7>19.64 > 19.68    -blood cultures 10/24,10/26, 10/28 positive for MRSA, will continue daily blood cultures (difficult to obtain)  - BCx NGTD from 10/31 and 11/1  - S/p fluid resuscitation at [place]  - ID on board- recommended following blood cultures daily and if not clearing bacteremia consider daptomycin; as of 11/2 BCx x2 (10/31 and 11/1) NGTD, will continue vancomycin  - Requiring small amount of levophed at 0.02; will add midodrine 15mg TID

## 2020-11-02 NOTE — SUBJECTIVE & OBJECTIVE
Interval History/Significant Events: NAEO. Patient continues to have rapid, shallow breathing on SBT, appears to be related to pain. Mild DERICK with Cr to 1.3. Will give fluid bolus and hold naproxen. Uptitrating pain regiment    Review of Systems   Unable to perform ROS: Intubated     Objective:     Vital Signs (Most Recent):  Temp: 97.6 °F (36.4 °C) (11/02/20 1100)  Pulse: 68 (11/02/20 1200)  Resp: (!) 23 (11/02/20 1200)  BP: 101/63 (11/02/20 1200)  SpO2: 97 % (11/02/20 1200) Vital Signs (24h Range):  Temp:  [96 °F (35.6 °C)-98.4 °F (36.9 °C)] 97.6 °F (36.4 °C)  Pulse:  [] 68  Resp:  [17-35] 23  SpO2:  [95 %-100 %] 97 %  BP: ()/(44-84) 101/63   Weight: 53.2 kg (117 lb 4.6 oz)  Body mass index is 21.45 kg/m².      Intake/Output Summary (Last 24 hours) at 11/2/2020 1248  Last data filed at 11/2/2020 1200  Gross per 24 hour   Intake 918.17 ml   Output 544 ml   Net 374.17 ml       Physical Exam  Vitals signs and nursing note reviewed.   Constitutional:       Appearance: Normal appearance. She is ill-appearing.      Comments: Intubated, nods to questions, types on phone to answer questions   HENT:      Head: Normocephalic and atraumatic.      Right Ear: External ear normal.      Left Ear: External ear normal.      Nose: Nose normal.      Mouth/Throat:      Mouth: Mucous membranes are moist.   Eyes:      Extraocular Movements: Extraocular movements intact.      Pupils: Pupils are equal, round, and reactive to light.   Neck:      Musculoskeletal: Normal range of motion. No neck rigidity.   Cardiovascular:      Rate and Rhythm: Normal rate and regular rhythm.      Heart sounds: Murmur (left lower sternal border) present. No friction rub. No gallop.    Pulmonary:      Comments: Intubated, mechanically ventilated breath sounds  Abdominal:      General: There is no distension.      Palpations: Abdomen is soft.      Tenderness: There is no abdominal tenderness.   Musculoskeletal:      Right lower leg: No edema.       Left lower leg: No edema.   Skin:     Capillary Refill: Capillary refill takes less than 2 seconds.      Comments: Left medial ankle ulcer   Neurological:      Mental Status: She is alert.      Comments: Arousable, nods yes and no to questions, follows commands.         Vents:  Vent Mode: Spont (11/02/20 0945)  Ventilator Initiated: Yes(chart correction) (10/26/20 2315)  Set Rate: 0 BPM (11/02/20 0945)  Vt Set: 0 mL (11/02/20 0945)  Pressure Support: 8 cmH20 (11/02/20 0945)  PEEP/CPAP: 5 cmH20 (11/02/20 0945)  Oxygen Concentration (%): 25 (11/02/20 1200)  Peak Airway Pressure: 13 cmH2O (11/02/20 0945)  Plateau Pressure: 24 cmH20 (11/02/20 0945)  Total Ve: 7.8 mL (11/02/20 0945)  Negative Inspiratory Force (cm H2O): -25 (11/02/20 1050)  F/VT Ratio<105 (RSBI): (!) 89.4 (11/02/20 0945)  Lines/Drains/Airways     Central Venous Catheter Line            Percutaneous Central Line Insertion/Assessment - Triple Lumen  10/27/20 0058 right internal jugular 6 days          Drain                 Urethral Catheter 10/29/20 1415 Latex 3 days              Significant Labs:    CBC/Anemia Profile:  Recent Labs   Lab 11/01/20  0155 11/02/20  0400   WBC 19.68* 19.64*   HGB 8.8* 9.0*   HCT 28.9* 29.4*    245   MCV 87 86   RDW 17.1* 17.3*        Chemistries:  Recent Labs   Lab 11/01/20  0155 11/01/20  1305 11/02/20  0400   * 130* 129*   K 4.5 4.8 4.8    103 103   CO2 18* 19* 18*   BUN 44* 42* 43*   CREATININE 1.3 1.4 1.5*   CALCIUM 7.2* 7.5* 7.1*   ALBUMIN 1.1*  --  1.1*   PROT 5.1*  --  5.2*   BILITOT 0.7  --  0.8   ALKPHOS 150*  --  182*   ALT 13  --  12   AST 25  --  19   MG 2.4  --  2.3   PHOS 5.4*  --  5.5*       ABGs: No results for input(s): PH, PCO2, HCO3, POCSATURATED, BE in the last 48 hours.    Significant Imaging:  I have reviewed all pertinent imaging results/findings within the past 24 hours.

## 2020-11-02 NOTE — ASSESSMENT & PLAN NOTE
Large tricuspid vegetation noted on TTE with septic emboli to lungs bilaterally  -no need for AC at this time, but will continue to monitor  -will consider need for HARI   - See severe sepsis; managing with vancomycin

## 2020-11-03 PROBLEM — N17.9 AKI (ACUTE KIDNEY INJURY): Status: ACTIVE | Noted: 2020-11-03

## 2020-11-03 PROBLEM — F12.20 CANNABIS USE DISORDER, SEVERE, DEPENDENCE: Chronic | Status: ACTIVE | Noted: 2020-11-03

## 2020-11-03 PROBLEM — I07.9 ENDOCARDITIS OF TRICUSPID VALVE: Status: ACTIVE | Noted: 2020-10-26

## 2020-11-03 PROBLEM — F11.20 OPIOID USE DISORDER, SEVERE, DEPENDENCE: Chronic | Status: ACTIVE | Noted: 2020-11-03

## 2020-11-03 PROBLEM — F13.20 SEDATIVE, HYPNOTIC OR ANXIOLYTIC USE DISORDER, SEVERE, DEPENDENCE: Chronic | Status: ACTIVE | Noted: 2020-11-03

## 2020-11-03 PROBLEM — F19.94 SUBSTANCE INDUCED MOOD DISORDER: Chronic | Status: ACTIVE | Noted: 2020-11-03

## 2020-11-03 LAB
ALBUMIN SERPL BCP-MCNC: 1.2 G/DL (ref 3.5–5.2)
ALP SERPL-CCNC: 183 U/L (ref 55–135)
ALT SERPL W/O P-5'-P-CCNC: 11 U/L (ref 10–44)
ANION GAP SERPL CALC-SCNC: 8 MMOL/L (ref 8–16)
AST SERPL-CCNC: 20 U/L (ref 10–40)
BASOPHILS # BLD AUTO: 0.17 K/UL (ref 0–0.2)
BASOPHILS NFR BLD: 1 % (ref 0–1.9)
BILIRUB SERPL-MCNC: 0.7 MG/DL (ref 0.1–1)
BUN SERPL-MCNC: 43 MG/DL (ref 6–20)
CALCIUM SERPL-MCNC: 7.4 MG/DL (ref 8.7–10.5)
CHLORIDE SERPL-SCNC: 104 MMOL/L (ref 95–110)
CO2 SERPL-SCNC: 19 MMOL/L (ref 23–29)
CREAT SERPL-MCNC: 1.8 MG/DL (ref 0.5–1.4)
CREAT UR-MCNC: 55 MG/DL (ref 15–325)
DIFFERENTIAL METHOD: ABNORMAL
EOSINOPHIL # BLD AUTO: 0.2 K/UL (ref 0–0.5)
EOSINOPHIL NFR BLD: 1.2 % (ref 0–8)
ERYTHROCYTE [DISTWIDTH] IN BLOOD BY AUTOMATED COUNT: 17.2 % (ref 11.5–14.5)
EST. GFR  (AFRICAN AMERICAN): 42.6 ML/MIN/1.73 M^2
EST. GFR  (NON AFRICAN AMERICAN): 37 ML/MIN/1.73 M^2
GLUCOSE SERPL-MCNC: 90 MG/DL (ref 70–110)
HCT VFR BLD AUTO: 29.5 % (ref 37–48.5)
HGB BLD-MCNC: 9 G/DL (ref 12–16)
IMM GRANULOCYTES # BLD AUTO: 0.47 K/UL (ref 0–0.04)
IMM GRANULOCYTES NFR BLD AUTO: 2.8 % (ref 0–0.5)
LYMPHOCYTES # BLD AUTO: 2 K/UL (ref 1–4.8)
LYMPHOCYTES NFR BLD: 11.9 % (ref 18–48)
MAGNESIUM SERPL-MCNC: 2.6 MG/DL (ref 1.6–2.6)
MCH RBC QN AUTO: 26.5 PG (ref 27–31)
MCHC RBC AUTO-ENTMCNC: 30.5 G/DL (ref 32–36)
MCV RBC AUTO: 87 FL (ref 82–98)
MONOCYTES # BLD AUTO: 1.3 K/UL (ref 0.3–1)
MONOCYTES NFR BLD: 8 % (ref 4–15)
NEUTROPHILS # BLD AUTO: 12.4 K/UL (ref 1.8–7.7)
NEUTROPHILS NFR BLD: 75.1 % (ref 38–73)
NRBC BLD-RTO: 0 /100 WBC
PHOSPHATE SERPL-MCNC: 5.8 MG/DL (ref 2.7–4.5)
PLATELET # BLD AUTO: 356 K/UL (ref 150–350)
PMV BLD AUTO: 10 FL (ref 9.2–12.9)
POTASSIUM SERPL-SCNC: 5.1 MMOL/L (ref 3.5–5.1)
PROT SERPL-MCNC: 5.3 G/DL (ref 6–8.4)
RBC # BLD AUTO: 3.39 M/UL (ref 4–5.4)
SODIUM SERPL-SCNC: 131 MMOL/L (ref 136–145)
SODIUM UR-SCNC: <20 MMOL/L (ref 20–250)
UUN UR-MCNC: 368 MG/DL (ref 140–1050)
VANCOMYCIN SERPL-MCNC: 18.4 UG/ML
WBC # BLD AUTO: 16.5 K/UL (ref 3.9–12.7)

## 2020-11-03 PROCEDURE — 25000003 PHARM REV CODE 250: Performed by: INTERNAL MEDICINE

## 2020-11-03 PROCEDURE — 99233 PR SUBSEQUENT HOSPITAL CARE,LEVL III: ICD-10-PCS | Mod: ,,, | Performed by: PHYSICIAN ASSISTANT

## 2020-11-03 PROCEDURE — 82570 ASSAY OF URINE CREATININE: CPT

## 2020-11-03 PROCEDURE — 99223 1ST HOSP IP/OBS HIGH 75: CPT | Mod: ,,, | Performed by: PSYCHIATRY & NEUROLOGY

## 2020-11-03 PROCEDURE — 80202 ASSAY OF VANCOMYCIN: CPT

## 2020-11-03 PROCEDURE — 25000003 PHARM REV CODE 250: Performed by: STUDENT IN AN ORGANIZED HEALTH CARE EDUCATION/TRAINING PROGRAM

## 2020-11-03 PROCEDURE — 83735 ASSAY OF MAGNESIUM: CPT

## 2020-11-03 PROCEDURE — 94761 N-INVAS EAR/PLS OXIMETRY MLT: CPT

## 2020-11-03 PROCEDURE — 11000001 HC ACUTE MED/SURG PRIVATE ROOM

## 2020-11-03 PROCEDURE — 85025 COMPLETE CBC W/AUTO DIFF WBC: CPT

## 2020-11-03 PROCEDURE — 84100 ASSAY OF PHOSPHORUS: CPT

## 2020-11-03 PROCEDURE — 99223 PR INITIAL HOSPITAL CARE,LEVL III: ICD-10-PCS | Mod: ,,, | Performed by: PSYCHIATRY & NEUROLOGY

## 2020-11-03 PROCEDURE — 63600175 PHARM REV CODE 636 W HCPCS: Performed by: INTERNAL MEDICINE

## 2020-11-03 PROCEDURE — 63600175 PHARM REV CODE 636 W HCPCS: Performed by: STUDENT IN AN ORGANIZED HEALTH CARE EDUCATION/TRAINING PROGRAM

## 2020-11-03 PROCEDURE — 94660 CPAP INITIATION&MGMT: CPT

## 2020-11-03 PROCEDURE — C1751 CATH, INF, PER/CENT/MIDLINE: HCPCS

## 2020-11-03 PROCEDURE — A4216 STERILE WATER/SALINE, 10 ML: HCPCS | Performed by: INTERNAL MEDICINE

## 2020-11-03 PROCEDURE — 80053 COMPREHEN METABOLIC PANEL: CPT

## 2020-11-03 PROCEDURE — 27000221 HC OXYGEN, UP TO 24 HOURS

## 2020-11-03 PROCEDURE — 99233 SBSQ HOSP IP/OBS HIGH 50: CPT | Mod: ,,, | Performed by: PHYSICIAN ASSISTANT

## 2020-11-03 PROCEDURE — 84540 ASSAY OF URINE/UREA-N: CPT

## 2020-11-03 PROCEDURE — 36573 INSJ PICC RS&I 5 YR+: CPT

## 2020-11-03 PROCEDURE — 76937 US GUIDE VASCULAR ACCESS: CPT

## 2020-11-03 PROCEDURE — 99233 SBSQ HOSP IP/OBS HIGH 50: CPT | Mod: ,,, | Performed by: INTERNAL MEDICINE

## 2020-11-03 PROCEDURE — 99233 PR SUBSEQUENT HOSPITAL CARE,LEVL III: ICD-10-PCS | Mod: ,,, | Performed by: INTERNAL MEDICINE

## 2020-11-03 PROCEDURE — 84300 ASSAY OF URINE SODIUM: CPT

## 2020-11-03 RX ORDER — SODIUM CHLORIDE 0.9 % (FLUSH) 0.9 %
10 SYRINGE (ML) INJECTION
Status: DISCONTINUED | OUTPATIENT
Start: 2020-11-03 | End: 2020-11-24 | Stop reason: HOSPADM

## 2020-11-03 RX ORDER — TALC
6 POWDER (GRAM) TOPICAL NIGHTLY PRN
Status: DISCONTINUED | OUTPATIENT
Start: 2020-11-03 | End: 2020-11-24 | Stop reason: HOSPADM

## 2020-11-03 RX ORDER — ACETAMINOPHEN 325 MG/1
650 TABLET ORAL EVERY 6 HOURS PRN
Status: DISCONTINUED | OUTPATIENT
Start: 2020-11-03 | End: 2020-11-24 | Stop reason: HOSPADM

## 2020-11-03 RX ORDER — OXYCODONE HYDROCHLORIDE 10 MG/1
10 TABLET ORAL EVERY 6 HOURS PRN
Status: DISCONTINUED | OUTPATIENT
Start: 2020-11-03 | End: 2020-11-24 | Stop reason: HOSPADM

## 2020-11-03 RX ORDER — MIRTAZAPINE 7.5 MG/1
7.5 TABLET, FILM COATED ORAL NIGHTLY
Status: DISCONTINUED | OUTPATIENT
Start: 2020-11-03 | End: 2020-11-12

## 2020-11-03 RX ORDER — SODIUM CHLORIDE 0.9 % (FLUSH) 0.9 %
10 SYRINGE (ML) INJECTION EVERY 6 HOURS
Status: DISCONTINUED | OUTPATIENT
Start: 2020-11-03 | End: 2020-11-24 | Stop reason: HOSPADM

## 2020-11-03 RX ORDER — ACETAMINOPHEN 500 MG
1000 TABLET ORAL EVERY 8 HOURS PRN
Status: DISCONTINUED | OUTPATIENT
Start: 2020-11-03 | End: 2020-11-24 | Stop reason: HOSPADM

## 2020-11-03 RX ORDER — CALCIUM CARBONATE 200(500)MG
500 TABLET,CHEWABLE ORAL 3 TIMES DAILY PRN
Status: DISCONTINUED | OUTPATIENT
Start: 2020-11-03 | End: 2020-11-24 | Stop reason: HOSPADM

## 2020-11-03 RX ORDER — POLYETHYLENE GLYCOL 3350 17 G/17G
17 POWDER, FOR SOLUTION ORAL DAILY PRN
Status: DISCONTINUED | OUTPATIENT
Start: 2020-11-03 | End: 2020-11-24 | Stop reason: HOSPADM

## 2020-11-03 RX ORDER — POLYETHYLENE GLYCOL 3350 17 G/17G
17 POWDER, FOR SOLUTION ORAL DAILY
Status: DISCONTINUED | OUTPATIENT
Start: 2020-11-04 | End: 2020-11-24 | Stop reason: HOSPADM

## 2020-11-03 RX ADMIN — HEPARIN SODIUM 5000 UNITS: 5000 INJECTION INTRAVENOUS; SUBCUTANEOUS at 01:11

## 2020-11-03 RX ADMIN — MIDODRINE HYDROCHLORIDE 15 MG: 5 TABLET ORAL at 09:11

## 2020-11-03 RX ADMIN — OXYCODONE HYDROCHLORIDE 20 MG: 10 TABLET ORAL at 06:11

## 2020-11-03 RX ADMIN — OXYCODONE HYDROCHLORIDE 20 MG: 10 TABLET ORAL at 11:11

## 2020-11-03 RX ADMIN — SODIUM CHLORIDE 500 ML: 0.9 INJECTION, SOLUTION INTRAVENOUS at 11:11

## 2020-11-03 RX ADMIN — MIDODRINE HYDROCHLORIDE 15 MG: 5 TABLET ORAL at 02:11

## 2020-11-03 RX ADMIN — HEPARIN SODIUM 5000 UNITS: 5000 INJECTION INTRAVENOUS; SUBCUTANEOUS at 06:11

## 2020-11-03 RX ADMIN — MIDODRINE HYDROCHLORIDE 15 MG: 5 TABLET ORAL at 08:11

## 2020-11-03 RX ADMIN — MIRTAZAPINE 7.5 MG: 7.5 TABLET ORAL at 08:11

## 2020-11-03 RX ADMIN — VANCOMYCIN HYDROCHLORIDE 750 MG: 750 INJECTION, POWDER, LYOPHILIZED, FOR SOLUTION INTRAVENOUS at 07:11

## 2020-11-03 RX ADMIN — SENNOSIDES 8.6 MG: 8.6 TABLET, FILM COATED ORAL at 09:11

## 2020-11-03 RX ADMIN — Medication 10 ML: at 11:11

## 2020-11-03 RX ADMIN — OXYCODONE HYDROCHLORIDE 10 MG: 10 TABLET ORAL at 09:11

## 2020-11-03 RX ADMIN — SODIUM CHLORIDE, SODIUM LACTATE, POTASSIUM CHLORIDE, AND CALCIUM CHLORIDE 500 ML: .6; .31; .03; .02 INJECTION, SOLUTION INTRAVENOUS at 11:11

## 2020-11-03 RX ADMIN — OXYCODONE HYDROCHLORIDE 20 MG: 10 TABLET ORAL at 01:11

## 2020-11-03 RX ADMIN — HEPARIN SODIUM 5000 UNITS: 5000 INJECTION INTRAVENOUS; SUBCUTANEOUS at 10:11

## 2020-11-03 NOTE — SUBJECTIVE & OBJECTIVE
Interval History/Significant Events: Tolerated extubation well on 2L nasal cannula 100%, will take off today. Cr trending to 1.8 with urine studies suggesting pre-renal. Pt given 1L IVF and will encourage PO hydration. Pain better controlled and plan for step down today    Review of Systems   Constitutional: Negative for fever.   Respiratory: Negative for shortness of breath.    Gastrointestinal: Negative for abdominal pain, nausea and vomiting.   Neurological: Negative for headaches.   All other systems reviewed and are negative.    Objective:     Vital Signs (Most Recent):  Temp: 98.2 °F (36.8 °C) (11/03/20 1105)  Pulse: 86 (11/03/20 1105)  Resp: (!) 22 (11/03/20 1105)  BP: 124/83 (11/03/20 1105)  SpO2: 100 % (11/03/20 1105) Vital Signs (24h Range):  Temp:  [97.8 °F (36.6 °C)-98.6 °F (37 °C)] 98.2 °F (36.8 °C)  Pulse:  [] 86  Resp:  [11-34] 22  SpO2:  [93 %-100 %] 100 %  BP: ()/(61-83) 124/83   Weight: 53.2 kg (117 lb 4.6 oz)  Body mass index is 21.45 kg/m².      Intake/Output Summary (Last 24 hours) at 11/3/2020 1309  Last data filed at 11/3/2020 1000  Gross per 24 hour   Intake 210 ml   Output 624 ml   Net -414 ml       Physical Exam  Vitals signs and nursing note reviewed.   Constitutional:       Appearance: Normal appearance.      Comments: Resting comfortably, answers questions appropriately   HENT:      Head: Normocephalic and atraumatic.      Right Ear: External ear normal.      Left Ear: External ear normal.      Nose: Nose normal.      Mouth/Throat:      Comments: Dry mucous membranes  Eyes:      Extraocular Movements: Extraocular movements intact.      Pupils: Pupils are equal, round, and reactive to light.   Neck:      Musculoskeletal: Normal range of motion. No neck rigidity.   Cardiovascular:      Rate and Rhythm: Normal rate and regular rhythm.      Heart sounds: Murmur (left lower sternal border) present. No friction rub. No gallop.    Pulmonary:      Effort: Pulmonary effort is normal.  No respiratory distress.      Breath sounds: Normal breath sounds. No wheezing or rales.   Abdominal:      General: There is no distension.      Palpations: Abdomen is soft.      Tenderness: There is no abdominal tenderness.   Musculoskeletal:      Right lower leg: No edema.      Left lower leg: No edema.   Skin:     Capillary Refill: Capillary refill takes less than 2 seconds.      Comments: Left medial ankle ulcer   Neurological:      General: No focal deficit present.      Mental Status: She is alert and oriented to person, place, and time.         Vents:  Vent Mode: Spont (11/02/20 0945)  Ventilator Initiated: Yes(chart correction) (10/26/20 2315)  Set Rate: 0 BPM (11/02/20 0945)  Vt Set: 0 mL (11/02/20 0945)  Pressure Support: 8 cmH20 (11/02/20 0945)  PEEP/CPAP: 5 cmH20 (11/02/20 0945)  Oxygen Concentration (%): 25 (11/02/20 1300)  Peak Airway Pressure: 13 cmH2O (11/02/20 0945)  Plateau Pressure: 24 cmH20 (11/02/20 0945)  Total Ve: 7.8 mL (11/02/20 0945)  Negative Inspiratory Force (cm H2O): -25 (11/02/20 1050)  F/VT Ratio<105 (RSBI): (!) 89.4 (11/02/20 0945)  Lines/Drains/Airways     Peripherally Inserted Central Catheter Line            PICC Double Lumen 11/03/20 1223 right basilic less than 1 day          Central Venous Catheter Line            Percutaneous Central Line Insertion/Assessment - Triple Lumen  10/27/20 0058 right internal jugular 7 days              Significant Labs:    CBC/Anemia Profile:  Recent Labs   Lab 11/02/20  0400 11/03/20  0338   WBC 19.64* 16.50*   HGB 9.0* 9.0*   HCT 29.4* 29.5*    356*   MCV 86 87   RDW 17.3* 17.2*        Chemistries:  Recent Labs   Lab 11/02/20  0400 11/03/20  0338   * 131*   K 4.8 5.1    104   CO2 18* 19*   BUN 43* 43*   CREATININE 1.5* 1.8*   CALCIUM 7.1* 7.4*   ALBUMIN 1.1* 1.2*   PROT 5.2* 5.3*   BILITOT 0.8 0.7   ALKPHOS 182* 183*   ALT 12 11   AST 19 20   MG 2.3 2.6   PHOS 5.5* 5.8*           Significant Imaging:  I have reviewed all  pertinent imaging results/findings within the past 24 hours.

## 2020-11-03 NOTE — PROCEDURES
"Марина Britton is a 30 y.o. female patient.    Temp: 98.2 °F (36.8 °C) (11/03/20 1105)  Pulse: 86 (11/03/20 1105)  Resp: (!) 22 (11/03/20 1105)  BP: 124/83 (11/03/20 1105)  SpO2: 100 % (11/03/20 1105)  Weight: 53.2 kg (117 lb 4.6 oz) (10/30/20 1200)  Height: 5' 2" (157.5 cm) (11/02/20 0745)    PICC  Date/Time: 11/3/2020 12:24 PM  Performed by: Heather So RN  Assisting provider: Homer Anderson RN  Consent Done: Yes  Time out: Immediately prior to procedure a time out was called to verify the correct patient, procedure, equipment, support staff and site/side marked as required  Indications: med administration and vascular access  Anesthesia: local infiltration  Local anesthetic: lidocaine 1% without epinephrine  Anesthetic Total (mL): 3  Description of findings: PICC  Preparation: skin prepped with ChloraPrep  Skin prep agent dried: skin prep agent completely dried prior to procedure  Sterile barriers: all five maximum sterile barriers used - cap, mask, sterile gown, sterile gloves, and large sterile sheet  Hand hygiene: hand hygiene performed prior to central venous catheter insertion  Location details: right basilic  Catheter type: double lumen  Catheter size: 5 Fr  Catheter Length: 29cm    Ultrasound guidance: yes  Vessel Caliber: medium and patent, compressibility normal  Vascular Doppler: not done  Needle advanced into vessel with real time Ultrasound guidance.  Guidewire confirmed in vessel.  Image recorded and saved.  Sterile sheath used.  Number of attempts: 1  Post-procedure: blood return through all ports, chlorhexidine patch and sterile dressing applied  Technical procedures used: 3CG  Specimens: No  Implants: No  Assessment: placement verified by x-ray  Complications: none          Homer Anderson  11/3/2020  "

## 2020-11-03 NOTE — ASSESSMENT & PLAN NOTE
- addiction psych consulted, appreciate recs  - starting remeron 7.5mg nightly  - patient with hyperesthesia and requiring oxycodone 20mg q6hrs scheduled and 10mg q4hrs PRN; will need to be weaned from oxycodone   - monitor for withdrawal symptoms; none at this time

## 2020-11-03 NOTE — PROGRESS NOTES
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 Course complicated by respiratory failure requiring intubation. Blood cultures remained positive from 10/24 - 10/28. She is currently on Vancomycin. Repeat blood cx of 10/31 and 11/1 are NGTD. Fevers resolved. Leukocytosis decreasing. Extubated yesterday now on O2 via NC. Off pressors. CTS has evaluated the patient and does not plan for acute surgical intervention.       Recommendations  IV Vancomycin x 6 weeks from day blood cultures cleared     End date of IV antibiotics: 12/12/20 (if blood cx drawn 10/31  remain clear)     Weekly outpatient laboratory on Monday or Tuesday while on IV antibiotics.    CBC   CMP   ESR and CRP   Vancomycin trough. Target 15-20    Vancomycin currently being dosed by daily levels in setting of DERICK. Inpatient pharmacy managing dosing. If vancomycin trough is not at target (15-20) prior to discharge, the please perform vancomycin trough before their fourth outpatient dose.    Fax laboratory results to [facility] at [phone] with attn: Evelyne Brand    Recommend discharge to an LTAC for IV antibiotic administration as patient is not a candidate for outpatient IV antibiotics. Please consult ID at LTAC to follow patient and patient's labs.     Outpatient Infectious Diseases clinic follow up will be arranged and found in patient calendar at end of antibiotic therapy. Also needs CTS follow up.    Prior to  "discharge, please ensure the patient's follow-up has been scheduled.  If there is still no follow-up scheduled in Infectious Diseases clinic, please send an EPIC message to Karmen Sánchez in Infectious Diseases.    ID will sign off at this time. Please call or re-consult ID if blood cultures drawn 10/31 or 11/1 return positive as will plan to switch antibiotic therapy to Daptomycin and Ceftaroline.         Please call for any questions. Thank you.  Evelyne Brand PA-C  Phone: [phone]  Pager: [phone]    Subjective:     Principal Problem:<principal problem not specified>    HPI: History obtained per chart review. Pt critically ill. Intubated, sedated.       30F with PMHx IVUD heroin (last use approx 2wk ago), Hepatitis C presents as transfer from OSH for septic endocarditis with b/l PE requiring higher level of care.  Per patient and chart review, she has had progressively worsening chest and back pain over the past several days with a Tmax of 105F at home.  She presented to [facility] and was admitted for sepsis on 10/24.  Bcx showed +MRSA and found to have large vegetation on tricuspid valve on TTE. Noted to have septic emobli on imaging studies. She was started on empiric vancomycin and zosyn. She was stepped up to the ICU on 10/25 for worsening tachypnea.  She also received 2U pRBC x2 for anemia with a pavel Hb of 6.5.  She was transferred to [facility] 10/26 for a higher level of care.    Per chart review. She has been seen in ED for fatigue and wound of her left foot. Per nursing note," Pt shows me a wound on her left foot/ankle, states it has been there over 1 month.  Pt states it started off as an are where she injected heroin, then turned in to a blister, abscess.  She states she was recently seen in [place] ED and placed on antibiotics.  Pt has not followed up with anyone regarding this wound. Spoke with ER MD Dr. Linares, pt does not tomas to be started on any antibiotics at this time, pt needs referral " "to wound care.  This was explained to pt who stated "ok."   she has been on different abx including bactrim and clindamycin without improvement. No follow up.   Interval History:   Patient afebrile over last 24 hours. WBC down trending. Extubated yesterday now on NC.  Off pressors. Feeling very weak. No new complaints.      Review of Systems   Constitutional: Positive for fatigue. Negative for diaphoresis and fever.   Respiratory: Positive for cough and shortness of breath.    Gastrointestinal: Negative for diarrhea and vomiting.   Genitourinary: Negative for difficulty urinating.   Neurological: Positive for weakness.   Psychiatric/Behavioral: Negative for confusion.     Objective:     Vital Signs (Most Recent):  Temp: 98.2 °F (36.8 °C) (11/03/20 1105)  Pulse: 86 (11/03/20 1105)  Resp: (!) 22 (11/03/20 1105)  BP: 124/83 (11/03/20 1105)  SpO2: 100 % (11/03/20 1105) Vital Signs (24h Range):  Temp:  [97.8 °F (36.6 °C)-98.6 °F (37 °C)] 98.2 °F (36.8 °C)  Pulse:  [] 86  Resp:  [11-34] 22  SpO2:  [93 %-100 %] 100 %  BP: ()/(61-83) 124/83     Weight: 53.2 kg (117 lb 4.6 oz)  Body mass index is 21.45 kg/m².    Estimated Creatinine Clearance: 35.8 mL/min (A) (based on SCr of 1.8 mg/dL (H)).    Physical Exam  Vitals signs and nursing note reviewed.   Constitutional:       Appearance: She is ill-appearing.   Eyes:      Conjunctiva/sclera: Conjunctivae normal.   Neck:      Comments: RIJ c/d/i  Cardiovascular:      Rate and Rhythm: Normal rate and regular rhythm.      Heart sounds: Murmur present.   Pulmonary:      Effort: Tachypnea present.      Breath sounds: Rales present.      Comments: Extubated. Now on O2 via NC  Increased work of breathing  Abdominal:      General: Abdomen is flat. There is no distension.      Palpations: There is no mass.   Skin:     General: Skin is warm and dry.      Comments: Janeway lesions on digits  Wound on ankle noted  Tattoos   Neurological:      Mental Status: She is alert. "   Psychiatric:         Mood and Affect: Mood normal.         Behavior: Behavior normal.         Thought Content: Thought content normal.         Significant Labs: All pertinent labs within the past 24 hours have been reviewed.    Significant Imaging: I have reviewed all pertinent imaging results/findings within the past 24 hours.

## 2020-11-03 NOTE — PLAN OF CARE
Hospital Medicine Team B  ICU stepdown acceptance note    Марина Britton is a 30 year old white woman,  5 para 5, with borderline personality disorder, posttraumatic stress disorder, marijuana use, opioid dependence with history of intravenous heroin use, benzodiazepine dependence. She lives in [place], [place]. Her primary care physician is Dr. Luis Felipe Hancock III.    She was seen at [facility] on 10/17/2020 after being found asleep on the side of the road. She reported that she had been off heroin for 3 days and ate a marijuana brownie. She had a left ankle wound and was prescribed topical mupirocin.   She was seen at [facility] on 10/19/2020 for headache. Her wound had already been treated at [facility] and [facility], and she had finished a course of trimethoprim-sulfamethoxazole. She was prescribed clindamycin.   She was admitted to [facility] on 10/24/2020 for cough, shortness of breath, and fever up to 105° Fahrenheit. She was found to have bilateral pulmonary septic emboli due to methicillin-resistant Staphylococcus aureus endocarditis. She was put on vancomycin and piperacillin-tazobactam. She was transferred to the intensive care unit on 10/25/2020 for worsening tachypnea. Hemoglobin decreased to 6.5 g/dL and she was transfused 2 units of packed RBCs. She was transferred to [facility] on 10/26/2020 and admitted to Critical Care Medicine.    She was extremely tachypneic (>50 breaths per minute). She was intubated. Echocardiogram showed tricuspid endocarditis. Infectious Disease was consulted and she was put on vancomycin. Cardiothoracic Surgery was consulted and recommended 6 weeks of intravenous antibiotics prior to reevaluation in the clinic. She was transfused a unit of packed RBCs on 10/29/2020. She was put on norepinephrine for hypotension on 10/30/2020. She developed  acute kidney injury. She was extubated on 11/2/2020. Psychiatry was consulted and started mirtazapine nightly. A right basilic PICC was plced on 11/3/2020.     X-ray chest AP portable 10/26/20: FINDINGS: COMPARISON: 10/24/2020.  Bilateral infiltrates have increased compared to prior.   Heart and lungs otherwise appear unchanged when allowing for differences in technique and positioning.  Impression:  Increasing bilateral diffuse airspace infiltrates.  Transthoracic echo (TTE) complete 10/26/20:   · The left ventricle is mildly enlarged with normal systolic function. The estimated ejection fraction is 65%.  · Moderate right atrial enlargement.  · Large mobile vegetation present on the posterior tricuspid leaflet.  · Severe tricuspid regurgitation.  · There is pulmonary hypertension.  · Mild right ventricular enlargement with ventricular septal wall flattening.  · Normal right ventricular systolic function.  · Mild left atrial enlargement.  · There is no right atrial thrombus present.  · No right atrial mass present.  · Small posterior pericardial effusion. Effusion is fluid.  CT Head Without Contrast 10/27/20: FINDINGS:  There is no acute intracranial hemorrhage, hydrocephalus, midline shift or mass effect. Gray-white matter differentiation appears maintained. The basal cisterns are patent. The mastoid air cells and paranasal sinuses are clear of acute process. The visualized bones of the calvarium demonstrate no acute osseous abnormality.  Impression:   No CT evidence of acute intracranial abnormality. Clinical correlation and further evaluation as warranted.  X-Ray Abdomen AP 1 View 10/29/20: FINDINGS: Abdominal radiographic examination is submitted, 2 radiographs.  Enteric tube noted, the distal tip is subdiaphragmatic overlying the left paramidline abdomen, L3 level.  There is a suspected navel piercing jewelry noted.  There is a relative paucity of small bowel gas noted, there is some air and stool noted along  the colon, pattern is nonspecific.  The osseous structures appear intact.  Impression:  Mild tip ostea of small bowel gas with mild air and stool along the colon, as above.  US Retroperitoneal Complete 10/30/20: FINDINGS:  Right kidney: Normal in size, measuring 12.8 cm. No cortical thinning. No loss of corticomedullary distinction. Resistive index measures 0.72.  No mass. No renal stone. No hydronephrosis.  Left kidney: Normal in size, measuring 15.4 cm. No cortical thinning. No loss of corticomedullary distinction. Resistive index measures 0.71.  No mass. No renal stone. No hydronephrosis.  The bladder is collapsed around a Adkins catheter.  Partially visualized ascites.  Impression:   Partially visualized ascites.  Otherwise, no significant abnormality.  X-Ray Chest AP Portable 11/01/20: FINDINGS: Right internal jugular central venous catheter, endotracheal tube, NG tube are unchanged.  Multifocal airspace disease is present with some areas that have worsened slightly when compared to 10/29/2020 while other areas appear improved.  Probable small, bilateral pleural effusions, as before.  The heart size is unchanged.  Impression:  ultifocal airspace disease; some areas appear worse when compared to 10/29/2020 while others appear improved.

## 2020-11-03 NOTE — NURSING TRANSFER
Nursing Transfer Note      11/3/2020     Transfer To: [address] [facility]    Transfer via wheelchair    Transfer with cardiac monitoring    Transported by RN     Medicines sent: no; oxygen 1L NC    Chart send with patient: Yes    Notified: pt. [email or web address].    Patient reassessed at: time arrived to room 621. Bart, her new nurse came to bedside and myself and her assisted pt. To new bed from wheelchair       Upon arrival to floor: patient oriented to room and bed in lowest position; prior to transfer, portable cardiac monitor applied. Box number [id number]. Telemetry monitor was called and confirmed that the box was functioning on their end. Sinus tach 100.

## 2020-11-03 NOTE — RESIDENT HANDOFF
Handoff     Primary Team: Networked reference to record PCT  Room Number: 621/621 A     Patient Name: Марина Britton MRN: [id number]     Date of Birth: [id number] Allergies: Patient has no known allergies.     Age: 30 y.o. Admit Date: 10/26/2020     Sex: female  BMI: Body mass index is 21.45 kg/m².     Code Status: Full Code        Illness Level (current clinical status): Watcher - No    Reason for Admission: Endocarditis with severe sepsis    Brief HPI (pertinent PMH and diagnosis or differential diagnosis):  30F with PMHx IVUD heroin (last use approx 2wk ago) presents as transfer from OSH for septic endocarditis with b/l PE requiring higher level of care.  Per patient and chart review, she has had progressively worsening chest and back pain over the past several days with a Tmax of 105F at home.  She presented to [facility] and was admitted for sepsis on 10/24.  Bcx showed GPC consistent with Staph Aureus, so she was started on Vanc/Zosyn.  She was stepped up to the ICU on 10/25 for worsening tachypnea.  She also received 2U pRBC x2 for anemia with a pavel Hb of 6.5.  She was transferred to [facility] 10/26 for a higher level of care.    Procedure Date: Intubation 10/27/20    Hospital Course (updated, brief assessment by system or problem, significant events):   10/27/2020 Patient extremely tachypneic >50 breaths per minute on arrival. Concern for respiratory distress and airway protection led to intubation. WBC trending down, persistently febrile tmax 103.2F, repeat cultures pending. OG tube to suction with bilious output. ID consulted     10/28/2020 Blood cultures remain positive for MRSA. CTS evaluated and recommending 6 week IV abx prior to re-eval in clinic. ID following.      10/29/2020 No acute changes, continues to fail SBT due to apparent anxiety/discomfort. Added precedex and oral pain regimen. Hgb 6.8 given 1 unit PRBC, unclear source of bleeding, some hematuria. Hemolysis labs negative.  Retroperitoneal u/s ordered. Started enteral feeds, tolerating well.     10/30/2020  Low dose levophed started due to low MAPs, uptitrating pain regimen. Continues to fail SBT despite minimal vent settings, remains 3/30%.     11/1/2020  No acute events. Failed SBT due to rapid, shallow breathing. Mild DERICK and fluids given     11/2/2020  SBT succeeded and patient tolerated extubation well. Midodrine added for BP support and completely weaned off of levophed. Cr continuing to rise to 1.5, held fluids for now.    11/3/2020  Tolerated extubation well. DERICK pre-renal and 1L IVF given.      Tasks (specific, using if-then statements):   #Endocarditis  - On telemetry to monitor for heart block requiring re-consult to CTS   - IV Vancomycin x6 weeks from day blood cultures cleared with end date of IV abx 12/12/20 (if blood cx drawn 10/31 remain clear)  - Vancomycin currently being dosed by daily levels in setting of DERICK; if vanc trough not at target (15-20) prior to discharge, please perform vanc trough before fourth outpatient dose.  - Will need weekly outpatient laboratory on Monday or Tuesday while on IV abx (CBC, CMP, ESR, CRP, vanc trough with target 15-20)  - ID recommends discharge to LTAC for IV abx administration  - Ensure outpatient ID clinic follow up to be arranged  - Ensure outpatient CTS follow-up appointment December 9, 2020 for repeat TTE    #IVDU  - Hyperesthesia requiring oxycodone 20mg q6hrs scheduled and 10mg q4hrs PRN; will need to be weaned from oxycodone and contact psychiatry once off acute pain medications for at least 20 hours prior to suboxone initiation  - Monitor for withdrawal:  Opiate Withdrawal Protocol:  -Clonidine 0.1mg PO q4hr PRN for withdrawal associated HTN (do not give if SBP <100, DBP <60, HR <60)  -Dicycloverine (Bentyl) 10mg PO q6hr PRN for abdominal muscle cramps  -Imodium (Loperamide) 2mg PO q6hr PRN for diarrhea  -Robaxin (Methocarbamol) 500mg PO q6hr PRN for muscle spasm  -Zofran  (Ondansetron) 4mg ODT q8hr PRN for nausea  -Vistaril (Hydroxyzine) 50mg PO qHS PRN for insomnia    Contingency Plan (special circumstances anticipated and plan): See above, re-consult MICU as needed    Estimated Discharge Date: TBD    Discharge Disposition: Long Term Care

## 2020-11-03 NOTE — PLAN OF CARE
Pt remained stable with no acute events throughout shift. Safety and comfort maintained. See flow sheets for assessments and details.

## 2020-11-03 NOTE — PROGRESS NOTES
Pharmacokinetic Assessment Follow Up: IV Vancomycin    Vancomycin Regimen Assessment & Plan:  - Vancomycin level drawn with morning labs today resulted as 18.4 ug/mL, goal trough 15-20 ug/mL.  - Patient with DERICK, SCr continues up trending today. Will continue pulse dosing while renal function remains unstable.  - Administer vancomycin 750 mg IV x1 dose today since level is <20. Draw vancomycin level with morning labs tomorrow. Will re-dose as needed depending on level and renal function.    Drug levels (last 3 results):  Recent Labs   Lab Result Units 11/02/20  0807 11/03/20  0338   Vancomycin, Random ug/mL 27.3 18.4     Pharmacy will continue to follow and monitor vancomycin.    Please contact pharmacy at extension [phone] for questions regarding this assessment.    Thank you for the consult,   Homer Ford     Patient brief summary:  Марина Britton is a 30 y.o. female initiated on antimicrobial therapy with IV Vancomycin for treatment of endocarditis    Drug Allergies:   Review of patient's allergies indicates:  No Known Allergies    Actual Body Weight:   53.2 kg    Renal Function:   Estimated Creatinine Clearance: 35.8 mL/min (A) (based on SCr of 1.8 mg/dL (H)).,     Dialysis Method (if applicable):  N/A

## 2020-11-03 NOTE — PROGRESS NOTES
[facility]  Critical Care Medicine  Progress Note    Patient Name: Марина Britton  MRN: [id number]  Admission Date: 10/26/2020  Hospital Length of Stay: 8 days  Code Status: Full Code  Attending Provider: Helena Loomis MD  Primary Care Provider: Luis Felipe Jose Iii, MD   Principal Problem: <principal problem not specified>    Subjective:     HPI:  30F with PMHx IVUD heroin (last use approx 2wk ago) presents as transfer from OSH for septic endocarditis with b/l PE requiring higher level of care.  Per patient and chart review, she has had progressively worsening chest and back pain over the past several days with a Tmax of 105F at home.  She presented to [facility] and was admitted for sepsis on 10/24.  Bcx showed GPC consistent with Staph Aureus, so she was started on Vanc/Zosyn.  She was stepped up to the ICU on 10/25 for worsening tachypnea.  She also received 2U pRBC x2 for anemia with a pavel Hb of 6.5.  She was transferred to [facility] 10/26 for a higher level of care.    Hospital/ICU Course:  10/27/2020 Patient extremely tachypneic >50 breaths per minute on arrival. Concern for respiratory distress and airway protection led to intubation. WBC trending down, persistently febrile tmax 103.2F, repeat cultures pending. OG tube to suction with bilious output. ID consulted    10/28/2020 Blood cultures remain positive for MRSA. CTS evaluated and recommending 6 week IV abx prior to re-eval in clinic. ID following.     10/29/2020 No acute changes, continues to fail SBT due to apparent anxiety/discomfort. Added precedex and oral pain regimen. Hgb 6.8 given 1 unit PRBC, unclear source of bleeding, some hematuria. Hemolysis labs negative. Retroperitoneal u/s ordered. Started enteral feeds, tolerating well.    10/30/2020  Low dose levophed started due to low MAPs, uptitrating pain regimen. Continues to fail SBT despite minimal vent settings, remains 3/30%.    11/1/2020  No acute events. Failed  SBT due to rapid, shallow breathing. Mild DERICK and fluids given    11/2/2020  SBT succeeded and patient tolerated extubation well. Midodrine added for BP support and completely weaned off of levophed. Cr continuing to rise to 1.5, held fluids for now.    Interval History/Significant Events: Tolerated extubation well on 2L nasal cannula 100%, will take off today. Cr trending to 1.8 with urine studies suggesting pre-renal. Pt given 1L IVF and will encourage PO hydration. Pain better controlled and plan for step down today    Review of Systems   Constitutional: Negative for fever.   Respiratory: Negative for shortness of breath.    Gastrointestinal: Negative for abdominal pain, nausea and vomiting.   Neurological: Negative for headaches.   All other systems reviewed and are negative.    Objective:     Vital Signs (Most Recent):  Temp: 98.2 °F (36.8 °C) (11/03/20 1105)  Pulse: 86 (11/03/20 1105)  Resp: (!) 22 (11/03/20 1105)  BP: 124/83 (11/03/20 1105)  SpO2: 100 % (11/03/20 1105) Vital Signs (24h Range):  Temp:  [97.8 °F (36.6 °C)-98.6 °F (37 °C)] 98.2 °F (36.8 °C)  Pulse:  [] 86  Resp:  [11-34] 22  SpO2:  [93 %-100 %] 100 %  BP: ()/(61-83) 124/83   Weight: 53.2 kg (117 lb 4.6 oz)  Body mass index is 21.45 kg/m².      Intake/Output Summary (Last 24 hours) at 11/3/2020 1309  Last data filed at 11/3/2020 1000  Gross per 24 hour   Intake 210 ml   Output 624 ml   Net -414 ml       Physical Exam  Vitals signs and nursing note reviewed.   Constitutional:       Appearance: Normal appearance.      Comments: Resting comfortably, answers questions appropriately   HENT:      Head: Normocephalic and atraumatic.      Right Ear: External ear normal.      Left Ear: External ear normal.      Nose: Nose normal.      Mouth/Throat:      Comments: Dry mucous membranes  Eyes:      Extraocular Movements: Extraocular movements intact.      Pupils: Pupils are equal, round, and reactive to light.   Neck:      Musculoskeletal: Normal  range of motion. No neck rigidity.   Cardiovascular:      Rate and Rhythm: Normal rate and regular rhythm.      Heart sounds: Murmur (left lower sternal border) present. No friction rub. No gallop.    Pulmonary:      Effort: Pulmonary effort is normal. No respiratory distress.      Breath sounds: Normal breath sounds. No wheezing or rales.   Abdominal:      General: There is no distension.      Palpations: Abdomen is soft.      Tenderness: There is no abdominal tenderness.   Musculoskeletal:      Right lower leg: No edema.      Left lower leg: No edema.   Skin:     Capillary Refill: Capillary refill takes less than 2 seconds.      Comments: Left medial ankle ulcer   Neurological:      General: No focal deficit present.      Mental Status: She is alert and oriented to person, place, and time.         Vents:  Vent Mode: Spont (11/02/20 0945)  Ventilator Initiated: Yes(chart correction) (10/26/20 2315)  Set Rate: 0 BPM (11/02/20 0945)  Vt Set: 0 mL (11/02/20 0945)  Pressure Support: 8 cmH20 (11/02/20 0945)  PEEP/CPAP: 5 cmH20 (11/02/20 0945)  Oxygen Concentration (%): 25 (11/02/20 1300)  Peak Airway Pressure: 13 cmH2O (11/02/20 0945)  Plateau Pressure: 24 cmH20 (11/02/20 0945)  Total Ve: 7.8 mL (11/02/20 0945)  Negative Inspiratory Force (cm H2O): -25 (11/02/20 1050)  F/VT Ratio<105 (RSBI): (!) 89.4 (11/02/20 0945)  Lines/Drains/Airways     Peripherally Inserted Central Catheter Line            PICC Double Lumen 11/03/20 1223 right basilic less than 1 day          Central Venous Catheter Line            Percutaneous Central Line Insertion/Assessment - Triple Lumen  10/27/20 0058 right internal jugular 7 days              Significant Labs:    CBC/Anemia Profile:  Recent Labs   Lab 11/02/20 0400 11/03/20 0338   WBC 19.64* 16.50*   HGB 9.0* 9.0*   HCT 29.4* 29.5*    356*   MCV 86 87   RDW 17.3* 17.2*        Chemistries:  Recent Labs   Lab 11/02/20 0400 11/03/20 0338   * 131*   K 4.8 5.1    104   CO2  18* 19*   BUN 43* 43*   CREATININE 1.5* 1.8*   CALCIUM 7.1* 7.4*   ALBUMIN 1.1* 1.2*   PROT 5.2* 5.3*   BILITOT 0.8 0.7   ALKPHOS 182* 183*   ALT 12 11   AST 19 20   MG 2.3 2.6   PHOS 5.5* 5.8*           Significant Imaging:  I have reviewed all pertinent imaging results/findings within the past 24 hours.      ABG  No results for input(s): PH, PO2, PCO2, HCO3, BE in the last 168 hours.  Assessment/Plan:     Psychiatric  IVDU (intravenous drug user)  - addiction psych consulted, appreciate recs  - starting remeron 7.5mg nightly  - patient with hyperesthesia and requiring oxycodone 20mg q6hrs scheduled and 10mg q4hrs PRN; will need to be weaned from oxycodone   - monitor for withdrawal symptoms; none at this time    Pulmonary  Pneumonia of both lungs due to infectious organism  CT chest demonstrating bilateral scattered opacities of varying size with notable cavitations concerning for septic emboli from endocarditis (large tricuspid vegetation noted on echocardiogram      Resp Rate Total:  [14 br/min] 14 br/min      - continue vanc  -intubated 10/27 for respiratory distress; extubated 11/2 to BiPAP  - 100% on 2L nasal cannula, will remove O2 and monitor  - No acute issues, even respirations    Cardiac/Vascular  Endocarditis  Large tricuspid vegetation noted on TTE with septic emboli to lungs bilaterally  -no need for AC at this time, but will continue to monitor  -will consider need for HARI   - See severe sepsis; managing with vancomycin  - PICC line placed 11/3    Renal/  DERICK (acute kidney injury)  Cr began uptrending 11/1 1.3>1.4>1.5 and today 1.8. Urine studies show pre-renal etiology, patient with dry mucous membranes on exam.    - Encourage PO hydration  - 1L IVF given  - Continue to trend Cr    ID  Severe sepsis  WBC peaked at 25.66, persistently febrile to 103F, tachycardic to 120s, lactate wnl. Encephalopathic on arrival concerning for end-organ damage    WBC 25>18>19>17>19.9> 19.8> 25.7>19.64 >  19.68    -blood cultures 10/24,10/26, 10/28 positive for MRSA  - BCx NGTD from 10/31 and 11/1  - S/p fluid resuscitation at [place]  - ID on board- recommended following blood cultures daily and if not clearing bacteremia consider daptomycin; as of 11/2 BCx x2 (10/31 and 11/1) NGTD, will continue vancomycin  - Has been weaned off of vasopressors for 24 hours     Critical Care Daily Checklist:    A: Awake: RASS Goal/Actual Goal: RASS Goal: 0-->alert and calm  Actual: [place] Agitation Sedation Scale (RASS): Alert and calm   B: Spontaneous Breathing Trial Performed? Spon. Breathing Trial Initiated?: Initiated (11/02/20 1162)   C: SAT & SBT Coordinated?  N/A                     D: Delirium: CAM-ICU Overall CAM-ICU: Negative   E: Early Mobility Performed? No   F: Feeding Goal: Goals: Meet % EEN, EPN by RD f/u date  Status: Nutrition Goal Status: new   Current Diet Order   Procedures    Diet Adult Regular (IDDSI Level 7)      AS: Analgesia/Sedation Oxycodone 20 scheduled q6hrs with 10 PRN q4hrs, will need weaning   T: Thromboembolic Prophylaxis heparin   H: HOB > 300 Yes   U: Stress Ulcer Prophylaxis (if needed) N/A   G: Glucose Control euglycemic   B: Bowel Function Stool Occurrence: 0   I: Indwelling Catheter (Lines & Adkins) Necessity none   D: De-escalation of Antimicrobials/Pharmacotherapies IV Vanc x6 weeks    Plan for the day/ETD Step down    Code Status:  Family/Goals of Care: Full Code         Critical secondary to Patient has a condition that poses threat to life and bodily function: Severe Respiratory Distress      Critical care was time spent personally by me on the following activities: development of treatment plan with patient or surrogate and bedside caregivers, discussions with consultants, evaluation of patient's response to treatment, examination of patient, ordering and performing treatments and interventions, ordering and review of laboratory studies, ordering and review of radiographic  studies, pulse oximetry, re-evaluation of patient's condition. This critical care time did not overlap with that of any other provider or involve time for any procedures.     Letitia Leon MD  Critical Care Medicine  [facility]

## 2020-11-03 NOTE — ASSESSMENT & PLAN NOTE
Large tricuspid vegetation noted on TTE with septic emboli to lungs bilaterally  -no need for AC at this time, but will continue to monitor  -will consider need for HARI   - See severe sepsis; managing with vancomycin  - PICC line placed 11/3

## 2020-11-03 NOTE — ASSESSMENT & PLAN NOTE
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 Course complicated by respiratory failure requiring intubation. Blood cultures remained positive from 10/24 - 10/28. She is currently on Vancomycin. Repeat blood cx of 10/31 and 11/1 are NGTD. Fevers resolved. Leukocytosis decreasing. Extubated yesterday now on O2 via NC. Off pressors. CTS has evaluated the patient and does not plan for acute surgical intervention.       Recommendations  IV Vancomycin x 6 weeks from day blood cultures cleared     End date of IV antibiotics: 12/12/20 (if blood cx drawn 10/31  remain clear)     Weekly outpatient laboratory on Monday or Tuesday while on IV antibiotics.    CBC   CMP   ESR and CRP   Vancomycin trough. Target 15-20    Vancomycin currently being dosed by daily levels in setting of DERICK. Inpatient pharmacy managing dosing. If vancomycin trough is not at target (15-20) prior to discharge, the please perform vancomycin trough before their fourth outpatient dose.    Fax laboratory results to [facility] at [phone] with attn: Evelyne Brand    Recommend discharge to an LTAC for IV antibiotic administration as patient is not a candidate for outpatient IV antibiotics. Please consult ID at LTAC to follow patient and patient's labs.     Outpatient Infectious Diseases clinic follow up will be arranged and found in patient calendar at end of antibiotic therapy. Also needs CTS follow up.    Prior to discharge, please ensure the patient's follow-up has been scheduled.  If there is still no follow-up scheduled in Infectious Diseases clinic, please send an EPIC message to Karmen Sánchez in Infectious Diseases.    ID will sign off.

## 2020-11-03 NOTE — HPI
Марина Britton is a 30 year old white woman,  5 para 5, with borderline personality disorder, posttraumatic stress disorder, marijuana use, opioid dependence with history of intravenous heroin use, benzodiazepine dependence. She lives in [place], [place]. Her primary care physician is Dr. Luis Felipe Hancock III.    She was seen at [facility] on 10/17/2020 after being found asleep on the side of the road. She reported that she had been off heroin for 3 days and ate a marijuana brownie. She had a left ankle wound and was prescribed topical mupirocin.   She was seen at [facility] on 10/19/2020 for headache. Her wound had already been treated at [facility] and [facility], and she had finished a course of trimethoprim-sulfamethoxazole. She was prescribed clindamycin.   She was admitted to [facility] on 10/24/2020 for cough, shortness of breath, and fever up to 105° Fahrenheit. She was found to have bilateral pulmonary septic emboli due to methicillin-resistant Staphylococcus aureus endocarditis. She was put on vancomycin and piperacillin-tazobactam. She was transferred to the intensive care unit on 10/25/2020 for worsening tachypnea. Hemoglobin decreased to 6.5 g/dL and she was transfused 2 units of packed RBCs. She was transferred to [facility] on 10/26/2020 and admitted to Critical Care Medicine.

## 2020-11-03 NOTE — SUBJECTIVE & OBJECTIVE
Interval History:   Patient afebrile over last 24 hours. WBC down trending. Extubated yesterday now on NC.  Off pressors. Feeling very weak. No new complaints.      Review of Systems   Constitutional: Positive for fatigue. Negative for diaphoresis and fever.   Respiratory: Positive for cough and shortness of breath.    Gastrointestinal: Negative for diarrhea and vomiting.   Genitourinary: Negative for difficulty urinating.   Neurological: Positive for weakness.   Psychiatric/Behavioral: Negative for confusion.     Objective:     Vital Signs (Most Recent):  Temp: 98.2 °F (36.8 °C) (11/03/20 1105)  Pulse: 86 (11/03/20 1105)  Resp: (!) 22 (11/03/20 1105)  BP: 124/83 (11/03/20 1105)  SpO2: 100 % (11/03/20 1105) Vital Signs (24h Range):  Temp:  [97.8 °F (36.6 °C)-98.6 °F (37 °C)] 98.2 °F (36.8 °C)  Pulse:  [] 86  Resp:  [11-34] 22  SpO2:  [93 %-100 %] 100 %  BP: ()/(61-83) 124/83     Weight: 53.2 kg (117 lb 4.6 oz)  Body mass index is 21.45 kg/m².    Estimated Creatinine Clearance: 35.8 mL/min (A) (based on SCr of 1.8 mg/dL (H)).    Physical Exam  Vitals signs and nursing note reviewed.   Constitutional:       Appearance: She is ill-appearing.   Eyes:      Conjunctiva/sclera: Conjunctivae normal.   Neck:      Comments: RIJ c/d/i  Cardiovascular:      Rate and Rhythm: Normal rate and regular rhythm.      Heart sounds: Murmur present.   Pulmonary:      Effort: Tachypnea present.      Breath sounds: Rales present.      Comments: Extubated. Now on O2 via NC  Increased work of breathing  Abdominal:      General: Abdomen is flat. There is no distension.      Palpations: There is no mass.   Skin:     General: Skin is warm and dry.      Comments: Janeway lesions on digits  Wound on ankle noted  Tattoos   Neurological:      Mental Status: She is alert.   Psychiatric:         Mood and Affect: Mood normal.         Behavior: Behavior normal.         Thought Content: Thought content normal.         Significant Labs: All  pertinent labs within the past 24 hours have been reviewed.    Significant Imaging: I have reviewed all pertinent imaging results/findings within the past 24 hours.

## 2020-11-03 NOTE — PLAN OF CARE
Problem: Adult Inpatient Plan of Care  Goal: Plan of Care Review  Outcome: Ongoing, Progressing  Goal: Patient-Specific Goal (Individualization)  Outcome: Ongoing, Progressing  Goal: Absence of Hospital-Acquired Illness or Injury  Outcome: Ongoing, Progressing  Goal: Optimal Comfort and Wellbeing  Outcome: Ongoing, Progressing  Goal: Readiness for Transition of Care  Outcome: Ongoing, Progressing  Goal: Rounds/Family Conference  Outcome: Ongoing, Progressing     Problem: Adjustment to Illness (Sepsis/Septic Shock)  Goal: Optimal Coping  Outcome: Ongoing, Progressing     Problem: Bleeding (Sepsis/Septic Shock)  Goal: Absence of Bleeding  Outcome: Ongoing, Progressing     Problem: Glycemic Control Impaired (Sepsis/Septic Shock)  Goal: Blood Glucose Level Within Desired Range  Outcome: Ongoing, Progressing     Problem: Hemodynamic Instability (Sepsis/Septic Shock)  Goal: Effective Tissue Perfusion  Outcome: Ongoing, Progressing     Problem: Infection (Sepsis/Septic Shock)  Goal: Absence of Infection Signs/Symptoms  Outcome: Ongoing, Progressing     Problem: Nutrition Impaired (Sepsis/Septic Shock)  Goal: Optimal Nutrition Intake  Outcome: Ongoing, Progressing     Problem: Respiratory Compromise (Sepsis/Septic Shock)  Goal: Effective Oxygenation and Ventilation  Outcome: Ongoing, Progressing     Problem: Fluid Imbalance (Pneumonia)  Goal: Fluid Balance  Outcome: Ongoing, Progressing     Problem: Infection (Pneumonia)  Goal: Resolution of Infection Signs/Symptoms  Outcome: Ongoing, Progressing     Problem: Respiratory Compromise (Pneumonia)  Goal: Effective Oxygenation and Ventilation  Outcome: Ongoing, Progressing     Problem: Infection  Goal: Infection Symptom Resolution  Outcome: Ongoing, Progressing     Problem: Wound  Goal: Optimal Wound Healing  Outcome: Ongoing, Progressing     Problem: Skin Injury Risk Increased  Goal: Skin Health and Integrity  Outcome: Ongoing, Progressing     Problem: Communication  Impairment (Mechanical Ventilation, Invasive)  Goal: Effective Communication  Outcome: Ongoing, Progressing     Problem: Device-Related Complication Risk (Mechanical Ventilation, Invasive)  Goal: Optimal Device Function  Outcome: Ongoing, Progressing     Problem: Inability to Wean (Mechanical Ventilation, Invasive)  Goal: Mechanical Ventilation Liberation  Outcome: Ongoing, Progressing     Problem: Nutrition Impairment (Mechanical Ventilation, Invasive)  Goal: Optimal Nutrition Delivery  Outcome: Ongoing, Progressing     Problem: Skin and Tissue Injury (Mechanical Ventilation, Invasive)  Goal: Absence of Device-Related Skin and Tissue Injury  Outcome: Ongoing, Progressing     Problem: Ventilator-Induced Lung Injury (Mechanical Ventilation, Invasive)  Goal: Absence of Ventilator-Induced Lung Injury  Outcome: Ongoing, Progressing     Problem: Communication Impairment (Artificial Airway)  Goal: Effective Communication  Outcome: Ongoing, Progressing     Problem: Device-Related Complication Risk (Artificial Airway)  Goal: Optimal Device Function  Outcome: Ongoing, Progressing     Problem: Skin and Tissue Injury (Artificial Airway)  Goal: Absence of Device-Related Skin or Tissue Injury  Outcome: Ongoing, Progressing     Problem: Noninvasive Ventilation Acute  Goal: Effective Unassisted Ventilation and Oxygenation  Outcome: Ongoing, Progressing     Problem: Fall Injury Risk  Goal: Absence of Fall and Fall-Related Injury  Outcome: Ongoing, Progressing     Problem: Restraint, Nonbehavioral (Nonviolent)  Goal: Discontinuation Criteria Achieved  Outcome: Ongoing, Progressing  Goal: Personal Dignity and Safety Maintained  Outcome: Ongoing, Progressing     Problem: Electrolyte Imbalance (Acute Kidney Injury/Impairment)  Goal: Serum Electrolyte Balance  Outcome: Ongoing, Progressing     Problem: Fluid Imbalance (Acute Kidney Injury/Impairment)  Goal: Optimal Fluid Balance  Outcome: Ongoing, Progressing     Problem: Hematologic  Alteration (Acute Kidney Injury/Impairment)  Goal: Hemoglobin, Hematocrit and Platelets Within Normal Range  Outcome: Ongoing, Progressing     Problem: Oral Intake Inadequate (Acute Kidney Injury/Impairment)  Goal: Optimal Nutrition Intake  Outcome: Ongoing, Progressing     Problem: Renal Function Impairment (Acute Kidney Injury/Impairment)  Goal: Effective Renal Function  Outcome: Ongoing, Progressing
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
## 2020-11-03 NOTE — HOSPITAL COURSE
She was extremely tachypneic (>50 breaths per minute). She was intubated. Echocardiogram showed tricuspid endocarditis. Infectious Disease was consulted and she was put on vancomycin. Cardiothoracic Surgery was consulted and recommended 6 weeks of intravenous antibiotics prior to reevaluation in the clinic. She was transfused a unit of packed RBCs on 10/29/2020. She was put on norepinephrine for hypotension on 10/30/2020. She developed acute kidney injury. She was extubated on 11/2/2020. Psychiatry was consulted and started mirtazapine nightly. A right basilic PICC was placed on 11/3/2020 and her internal jugular central venous catheter was removed. She was transferred to Hospital Medicine Team B on 11/3/2020.

## 2020-11-03 NOTE — CONSULTS
ABY consulted for MIDLINE    Spoke with ordering MD- per ID note- patient is to receive 6 weeks of abx- will need a PICC order

## 2020-11-03 NOTE — ASSESSMENT & PLAN NOTE
Cr began uptrending 11/1 1.3>1.4>1.5 and today 1.8. Urine studies show pre-renal etiology, patient with dry mucous membranes on exam.    - Encourage PO hydration  - 1L IVF given  - Continue to trend Cr

## 2020-11-03 NOTE — CONSULTS
Double lumen PICC to right basilic vein.  29 cm in length, 24 cm arm circumference and 0 cm exposed.   Lot # [id number].

## 2020-11-03 NOTE — ASSESSMENT & PLAN NOTE
WBC peaked at 25.66, persistently febrile to 103F, tachycardic to 120s, lactate wnl. Encephalopathic on arrival concerning for end-organ damage    WBC 25>18>19>17>19.9> 19.8> 25.7>19.64 > 19.68    -blood cultures 10/24,10/26, 10/28 positive for MRSA  - BCx NGTD from 10/31 and 11/1  - S/p fluid resuscitation at [place]  - ID on board- recommended following blood cultures daily and if not clearing bacteremia consider daptomycin; as of 11/2 BCx x2 (10/31 and 11/1) NGTD, will continue vancomycin  - Has been weaned off of vasopressors for 24 hours

## 2020-11-04 PROBLEM — R06.82 TACHYPNEA: Status: RESOLVED | Noted: 2020-10-26 | Resolved: 2020-11-04

## 2020-11-04 PROBLEM — E87.5 HYPERKALEMIA: Status: ACTIVE | Noted: 2020-11-04

## 2020-11-04 PROBLEM — A41.9 SEVERE SEPSIS: Status: RESOLVED | Noted: 2020-10-26 | Resolved: 2020-11-04

## 2020-11-04 PROBLEM — F19.90 IVDU (INTRAVENOUS DRUG USER): Chronic | Status: ACTIVE | Noted: 2020-10-26

## 2020-11-04 PROBLEM — R65.20 SEVERE SEPSIS: Status: RESOLVED | Noted: 2020-10-26 | Resolved: 2020-11-04

## 2020-11-04 LAB
ALBUMIN SERPL BCP-MCNC: 1.6 G/DL (ref 3.5–5.2)
ALP SERPL-CCNC: 213 U/L (ref 55–135)
ALT SERPL W/O P-5'-P-CCNC: 12 U/L (ref 10–44)
ANION GAP SERPL CALC-SCNC: 9 MMOL/L (ref 8–16)
AST SERPL-CCNC: 20 U/L (ref 10–40)
BASOPHILS # BLD AUTO: 0.21 K/UL (ref 0–0.2)
BASOPHILS NFR BLD: 1.4 % (ref 0–1.9)
BILIRUB SERPL-MCNC: 0.9 MG/DL (ref 0.1–1)
BUN SERPL-MCNC: 39 MG/DL (ref 6–20)
CALCIUM SERPL-MCNC: 8.2 MG/DL (ref 8.7–10.5)
CHLORIDE SERPL-SCNC: 103 MMOL/L (ref 95–110)
CO2 SERPL-SCNC: 17 MMOL/L (ref 23–29)
CREAT SERPL-MCNC: 1.8 MG/DL (ref 0.5–1.4)
DIFFERENTIAL METHOD: ABNORMAL
EOSINOPHIL # BLD AUTO: 0.2 K/UL (ref 0–0.5)
EOSINOPHIL NFR BLD: 1.1 % (ref 0–8)
ERYTHROCYTE [DISTWIDTH] IN BLOOD BY AUTOMATED COUNT: 17.4 % (ref 11.5–14.5)
EST. GFR  (AFRICAN AMERICAN): 42.6 ML/MIN/1.73 M^2
EST. GFR  (NON AFRICAN AMERICAN): 37 ML/MIN/1.73 M^2
GLUCOSE SERPL-MCNC: 70 MG/DL (ref 70–110)
HCT VFR BLD AUTO: 37.7 % (ref 37–48.5)
HGB BLD-MCNC: 11.1 G/DL (ref 12–16)
IMM GRANULOCYTES # BLD AUTO: 0.34 K/UL (ref 0–0.04)
IMM GRANULOCYTES NFR BLD AUTO: 2.3 % (ref 0–0.5)
LYMPHOCYTES # BLD AUTO: 1.9 K/UL (ref 1–4.8)
LYMPHOCYTES NFR BLD: 13 % (ref 18–48)
MAGNESIUM SERPL-MCNC: 3 MG/DL (ref 1.6–2.6)
MCH RBC QN AUTO: 26.9 PG (ref 27–31)
MCHC RBC AUTO-ENTMCNC: 29.4 G/DL (ref 32–36)
MCV RBC AUTO: 92 FL (ref 82–98)
MONOCYTES # BLD AUTO: 0.9 K/UL (ref 0.3–1)
MONOCYTES NFR BLD: 6.1 % (ref 4–15)
NEUTROPHILS # BLD AUTO: 11.4 K/UL (ref 1.8–7.7)
NEUTROPHILS NFR BLD: 76.1 % (ref 38–73)
NRBC BLD-RTO: 0 /100 WBC
PHOSPHATE SERPL-MCNC: 5.5 MG/DL (ref 2.7–4.5)
PLATELET # BLD AUTO: 445 K/UL (ref 150–350)
PMV BLD AUTO: 9.9 FL (ref 9.2–12.9)
POTASSIUM SERPL-SCNC: 5.2 MMOL/L (ref 3.5–5.1)
POTASSIUM SERPL-SCNC: 6.3 MMOL/L (ref 3.5–5.1)
PROT SERPL-MCNC: 7 G/DL (ref 6–8.4)
RBC # BLD AUTO: 4.12 M/UL (ref 4–5.4)
SODIUM SERPL-SCNC: 129 MMOL/L (ref 136–145)
VANCOMYCIN SERPL-MCNC: 15.7 UG/ML
VANCOMYCIN SERPL-MCNC: 22 UG/ML
WBC # BLD AUTO: 14.96 K/UL (ref 3.9–12.7)

## 2020-11-04 PROCEDURE — 63600175 PHARM REV CODE 636 W HCPCS: Performed by: STUDENT IN AN ORGANIZED HEALTH CARE EDUCATION/TRAINING PROGRAM

## 2020-11-04 PROCEDURE — 97116 GAIT TRAINING THERAPY: CPT

## 2020-11-04 PROCEDURE — 99232 SBSQ HOSP IP/OBS MODERATE 35: CPT | Mod: ,,, | Performed by: HOSPITALIST

## 2020-11-04 PROCEDURE — 93010 EKG 12-LEAD: ICD-10-PCS | Mod: ,,, | Performed by: INTERNAL MEDICINE

## 2020-11-04 PROCEDURE — 97162 PT EVAL MOD COMPLEX 30 MIN: CPT

## 2020-11-04 PROCEDURE — 94761 N-INVAS EAR/PLS OXIMETRY MLT: CPT

## 2020-11-04 PROCEDURE — 25000003 PHARM REV CODE 250: Performed by: STUDENT IN AN ORGANIZED HEALTH CARE EDUCATION/TRAINING PROGRAM

## 2020-11-04 PROCEDURE — 80053 COMPREHEN METABOLIC PANEL: CPT

## 2020-11-04 PROCEDURE — 99232 PR SUBSEQUENT HOSPITAL CARE,LEVL II: ICD-10-PCS | Mod: ,,, | Performed by: PSYCHIATRY & NEUROLOGY

## 2020-11-04 PROCEDURE — 80202 ASSAY OF VANCOMYCIN: CPT

## 2020-11-04 PROCEDURE — 87040 BLOOD CULTURE FOR BACTERIA: CPT

## 2020-11-04 PROCEDURE — 11000001 HC ACUTE MED/SURG PRIVATE ROOM

## 2020-11-04 PROCEDURE — 93010 ELECTROCARDIOGRAM REPORT: CPT | Mod: ,,, | Performed by: INTERNAL MEDICINE

## 2020-11-04 PROCEDURE — 63600175 PHARM REV CODE 636 W HCPCS: Performed by: HOSPITALIST

## 2020-11-04 PROCEDURE — 97165 OT EVAL LOW COMPLEX 30 MIN: CPT

## 2020-11-04 PROCEDURE — 94660 CPAP INITIATION&MGMT: CPT

## 2020-11-04 PROCEDURE — 84100 ASSAY OF PHOSPHORUS: CPT

## 2020-11-04 PROCEDURE — 93005 ELECTROCARDIOGRAM TRACING: CPT

## 2020-11-04 PROCEDURE — 25000003 PHARM REV CODE 250: Performed by: HOSPITALIST

## 2020-11-04 PROCEDURE — 83735 ASSAY OF MAGNESIUM: CPT

## 2020-11-04 PROCEDURE — 80202 ASSAY OF VANCOMYCIN: CPT | Mod: 91

## 2020-11-04 PROCEDURE — A4216 STERILE WATER/SALINE, 10 ML: HCPCS | Performed by: INTERNAL MEDICINE

## 2020-11-04 PROCEDURE — 99232 PR SUBSEQUENT HOSPITAL CARE,LEVL II: ICD-10-PCS | Mod: ,,, | Performed by: HOSPITALIST

## 2020-11-04 PROCEDURE — 84132 ASSAY OF SERUM POTASSIUM: CPT

## 2020-11-04 PROCEDURE — 27000221 HC OXYGEN, UP TO 24 HOURS

## 2020-11-04 PROCEDURE — 27000190 HC CPAP FULL FACE MASK W/VALVE

## 2020-11-04 PROCEDURE — 25000003 PHARM REV CODE 250: Performed by: INTERNAL MEDICINE

## 2020-11-04 PROCEDURE — 97535 SELF CARE MNGMENT TRAINING: CPT

## 2020-11-04 PROCEDURE — 99232 SBSQ HOSP IP/OBS MODERATE 35: CPT | Mod: ,,, | Performed by: PSYCHIATRY & NEUROLOGY

## 2020-11-04 PROCEDURE — 99900035 HC TECH TIME PER 15 MIN (STAT)

## 2020-11-04 PROCEDURE — 85025 COMPLETE CBC W/AUTO DIFF WBC: CPT

## 2020-11-04 RX ADMIN — VANCOMYCIN HYDROCHLORIDE 750 MG: 750 INJECTION, POWDER, LYOPHILIZED, FOR SOLUTION INTRAVENOUS at 09:11

## 2020-11-04 RX ADMIN — HEPARIN SODIUM 5000 UNITS: 5000 INJECTION INTRAVENOUS; SUBCUTANEOUS at 05:11

## 2020-11-04 RX ADMIN — MIRTAZAPINE 7.5 MG: 7.5 TABLET ORAL at 08:11

## 2020-11-04 RX ADMIN — ACETAMINOPHEN 1000 MG: 500 TABLET ORAL at 05:11

## 2020-11-04 RX ADMIN — OXYCODONE HYDROCHLORIDE 20 MG: 10 TABLET ORAL at 12:11

## 2020-11-04 RX ADMIN — HEPARIN SODIUM 5000 UNITS: 5000 INJECTION INTRAVENOUS; SUBCUTANEOUS at 03:11

## 2020-11-04 RX ADMIN — MIDODRINE HYDROCHLORIDE 15 MG: 5 TABLET ORAL at 08:11

## 2020-11-04 RX ADMIN — OXYCODONE HYDROCHLORIDE 20 MG: 10 TABLET ORAL at 05:11

## 2020-11-04 RX ADMIN — HEPARIN SODIUM 5000 UNITS: 5000 INJECTION INTRAVENOUS; SUBCUTANEOUS at 10:11

## 2020-11-04 RX ADMIN — MIDODRINE HYDROCHLORIDE 15 MG: 5 TABLET ORAL at 03:11

## 2020-11-04 RX ADMIN — Medication 1 G: at 10:11

## 2020-11-04 RX ADMIN — SODIUM ZIRCONIUM CYCLOSILICATE 10 G: 10 POWDER, FOR SUSPENSION ORAL at 10:11

## 2020-11-04 RX ADMIN — Medication 10 ML: at 12:11

## 2020-11-04 RX ADMIN — ACETAMINOPHEN 650 MG: 325 TABLET ORAL at 08:11

## 2020-11-04 RX ADMIN — OXYCODONE HYDROCHLORIDE 10 MG: 10 TABLET ORAL at 03:11

## 2020-11-04 RX ADMIN — Medication 10 ML: at 05:11

## 2020-11-04 NOTE — PLAN OF CARE
Pt not medically ready att his time, will need 6 weeks of IVABX vancomycin . SW placed referrals in [facility]  . Will cont to follow     11/04/20 1246   Discharge Reassessment   Assessment Type Discharge Planning Reassessment   Provided patient/caregiver education on the expected discharge date and the discharge plan Yes   Do you have any problems affording any of your prescribed medications? No   Discharge Plan A Long-term acute care facility (LTAC)   Discharge Plan B Long-term acute care facility (LTAC)   DME Needed Upon Discharge  none   Anticipated Discharge Disposition LTAC   Can the patient/caregiver answer the patient profile reliably? Yes, cognitively intact   How does the patient rate their overall health at the present time? Fair   Describe the patient's ability to walk at the present time. Minor restrictions or changes   How often would a person be available to care for the patient? Occasionally   Number of comorbid conditions (as recorded on the chart) Three   During the past month, has the patient often been bothered by little interest or pleasure in doing things? No   Post-Acute Status   Post-Acute Authorization Placement   Post-Acute Placement Status Awaiting Internal Medical Clearance   Discharge Delays None known at this time   Stephanie Villatoro, MSN  Case Management  Ext [phone]

## 2020-11-04 NOTE — ASSESSMENT & PLAN NOTE
Due to sepsis and endocarditis, possibly septic emboli to the kidneys as well. Monitor on vancomycin.

## 2020-11-04 NOTE — PLAN OF CARE
Problem: Physical Therapy Goal  Goal: Physical Therapy Goal  Description: Goals to be met by: 20     Patient will increase functional independence with mobility by performin. Supine to sit with Modified [place]  2. Sit to supine with Modified [place]  3. Sit to stand transfer with Stand-by Assistance  4. Gait  x 50 feet with Contact Guard Assistance using LRAD, if needed.   5. Ascend/descend 3 stairs with left Handrails Contact Guard Assistance.     Outcome: Ongoing, Progressing   Initial evaluation completed. Patient tolerated assessment well. Established POC and goals. Patient would continue to benefit from skilled PT services in order to improve functional mobility independence.   Marti Argueta, PT, DPT  2020

## 2020-11-04 NOTE — ASSESSMENT & PLAN NOTE
MRSA bacteremia  Pneumonia of both lungs due to infectious organism  Pulmonary emboli  Vancomycin for 6 weeks. Getting opioid pain medication for associated pain. Advised that this is only to treat the pain, not for her heroin dependence. Physical and Occupational Therapy to help determine disposition. Follow up with Cardiothoracic Surgery afterward.

## 2020-11-04 NOTE — PLAN OF CARE
Recommendations:   1. Continue Regular Diet   --Order Boost Plus 3 times daily while meal intake <50%   2. If renal labs continue to uptrend, consider adding renal restrictions   3. RD to monitor and follow up     Goals: Meet % EEN, EPN by RD f/u date  Nutrition Goal Status: progressing towards goal  Communication of RD Recs: reviewed with RN(POC)

## 2020-11-04 NOTE — PROGRESS NOTES
[facility] Hospital Medicine  Progress Note    Patient Name: Марина Britton  MRN: [id number]  Patient Class: IP- Inpatient   Admission Date: 10/26/2020  Length of Stay: 9 days  Attending Physician: Michael Florez MD  Primary Care Provider: Luis Felipe Hancock Iii, MD    [facility] Medicine Team: [facility] MED B Michael Florez MD    Subjective:     Principal Problem:Endocarditis of tricuspid valve        HPI:  Марина Britton is a 30 year old white woman,  5 para 5, with borderline personality disorder, posttraumatic stress disorder, marijuana use, opioid dependence with history of intravenous heroin use, benzodiazepine dependence. She lives in [place], [place]. Her primary care physician is Dr. Luis Felipe Hancock III.    She was seen at [facility] on 10/17/2020 after being found asleep on the side of the road. She reported that she had been off heroin for 3 days and ate a marijuana brownie. She had a left ankle wound and was prescribed topical mupirocin.   She was seen at [facility] on 10/19/2020 for headache. Her wound had already been treated at [facility] and [facility], and she had finished a course of trimethoprim-sulfamethoxazole. She was prescribed clindamycin.   She was admitted to [facility] on 10/24/2020 for cough, shortness of breath, and fever up to 105° Fahrenheit. She was found to have bilateral pulmonary septic emboli due to methicillin-resistant Staphylococcus aureus endocarditis. She was put on vancomycin and piperacillin-tazobactam. She was transferred to the intensive care unit on 10/25/2020 for worsening tachypnea. Hemoglobin decreased to 6.5 g/dL and she was transfused 2 units of packed RBCs. She was transferred to [facility] on 10/26/2020 and admitted to Critical Care Medicine.    Overview/Hospital Course:  She was extremely tachypneic (>50  breaths per minute). She was intubated. Echocardiogram showed tricuspid endocarditis. Infectious Disease was consulted and she was put on vancomycin. Cardiothoracic Surgery was consulted and recommended 6 weeks of intravenous antibiotics prior to reevaluation in the clinic. She was transfused a unit of packed RBCs on 10/29/2020. She was put on norepinephrine for hypotension on 10/30/2020. She developed acute kidney injury. She was extubated on 11/2/2020. Psychiatry was consulted and started mirtazapine nightly. A right basilic PICC was placed on 11/3/2020 and her internal jugular central venous catheter was removed. She was transferred to [facility] Medicine Team B on 11/3/2020.     Interval History: Her chest has been hurting. She says the previous doctors attributed to her endocarditis. She has dyspnea on exertion but is able to walk around. Her potassium was high this morning for some reason. EKG was not concerning and I already ordered calcium gluconate and sodium zirconium earlier.     Review of Systems   Constitutional: Negative for chills and fever.   Respiratory: Positive for shortness of breath.    Cardiovascular: Positive for chest pain.   Gastrointestinal: Negative for nausea and vomiting.   Neurological: Negative for seizures and syncope.     Objective:     Vital Signs (Most Recent):  Temp: 98.3 °F (36.8 °C) (11/04/20 0801)  Pulse: 84 (11/04/20 1138)  Resp: 16 (11/04/20 0801)  BP: 123/80 (11/04/20 0801)  SpO2: (!) 92 % (11/04/20 0801) Vital Signs (24h Range):  Temp:  [97.8 °F (36.6 °C)-99.9 °F (37.7 °C)] 98.3 °F (36.8 °C)  Pulse:  [] 84  Resp:  [16-33] 16  SpO2:  [85 %-100 %] 92 %  BP: (117-140)/(80-90) 123/80     Weight: 64.4 kg (141 lb 15.6 oz)  Body mass index is 25.97 kg/m².    Intake/Output Summary (Last 24 hours) at 11/4/2020 1213  Last data filed at 11/3/2020 1700  Gross per 24 hour   Intake 1120 ml   Output --   Net 1120 ml      Physical Exam  Vitals signs and nursing note reviewed.    Constitutional:       General: She is not in acute distress.  Pulmonary:      Effort: Pulmonary effort is normal. No respiratory distress.   Neurological:      Mental Status: She is alert and oriented to person, place, and time.   Psychiatric:         Mood and Affect: Mood and affect normal.         Significant Labs:   CMP:   Recent Labs   Lab 11/03/20  0338 11/04/20  0441   * 129*   K 5.1 6.3*    103   CO2 19* 17*   GLU 90 70   BUN 43* 39*   CREATININE 1.8* 1.8*   CALCIUM 7.4* 8.2*   PROT 5.3* 7.0   ALBUMIN 1.2* 1.6*   BILITOT 0.7 0.9   ALKPHOS 183* 213*   AST 20 20   ALT 11 12   ANIONGAP 8 9   EGFRNONAA 37.0* 37.0*     All pertinent labs within the past 24 hours have been reviewed.    Significant Imaging: I have reviewed all pertinent imaging results/findings within the past 24 hours.      Assessment/Plan:      * Endocarditis of tricuspid valve  MRSA bacteremia  Pneumonia of both lungs due to infectious organism  Pulmonary emboli  Vancomycin for 6 weeks. Getting opioid pain medication for associated pain. Advised that this is only to treat the pain, not for her heroin dependence. Physical and Occupational Therapy to help determine disposition. Follow up with Cardiothoracic Surgery afterward.    Hyperkalemia  Unclear etiology. Was not getting potassium supplementation. Given sodium zirconium. Repeat.    DERICK (acute kidney injury)  Due to sepsis and endocarditis, possibly septic emboli to the kidneys as well. Monitor on vancomycin.    Substance induced mood disorder  Sedative, hypnotic or anxiolytic use disorder, severe, dependence  Cannabis use disorder, moderate, dependence  Opioid use disorder, severe, dependence  IVDU (intravenous drug user)  Started on mirtazapine by Psychiatry.      VTE Risk Mitigation (From admission, onward)         Ordered     heparin (porcine) injection 5,000 Units  Every 8 hours      10/27/20 1520     Place sequential compression device  Until discontinued      10/26/20 2023                 Discharge Planning   VIANNEY: 11/3/2020     Code Status: Full Code   Is the patient medically ready for discharge?:     Reason for patient still in hospital (select all that apply): Patient unstable, Patient new problem and Patient trending condition  Discharge Plan A: Home with family   Discharge Delays: None known at this time              Michael Florez MD  Department of Hospital Medicine   [facility]

## 2020-11-04 NOTE — PLAN OF CARE
11/04/20 1023   Post-Acute Status   Post-Acute Authorization Placement   Post-Acute Placement Status Referrals Sent     Sw sent out several LTAC referrals as pt will need 6 weeks of abx at [facility] and has an IVDU history, Дмитрий to follow.

## 2020-11-04 NOTE — PLAN OF CARE
Дмитрий informed by Betty with [facility] that Pt would like them 2nd choice and [facility] as first. Дмитрий informed Betty that [facility] and [facility] have denied Pt. Sw to follow in the am with medical stability, acceptance and auth.

## 2020-11-04 NOTE — PT/OT/SLP EVAL
"Occupational Therapy   Evaluation    Name: Марина Britton  MRN: [id number]  Admitting Diagnosis:  Endocarditis of tricuspid valve      Recommendations:     Discharge Recommendations: rehabilitation facility  Discharge Equipment Recommendations:  (TBD)  Barriers to discharge:  (decreased functional mobility and increased skilled assistance at this time)    Assessment:     Марина Britton is a 30 y.o. female with a medical diagnosis of Endocarditis of tricuspid valve. Performance deficits affecting function: weakness, impaired endurance, impaired self care skills, impaired functional mobilty, gait instability, impaired balance, pain, decreased lower extremity function, edema. Patient willing to participate in therapy on this date after maximal encouragement. Patient is currently functioning below baseline secondary to weakness and decreased activity endurance. Patients impaired tolerance for OOB activity currently limits her functional independence to return to previous life roles. Patient would benefit from continued skilled acute OT 4x/wk to improve functional mobility, increase independence with ADLs, and address established goals.        Rehab Prognosis: Good; patient would benefit from acute skilled OT services to address these deficits and reach maximum level of function.       Plan:     Patient to be seen 4 x/week to address the above listed problems via self-care/home management, therapeutic activities, therapeutic exercises  · Plan of Care Expires: 12/04/20  · Plan of Care Reviewed with: patient    Subjective     Chief Complaint: Patient reported general weakness and "heavy" in the legs.   Patient/Family Comments/goals: "I need to lay down."     Occupational Profile:  Living Environment: Patient lives with parents and 3 dependent children in a [facility] with 3 SIXTO with L HR. Bathroom has tub/shower combo with standard toilet.   Previous level of function: Patient was independent PTA.   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 Enjoys taking children to the park.   Equipment Used at Home:  none  Assistance upon Discharge: Patient will have assistance from family upon discharge.     Pain/Comfort:  Pain Rating 1: (did not rate)  Location - Orientation 1: generalized  Pain Rating Post-Intervention 1: (did not rate)  Pain Addressed 2: Cessation of Activity, Reposition    Patients cultural, spiritual, Sabianism conflicts given the current situation: no    Objective:     Communicated with: NSG prior to session.  Patient found supine with pulse ox (continuous), peripheral IV, telemetry upon OT entry to room.    General Precautions: Standard, aspiration, fall   Orthopedic Precautions:N/A   Braces: N/A     Occupational Performance:    Bed Mobility:    · Patient completed Rolling/Turning to Left with  contact guard assistance  · Patient completed Scooting/Bridging with contact guard assistance  · Patient completed Supine to Sit with minimum assistance  · Patient completed Sit to Supine with minimum assistance    Functional Mobility/Transfers:  · Patient completed Sit <> Stand Transfer with minimum assistance  with  hand-held assist   · Patient completed Toilet Transfer Step Transfer technique EOB <> Bedside commode with moderate assistance with hand-held assist. Patient reported SOB during transfer.     Activities of Daily Living:  · Patient denied completion of ADL tasks on this date.     Cognitive/Visual Perceptual:  Cognitive/Psychosocial Skills:     -       Oriented to: Person, Place, Time and Situation   -       Follows Commands/attention:Follows multistep  commands  -       Communication: clear/fluent  -       Memory: No Deficits noted  -       Safety awareness/insight to disability: intact   -       Mood/Affect/Coping skills/emotional control: Appropriate to situation  Visual/Perceptual:      -Intact      Physical Exam:  Edema:  Mild B LE  Upper Extremity Range of Motion:     -       Right Upper Extremity: WFL  -        Left Upper Extremity: WFL  Upper Extremity Strength:    -       Right Upper Extremity: WFL  -       Left Upper Extremity: WFL   Strength:    -       Right Upper Extremity: WFL  -       Left Upper Extremity: WFL    AMPAC 6 Click ADL:  AMPAC Total Score: 12    Treatment & Education:  Role of OT and POC  Safety  ADL Retraining  Functional mobility training  Importance of participation in therapy and OOB activity  Education:    Patient left HOB elevated with all lines intact, call button in reach and all needs met.     GOALS:   Multidisciplinary Problems     Occupational Therapy Goals        Problem: Occupational Therapy Goal    Goal Priority Disciplines Outcome Interventions   Occupational Therapy Goal     OT, PT/OT Ongoing, Progressing    Description: Goals to be met by:     Patient will increase functional independence with ADLs by performing:    UE Dressing with Set-up Assistance.  LE Dressing with Set-up Assistance.  Grooming while standing at sink with Supervision.  Toileting from toilet with Modified [place] for hygiene and clothing management.   Toilet transfer to toilet with Modified [place].                     History:     Past Medical History:   Diagnosis Date    Anxiety     Borderline personality disorder     Endocarditis of tricuspid valve 10/26/2020    MRSA due to IV heroin    PTSD (post-traumatic stress disorder)     Substance abuse          Past Surgical History:   Procedure Laterality Date     SECTION, LOW TRANSVERSE      x4    COSMETIC SURGERY         Time Tracking:     OT Date of Treatment: 20  OT Start Time: 1129  OT Stop Time: 1145  OT Total Time (min): 16 min    Billable Minutes:Evaluation 8  Self Care/Home Management 8    SOFIA Cassidy  2020

## 2020-11-04 NOTE — PROGRESS NOTES
Pharmacokinetic Assessment Follow Up: IV Vancomycin    Vancomycin serum concentration assessment(s):    - Vancomycin level was drawn ~ 20.5 hours after last dose  - Random level is above goal trough of 15-20 mcg/mL; want trough closer to 20mcg/mL per ID  - Patient remains in DERICK; Scr stable at 1.8mg/dL    Vancomycin Regimen Plan:    - Will hold off on vancomycin dose for now  - Recheck random vancomycin level today @ 1700 (~ 12 hours from previous random level)  - Redose vancomycin if random level is < 20mcg/dL    Drug levels (last 3 results):  Recent Labs   Lab Result Units 11/02/20  0807 11/03/20  0338 11/04/20  0441   Vancomycin, Random ug/mL 27.3 18.4 22.0       Pharmacy will continue to follow and monitor vancomycin.    Please contact pharmacy at extension [phone] for questions regarding this assessment.    Thank you for the consult,   Rosa Garcia       Patient brief summary:  Марина Britton is a 30 y.o. female initiated on antimicrobial therapy with IV Vancomycin for treatment of endocarditis    The patient's current regimen is pulsed dosing.    Drug Allergies:   Review of patient's allergies indicates:  No Known Allergies    Actual Body Weight:   64.4kg    Renal Function:   Estimated Creatinine Clearance: 39.9 mL/min (A) (based on SCr of 1.8 mg/dL (H)).,     Dialysis Method (if applicable):  N/A    CBC (last 72 hours):  Recent Labs   Lab Result Units 11/02/20  0400 11/03/20  0338 11/04/20  0441   WBC K/uL 19.64* 16.50* 14.96*   Hemoglobin g/dL 9.0* 9.0* 11.1*   Hematocrit % 29.4* 29.5* 37.7   Platelets K/uL 245 356* 445*   Gran % % 87.5* 75.1* 76.1*   Lymph % % 4.3* 11.9* 13.0*   Mono % % 4.8 8.0 6.1   Eosinophil % % 1.3 1.2 1.1   Basophil % % 0.4 1.0 1.4   Differential Method  Automated Automated Automated       Metabolic Panel (last 72 hours):  Recent Labs   Lab Result Units 11/01/20  1305 11/02/20  0400 11/03/20  0338 11/03/20  1009 11/04/20  0441   Sodium mmol/L 130* 129* 131*  --  129*   Sodium, Urine  mmol/L  --   --   --  <20*  --    Potassium mmol/L 4.8 4.8 5.1  --  6.3*   Chloride mmol/L 103 103 104  --  103   CO2 mmol/L 19* 18* 19*  --  17*   Glucose mg/dL 114* 109 90  --  70   BUN mg/dL 42* 43* 43*  --  39*   Creatinine mg/dL 1.4 1.5* 1.8*  --  1.8*   Creatinine, Urine mg/dL  --   --   --  55.0  --    Albumin g/dL  --  1.1* 1.2*  --  1.6*   Total Bilirubin mg/dL  --  0.8 0.7  --  0.9   Alkaline Phosphatase U/L  --  182* 183*  --  213*   AST U/L  --  19 20  --  20   ALT U/L  --  12 11  --  12   Magnesium mg/dL  --  2.3 2.6  --  3.0*   Phosphorus mg/dL  --  5.5* 5.8*  --  5.5*       Vancomycin Administrations:  vancomycin given in the last 96 hours                   vancomycin 750 mg in dextrose 5 % 250 mL IVPB (ready to mix system) (mg) 750 mg New Bag 11/03/20 0755    vancomycin 1.25 g in dextrose 5% 250 mL IVPB (ready to mix) (mg) 1,250 mg New Bag 11/01/20 0841                Microbiologic Results:  Microbiology Results (last 7 days)     Procedure Component Value Units Date/Time    Blood culture [[id number]] Collected: 11/04/20 0442    Order Status: Sent Specimen: Blood Updated: 11/04/20 0646    Blood culture [[id number]] Collected: 11/04/20 0442    Order Status: Sent Specimen: Blood Updated: 11/04/20 0646    Blood culture [[id number]] Collected: 10/31/20 1610    Order Status: Completed Specimen: Blood from Peripheral, Antecubital, Right Updated: 11/03/20 2212     Blood Culture, Routine No Growth to date      No Growth to date      No Growth to date      No Growth to date    Blood culture [[id number]] Collected: 10/31/20 1626    Order Status: Completed Specimen: Blood from Peripheral, Antecubital, Left Updated: 11/03/20 2212     Blood Culture, Routine No Growth to date      No Growth to date      No Growth to date      No Growth to date    Blood culture [[id number]] Collected: 11/01/20 1213    Order Status: Completed Specimen: Blood from Peripheral, Antecubital, Left Updated: 11/03/20 1412     Blood Culture,  Routine No Growth to date      No Growth to date      No Growth to date    Blood culture [[id number]] Collected: 11/01/20 1145    Order Status: Completed Specimen: Blood from Peripheral, Antecubital, Right Updated: 11/03/20 1412     Blood Culture, Routine No Growth to date      No Growth to date      No Growth to date    Blood culture [[id number]]     Order Status: No result Specimen: Blood     Blood culture [[id number]]     Order Status: No result Specimen: Blood     Blood culture [[id number]]     Order Status: No result Specimen: Blood     Blood culture [[id number]]     Order Status: No result Specimen: Blood     Blood culture [[id number]]  (Abnormal) Collected: 10/28/20 0955    Order Status: Completed Specimen: Blood from Peripheral, Antecubital, Right Updated: 11/01/20 1109     Blood Culture, Routine Gram stain richard bottle: Gram positive cocci in clusters resembling Staph       Results called to and read back by: Rica Medrano RN 10/29/2020  12:32      METHICILLIN RESISTANT STAPHYLOCOCCUS AUREUS  ID consult required at [facility] [place].  For susceptibility see order #[id number]      Blood culture [[id number]]  (Abnormal)  (Susceptibility) Collected: 10/28/20 1018    Order Status: Completed Specimen: Blood from Peripheral, Antecubital, Left Updated: 10/31/20 0920     Blood Culture, Routine Gram stain aer bottle: Gram positive cocci in clusters resembling Staph      Positive results previously called 10/29/2020      METHICILLIN RESISTANT STAPHYLOCOCCUS AUREUS  ID consult required at [facility] [place].      Urine culture [[id number]] Collected: 10/29/20 1438    Order Status: Completed Specimen: Urine Updated: 10/30/20 2149     Urine Culture, Routine No growth    Narrative:      Specimen Source->Urine    Blood culture [[id number]]  (Abnormal) Collected: 10/26/20 2035    Order Status: Completed Specimen: Blood from Peripheral, Forearm, Right Updated: 10/30/20 1256     Blood  Culture, Routine Gram stain aer bottle: Gram positive cocci in clusters resembling Staph       Positive results previously called 10/28/2020  05:02      METHICILLIN RESISTANT STAPHYLOCOCCUS AUREUS  ID consult required at [facility].  For susceptibility see order #[id number]      Blood culture [[id number]]     Order Status: Sent Specimen: Blood     Blood culture [[id number]]     Order Status: Canceled Specimen: Blood     Blood culture [[id number]]     Order Status: Canceled Specimen: Blood     Blood culture [[id number]]  (Abnormal) Collected: 10/26/20 2232    Order Status: Completed Specimen: Blood from Peripheral, Antecubital, Left Updated: 10/29/20 0916     Blood Culture, Routine Gram stain aer bottle: Gram positive cocci in clusters resembling Staph      Results called to and read back by:Sherry Cohen RN 10/27/2020  18:07      METHICILLIN RESISTANT STAPHYLOCOCCUS AUREUS  [facility].  For susceptibility see order #[id number]      Blood culture [[id number]]     Order Status: Canceled Specimen: Blood     Blood culture [[id number]]     Order Status: Canceled Specimen: Blood

## 2020-11-04 NOTE — PLAN OF CARE
Problem: Occupational Therapy Goal  Goal: Occupational Therapy Goal  Description: Goals to be met by: 11/18    Patient will increase functional independence with ADLs by performing:    UE Dressing with Set-up Assistance.  LE Dressing with Set-up Assistance.  Grooming while standing at sink with Supervision.  Toileting from toilet with Modified [place] for hygiene and clothing management.   Toilet transfer to toilet with Modified [place].    Outcome: Ongoing, Progressing     Patients goals are set.   SOFIA Cassidy  11/4/2020

## 2020-11-04 NOTE — PLAN OF CARE
AAOx4, VSS, no SOB, pt sleep bu alert. Tele monitor on, free from falls/injuries. Will continue to monitor.

## 2020-11-04 NOTE — PROGRESS NOTES
"[facility]  Adult Nutrition  Progress Note    SUMMARY       Recommendations    Recommendations:   1. Continue Regular Diet   --Order Boost Plus 3 times daily while meal intake <50%   2. If renal labs continue to uptrend, consider adding renal restrictions   3. RD to monitor and follow up    Goals: Meet % EEN, EPN by RD f/u date  Nutrition Goal Status: progressing towards goal  Communication of RD Recs: reviewed with RN(POC)    Reason for Assessment    Reason For Assessment: RD follow-up  Diagnosis: infection/sepsis(septic endocarditis)  Relevant Medical History: IVDU  Interdisciplinary Rounds: did not attend  General Information Comments: Spoke with RN via phone. NG tube removed, extubated on 11/2. Ordered for regular diet. Tolerating per RN. Noted pt intake is 25% of meals per flowsheets. Wt increased significantly per chart, unsure accuracy.  Nutrition Discharge Planning: Regular Diet with adeqaute PO intake    Nutrition Risk Screen    Nutrition Risk Screen: other (see comments) Diet recently advanced to regular; previously intubated     Nutrition/Diet History    Factors Affecting Nutritional Intake: None identified at this time    Anthropometrics    Temp: 98.3 °F (36.8 °C)  Height: 5' 2"  Height (inches): 62 in  Weight Method: Bed Scale  Weight: 64.4 kg (141 lb 15.6 oz)  Weight (lb): 141.98 lb  Ideal Body Weight (IBW), Female: 110 lb  % Ideal Body Weight, Female (lb): 106.63 %  BMI (Calculated): 26  BMI Grade: 18.5-24.9 - normal       Lab/Procedures/Meds    Pertinent Labs Reviewed: reviewed  Pertinent Labs Comments: Na 129, K 6.3, BUN 39, Cr 1.8, Ca 8.2, Phos 5.3  Pertinent Medications Reviewed: reviewed  Pertinent Medications Comments: Ca gluconate, miralax, senna    Estimated/Assessed Needs    Weight Used For Calorie Calculations: 53.2 kg (117 lb 4.6 oz)  Energy Calorie Requirements (kcal): 1500 kcal/day  Energy Need Method: Blane Merino(MSJ x1.25)  Protein Requirements: 53-64 " gm/day(1-1.2 g/kg)  Weight Used For Protein Calculations: 53.2 kg (117 lb 4.6 oz)  Fluid Requirements (mL): 1 mL/kcal or per MD  Estimated Fluid Requirement Method: RDA Method  RDA Method (mL): 1500  CHO Requirement: N/A    Nutrition Prescription Ordered    Current Diet Order: Regular Diet  Nutrition Order Comments: -  Current Nutrition Support Formula Ordered: Isosource 1.5  Current Nutrition Support Rate Ordered: 10 (ml)  Current Nutrition Support Frequency Ordered: mL/hr    Evaluation of Received Nutrient/Fluid Intake    Enteral Calories (kcal): 0  Enteral Protein (gm): 0  Enteral (Free Water) Fluid (mL): 0  Energy Calories Required: not meeting needs  Protein Required: not meeting needs  Fluid Required: other (see comments)(per MD)  Comments: LBM 11/2  Tolerance: tolerating  % Intake of Estimated Energy Needs: 0 - 25 %  % Meal Intake: 0 - 25 %    Nutrition Risk    Level of Risk/Frequency of Follow-up: moderate     Assessment and Plan    Nutrition Problem  Inadequate energy intake     Related to (etiology):   Inability to consume sufficient energy     Signs and Symptoms (as evidenced by):   NPO     Interventions(treatment strategy):  Collaboration of nutrition care with other providers      Nutrition Diagnosis Status:   Resolving     Monitor and Evaluation    Food and Nutrient Intake: energy intake, food and beverage intake  Food and Nutrient Adminstration: diet order  Physical Activity and Function: nutrition-related ADLs and IADLs  Anthropometric Measurements: weight, weight change  Biochemical Data, Medical Tests and Procedures: electrolyte and renal panel, gastrointestinal profile, glucose/endocrine profile, inflammatory profile, lipid profile  Nutrition-Focused Physical Findings: overall appearance     Nutrition Follow-Up    RD Follow-up?: Yes

## 2020-11-04 NOTE — ASSESSMENT & PLAN NOTE
Sedative, hypnotic or anxiolytic use disorder, severe, dependence  Cannabis use disorder, moderate, dependence  Opioid use disorder, severe, dependence  IVDU (intravenous drug user)  Started on mirtazapine by Psychiatry.

## 2020-11-04 NOTE — SUBJECTIVE & OBJECTIVE
Interval History: Her chest has been hurting. She says the previous doctors attributed to her endocarditis. She has dyspnea on exertion but is able to walk around. Her potassium was high this morning for some reason. EKG was not concerning and I already ordered calcium gluconate and sodium zirconium earlier.     Review of Systems   Constitutional: Negative for chills and fever.   Respiratory: Positive for shortness of breath.    Cardiovascular: Positive for chest pain.   Gastrointestinal: Negative for nausea and vomiting.   Neurological: Negative for seizures and syncope.     Objective:     Vital Signs (Most Recent):  Temp: 98.3 °F (36.8 °C) (11/04/20 0801)  Pulse: 84 (11/04/20 1138)  Resp: 16 (11/04/20 0801)  BP: 123/80 (11/04/20 0801)  SpO2: (!) 92 % (11/04/20 0801) Vital Signs (24h Range):  Temp:  [97.8 °F (36.6 °C)-99.9 °F (37.7 °C)] 98.3 °F (36.8 °C)  Pulse:  [] 84  Resp:  [16-33] 16  SpO2:  [85 %-100 %] 92 %  BP: (117-140)/(80-90) 123/80     Weight: 64.4 kg (141 lb 15.6 oz)  Body mass index is 25.97 kg/m².    Intake/Output Summary (Last 24 hours) at 11/4/2020 1213  Last data filed at 11/3/2020 1700  Gross per 24 hour   Intake 1120 ml   Output --   Net 1120 ml      Physical Exam  Vitals signs and nursing note reviewed.   Constitutional:       General: She is not in acute distress.  Pulmonary:      Effort: Pulmonary effort is normal. No respiratory distress.   Neurological:      Mental Status: She is alert and oriented to person, place, and time.   Psychiatric:         Mood and Affect: Mood and affect normal.         Significant Labs:   CMP:   Recent Labs   Lab 11/03/20  0338 11/04/20  0441   * 129*   K 5.1 6.3*    103   CO2 19* 17*   GLU 90 70   BUN 43* 39*   CREATININE 1.8* 1.8*   CALCIUM 7.4* 8.2*   PROT 5.3* 7.0   ALBUMIN 1.2* 1.6*   BILITOT 0.7 0.9   ALKPHOS 183* 213*   AST 20 20   ALT 11 12   ANIONGAP 8 9   EGFRNONAA 37.0* 37.0*     All pertinent labs within the past 24 hours have been  reviewed.    Significant Imaging: I have reviewed all pertinent imaging results/findings within the past 24 hours.

## 2020-11-04 NOTE — PROGRESS NOTES
"ADDICTION CONSULT FOLLOW UP VISIT     DEPARTMENT:  Psychiatry  SITE: [facility] [address]    DATE OF ADMISSION: 10/26/2020  7:45 PM  LENGTH OF STAY: 9 days    EXAMINING PRACTITIONER: Delmer Baxter    SUBJECTIVE:     HISTORY    Patient Name: Марина Britton  YOB: 1990    CHIEF COMPLAINT   Марина Britton is a 30 y.o. female who is being seen today for a follow up visit by the addiction psychiatry consult service.  Марина Britton presents with the chief complaint of: septic endocarditis with b/l PE    HPI & PSYCHIATRIC ROS    Patient interviewed at bedside. She received her first dose of Remeron 7.5 mg last night. States she still slept poorly overnight, woke up at 3 am and was unable to go back to sleep. She has been sleeping throughout the day and will try to stay awake today to see if she sleeps better tonight. She also endorses withdrawal symptoms described as "feeling hot and cold" since fentanyl was stopped (11/2). Denies headache, nausea, vomiting, or diarrhea.    She will follow up with her provider Padmini Pedroza, for MAT, after discharge.       PFSH: The patient's past medical history has been reviewed and updated as appropriate within the electronic medical record system.    ROS:  N/V/D: no  Tremor: no  Restlessness/Sweating: mild  Anxiety/Agitation: mild     Headache: no  Photophobia: no      PSYCHOTROPIC MEDICATIONS   Mirtazapine 7.5 mg nightly      OBJECTIVE:     EXAMINATION    Vitals:    11/04/20 0504 11/04/20 0509 11/04/20 0711 11/04/20 0801   BP: 117/81   123/80   BP Location:    Left arm   Patient Position:    Lying   Pulse: 98  99 102   Resp: 18 16  16   Temp: 98.7 °F (37.1 °C)   98.3 °F (36.8 °C)   TempSrc:    Oral   SpO2: 98%   (!) 92%   Weight:       Height:           CONSTITUTIONAL  General Appearance: unremarkable, age appropriate    MUSCULOSKELETAL  Abnormal Involuntary Movements: none    PSYCHIATRIC   Orientation: person, place, time, " situation  Speech: normal volume/rate/pitch, spontaneous  Language: English, fluid  Mood: neutral  Affect: reactive  Thought Process: linear, logical, goal-directed  Associations: intact, no JON  Thought Content: no SI/HI/AVH, no bizarre delusions  Memory: intact  Attention and Concentration: intact  Fund of Knowledge: intact  Insight: intact  Judgment: intact      ASSESSMENT:     DIAGNOSES & PROBLEMS    Substance induced depressive disorder  Heroin use disorder, severe  Benzodiazapine use disorder  Cannabis use r/o disorder  Anxiety NOS  Self reported BPD/PTSD       Patient Active Problem List   Diagnosis    Pneumonia of both lungs due to infectious organism    Pulmonary embolism    Severe sepsis    IVDU (intravenous drug user)    Pulmonary emboli    Endocarditis of tricuspid valve    Tachypnea    MRSA bacteremia    Opioid use disorder, severe, dependence    Cannabis use disorder, moderate, dependence    Sedative, hypnotic or anxiolytic use disorder, severe, dependence    Substance induced mood disorder    DERICK (acute kidney injury)    Bacterial endocarditis         PLAN:       MANAGEMENT PLAN, TREATMENT GOALS, THERAPEUTIC TECHNIQUES/APPROACHES & CLINICAL REASONING    - Continue Remeron 7.5 mg nightly  - Patient interested in residential rehab, states will f/u with PCP with regards to initiating MAT -- provided patient with resources  - Please contact Psychiatry once patient no longer needs acute pain control as she needs to be off all opioid medications for at least 20 hours prior to Suboxone initiation.       Opiate Withdrawal Protocol:  -Clonidine 0.1mg PO q4hr PRN for withdrawal associated HTN (do not give if SBP <100, DBP <60, HR <60)  -Dicycloverine (Bentyl) 10mg PO q6hr PRN for abdominal muscle cramps  -Imodium (Loperamide) 2mg PO q6hr PRN for diarrhea  -Robaxin (Methocarbamol) 500mg PO q6hr PRN for muscle spasm  -Zofran (Ondansetron) 4mg ODT q8hr PRN for nausea  -Vistaril (Hydroxyzine) 50mg PO  qHS PRN for insomnia        · Patient counseled on abstinence from alcohol and substances of abuse (illicit and prescription).  · Additional workup planned to address substance use disorder, in order to guide and refine ongoing management options, includes serial alcohol and drug laboratory testing (e.g. PETH, urine toxicology).  · Relapse prevention and motivational interviewing provided.  · Education provided on 12 step recovery programs.        PRESCRIPTION DRUG MANAGEMENT  - The risks and benefits of medication were discussed with this patient.  - Possible expectable adverse effects of any current or proposed individual psychotropic agents were discussed with this patient.  - Counseling was provided on the importance of full compliance with medication regimens.      In cases of emergency, daily coverage provided by Acute/ER Psych MD, NP, or SW, with contact numbers located in [facility] On Call Schedule    Case discussed with staff addiction psychiatrist: LUIS SHERMAN MD    Thank you for the consult. Please contact Psychiatry with questions or for additional assistance.    LABS/IMAGING/STUDIES   Recent Results (from the past 24 hour(s))   Sodium, urine, random    Collection Time: 11/03/20 10:09 AM   Result Value Ref Range    Sodium, Urine <20 (A) 20 - 250 mmol/L   Creatinine, urine, random    Collection Time: 11/03/20 10:09 AM   Result Value Ref Range    Creatinine, Urine 55.0 15.0 - 325.0 mg/dL   Urea nitrogen, urine    Collection Time: 11/03/20 10:09 AM   Result Value Ref Range    Urine Urea Nitrogen 368 140 - 1050 mg/dL   Comprehensive metabolic panel    Collection Time: 11/04/20  4:41 AM   Result Value Ref Range    Sodium 129 (L) 136 - 145 mmol/L    Potassium 6.3 (HH) 3.5 - 5.1 mmol/L    Chloride 103 95 - 110 mmol/L    CO2 17 (L) 23 - 29 mmol/L    Glucose 70 70 - 110 mg/dL    BUN 39 (H) 6 - 20 mg/dL    Creatinine 1.8 (H) 0.5 - 1.4 mg/dL    Calcium 8.2 (L) 8.7 - 10.5 mg/dL    Total Protein 7.0  6.0 - 8.4 g/dL    Albumin 1.6 (L) 3.5 - 5.2 g/dL    Total Bilirubin 0.9 0.1 - 1.0 mg/dL    Alkaline Phosphatase 213 (H) 55 - 135 U/L    AST 20 10 - 40 U/L    ALT 12 10 - 44 U/L    Anion Gap 9 8 - 16 mmol/L    eGFR if African American 42.6 (A) >60 mL/min/1.73 m^2    eGFR if non  37.0 (A) >60 mL/min/1.73 m^2   Magnesium    Collection Time: 11/04/20  4:41 AM   Result Value Ref Range    Magnesium 3.0 (H) 1.6 - 2.6 mg/dL   Phosphorus    Collection Time: 11/04/20  4:41 AM   Result Value Ref Range    Phosphorus 5.5 (H) 2.7 - 4.5 mg/dL   CBC auto differential    Collection Time: 11/04/20  4:41 AM   Result Value Ref Range    WBC 14.96 (H) 3.90 - 12.70 K/uL    RBC 4.12 4.00 - 5.40 M/uL    Hemoglobin 11.1 (L) 12.0 - 16.0 g/dL    Hematocrit 37.7 37.0 - 48.5 %    MCV 92 82 - 98 fL    MCH 26.9 (L) 27.0 - 31.0 pg    MCHC 29.4 (L) 32.0 - 36.0 g/dL    RDW 17.4 (H) 11.5 - 14.5 %    Platelets 445 (H) 150 - 350 K/uL    MPV 9.9 9.2 - 12.9 fL    Immature Granulocytes 2.3 (H) 0.0 - 0.5 %    Gran # (ANC) 11.4 (H) 1.8 - 7.7 K/uL    Immature Grans (Abs) 0.34 (H) 0.00 - 0.04 K/uL    Lymph # 1.9 1.0 - 4.8 K/uL    Mono # 0.9 0.3 - 1.0 K/uL    Eos # 0.2 0.0 - 0.5 K/uL    Baso # 0.21 (H) 0.00 - 0.20 K/uL    nRBC 0 0 /100 WBC    Gran % 76.1 (H) 38.0 - 73.0 %    Lymph % 13.0 (L) 18.0 - 48.0 %    Mono % 6.1 4.0 - 15.0 %    Eosinophil % 1.1 0.0 - 8.0 %    Basophil % 1.4 0.0 - 1.9 %    Differential Method Automated    Vancomycin, Random    Collection Time: 11/04/20  4:41 AM   Result Value Ref Range    Vancomycin, Random 22.0 Not established ug/mL

## 2020-11-04 NOTE — PT/OT/SLP EVAL
"Physical Therapy Evaluation  Co-Eval with OT    Patient Name:  Марина Britton   MRN:  [id number]    Co-treatment performed due to patient's multiple deficits requiring two skilled therapists to appropriately and safely assess patient's strength and endurance while facilitating functional tasks in addition to accommodating for patient's activity tolerance.   Recommendations:     Discharge Recommendations:  rehabilitation facility   Discharge Equipment Recommendations: (TBD pending progress)   Barriers to discharge: increased skilled assistance needed    Assessment:     Марина Britton is a 30 y.o. female admitted with a medical diagnosis of Endocarditis of tricuspid valve.  She presents with the following impairments/functional limitations:  weakness, impaired endurance, impaired self care skills, impaired functional mobilty, gait instability, impaired balance, decreased coordination, decreased lower extremity function, pain, edema . Patient tolerated session fairly well. She reports "heaviness" during mobility due to edema, educated on ankle pumps to promote circulatory drainage. Patient is requiring assistance with mobility and is at an increased risk of falls should she return home at d/c. Patient will benefit from PT services to address the mentioned deficits in order to promote an improve functional mobility status. Upon d/c, rec of IPR in order for patient to progress towards an improved level of functional mobility independence.     Rehab Prognosis: Good; patient would benefit from acute skilled PT services to address these deficits and reach maximum level of function.    Recent Surgery: * No surgery found *      Plan:     During this hospitalization, patient to be seen 4 x/week to address the identified rehab impairments via gait training, therapeutic activities, neuromuscular re-education, therapeutic exercises and progress toward the following goals:    · Plan of Care Expires:  12/04/20    History: " "    Past Medical History:   Diagnosis Date    Anxiety     Borderline personality disorder     Endocarditis of tricuspid valve 10/26/2020    MRSA due to IV heroin    PTSD (post-traumatic stress disorder)     Substance abuse        Past Surgical History:   Procedure Laterality Date     SECTION, LOW TRANSVERSE      x4    COSMETIC SURGERY         Subjective     Chief Complaint: weakness and "heaviness" of BLE   Patient/Family Comments/goals: "I'm sorry I need help to move."  Pain/Comfort:  · Pain Rating 1: (did not rate)  · Location 1: (generalized discomfort)  · Pain Addressed 1: Reposition, Distraction, Cessation of Activity  · Pain Rating Post-Intervention 1: (continued throughout)    Patients cultural, spiritual, Gnosticist conflicts given the current situation: no    Living Environment:  Patient's living environment is as follows:  Home type home   1 or 2 stories  [facility]   Number of SIXTO/ rails 3 SIXTO (Left handrail)   AD used?/Owned?  none   Bathroom set-up  Tub/shower   Working? no   Driving? no   Individuals living with patient:  Mother, father, 3 children (1yo, 4yo and 4yo)   Hobbies/Roles: Fishing, reading    Prior to admission, patients level of function was (I) for ADLs and mobility.  Equipment used at home: none.  DME owned (not currently used): none.  Upon discharge, patient will have assistance from parents and sister.    Objective:     Communicated with RN prior to session.  Patient found HOB elevated with telemetry, pulse ox (continuous), peripheral IV  upon PT entry to room. See below for detailed functional assessment. Patient agreeable to participate in initial evaluation.    General Precautions: Standard, fall, aspiration   Orthopedic Precautions:N/A   Braces: N/A     Exams:  · Cognitive Exam:  Patient is oriented to Person, Place, Time and Situation  · Postural Exam:  Patient presented with the following abnormalities:    · -       Rounded shoulders  · -       Forward head  · Sensation:  "   LEFT LE: -       Intact  light/touch LE RIGHT LE:   -       Intact  light/touch LE      ROM and Strength   Right Lower Extremity  Left Lower Extremity    Hip Flexion 3/5 WFL   Knee Extension  WFL WFL   Knee Flexion  3+/5 3+/5   Ankle DF 3+/5 3+/5   Ankle PF  WFL WFL   * BLE significant edema present     Functional Mobility:  · Bed Mobility:     · Rolling Left:  contact guard assistance  · Scooting: contact guard assistance  · Supine to Sit: minimum assistance  · Sit to Supine: minimum assistance  · Transfers:     · Sit to Stand:  minimum assistance with hand-held assist  · Gait: ~5 steps x2 with ModA and HHA  ·  unsteady, shuffling gait  ·  significant difficultly advancing RLE  ·  FFP, decreased knee flexion with swing phase  ·  SOB with mobility requesting to return to bed    · Balance: sitting EOB - SBA; static standing - Felicia; dynamic standing - ModA     Therapeutic Activities and Exercises:  -Patient educated on the role and goal of PT services during acute care LOS. Question and concerns acknowledged and answered as appropriate.   -Will continue to educated as needed.    -White board updated in patients room to reflect level of assistance needed with nursing. RNx1 via stand pivot transfers     AM-PAC 6 CLICK MOBILITY  Total Score:15     Patient left HOB elevated with all lines intact, call button in reach and RN notified.  White board updated in patient room to reflect level of function with nursing.     GOALS:   Multidisciplinary Problems     Physical Therapy Goals        Problem: Physical Therapy Goal    Goal Priority Disciplines Outcome Goal Variances Interventions   Physical Therapy Goal     PT, PT/OT Ongoing, Progressing     Description: Goals to be met by: 20     Patient will increase functional independence with mobility by performin. Supine to sit with Modified [place]  2. Sit to supine with Modified [place]  3. Sit to stand transfer with Stand-by Assistance  4. Gait  x 50  feet with Contact Guard Assistance using LRAD, if needed.   5. Ascend/descend 3 stairs with left Handrails Contact Guard Assistance.                      Time Tracking:     PT Received On: 11/04/20  PT Start Time: 1129     PT Stop Time: 1145  PT Total Time (min): 16 min     Billable Minutes: Evaluation 8 and Gait Training 8      Marti Argueta, PT  11/04/2020

## 2020-11-05 LAB
ALBUMIN SERPL BCP-MCNC: 1.6 G/DL (ref 3.5–5.2)
ALP SERPL-CCNC: 191 U/L (ref 55–135)
ALT SERPL W/O P-5'-P-CCNC: 10 U/L (ref 10–44)
AMORPH CRY UR QL COMP ASSIST: ABNORMAL
ANION GAP SERPL CALC-SCNC: 12 MMOL/L (ref 8–16)
AST SERPL-CCNC: 19 U/L (ref 10–40)
BACTERIA #/AREA URNS AUTO: ABNORMAL /HPF
BACTERIA BLD CULT: NORMAL
BACTERIA BLD CULT: NORMAL
BASOPHILS # BLD AUTO: 0.2 K/UL (ref 0–0.2)
BASOPHILS NFR BLD: 1.1 % (ref 0–1.9)
BILIRUB SERPL-MCNC: 0.6 MG/DL (ref 0.1–1)
BILIRUB UR QL STRIP: NEGATIVE
BUN SERPL-MCNC: 40 MG/DL (ref 6–20)
CALCIUM SERPL-MCNC: 8 MG/DL (ref 8.7–10.5)
CHLORIDE SERPL-SCNC: 102 MMOL/L (ref 95–110)
CLARITY UR REFRACT.AUTO: ABNORMAL
CO2 SERPL-SCNC: 17 MMOL/L (ref 23–29)
COLOR UR AUTO: ABNORMAL
CREAT SERPL-MCNC: 2 MG/DL (ref 0.5–1.4)
DIFFERENTIAL METHOD: ABNORMAL
EOSINOPHIL # BLD AUTO: 0.3 K/UL (ref 0–0.5)
EOSINOPHIL NFR BLD: 1.6 % (ref 0–8)
ERYTHROCYTE [DISTWIDTH] IN BLOOD BY AUTOMATED COUNT: 17.4 % (ref 11.5–14.5)
EST. GFR  (AFRICAN AMERICAN): 37.5 ML/MIN/1.73 M^2
EST. GFR  (NON AFRICAN AMERICAN): 32.6 ML/MIN/1.73 M^2
GLUCOSE SERPL-MCNC: 77 MG/DL (ref 70–110)
GLUCOSE UR QL STRIP: NEGATIVE
GRAN CASTS UR QL COMP ASSIST: 1 /LPF
HCT VFR BLD AUTO: 33.8 % (ref 37–48.5)
HGB BLD-MCNC: 10.3 G/DL (ref 12–16)
HGB UR QL STRIP: ABNORMAL
HYALINE CASTS UR QL AUTO: 0 /LPF
IMM GRANULOCYTES # BLD AUTO: 0.35 K/UL (ref 0–0.04)
IMM GRANULOCYTES NFR BLD AUTO: 1.9 % (ref 0–0.5)
KETONES UR QL STRIP: NEGATIVE
LEUKOCYTE ESTERASE UR QL STRIP: ABNORMAL
LYMPHOCYTES # BLD AUTO: 2.2 K/UL (ref 1–4.8)
LYMPHOCYTES NFR BLD: 12.3 % (ref 18–48)
MAGNESIUM SERPL-MCNC: 2.7 MG/DL (ref 1.6–2.6)
MCH RBC QN AUTO: 27.1 PG (ref 27–31)
MCHC RBC AUTO-ENTMCNC: 30.5 G/DL (ref 32–36)
MCV RBC AUTO: 89 FL (ref 82–98)
MICROSCOPIC COMMENT: ABNORMAL
MONOCYTES # BLD AUTO: 0.6 K/UL (ref 0.3–1)
MONOCYTES NFR BLD: 3.5 % (ref 4–15)
NEUTROPHILS # BLD AUTO: 14.3 K/UL (ref 1.8–7.7)
NEUTROPHILS NFR BLD: 79.6 % (ref 38–73)
NITRITE UR QL STRIP: NEGATIVE
NON-SQ EPI CELLS #/AREA URNS AUTO: 1 /HPF
NRBC BLD-RTO: 0 /100 WBC
PH UR STRIP: 5 [PH] (ref 5–8)
PHOSPHATE SERPL-MCNC: 5.6 MG/DL (ref 2.7–4.5)
PLATELET # BLD AUTO: 491 K/UL (ref 150–350)
PMV BLD AUTO: 9.8 FL (ref 9.2–12.9)
POTASSIUM SERPL-SCNC: 4.8 MMOL/L (ref 3.5–5.1)
POTASSIUM SERPL-SCNC: 4.9 MMOL/L (ref 3.5–5.1)
POTASSIUM SERPL-SCNC: 5 MMOL/L (ref 3.5–5.1)
POTASSIUM SERPL-SCNC: 5.4 MMOL/L (ref 3.5–5.1)
POTASSIUM SERPL-SCNC: 5.5 MMOL/L (ref 3.5–5.1)
POTASSIUM SERPL-SCNC: 5.5 MMOL/L (ref 3.5–5.1)
PROT SERPL-MCNC: 6.6 G/DL (ref 6–8.4)
PROT UR QL STRIP: ABNORMAL
RBC # BLD AUTO: 3.8 M/UL (ref 4–5.4)
RBC #/AREA URNS AUTO: 26 /HPF (ref 0–4)
SODIUM SERPL-SCNC: 131 MMOL/L (ref 136–145)
SP GR UR STRIP: 1.02 (ref 1–1.03)
SQUAMOUS #/AREA URNS AUTO: 5 /HPF
URN SPEC COLLECT METH UR: ABNORMAL
WBC # BLD AUTO: 17.98 K/UL (ref 3.9–12.7)
WBC #/AREA URNS AUTO: 30 /HPF (ref 0–5)
WBC CLUMPS UR QL AUTO: ABNORMAL

## 2020-11-05 PROCEDURE — 25000003 PHARM REV CODE 250: Performed by: STUDENT IN AN ORGANIZED HEALTH CARE EDUCATION/TRAINING PROGRAM

## 2020-11-05 PROCEDURE — 84132 ASSAY OF SERUM POTASSIUM: CPT

## 2020-11-05 PROCEDURE — 63600175 PHARM REV CODE 636 W HCPCS: Performed by: STUDENT IN AN ORGANIZED HEALTH CARE EDUCATION/TRAINING PROGRAM

## 2020-11-05 PROCEDURE — 84132 ASSAY OF SERUM POTASSIUM: CPT | Mod: 91

## 2020-11-05 PROCEDURE — 99233 PR SUBSEQUENT HOSPITAL CARE,LEVL III: ICD-10-PCS | Mod: ,,, | Performed by: INTERNAL MEDICINE

## 2020-11-05 PROCEDURE — 25000003 PHARM REV CODE 250: Performed by: INTERNAL MEDICINE

## 2020-11-05 PROCEDURE — 97530 THERAPEUTIC ACTIVITIES: CPT

## 2020-11-05 PROCEDURE — 83735 ASSAY OF MAGNESIUM: CPT

## 2020-11-05 PROCEDURE — 97116 GAIT TRAINING THERAPY: CPT

## 2020-11-05 PROCEDURE — 25000003 PHARM REV CODE 250: Performed by: NURSE PRACTITIONER

## 2020-11-05 PROCEDURE — A4216 STERILE WATER/SALINE, 10 ML: HCPCS | Performed by: INTERNAL MEDICINE

## 2020-11-05 PROCEDURE — 94761 N-INVAS EAR/PLS OXIMETRY MLT: CPT

## 2020-11-05 PROCEDURE — 27000221 HC OXYGEN, UP TO 24 HOURS

## 2020-11-05 PROCEDURE — 11000001 HC ACUTE MED/SURG PRIVATE ROOM

## 2020-11-05 PROCEDURE — 94660 CPAP INITIATION&MGMT: CPT

## 2020-11-05 PROCEDURE — 81001 URINALYSIS AUTO W/SCOPE: CPT

## 2020-11-05 PROCEDURE — 85025 COMPLETE CBC W/AUTO DIFF WBC: CPT

## 2020-11-05 PROCEDURE — 80053 COMPREHEN METABOLIC PANEL: CPT

## 2020-11-05 PROCEDURE — 87040 BLOOD CULTURE FOR BACTERIA: CPT | Mod: 59

## 2020-11-05 PROCEDURE — 99900035 HC TECH TIME PER 15 MIN (STAT)

## 2020-11-05 PROCEDURE — 87086 URINE CULTURE/COLONY COUNT: CPT

## 2020-11-05 PROCEDURE — 36415 COLL VENOUS BLD VENIPUNCTURE: CPT

## 2020-11-05 PROCEDURE — 25000003 PHARM REV CODE 250: Performed by: HOSPITALIST

## 2020-11-05 PROCEDURE — 84100 ASSAY OF PHOSPHORUS: CPT

## 2020-11-05 PROCEDURE — 99233 SBSQ HOSP IP/OBS HIGH 50: CPT | Mod: ,,, | Performed by: INTERNAL MEDICINE

## 2020-11-05 RX ORDER — SODIUM CHLORIDE 9 MG/ML
INJECTION, SOLUTION INTRAVENOUS CONTINUOUS
Status: ACTIVE | OUTPATIENT
Start: 2020-11-05 | End: 2020-11-05

## 2020-11-05 RX ORDER — MIDODRINE HYDROCHLORIDE 5 MG/1
5 TABLET ORAL 3 TIMES DAILY
Status: DISCONTINUED | OUTPATIENT
Start: 2020-11-05 | End: 2020-11-05

## 2020-11-05 RX ADMIN — MIRTAZAPINE 7.5 MG: 7.5 TABLET ORAL at 08:11

## 2020-11-05 RX ADMIN — HEPARIN SODIUM 5000 UNITS: 5000 INJECTION INTRAVENOUS; SUBCUTANEOUS at 09:11

## 2020-11-05 RX ADMIN — ACETAMINOPHEN 650 MG: 325 TABLET ORAL at 09:11

## 2020-11-05 RX ADMIN — OXYCODONE HYDROCHLORIDE 20 MG: 10 TABLET ORAL at 11:11

## 2020-11-05 RX ADMIN — OXYCODONE HYDROCHLORIDE 20 MG: 10 TABLET ORAL at 12:11

## 2020-11-05 RX ADMIN — MIDODRINE HYDROCHLORIDE 15 MG: 5 TABLET ORAL at 10:11

## 2020-11-05 RX ADMIN — Medication 10 ML: at 06:11

## 2020-11-05 RX ADMIN — ACETAMINOPHEN 1000 MG: 500 TABLET ORAL at 04:11

## 2020-11-05 RX ADMIN — Medication 10 ML: at 12:11

## 2020-11-05 RX ADMIN — SODIUM CHLORIDE: 0.9 INJECTION, SOLUTION INTRAVENOUS at 05:11

## 2020-11-05 RX ADMIN — OXYCODONE HYDROCHLORIDE 20 MG: 10 TABLET ORAL at 05:11

## 2020-11-05 RX ADMIN — SODIUM ZIRCONIUM CYCLOSILICATE 10 G: 10 POWDER, FOR SUSPENSION ORAL at 10:11

## 2020-11-05 RX ADMIN — Medication 10 ML: at 11:11

## 2020-11-05 RX ADMIN — HEPARIN SODIUM 5000 UNITS: 5000 INJECTION INTRAVENOUS; SUBCUTANEOUS at 05:11

## 2020-11-05 RX ADMIN — Medication 10 ML: at 05:11

## 2020-11-05 NOTE — PLAN OF CARE
Temp of 101.8 MD notified, pt given tylenol and started on fluids 100ml/hr. Free from falls/injuries, Contact precautions maintained. Will continue to monitor.    Problem: Adult Inpatient Plan of Care  Goal: Plan of Care Review  Outcome: Ongoing, Progressing  Goal: Patient-Specific Goal (Individualization)  Outcome: Ongoing, Progressing  Goal: Absence of Hospital-Acquired Illness or Injury  Outcome: Ongoing, Progressing  Goal: Optimal Comfort and Wellbeing  Outcome: Ongoing, Progressing  Goal: Readiness for Transition of Care  Outcome: Ongoing, Progressing     Problem: Adjustment to Illness (Sepsis/Septic Shock)  Goal: Optimal Coping  Outcome: Ongoing, Progressing     Problem: Bleeding (Sepsis/Septic Shock)  Goal: Absence of Bleeding  Outcome: Ongoing, Progressing     Problem: Glycemic Control Impaired (Sepsis/Septic Shock)  Goal: Blood Glucose Level Within Desired Range  Outcome: Ongoing, Progressing     Problem: Hemodynamic Instability (Sepsis/Septic Shock)  Goal: Effective Tissue Perfusion  Outcome: Ongoing, Progressing     Problem: Nutrition Impaired (Sepsis/Septic Shock)  Goal: Optimal Nutrition Intake  Outcome: Ongoing, Progressing     Problem: Fluid Imbalance (Pneumonia)  Goal: Fluid Balance  Outcome: Ongoing, Progressing     Problem: Infection (Pneumonia)  Goal: Resolution of Infection Signs/Symptoms  Outcome: Ongoing, Progressing     Problem: Respiratory Compromise (Pneumonia)  Goal: Effective Oxygenation and Ventilation  Outcome: Ongoing, Progressing     Problem: Infection  Goal: Infection Symptom Resolution  Outcome: Ongoing, Progressing     Problem: Wound  Goal: Optimal Wound Healing  Outcome: Ongoing, Progressing     Problem: Skin Injury Risk Increased  Goal: Skin Health and Integrity  Outcome: Ongoing, Progressing     Goal: Absence of Device-Related Skin or Tissue Injury  Outcome: Ongoing, Progressing     Problem: Fall Injury Risk  Goal: Absence of Fall and Fall-Related Injury  Outcome: Ongoing,  Progressing  Goal: Personal Dignity and Safety Maintained  Outcome: Ongoing, Progressing     Problem: Electrolyte Imbalance (Acute Kidney Injury/Impairment)  Goal: Serum Electrolyte Balance  Outcome: Ongoing, Progressing     Problem: Fluid Imbalance (Acute Kidney Injury/Impairment)  Goal: Optimal Fluid Balance  Outcome: Ongoing, Progressing    Problem: Oral Intake Inadequate (Acute Kidney Injury/Impairment)  Goal: Optimal Nutrition Intake  Outcome: Ongoing, Progressing     Problem: Renal Function Impairment (Acute Kidney Injury/Impairment)  Goal: Effective Renal Function  Outcome: Ongoing, Progressing

## 2020-11-05 NOTE — PT/OT/SLP PROGRESS
Occupational Therapy      Patient Name:  Марина Britton   MRN:  [id number]    Patient not seen today secondary to Unavailable Patient off unit for ultra sound initial trial. Attending nurse requesting hold at time of follow up visit. OT will follow-up 11/06/2020    LORY Foreman  11/5/2020

## 2020-11-05 NOTE — PLAN OF CARE
11/05/20 0831   Post-Acute Status   Post-Acute Authorization Placement   Post-Acute Placement Status Referrals Sent

## 2020-11-05 NOTE — PROGRESS NOTES
"Hospital Medicine  Progress Note      Patient Name: Марина Britton  MRN: [id number]  Date of Admission: 10/26/2020     Principal Problem: Endocarditis of tricuspid valve     Subjective     Ms. Britton felt "fine" this morning, though she was febrile again overnight. No pain or redness at her RUE PICC. Chest pain from yesterday improved. No numbness or weakness. She has otherwise remained hemodynamically stable and has no new complaints.    Repeat BCx in process    Review of Systems    Constitutional: +fever Negative for chills, fatigue   Respiratory: +SOB (improved) Negative for cough  Cardiovascular: +chest pain (improved) Negative for palpitations, leg swelling.   Gastrointestinal: Negative for abdominal pain, constipation, diarrhea, nausea, vomiting.   Neurological: Negative for dizziness, syncope, weakness, light-headedness.     Medications  Scheduled Meds:   heparin (porcine)  5,000 Units Subcutaneous Q8H    midodrine  15 mg Per OG tube TID    mirtazapine  7.5 mg Oral QHS    oxyCODONE  20 mg Per OG tube Q6H    polyethylene glycol  17 g Oral Daily    senna  8.6 mg Per OG tube Daily    sodium chloride 0.9%  10 mL Intravenous Q6H     Continuous Infusions:   sodium chloride 0.9% 100 mL/hr at 11/05/20 0559     PRN Meds:.acetaminophen, acetaminophen, acetaminophen, calcium carbonate, hydrOXYzine HCL, melatonin, oxyCODONE, polyethylene glycol, sodium chloride 0.9%, Flushing PICC Protocol **AND** sodium chloride 0.9% **AND** sodium chloride 0.9%, vancomycin - pharmacy to dose    Objective    Physical Examination    Temp:  [97.1 °F (36.2 °C)-101.8 °F (38.8 °C)]   Pulse:  []   Resp:  [15-18]   BP: (124-161)/()   SpO2:  [94 %-100 %]     Gen: NAD, conversant  CV: RRR, +murmur, no peripheral edema  Resp: CTAB, no increased work of breathing on room air  GI: Soft, NT, ND, +BS  Ext: MAEW, RUE PICC clean and dry at insertion site  Neuro: AAOx3, CN grossly intact, no focal neurologic deficits    CBC  Recent " Labs   Lab 11/03/20 0338 11/04/20 0441 11/05/20  0307   WBC 16.50* 14.96* 17.98*   HGB 9.0* 11.1* 10.3*   HCT 29.5* 37.7 33.8*   * 445* 491*     CMP  Recent Labs   Lab 11/03/20 0338 11/04/20 0441 11/05/20  0016 11/05/20  0307 11/05/20  1228   * 129*  --   --  131*  --    K 5.1 6.3*   < > 5.0 5.5*  5.4* 4.8    103  --   --  102  --    CO2 19* 17*  --   --  17*  --    BUN 43* 39*  --   --  40*  --    CREATININE 1.8* 1.8*  --   --  2.0*  --    GLU 90 70  --   --  77  --    CALCIUM 7.4* 8.2*  --   --  8.0*  --    MG 2.6 3.0*  --   --  2.7*  --    PHOS 5.8* 5.5*  --   --  5.6*  --    ALKPHOS 183* 213*  --   --  191*  --    ALT 11 12  --   --  10  --    AST 20 20  --   --  19  --    ALBUMIN 1.2* 1.6*  --   --  1.6*  --    PROT 5.3* 7.0  --   --  6.6  --    BILITOT 0.7 0.9  --   --  0.6  --     < > = values in this interval not displayed.       Hospital Course:    Ms. Britton is a 30-year-old woman with HCV, IVDU who was transferred from [facility] where she presented with chest pain and was diagnosed with MRSA bacteremia complicated by TV IE with septic emboli, acute hypoxic respiratory failure, septic/metabolic encephalopathy, septic PE, septic shock, and acute anemia. She was admitted to the [facility] MICU on 10/27 and was intubated for respiratory failure. She has subsequently been evaluated by ID, CTS, and psychiatry. She required pRBC transfusion on 10/29, and norepi on 10/30. Her admission was further complicated by DERICK which subsequently resolved. She was extubated on 11/2, then transferred to the floor on 11/3 for further care, planning on long-term IV antibiotics with vancomycin and repeat CTS evaluation as an outpatient. Subsequently her DERICK has returned, and she is again febrile for which BCx were obtained on 11/4 and 11/5.    Assessment and Plan:    MRSA bacteremia  Pneumonia of both lungs due to infectious organism  Pulmonary emboli (septic)  Sepsis  Leukocytosis  Endocarditis of  tricuspid valve    - Febrile again, BCx from 11/4 and 11/5 pending. Leukocytosis worsening  - Continue vancomycin (pharmacy dosing)  - PT/OT following and recommending IPR, anticipate LTAC   - CBC daily for WBC  - Needs 6 weeks IV antibiotics, then f/u CTS    DERICK (acute kidney injury)  Hyperkalemia  Hyponatremia    - sCr up to 2.0 today from 1.8  - Etiology likely multifactorial with recent sepsis and vancomycin  - Na 131  - Urine studies and US ordered, administered NS 1L for both Na (131) and DERICK  - Lokelma for hyperkalemia, already improving  - Cardiac monitoring    IVDU (intravenous drug user)  Opioid use disorder, severe, dependence    - Psych evaluated earlier this admission  - Would benefit from LTAC disposition    Substance induced mood disorder    - Psych evaluated, starting on mirtazapine, seems to be improving.    Diet: Adult regular  VTE PPX: SQH, will convert to LMWH when kidney function stabilizes  Goals of care: Curative, full code      Alli Garcia M.D.  Department of Hospital Medicine  [facility]  [phone] (pager)     Total time spent 40 minutes with >50% in direct patient care and care coordination.

## 2020-11-05 NOTE — PLAN OF CARE
Problem: Physical Therapy Goal  Goal: Physical Therapy Goal  Description: Goals to be met by: 20     Patient will increase functional independence with mobility by performin. Supine to sit with Modified [facility]  2. Sit to supine with Modified [facility]  3. Sit to stand transfer with Stand-by Assistance  4. Gait  x 50 feet with Contact Guard Assistance using LRAD, if needed.   5. Ascend/descend 3 stairs with left Handrails Contact Guard Assistance.     Outcome: Ongoing, Progressing   Patient tolerated treatment well. Established POC and goals reviewed and remain appropriate. Plan is to continue to improve patient's functional mobility capabilities.      Marti Argueta, PT, DPT  2020

## 2020-11-05 NOTE — CARE UPDATE
Rapid Response Nurse Chart Check     Chart check completed, abnormal VS noted. Bedside RNCarlito contacted. RN to assess patient, notify MD, administer tylenol, and recheck temperature 1 hour after tylenol. Patient currently on Vancomycin. No concerns verbalized at this time. Instructed to call [phone] for further concerns or assistance.

## 2020-11-05 NOTE — PT/OT/SLP PROGRESS
"Physical Therapy Treatment    Patient Name:  Марина Britton   MRN:  [id number]    Recommendations:     Discharge Recommendations:  rehabilitation facility   Discharge Equipment Recommendations: walker, rolling, shower chair   Barriers to discharge: increased skilled assistance needed    Assessment:     Марина Britton is a 30 y.o. female admitted with a medical diagnosis of Endocarditis of tricuspid valve.  She presents with the following impairments/functional limitations:  weakness, impaired endurance, impaired self care skills, impaired functional mobilty, gait instability, impaired balance, decreased coordination, decreased lower extremity function, edema. Patient tolerated session well. She is eager to improve. Continues with BLE edema, causing "heaviness". Patient would continue to benefit from skilled PT services while in the hospital. At this time, upon d/c rec of IPR in order for patient to progress towards an improved level of functional mobility independence.     Rehab Prognosis: Good; patient would benefit from acute skilled PT services to address these deficits and reach maximum level of function.    Recent Surgery: * No surgery found *      Plan:     During this hospitalization, patient to be seen 4 x/week to address the identified rehab impairments via gait training, therapeutic activities, therapeutic exercises, neuromuscular re-education and progress toward the following goals:    · Plan of Care Expires:  12/04/20    Subjective     Chief Complaint: knee pain   Patient/Family Comments/goals: "Thank you. I think I'm doing better today."  Pain/Comfort:  · Pain Rating 1: 0/10  · Pain Rating Post-Intervention 1: 0/10      Objective:     Communicated with RN prior to session.  Patient found HOB elevated with telemetry, pulse ox (continuous), PICC line upon PT entry to room.     General Precautions: Standard, fall   Orthopedic Precautions:N/A   Braces: N/A     Functional Mobility:  · Bed Mobility:   "   · Scooting: stand by assistance  · Supine to Sit: stand by assistance  · Sit to Supine: stand by assistance  · Transfers:     · Sit to Stand:  minimum assistance with rolling walker  · Gait: ~8 steps with Felicia and RW  ·  significant decreased michel, antalgic gait due to BLE edema   ·  patient ambulating with increased L hip forward rotation - RLE dragging behind  ·  shuffling gait  ·  FFP over RW  ·  patient reporting SOB throughout - requesting to terminate further mobility   · Encouraged to sit up in chair - patient politely refusing and reports she will later this date   · Balance: sitting EOB - Sup; static standing - CGA; dynamic standing - Felicia       AM-PAC 6 CLICK MOBILITY  Turning over in bed (including adjusting bedclothes, sheets and blankets)?: 4  Sitting down on and standing up from a chair with arms (e.g., wheelchair, bedside commode, etc.): 3  Moving from lying on back to sitting on the side of the bed?: 4  Moving to and from a bed to a chair (including a wheelchair)?: 3  Need to walk in hospital room?: 3  Climbing 3-5 steps with a railing?: 2  Basic Mobility Total Score: 19       Therapeutic Activities and Exercises:  -Patient educated on the continued role and goal of PT  -Questions and concerns answered within the the PT scope of practice.   -White board updated in patient room to reflect level of assistance needed with nursing. RNx1 - Felicia for transfers and RW (RW left in patient room for use)     Patient left HOB elevated with all lines intact, call button in reach and RN notified..    GOALS:   Multidisciplinary Problems     Physical Therapy Goals        Problem: Physical Therapy Goal    Goal Priority Disciplines Outcome Goal Variances Interventions   Physical Therapy Goal     PT, PT/OT Ongoing, Progressing     Description: Goals to be met by: 20     Patient will increase functional independence with mobility by performin. Supine to sit with Modified [place]  2. Sit to supine  with Modified [place]  3. Sit to stand transfer with Stand-by Assistance  4. Gait  x 50 feet with Contact Guard Assistance using LRAD, if needed.   5. Ascend/descend 3 stairs with left Handrails Contact Guard Assistance.                      Time Tracking:     PT Received On: 11/05/20  PT Start Time: 1130     PT Stop Time: 1153  PT Total Time (min): 23 min     Billable Minutes: Gait Training 13 and Therapeutic Activity 10    Treatment Type: Treatment  PT/PTA: PT     PTA Visit Number: 0     Marti Argueta, PT  11/05/2020

## 2020-11-06 LAB
ALBUMIN SERPL BCP-MCNC: 1.5 G/DL (ref 3.5–5.2)
ALP SERPL-CCNC: 147 U/L (ref 55–135)
ALT SERPL W/O P-5'-P-CCNC: 8 U/L (ref 10–44)
ANION GAP SERPL CALC-SCNC: 9 MMOL/L (ref 8–16)
AST SERPL-CCNC: 14 U/L (ref 10–40)
BACTERIA #/AREA URNS AUTO: ABNORMAL /HPF
BACTERIA BLD CULT: NORMAL
BACTERIA BLD CULT: NORMAL
BACTERIA UR CULT: NO GROWTH
BASOPHILS # BLD AUTO: 0.14 K/UL (ref 0–0.2)
BASOPHILS NFR BLD: 1.2 % (ref 0–1.9)
BILIRUB SERPL-MCNC: 0.6 MG/DL (ref 0.1–1)
BILIRUB UR QL STRIP: NEGATIVE
BUN SERPL-MCNC: 37 MG/DL (ref 6–20)
CALCIUM SERPL-MCNC: 7.5 MG/DL (ref 8.7–10.5)
CHLORIDE SERPL-SCNC: 104 MMOL/L (ref 95–110)
CLARITY UR REFRACT.AUTO: ABNORMAL
CO2 SERPL-SCNC: 19 MMOL/L (ref 23–29)
COLOR UR AUTO: ABNORMAL
CREAT SERPL-MCNC: 1.8 MG/DL (ref 0.5–1.4)
CREAT UR-MCNC: 123 MG/DL (ref 15–325)
DIFFERENTIAL METHOD: ABNORMAL
EOSINOPHIL # BLD AUTO: 0.3 K/UL (ref 0–0.5)
EOSINOPHIL NFR BLD: 2.4 % (ref 0–8)
ERYTHROCYTE [DISTWIDTH] IN BLOOD BY AUTOMATED COUNT: 17.5 % (ref 11.5–14.5)
EST. GFR  (AFRICAN AMERICAN): 42.6 ML/MIN/1.73 M^2
EST. GFR  (NON AFRICAN AMERICAN): 37 ML/MIN/1.73 M^2
GLUCOSE SERPL-MCNC: 90 MG/DL (ref 70–110)
GLUCOSE UR QL STRIP: NEGATIVE
HCT VFR BLD AUTO: 28 % (ref 37–48.5)
HGB BLD-MCNC: 8.5 G/DL (ref 12–16)
HGB UR QL STRIP: ABNORMAL
HYALINE CASTS UR QL AUTO: 0 /LPF
IMM GRANULOCYTES # BLD AUTO: 0.27 K/UL (ref 0–0.04)
IMM GRANULOCYTES NFR BLD AUTO: 2.4 % (ref 0–0.5)
KETONES UR QL STRIP: NEGATIVE
LEUKOCYTE ESTERASE UR QL STRIP: ABNORMAL
LYMPHOCYTES # BLD AUTO: 1.3 K/UL (ref 1–4.8)
LYMPHOCYTES NFR BLD: 11.4 % (ref 18–48)
MAGNESIUM SERPL-MCNC: 2.6 MG/DL (ref 1.6–2.6)
MCH RBC QN AUTO: 26.8 PG (ref 27–31)
MCHC RBC AUTO-ENTMCNC: 30.4 G/DL (ref 32–36)
MCV RBC AUTO: 88 FL (ref 82–98)
MICROSCOPIC COMMENT: ABNORMAL
MONOCYTES # BLD AUTO: 0.8 K/UL (ref 0.3–1)
MONOCYTES NFR BLD: 6.6 % (ref 4–15)
NEUTROPHILS # BLD AUTO: 8.7 K/UL (ref 1.8–7.7)
NEUTROPHILS NFR BLD: 76 % (ref 38–73)
NITRITE UR QL STRIP: NEGATIVE
NRBC BLD-RTO: 0 /100 WBC
PH UR STRIP: 5 [PH] (ref 5–8)
PHOSPHATE SERPL-MCNC: 5.3 MG/DL (ref 2.7–4.5)
PLATELET # BLD AUTO: 372 K/UL (ref 150–350)
PMV BLD AUTO: 9.2 FL (ref 9.2–12.9)
POTASSIUM SERPL-SCNC: 4.4 MMOL/L (ref 3.5–5.1)
POTASSIUM SERPL-SCNC: 4.7 MMOL/L (ref 3.5–5.1)
POTASSIUM SERPL-SCNC: 4.7 MMOL/L (ref 3.5–5.1)
POTASSIUM SERPL-SCNC: 4.8 MMOL/L (ref 3.5–5.1)
POTASSIUM SERPL-SCNC: 5 MMOL/L (ref 3.5–5.1)
PROT SERPL-MCNC: 6.3 G/DL (ref 6–8.4)
PROT UR QL STRIP: ABNORMAL
RBC # BLD AUTO: 3.17 M/UL (ref 4–5.4)
RBC #/AREA URNS AUTO: 7 /HPF (ref 0–4)
SODIUM SERPL-SCNC: 132 MMOL/L (ref 136–145)
SODIUM UR-SCNC: <20 MMOL/L (ref 20–250)
SP GR UR STRIP: 1.01 (ref 1–1.03)
SQUAMOUS #/AREA URNS AUTO: 8 /HPF
URN SPEC COLLECT METH UR: ABNORMAL
UUN UR-MCNC: 654 MG/DL (ref 140–1050)
VANCOMYCIN SERPL-MCNC: 13.9 UG/ML
WBC # BLD AUTO: 11.44 K/UL (ref 3.9–12.7)
WBC #/AREA URNS AUTO: 18 /HPF (ref 0–5)

## 2020-11-06 PROCEDURE — 63600175 PHARM REV CODE 636 W HCPCS: Performed by: INTERNAL MEDICINE

## 2020-11-06 PROCEDURE — 99232 PR SUBSEQUENT HOSPITAL CARE,LEVL II: ICD-10-PCS | Mod: ,,, | Performed by: PSYCHIATRY & NEUROLOGY

## 2020-11-06 PROCEDURE — 84132 ASSAY OF SERUM POTASSIUM: CPT

## 2020-11-06 PROCEDURE — 63600175 PHARM REV CODE 636 W HCPCS: Performed by: PHYSICIAN ASSISTANT

## 2020-11-06 PROCEDURE — 99900035 HC TECH TIME PER 15 MIN (STAT)

## 2020-11-06 PROCEDURE — 25000003 PHARM REV CODE 250: Performed by: INTERNAL MEDICINE

## 2020-11-06 PROCEDURE — 87040 BLOOD CULTURE FOR BACTERIA: CPT

## 2020-11-06 PROCEDURE — 84100 ASSAY OF PHOSPHORUS: CPT

## 2020-11-06 PROCEDURE — 83735 ASSAY OF MAGNESIUM: CPT

## 2020-11-06 PROCEDURE — 99232 SBSQ HOSP IP/OBS MODERATE 35: CPT | Mod: ,,, | Performed by: PSYCHIATRY & NEUROLOGY

## 2020-11-06 PROCEDURE — 99233 SBSQ HOSP IP/OBS HIGH 50: CPT | Mod: ,,, | Performed by: PHYSICIAN ASSISTANT

## 2020-11-06 PROCEDURE — 63600175 PHARM REV CODE 636 W HCPCS: Performed by: STUDENT IN AN ORGANIZED HEALTH CARE EDUCATION/TRAINING PROGRAM

## 2020-11-06 PROCEDURE — 99233 PR SUBSEQUENT HOSPITAL CARE,LEVL III: ICD-10-PCS | Mod: ,,, | Performed by: PHYSICIAN ASSISTANT

## 2020-11-06 PROCEDURE — 25000003 PHARM REV CODE 250: Performed by: HOSPITALIST

## 2020-11-06 PROCEDURE — 84300 ASSAY OF URINE SODIUM: CPT

## 2020-11-06 PROCEDURE — 82570 ASSAY OF URINE CREATININE: CPT

## 2020-11-06 PROCEDURE — 99232 SBSQ HOSP IP/OBS MODERATE 35: CPT | Mod: ,,, | Performed by: INTERNAL MEDICINE

## 2020-11-06 PROCEDURE — 80053 COMPREHEN METABOLIC PANEL: CPT

## 2020-11-06 PROCEDURE — 87086 URINE CULTURE/COLONY COUNT: CPT

## 2020-11-06 PROCEDURE — 84540 ASSAY OF URINE/UREA-N: CPT

## 2020-11-06 PROCEDURE — 25000003 PHARM REV CODE 250: Performed by: STUDENT IN AN ORGANIZED HEALTH CARE EDUCATION/TRAINING PROGRAM

## 2020-11-06 PROCEDURE — 99232 PR SUBSEQUENT HOSPITAL CARE,LEVL II: ICD-10-PCS | Mod: ,,, | Performed by: INTERNAL MEDICINE

## 2020-11-06 PROCEDURE — A4216 STERILE WATER/SALINE, 10 ML: HCPCS | Performed by: INTERNAL MEDICINE

## 2020-11-06 PROCEDURE — 11000001 HC ACUTE MED/SURG PRIVATE ROOM

## 2020-11-06 PROCEDURE — 85025 COMPLETE CBC W/AUTO DIFF WBC: CPT

## 2020-11-06 PROCEDURE — 97535 SELF CARE MNGMENT TRAINING: CPT

## 2020-11-06 PROCEDURE — 36415 COLL VENOUS BLD VENIPUNCTURE: CPT

## 2020-11-06 PROCEDURE — 81001 URINALYSIS AUTO W/SCOPE: CPT

## 2020-11-06 PROCEDURE — 80202 ASSAY OF VANCOMYCIN: CPT

## 2020-11-06 PROCEDURE — 84132 ASSAY OF SERUM POTASSIUM: CPT | Mod: 91

## 2020-11-06 RX ORDER — CEFEPIME HYDROCHLORIDE 2 G/1
2 INJECTION, POWDER, FOR SOLUTION INTRAVENOUS
Status: DISCONTINUED | OUTPATIENT
Start: 2020-11-06 | End: 2020-11-10

## 2020-11-06 RX ORDER — SODIUM CHLORIDE 9 MG/ML
INJECTION, SOLUTION INTRAVENOUS CONTINUOUS
Status: DISCONTINUED | OUTPATIENT
Start: 2020-11-06 | End: 2020-11-07

## 2020-11-06 RX ADMIN — HEPARIN SODIUM 5000 UNITS: 5000 INJECTION INTRAVENOUS; SUBCUTANEOUS at 03:11

## 2020-11-06 RX ADMIN — VANCOMYCIN HYDROCHLORIDE 750 MG: 750 INJECTION, POWDER, LYOPHILIZED, FOR SOLUTION INTRAVENOUS at 10:11

## 2020-11-06 RX ADMIN — HEPARIN SODIUM 5000 UNITS: 5000 INJECTION INTRAVENOUS; SUBCUTANEOUS at 06:11

## 2020-11-06 RX ADMIN — OXYCODONE HYDROCHLORIDE 20 MG: 10 TABLET ORAL at 06:11

## 2020-11-06 RX ADMIN — OXYCODONE HYDROCHLORIDE 20 MG: 10 TABLET ORAL at 11:11

## 2020-11-06 RX ADMIN — ACETAMINOPHEN 1000 MG: 500 TABLET ORAL at 11:11

## 2020-11-06 RX ADMIN — Medication 10 ML: at 11:11

## 2020-11-06 RX ADMIN — MIRTAZAPINE 7.5 MG: 7.5 TABLET ORAL at 09:11

## 2020-11-06 RX ADMIN — HEPARIN SODIUM 5000 UNITS: 5000 INJECTION INTRAVENOUS; SUBCUTANEOUS at 09:11

## 2020-11-06 RX ADMIN — SODIUM CHLORIDE: 0.9 INJECTION, SOLUTION INTRAVENOUS at 03:11

## 2020-11-06 RX ADMIN — CEFEPIME 2 G: 2 INJECTION, POWDER, FOR SOLUTION INTRAVENOUS at 03:11

## 2020-11-06 RX ADMIN — Medication 10 ML: at 12:11

## 2020-11-06 RX ADMIN — OXYCODONE HYDROCHLORIDE 20 MG: 10 TABLET ORAL at 12:11

## 2020-11-06 RX ADMIN — Medication 10 ML: at 06:11

## 2020-11-06 NOTE — PT/OT/SLP PROGRESS
"Occupational Therapy   Treatment    Name: Марина Britton  MRN: [id number]  Admitting Diagnosis:  Endocarditis of tricuspid valve       Recommendations:     Discharge Recommendations: rehabilitation facility  Discharge Equipment Recommendations:  walker, rolling  Barriers to discharge:  Decreased caregiver support    Assessment:     Марина Britton is a 30 y.o. female with a medical diagnosis of Endocarditis of tricuspid valve.  She presents with deficits in self-care skills as well as mobility and endurance. Pt. Required min A for sit to stand transfers  On this date as well as with MIP and side steps. Pt. Fatigued from not sleeping well last night.. Performance deficits affecting function are impaired self care skills, impaired functional mobilty, impaired endurance, weakness, gait instability. Pt. Would benefit from continued oT services to maximize safety and I with ADL tasks and improve level of endurance.    Rehab Prognosis:  Good; patient would benefit from acute skilled OT services to address these deficits and reach maximum level of function.       Plan:     Patient to be seen 4 x/week to address the above listed problems via self-care/home management, therapeutic activities, therapeutic exercises  · Plan of Care Expires: 12/04/20  · Plan of Care Reviewed with: patient    Subjective   " I don't feel good this morning. I was up all last night"    Pain/Comfort:  · Pain Rating 1: 0/10  · Pain Rating Post-Intervention 1: 0/10    Objective:     Communicated with: nurse prior to session.  Patient found supine with telemetry, pulse ox (continuous), PICC line upon OT entry to room.    General Precautions: Standard, fall, contact   Orthopedic Precautions:N/A   Braces: N/A     Occupational Performance:     Bed Mobility:    · Patient completed Supine to Sit with stand by assistance     Functional Mobility/Transfers:  · Patient completed Sit <> Stand Transfer with contact guard assistance  with  rolling walker "   · Functional Mobility: pt. Performed MIP x 10 reps with RW and CGA/min At. Also side stepped along EOB toHOB x ~ 6  small steps with RW and CGA    Activities of Daily Living:  · Grooming: supervision seated EOB to wash face and brush teeth      [facility] 6 Click ADL: 18    Treatment & Education:  Pt. Educated on role of oT and POC   pt. Educated on importance of therapy/OOB as well as need to perform there ex particularly ankle pumps to decrease edema in BLE  Pt. Performed ankle pumps x 20 reps     Patient left supine with all lines intact and call button in reachEducation:      GOALS:   Multidisciplinary Problems     Occupational Therapy Goals        Problem: Occupational Therapy Goal    Goal Priority Disciplines Outcome Interventions   Occupational Therapy Goal     OT, PT/OT Ongoing, Progressing    Description: Goals to be met by: 11/18    Patient will increase functional independence with ADLs by performing:    UE Dressing with Set-up Assistance.  LE Dressing with Set-up Assistance.  Grooming while standing at sink with Supervision.  Toileting from toilet with Modified [place] for hygiene and clothing management.   Toilet transfer to toilet with Modified [place].                     Time Tracking:     OT Date of Treatment: 11/06/20  OT Start Time: 0822  OT Stop Time: 0834  OT Total Time (min): 12 min    Billable Minutes:Self Care/Home Management 12    LORY Maurer  11/6/2020

## 2020-11-06 NOTE — PROGRESS NOTES
Pharmacokinetic Assessment Follow Up: IV Vancomycin    Vancomycin Regimen Assessment & Plan:  - Vancomycin level drawn this am resulted as 13.9 ug/mL (~35 hr post Vanc dose on 11/4 pm); goal trough 15-20 ug/mL.  - Patient with DERICK, SCr trending down (2.0 > 1.8 mcg/dL today).  Will continue pulse dosing while renal function remains unstable.  - Administer vancomycin 750 mg IV x1 dose today since level is <20. Draw Vancomycin level with morning labs tomorrow.  Will re-dose as needed depending on level and renal function.       Drug levels (last 3 results):  Recent Labs   Lab Result Units 11/04/20 0441 11/04/20 1905 11/06/20  0742   Vancomycin, Random ug/mL 22.0 15.7 13.9       Pharmacy will continue to follow and monitor vancomycin.    Please contact pharmacy at extension [phone] for questions regarding this assessment.    Thank you for the consult,   Reta Pelayo       Patient brief summary:  Марина Britton is a 30 y.o. female initiated on antimicrobial therapy with IV Vancomycin for treatment of endocarditis    The patient's current regimen is pulse dosing    Drug Allergies:   Review of patient's allergies indicates:  No Known Allergies    Actual Body Weight:   69 kg    Renal Function:   Estimated Creatinine Clearance: 41.2 mL/min (A) (based on SCr of 1.8 mg/dL (H)).,     Dialysis Method (if applicable):  N/A    CBC (last 72 hours):  Recent Labs   Lab Result Units 11/04/20 0441 11/05/20  0307 11/06/20  0742   WBC K/uL 14.96* 17.98* 11.44   Hemoglobin g/dL 11.1* 10.3* 8.5*   Hematocrit % 37.7 33.8* 28.0*   Platelets K/uL 445* 491* 372*   Gran % % 76.1* 79.6* 76.0*   Lymph % % 13.0* 12.3* 11.4*   Mono % % 6.1 3.5* 6.6   Eosinophil % % 1.1 1.6 2.4   Basophil % % 1.4 1.1 1.2   Differential Method  Automated Automated Automated       Metabolic Panel (last 72 hours):  Recent Labs   Lab Result Units 11/03/20  1009 11/04/20 0441 11/04/20  1905 11/05/20  0016 11/05/20  0307 11/05/20  0615 11/05/20  1228 11/05/20  1635  11/05/20 2120 11/06/20  0000 11/06/20  0638 11/06/20  0742   Sodium mmol/L  --  129*  --   --  131*  --   --   --   --   --   --  132*   Sodium, Urine mmol/L <20*  --   --   --   --   --   --   --   --   --  <20*  --    Potassium mmol/L  --  6.3* 5.2*  5.2*  5.2* 5.0 5.5*  5.4*  --  4.8 5.5* 4.9 4.7  --  4.8  4.7   Chloride mmol/L  --  103  --   --  102  --   --   --   --   --   --  104   CO2 mmol/L  --  17*  --   --  17*  --   --   --   --   --   --  19*   Glucose mg/dL  --  70  --   --  77  --   --   --   --   --   --  90   Glucose, UA   --   --   --   --   --  Negative  --   --   --   --  Negative  --    BUN mg/dL  --  39*  --   --  40*  --   --   --   --   --   --  37*   Creatinine mg/dL  --  1.8*  --   --  2.0*  --   --   --   --   --   --  1.8*   Creatinine, Urine mg/dL 55.0  --   --   --   --   --   --   --   --   --  123.0  --    Albumin g/dL  --  1.6*  --   --  1.6*  --   --   --   --   --   --  1.5*   Total Bilirubin mg/dL  --  0.9  --   --  0.6  --   --   --   --   --   --  0.6   Alkaline Phosphatase U/L  --  213*  --   --  191*  --   --   --   --   --   --  147*   AST U/L  --  20  --   --  19  --   --   --   --   --   --  14   ALT U/L  --  12  --   --  10  --   --   --   --   --   --  8*   Magnesium mg/dL  --  3.0*  --   --  2.7*  --   --   --   --   --   --  2.6   Phosphorus mg/dL  --  5.5*  --   --  5.6*  --   --   --   --   --   --  5.3*       Vancomycin Administrations:  vancomycin given in the last 96 hours                   vancomycin 750 mg in dextrose 5 % 250 mL IVPB (ready to mix system) (mg) 750 mg New Bag 11/04/20 2136    vancomycin 750 mg in dextrose 5 % 250 mL IVPB (ready to mix system) (mg) 750 mg New Bag 11/03/20 0755                Microbiologic Results:  Microbiology Results (last 7 days)     Procedure Component Value Units Date/Time    Blood culture [[id number]] Collected: 11/05/20 0307    Order Status: Completed Specimen: Blood Updated: 11/06/20 0812     Blood Culture, Routine No  Growth to date      No Growth to date    Blood culture [[id number]] Collected: 11/05/20 0307    Order Status: Completed Specimen: Blood Updated: 11/06/20 0812     Blood Culture, Routine No Growth to date      No Growth to date    Blood culture [[id number]] Collected: 11/06/20 0742    Order Status: Sent Specimen: Blood from Antecubital, Left Arm Updated: 11/06/20 0804    Narrative:      Collection has been rescheduled by [facility] at 11/06/2020 05:05 Reason:   Unable to collect blood culture and other labs RN Maribel is aware  Collection has been rescheduled by [facility] at 11/06/2020 05:05 Reason:   Unable to collect blood culture and other labs RN Maribel is aware    Blood culture [[id number]] Collected: 11/06/20 0745    Order Status: Sent Specimen: Blood from Peripheral, Left Hand Updated: 11/06/20 0804    Narrative:      Collection has been rescheduled by VX at 11/06/2020 05:05 Reason:   Unable to collect blood culture and other labs RN Maribel is aware  Collection has been rescheduled by [facility] at 11/06/2020 05:05 Reason:   Unable to collect blood culture and other labs RN Maribel is aware    Urine culture [[id number]] Collected: 11/06/20 0638    Order Status: No result Specimen: Urine Updated: 11/06/20 0705    Blood culture [[id number]] Collected: 10/31/20 1610    Order Status: Completed Specimen: Blood from Peripheral, Antecubital, Right Updated: 11/05/20 2212     Blood Culture, Routine No growth after 5 days.    Blood culture [[id number]] Collected: 10/31/20 1626    Order Status: Completed Specimen: Blood from Peripheral, Antecubital, Left Updated: 11/05/20 2212     Blood Culture, Routine No growth after 5 days.    Blood culture [[id number]] Collected: 11/01/20 1213    Order Status: Completed Specimen: Blood from Peripheral, Antecubital, Left Updated: 11/05/20 1412     Blood Culture, Routine No Growth to date      No Growth to date      No Growth to date      No Growth to date      No Growth to date    Blood culture [[id number]] Collected:  11/01/20 1145    Order Status: Completed Specimen: Blood from Peripheral, Antecubital, Right Updated: 11/05/20 1412     Blood Culture, Routine No Growth to date      No Growth to date      No Growth to date      No Growth to date      No Growth to date    Blood culture [[id number]] Collected: 11/04/20 0442    Order Status: Completed Specimen: Blood Updated: 11/05/20 1013     Blood Culture, Routine No Growth to date      No Growth to date    Blood culture [[id number]] Collected: 11/04/20 0442    Order Status: Completed Specimen: Blood Updated: 11/05/20 1013     Blood Culture, Routine No Growth to date      No Growth to date    Urine Culture High Risk [[id number]] Collected: 11/05/20 0615    Order Status: Sent Specimen: Urine, Clean Catch Updated: 11/05/20 0733    Blood culture [[id number]]     Order Status: No result Specimen: Blood     Blood culture [[id number]]     Order Status: No result Specimen: Blood     Blood culture [[id number]]     Order Status: No result Specimen: Blood     Blood culture [[id number]]     Order Status: No result Specimen: Blood     Blood culture [[id number]]  (Abnormal) Collected: 10/28/20 0955    Order Status: Completed Specimen: Blood from Peripheral, Antecubital, Right Updated: 11/01/20 1109     Blood Culture, Routine Gram stain richard bottle: Gram positive cocci in clusters resembling Staph       Results called to and read back by: Rica Medrano RN 10/29/2020  12:32      METHICILLIN RESISTANT STAPHYLOCOCCUS AUREUS  ID consult required at [facility], [facility].  For susceptibility see order #[id number]      Blood culture [[id number]]  (Abnormal)  (Susceptibility) Collected: 10/28/20 1018    Order Status: Completed Specimen: Blood from Peripheral, Antecubital, Left Updated: 10/31/20 0920     Blood Culture, Routine Gram stain aer bottle: Gram positive cocci in clusters resembling Staph      Positive results previously called 10/29/2020      METHICILLIN RESISTANT STAPHYLOCOCCUS  AUREUS  ID consult required at [facility] and [facility].      Urine culture [[id number]] Collected: 10/29/20 1438    Order Status: Completed Specimen: Urine Updated: 10/30/20 2149     Urine Culture, Routine No growth    Narrative:      Specimen Source->Urine    Blood culture [[id number]]  (Abnormal) Collected: 10/26/20 2035    Order Status: Completed Specimen: Blood from Peripheral, Forearm, Right Updated: 10/30/20 1256     Blood Culture, Routine Gram stain aer bottle: Gram positive cocci in clusters resembling Staph       Positive results previously called 10/28/2020  05:02      METHICILLIN RESISTANT STAPHYLOCOCCUS AUREUS  ID consult required at [facility].  For susceptibility see order #[id number]

## 2020-11-06 NOTE — SUBJECTIVE & OBJECTIVE
Past Medical History:   Diagnosis Date    Anxiety     Borderline personality disorder     Endocarditis of tricuspid valve 10/26/2020    MRSA due to IV heroin    PTSD (post-traumatic stress disorder)     Substance abuse        Past Surgical History:   Procedure Laterality Date     SECTION, LOW TRANSVERSE      x4    COSMETIC SURGERY         Review of patient's allergies indicates:  No Known Allergies    Medications:  Medications Prior to Admission   Medication Sig    alprazolam (XANAX) 1 MG tablet Take 1 mg by mouth 3 (three) times daily as needed for Anxiety.    [] clindamycin (CLEOCIN) 150 MG capsule Take 2 capsules (300 mg total) by mouth 2 (two) times a day. for 10 days    hydrocodone-acetaminophen 10-325mg (NORCO)  mg Tab Take 1 tablet by mouth every 6 (six) hours as needed for Pain (as needed for pain).    mupirocin (BACTROBAN) 2 % ointment Apply topically 3 (three) times daily.     Antibiotics (From admission, onward)    Start     Stop Route Frequency Ordered    20 1000  vancomycin 750 mg in dextrose 5 % 250 mL IVPB (ready to mix system)      -- IV Once 20 0903    20 0826  vancomycin - pharmacy to dose      -- IV pharmacy to manage frequency 20 0728        Antifungals (From admission, onward)    None        Antivirals (From admission, onward)    None             There is no immunization history on file for this patient.    Family History     None        Social History     Socioeconomic History    Marital status: Single     Spouse name: Not on file    Number of children: Not on file    Years of education: Not on file    Highest education level: Not on file   Occupational History    Not on file   Social Needs    Financial resource strain: Not on file    Food insecurity     Worry: Not on file     Inability: Not on file    Transportation needs     Medical: Not on file     Non-medical: Not on file   Tobacco Use    Smoking status: Never Smoker    Substance and Sexual Activity    Alcohol use: Yes     Comment: occ    Drug use: Yes     Types: IV, Marijuana     Comment: no heroin x3 days    Sexual activity: Yes     Partners: Male     Birth control/protection: Partner-Vasectomy   Lifestyle    Physical activity     Days per week: Not on file     Minutes per session: Not on file    Stress: Not on file   Relationships    Social connections     Talks on phone: Not on file     Gets together: Not on file     Attends Tenriism service: Not on file     Active member of club or organization: Not on file     Attends meetings of clubs or organizations: Not on file     Relationship status: Not on file   Other Topics Concern    Not on file   Social History Narrative    Not on file     Review of Systems   Constitutional: Positive for chills, fatigue and fever. Negative for diaphoresis.   Respiratory: Positive for cough and shortness of breath.    Cardiovascular: Positive for chest pain.   Gastrointestinal: Positive for nausea. Negative for abdominal pain, diarrhea and vomiting.   Genitourinary: Negative for difficulty urinating, dysuria, pelvic pain and vaginal discharge.        Dark colored urine   Musculoskeletal: Negative for arthralgias and back pain.   Skin: Negative for color change, rash and wound.   Neurological: Positive for weakness. Negative for headaches.   Psychiatric/Behavioral: Negative for agitation and confusion. The patient is not nervous/anxious.      Objective:     Vital Signs (Most Recent):  Temp: 98.1 °F (36.7 °C) (11/06/20 0931)  Pulse: 108 (11/06/20 0931)  Resp: 18 (11/06/20 0931)  BP: (!) 134/95 (11/06/20 0931)  SpO2: (!) 89 % (11/06/20 0931) Vital Signs (24h Range):  Temp:  [96.7 °F (35.9 °C)-101.1 °F (38.4 °C)] 98.1 °F (36.7 °C)  Pulse:  [] 108  Resp:  [15-22] 18  SpO2:  [86 %-100 %] 89 %  BP: (133-145)/() 134/95     Weight: 69 kg (152 lb 1.9 oz)  Body mass index is 27.82 kg/m².    Estimated Creatinine Clearance: 41.2 mL/min (A)  (based on SCr of 1.8 mg/dL (H)).    Physical Exam  Vitals signs and nursing note reviewed.   Constitutional:       General: She is not in acute distress.     Appearance: She is ill-appearing.   HENT:      Head: Normocephalic and atraumatic.      Nose: No congestion.   Eyes:      General: No scleral icterus.     Conjunctiva/sclera: Conjunctivae normal.   Cardiovascular:      Rate and Rhythm: Normal rate and regular rhythm.      Heart sounds: Murmur present.   Pulmonary:      Effort: Tachypnea present.      Breath sounds: Rales present.      Comments: O2 via NC  Abdominal:      General: Abdomen is flat. There is no distension.      Palpations: Abdomen is soft. There is no mass.   Musculoskeletal:      Right lower leg: Edema present.      Left lower leg: Edema present.   Skin:     General: Skin is warm and dry.      Comments: Janeway lesions on digits  Wound on ankle noted  Tattoos    RUE PICC Line c/d/i.   Neurological:      Mental Status: She is alert.   Psychiatric:         Mood and Affect: Mood normal.         Behavior: Behavior normal.         Thought Content: Thought content normal.         Significant Labs: All pertinent labs within the past 24 hours have been reviewed.    Significant Imaging: I have reviewed all pertinent imaging results/findings within the past 24 hours.

## 2020-11-06 NOTE — ASSESSMENT & PLAN NOTE
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 CTS has evaluated the patient and does not plan for acute surgical intervention. Course complicated by respiratory failure requiring intubation and pressor support. Blood cultures remained positive from 10/24 - 10/28. She has been and is currently on Vancomycin. Repeat blood cx of 10/31 and 11/1 are NGTD. Extubated to NC 11/2, fevers had resolved and she was stepped down to the floor. Plan was to continue Vancomycin x 6 weeks. ID re-consulted on 11/6 as patient developed recurrent fevers on 11/4 with hypoxia. She reports chest pain, SOB, nausea, feeling poorly. Denies recent visitors or issues/tampering with her PICC line.      Recommendations  - Continue IV Vancomycin (renal dosing). Goal trough 15-20  - Start Cefepime 2 g IV q 12 hours empirically   - Follow blood/urine cultures  - Recommend CT chest/abdomen when able  - Discussed plan with primary team. ID will follow.

## 2020-11-06 NOTE — PROGRESS NOTES
"ADDICTION CONSULT FOLLOW UP VISIT     DEPARTMENT:  Psychiatry  SITE: [facility], [address]    DATE OF ADMISSION: 10/26/2020  7:45 PM  LENGTH OF STAY: 11 days    EXAMINING PRACTITIONER: Erich Guajardo      SUBJECTIVE:     HISTORY    Patient Name: Марина Britton  YOB: 1990    CHIEF COMPLAINT   Марина Britton is a 30 y.o. female who is being seen today for a follow up visit by the addiction psychiatry consult service.  Марина Britton presents with the chief complaint of: opioid use disorder, mood dysregulation    HPI & PSYCHIATRIC ROS    Patient states mood is "pretty good".  She denies current depression.  She had difficulty sleeping last night and was concerned the remeron may have impacted but explained to her this is unlikely.  Current pain is 6/10.  No withdrawal symptoms.  No N/V/D.  No abdominal crampings.  No hot or cold flashes.  She may be interested in suboxone MAT at future date but not at this time - wants to get through surgery first.      PFSH: The patient's past medical history has been reviewed and updated as appropriate within the electronic medical record system.      PSYCHOTROPIC MEDICATIONS   Remeron 7.5mg bedtime  Oxycodone 20mg q6  Hydroxyzine 25mg daily prn anxiety  Melatonin 6mg bedtime prn insomnia  Oxycodone 10mg q6 prn pain        OBJECTIVE:     EXAMINATION    Vitals:    11/06/20 0426 11/06/20 0434 11/06/20 0616 11/06/20 0931   BP:  (!) 133/98  (!) 134/95   BP Location:  Left arm     Patient Position:  Lying  Lying   Pulse: 91 94  108   Resp:   18 18   Temp:    98.1 °F (36.7 °C)   TempSrc:    Oral   SpO2:  (!) 91%  (!) 89%   Weight:  69 kg (152 lb 1.9 oz)     Height:           CONSTITUTIONAL  General Appearance: stated age, resting comfortably in bed, hospital garb    PSYCHIATRIC   Speech: conversational, spontaneous  Language: fluent english, repeats words/phrases  Mood: "pretty good"  Affect: subdued but euthymic  Thought Process: " linear  Associations: intact, no loosening of associations  Thought Content: no SI  Insight: impaired  Judgment: impaired      ASSESSMENT:     DIAGNOSES & PROBLEMS    Opioid Use Disorder  Cannabis Use Disorder  Sedative Hyponotic or Anxiolytic Use Disorder  Substance Induced Mood Disorder    Patient Active Problem List   Diagnosis    Pneumonia of both lungs due to infectious organism    Pulmonary embolism    IVDU (intravenous drug user)    Pulmonary emboli    Endocarditis of tricuspid valve    MRSA bacteremia    Opioid use disorder, severe, dependence    Cannabis use disorder, moderate, dependence    Sedative, hypnotic or anxiolytic use disorder, severe, dependence    Substance induced mood disorder    DERICK (acute kidney injury)    Bacterial endocarditis    Hyperkalemia         PLAN:       MANAGEMENT PLAN, TREATMENT GOALS, THERAPEUTIC TECHNIQUES/APPROACHES & CLINICAL REASONING    No s/sx opioid withdrawal.  Patient remains on full opioid agonist pain medication at this time.  She may be candidate for suboxone MAT in future - patient not interested at present time.  Continue trial of remeron.  Addiction psychiatry will sign off - please contact us if further assistance needed.      DIAGNOSTIC TESTING  The chart was reviewed for recent diagnostic investigations, and pertinent results are noted below.  10/24/20 - utox positive for benzos, opiates, barbiturates, THC  10/18/20 - alcohol non detectable

## 2020-11-06 NOTE — PROGRESS NOTES
Hospital Medicine  Progress Note      Patient Name: Марина Britton  MRN: [id number]  Date of Admission: 10/26/2020     Principal Problem: Endocarditis of tricuspid valve     Subjective     Ms. Britton was resting comfortably again this morning, but was febrile overnight. Otherwise she has remained hemodynamically stable. BCx NGTD.     Consulted and discussed with ID who have added cefepime. Ms. Britton needs additional imaging, with CT C/A/P, but will hold off for today in the hopes of getting a more useful contrasted study when her kidney function improves, possibly tomorrow.    Review of Systems    Constitutional: +fever Negative for chills, fatigue   Respiratory: +SOB (improved) Negative for cough  Cardiovascular: +chest pain (improved) Negative for palpitations, leg swelling.   Gastrointestinal: Negative for abdominal pain, constipation, diarrhea, nausea, vomiting.   Neurological: Negative for dizziness, syncope, weakness, light-headedness.     Medications  Scheduled Meds:   ceFEPime (MAXIPIME) IVPB  2 g Intravenous Q12H    heparin (porcine)  5,000 Units Subcutaneous Q8H    mirtazapine  7.5 mg Oral QHS    oxyCODONE  20 mg Per OG tube Q6H    polyethylene glycol  17 g Oral Daily    senna  8.6 mg Per OG tube Daily    sodium chloride 0.9%  10 mL Intravenous Q6H     Continuous Infusions:    PRN Meds:.acetaminophen, acetaminophen, acetaminophen, calcium carbonate, hydrOXYzine HCL, melatonin, oxyCODONE, polyethylene glycol, sodium chloride 0.9%, Flushing PICC Protocol **AND** sodium chloride 0.9% **AND** sodium chloride 0.9%, vancomycin - pharmacy to dose    Objective    Physical Examination    Temp:  [96.7 °F (35.9 °C)-101.1 °F (38.4 °C)]   Pulse:  []   Resp:  [15-22]   BP: (133-145)/()   SpO2:  [86 %-91 %]     Gen: NAD, conversant  CV: RRR, +murmur, 1+ lower extremity edema bilaterally   Resp: CTAB, no increased work of breathing on room air  GI: Soft, NT, ND, +BS  Ext: MAEW, RUE PICC clean and dry  at insertion site  Neuro: AAOx3, CN grossly intact, no focal neurologic deficits    CBC  Recent Labs   Lab 11/04/20  0441 11/05/20 0307 11/06/20  0742   WBC 14.96* 17.98* 11.44   HGB 11.1* 10.3* 8.5*   HCT 37.7 33.8* 28.0*   * 491* 372*     CMP  Recent Labs   Lab 11/04/20  0441 11/05/20 0307  11/05/20 2120 11/06/20  0000 11/06/20  0742   *  --  131*  --   --   --  132*   K 6.3*   < > 5.5*  5.4*   < > 4.9 4.7 4.8  4.7     --  102  --   --   --  104   CO2 17*  --  17*  --   --   --  19*   BUN 39*  --  40*  --   --   --  37*   CREATININE 1.8*  --  2.0*  --   --   --  1.8*   GLU 70  --  77  --   --   --  90   CALCIUM 8.2*  --  8.0*  --   --   --  7.5*   MG 3.0*  --  2.7*  --   --   --  2.6   PHOS 5.5*  --  5.6*  --   --   --  5.3*   ALKPHOS 213*  --  191*  --   --   --  147*   ALT 12  --  10  --   --   --  8*   AST 20  --  19  --   --   --  14   ALBUMIN 1.6*  --  1.6*  --   --   --  1.5*   PROT 7.0  --  6.6  --   --   --  6.3   BILITOT 0.9  --  0.6  --   --   --  0.6    < > = values in this interval not displayed.       Hospital Course:    Ms. Britton is a 30-year-old woman with HCV, IVDU who was transferred from [facility] where she presented with chest pain and was diagnosed with MRSA bacteremia complicated by TV IE with septic emboli, acute hypoxic respiratory failure, septic/metabolic encephalopathy, septic PE, septic shock, and acute anemia. She was admitted to the [facility] on 10/27 and was intubated for respiratory failure. She has subsequently been evaluated by ID, CTS, and psychiatry. She required pRBC transfusion on 10/29, and norepi on 10/30. Her admission was further complicated by DERICK which subsequently resolved. She was extubated on 11/2, then transferred to the floor on 11/3 for further care, planning on long-term IV antibiotics with vancomycin and repeat CTS evaluation as an outpatient. Subsequently her DERICK has returned, and she is again febrile for which BCx were obtained on  11/4 and 11/5 and have remained NGTD. ID was reconsulted on 11/6, and cefepime was added to her regimen with plans for additional imaging.    Assessment and Plan:    MRSA bacteremia  Pneumonia of both lungs due to infectious organism  Pulmonary emboli (septic)  Sepsis  Leukocytosis  Endocarditis of tricuspid valve    - Febrile again, BCx from 11/4 and 11/5 NGTD. Leukocytosis improving (though the specimen appears diluted)  - Continue vancomycin (pharmacy dosing)  - PT/OT following and recommending IPR, anticipate LTAC   - CBC daily for WBC  - Needs 6 weeks IV antibiotics, then f/u CTS    DERICK (acute kidney injury)  Hyperkalemia  Hyponatremia    - sCr improving   - Etiology likely multifactorial with recent sepsis and vancomycin and poor PO intake  - Na 132, improved with Na  - Urine studies and US ordered. Both FENa and FEUrea consistent with pre-renal, improved with hydration yesterday, will repeat  - HyperK, no more lokelma today  - Cardiac monitoring    IVDU (intravenous drug user)  Opioid use disorder, severe, dependence    - Psych evaluated earlier this admission  - Would benefit from LTAC disposition    Substance induced mood disorder    - Psych evaluated, starting on mirtazapine, seems to be improving.    Diet: Adult regular  VTE PPX: SQH, will convert to LMWH when kidney function stabilizes  Goals of care: Curative, full code      Alli Garcia M.D.  Department of Hospital Medicine  [facility]  [phone] (pager)

## 2020-11-06 NOTE — CONSULTS
[facility]  Infectious Disease  Consult Note    Patient Name: Марина Britton  MRN: [id number]  Admission Date: 10/26/2020  Hospital Length of Stay: 11 days  Attending Physician: Alli Garcia MD  Primary Care Provider: Luis Felipe Jose Iii, MD     Isolation Status: Contact    Patient information was obtained from patient and past medical records.      Inpatient consult to Infectious Diseases  Consult performed by: Evelyne Brand PA-C  Consult ordered by: Alli Garcia MD        Assessment/Plan:     MRSA bacteremia     See endocarditis    Endocarditis of tricuspid valve     31 y/o female with history of IVDU and HCV transferred from OSH  for management of septic shock secondary to MRSA bacteremia, tricuspid valve endocarditis with associated pulmonary septic emboli. CTS has evaluated the patient and does not plan for acute surgical intervention. Course complicated by respiratory failure requiring intubation and pressor support. Blood cultures remained positive from 10/24 - 10/28. She has been and is currently on Vancomycin. Repeat blood cx of 10/31 and 11/1 are NGTD. Extubated to NC 11/2, fevers had resolved and she was stepped down to the floor. Plan was to continue Vancomycin x 6 weeks. ID re-consulted on 11/6 as patient developed recurrent fevers on 11/4 with hypoxia. She reports chest pain, SOB, nausea, feeling poorly. Denies recent visitors or issues/tampering with her PICC line.      Recommendations  - Continue IV Vancomycin (renal dosing). Goal trough 15-20  - Start Cefepime 2 g IV q 12 hours empirically   - Follow blood/urine cultures  - Recommend CT chest/abdomen when able  - Discussed plan with primary team. ID will follow.              Thank you for the consult. Please call for any questions.  Evelyne Brand PA-C  Phone: [phone]  Pager: [phone]      Subjective:     Principal Problem: Endocarditis of tricuspid valve    HPI: History obtained per chart review. Pt  "critically ill. Intubated, sedated.       30F with PMHx IVUD heroin (last use approx 2wk ago), Hepatitis C presents as transfer from OSH for septic endocarditis with b/l PE requiring higher level of care.  Per patient and chart review, she has had progressively worsening chest and back pain over the past several days with a Tmax of 105F at home.  She presented to [facility] and was admitted for sepsis on 10/24.  Bcx showed +MRSA and found to have large vegetation on tricuspid valve on TTE. Noted to have septic emobli on imaging studies. She was started on empiric vancomycin and zosyn. She was stepped up to the ICU on 10/25 for worsening tachypnea.  She also received 2U pRBC x2 for anemia with a pavel Hb of 6.5.  She was transferred to [facility] 10/26 for a higher level of care.    Per chart review. She has been seen in ED for fatigue and wound of her left foot. Per nursing note," Pt shows me a wound on her left foot/ankle, states it has been there over 1 month.  Pt states it started off as an are where she injected heroin, then turned in to a blister, abscess.  She states she was recently seen in [place] ED and placed on antibiotics.  Pt has not followed up with anyone regarding this wound. Spoke with ER MD Dr. Linares, pt does not tomas to be started on any antibiotics at this time, pt needs referral to wound care.  This was explained to pt who stated "ok."   she has been on different abx including bactrim and clindamycin without improvement. No follow up.     Past Medical History:   Diagnosis Date    Anxiety     Borderline personality disorder     Endocarditis of tricuspid valve 10/26/2020    MRSA due to IV heroin    PTSD (post-traumatic stress disorder)     Substance abuse        Past Surgical History:   Procedure Laterality Date     SECTION, LOW TRANSVERSE      x4    COSMETIC SURGERY         Review of patient's allergies indicates:  No Known Allergies    Medications:  Medications Prior to " Admission   Medication Sig    alprazolam (XANAX) 1 MG tablet Take 1 mg by mouth 3 (three) times daily as needed for Anxiety.    [] clindamycin (CLEOCIN) 150 MG capsule Take 2 capsules (300 mg total) by mouth 2 (two) times a day. for 10 days    hydrocodone-acetaminophen 10-325mg (NORCO)  mg Tab Take 1 tablet by mouth every 6 (six) hours as needed for Pain (as needed for pain).    mupirocin (BACTROBAN) 2 % ointment Apply topically 3 (three) times daily.     Antibiotics (From admission, onward)    Start     Stop Route Frequency Ordered    20 1000  vancomycin 750 mg in dextrose 5 % 250 mL IVPB (ready to mix system)      -- IV Once 20 0903    20 0826  vancomycin - pharmacy to dose      -- IV pharmacy to manage frequency 20 0728        Antifungals (From admission, onward)    None        Antivirals (From admission, onward)    None             There is no immunization history on file for this patient.    Family History     None        Social History     Socioeconomic History    Marital status: Single     Spouse name: Not on file    Number of children: Not on file    Years of education: Not on file    Highest education level: Not on file   Occupational History    Not on file   Social Needs    Financial resource strain: Not on file    Food insecurity     Worry: Not on file     Inability: Not on file    Transportation needs     Medical: Not on file     Non-medical: Not on file   Tobacco Use    Smoking status: Never Smoker   Substance and Sexual Activity    Alcohol use: Yes     Comment: occ    Drug use: Yes     Types: IV, Marijuana     Comment: no heroin x3 days    Sexual activity: Yes     Partners: Male     Birth control/protection: Partner-Vasectomy   Lifestyle    Physical activity     Days per week: Not on file     Minutes per session: Not on file    Stress: Not on file   Relationships    Social connections     Talks on phone: Not on file     Gets together: Not on file      Attends Muslim service: Not on file     Active member of club or organization: Not on file     Attends meetings of clubs or organizations: Not on file     Relationship status: Not on file   Other Topics Concern    Not on file   Social History Narrative    Not on file     Review of Systems   Constitutional: Positive for chills, fatigue and fever. Negative for diaphoresis.   Respiratory: Positive for cough and shortness of breath.    Cardiovascular: Positive for chest pain.   Gastrointestinal: Positive for nausea. Negative for abdominal pain, diarrhea and vomiting.   Genitourinary: Negative for difficulty urinating, dysuria, pelvic pain and vaginal discharge.        Dark colored urine   Musculoskeletal: Negative for arthralgias and back pain.   Skin: Negative for color change, rash and wound.   Neurological: Positive for weakness. Negative for headaches.   Psychiatric/Behavioral: Negative for agitation and confusion. The patient is not nervous/anxious.      Objective:     Vital Signs (Most Recent):  Temp: 98.1 °F (36.7 °C) (11/06/20 0931)  Pulse: 108 (11/06/20 0931)  Resp: 18 (11/06/20 0931)  BP: (!) 134/95 (11/06/20 0931)  SpO2: (!) 89 % (11/06/20 0931) Vital Signs (24h Range):  Temp:  [96.7 °F (35.9 °C)-101.1 °F (38.4 °C)] 98.1 °F (36.7 °C)  Pulse:  [] 108  Resp:  [15-22] 18  SpO2:  [86 %-100 %] 89 %  BP: (133-145)/() 134/95     Weight: 69 kg (152 lb 1.9 oz)  Body mass index is 27.82 kg/m².    Estimated Creatinine Clearance: 41.2 mL/min (A) (based on SCr of 1.8 mg/dL (H)).    Physical Exam  Vitals signs and nursing note reviewed.   Constitutional:       General: She is not in acute distress.     Appearance: She is ill-appearing.   HENT:      Head: Normocephalic and atraumatic.      Nose: No congestion.   Eyes:      General: No scleral icterus.     Conjunctiva/sclera: Conjunctivae normal.   Cardiovascular:      Rate and Rhythm: Normal rate and regular rhythm.      Heart sounds: Murmur present.    Pulmonary:      Effort: Tachypnea present.      Breath sounds: Rales present.      Comments: O2 via NC  Abdominal:      General: Abdomen is flat. There is no distension.      Palpations: Abdomen is soft. There is no mass.   Musculoskeletal:      Right lower leg: Edema present.      Left lower leg: Edema present.   Skin:     General: Skin is warm and dry.      Comments: Janeway lesions on digits  Wound on ankle noted  Tattoos    RUE PICC Line c/d/i.   Neurological:      Mental Status: She is alert.   Psychiatric:         Mood and Affect: Mood normal.         Behavior: Behavior normal.         Thought Content: Thought content normal.         Significant Labs: All pertinent labs within the past 24 hours have been reviewed.    Significant Imaging: I have reviewed all pertinent imaging results/findings within the past 24 hours.

## 2020-11-06 NOTE — PLAN OF CARE
11/06/20 0825   Post-Acute Status   Post-Acute Authorization Placement   Post-Acute Placement Status Awaiting Internal Medical Clearance

## 2020-11-06 NOTE — NURSING
Per venipuncture tech, 0400 blood cultures could not be drawn because patient is a very difficult stick.

## 2020-11-07 LAB
ALBUMIN SERPL BCP-MCNC: 1.3 G/DL (ref 3.5–5.2)
ALP SERPL-CCNC: 119 U/L (ref 55–135)
ALT SERPL W/O P-5'-P-CCNC: 9 U/L (ref 10–44)
ANION GAP SERPL CALC-SCNC: 7 MMOL/L (ref 8–16)
AST SERPL-CCNC: 17 U/L (ref 10–40)
BACTERIA UR CULT: NO GROWTH
BASOPHILS # BLD AUTO: 0.11 K/UL (ref 0–0.2)
BASOPHILS NFR BLD: 0.9 % (ref 0–1.9)
BILIRUB SERPL-MCNC: 0.4 MG/DL (ref 0.1–1)
BUN SERPL-MCNC: 38 MG/DL (ref 6–20)
CALCIUM SERPL-MCNC: 7.1 MG/DL (ref 8.7–10.5)
CHLORIDE SERPL-SCNC: 105 MMOL/L (ref 95–110)
CO2 SERPL-SCNC: 19 MMOL/L (ref 23–29)
CREAT SERPL-MCNC: 1.8 MG/DL (ref 0.5–1.4)
CRP SERPL-MCNC: 174.2 MG/L (ref 0–8.2)
DIFFERENTIAL METHOD: ABNORMAL
EOSINOPHIL # BLD AUTO: 0.3 K/UL (ref 0–0.5)
EOSINOPHIL NFR BLD: 2.5 % (ref 0–8)
EOSINOPHIL URNS QL WRIGHT STN: NORMAL
ERYTHROCYTE [DISTWIDTH] IN BLOOD BY AUTOMATED COUNT: 17.4 % (ref 11.5–14.5)
EST. GFR  (AFRICAN AMERICAN): 42.6 ML/MIN/1.73 M^2
EST. GFR  (NON AFRICAN AMERICAN): 37 ML/MIN/1.73 M^2
GLUCOSE SERPL-MCNC: 114 MG/DL (ref 70–110)
HCT VFR BLD AUTO: 24.6 % (ref 37–48.5)
HGB BLD-MCNC: 7.6 G/DL (ref 12–16)
IMM GRANULOCYTES # BLD AUTO: 0.26 K/UL (ref 0–0.04)
IMM GRANULOCYTES NFR BLD AUTO: 2.2 % (ref 0–0.5)
LYMPHOCYTES # BLD AUTO: 1.2 K/UL (ref 1–4.8)
LYMPHOCYTES NFR BLD: 10.4 % (ref 18–48)
MAGNESIUM SERPL-MCNC: 2.3 MG/DL (ref 1.6–2.6)
MCH RBC QN AUTO: 26.9 PG (ref 27–31)
MCHC RBC AUTO-ENTMCNC: 30.9 G/DL (ref 32–36)
MCV RBC AUTO: 87 FL (ref 82–98)
MONOCYTES # BLD AUTO: 1 K/UL (ref 0.3–1)
MONOCYTES NFR BLD: 8.4 % (ref 4–15)
NEUTROPHILS # BLD AUTO: 8.9 K/UL (ref 1.8–7.7)
NEUTROPHILS NFR BLD: 75.6 % (ref 38–73)
NRBC BLD-RTO: 0 /100 WBC
PHOSPHATE SERPL-MCNC: 4.8 MG/DL (ref 2.7–4.5)
PLATELET # BLD AUTO: 295 K/UL (ref 150–350)
PMV BLD AUTO: 9 FL (ref 9.2–12.9)
POTASSIUM SERPL-SCNC: 4.6 MMOL/L (ref 3.5–5.1)
PROT SERPL-MCNC: 5.6 G/DL (ref 6–8.4)
RBC # BLD AUTO: 2.83 M/UL (ref 4–5.4)
SODIUM SERPL-SCNC: 131 MMOL/L (ref 136–145)
VANCOMYCIN SERPL-MCNC: 16.5 UG/ML
WBC # BLD AUTO: 11.78 K/UL (ref 3.9–12.7)

## 2020-11-07 PROCEDURE — 63600175 PHARM REV CODE 636 W HCPCS: Performed by: PHYSICIAN ASSISTANT

## 2020-11-07 PROCEDURE — 25000003 PHARM REV CODE 250: Performed by: STUDENT IN AN ORGANIZED HEALTH CARE EDUCATION/TRAINING PROGRAM

## 2020-11-07 PROCEDURE — 80202 ASSAY OF VANCOMYCIN: CPT

## 2020-11-07 PROCEDURE — 99233 SBSQ HOSP IP/OBS HIGH 50: CPT | Mod: ,,, | Performed by: PHYSICIAN ASSISTANT

## 2020-11-07 PROCEDURE — 87205 SMEAR GRAM STAIN: CPT

## 2020-11-07 PROCEDURE — 99233 PR SUBSEQUENT HOSPITAL CARE,LEVL III: ICD-10-PCS | Mod: ,,, | Performed by: PHYSICIAN ASSISTANT

## 2020-11-07 PROCEDURE — 99232 PR SUBSEQUENT HOSPITAL CARE,LEVL II: ICD-10-PCS | Mod: ,,, | Performed by: INTERNAL MEDICINE

## 2020-11-07 PROCEDURE — 80053 COMPREHEN METABOLIC PANEL: CPT

## 2020-11-07 PROCEDURE — 84100 ASSAY OF PHOSPHORUS: CPT

## 2020-11-07 PROCEDURE — A4216 STERILE WATER/SALINE, 10 ML: HCPCS | Performed by: INTERNAL MEDICINE

## 2020-11-07 PROCEDURE — 25000003 PHARM REV CODE 250: Performed by: INTERNAL MEDICINE

## 2020-11-07 PROCEDURE — 27000221 HC OXYGEN, UP TO 24 HOURS

## 2020-11-07 PROCEDURE — 63600175 PHARM REV CODE 636 W HCPCS: Performed by: INTERNAL MEDICINE

## 2020-11-07 PROCEDURE — 25000003 PHARM REV CODE 250: Performed by: HOSPITALIST

## 2020-11-07 PROCEDURE — 85025 COMPLETE CBC W/AUTO DIFF WBC: CPT

## 2020-11-07 PROCEDURE — 86140 C-REACTIVE PROTEIN: CPT

## 2020-11-07 PROCEDURE — 63600175 PHARM REV CODE 636 W HCPCS: Performed by: STUDENT IN AN ORGANIZED HEALTH CARE EDUCATION/TRAINING PROGRAM

## 2020-11-07 PROCEDURE — 11000001 HC ACUTE MED/SURG PRIVATE ROOM

## 2020-11-07 PROCEDURE — 99232 SBSQ HOSP IP/OBS MODERATE 35: CPT | Mod: ,,, | Performed by: INTERNAL MEDICINE

## 2020-11-07 PROCEDURE — 83735 ASSAY OF MAGNESIUM: CPT

## 2020-11-07 RX ADMIN — CEFEPIME 2 G: 2 INJECTION, POWDER, FOR SOLUTION INTRAVENOUS at 02:11

## 2020-11-07 RX ADMIN — ACETAMINOPHEN 650 MG: 325 TABLET ORAL at 12:11

## 2020-11-07 RX ADMIN — Medication 10 ML: at 11:11

## 2020-11-07 RX ADMIN — MIRTAZAPINE 7.5 MG: 7.5 TABLET ORAL at 09:11

## 2020-11-07 RX ADMIN — ACETAMINOPHEN 650 MG: 325 TABLET ORAL at 06:11

## 2020-11-07 RX ADMIN — OXYCODONE HYDROCHLORIDE 20 MG: 10 TABLET ORAL at 06:11

## 2020-11-07 RX ADMIN — Medication 10 ML: at 12:11

## 2020-11-07 RX ADMIN — Medication 10 ML: at 06:11

## 2020-11-07 RX ADMIN — HEPARIN SODIUM 5000 UNITS: 5000 INJECTION INTRAVENOUS; SUBCUTANEOUS at 05:11

## 2020-11-07 RX ADMIN — CEFEPIME 2 G: 2 INJECTION, POWDER, FOR SOLUTION INTRAVENOUS at 03:11

## 2020-11-07 RX ADMIN — HEPARIN SODIUM 5000 UNITS: 5000 INJECTION INTRAVENOUS; SUBCUTANEOUS at 09:11

## 2020-11-07 RX ADMIN — HEPARIN SODIUM 5000 UNITS: 5000 INJECTION INTRAVENOUS; SUBCUTANEOUS at 02:11

## 2020-11-07 RX ADMIN — HYDROXYZINE HYDROCHLORIDE 25 MG: 25 TABLET, FILM COATED ORAL at 03:11

## 2020-11-07 RX ADMIN — VANCOMYCIN HYDROCHLORIDE 750 MG: 750 INJECTION, POWDER, LYOPHILIZED, FOR SOLUTION INTRAVENOUS at 10:11

## 2020-11-07 RX ADMIN — OXYCODONE HYDROCHLORIDE 20 MG: 10 TABLET ORAL at 05:11

## 2020-11-07 RX ADMIN — ACETAMINOPHEN 1000 MG: 500 TABLET ORAL at 10:11

## 2020-11-07 RX ADMIN — OXYCODONE HYDROCHLORIDE 20 MG: 10 TABLET ORAL at 11:11

## 2020-11-07 NOTE — PROGRESS NOTES
11/07/20 0145   Vital Signs   Temp (!) 101.6 °F (38.7 °C)   Temp src Axillary   Reported to IMB with no new orders

## 2020-11-07 NOTE — SUBJECTIVE & OBJECTIVE
Interval History: No AEON.  Tmax 102, T current 101.2. WBC 11s CO Chest pain with inspiration.      Review of Systems   Constitutional: Negative for activity change, chills, diaphoresis and fever.   Respiratory: Positive for chest tightness and shortness of breath. Negative for cough and wheezing.    Cardiovascular: Negative for chest pain.   Gastrointestinal: Negative for abdominal pain, constipation, diarrhea, nausea and vomiting.   Genitourinary: Negative for dysuria, frequency and urgency.   Neurological: Negative for dizziness.   Hematological: Does not bruise/bleed easily.     Objective:     Vital Signs (Most Recent):  Temp: (!) 101.2 °F (38.4 °C) (11/07/20 1109)  Pulse: 96 (11/07/20 1109)  Resp: 18 (11/07/20 1143)  BP: (!) 125/93 (11/07/20 1109)  SpO2: 99 % (11/07/20 1109) Vital Signs (24h Range):  Temp:  [98 °F (36.7 °C)-102 °F (38.9 °C)] 101.2 °F (38.4 °C)  Pulse:  [] 96  Resp:  [15-20] 18  SpO2:  [87 %-100 %] 99 %  BP: (125-147)/() 125/93     Weight: 69 kg (152 lb 1.9 oz)  Body mass index is 27.82 kg/m².    Estimated Creatinine Clearance: 41.2 mL/min (A) (based on SCr of 1.8 mg/dL (H)).    Physical Exam  Vitals signs and nursing note reviewed.   Constitutional:       General: She is not in acute distress.     Appearance: She is ill-appearing.       HENT:      Head: Normocephalic and atraumatic.      Nose: No congestion.   Eyes:      General: No scleral icterus.     Conjunctiva/sclera: Conjunctivae normal.   Cardiovascular:      Rate and Rhythm: Normal rate and regular rhythm.      Heart sounds: Murmur present.   Pulmonary:      Effort: No tachypnea or respiratory distress.      Breath sounds: No stridor. Examination of the right-upper field reveals decreased breath sounds and rales. Examination of the left-upper field reveals rales. Examination of the right-middle field reveals decreased breath sounds and rales. Examination of the left-middle field reveals rales. Examination of the  right-lower field reveals decreased breath sounds. Examination of the left-lower field reveals rales. Decreased breath sounds and rales present. No wheezing or rhonchi.      Comments: O2 via NC  Abdominal:      General: Abdomen is flat. There is no distension.      Palpations: Abdomen is soft. There is no mass.   Musculoskeletal:      Right lower leg: Edema present.      Left lower leg: Edema present.   Skin:     General: Skin is warm and dry.      Comments: Janeway lesions on digits  Wound on ankle noted  Tattoos    RUE PICC Line c/d/i.   Neurological:      Mental Status: She is alert.   Psychiatric:         Mood and Affect: Mood normal.         Behavior: Behavior normal.         Thought Content: Thought content normal.         Significant Labs:   Blood Culture:   Recent Labs   Lab 11/01/20  1213 11/04/20  0442 11/05/20  0307 11/06/20  0742 11/06/20  0745   LABBLOO No growth after 5 days. No Growth to date  No Growth to date  No Growth to date  No Growth to date  No Growth to date  No Growth to date  No Growth to date  No Growth to date No Growth to date  No Growth to date  No Growth to date  No Growth to date  No Growth to date  No Growth to date No Growth to date  No Growth to date No Growth to date  No Growth to date     CBC:   Recent Labs   Lab 11/06/20  0742 11/07/20  0542   WBC 11.44 11.78   HGB 8.5* 7.6*   HCT 28.0* 24.6*   * 295     CMP:   Recent Labs   Lab 11/06/20  0742 11/06/20  1309 11/06/20  1532 11/07/20  0542   *  --   --  131*   K 4.8  4.7 5.0 4.4 4.6     --   --  105   CO2 19*  --   --  19*   GLU 90  --   --  114*   BUN 37*  --   --  38*   CREATININE 1.8*  --   --  1.8*   CALCIUM 7.5*  --   --  7.1*   PROT 6.3  --   --  5.6*   ALBUMIN 1.5*  --   --  1.3*   BILITOT 0.6  --   --  0.4   ALKPHOS 147*  --   --  119   AST 14  --   --  17   ALT 8*  --   --  9*   ANIONGAP 9  --   --  7*   EGFRNONAA 37.0*  --   --  37.0*     Wound Culture: No results for input(s): JAG  in the last 4320 hours.  All pertinent labs within the past 24 hours have been reviewed.    Significant Imaging: I have reviewed all pertinent imaging results/findings within the past 24 hours.

## 2020-11-07 NOTE — PROGRESS NOTES
Hospital Medicine  Progress Note      Patient Name: Марина Britton  MRN: [id number]  Date of Admission: 10/26/2020     Principal Problem: Endocarditis of tricuspid valve     Subjective     Ms. Britton was resting comfortably again this morning, continuing with intermittent fevers. Her creatinine remains prohibitive of contrasted imaging studies. She has no new complaints.    Review of Systems    Constitutional: +fever Negative for chills, fatigue   Respiratory: +SOB (improved) Negative for cough  Cardiovascular: +chest pain (improved) Negative for palpitations, leg swelling.   Gastrointestinal: Negative for abdominal pain, constipation, diarrhea, nausea, vomiting.   Neurological: Negative for dizziness, syncope, weakness, light-headedness.     Medications  Scheduled Meds:   ceFEPime (MAXIPIME) IVPB  2 g Intravenous Q12H    heparin (porcine)  5,000 Units Subcutaneous Q8H    mirtazapine  7.5 mg Oral QHS    oxyCODONE  20 mg Per OG tube Q6H    polyethylene glycol  17 g Oral Daily    senna  8.6 mg Per OG tube Daily    sodium chloride 0.9%  10 mL Intravenous Q6H     Continuous Infusions:    PRN Meds:.acetaminophen, acetaminophen, acetaminophen, calcium carbonate, hydrOXYzine HCL, melatonin, oxyCODONE, polyethylene glycol, sodium chloride 0.9%, Flushing PICC Protocol **AND** sodium chloride 0.9% **AND** sodium chloride 0.9%, vancomycin - pharmacy to dose    Objective    Physical Examination    Temp:  [98 °F (36.7 °C)-102 °F (38.9 °C)]   Pulse:  []   Resp:  [15-20]   BP: (125-147)/()   SpO2:  [87 %-100 %]     Gen: NAD, conversant  CV: RRR, +murmur, 1+ lower extremity edema bilaterally   Resp: CTAB, no increased work of breathing on room air  GI: Soft, NT, ND, +BS  Ext: MAEW, RUE PICC clean and dry at insertion site  Neuro: AAOx3, CN grossly intact, no focal neurologic deficits    CBC  Recent Labs   Lab 11/05/20  0307 11/06/20  0742 11/07/20  0542   WBC 17.98* 11.44 11.78   HGB 10.3* 8.5* 7.6*   HCT 33.8*  28.0* 24.6*   * 372* 295     CMP  Recent Labs   Lab 11/05/20  0307  11/06/20  0742 11/06/20  1309 11/06/20  1532 11/07/20  0542   *  --  132*  --   --  131*   K 5.5*  5.4*   < > 4.8  4.7 5.0 4.4 4.6     --  104  --   --  105   CO2 17*  --  19*  --   --  19*   BUN 40*  --  37*  --   --  38*   CREATININE 2.0*  --  1.8*  --   --  1.8*   GLU 77  --  90  --   --  114*   CALCIUM 8.0*  --  7.5*  --   --  7.1*   MG 2.7*  --  2.6  --   --  2.3   PHOS 5.6*  --  5.3*  --   --  4.8*   ALKPHOS 191*  --  147*  --   --  119   ALT 10  --  8*  --   --  9*   AST 19  --  14  --   --  17   ALBUMIN 1.6*  --  1.5*  --   --  1.3*   PROT 6.6  --  6.3  --   --  5.6*   BILITOT 0.6  --  0.6  --   --  0.4    < > = values in this interval not displayed.       Hospital Course:    Ms. Britton is a 30-year-old woman with HCV, IVDU who was transferred from [facility] where she presented with chest pain and was diagnosed with MRSA bacteremia complicated by TV IE with septic emboli, acute hypoxic respiratory failure, septic/metabolic encephalopathy, septic PE, septic shock, and acute anemia. [place] [facility] [place] and was intubated for respiratory failure. She has subsequently been evaluated by ID, CTS, and psychiatry. She required pRBC transfusion on 10/29, and norepi on 10/30. Her admission was further complicated by DERICK which subsequently resolved. She was extubated on 11/2, then transferred to the floor on 11/3 for further care, planning on long-term IV antibiotics with vancomycin and repeat CTS evaluation as an outpatient. Subsequently her DERICK has returned, and she is again febrile for which BCx were obtained on 11/4 and 11/5 and have remained NGTD. ID was reconsulted on 11/6, and cefepime was added to her regimen with plans for additional imaging, pending recovery of her renal function.    Assessment and Plan:    MRSA bacteremia  Pneumonia of both lungs due to infectious organism  Pulmonary emboli  (septic)  Sepsis  Leukocytosis  Endocarditis of tricuspid valve    - Febrile again, BCx from 11/4 and 11/5 NGTD. Leukocytosis improving  - Continue vancomycin (pharmacy dosing)  - PT/OT following and recommending IPR, anticipate LTAC   - CBC daily for WBC  - Needs 6 weeks IV antibiotics, then f/u CTS  - Needs CT C/A/P with contrast, but renal function prohibitive. Will obtain as soon as it is safe to do so  - Tele    DERICK (acute kidney injury)  Hyperkalemia  Hyponatremia    - sCr stable  - Etiology likely multifactorial with recent sepsis and vancomycin and poor PO intake  - Na 131  - Urine studies c/w pre-renal, minimally responsive to fluids, will match I/Os  - HyperK resolved    IVDU (intravenous drug user)  Opioid use disorder, severe, dependence    - Psych evaluated earlier this admission  - Would benefit from LTAC disposition    Substance induced mood disorder    - Psych evaluated, starting on mirtazapine, seems to be improving.    Diet: Adult regular  VTE PPX: SQH, will convert to LMWH when kidney function stabilizes  Goals of care: Curative, full code      Alli Garcia M.D.  Department of Hospital Medicine  [facility]  [phone] (pager)

## 2020-11-07 NOTE — PROGRESS NOTES
[facility]  Infectious Disease  Progress Note    Patient Name: Марина Britton  MRN: [id number]  Admission Date: 10/26/2020  Length of Stay: 12 days  Attending Physician: Alli Garcia MD  Primary Care Provider: Luis Felipe Jose Iii, MD    Isolation Status: Contact  Assessment/Plan:      * Endocarditis of tricuspid valve     31 y/o female with history of IVDU and HCV transferred from H  for management of septic shock secondary to MRSA bacteremia, tricuspid valve endocarditis with associated pulmonary septic emboli. CTS has evaluated the patient and does not plan for acute surgical intervention. Course complicated by respiratory failure requiring intubation and pressor support. Blood cultures remained positive from 10/24 - 10/28. She has been and is currently on Vancomycin. Repeat blood cx of 10/31 and 11/1 are NGTD. Extubated to NC 11/2, fevers had resolved and she was stepped down to the floor. Plan was to continue Vancomycin x 6 weeks. ID re-consulted on 11/6 as patient developed recurrent fevers on 11/4 with hypoxia. She reports chest pain, SOB, nausea, feeling poorly. Denies recent visitors or issues/tampering with her PICC line.    11/7 - spiking fever, WBC normalized, BCXs/UCXs remain NGTD, R lung with significant decreased BS, appears unwell and renal function has worsened.  .  No rash      Recommendations  - Continue IV Vancomycin (renal dosing). Goal trough 15-20 and Cefepime 2 g IV q 12 hours empirically   - Follow blood/urine cultures  - Recommend CT chest/abdomen/pelvis without contrast as with DERICK  - Rec repeat 2D echo to assess for cardiac abscess  - check urine Barajas stain given worsening RF  - Discussed plan at length with primary team. ID will follow.      MRSA bacteremia  See endocarditis        Anticipated Disposition: tbd    Thank you for your consult. I will follow-up with patient. Please contact us if you have any additional questions.    Paulo Carter,  "PA  Infectious Disease  [facility]    Subjective:     Principal Problem:Endocarditis of tricuspid valve    HPI: History obtained per chart review. Pt critically ill. Intubated, sedated.       30F with PMHx IVUD heroin (last use approx 2wk ago), Hepatitis C presents as transfer from [facility] for septic endocarditis with b/l PE requiring higher level of care.  Per patient and chart review, she has had progressively worsening chest and back pain over the past several days with a Tmax of 105F at home.  She presented to [facility] and was admitted for sepsis on 10/24.  Bcx showed +MRSA and found to have large vegetation on tricuspid valve on TTE. Noted to have septic emobli on imaging studies. She was started on empiric vancomycin and zosyn. She was stepped up to the ICU on 10/25 for worsening tachypnea.  She also received 2U pRBC x2 for anemia with a pavel Hb of 6.5.  She was transferred to [facility] 10/26 for a higher level of care.    Per chart review. She has been seen in ED for fatigue and wound of her left foot. Per nursing note," Pt shows me a wound on her left foot/ankle, states it has been there over 1 month.  Pt states it started off as an are where she injected heroin, then turned in to a blister, abscess.  She states she was recently seen in [place] ED and placed on antibiotics.  Pt has not followed up with anyone regarding this wound. Spoke with ER MD Dr. Linares, pt does not tomas to be started on any antibiotics at this time, pt needs referral to wound care.  This was explained to pt who stated "ok."   she has been on different abx including bactrim and clindamycin without improvement. No follow up.   Interval History: No AEON.  Tmax 102, T current 101.2. WBC 11s CO Chest pain with inspiration.      Review of Systems   Constitutional: Negative for activity change, chills, diaphoresis and fever.   Respiratory: Positive for chest tightness and shortness of breath. Negative for cough " and wheezing.    Cardiovascular: Negative for chest pain.   Gastrointestinal: Negative for abdominal pain, constipation, diarrhea, nausea and vomiting.   Genitourinary: Negative for dysuria, frequency and urgency.   Neurological: Negative for dizziness.   Hematological: Does not bruise/bleed easily.     Objective:     Vital Signs (Most Recent):  Temp: (!) 101.2 °F (38.4 °C) (11/07/20 1109)  Pulse: 96 (11/07/20 1109)  Resp: 18 (11/07/20 1143)  BP: (!) 125/93 (11/07/20 1109)  SpO2: 99 % (11/07/20 1109) Vital Signs (24h Range):  Temp:  [98 °F (36.7 °C)-102 °F (38.9 °C)] 101.2 °F (38.4 °C)  Pulse:  [] 96  Resp:  [15-20] 18  SpO2:  [87 %-100 %] 99 %  BP: (125-147)/() 125/93     Weight: 69 kg (152 lb 1.9 oz)  Body mass index is 27.82 kg/m².    Estimated Creatinine Clearance: 41.2 mL/min (A) (based on SCr of 1.8 mg/dL (H)).    Physical Exam  Vitals signs and nursing note reviewed.   Constitutional:       General: She is not in acute distress.     Appearance: She is ill-appearing.       HENT:      Head: Normocephalic and atraumatic.      Nose: No congestion.   Eyes:      General: No scleral icterus.     Conjunctiva/sclera: Conjunctivae normal.   Cardiovascular:      Rate and Rhythm: Normal rate and regular rhythm.      Heart sounds: Murmur present.   Pulmonary:      Effort: No tachypnea or respiratory distress.      Breath sounds: No stridor. Examination of the right-upper field reveals decreased breath sounds and rales. Examination of the left-upper field reveals rales. Examination of the right-middle field reveals decreased breath sounds and rales. Examination of the left-middle field reveals rales. Examination of the right-lower field reveals decreased breath sounds. Examination of the left-lower field reveals rales. Decreased breath sounds and rales present. No wheezing or rhonchi.      Comments: O2 via NC  Abdominal:      General: Abdomen is flat. There is no distension.      Palpations: Abdomen is soft.  There is no mass.   Musculoskeletal:      Right lower leg: Edema present.      Left lower leg: Edema present.   Skin:     General: Skin is warm and dry.      Comments: Janeway lesions on digits  Wound on ankle noted  Tattoos    RUE PICC Line c/d/i.   Neurological:      Mental Status: She is alert.   Psychiatric:         Mood and Affect: Mood normal.         Behavior: Behavior normal.         Thought Content: Thought content normal.         Significant Labs:   Blood Culture:   Recent Labs   Lab 11/01/20  1213 11/04/20  0442 11/05/20  0307 11/06/20  0742 11/06/20  0745   LABBLOO No growth after 5 days. No Growth to date  No Growth to date  No Growth to date  No Growth to date  No Growth to date  No Growth to date  No Growth to date  No Growth to date No Growth to date  No Growth to date  No Growth to date  No Growth to date  No Growth to date  No Growth to date No Growth to date  No Growth to date No Growth to date  No Growth to date     CBC:   Recent Labs   Lab 11/06/20  0742 11/07/20  0542   WBC 11.44 11.78   HGB 8.5* 7.6*   HCT 28.0* 24.6*   * 295     CMP:   Recent Labs   Lab 11/06/20  0742 11/06/20  1309 11/06/20  1532 11/07/20  0542   *  --   --  131*   K 4.8  4.7 5.0 4.4 4.6     --   --  105   CO2 19*  --   --  19*   GLU 90  --   --  114*   BUN 37*  --   --  38*   CREATININE 1.8*  --   --  1.8*   CALCIUM 7.5*  --   --  7.1*   PROT 6.3  --   --  5.6*   ALBUMIN 1.5*  --   --  1.3*   BILITOT 0.6  --   --  0.4   ALKPHOS 147*  --   --  119   AST 14  --   --  17   ALT 8*  --   --  9*   ANIONGAP 9  --   --  7*   EGFRNONAA 37.0*  --   --  37.0*     Wound Culture: No results for input(s): LABAERO in the last 4320 hours.  All pertinent labs within the past 24 hours have been reviewed.    Significant Imaging: I have reviewed all pertinent imaging results/findings within the past 24 hours.

## 2020-11-07 NOTE — NURSING
Pt lying in bed with c/o of SOB O2 sats on RA87%. O2 initated at 2L and O2 sats now at 97%. Pt was also offered to wear PRN BIPAP which she refused.  Pt also refused the continuous NS. Pt explained this is contributing to the swelling in legs. Pt has 1+ nonpitting edema to legs.  I explained to pt she is on fluids for other reasons . IMB was notified and explained that was fine just document. RT was also notified of O2 initiation. I will continue to monitor.

## 2020-11-07 NOTE — PROGRESS NOTES
Pharmacokinetic Assessment Follow Up: IV Vancomycin    Vancomycin serum concentration assessment(s):    - The random level (approximately 19 hours post dose) was drawn correctly and can be used to guide therapy at this time. The measurement is within the desired definitive target range of 15 to 20 mcg/mL  - Patient is in DERICK but Scr improving (2.0>1.8>1.8)  - Urine output low (about 0.2 ml/kg/hr). Will dose cautiously     Vancomycin Regimen Plan:    - Will give vancomycin 750 mg once today   - Re-dose when the random level is less than 20 mcg/mL, next level to be drawn at 0400 on 11/08/2020    Drug levels (last 3 results):  Recent Labs   Lab Result Units 11/04/20 1905 11/06/20 0742 11/07/20  0542   Vancomycin, Random ug/mL 15.7 13.9 16.5       Pharmacy will continue to follow and monitor vancomycin.    Please contact pharmacy at extension [phone] for questions regarding this assessment.    Thank you for the consult,   Maria Teresa Madrid       Patient brief summary:  Марина Britton is a 30 y.o. female initiated on antimicrobial therapy with IV Vancomycin for treatment of endocarditis    Drug Allergies:   Review of patient's allergies indicates:  No Known Allergies    Actual Body Weight:   69 kg     Renal Function:   Estimated Creatinine Clearance: 41.2 mL/min (A) (based on SCr of 1.8 mg/dL (H)).,     Dialysis Method (if applicable):  N/A    CBC (last 72 hours):  Recent Labs   Lab Result Units 11/05/20 0307 11/06/20 0742 11/07/20  0542   WBC K/uL 17.98* 11.44 11.78   Hemoglobin g/dL 10.3* 8.5* 7.6*   Hematocrit % 33.8* 28.0* 24.6*   Platelets K/uL 491* 372* 295   Gran % % 79.6* 76.0* 75.6*   Lymph % % 12.3* 11.4* 10.4*   Mono % % 3.5* 6.6 8.4   Eosinophil % % 1.6 2.4 2.5   Basophil % % 1.1 1.2 0.9   Differential Method  Automated Automated Automated       Metabolic Panel (last 72 hours):  Recent Labs   Lab Result Units 11/04/20 1905 11/05/20  0016 11/05/20  0307 11/05/20  0615 11/05/20  1228 11/05/20  1636  11/05/20 2120 11/06/20  0000 11/06/20  0638 11/06/20  0742 11/06/20  1309 11/06/20  1532 11/07/20  0542   Sodium mmol/L  --   --  131*  --   --   --   --   --   --  132*  --   --  131*   Sodium, Urine mmol/L  --   --   --   --   --   --   --   --  <20*  --   --   --   --    Potassium mmol/L 5.2*  5.2*  5.2* 5.0 5.5*  5.4*  --  4.8 5.5* 4.9 4.7  --  4.8  4.7 5.0 4.4 4.6   Chloride mmol/L  --   --  102  --   --   --   --   --   --  104  --   --  105   CO2 mmol/L  --   --  17*  --   --   --   --   --   --  19*  --   --  19*   Glucose mg/dL  --   --  77  --   --   --   --   --   --  90  --   --  114*   Glucose, UA   --   --   --  Negative  --   --   --   --  Negative  --   --   --   --    BUN mg/dL  --   --  40*  --   --   --   --   --   --  37*  --   --  38*   Creatinine mg/dL  --   --  2.0*  --   --   --   --   --   --  1.8*  --   --  1.8*   Creatinine, Urine mg/dL  --   --   --   --   --   --   --   --  123.0  --   --   --   --    Albumin g/dL  --   --  1.6*  --   --   --   --   --   --  1.5*  --   --  1.3*   Total Bilirubin mg/dL  --   --  0.6  --   --   --   --   --   --  0.6  --   --  0.4   Alkaline Phosphatase U/L  --   --  191*  --   --   --   --   --   --  147*  --   --  119   AST U/L  --   --  19  --   --   --   --   --   --  14  --   --  17   ALT U/L  --   --  10  --   --   --   --   --   --  8*  --   --  9*   Magnesium mg/dL  --   --  2.7*  --   --   --   --   --   --  2.6  --   --  2.3   Phosphorus mg/dL  --   --  5.6*  --   --   --   --   --   --  5.3*  --   --  4.8*       Vancomycin Administrations:  vancomycin given in the last 96 hours                   vancomycin 750 mg in dextrose 5 % 250 mL IVPB (ready to mix system) (mg) 750 mg New Bag 11/06/20 1015    vancomycin 750 mg in dextrose 5 % 250 mL IVPB (ready to mix system) (mg) 750 mg New Bag 11/04/20 5674                Microbiologic Results:  Microbiology Results (last 7 days)     Procedure Component Value Units Date/Time    Blood culture  [[id number]] Collected: 11/06/20 0742    Order Status: Completed Specimen: Blood from Antecubital, Left Arm Updated: 11/07/20 0812     Blood Culture, Routine No Growth to date      No Growth to date    Narrative:      Collection has been rescheduled by VX at 11/06/2020 05:05 Reason:   Unable to collect blood culture and other labs MILLY López is aware  Collection has been rescheduled by VX at 11/06/2020 05:05 Reason:   Unable to collect blood culture and other labs MILLY López is aware    Blood culture [[id number]] Collected: 11/06/20 0745    Order Status: Completed Specimen: Blood from Peripheral, Left Hand Updated: 11/07/20 0812     Blood Culture, Routine No Growth to date      No Growth to date    Narrative:      Collection has been rescheduled by VXH at 11/06/2020 05:05 Reason:   Unable to collect blood culture and other labs MILLY López is aware  Collection has been rescheduled by VX at 11/06/2020 05:05 Reason:   Unable to collect blood culture and other labs MILLY López is aware    Blood culture [[id number]] Collected: 11/05/20 0307    Order Status: Completed Specimen: Blood Updated: 11/07/20 0812     Blood Culture, Routine No Growth to date      No Growth to date      No Growth to date    Blood culture [[id number]] Collected: 11/05/20 0307    Order Status: Completed Specimen: Blood Updated: 11/07/20 0812     Blood Culture, Routine No Growth to date      No Growth to date      No Growth to date    Blood culture [[id number]]     Order Status: No result Specimen: Blood     Blood culture [[id number]]     Order Status: No result Specimen: Blood     Blood culture [[id number]] Collected: 11/01/20 1213    Order Status: Completed Specimen: Blood from Peripheral, Antecubital, Left Updated: 11/06/20 1412     Blood Culture, Routine No growth after 5 days.    Blood culture [[id number]] Collected: 11/01/20 1145    Order Status: Completed Specimen: Blood from Peripheral, Antecubital, Right Updated: 11/06/20 1412     Blood Culture, Routine No growth  after 5 days.    Urine Culture High Risk [[id number]] Collected: 11/05/20 0615    Order Status: Completed Specimen: Urine, Clean Catch Updated: 11/06/20 1228     Urine Culture, Routine No growth    Narrative:      Indicated criteria for high risk culture:->Other  Other (specify):->high risk    Blood culture [[id number]] Collected: 11/04/20 0442    Order Status: Completed Specimen: Blood Updated: 11/06/20 1013     Blood Culture, Routine No Growth to date      No Growth to date      No Growth to date    Blood culture [[id number]] Collected: 11/04/20 0442    Order Status: Completed Specimen: Blood Updated: 11/06/20 1013     Blood Culture, Routine No Growth to date      No Growth to date      No Growth to date    Urine culture [[id number]] Collected: 11/06/20 0638    Order Status: No result Specimen: Urine Updated: 11/06/20 0705    Blood culture [[id number]] Collected: 10/31/20 1610    Order Status: Completed Specimen: Blood from Peripheral, Antecubital, Right Updated: 11/05/20 2212     Blood Culture, Routine No growth after 5 days.    Blood culture [[id number]] Collected: 10/31/20 1626    Order Status: Completed Specimen: Blood from Peripheral, Antecubital, Left Updated: 11/05/20 2212     Blood Culture, Routine No growth after 5 days.    Blood culture [[id number]]     Order Status: No result Specimen: Blood     Blood culture [[id number]]     Order Status: No result Specimen: Blood     Blood culture [[id number]]     Order Status: No result Specimen: Blood     Blood culture [[id number]]     Order Status: No result Specimen: Blood     Blood culture [[id number]]  (Abnormal) Collected: 10/28/20 0955    Order Status: Completed Specimen: Blood from Peripheral, Antecubital, Right Updated: 11/01/20 1109     Blood Culture, Routine Gram stain richard bottle: Gram positive cocci in clusters resembling Staph       Results called to and read back by: Rica Medrano RN 10/29/2020  12:32      METHICILLIN RESISTANT STAPHYLOCOCCUS AUREUS  ID consult  required at [facility].Jocelynn Espinoza and Herman naik.  For susceptibility see order #[id number]

## 2020-11-07 NOTE — PROGRESS NOTES
11/07/20 0018   Vital Signs   Temp (!) 102 °F (38.9 °C)   Temp src Oral   Pulse 98   Heart Rate Source Monitor   Resp 15   SpO2 100 %   BP (!) 140/100   MAP (mmHg) 116   BP Location Right arm   Patient Position Lying         Pt has a fever at this time with headache. Pt given 650 mg tyenol and will reass T . I will continue to monitor

## 2020-11-07 NOTE — ASSESSMENT & PLAN NOTE
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 CTS has evaluated the patient and does not plan for acute surgical intervention. Course complicated by respiratory failure requiring intubation and pressor support. Blood cultures remained positive from 10/24 - 10/28. She has been and is currently on Vancomycin. Repeat blood cx of 10/31 and 11/1 are NGTD. Extubated to NC 11/2, fevers had resolved and she was stepped down to the floor. Plan was to continue Vancomycin x 6 weeks. ID re-consulted on 11/6 as patient developed recurrent fevers on 11/4 with hypoxia. She reports chest pain, SOB, nausea, feeling poorly. Denies recent visitors or issues/tampering with her PICC line.    11/7 - spiking fever, WBC normalized, BCXs/UCXs remain NGTD, R lung with significant decreased BS, appears unwell and renal function has worsened.  .  No rash      Recommendations  - Continue IV Vancomycin (renal dosing). Goal trough 15-20 and Cefepime 2 g IV q 12 hours empirically   - Follow blood/urine cultures  - Recommend CT chest/abdomen/pelvis without contrast as with DERICK  - Rec repeat 2D echo to assess for cardiac abscess  - check urine Barajas stain given worsening RF  - Discussed plan at length with primary team. ID will follow.

## 2020-11-08 LAB
ALBUMIN SERPL BCP-MCNC: 1.3 G/DL (ref 3.5–5.2)
ALP SERPL-CCNC: 122 U/L (ref 55–135)
ALT SERPL W/O P-5'-P-CCNC: 8 U/L (ref 10–44)
ANION GAP SERPL CALC-SCNC: 6 MMOL/L (ref 8–16)
AST SERPL-CCNC: 16 U/L (ref 10–40)
BASOPHILS # BLD AUTO: 0.11 K/UL (ref 0–0.2)
BASOPHILS NFR BLD: 0.9 % (ref 0–1.9)
BILIRUB SERPL-MCNC: 0.4 MG/DL (ref 0.1–1)
BUN SERPL-MCNC: 35 MG/DL (ref 6–20)
CALCIUM SERPL-MCNC: 7.2 MG/DL (ref 8.7–10.5)
CHLORIDE SERPL-SCNC: 105 MMOL/L (ref 95–110)
CO2 SERPL-SCNC: 19 MMOL/L (ref 23–29)
CREAT SERPL-MCNC: 1.7 MG/DL (ref 0.5–1.4)
DIFFERENTIAL METHOD: ABNORMAL
EOSINOPHIL # BLD AUTO: 0.3 K/UL (ref 0–0.5)
EOSINOPHIL NFR BLD: 2.5 % (ref 0–8)
ERYTHROCYTE [DISTWIDTH] IN BLOOD BY AUTOMATED COUNT: 17.6 % (ref 11.5–14.5)
EST. GFR  (AFRICAN AMERICAN): 45.7 ML/MIN/1.73 M^2
EST. GFR  (NON AFRICAN AMERICAN): 39.6 ML/MIN/1.73 M^2
GLUCOSE SERPL-MCNC: 100 MG/DL (ref 70–110)
HCT VFR BLD AUTO: 24.6 % (ref 37–48.5)
HGB BLD-MCNC: 7.4 G/DL (ref 12–16)
IMM GRANULOCYTES # BLD AUTO: 0.21 K/UL (ref 0–0.04)
IMM GRANULOCYTES NFR BLD AUTO: 1.7 % (ref 0–0.5)
LYMPHOCYTES # BLD AUTO: 1.1 K/UL (ref 1–4.8)
LYMPHOCYTES NFR BLD: 8.9 % (ref 18–48)
MAGNESIUM SERPL-MCNC: 2.3 MG/DL (ref 1.6–2.6)
MCH RBC QN AUTO: 26.3 PG (ref 27–31)
MCHC RBC AUTO-ENTMCNC: 30.1 G/DL (ref 32–36)
MCV RBC AUTO: 88 FL (ref 82–98)
MONOCYTES # BLD AUTO: 0.8 K/UL (ref 0.3–1)
MONOCYTES NFR BLD: 6.2 % (ref 4–15)
NEUTROPHILS # BLD AUTO: 9.8 K/UL (ref 1.8–7.7)
NEUTROPHILS NFR BLD: 79.8 % (ref 38–73)
NRBC BLD-RTO: 0 /100 WBC
PHOSPHATE SERPL-MCNC: 4.3 MG/DL (ref 2.7–4.5)
PLATELET # BLD AUTO: 282 K/UL (ref 150–350)
PMV BLD AUTO: 9.4 FL (ref 9.2–12.9)
POTASSIUM SERPL-SCNC: 4.6 MMOL/L (ref 3.5–5.1)
PROT SERPL-MCNC: 5.8 G/DL (ref 6–8.4)
RBC # BLD AUTO: 2.81 M/UL (ref 4–5.4)
SODIUM SERPL-SCNC: 130 MMOL/L (ref 136–145)
VANCOMYCIN SERPL-MCNC: 16.9 UG/ML
WBC # BLD AUTO: 12.23 K/UL (ref 3.9–12.7)

## 2020-11-08 PROCEDURE — 25000003 PHARM REV CODE 250: Performed by: INTERNAL MEDICINE

## 2020-11-08 PROCEDURE — 80202 ASSAY OF VANCOMYCIN: CPT

## 2020-11-08 PROCEDURE — A4216 STERILE WATER/SALINE, 10 ML: HCPCS | Performed by: INTERNAL MEDICINE

## 2020-11-08 PROCEDURE — 83735 ASSAY OF MAGNESIUM: CPT

## 2020-11-08 PROCEDURE — 80053 COMPREHEN METABOLIC PANEL: CPT

## 2020-11-08 PROCEDURE — 84100 ASSAY OF PHOSPHORUS: CPT

## 2020-11-08 PROCEDURE — 25000003 PHARM REV CODE 250: Performed by: STUDENT IN AN ORGANIZED HEALTH CARE EDUCATION/TRAINING PROGRAM

## 2020-11-08 PROCEDURE — 99233 SBSQ HOSP IP/OBS HIGH 50: CPT | Mod: ,,, | Performed by: PHYSICIAN ASSISTANT

## 2020-11-08 PROCEDURE — 11000001 HC ACUTE MED/SURG PRIVATE ROOM

## 2020-11-08 PROCEDURE — 25000003 PHARM REV CODE 250: Performed by: HOSPITALIST

## 2020-11-08 PROCEDURE — 63600175 PHARM REV CODE 636 W HCPCS: Performed by: PHYSICIAN ASSISTANT

## 2020-11-08 PROCEDURE — 99232 PR SUBSEQUENT HOSPITAL CARE,LEVL II: ICD-10-PCS | Mod: ,,, | Performed by: INTERNAL MEDICINE

## 2020-11-08 PROCEDURE — 85025 COMPLETE CBC W/AUTO DIFF WBC: CPT

## 2020-11-08 PROCEDURE — 87040 BLOOD CULTURE FOR BACTERIA: CPT

## 2020-11-08 PROCEDURE — 63600175 PHARM REV CODE 636 W HCPCS: Performed by: STUDENT IN AN ORGANIZED HEALTH CARE EDUCATION/TRAINING PROGRAM

## 2020-11-08 PROCEDURE — 99232 SBSQ HOSP IP/OBS MODERATE 35: CPT | Mod: ,,, | Performed by: INTERNAL MEDICINE

## 2020-11-08 PROCEDURE — 63600175 PHARM REV CODE 636 W HCPCS: Performed by: INTERNAL MEDICINE

## 2020-11-08 PROCEDURE — 99233 PR SUBSEQUENT HOSPITAL CARE,LEVL III: ICD-10-PCS | Mod: ,,, | Performed by: PHYSICIAN ASSISTANT

## 2020-11-08 RX ORDER — ENOXAPARIN SODIUM 100 MG/ML
40 INJECTION SUBCUTANEOUS EVERY 24 HOURS
Status: DISCONTINUED | OUTPATIENT
Start: 2020-11-08 | End: 2020-11-24 | Stop reason: HOSPADM

## 2020-11-08 RX ORDER — AMOXICILLIN 250 MG
2 CAPSULE ORAL 2 TIMES DAILY
Status: DISCONTINUED | OUTPATIENT
Start: 2020-11-08 | End: 2020-11-24 | Stop reason: HOSPADM

## 2020-11-08 RX ADMIN — OXYCODONE HYDROCHLORIDE 20 MG: 10 TABLET ORAL at 03:11

## 2020-11-08 RX ADMIN — OXYCODONE HYDROCHLORIDE 10 MG: 10 TABLET ORAL at 03:11

## 2020-11-08 RX ADMIN — Medication 10 ML: at 05:11

## 2020-11-08 RX ADMIN — SENNOSIDES 8.6 MG: 8.6 TABLET, FILM COATED ORAL at 08:11

## 2020-11-08 RX ADMIN — OXYCODONE HYDROCHLORIDE 20 MG: 10 TABLET ORAL at 05:11

## 2020-11-08 RX ADMIN — OXYCODONE HYDROCHLORIDE 10 MG: 10 TABLET ORAL at 06:11

## 2020-11-08 RX ADMIN — Medication 10 ML: at 06:11

## 2020-11-08 RX ADMIN — DOCUSATE SODIUM AND SENNOSIDES 2 TABLET: 8.6; 5 TABLET, FILM COATED ORAL at 08:11

## 2020-11-08 RX ADMIN — CEFEPIME 2 G: 2 INJECTION, POWDER, FOR SOLUTION INTRAVENOUS at 03:11

## 2020-11-08 RX ADMIN — Medication 10 ML: at 03:11

## 2020-11-08 RX ADMIN — ACETAMINOPHEN 1000 MG: 500 TABLET ORAL at 03:11

## 2020-11-08 RX ADMIN — HEPARIN SODIUM 5000 UNITS: 5000 INJECTION INTRAVENOUS; SUBCUTANEOUS at 05:11

## 2020-11-08 RX ADMIN — VANCOMYCIN HYDROCHLORIDE 750 MG: 750 INJECTION, POWDER, LYOPHILIZED, FOR SOLUTION INTRAVENOUS at 08:11

## 2020-11-08 RX ADMIN — MIRTAZAPINE 7.5 MG: 7.5 TABLET ORAL at 10:11

## 2020-11-08 RX ADMIN — OXYCODONE HYDROCHLORIDE 20 MG: 10 TABLET ORAL at 11:11

## 2020-11-08 RX ADMIN — ENOXAPARIN SODIUM 40 MG: 40 INJECTION SUBCUTANEOUS at 05:11

## 2020-11-08 NOTE — PROGRESS NOTES
Hospital Medicine  Progress Note      Patient Name: Марина Britton  MRN: [id number]  Date of Admission: 10/26/2020     Principal Problem: Endocarditis of tricuspid valve     Subjective     Ms. Britton feels a little better today. She comments that her appetite has started to improve. She was febrile this morning at 0344. CT C/A/P with multiple areas of likely septic embolization in her lungs generally similar to prior though with some areas of convalescence of nodular opacities, anasarca, HSM, retained stool, and a small hepatic hypodensity, too small to characterize. Kidney function slowly improving.    Review of Systems    Constitutional: +fever Negative for chills, fatigue   Respiratory: +SOB (improved) Negative for cough  Cardiovascular: +chest pain (improved) Negative for palpitations, leg swelling.   Gastrointestinal: Negative for abdominal pain, constipation, diarrhea, nausea, vomiting.   Neurological: Negative for dizziness, syncope, weakness, light-headedness.     Medications  Scheduled Meds:   ceFEPime (MAXIPIME) IVPB  2 g Intravenous Q12H    enoxaparin  40 mg Subcutaneous Q24H    mirtazapine  7.5 mg Oral QHS    oxyCODONE  20 mg Per OG tube Q6H    polyethylene glycol  17 g Oral Daily    senna-docusate 8.6-50 mg  2 tablet Oral BID    sodium chloride 0.9%  10 mL Intravenous Q6H    vancomycin (VANCOCIN) IVPB  750 mg Intravenous Q24H     Continuous Infusions:    PRN Meds:.acetaminophen, acetaminophen, acetaminophen, calcium carbonate, hydrOXYzine HCL, melatonin, oxyCODONE, polyethylene glycol, sodium chloride 0.9%, Flushing PICC Protocol **AND** sodium chloride 0.9% **AND** sodium chloride 0.9%, vancomycin - pharmacy to dose    Objective    Physical Examination    Temp:  [97.9 °F (36.6 °C)-101 °F (38.3 °C)]   Pulse:  []   Resp:  [18]   BP: (121-155)/()   SpO2:  [95 %-100 %]     Gen: NAD, conversant  CV: RRR, +murmur, 1+ lower extremity edema bilaterally   Resp: CTAB, no increased work of  breathing on room air  GI: Soft, NT, ND, +BS  Ext: MAEWADINA PICC clean and dry at insertion site  Neuro: AAOx3, CN grossly intact, no focal neurologic deficits    CBC  Recent Labs   Lab 11/06/20  0742 11/07/20  0542 11/08/20  0642   WBC 11.44 11.78 12.23   HGB 8.5* 7.6* 7.4*   HCT 28.0* 24.6* 24.6*   * 295 282     CMP  Recent Labs   Lab 11/06/20  0742  11/06/20  1532 11/07/20  0542 11/08/20  0642   *  --   --  131* 130*   K 4.8  4.7   < > 4.4 4.6 4.6     --   --  105 105   CO2 19*  --   --  19* 19*   BUN 37*  --   --  38* 35*   CREATININE 1.8*  --   --  1.8* 1.7*   GLU 90  --   --  114* 100   CALCIUM 7.5*  --   --  7.1* 7.2*   MG 2.6  --   --  2.3 2.3   PHOS 5.3*  --   --  4.8* 4.3   ALKPHOS 147*  --   --  119 122   ALT 8*  --   --  9* 8*   AST 14  --   --  17 16   ALBUMIN 1.5*  --   --  1.3* 1.3*   PROT 6.3  --   --  5.6* 5.8*   BILITOT 0.6  --   --  0.4 0.4    < > = values in this interval not displayed.       Hospital Course:    Ms. Britton is a 30-year-old woman with HCV, IVDU who was transferred from [facility] where she presented with chest pain and was diagnosed with MRSA bacteremia complicated by TV IE with septic emboli, acute hypoxic respiratory failure, septic/metabolic encephalopathy, septic PE, septic shock, and acute anemia. She was admitted to the [facility] MICU on 10/27 and was intubated for respiratory failure. She has subsequently been evaluated by ID, CTS, and psychiatry. She required pRBC transfusion on 10/29, and norepi on 10/30. Her admission was further complicated by DERICK which subsequently resolved. She was extubated on 11/2, then transferred to the floor on 11/3 for further care, planning on long-term IV antibiotics with vancomycin and repeat CTS evaluation as an outpatient. Subsequently her DERICK has returned, and she is again febrile for which BCx were obtained on 11/4 and 11/5 and have remained NGTD. ID was reconsulted on 11/6, and cefepime was added to her regimen. CT  C/A/P obtained shows continued infectious lung processes.     Assessment and Plan:    MRSA bacteremia  Pneumonia of both lungs due to infectious organism  Pulmonary emboli (septic)  Sepsis  Leukocytosis - resolved  Endocarditis of tricuspid valve    - Febrile again, BCx from 11/4 and 11/5 NGTD. Leukocytosis resolved  - Continue vancomycin (pharmacy dosing)  - PT/OT following and recommending IPR, anticipate LTAC   - CBC daily for WBC  - Needs 6 weeks IV antibiotics, then f/u CTS  - Ideally the CT C/A/P would have been contrasted, renal function prohibitive  - TTE pending (repeat recommended by ID)  - Tele    DERICK (acute kidney injury)  Hyperkalemia  Hyponatremia    - sCr mildly improving  - Etiology likely multifactorial with recent sepsis and vancomycin and poor PO intake  - Na 131  - Urine studies c/w pre-renal, minimally responsive to fluids, will match I/Os  - Imaging shows anasarca matching her physical exam. If no improvement tomorrow will try lasix  - HyperK resolved    IVDU (intravenous drug user)  Opioid use disorder, severe, dependence    - Psych evaluated earlier this admission  - Would benefit from LTAC disposition    Substance induced mood disorder    - Psych evaluated, starting on mirtazapine, seems to be improving.    Diet: Adult regular  VTE PPX: LMWH  Goals of care: Curative, full code      Alli Garcia M.D.  Department of Hospital Medicine  [facility] [place]  [phone] (pager)

## 2020-11-08 NOTE — PROGRESS NOTES
Pharmacokinetic Assessment Follow Up: IV Vancomycin    Vancomycin serum concentration assessment(s):    - The random level (approximately 20 hours post dose) was drawn correctly and can be used to guide therapy at this time. The measurement is within the desired definitive target range of 15 to 20 mcg/mL  - Patient is in DERICK but Scr improving (2.0>1.8>1.8>1.7)  - Urine output is good at 0.5 ml/kg/hr   - Will schedule vancomycin dose and get trough sooner to confirm vancomycin still therapeutic     Vancomycin Regimen Plan:    - Will initiate vancomycin 750 mg q24h   - Re-dose when the random level is less than 20 mcg/mL, next level to be drawn at 0830 on 11/10/2020 before the third dose   - Get level sooner if renal function declines or clinical picture changes     Drug levels (last 3 results):  Recent Labs   Lab Result Units 11/06/20 0742 11/07/20  0542 11/08/20  0642   Vancomycin, Random ug/mL 13.9 16.5 16.9       Pharmacy will continue to follow and monitor vancomycin.    Please contact pharmacy at extension [phone] for questions regarding this assessment.    Thank you for the consult,   Maria Teresa Madrid       Patient brief summary:  Марина Britton is a 30 y.o. female initiated on antimicrobial therapy with IV Vancomycin for treatment of endocarditis    Drug Allergies:   Review of patient's allergies indicates:  No Known Allergies    Actual Body Weight:   69 kg     Renal Function:   Estimated Creatinine Clearance: 43.7 mL/min (A) (based on SCr of 1.7 mg/dL (H)).,     Dialysis Method (if applicable):  N/A    CBC (last 72 hours):  Recent Labs   Lab Result Units 11/06/20 0742 11/07/20  0542 11/08/20  0642   WBC K/uL 11.44 11.78 12.23   Hemoglobin g/dL 8.5* 7.6* 7.4*   Hematocrit % 28.0* 24.6* 24.6*   Platelets K/uL 372* 295 282   Gran % % 76.0* 75.6* 79.8*   Lymph % % 11.4* 10.4* 8.9*   Mono % % 6.6 8.4 6.2   Eosinophil % % 2.4 2.5 2.5   Basophil % % 1.2 0.9 0.9   Differential Method  Automated Automated Automated        Metabolic Panel (last 72 hours):  Recent Labs   Lab Result Units 11/05/20  1228 11/05/20  1633 11/05/20  2120 11/06/20  0000 11/06/20  0638 11/06/20  0742 11/06/20  1309 11/06/20  1532 11/07/20  0542 11/08/20  0642   Sodium mmol/L  --   --   --   --   --  132*  --   --  131* 130*   Sodium, Urine mmol/L  --   --   --   --  <20*  --   --   --   --   --    Potassium mmol/L 4.8 5.5* 4.9 4.7  --  4.8  4.7 5.0 4.4 4.6 4.6   Chloride mmol/L  --   --   --   --   --  104  --   --  105 105   CO2 mmol/L  --   --   --   --   --  19*  --   --  19* 19*   Glucose mg/dL  --   --   --   --   --  90  --   --  114* 100   Glucose, UA   --   --   --   --  Negative  --   --   --   --   --    BUN mg/dL  --   --   --   --   --  37*  --   --  38* 35*   Creatinine mg/dL  --   --   --   --   --  1.8*  --   --  1.8* 1.7*   Creatinine, Urine mg/dL  --   --   --   --  123.0  --   --   --   --   --    Albumin g/dL  --   --   --   --   --  1.5*  --   --  1.3* 1.3*   Total Bilirubin mg/dL  --   --   --   --   --  0.6  --   --  0.4 0.4   Alkaline Phosphatase U/L  --   --   --   --   --  147*  --   --  119 122   AST U/L  --   --   --   --   --  14  --   --  17 16   ALT U/L  --   --   --   --   --  8*  --   --  9* 8*   Magnesium mg/dL  --   --   --   --   --  2.6  --   --  2.3 2.3   Phosphorus mg/dL  --   --   --   --   --  5.3*  --   --  4.8* 4.3       Vancomycin Administrations:  vancomycin given in the last 96 hours                   vancomycin 750 mg in dextrose 5 % 250 mL IVPB (ready to mix system) (mg) 750 mg New Bag 11/06/20 1015    vancomycin 750 mg in dextrose 5 % 250 mL IVPB (ready to mix system) (mg) 750 mg New Bag 11/04/20 2136                Microbiologic Results:  Microbiology Results (last 7 days)     Procedure Component Value Units Date/Time    Blood culture [[id number]] Collected: 11/08/20 0642    Order Status: Sent Specimen: Blood Updated: 11/08/20 0815    Blood culture [[id number]] Collected: 11/06/20 0745    Order Status:  Completed Specimen: Blood from Peripheral, Left Hand Updated: 11/08/20 0812     Blood Culture, Routine No Growth to date      No Growth to date      No Growth to date    Narrative:      Collection has been rescheduled by VX at 11/06/2020 05:05 Reason:   Unable to collect blood culture and other labs MILLY López is aware  Collection has been rescheduled by VX at 11/06/2020 05:05 Reason:   Unable to collect blood culture and other labs MILLY López is aware    Blood culture [[id number]] Collected: 11/05/20 0307    Order Status: Completed Specimen: Blood Updated: 11/08/20 0812     Blood Culture, Routine No Growth to date      No Growth to date      No Growth to date      No Growth to date    Blood culture [[id number]] Collected: 11/06/20 0742    Order Status: Completed Specimen: Blood from Antecubital, Left Arm Updated: 11/08/20 0812     Blood Culture, Routine No Growth to date      No Growth to date      No Growth to date    Narrative:      Collection has been rescheduled by VX at 11/06/2020 05:05 Reason:   Unable to collect blood culture and other labs MILLY López is aware  Collection has been rescheduled by VX at 11/06/2020 05:05 Reason:   Unable to collect blood culture and other labs MILLY López is aware    Blood culture [[id number]] Collected: 11/05/20 0307    Order Status: Completed Specimen: Blood Updated: 11/08/20 0812     Blood Culture, Routine No Growth to date      No Growth to date      No Growth to date      No Growth to date    Blood culture [[id number]] Collected: 11/08/20 0642    Order Status: Sent Specimen: Blood Updated: 11/08/20 0710    Urine culture [[id number]] Collected: 11/06/20 0638    Order Status: Completed Specimen: Urine Updated: 11/07/20 1038     Urine Culture, Routine No growth    Narrative:      Specimen Source->Urine    Blood culture [[id number]] Collected: 11/04/20 0442    Order Status: Completed Specimen: Blood Updated: 11/07/20 1012     Blood Culture, Routine No Growth to date      No Growth to date       No Growth to date      No Growth to date    Blood culture [[id number]] Collected: 11/04/20 0442    Order Status: Completed Specimen: Blood Updated: 11/07/20 1012     Blood Culture, Routine No Growth to date      No Growth to date      No Growth to date      No Growth to date    Blood culture [[id number]]     Order Status: Sent Specimen: Blood     Blood culture [[id number]]     Order Status: Sent Specimen: Blood     Blood culture [[id number]] Collected: 11/01/20 1213    Order Status: Completed Specimen: Blood from Peripheral, Antecubital, Left Updated: 11/06/20 1412     Blood Culture, Routine No growth after 5 days.    Blood culture [[id number]] Collected: 11/01/20 1145    Order Status: Completed Specimen: Blood from Peripheral, Antecubital, Right Updated: 11/06/20 1412     Blood Culture, Routine No growth after 5 days.    Urine Culture High Risk [[id number]] Collected: 11/05/20 0615    Order Status: Completed Specimen: Urine, Clean Catch Updated: 11/06/20 1228     Urine Culture, Routine No growth    Narrative:      Indicated criteria for high risk culture:->Other  Other (specify):->high risk    Blood culture [[id number]] Collected: 10/31/20 1610    Order Status: Completed Specimen: Blood from Peripheral, Antecubital, Right Updated: 11/05/20 2212     Blood Culture, Routine No growth after 5 days.    Blood culture [[id number]] Collected: 10/31/20 1626    Order Status: Completed Specimen: Blood from Peripheral, Antecubital, Left Updated: 11/05/20 2212     Blood Culture, Routine No growth after 5 days.    Blood culture [[id number]]     Order Status: No result Specimen: Blood     Blood culture [[id number]]     Order Status: No result Specimen: Blood     Blood culture [[id number]]     Order Status: No result Specimen: Blood     Blood culture [[id number]]     Order Status: No result Specimen: Blood     Blood culture [[id number]]  (Abnormal) Collected: 10/28/20 0955    Order Status: Completed Specimen: Blood from Peripheral,  Antecubital, Right Updated: 11/01/20 1109     Blood Culture, Routine Gram stain richard bottle: Gram positive cocci in clusters resembling Staph       Results called to and read back by: Rica Medrano RN 10/29/2020  12:32      METHICILLIN RESISTANT STAPHYLOCOCCUS AUREUS  ID consult required at [facility] [place]ocelynn and Herman locations.  For susceptibility see order #[id number]

## 2020-11-09 LAB
ALBUMIN SERPL BCP-MCNC: 1.3 G/DL (ref 3.5–5.2)
ALP SERPL-CCNC: 105 U/L (ref 55–135)
ALT SERPL W/O P-5'-P-CCNC: 8 U/L (ref 10–44)
ANION GAP SERPL CALC-SCNC: 4 MMOL/L (ref 8–16)
ASCENDING AORTA: 2.39 CM
AST SERPL-CCNC: 17 U/L (ref 10–40)
AV INDEX (PROSTH): 0.95
AV MEAN GRADIENT: 4 MMHG
AV PEAK GRADIENT: 8 MMHG
AV VALVE AREA: 2.98 CM2
AV VELOCITY RATIO: 0.82
BACTERIA BLD CULT: NORMAL
BACTERIA BLD CULT: NORMAL
BASOPHILS # BLD AUTO: 0.14 K/UL (ref 0–0.2)
BASOPHILS NFR BLD: 1.2 % (ref 0–1.9)
BILIRUB SERPL-MCNC: 0.4 MG/DL (ref 0.1–1)
BSA FOR ECHO PROCEDURE: 1.74 M2
BUN SERPL-MCNC: 30 MG/DL (ref 6–20)
CALCIUM SERPL-MCNC: 7.5 MG/DL (ref 8.7–10.5)
CHLORIDE SERPL-SCNC: 107 MMOL/L (ref 95–110)
CO2 SERPL-SCNC: 21 MMOL/L (ref 23–29)
CREAT SERPL-MCNC: 1.4 MG/DL (ref 0.5–1.4)
CV ECHO LV RWT: 0.37 CM
DIFFERENTIAL METHOD: ABNORMAL
DOP CALC AO PEAK VEL: 1.37 M/S
DOP CALC AO VTI: 19.9 CM
DOP CALC LVOT AREA: 3.1 CM2
DOP CALC LVOT DIAMETER: 2 CM
DOP CALC LVOT PEAK VEL: 1.13 M/S
DOP CALC LVOT STROKE VOLUME: 59.31 CM3
DOP CALCLVOT PEAK VEL VTI: 18.89 CM
E WAVE DECELERATION TIME: 144.91 MSEC
E/A RATIO: 0.87
E/E' RATIO: 4.93 M/S
ECHO LV POSTERIOR WALL: 0.76 CM (ref 0.6–1.1)
EOSINOPHIL # BLD AUTO: 0.3 K/UL (ref 0–0.5)
EOSINOPHIL NFR BLD: 2.6 % (ref 0–8)
ERYTHROCYTE [DISTWIDTH] IN BLOOD BY AUTOMATED COUNT: 17.7 % (ref 11.5–14.5)
EST. GFR  (AFRICAN AMERICAN): 57.8 ML/MIN/1.73 M^2
EST. GFR  (NON AFRICAN AMERICAN): 50.1 ML/MIN/1.73 M^2
FRACTIONAL SHORTENING: 31 % (ref 28–44)
GLUCOSE SERPL-MCNC: 80 MG/DL (ref 70–110)
HCT VFR BLD AUTO: 24.3 % (ref 37–48.5)
HGB BLD-MCNC: 7.4 G/DL (ref 12–16)
IMM GRANULOCYTES # BLD AUTO: 0.24 K/UL (ref 0–0.04)
IMM GRANULOCYTES NFR BLD AUTO: 2.1 % (ref 0–0.5)
INTERVENTRICULAR SEPTUM: 0.76 CM (ref 0.6–1.1)
IVRT: 104.66 MSEC
LA MAJOR: 4.78 CM
LA MINOR: 4.26 CM
LA WIDTH: 2.98 CM
LEFT ATRIUM SIZE: 3.92 CM
LEFT ATRIUM VOLUME INDEX: 26.3 ML/M2
LEFT ATRIUM VOLUME: 44.73 CM3
LEFT INTERNAL DIMENSION IN SYSTOLE: 2.83 CM (ref 2.1–4)
LEFT VENTRICLE DIASTOLIC VOLUME INDEX: 44.17 ML/M2
LEFT VENTRICLE DIASTOLIC VOLUME: 75.14 ML
LEFT VENTRICLE MASS INDEX: 54 G/M2
LEFT VENTRICLE SYSTOLIC VOLUME INDEX: 17.8 ML/M2
LEFT VENTRICLE SYSTOLIC VOLUME: 30.29 ML
LEFT VENTRICULAR INTERNAL DIMENSION IN DIASTOLE: 4.12 CM (ref 3.5–6)
LEFT VENTRICULAR MASS: 91.68 G
LV LATERAL E/E' RATIO: 4.31 M/S
LV SEPTAL E/E' RATIO: 5.75 M/S
LYMPHOCYTES # BLD AUTO: 1.7 K/UL (ref 1–4.8)
LYMPHOCYTES NFR BLD: 14.7 % (ref 18–48)
MAGNESIUM SERPL-MCNC: 2.2 MG/DL (ref 1.6–2.6)
MCH RBC QN AUTO: 27.1 PG (ref 27–31)
MCHC RBC AUTO-ENTMCNC: 30.5 G/DL (ref 32–36)
MCV RBC AUTO: 89 FL (ref 82–98)
MONOCYTES # BLD AUTO: 0.9 K/UL (ref 0.3–1)
MONOCYTES NFR BLD: 7.7 % (ref 4–15)
MV PEAK A VEL: 0.79 M/S
MV PEAK E VEL: 0.69 M/S
MV STENOSIS PRESSURE HALF TIME: 42.02 MS
MV VALVE AREA P 1/2 METHOD: 5.24 CM2
NEUTROPHILS # BLD AUTO: 8.2 K/UL (ref 1.8–7.7)
NEUTROPHILS NFR BLD: 71.7 % (ref 38–73)
NRBC BLD-RTO: 0 /100 WBC
PHOSPHATE SERPL-MCNC: 3.6 MG/DL (ref 2.7–4.5)
PISA TR MAX VEL: 3.11 M/S
PLATELET # BLD AUTO: 262 K/UL (ref 150–350)
PMV BLD AUTO: 9.7 FL (ref 9.2–12.9)
POTASSIUM SERPL-SCNC: 5 MMOL/L (ref 3.5–5.1)
PROT SERPL-MCNC: 5.8 G/DL (ref 6–8.4)
RA MAJOR: 5.7 CM
RA PRESSURE: 15 MMHG
RA WIDTH: 5.5 CM
RBC # BLD AUTO: 2.73 M/UL (ref 4–5.4)
RIGHT VENTRICULAR END-DIASTOLIC DIMENSION: 4.58 CM
RV TISSUE DOPPLER FREE WALL SYSTOLIC VELOCITY 1 (APICAL 4 CHAMBER VIEW): 17.42 CM/S
SINUS: 2.71 CM
SODIUM SERPL-SCNC: 132 MMOL/L (ref 136–145)
STJ: 2.54 CM
TDI LATERAL: 0.16 M/S
TDI SEPTAL: 0.12 M/S
TDI: 0.14 M/S
TR MAX PG: 39 MMHG
TRICUSPID ANNULAR PLANE SYSTOLIC EXCURSION: 2.14 CM
TV REST PULMONARY ARTERY PRESSURE: 54 MMHG
WBC # BLD AUTO: 11.37 K/UL (ref 3.9–12.7)

## 2020-11-09 PROCEDURE — 63600175 PHARM REV CODE 636 W HCPCS: Performed by: INTERNAL MEDICINE

## 2020-11-09 PROCEDURE — 97535 SELF CARE MNGMENT TRAINING: CPT

## 2020-11-09 PROCEDURE — 99232 SBSQ HOSP IP/OBS MODERATE 35: CPT | Mod: ,,, | Performed by: INTERNAL MEDICINE

## 2020-11-09 PROCEDURE — 27000221 HC OXYGEN, UP TO 24 HOURS

## 2020-11-09 PROCEDURE — 99232 PR SUBSEQUENT HOSPITAL CARE,LEVL II: ICD-10-PCS | Mod: ,,, | Performed by: INTERNAL MEDICINE

## 2020-11-09 PROCEDURE — 11000001 HC ACUTE MED/SURG PRIVATE ROOM

## 2020-11-09 PROCEDURE — A4216 STERILE WATER/SALINE, 10 ML: HCPCS | Performed by: INTERNAL MEDICINE

## 2020-11-09 PROCEDURE — 85025 COMPLETE CBC W/AUTO DIFF WBC: CPT

## 2020-11-09 PROCEDURE — 99233 SBSQ HOSP IP/OBS HIGH 50: CPT | Mod: ,,, | Performed by: PHYSICIAN ASSISTANT

## 2020-11-09 PROCEDURE — 99233 PR SUBSEQUENT HOSPITAL CARE,LEVL III: ICD-10-PCS | Mod: ,,, | Performed by: PHYSICIAN ASSISTANT

## 2020-11-09 PROCEDURE — 84100 ASSAY OF PHOSPHORUS: CPT

## 2020-11-09 PROCEDURE — 25000003 PHARM REV CODE 250: Performed by: STUDENT IN AN ORGANIZED HEALTH CARE EDUCATION/TRAINING PROGRAM

## 2020-11-09 PROCEDURE — 83735 ASSAY OF MAGNESIUM: CPT

## 2020-11-09 PROCEDURE — 80053 COMPREHEN METABOLIC PANEL: CPT

## 2020-11-09 PROCEDURE — 25000003 PHARM REV CODE 250: Performed by: INTERNAL MEDICINE

## 2020-11-09 PROCEDURE — 36415 COLL VENOUS BLD VENIPUNCTURE: CPT

## 2020-11-09 PROCEDURE — 63600175 PHARM REV CODE 636 W HCPCS: Performed by: PHYSICIAN ASSISTANT

## 2020-11-09 PROCEDURE — 25000003 PHARM REV CODE 250: Performed by: HOSPITALIST

## 2020-11-09 PROCEDURE — 87040 BLOOD CULTURE FOR BACTERIA: CPT

## 2020-11-09 PROCEDURE — 97803 MED NUTRITION INDIV SUBSEQ: CPT

## 2020-11-09 PROCEDURE — 99900035 HC TECH TIME PER 15 MIN (STAT)

## 2020-11-09 PROCEDURE — 94799 UNLISTED PULMONARY SVC/PX: CPT

## 2020-11-09 RX ORDER — DIPHENHYDRAMINE HCL 25 MG
25 CAPSULE ORAL EVERY 6 HOURS PRN
Status: DISCONTINUED | OUTPATIENT
Start: 2020-11-09 | End: 2020-11-24 | Stop reason: HOSPADM

## 2020-11-09 RX ORDER — FUROSEMIDE 10 MG/ML
40 INJECTION INTRAMUSCULAR; INTRAVENOUS DAILY
Status: DISCONTINUED | OUTPATIENT
Start: 2020-11-09 | End: 2020-11-12

## 2020-11-09 RX ORDER — FUROSEMIDE 10 MG/ML
40 INJECTION INTRAMUSCULAR; INTRAVENOUS DAILY
Status: DISCONTINUED | OUTPATIENT
Start: 2020-11-10 | End: 2020-11-09

## 2020-11-09 RX ADMIN — Medication 10 ML: at 06:11

## 2020-11-09 RX ADMIN — OXYCODONE HYDROCHLORIDE 20 MG: 10 TABLET ORAL at 07:11

## 2020-11-09 RX ADMIN — DIPHENHYDRAMINE HYDROCHLORIDE 25 MG: 25 CAPSULE ORAL at 09:11

## 2020-11-09 RX ADMIN — Medication 10 ML: at 12:11

## 2020-11-09 RX ADMIN — Medication 10 ML: at 11:11

## 2020-11-09 RX ADMIN — MIRTAZAPINE 7.5 MG: 7.5 TABLET ORAL at 09:11

## 2020-11-09 RX ADMIN — ACETAMINOPHEN 1000 MG: 500 TABLET ORAL at 11:11

## 2020-11-09 RX ADMIN — OXYCODONE HYDROCHLORIDE 20 MG: 10 TABLET ORAL at 06:11

## 2020-11-09 RX ADMIN — OXYCODONE HYDROCHLORIDE 20 MG: 10 TABLET ORAL at 11:11

## 2020-11-09 RX ADMIN — ACETAMINOPHEN 650 MG: 325 TABLET ORAL at 11:11

## 2020-11-09 RX ADMIN — CEFEPIME 2 G: 2 INJECTION, POWDER, FOR SOLUTION INTRAVENOUS at 03:11

## 2020-11-09 RX ADMIN — ENOXAPARIN SODIUM 40 MG: 40 INJECTION SUBCUTANEOUS at 04:11

## 2020-11-09 RX ADMIN — FUROSEMIDE 40 MG: 10 INJECTION, SOLUTION INTRAMUSCULAR; INTRAVENOUS at 10:11

## 2020-11-09 RX ADMIN — VANCOMYCIN HYDROCHLORIDE 750 MG: 750 INJECTION, POWDER, LYOPHILIZED, FOR SOLUTION INTRAVENOUS at 10:11

## 2020-11-09 RX ADMIN — CEFEPIME 2 G: 2 INJECTION, POWDER, FOR SOLUTION INTRAVENOUS at 04:11

## 2020-11-09 RX ADMIN — Medication 10 ML: at 07:11

## 2020-11-09 RX ADMIN — OXYCODONE HYDROCHLORIDE 10 MG: 10 TABLET ORAL at 10:11

## 2020-11-09 RX ADMIN — OXYCODONE HYDROCHLORIDE 20 MG: 10 TABLET ORAL at 12:11

## 2020-11-09 RX ADMIN — MELATONIN TAB 3 MG 6 MG: 3 TAB at 09:11

## 2020-11-09 NOTE — PROGRESS NOTES
"[facility]  Adult Nutrition  Progress Note    SUMMARY       Recommendations    1. Continue Regular Diet --Order Boost Plus 2 times daily if meal intake <50%   2. If renal labs continue to uptrend, consider adding renal restrictions   3. RD to monitor and follow up    Goals: Meet % EEN, EPN by RD f/u date  Nutrition Goal Status: goal met  Communication of RD Recs: other (comment)    Reason for Assessment    Reason For Assessment: RD follow-up  Diagnosis: infection/sepsis(septic endocarditis)  Relevant Medical History: IVDU  Interdisciplinary Rounds: did not attend  General Information Comments: Pt is on a regular diet now and eating 100% of her meals. Pt reports no n/v/d/c. Pt was not ablet to recall her UBW. NFPE not warranted at this time.  Nutrition Discharge Planning: Regular Diet with adeqaute PO intake    Nutrition Risk Screen    Nutrition Risk Screen: no indicators present    Nutrition/Diet History    Spiritual, Cultural Beliefs, Yazidi Practices, Values that Affect Care: no  Factors Affecting Nutritional Intake: None identified at this time    Anthropometrics    Temp: 97.8 °F (36.6 °C)  Height: 5' 2" (157.5 cm)  Height (inches): 62 in  Weight Method: Bed Scale  Weight: 69 kg (152 lb 1.9 oz)  Weight (lb): 152.12 lb  Ideal Body Weight (IBW), Female: 110 lb  % Ideal Body Weight, Female (lb): 106.63 %  BMI (Calculated): 27.8  BMI Grade: 18.5-24.9 - normal       Lab/Procedures/Meds    Pertinent Labs Reviewed: reviewed  Pertinent Labs Comments: Na 132, Bun 30, Alb 1.3  Pertinent Medications Reviewed: reviewed  Pertinent Medications Comments: Senna    Physical Findings/Assessment         Estimated/Assessed Needs    Weight Used For Calorie Calculations: 53.2 kg (117 lb 4.6 oz)  Energy Calorie Requirements (kcal): 1500 kcal/day  Energy Need Method: Yohana-St [place]ino(MSJ x1.25)  Protein Requirements: 53-64 gm/day(1-1.2 g/kg)  Weight Used For Protein Calculations: 53.2 kg (117 lb 4.6 " oz)  Fluid Requirements (mL): 1 mL/kcal or per MD  Estimated Fluid Requirement Method: RDA Method  RDA Method (mL): 1500  CHO Requirement: N/A      Nutrition Prescription Ordered    Current Diet Order: Regular Diet  Nutrition Order Comments: -  Current Nutrition Support Formula Ordered: Isosource 1.5  Current Nutrition Support Rate Ordered: 10 (ml)  Current Nutrition Support Frequency Ordered: mL/hr    Evaluation of Received Nutrient/Fluid Intake    Enteral Calories (kcal): 0  Enteral Protein (gm): 0  Enteral (Free Water) Fluid (mL): 0  I/O: 9.6 L since admit  Energy Calories Required: not meeting needs  Protein Required: not meeting needs  Fluid Required: other (see comments)(per MD)  Comments: LBM 11/5  Tolerance: tolerating  % Intake of Estimated Energy Needs: 75 - 100 %  % Meal Intake: 75 - 100 %    Nutrition Risk    Level of Risk/Frequency of Follow-up: low     Assessment and Plan  Nutrition Problem  Inadequate energy intake     Related to (etiology):   Inability to consume sufficient energy     Signs and Symptoms (as evidenced by):   NPO     Interventions(treatment strategy):  Collaboration of nutrition care with other providers        Nutrition Diagnosis Status:   Resolved      Monitor and Evaluation    Food and Nutrient Intake: energy intake, food and beverage intake  Food and Nutrient Adminstration: diet order  Physical Activity and Function: nutrition-related ADLs and IADLs  Anthropometric Measurements: weight, weight change  Biochemical Data, Medical Tests and Procedures: electrolyte and renal panel, gastrointestinal profile, glucose/endocrine profile, inflammatory profile, lipid profile  Nutrition-Focused Physical Findings: overall appearance     Malnutrition Assessment                                       Nutrition Follow-Up    RD Follow-up?: Yes

## 2020-11-09 NOTE — PLAN OF CARE
POC discussed with patient. Pt AAOx4. PICC ESPINOZA C/D/I x NS flushes per order. Pt up with assistance. Pt on 2L O2 NC; sats >92%. Pt c/o pain; Pain medication administered per order. +2 pitting edema BLE; BLE elevated. Abd distended. Contact Precautions maintained. Patient is free of fall/trauma/injury. Denies CP, SOB. All questions addressed. WCTM.     Pt refused cardiac monitor. MD aware. War Room aware.

## 2020-11-09 NOTE — PROGRESS NOTES
[facility]  Infectious Disease  Progress Note    Patient Name: Марина Britton  MRN: [id number]  Admission Date: 10/26/2020  Length of Stay: 14 days  Attending Physician: Alli Garcia MD  Primary Care Provider: Luis Felipe Jose Iii, MD    Isolation Status: Contact  Assessment/Plan:      * Endocarditis of tricuspid valve     29 y/o female with history of IVDU and HCV transferred from OSH  for management of septic shock secondary to MRSA bacteremia, tricuspid valve endocarditis with associated pulmonary septic emboli. CTS has evaluated the patient and does not plan for acute surgical intervention. Course complicated by respiratory failure requiring intubation and pressor support. Blood cultures remained positive from 10/24 - 10/28. She has been and is currently on Vancomycin. Repeat blood cx of 10/31 and 11/1 are NGTD. Extubated to NC 11/2, fevers had resolved and she was stepped down to the floor. Plan was to continue Vancomycin x 6 weeks. ID re-consulted on 11/6 as patient developed recurrent fevers on 11/4 with hypoxia. She reports chest pain, SOB, nausea, feeling poorly. Denies recent visitors or issues/tampering with her PICC line.    Cefepime added empirically on 11/6. Repeat CT C/A revealed continued evolution of previously demonstrated lung disease, noting continued extensive bilateral opacities, some of which demonstrate central cavitation, small bilateral pleural effusions, significant diffuse body wall anasarca, new small pericardial effusion. Repeat blood cx remain sterile. Leukocytosis resolved. Now afebrile over last 24 hours. O2 sats stable. Moving slighlty more air on R lung, Kidney function improving.      Recommendations  - Continue IV Vancomycin (renal dosing). Goal trough 15-20  - Continue Cefepime 2 g IV q 12 hours for a total of 5 days  - Monitor closely for any decompensation  - ID will follow with final recs likely tomorrow.              Please call for any questions.  "Thank you.  Evelyne Brand PA-C  Phone: [phone]  Pager: [phone]      Subjective:     Principal Problem:Endocarditis of tricuspid valve    HPI: History obtained per chart review. Pt critically ill. Intubated, sedated.       30F with PMHx IVUD heroin (last use approx 2wk ago), Hepatitis C presents as transfer from OSH for septic endocarditis with b/l PE requiring higher level of care.  Per patient and chart review, she has had progressively worsening chest and back pain over the past several days with a Tmax of 105F at home.  She presented to [facility] and was admitted for sepsis on 10/24.  Bcx showed +MRSA and found to have large vegetation on tricuspid valve on TTE. Noted to have septic emobli on imaging studies. She was started on empiric vancomycin and zosyn. She was stepped up to the ICU on 10/25 for worsening tachypnea.  She also received 2U pRBC x2 for anemia with a pavel Hb of 6.5.  She was transferred to [facility] 10/26 for a higher level of care.    Per chart review. She has been seen in ED for fatigue and wound of her left foot. Per nursing note," Pt shows me a wound on her left foot/ankle, states it has been there over 1 month.  Pt states it started off as an are where she injected heroin, then turned in to a blister, abscess.  She states she was recently seen in [place] ED and placed on antibiotics.  Pt has not followed up with anyone regarding this wound. Spoke with ER MD Dr. Linares, pt does not tomas to be started on any antibiotics at this time, pt needs referral to wound care.  This was explained to pt who stated "ok."   she has been on different abx including bactrim and clindamycin without improvement. No follow up.   Interval History: No AEON.  Afebrile over last 24 hours. WBC 11K. CO Chest pain with inspiration - no change. Coughing up clear sputum.    Review of Systems   Constitutional: Negative for activity change, chills, diaphoresis and fever.   Respiratory: Positive for chest " tightness and shortness of breath. Negative for cough and wheezing.    Cardiovascular: Negative for chest pain.   Gastrointestinal: Negative for abdominal pain, constipation, diarrhea, nausea and vomiting.   Genitourinary: Negative for dysuria, frequency and urgency.   Neurological: Negative for dizziness.   Hematological: Does not bruise/bleed easily.     Objective:     Vital Signs (Most Recent):  Temp: 97.8 °F (36.6 °C) (11/09/20 1141)  Pulse: 106 (11/09/20 1418)  Resp: 17 (11/09/20 1240)  BP: 125/88 (11/09/20 1418)  SpO2: 95 % (11/09/20 1141) Vital Signs (24h Range):  Temp:  [97.8 °F (36.6 °C)-99.9 °F (37.7 °C)] 97.8 °F (36.6 °C)  Pulse:  [100-113] 106  Resp:  [16-20] 17  SpO2:  [94 %-100 %] 95 %  BP: (125-160)/() 125/88     Weight: 68.9 kg (152 lb)  Body mass index is 27.8 kg/m².    Estimated Creatinine Clearance: 52.9 mL/min (based on SCr of 1.4 mg/dL).    Physical Exam  Vitals signs and nursing note reviewed.   Constitutional:       General: She is not in acute distress.     Appearance: She is ill-appearing.       HENT:      Head: Normocephalic and atraumatic.      Nose: No congestion.   Eyes:      General: No scleral icterus.     Conjunctiva/sclera: Conjunctivae normal.   Cardiovascular:      Rate and Rhythm: Normal rate and regular rhythm.      Heart sounds: Murmur present.   Pulmonary:      Effort: No tachypnea or respiratory distress.      Breath sounds: No stridor. Examination of the right-upper field reveals decreased breath sounds and rales. Examination of the left-upper field reveals rales. Examination of the right-middle field reveals decreased breath sounds and rales. Examination of the left-middle field reveals rales. Examination of the right-lower field reveals decreased breath sounds. Examination of the left-lower field reveals rales. Decreased breath sounds (slightly improved) and rales present. No wheezing or rhonchi.      Comments: O2 via NC  Abdominal:      General: Abdomen is flat.  There is no distension.      Palpations: Abdomen is soft. There is no mass.   Musculoskeletal:      Right lower leg: Edema present.      Left lower leg: Edema present.   Skin:     General: Skin is warm and dry.      Comments: Janeway lesions on digits  Wound on ankle noted  Tattoos    RUE PICC Line c/d/i.   Neurological:      Mental Status: She is alert.   Psychiatric:         Mood and Affect: Mood normal.         Behavior: Behavior normal.         Thought Content: Thought content normal.         Significant Labs:   Blood Culture:   Recent Labs   Lab 11/04/20  0442 11/05/20  0307 11/06/20  0742 11/06/20  0745 11/08/20  0642   LABBLOO No growth after 5 days.  No growth after 5 days. No Growth to date  No Growth to date  No Growth to date  No Growth to date  No Growth to date  No Growth to date  No Growth to date  No Growth to date  No Growth to date  No Growth to date No Growth to date  No Growth to date  No Growth to date  No Growth to date No Growth to date  No Growth to date  No Growth to date  No Growth to date No Growth to date  No Growth to date  No Growth to date  No Growth to date     CBC:   Recent Labs   Lab 11/08/20  0642 11/09/20  0448   WBC 12.23 11.37   HGB 7.4* 7.4*   HCT 24.6* 24.3*    262     CMP:   Recent Labs   Lab 11/08/20  0642 11/09/20  0448   * 132*   K 4.6 5.0    107   CO2 19* 21*    80   BUN 35* 30*   CREATININE 1.7* 1.4   CALCIUM 7.2* 7.5*   PROT 5.8* 5.8*   ALBUMIN 1.3* 1.3*   BILITOT 0.4 0.4   ALKPHOS 122 105   AST 16 17   ALT 8* 8*   ANIONGAP 6* 4*   EGFRNONAA 39.6* 50.1*     Wound Culture: No results for input(s): LABAERO in the last 4320 hours.  All pertinent labs within the past 24 hours have been reviewed.    Significant Imaging: I have reviewed all pertinent imaging results/findings within the past 24 hours.   CT Chest Abdomen Pelvis Without Contrast (XPD) [[id number]] Resulted: 11/07/20 2248   Order Status: Completed Updated: 11/07/20 2250    Narrative:     EXAMINATION:   CT CHEST ABDOMEN PELVIS WITHOUT CONTRAST(XPD)     CLINICAL HISTORY:   fever/sepsis, known abscesses, renal function prohibitive of IV contrast which would be preferred;     TECHNIQUE:   Low dose axial images, sagittal and coronal reformations were obtained from the thoracic inlet to the pubic symphysis without IV contrast.  .  Oral contrast was not administered.     COMPARISON:   CTA chest 10/24/2020     FINDINGS:   Examination of the vascular and soft tissue structures at the base of the neck is unremarkable.  There is a right-sided PICC line with tip terminating in the right atrium.     The thoracic aorta maintains normal caliber, contour, and course without significant atherosclerotic calcification within its course.  The heart is not enlarged.  There is a small pericardial effusion present, new from prior exam.     The esophagus maintains a normal course and caliber. No new bulky mediastinal or axillary lymph node enlargement appreciated allowing for limitations of noncontrast technique.     There appear to be a few secretions noted within the distal trachea.  Proximal airways are patent.  There is continued temporal evolution of previously demonstrated extensive bilateral opacities, some of which demonstrate central cavitation.  Scattered nodular opacities appear somewhat more consolidative compared to prior study of 10/24/2020.  There are small bilateral pleural effusions, mildly increased in volume compared to prior examination with associated lower lobe atelectasis and/or consolidation.     The liver is enlarged.  There is a subcentimeter left hepatic lobe hypodensity which is too small to accurately characterize.  Gallbladder is poorly visualized, although there is suggestion of circumferential gallbladder wall thickening.  No radiopaque calculi appreciated.  There is no intra-or extrahepatic biliary ductal dilatation.     The stomach, pancreas, and adrenal glands appear  within normal limits.  The spleen is enlarged measuring 13.9 cm.     Kidneys are normal in size and location.  No evidence of nephrolithiasis or hydronephrosis.  The urinary bladder demonstrates no significant abnormality. The uterus is unremarkable.     The abdominal aorta is normal in course and caliber without significant atherosclerotic calcifications.     There is significant volume retained stool throughout the colon suggesting constipation.  The appendix appears to be surgically absent.  There is moderate volume of peritoneal fluid throughout the abdomen and pelvis.  No evidence of free intraperitoneal air or portal venous gas.     Osseous structures demonstrate no acute abnormalities.  There is significant diffuse body wall anasarca present.  There are scattered foci of air within the anterior abdominal wall subcutaneous soft tissues, presumably relating to recent injection sites.    Impression:       1.  Continued temporal evolution of previously demonstrated lung disease presumably relating to septic emboli, noting continued extensive bilateral opacities, some of which demonstrate central cavitation.  Findings appear relatively similar compared to prior examination with some interval coalescence of previously demonstrated more scattered nodular opacities.     2.  Small bilateral pleural effusions, increased in volume from prior examination, with associated lower lobe atelectasis and/or consolidation.     3.  Significant diffuse body wall anasarca.  Moderate volume of peritoneal fluid throughout the abdomen and pelvis.     4.  Small pericardial effusion, new from prior examination.     5.  Mild gallbladder wall thickening which is a nonspecific finding, although may relate to hepatic dysfunction or volume status given peritoneal fluid and anasarca.  No evidence of cholelithiasis.  Clinical correlation advised.     6.  Hepatosplenomegaly.  Subcentimeter left hepatic lobe hypodensity, too small to accurately  characterize.     7.  Moderate volume retained stool throughout the colon suggesting constipation.     8.  Additional findings as above.       Electronically signed by: Frandy Fan MD   Date: 11/07/2020   Time: 22:48   X-Ray Chest PA And Lateral [[id number]] Resulted: 11/06/20 1000   Order Status: Completed Updated: 11/06/20 1002   Narrative:     EXAMINATION:   XR CHEST PA AND LATERAL     CLINICAL HISTORY:   fever, known septic emboli;     TECHNIQUE:   PA and lateral views of the chest were performed.     COMPARISON:   Chest radiograph: 11/05/2020, 06:17 hours.  11/03/2020.  10/29/2020.     Chest CTA: 10/24/2020.     FINDINGS:   PICC line placed via the right upper extremity has its tip overlying the mediastinum near the junction of SVC and right atrium.     Multifocal patchy subsegmental opacities persist throughout both lungs.  I suspect dependent pleural fluid is well.  The extent of disease is no better than on yesterday's study of 06/17 hours.  I note the clinical history of septic emboli.     I detect no pneumothorax, pneumomediastinum, pneumoperitoneum or significant osseous abnormality.    Impression:       No significant change compared to yesterday's study in this patient with bilateral pulmonary parenchymal disease consistent with septic emboli.       Electronically signed by: Anastasiia Leo MD   Date: 11/06/2020   Time: 10:00   US Retroperitoneal Complete (Kidney and [[id number]] Resulted: 11/05/20 1008   Order Status: Completed Updated: 11/05/20 1010   Narrative:     EXAMINATION:   US RETROPERITONEAL COMPLETE     CLINICAL HISTORY:   DERICK;     TECHNIQUE:   Ultrasound of the kidneys and urinary bladder was performed including color flow and Doppler evaluation of the kidneys.     COMPARISON:   Retroperitoneal ultrasound 10/29/2020.     FINDINGS:   Right kidney: The right kidney measures 12.5 cm in length.  No cortical thinning. No loss of corticomedullary distinction.  Perfusion is normal.  Resistive  index measures 0.70.  No mass. There is a 3 mm hyperechoic focus with twinkling artifact, which may represent nonobstructing stone within the mid right kidney.  No hydronephrosis.     Left kidney: The left kidney measures 13.8 cm in length.  No cortical thinning. No loss of corticomedullary distinction.  Perfusion is normal.  Resistive index measures 0.71.  No mass. No renal stone. No hydronephrosis.     The bladder is partially distended at the time of scanning and has an unremarkable appearance.     Miscellaneous: Partially visualized left pleural effusion    Impression:       3 mm right nonobstructing nephrolithiasis.     Partially visualized left pleural effusion.     Electronically signed by resident: Sophia Trent   Date: 11/05/2020   Time: 09:35     Electronically signed by: James Rousseau MD   Date: 11/05/2020   Time: 10:08   X-Ray Chest 1 View [[id number]] Resulted: 11/05/20 0740   Order Status: Completed Updated: 11/05/20 0743   Narrative:     EXAMINATION:   XR CHEST 1 VIEW     CLINICAL HISTORY:   Pneumonia;     TECHNIQUE:   Single frontal view of the chest was performed.     COMPARISON:   11/03/2020.     FINDINGS:   There is a right upper extremity PICC line with tip near the junction of the superior vena cava and right atrium.  The right internal jugular central venous catheter has been removed.     Heart and mediastinal structures are unchanged.  There continues to be patchy bilateral pulmonary consolidation within both lungs predominantly within the mid to lower lungs.  Findings are similar to the prior examination.  The hemidiaphragms and costophrenic angles are poorly delineated and small layering pleural effusions may be present.  There is no evidence for pneumothorax.  Bony structures are grossly intact.    Impression:       Right upper extremity PICC line with tip near the junction of the superior vena cava and right atrium.  Right IJ line has been removed.     Persistent patchy bilateral pulmonary  opacities predominantly within the mid to lower lungs with little interval change when compared to 11/03/2020.     Poor delineation of the hemidiaphragms and costophrenic angle suggesting small layering bilateral pleural effusions.       Electronically signed by: James Rousseau MD   Date: 11/05/2020   Time: 07:40   X-Ray Chest 1 View for PICC_Central line [[id number]] Resulted: 11/03/20 1349   Order Status: Completed Updated: 11/03/20 1351   Narrative:     EXAMINATION:   XR CHEST 1 VIEW     CLINICAL HISTORY:   Evaluate PICC line placement;     TECHNIQUE:   Single frontal view of the chest was performed.     COMPARISON:   11/01/2020.  10/29/2020.  10/27/2020.     FINDINGS:   PICC line placed via the right upper extremity has its tip projected over the mediastinum near the junction of SVC and right atrium.  Central venous catheter placed via the right neck has its tip in its usual location, also near the junction of SVC and right atrium.     Interval extubation and removal of NG tube.  Extrinsic devices overlie the image.     Multifocal patchy subsegmental opacities persist in both lungs with peripheral predominance.  Overall pulmonary parenchymal aeration has improved since 10/27/2020.  No new pulmonary disease detected.    Impression:       Please see above.       Electronically signed by: Anastasiia Leo MD   Date: 11/03/2020   Time: 13:49   Imaging History    2020  Date Procedure Name Status Accession Number Location   11/07/20 06:38 PM CT Chest Abdomen Pelvis Without Contrast (XPD) Final [id number] [facility]   11/06/20 09:49 AM X-Ray Chest PA And Lateral Final [id number] [facility]   11/05/20 09:32 AM US Retroperitoneal Complete (Kidney and Final [id number] [facility]   11/05/20 06:17 AM X-Ray Chest 1 View Final [id number] [facility]   11/03/20 01:35 PM X-Ray Chest 1 View for PICC_Central line Final [id number] [facility]   11/01/20 02:32 PM X-Ray Chest AP Portable Final [id number] [facility]   10/29/20 06:17 PM US Retroperitoneal Complete (Kidney and  Final [id number] [facility]   10/29/20 12:37 PM X-Ray Chest AP Portable Final [id number] [facility]   10/29/20 05:49 AM X-Ray Abdomen AP 1 View Final [id number] [facility]   10/27/20 03:05 AM CT Head Without Contrast Final [id number] [facility]   10/27/20 01:32 AM X-Ray Chest AP Portable Final [id number] [facility]   10/26/20 11:18 PM X-Ray Chest AP Portable Final [id number] [facility]   10/26/20 09:57 PM X-ray chest AP portable Final [id number] [facility]   10/24/20 08:22 PM CTA Chest Non-Coronary Final [id number] [facility]   10/24/20 01:55 AM X-Ray Foot Complete Left Final [id number] [facility]   10/24/20 01:54 AM X-Ray Chest 1 View Final [id number] [facility]   10/24/20 12:00 AM CARDIAC MONITORING STRIPS Final     10/26/20 10:54 AM Echo Color Flow Doppler? Yes Final [id number] [facility]

## 2020-11-09 NOTE — PLAN OF CARE
11/09/20 0840   Post-Acute Status   Post-Acute Authorization Placement   Post-Acute Placement Status Awaiting Internal Medical Clearance

## 2020-11-09 NOTE — PLAN OF CARE
Continue POC    Problem: Occupational Therapy Goal  Goal: Occupational Therapy Goal  Description: Goals to be met by: 11/18    Patient will increase functional independence with ADLs by performing:    UE Dressing with Set-up Assistance.  LE Dressing with Set-up Assistance. Goal met for socks.  Grooming while standing at sink with Supervision. Goal met.  Toileting from toilet with Modified [place] for hygiene and clothing management.   Toilet transfer to toilet with Modified [place].    Outcome: Ongoing, Progressing    SOFIA Moulton  11/9/2020  Student OT

## 2020-11-09 NOTE — PT/OT/SLP PROGRESS
Occupational Therapy   Treatment    Name: Марина Britton  MRN: [id number]  Admitting Diagnosis:  Endocarditis of tricuspid valve       Recommendations:     Discharge Recommendations: rehabilitation facility  Discharge Equipment Recommendations:  bedside commode, walker, rolling, other (see comments)(TBD at next level of care)  Barriers to discharge:  None    Assessment:     Марина Britton is a 30 y.o. female with a medical diagnosis of Endocarditis of tricuspid valve.  She presents with a decline in functional mobility and self-care management secondary to edema, generalized weakness, and a decline in activity tolerance. Performance deficits affecting function are weakness, impaired endurance, impaired self care skills, impaired functional mobilty, impaired balance, gait instability, decreased upper extremity function, decreased lower extremity function, decreased ROM.  Pt demonstrated good potential and progression requiring SBA - Set up assistance for bed mobility, functional mobility, and ADLs this session. Overall edema limits pt's ROM and functional mobility as well during ADL. Pt would benefit from Rehab recommendation to address generalized weakness, functional mobility deficits, and increase participation and success with ADLs.     Rehab Prognosis:  Good; patient would benefit from acute skilled OT services to address these deficits and reach maximum level of function.       Plan:     Patient to be seen 4 x/week to address the above listed problems via self-care/home management, therapeutic activities, therapeutic exercises, neuromuscular re-education  · Plan of Care Expires: 12/04/20  · Plan of Care Reviewed with: patient, mother    Subjective     Pain/Comfort:  · Pain Rating 1: 0/10  · Pain Rating Post-Intervention 1: 0/10    Objective:     Communicated with: RN prior to session.  Patient found supine with PICC line upon OT entry to room.    General Precautions: Standard, fall, contact   Orthopedic  Precautions:N/A   Braces: N/A     Occupational Performance:     Bed Mobility:    · Patient completed Supine to Sit with stand by assistance  · Patient completed Sit to Supine with stand by assistance     Functional Mobility/Transfers:  · Patient completed Sit <> Stand Transfer with stand by assistance  with  no assistive device   · Patient completed Toilet Transfer Step Transfer technique with stand by assistance with  rolling walker. Pt deferred toilet transfer secondary to low height and expressing difficulty as she has done it before.  · Functional Mobility: Pt ambulated to bathroom with RW, to the BSC, and then back to the bed with SBA for safety. Pt demonstrated increased weight shift onto L LE during ambulation because pt expressed more weakness in the R LE.     Activities of Daily Living:  · Grooming: supervision for oral hygiene at sink standing.  · Bathing: stand by assistance for bathing with wipes EOB.  · Upper Body Dressing: set up assistance to doff/rios gown seated EOB. Pt need assistance to tie gown in back.  · Lower Body Dressing: set up assistance to doff/rios socks seated EOB.      [facility] 6 Click ADL: 22    Treatment & Education:  Pt educated on role of OT in acute care setting.   Assisted with ADLs and functional mobility with assist levels noted above   Pt educated on benefits of OOB activity.   Pt and parent discussion on POC.    Patient left supine with call button in reach and mother presentEducation:      GOALS:   Multidisciplinary Problems     Occupational Therapy Goals        Problem: Occupational Therapy Goal    Goal Priority Disciplines Outcome Interventions   Occupational Therapy Goal     OT, PT/OT Ongoing, Progressing    Description: Goals to be met by: 11/18    Patient will increase functional independence with ADLs by performing:    UE Dressing with Set-up Assistance.  LE Dressing with Set-up Assistance. Goal met for socks.  Grooming while standing at sink with Supervision. Goal  met.  Toileting from toilet with Modified [place] for hygiene and clothing management.   Toilet transfer to toilet with Modified [place].                     Time Tracking:     OT Date of Treatment: 11/09/20  OT Start Time: 1516  OT Stop Time: 1549  OT Total Time (min): 33 min    Billable Minutes:Self Care/Home Management 33    SOFIA Moulton  11/9/2020

## 2020-11-09 NOTE — ASSESSMENT & PLAN NOTE
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 CTS has evaluated the patient and does not plan for acute surgical intervention. Course complicated by respiratory failure requiring intubation and pressor support. Blood cultures remained positive from 10/24 - 10/28. She has been and is currently on Vancomycin. Repeat blood cx of 10/31 and 11/1 are NGTD. Extubated to NC 11/2, fevers had resolved and she was stepped down to the floor. Plan was to continue Vancomycin x 6 weeks. ID re-consulted on 11/6 as patient developed recurrent fevers on 11/4 with hypoxia. She reports chest pain, SOB, nausea, feeling poorly. Denies recent visitors or issues/tampering with her PICC line.    11/7 - spiking fever , WBC normalized, BCXs/UCXs remain NGTD, R lung with significant decreased BS, appears unwell and renal function has worsened.  .  No rash  11/8 - fever curve may be improving, BCXS NGTD, CT chest w/out appears stable (new small pericardial effusion), O2 Sat stable, 2D echo not yet done, moving slighlty more air on R lung, Cr down 0.1 pt, wirght stain negative      Recommendations  - Continue IV Vancomycin (renal dosing). Goal trough 15-20 and Cefepime 2 g IV q 12 hours empirically   - Follow blood/urine cultures  - Rec repeat 2D echo to assess for cardiac abscess  - ID will follow.

## 2020-11-09 NOTE — SUBJECTIVE & OBJECTIVE
Interval History: No AEON.  Afebrile over last 24 hours. WBC 11K. CO Chest pain with inspiration - no change. Coughing up clear sputum.    Review of Systems   Constitutional: Negative for activity change, chills, diaphoresis and fever.   Respiratory: Positive for chest tightness and shortness of breath. Negative for cough and wheezing.    Cardiovascular: Negative for chest pain.   Gastrointestinal: Negative for abdominal pain, constipation, diarrhea, nausea and vomiting.   Genitourinary: Negative for dysuria, frequency and urgency.   Neurological: Negative for dizziness.   Hematological: Does not bruise/bleed easily.     Objective:     Vital Signs (Most Recent):  Temp: 97.8 °F (36.6 °C) (11/09/20 1141)  Pulse: 106 (11/09/20 1418)  Resp: 17 (11/09/20 1240)  BP: 125/88 (11/09/20 1418)  SpO2: 95 % (11/09/20 1141) Vital Signs (24h Range):  Temp:  [97.8 °F (36.6 °C)-99.9 °F (37.7 °C)] 97.8 °F (36.6 °C)  Pulse:  [100-113] 106  Resp:  [16-20] 17  SpO2:  [94 %-100 %] 95 %  BP: (125-160)/() 125/88     Weight: 68.9 kg (152 lb)  Body mass index is 27.8 kg/m².    Estimated Creatinine Clearance: 52.9 mL/min (based on SCr of 1.4 mg/dL).    Physical Exam  Vitals signs and nursing note reviewed.   Constitutional:       General: She is not in acute distress.     Appearance: She is ill-appearing.       HENT:      Head: Normocephalic and atraumatic.      Nose: No congestion.   Eyes:      General: No scleral icterus.     Conjunctiva/sclera: Conjunctivae normal.   Cardiovascular:      Rate and Rhythm: Normal rate and regular rhythm.      Heart sounds: Murmur present.   Pulmonary:      Effort: No tachypnea or respiratory distress.      Breath sounds: No stridor. Examination of the right-upper field reveals decreased breath sounds and rales. Examination of the left-upper field reveals rales. Examination of the right-middle field reveals decreased breath sounds and rales. Examination of the left-middle field reveals rales.  Examination of the right-lower field reveals decreased breath sounds. Examination of the left-lower field reveals rales. Decreased breath sounds (slightly improved) and rales present. No wheezing or rhonchi.      Comments: O2 via NC  Abdominal:      General: Abdomen is flat. There is no distension.      Palpations: Abdomen is soft. There is no mass.   Musculoskeletal:      Right lower leg: Edema present.      Left lower leg: Edema present.   Skin:     General: Skin is warm and dry.      Comments: Janeway lesions on digits  Wound on ankle noted  Tattoos    RUE PICC Line c/d/i.   Neurological:      Mental Status: She is alert.   Psychiatric:         Mood and Affect: Mood normal.         Behavior: Behavior normal.         Thought Content: Thought content normal.         Significant Labs:   Blood Culture:   Recent Labs   Lab 11/04/20  0442 11/05/20  0307 11/06/20  0742 11/06/20  0745 11/08/20  0642   LABBLOO No growth after 5 days.  No growth after 5 days. No Growth to date  No Growth to date  No Growth to date  No Growth to date  No Growth to date  No Growth to date  No Growth to date  No Growth to date  No Growth to date  No Growth to date No Growth to date  No Growth to date  No Growth to date  No Growth to date No Growth to date  No Growth to date  No Growth to date  No Growth to date No Growth to date  No Growth to date  No Growth to date  No Growth to date     CBC:   Recent Labs   Lab 11/08/20  0642 11/09/20  0448   WBC 12.23 11.37   HGB 7.4* 7.4*   HCT 24.6* 24.3*    262     CMP:   Recent Labs   Lab 11/08/20  0642 11/09/20  0448   * 132*   K 4.6 5.0    107   CO2 19* 21*    80   BUN 35* 30*   CREATININE 1.7* 1.4   CALCIUM 7.2* 7.5*   PROT 5.8* 5.8*   ALBUMIN 1.3* 1.3*   BILITOT 0.4 0.4   ALKPHOS 122 105   AST 16 17   ALT 8* 8*   ANIONGAP 6* 4*   EGFRNONAA 39.6* 50.1*     Wound Culture: No results for input(s): LABAERO in the last 4320 hours.  All pertinent labs within  the past 24 hours have been reviewed.    Significant Imaging: I have reviewed all pertinent imaging results/findings within the past 24 hours.   CT Chest Abdomen Pelvis Without Contrast (XPD) [[id number]] Resulted: 11/07/20 2248   Order Status: Completed Updated: 11/07/20 2250   Narrative:     EXAMINATION:   CT CHEST ABDOMEN PELVIS WITHOUT CONTRAST(XPD)     CLINICAL HISTORY:   fever/sepsis, known abscesses, renal function prohibitive of IV contrast which would be preferred;     TECHNIQUE:   Low dose axial images, sagittal and coronal reformations were obtained from the thoracic inlet to the pubic symphysis without IV contrast.  .  Oral contrast was not administered.     COMPARISON:   CTA chest 10/24/2020     FINDINGS:   Examination of the vascular and soft tissue structures at the base of the neck is unremarkable.  There is a right-sided PICC line with tip terminating in the right atrium.     The thoracic aorta maintains normal caliber, contour, and course without significant atherosclerotic calcification within its course.  The heart is not enlarged.  There is a small pericardial effusion present, new from prior exam.     The esophagus maintains a normal course and caliber. No new bulky mediastinal or axillary lymph node enlargement appreciated allowing for limitations of noncontrast technique.     There appear to be a few secretions noted within the distal trachea.  Proximal airways are patent.  There is continued temporal evolution of previously demonstrated extensive bilateral opacities, some of which demonstrate central cavitation.  Scattered nodular opacities appear somewhat more consolidative compared to prior study of 10/24/2020.  There are small bilateral pleural effusions, mildly increased in volume compared to prior examination with associated lower lobe atelectasis and/or consolidation.     The liver is enlarged.  There is a subcentimeter left hepatic lobe hypodensity which is too small to accurately  characterize.  Gallbladder is poorly visualized, although there is suggestion of circumferential gallbladder wall thickening.  No radiopaque calculi appreciated.  There is no intra-or extrahepatic biliary ductal dilatation.     The stomach, pancreas, and adrenal glands appear within normal limits.  The spleen is enlarged measuring 13.9 cm.     Kidneys are normal in size and location.  No evidence of nephrolithiasis or hydronephrosis.  The urinary bladder demonstrates no significant abnormality. The uterus is unremarkable.     The abdominal aorta is normal in course and caliber without significant atherosclerotic calcifications.     There is significant volume retained stool throughout the colon suggesting constipation.  The appendix appears to be surgically absent.  There is moderate volume of peritoneal fluid throughout the abdomen and pelvis.  No evidence of free intraperitoneal air or portal venous gas.     Osseous structures demonstrate no acute abnormalities.  There is significant diffuse body wall anasarca present.  There are scattered foci of air within the anterior abdominal wall subcutaneous soft tissues, presumably relating to recent injection sites.    Impression:       1.  Continued temporal evolution of previously demonstrated lung disease presumably relating to septic emboli, noting continued extensive bilateral opacities, some of which demonstrate central cavitation.  Findings appear relatively similar compared to prior examination with some interval coalescence of previously demonstrated more scattered nodular opacities.     2.  Small bilateral pleural effusions, increased in volume from prior examination, with associated lower lobe atelectasis and/or consolidation.     3.  Significant diffuse body wall anasarca.  Moderate volume of peritoneal fluid throughout the abdomen and pelvis.     4.  Small pericardial effusion, new from prior examination.     5.  Mild gallbladder wall thickening which is a  nonspecific finding, although may relate to hepatic dysfunction or volume status given peritoneal fluid and anasarca.  No evidence of cholelithiasis.  Clinical correlation advised.     6.  Hepatosplenomegaly.  Subcentimeter left hepatic lobe hypodensity, too small to accurately characterize.     7.  Moderate volume retained stool throughout the colon suggesting constipation.     8.  Additional findings as above.       Electronically signed by: Frandy Fan MD   Date: 11/07/2020   Time: 22:48   X-Ray Chest PA And Lateral [[id number]] Resulted: 11/06/20 1000   Order Status: Completed Updated: 11/06/20 1002   Narrative:     EXAMINATION:   XR CHEST PA AND LATERAL     CLINICAL HISTORY:   fever, known septic emboli;     TECHNIQUE:   PA and lateral views of the chest were performed.     COMPARISON:   Chest radiograph: 11/05/2020, 06:17 hours.  11/03/2020.  10/29/2020.     Chest CTA: 10/24/2020.     FINDINGS:   PICC line placed via the right upper extremity has its tip overlying the mediastinum near the junction of SVC and right atrium.     Multifocal patchy subsegmental opacities persist throughout both lungs.  I suspect dependent pleural fluid is well.  The extent of disease is no better than on yesterday's study of 06/17 hours.  I note the clinical history of septic emboli.     I detect no pneumothorax, pneumomediastinum, pneumoperitoneum or significant osseous abnormality.    Impression:       No significant change compared to yesterday's study in this patient with bilateral pulmonary parenchymal disease consistent with septic emboli.       Electronically signed by: Anastasiia Leo MD   Date: 11/06/2020   Time: 10:00   US Retroperitoneal Complete (Kidney and [[id number]] Resulted: 11/05/20 1008   Order Status: Completed Updated: 11/05/20 1010   Narrative:     EXAMINATION:   US RETROPERITONEAL COMPLETE     CLINICAL HISTORY:   DERICK;     TECHNIQUE:   Ultrasound of the kidneys and urinary bladder was performed including  color flow and Doppler evaluation of the kidneys.     COMPARISON:   Retroperitoneal ultrasound 10/29/2020.     FINDINGS:   Right kidney: The right kidney measures 12.5 cm in length.  No cortical thinning. No loss of corticomedullary distinction.  Perfusion is normal.  Resistive index measures 0.70.  No mass. There is a 3 mm hyperechoic focus with twinkling artifact, which may represent nonobstructing stone within the mid right kidney.  No hydronephrosis.     Left kidney: The left kidney measures 13.8 cm in length.  No cortical thinning. No loss of corticomedullary distinction.  Perfusion is normal.  Resistive index measures 0.71.  No mass. No renal stone. No hydronephrosis.     The bladder is partially distended at the time of scanning and has an unremarkable appearance.     Miscellaneous: Partially visualized left pleural effusion    Impression:       3 mm right nonobstructing nephrolithiasis.     Partially visualized left pleural effusion.     Electronically signed by resident: Sophia Trent   Date: 11/05/2020   Time: 09:35     Electronically signed by: James Rousseau MD   Date: 11/05/2020   Time: 10:08   X-Ray Chest 1 View [[id number]] Resulted: 11/05/20 0740   Order Status: Completed Updated: 11/05/20 0743   Narrative:     EXAMINATION:   XR CHEST 1 VIEW     CLINICAL HISTORY:   Pneumonia;     TECHNIQUE:   Single frontal view of the chest was performed.     COMPARISON:   11/03/2020.     FINDINGS:   There is a right upper extremity PICC line with tip near the junction of the superior vena cava and right atrium.  The right internal jugular central venous catheter has been removed.     Heart and mediastinal structures are unchanged.  There continues to be patchy bilateral pulmonary consolidation within both lungs predominantly within the mid to lower lungs.  Findings are similar to the prior examination.  The hemidiaphragms and costophrenic angles are poorly delineated and small layering pleural effusions may be  present.  There is no evidence for pneumothorax.  Bony structures are grossly intact.    Impression:       Right upper extremity PICC line with tip near the junction of the superior vena cava and right atrium.  Right IJ line has been removed.     Persistent patchy bilateral pulmonary opacities predominantly within the mid to lower lungs with little interval change when compared to 11/03/2020.     Poor delineation of the hemidiaphragms and costophrenic angle suggesting small layering bilateral pleural effusions.       Electronically signed by: James Rousseau MD   Date: 11/05/2020   Time: 07:40   X-Ray Chest 1 View for PICC_Central line [[id number]] Resulted: 11/03/20 1349   Order Status: Completed Updated: 11/03/20 1351   Narrative:     EXAMINATION:   XR CHEST 1 VIEW     CLINICAL HISTORY:   Evaluate PICC line placement;     TECHNIQUE:   Single frontal view of the chest was performed.     COMPARISON:   11/01/2020.  10/29/2020.  10/27/2020.     FINDINGS:   PICC line placed via the right upper extremity has its tip projected over the mediastinum near the junction of SVC and right atrium.  Central venous catheter placed via the right neck has its tip in its usual location, also near the junction of SVC and right atrium.     Interval extubation and removal of NG tube.  Extrinsic devices overlie the image.     Multifocal patchy subsegmental opacities persist in both lungs with peripheral predominance.  Overall pulmonary parenchymal aeration has improved since 10/27/2020.  No new pulmonary disease detected.    Impression:       Please see above.       Electronically signed by: Anastasiia Leo MD   Date: 11/03/2020   Time: 13:49   Imaging History    2020  Date Procedure Name Status Accession Number Location   11/07/20 06:38 PM CT Chest Abdomen Pelvis Without Contrast (XPD) Final [id number] [facility]   11/06/20 09:49 AM X[facility] Chest [facility] And Lateral [id number]   11/05/20 09:32 AM US Retroperitoneal Complete (Kidney and Final  [id number] [facility]   11/05/20 06:17 AM X-Ray Chest 1 View Final [id number] [facility]   11/03/20 01:35 PM X-Ray Chest 1 View for PICC_Central line Final [id number] [facility]   11/01/20 02:32 PM X-Ray Chest AP Portable Final [id number] [facility]   10/29/20 06:17 PM US Retroperitoneal Complete (Kidney and Final [id number] [facility]   10/29/20 12:37 PM X-Ray Chest AP Portable Final [id number] [facility]   10/29/20 05:49 AM X-Ray Abdomen AP 1 View Final [id number] [facility]   10/27/20 03:05 AM CT Head Without Contrast Final [id number] [facility]   10/27/20 01:32 AM X-Ray Chest AP Portable Final [id number] [facility]   10/26/20 11:18 PM X-Ray Chest AP Portable Final [id number] [facility]   10/26/20 09:57 PM X-ray chest AP portable Final [id number] [facility]   10/24/20 08:22 PM CTA Chest Non-Coronary Final [id number] [facility]   10/24/20 01:55 AM X-Ray Foot Complete Left Final [id number] [facility]   10/24/20 01:54 AM X-Ray Chest 1 View Final [id number] [facility]   10/24/20 12:00 AM CARDIAC MONITORING STRIPS Final     10/26/20 10:54 AM Echo Color Flow Doppler? Yes Final [id number] [facility]

## 2020-11-09 NOTE — SUBJECTIVE & OBJECTIVE
Interval History: No AEON.  Tmax 101, T current 98.1. WBC 11s CO Chest pain with inspiration - no change.  .  Coughing up clear sputum.    Review of Systems   Constitutional: Negative for activity change, chills, diaphoresis and fever.   Respiratory: Positive for chest tightness and shortness of breath. Negative for cough and wheezing.    Cardiovascular: Negative for chest pain.   Gastrointestinal: Negative for abdominal pain, constipation, diarrhea, nausea and vomiting.   Genitourinary: Negative for dysuria, frequency and urgency.   Neurological: Negative for dizziness.   Hematological: Does not bruise/bleed easily.     Objective:     Vital Signs (Most Recent):  Temp: 98.1 °F (36.7 °C) (11/08/20 1646)  Pulse: 101 (11/08/20 1803)  Resp: 20 (11/08/20 1806)  BP: (!) 146/100 (11/08/20 1803)  SpO2: 100 % (11/08/20 1646) Vital Signs (24h Range):  Temp:  [97.9 °F (36.6 °C)-101 °F (38.3 °C)] 98.1 °F (36.7 °C)  Pulse:  [] 101  Resp:  [18-20] 20  SpO2:  [98 %-100 %] 100 %  BP: (121-160)/() 146/100     Weight: 69 kg (152 lb 1.9 oz)  Body mass index is 27.82 kg/m².    Estimated Creatinine Clearance: 43.7 mL/min (A) (based on SCr of 1.7 mg/dL (H)).    Physical Exam  Vitals signs and nursing note reviewed.   Constitutional:       General: She is not in acute distress.     Appearance: She is ill-appearing.       HENT:      Head: Normocephalic and atraumatic.      Nose: No congestion.   Eyes:      General: No scleral icterus.     Conjunctiva/sclera: Conjunctivae normal.   Cardiovascular:      Rate and Rhythm: Normal rate and regular rhythm.      Heart sounds: Murmur present.   Pulmonary:      Effort: No tachypnea or respiratory distress.      Breath sounds: No stridor. Examination of the right-upper field reveals decreased breath sounds and rales. Examination of the left-upper field reveals rales. Examination of the right-middle field reveals decreased breath sounds and rales. Examination of the left-middle  field reveals rales. Examination of the right-lower field reveals decreased breath sounds. Examination of the left-lower field reveals rales. Decreased breath sounds (slightly improved) and rales present. No wheezing or rhonchi.      Comments: O2 via NC  Abdominal:      General: Abdomen is flat. There is no distension.      Palpations: Abdomen is soft. There is no mass.   Musculoskeletal:      Right lower leg: Edema present.      Left lower leg: Edema present.   Skin:     General: Skin is warm and dry.      Comments: Janeway lesions on digits  Wound on ankle noted  Tattoos    RUE PICC Line c/d/i.   Neurological:      Mental Status: She is alert.   Psychiatric:         Mood and Affect: Mood normal.         Behavior: Behavior normal.         Thought Content: Thought content normal.         Significant Labs:   Blood Culture:   Recent Labs   Lab 11/04/20  0442 11/05/20  0307 11/06/20  0742 11/06/20  0745 11/08/20  0642   LABBLOO No Growth to date  No Growth to date  No Growth to date  No Growth to date  No Growth to date  No Growth to date  No Growth to date  No Growth to date  No Growth to date  No Growth to date No Growth to date  No Growth to date  No Growth to date  No Growth to date  No Growth to date  No Growth to date  No Growth to date  No Growth to date No Growth to date  No Growth to date  No Growth to date No Growth to date  No Growth to date  No Growth to date No Growth to date  No Growth to date     CBC:   Recent Labs   Lab 11/07/20  0542 11/08/20  0642   WBC 11.78 12.23   HGB 7.6* 7.4*   HCT 24.6* 24.6*    282     CMP:   Recent Labs   Lab 11/07/20  0542 11/08/20  0642   * 130*   K 4.6 4.6    105   CO2 19* 19*   * 100   BUN 38* 35*   CREATININE 1.8* 1.7*   CALCIUM 7.1* 7.2*   PROT 5.6* 5.8*   ALBUMIN 1.3* 1.3*   BILITOT 0.4 0.4   ALKPHOS 119 122   AST 17 16   ALT 9* 8*   ANIONGAP 7* 6*   EGFRNONAA 37.0* 39.6*     Wound Culture: No results for input(s): LABAERO  in the last 4320 hours.  All pertinent labs within the past 24 hours have been reviewed.    Significant Imaging: I have reviewed all pertinent imaging results/findings within the past 24 hours.   CT Chest Abdomen Pelvis Without Contrast (XPD) [[id number]] Resulted: 11/07/20 2248   Order Status: Completed Updated: 11/07/20 2250   Narrative:     EXAMINATION:   CT CHEST ABDOMEN PELVIS WITHOUT CONTRAST(XPD)     CLINICAL HISTORY:   fever/sepsis, known abscesses, renal function prohibitive of IV contrast which would be preferred;     TECHNIQUE:   Low dose axial images, sagittal and coronal reformations were obtained from the thoracic inlet to the pubic symphysis without IV contrast.  .  Oral contrast was not administered.     COMPARISON:   CTA chest 10/24/2020     FINDINGS:   Examination of the vascular and soft tissue structures at the base of the neck is unremarkable.  There is a right-sided PICC line with tip terminating in the right atrium.     The thoracic aorta maintains normal caliber, contour, and course without significant atherosclerotic calcification within its course.  The heart is not enlarged.  There is a small pericardial effusion present, new from prior exam.     The esophagus maintains a normal course and caliber. No new bulky mediastinal or axillary lymph node enlargement appreciated allowing for limitations of noncontrast technique.     There appear to be a few secretions noted within the distal trachea.  Proximal airways are patent.  There is continued temporal evolution of previously demonstrated extensive bilateral opacities, some of which demonstrate central cavitation.  Scattered nodular opacities appear somewhat more consolidative compared to prior study of 10/24/2020.  There are small bilateral pleural effusions, mildly increased in volume compared to prior examination with associated lower lobe atelectasis and/or consolidation.     The liver is enlarged.  There is a subcentimeter left hepatic  lobe hypodensity which is too small to accurately characterize.  Gallbladder is poorly visualized, although there is suggestion of circumferential gallbladder wall thickening.  No radiopaque calculi appreciated.  There is no intra-or extrahepatic biliary ductal dilatation.     The stomach, pancreas, and adrenal glands appear within normal limits.  The spleen is enlarged measuring 13.9 cm.     Kidneys are normal in size and location.  No evidence of nephrolithiasis or hydronephrosis.  The urinary bladder demonstrates no significant abnormality. The uterus is unremarkable.     The abdominal aorta is normal in course and caliber without significant atherosclerotic calcifications.     There is significant volume retained stool throughout the colon suggesting constipation.  The appendix appears to be surgically absent.  There is moderate volume of peritoneal fluid throughout the abdomen and pelvis.  No evidence of free intraperitoneal air or portal venous gas.     Osseous structures demonstrate no acute abnormalities.  There is significant diffuse body wall anasarca present.  There are scattered foci of air within the anterior abdominal wall subcutaneous soft tissues, presumably relating to recent injection sites.    Impression:       1.  Continued temporal evolution of previously demonstrated lung disease presumably relating to septic emboli, noting continued extensive bilateral opacities, some of which demonstrate central cavitation.  Findings appear relatively similar compared to prior examination with some interval coalescence of previously demonstrated more scattered nodular opacities.     2.  Small bilateral pleural effusions, increased in volume from prior examination, with associated lower lobe atelectasis and/or consolidation.     3.  Significant diffuse body wall anasarca.  Moderate volume of peritoneal fluid throughout the abdomen and pelvis.     4.  Small pericardial effusion, new from prior examination.      5.  Mild gallbladder wall thickening which is a nonspecific finding, although may relate to hepatic dysfunction or volume status given peritoneal fluid and anasarca.  No evidence of cholelithiasis.  Clinical correlation advised.     6.  Hepatosplenomegaly.  Subcentimeter left hepatic lobe hypodensity, too small to accurately characterize.     7.  Moderate volume retained stool throughout the colon suggesting constipation.     8.  Additional findings as above.       Electronically signed by: Frandy Fan MD   Date: 11/07/2020   Time: 22:48   X-Ray Chest PA And Lateral [[id number]] Resulted: 11/06/20 1000   Order Status: Completed Updated: 11/06/20 1002   Narrative:     EXAMINATION:   XR CHEST PA AND LATERAL     CLINICAL HISTORY:   fever, known septic emboli;     TECHNIQUE:   PA and lateral views of the chest were performed.     COMPARISON:   Chest radiograph: 11/05/2020, 06:17 hours.  11/03/2020.  10/29/2020.     Chest CTA: 10/24/2020.     FINDINGS:   PICC line placed via the right upper extremity has its tip overlying the mediastinum near the junction of SVC and right atrium.     Multifocal patchy subsegmental opacities persist throughout both lungs.  I suspect dependent pleural fluid is well.  The extent of disease is no better than on yesterday's study of 06/17 hours.  I note the clinical history of septic emboli.     I detect no pneumothorax, pneumomediastinum, pneumoperitoneum or significant osseous abnormality.    Impression:       No significant change compared to yesterday's study in this patient with bilateral pulmonary parenchymal disease consistent with septic emboli.       Electronically signed by: Anastasiia Leo MD   Date: 11/06/2020   Time: 10:00   US Retroperitoneal Complete (Kidney and [[id number]] Resulted: 11/05/20 1008   Order Status: Completed Updated: 11/05/20 1010   Narrative:     EXAMINATION:   US RETROPERITONEAL COMPLETE     CLINICAL HISTORY:   DERICK;     TECHNIQUE:   Ultrasound of the  kidneys and urinary bladder was performed including color flow and Doppler evaluation of the kidneys.     COMPARISON:   Retroperitoneal ultrasound 10/29/2020.     FINDINGS:   Right kidney: The right kidney measures 12.5 cm in length.  No cortical thinning. No loss of corticomedullary distinction.  Perfusion is normal.  Resistive index measures 0.70.  No mass. There is a 3 mm hyperechoic focus with twinkling artifact, which may represent nonobstructing stone within the mid right kidney.  No hydronephrosis.     Left kidney: The left kidney measures 13.8 cm in length.  No cortical thinning. No loss of corticomedullary distinction.  Perfusion is normal.  Resistive index measures 0.71.  No mass. No renal stone. No hydronephrosis.     The bladder is partially distended at the time of scanning and has an unremarkable appearance.     Miscellaneous: Partially visualized left pleural effusion    Impression:       3 mm right nonobstructing nephrolithiasis.     Partially visualized left pleural effusion.     Electronically signed by resident: Sophia Trent   Date: 11/05/2020   Time: 09:35     Electronically signed by: James Rousseau MD   Date: 11/05/2020   Time: 10:08   X-Ray Chest 1 View [[id number]] Resulted: 11/05/20 0740   Order Status: Completed Updated: 11/05/20 0743   Narrative:     EXAMINATION:   XR CHEST 1 VIEW     CLINICAL HISTORY:   Pneumonia;     TECHNIQUE:   Single frontal view of the chest was performed.     COMPARISON:   11/03/2020.     FINDINGS:   There is a right upper extremity PICC line with tip near the junction of the superior vena cava and right atrium.  The right internal jugular central venous catheter has been removed.     Heart and mediastinal structures are unchanged.  There continues to be patchy bilateral pulmonary consolidation within both lungs predominantly within the mid to lower lungs.  Findings are similar to the prior examination.  The hemidiaphragms and costophrenic angles are poorly  delineated and small layering pleural effusions may be present.  There is no evidence for pneumothorax.  Bony structures are grossly intact.    Impression:       Right upper extremity PICC line with tip near the junction of the superior vena cava and right atrium.  Right IJ line has been removed.     Persistent patchy bilateral pulmonary opacities predominantly within the mid to lower lungs with little interval change when compared to 11/03/2020.     Poor delineation of the hemidiaphragms and costophrenic angle suggesting small layering bilateral pleural effusions.       Electronically signed by: James Rousseau MD   Date: 11/05/2020   Time: 07:40   X-Ray Chest 1 View for PICC_Central line [[id number]] Resulted: 11/03/20 1349   Order Status: Completed Updated: 11/03/20 1351   Narrative:     EXAMINATION:   XR CHEST 1 VIEW     CLINICAL HISTORY:   Evaluate PICC line placement;     TECHNIQUE:   Single frontal view of the chest was performed.     COMPARISON:   11/01/2020.  10/29/2020.  10/27/2020.     FINDINGS:   PICC line placed via the right upper extremity has its tip projected over the mediastinum near the junction of SVC and right atrium.  Central venous catheter placed via the right neck has its tip in its usual location, also near the junction of SVC and right atrium.     Interval extubation and removal of NG tube.  Extrinsic devices overlie the image.     Multifocal patchy subsegmental opacities persist in both lungs with peripheral predominance.  Overall pulmonary parenchymal aeration has improved since 10/27/2020.  No new pulmonary disease detected.    Impression:       Please see above.       Electronically signed by: Anastasiia Leo MD   Date: 11/03/2020   Time: 13:49   Imaging History    2020  Date Procedure Name Status Accession Number Location   11/07/20 06:38 PM CT Chest Abdomen Pelvis Without Contrast (XPD) Final [id number] [facility]   11/06/20 09:49 AM X-Ray Chest PA And Lateral Final [id number] [facility]   11/05/20  09:32 AM US Retroperitoneal Complete (Kidney and Final [id number] [facility]   11/05/20 06:17 AM X-Ray Chest 1 View Final [id number] [facility]   11/03/20 01:35 PM X-Ray Chest 1 View for PICC_Central line Final [id number] [facility]   11/01/20 02:32 PM X-Ray Chest AP Portable Final [id number] [facility]   10/29/20 06:17 PM US Retroperitoneal Complete (Kidney and Final [id number] [facility]   10/29/20 12:37 PM X-Ray Chest AP Portable Final [id number] [facility]   10/29/20 05:49 AM X-Ray Abdomen AP 1 View Final [id number] [facility]   10/27/20 03:05 AM CT Head Without Contrast Final [id number] [facility]   10/27/20 01:32 AM X-Ray Chest AP Portable Final [id number] [facility]   10/26/20 11:18 PM X-Ray Chest AP Portable Final [id number] [facility]   10/26/20 09:57 PM X-ray chest AP portable Final [id number] [facility]   10/24/20 08:22 PM CTA Chest Non-Coronary Final [id number] [facility]   10/24/20 01:55 AM X-Ray Foot Complete Left Final [id number] [facility]   10/24/20 01:54 AM X-Ray Chest 1 View Final [id number] [facility]   10/24/20 12:00 AM CARDIAC MONITORING STRIPS Final     10/26/20 10:54 AM Echo Color Flow Doppler? Yes Final [id number] [facility]

## 2020-11-09 NOTE — PT/OT/SLP PROGRESS
Physical Therapy       [id number]     Pt not treated on this date but remains appropriate for current POC. Therapy will follow up as appropriate.     Julio Olivier, PT, DPT  11/9/2020

## 2020-11-09 NOTE — PLAN OF CARE
Patient is A&Ox4. Patient refused continuous cardiac monitoring order. Mother is at the bedside and has gotten permission to be able to stay over night. VS were stable throughout shift. Patient remained afebrile throughout the night. Plan of care revolves around patient receiving IV antibiotics and managing pain. She is on 2L of continuous oxygen via nasal cannula. Plan of care and patient education were reviewed at the bedside. Patient and mother verbalized understanding. Safety was maintained during shift.     Problem: Adult Inpatient Plan of Care  Goal: Plan of Care Review  Outcome: Ongoing, Progressing  Goal: Patient-Specific Goal (Individualization)  Outcome: Ongoing, Progressing  Goal: Absence of Hospital-Acquired Illness or Injury  Outcome: Ongoing, Progressing  Goal: Optimal Comfort and Wellbeing  Outcome: Ongoing, Progressing  Goal: Readiness for Transition of Care  Outcome: Ongoing, Progressing  Goal: Rounds/Family Conference  Outcome: Ongoing, Progressing     Problem: Adjustment to Illness (Sepsis/Septic Shock)  Goal: Optimal Coping  Outcome: Ongoing, Progressing     Problem: Bleeding (Sepsis/Septic Shock)  Goal: Absence of Bleeding  Outcome: Ongoing, Progressing     Problem: Infection (Sepsis/Septic Shock)  Goal: Absence of Infection Signs/Symptoms  Outcome: Ongoing, Progressing     Problem: Respiratory Compromise (Sepsis/Septic Shock)  Goal: Effective Oxygenation and Ventilation  Outcome: Ongoing, Progressing     Problem: Infection  Goal: Infection Symptom Resolution  Outcome: Ongoing, Progressing     Problem: Wound  Goal: Optimal Wound Healing  Outcome: Ongoing, Progressing     Problem: Skin Injury Risk Increased  Goal: Skin Health and Integrity  Outcome: Ongoing, Progressing     Problem: Fall Injury Risk  Goal: Absence of Fall and Fall-Related Injury  Outcome: Ongoing, Progressing     Problem: Electrolyte Imbalance (Acute Kidney Injury/Impairment)  Goal: Serum Electrolyte Balance  Outcome: Ongoing,  Progressing     Problem: Fluid Imbalance (Acute Kidney Injury/Impairment)  Goal: Optimal Fluid Balance  Outcome: Ongoing, Progressing     Problem: Hematologic Alteration (Acute Kidney Injury/Impairment)  Goal: Hemoglobin, Hematocrit and Platelets Within Normal Range  Outcome: Ongoing, Progressing     Problem: Oral Intake Inadequate (Acute Kidney Injury/Impairment)  Goal: Optimal Nutrition Intake  Outcome: Ongoing, Progressing     Problem: Renal Function Impairment (Acute Kidney Injury/Impairment)  Goal: Effective Renal Function  Outcome: Ongoing, Progressing

## 2020-11-09 NOTE — PROGRESS NOTES
[facility]  Infectious Disease  Progress Note    Patient Name: Марина Britton  MRN: [id number]  Admission Date: 10/26/2020  Length of Stay: 13 days  Attending Physician: Alli Garcia MD  Primary Care Provider: Luis Felipe Jose Iii, MD    Isolation Status: Contact  Assessment/Plan:      * Endocarditis of tricuspid valve     29 y/o female with history of IVDU and HCV transferred from OSH  for management of septic shock secondary to MRSA bacteremia, tricuspid valve endocarditis with associated pulmonary septic emboli. CTS has evaluated the patient and does not plan for acute surgical intervention. Course complicated by respiratory failure requiring intubation and pressor support. Blood cultures remained positive from 10/24 - 10/28. She has been and is currently on Vancomycin. Repeat blood cx of 10/31 and 11/1 are NGTD. Extubated to NC 11/2, fevers had resolved and she was stepped down to the floor. Plan was to continue Vancomycin x 6 weeks. ID re-consulted on 11/6 as patient developed recurrent fevers on 11/4 with hypoxia. She reports chest pain, SOB, nausea, feeling poorly. Denies recent visitors or issues/tampering with her PICC line.    11/7 - spiking fever , WBC normalized, BCXs/UCXs remain NGTD, R lung with significant decreased BS, appears unwell and renal function has worsened.  .  No rash  11/8 - fever curve may be improving, BCXS NGTD, CT chest w/out appears stable (new small pericardial effusion), O2 Sat stable, 2D echo not yet done, moving slighlty more air on R lung, Cr down 0.1 pt, wirght stain negative      Recommendations  - Continue IV Vancomycin (renal dosing). Goal trough 15-20 and Cefepime 2 g IV q 12 hours empirically   - Follow blood/urine cultures  - Rec repeat 2D echo to assess for cardiac abscess  - ID will follow.      Pulmonary emboli  See endocarditis        Anticipated Disposition: tbd    Thank you for your consult. I will follow-up with patient. Please  "contact us if you have any additional questions.    DALLAS Sims  Infectious Disease  [facility]    Subjective:     Principal Problem:Endocarditis of tricuspid valve    HPI: History obtained per chart review. Pt critically ill. Intubated, sedated.       30F with PMHx IVUD heroin (last use approx 2wk ago), Hepatitis C presents as transfer from OSH for septic endocarditis with b/l PE requiring higher level of care.  Per patient and chart review, she has had progressively worsening chest and back pain over the past several days with a Tmax of 105F at home.  She presented to [facility] and was admitted for sepsis on 10/24.  Bcx showed +MRSA and found to have large vegetation on tricuspid valve on TTE. Noted to have septic emobli on imaging studies. She was started on empiric vancomycin and zosyn. She was stepped up to the ICU on 10/25 for worsening tachypnea.  She also received 2U pRBC x2 for anemia with a pavel Hb of 6.5.  She was transferred to [facility] 10/26 for a higher level of care.    Per chart review. She has been seen in ED for fatigue and wound of her left foot. Per nursing note," Pt shows me a wound on her left foot/ankle, states it has been there over 1 month.  Pt states it started off as an are where she injected heroin, then turned in to a blister, abscess.  She states she was recently seen in [place] ED and placed on antibiotics.  Pt has not followed up with anyone regarding this wound. Spoke with ER MD Dr. Linares, pt does not tomas to be started on any antibiotics at this time, pt needs referral to wound care.  This was explained to pt who stated "ok."   she has been on different abx including bactrim and clindamycin without improvement. No follow up.   Interval History: No AEON.  Tmax 101, T current 98.1. WBC 11s CO Chest pain with inspiration - no change.  .  Coughing up clear sputum.    Review of Systems   Constitutional: Negative for activity change, chills, " diaphoresis and fever.   Respiratory: Positive for chest tightness and shortness of breath. Negative for cough and wheezing.    Cardiovascular: Negative for chest pain.   Gastrointestinal: Negative for abdominal pain, constipation, diarrhea, nausea and vomiting.   Genitourinary: Negative for dysuria, frequency and urgency.   Neurological: Negative for dizziness.   Hematological: Does not bruise/bleed easily.     Objective:     Vital Signs (Most Recent):  Temp: 98.1 °F (36.7 °C) (11/08/20 1646)  Pulse: 101 (11/08/20 1803)  Resp: 20 (11/08/20 1806)  BP: (!) 146/100 (11/08/20 1803)  SpO2: 100 % (11/08/20 1646) Vital Signs (24h Range):  Temp:  [97.9 °F (36.6 °C)-101 °F (38.3 °C)] 98.1 °F (36.7 °C)  Pulse:  [] 101  Resp:  [18-20] 20  SpO2:  [98 %-100 %] 100 %  BP: (121-160)/() 146/100     Weight: 69 kg (152 lb 1.9 oz)  Body mass index is 27.82 kg/m².    Estimated Creatinine Clearance: 43.7 mL/min (A) (based on SCr of 1.7 mg/dL (H)).    Physical Exam  Vitals signs and nursing note reviewed.   Constitutional:       General: She is not in acute distress.     Appearance: She is ill-appearing.       HENT:      Head: Normocephalic and atraumatic.      Nose: No congestion.   Eyes:      General: No scleral icterus.     Conjunctiva/sclera: Conjunctivae normal.   Cardiovascular:      Rate and Rhythm: Normal rate and regular rhythm.      Heart sounds: Murmur present.   Pulmonary:      Effort: No tachypnea or respiratory distress.      Breath sounds: No stridor. Examination of the right-upper field reveals decreased breath sounds and rales. Examination of the left-upper field reveals rales. Examination of the right-middle field reveals decreased breath sounds and rales. Examination of the left-middle field reveals rales. Examination of the right-lower field reveals decreased breath sounds. Examination of the left-lower field reveals rales. Decreased breath sounds (slightly improved) and rales present. No wheezing or  rhonchi.      Comments: O2 via NC  Abdominal:      General: Abdomen is flat. There is no distension.      Palpations: Abdomen is soft. There is no mass.   Musculoskeletal:      Right lower leg: Edema present.      Left lower leg: Edema present.   Skin:     General: Skin is warm and dry.      Comments: Janeway lesions on digits  Wound on ankle noted  Tattoos    RUE PICC Line c/d/i.   Neurological:      Mental Status: She is alert.   Psychiatric:         Mood and Affect: Mood normal.         Behavior: Behavior normal.         Thought Content: Thought content normal.         Significant Labs:   Blood Culture:   Recent Labs   Lab 11/04/20  0442 11/05/20  0307 11/06/20  0742 11/06/20  0745 11/08/20  0642   LABBLOO No Growth to date  No Growth to date  No Growth to date  No Growth to date  No Growth to date  No Growth to date  No Growth to date  No Growth to date  No Growth to date  No Growth to date No Growth to date  No Growth to date  No Growth to date  No Growth to date  No Growth to date  No Growth to date  No Growth to date  No Growth to date No Growth to date  No Growth to date  No Growth to date No Growth to date  No Growth to date  No Growth to date No Growth to date  No Growth to date     CBC:   Recent Labs   Lab 11/07/20  0542 11/08/20  0642   WBC 11.78 12.23   HGB 7.6* 7.4*   HCT 24.6* 24.6*    282     CMP:   Recent Labs   Lab 11/07/20  0542 11/08/20  0642   * 130*   K 4.6 4.6    105   CO2 19* 19*   * 100   BUN 38* 35*   CREATININE 1.8* 1.7*   CALCIUM 7.1* 7.2*   PROT 5.6* 5.8*   ALBUMIN 1.3* 1.3*   BILITOT 0.4 0.4   ALKPHOS 119 122   AST 17 16   ALT 9* 8*   ANIONGAP 7* 6*   EGFRNONAA 37.0* 39.6*     Wound Culture: No results for input(s): LABAERO in the last 4320 hours.  All pertinent labs within the past 24 hours have been reviewed.    Significant Imaging: I have reviewed all pertinent imaging results/findings within the past 24 hours.   CT Chest Abdomen Pelvis  Without Contrast (XPD) [[id number]] Resulted: 11/07/20 2248   Order Status: Completed Updated: 11/07/20 2250   Narrative:     EXAMINATION:   CT CHEST ABDOMEN PELVIS WITHOUT CONTRAST(XPD)     CLINICAL HISTORY:   fever/sepsis, known abscesses, renal function prohibitive of IV contrast which would be preferred;     TECHNIQUE:   Low dose axial images, sagittal and coronal reformations were obtained from the thoracic inlet to the pubic symphysis without IV contrast.  .  Oral contrast was not administered.     COMPARISON:   CTA chest 10/24/2020     FINDINGS:   Examination of the vascular and soft tissue structures at the base of the neck is unremarkable.  There is a right-sided PICC line with tip terminating in the right atrium.     The thoracic aorta maintains normal caliber, contour, and course without significant atherosclerotic calcification within its course.  The heart is not enlarged.  There is a small pericardial effusion present, new from prior exam.     The esophagus maintains a normal course and caliber. No new bulky mediastinal or axillary lymph node enlargement appreciated allowing for limitations of noncontrast technique.     There appear to be a few secretions noted within the distal trachea.  Proximal airways are patent.  There is continued temporal evolution of previously demonstrated extensive bilateral opacities, some of which demonstrate central cavitation.  Scattered nodular opacities appear somewhat more consolidative compared to prior study of 10/24/2020.  There are small bilateral pleural effusions, mildly increased in volume compared to prior examination with associated lower lobe atelectasis and/or consolidation.     The liver is enlarged.  There is a subcentimeter left hepatic lobe hypodensity which is too small to accurately characterize.  Gallbladder is poorly visualized, although there is suggestion of circumferential gallbladder wall thickening.  No radiopaque calculi appreciated.  There is  no intra-or extrahepatic biliary ductal dilatation.     The stomach, pancreas, and adrenal glands appear within normal limits.  The spleen is enlarged measuring 13.9 cm.     Kidneys are normal in size and location.  No evidence of nephrolithiasis or hydronephrosis.  The urinary bladder demonstrates no significant abnormality. The uterus is unremarkable.     The abdominal aorta is normal in course and caliber without significant atherosclerotic calcifications.     There is significant volume retained stool throughout the colon suggesting constipation.  The appendix appears to be surgically absent.  There is moderate volume of peritoneal fluid throughout the abdomen and pelvis.  No evidence of free intraperitoneal air or portal venous gas.     Osseous structures demonstrate no acute abnormalities.  There is significant diffuse body wall anasarca present.  There are scattered foci of air within the anterior abdominal wall subcutaneous soft tissues, presumably relating to recent injection sites.    Impression:       1.  Continued temporal evolution of previously demonstrated lung disease presumably relating to septic emboli, noting continued extensive bilateral opacities, some of which demonstrate central cavitation.  Findings appear relatively similar compared to prior examination with some interval coalescence of previously demonstrated more scattered nodular opacities.     2.  Small bilateral pleural effusions, increased in volume from prior examination, with associated lower lobe atelectasis and/or consolidation.     3.  Significant diffuse body wall anasarca.  Moderate volume of peritoneal fluid throughout the abdomen and pelvis.     4.  Small pericardial effusion, new from prior examination.     5.  Mild gallbladder wall thickening which is a nonspecific finding, although may relate to hepatic dysfunction or volume status given peritoneal fluid and anasarca.  No evidence of cholelithiasis.  Clinical correlation  advised.     6.  Hepatosplenomegaly.  Subcentimeter left hepatic lobe hypodensity, too small to accurately characterize.     7.  Moderate volume retained stool throughout the colon suggesting constipation.     8.  Additional findings as above.       Electronically signed by: Frandy Fan MD   Date: 11/07/2020   Time: 22:48   X-Ray Chest PA And Lateral [[id number]] Resulted: 11/06/20 1000   Order Status: Completed Updated: 11/06/20 1002   Narrative:     EXAMINATION:   XR CHEST PA AND LATERAL     CLINICAL HISTORY:   fever, known septic emboli;     TECHNIQUE:   PA and lateral views of the chest were performed.     COMPARISON:   Chest radiograph: 11/05/2020, 06:17 hours.  11/03/2020.  10/29/2020.     Chest CTA: 10/24/2020.     FINDINGS:   PICC line placed via the right upper extremity has its tip overlying the mediastinum near the junction of SVC and right atrium.     Multifocal patchy subsegmental opacities persist throughout both lungs.  I suspect dependent pleural fluid is well.  The extent of disease is no better than on yesterday's study of 06/17 hours.  I note the clinical history of septic emboli.     I detect no pneumothorax, pneumomediastinum, pneumoperitoneum or significant osseous abnormality.    Impression:       No significant change compared to yesterday's study in this patient with bilateral pulmonary parenchymal disease consistent with septic emboli.       Electronically signed by: Anastasiia Leo MD   Date: 11/06/2020   Time: 10:00   US Retroperitoneal Complete (Kidney and [[id number]] Resulted: 11/05/20 1008   Order Status: Completed Updated: 11/05/20 1010   Narrative:     EXAMINATION:   US RETROPERITONEAL COMPLETE     CLINICAL HISTORY:   DERICK;     TECHNIQUE:   Ultrasound of the kidneys and urinary bladder was performed including color flow and Doppler evaluation of the kidneys.     COMPARISON:   Retroperitoneal ultrasound 10/29/2020.     FINDINGS:   Right kidney: The right kidney measures 12.5 cm in  length.  No cortical thinning. No loss of corticomedullary distinction.  Perfusion is normal.  Resistive index measures 0.70.  No mass. There is a 3 mm hyperechoic focus with twinkling artifact, which may represent nonobstructing stone within the mid right kidney.  No hydronephrosis.     Left kidney: The left kidney measures 13.8 cm in length.  No cortical thinning. No loss of corticomedullary distinction.  Perfusion is normal.  Resistive index measures 0.71.  No mass. No renal stone. No hydronephrosis.     The bladder is partially distended at the time of scanning and has an unremarkable appearance.     Miscellaneous: Partially visualized left pleural effusion    Impression:       3 mm right nonobstructing nephrolithiasis.     Partially visualized left pleural effusion.     Electronically signed by resident: Sophia Trent   Date: 11/05/2020   Time: 09:35     Electronically signed by: James Rousseau MD   Date: 11/05/2020   Time: 10:08   X-Ray Chest 1 View [[id number]] Resulted: 11/05/20 0740   Order Status: Completed Updated: 11/05/20 0743   Narrative:     EXAMINATION:   XR CHEST 1 VIEW     CLINICAL HISTORY:   Pneumonia;     TECHNIQUE:   Single frontal view of the chest was performed.     COMPARISON:   11/03/2020.     FINDINGS:   There is a right upper extremity PICC line with tip near the junction of the superior vena cava and right atrium.  The right internal jugular central venous catheter has been removed.     Heart and mediastinal structures are unchanged.  There continues to be patchy bilateral pulmonary consolidation within both lungs predominantly within the mid to lower lungs.  Findings are similar to the prior examination.  The hemidiaphragms and costophrenic angles are poorly delineated and small layering pleural effusions may be present.  There is no evidence for pneumothorax.  Bony structures are grossly intact.    Impression:       Right upper extremity PICC line with tip near the junction of the  superior vena cava and right atrium.  Right IJ line has been removed.     Persistent patchy bilateral pulmonary opacities predominantly within the mid to lower lungs with little interval change when compared to 11/03/2020.     Poor delineation of the hemidiaphragms and costophrenic angle suggesting small layering bilateral pleural effusions.       Electronically signed by: James Rousseau MD   Date: 11/05/2020   Time: 07:40   X-Ray Chest 1 View for PICC_Central line [[id number]] Resulted: 11/03/20 1349   Order Status: Completed Updated: 11/03/20 1351   Narrative:     EXAMINATION:   XR CHEST 1 VIEW     CLINICAL HISTORY:   Evaluate PICC line placement;     TECHNIQUE:   Single frontal view of the chest was performed.     COMPARISON:   11/01/2020.  10/29/2020.  10/27/2020.     FINDINGS:   PICC line placed via the right upper extremity has its tip projected over the mediastinum near the junction of SVC and right atrium.  Central venous catheter placed via the right neck has its tip in its usual location, also near the junction of SVC and right atrium.     Interval extubation and removal of NG tube.  Extrinsic devices overlie the image.     Multifocal patchy subsegmental opacities persist in both lungs with peripheral predominance.  Overall pulmonary parenchymal aeration has improved since 10/27/2020.  No new pulmonary disease detected.    Impression:       Please see above.       Electronically signed by: Anastasiia Leo MD   Date: 11/03/2020   Time: 13:49   Imaging History    2020  Date Procedure Name Status Accession Number Location   11/07/20 06:38 PM CT Chest Abdomen Pelvis Without Contrast (XPD) Final [id number] [facility]   11/06/20 09:49 AM X-Ray Chest PA And Lateral Final [id number] [facility]   11/05/20 09:32 AM US Retroperitoneal Complete (Kidney and Final [id number] [facility]   11/05/20 06:17 AM X-Ray Chest 1 View Final [id number] [facility]   11/03/20 01:35 PM X-Ray Chest 1 View for PICC_Central line Final [id number] [facility]   11/01/20 02:32  PM X-Ray Chest AP Portable Final [id number] [facility]   10/29/20 06:17 PM US Retroperitoneal Complete (Kidney and Final [id number] [facility]   10/29/20 12:37 PM X-Ray Chest AP Portable Final [id number] [facility]   10/29/20 05:49 AM X-Ray Abdomen AP 1 View Final [id number] [facility]   10/27/20 03:05 AM CT Head Without Contrast Final [id number] [facility]   10/27/20 01:32 AM X-Ray Chest AP Portable Final [id number] [facility]   10/26/20 11:18 PM X-Ray Chest AP Portable Final [id number] [facility]   10/26/20 09:57 PM X-ray chest AP portable Final [id number] [facility]   10/24/20 08:22 PM CTA Chest Non-Coronary Final [id number] [facility]   10/24/20 01:55 AM X-Ray Foot Complete Left Final [id number] [facility]   10/24/20 01:54 AM X-Ray Chest 1 View Final [id number] [facility]   10/24/20 12:00 AM CARDIAC MONITORING STRIPS Final     10/26/20 10:54 AM Echo Color Flow Doppler? Yes Final [id number] [facility]

## 2020-11-09 NOTE — PLAN OF CARE
Pt will dc to when medically ready for 6 weeks of IVABX will will cont to follow.       11/09/20 1316   Discharge Reassessment   Assessment Type Discharge Planning Reassessment   Provided patient/caregiver education on the expected discharge date and the discharge plan No   Do you have any problems affording any of your prescribed medications? No   Discharge Plan A Long-term acute care facility (LTAC)   Discharge Plan B Long-term acute care facility (LTAC)   DME Needed Upon Discharge  none   Anticipated Discharge Disposition LTAC   Can the patient/caregiver answer the patient profile reliably? Yes, cognitively intact   How does the patient rate their overall health at the present time? Fair   Describe the patient's ability to walk at the present time. Minor restrictions or changes   How often would a person be available to care for the patient? Occasionally   Number of comorbid conditions (as recorded on the chart) Three   During the past month, has the patient often been bothered by feeling down, depressed or hopeless? No   During the past month, has the patient often been bothered by little interest or pleasure in doing things? No   Post-Acute Status   Post-Acute Authorization Home Health   Post-Acute Placement Status Awaiting Internal Medical Clearance   Home Health Status Awaiting Internal Medical Clearance   Discharge Delays None known at this time   Stephanie Villatoro, MSN  Case Management  Ext [phone]

## 2020-11-09 NOTE — ASSESSMENT & PLAN NOTE
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 CTS has evaluated the patient and does not plan for acute surgical intervention. Course complicated by respiratory failure requiring intubation and pressor support. Blood cultures remained positive from 10/24 - 10/28. She has been and is currently on Vancomycin. Repeat blood cx of 10/31 and 11/1 are NGTD. Extubated to NC 11/2, fevers had resolved and she was stepped down to the floor. Plan was to continue Vancomycin x 6 weeks. ID re-consulted on 11/6 as patient developed recurrent fevers on 11/4 with hypoxia. She reports chest pain, SOB, nausea, feeling poorly. Denies recent visitors or issues/tampering with her PICC line.    Cefepime added empirically on 11/6. Repeat CT C/A revealed continued evolution of previously demonstrated lung disease, noting continued extensive bilateral opacities, some of which demonstrate central cavitation, small bilateral pleural effusions, significant diffuse body wall anasarca, new small pericardial effusion. Repeat blood cx remain sterile. Leukocytosis resolved. Now afebrile over last 24 hours. O2 sats stable. Moving slighlty more air on R lung, Kidney function improving.      Recommendations  - Continue IV Vancomycin (renal dosing). Goal trough 15-20  - Continue Cefepime 2 g IV q 12 hours for a total of 5 days  - Monitor closely for any decompensation  - ID will follow with final recs likely tomorrow.

## 2020-11-10 LAB
ABO + RH BLD: NORMAL
ALBUMIN SERPL BCP-MCNC: 1.3 G/DL (ref 3.5–5.2)
ALP SERPL-CCNC: 106 U/L (ref 55–135)
ALT SERPL W/O P-5'-P-CCNC: 8 U/L (ref 10–44)
ANION GAP SERPL CALC-SCNC: 6 MMOL/L (ref 8–16)
AST SERPL-CCNC: 15 U/L (ref 10–40)
BACTERIA BLD CULT: NORMAL
BACTERIA BLD CULT: NORMAL
BASOPHILS # BLD AUTO: 0.12 K/UL (ref 0–0.2)
BASOPHILS NFR BLD: 1 % (ref 0–1.9)
BILIRUB SERPL-MCNC: 0.4 MG/DL (ref 0.1–1)
BLD GP AB SCN CELLS X3 SERPL QL: NORMAL
BUN SERPL-MCNC: 28 MG/DL (ref 6–20)
CALCIUM SERPL-MCNC: 7.7 MG/DL (ref 8.7–10.5)
CHLORIDE SERPL-SCNC: 107 MMOL/L (ref 95–110)
CO2 SERPL-SCNC: 22 MMOL/L (ref 23–29)
CREAT SERPL-MCNC: 1.4 MG/DL (ref 0.5–1.4)
DIFFERENTIAL METHOD: ABNORMAL
EOSINOPHIL # BLD AUTO: 0.3 K/UL (ref 0–0.5)
EOSINOPHIL NFR BLD: 2.7 % (ref 0–8)
ERYTHROCYTE [DISTWIDTH] IN BLOOD BY AUTOMATED COUNT: 17.7 % (ref 11.5–14.5)
EST. GFR  (AFRICAN AMERICAN): 57.8 ML/MIN/1.73 M^2
EST. GFR  (NON AFRICAN AMERICAN): 50.1 ML/MIN/1.73 M^2
FERRITIN SERPL-MCNC: 686 NG/ML (ref 20–300)
GLUCOSE SERPL-MCNC: 109 MG/DL (ref 70–110)
HCT VFR BLD AUTO: 22.9 % (ref 37–48.5)
HGB BLD-MCNC: 6.9 G/DL (ref 12–16)
IMM GRANULOCYTES # BLD AUTO: 0.19 K/UL (ref 0–0.04)
IMM GRANULOCYTES NFR BLD AUTO: 1.6 % (ref 0–0.5)
IRON SERPL-MCNC: 31 UG/DL (ref 30–160)
LYMPHOCYTES # BLD AUTO: 1.6 K/UL (ref 1–4.8)
LYMPHOCYTES NFR BLD: 13.6 % (ref 18–48)
MAGNESIUM SERPL-MCNC: 1.9 MG/DL (ref 1.6–2.6)
MCH RBC QN AUTO: 26.4 PG (ref 27–31)
MCHC RBC AUTO-ENTMCNC: 30.1 G/DL (ref 32–36)
MCV RBC AUTO: 88 FL (ref 82–98)
MONOCYTES # BLD AUTO: 0.9 K/UL (ref 0.3–1)
MONOCYTES NFR BLD: 7.5 % (ref 4–15)
NEUTROPHILS # BLD AUTO: 8.7 K/UL (ref 1.8–7.7)
NEUTROPHILS NFR BLD: 73.6 % (ref 38–73)
NRBC BLD-RTO: 0 /100 WBC
PHOSPHATE SERPL-MCNC: 3.4 MG/DL (ref 2.7–4.5)
PLATELET # BLD AUTO: 228 K/UL (ref 150–350)
PMV BLD AUTO: 9.5 FL (ref 9.2–12.9)
POTASSIUM SERPL-SCNC: 4.5 MMOL/L (ref 3.5–5.1)
PROT SERPL-MCNC: 5.9 G/DL (ref 6–8.4)
RBC # BLD AUTO: 2.61 M/UL (ref 4–5.4)
SATURATED IRON: 16 % (ref 20–50)
SODIUM SERPL-SCNC: 135 MMOL/L (ref 136–145)
TOTAL IRON BINDING CAPACITY: 197 UG/DL (ref 250–450)
TRANSFERRIN SERPL-MCNC: 133 MG/DL (ref 200–375)
VANCOMYCIN TROUGH SERPL-MCNC: 13.5 UG/ML (ref 10–22)
WBC # BLD AUTO: 11.88 K/UL (ref 3.9–12.7)

## 2020-11-10 PROCEDURE — 87040 BLOOD CULTURE FOR BACTERIA: CPT | Mod: 59

## 2020-11-10 PROCEDURE — 83540 ASSAY OF IRON: CPT

## 2020-11-10 PROCEDURE — 99232 PR SUBSEQUENT HOSPITAL CARE,LEVL II: ICD-10-PCS | Mod: ,,, | Performed by: INTERNAL MEDICINE

## 2020-11-10 PROCEDURE — 25000003 PHARM REV CODE 250: Performed by: STUDENT IN AN ORGANIZED HEALTH CARE EDUCATION/TRAINING PROGRAM

## 2020-11-10 PROCEDURE — 80053 COMPREHEN METABOLIC PANEL: CPT

## 2020-11-10 PROCEDURE — 85025 COMPLETE CBC W/AUTO DIFF WBC: CPT

## 2020-11-10 PROCEDURE — 25000003 PHARM REV CODE 250: Performed by: INTERNAL MEDICINE

## 2020-11-10 PROCEDURE — 99232 SBSQ HOSP IP/OBS MODERATE 35: CPT | Mod: ,,, | Performed by: INTERNAL MEDICINE

## 2020-11-10 PROCEDURE — 25000003 PHARM REV CODE 250: Performed by: NURSE PRACTITIONER

## 2020-11-10 PROCEDURE — A4216 STERILE WATER/SALINE, 10 ML: HCPCS | Performed by: INTERNAL MEDICINE

## 2020-11-10 PROCEDURE — 99233 PR SUBSEQUENT HOSPITAL CARE,LEVL III: ICD-10-PCS | Mod: ,,, | Performed by: PHYSICIAN ASSISTANT

## 2020-11-10 PROCEDURE — 36415 COLL VENOUS BLD VENIPUNCTURE: CPT

## 2020-11-10 PROCEDURE — 86920 COMPATIBILITY TEST SPIN: CPT

## 2020-11-10 PROCEDURE — 83735 ASSAY OF MAGNESIUM: CPT

## 2020-11-10 PROCEDURE — 99233 SBSQ HOSP IP/OBS HIGH 50: CPT | Mod: ,,, | Performed by: PHYSICIAN ASSISTANT

## 2020-11-10 PROCEDURE — 80202 ASSAY OF VANCOMYCIN: CPT

## 2020-11-10 PROCEDURE — 86850 RBC ANTIBODY SCREEN: CPT

## 2020-11-10 PROCEDURE — 97530 THERAPEUTIC ACTIVITIES: CPT

## 2020-11-10 PROCEDURE — 82728 ASSAY OF FERRITIN: CPT

## 2020-11-10 PROCEDURE — 63600175 PHARM REV CODE 636 W HCPCS: Performed by: INTERNAL MEDICINE

## 2020-11-10 PROCEDURE — 25000003 PHARM REV CODE 250: Performed by: HOSPITALIST

## 2020-11-10 PROCEDURE — 63600175 PHARM REV CODE 636 W HCPCS: Performed by: PHYSICIAN ASSISTANT

## 2020-11-10 PROCEDURE — 11000001 HC ACUTE MED/SURG PRIVATE ROOM

## 2020-11-10 PROCEDURE — 84100 ASSAY OF PHOSPHORUS: CPT

## 2020-11-10 RX ORDER — CLONIDINE HYDROCHLORIDE 0.1 MG/1
0.1 TABLET ORAL EVERY 6 HOURS PRN
Status: DISCONTINUED | OUTPATIENT
Start: 2020-11-10 | End: 2020-11-11

## 2020-11-10 RX ORDER — HYDROCODONE BITARTRATE AND ACETAMINOPHEN 500; 5 MG/1; MG/1
TABLET ORAL
Status: DISCONTINUED | OUTPATIENT
Start: 2020-11-10 | End: 2020-11-24 | Stop reason: HOSPADM

## 2020-11-10 RX ORDER — IBUPROFEN 400 MG/1
400 TABLET ORAL ONCE
Status: COMPLETED | OUTPATIENT
Start: 2020-11-10 | End: 2020-11-10

## 2020-11-10 RX ORDER — FUROSEMIDE 10 MG/ML
40 INJECTION INTRAMUSCULAR; INTRAVENOUS ONCE
Status: DISCONTINUED | OUTPATIENT
Start: 2020-11-10 | End: 2020-11-11

## 2020-11-10 RX ORDER — VANCOMYCIN HCL IN 5 % DEXTROSE 1G/250ML
1000 PLASTIC BAG, INJECTION (ML) INTRAVENOUS
Status: DISCONTINUED | OUTPATIENT
Start: 2020-11-11 | End: 2020-11-14

## 2020-11-10 RX ADMIN — FUROSEMIDE 40 MG: 10 INJECTION, SOLUTION INTRAMUSCULAR; INTRAVENOUS at 09:11

## 2020-11-10 RX ADMIN — IBUPROFEN 400 MG: 400 TABLET, FILM COATED ORAL at 10:11

## 2020-11-10 RX ADMIN — OXYCODONE HYDROCHLORIDE 20 MG: 10 TABLET ORAL at 05:11

## 2020-11-10 RX ADMIN — Medication 10 ML: at 12:11

## 2020-11-10 RX ADMIN — HYDROXYZINE HYDROCHLORIDE 25 MG: 25 TABLET, FILM COATED ORAL at 08:11

## 2020-11-10 RX ADMIN — MELATONIN TAB 3 MG 6 MG: 3 TAB at 08:11

## 2020-11-10 RX ADMIN — CEFEPIME 2 G: 2 INJECTION, POWDER, FOR SOLUTION INTRAVENOUS at 02:11

## 2020-11-10 RX ADMIN — CEFEPIME 2 G: 2 INJECTION, POWDER, FOR SOLUTION INTRAVENOUS at 04:11

## 2020-11-10 RX ADMIN — VANCOMYCIN HYDROCHLORIDE 750 MG: 750 INJECTION, POWDER, LYOPHILIZED, FOR SOLUTION INTRAVENOUS at 09:11

## 2020-11-10 RX ADMIN — MIRTAZAPINE 7.5 MG: 7.5 TABLET ORAL at 08:11

## 2020-11-10 RX ADMIN — ACETAMINOPHEN 650 MG: 325 TABLET ORAL at 08:11

## 2020-11-10 RX ADMIN — CLONIDINE HYDROCHLORIDE 0.1 MG: 0.1 TABLET ORAL at 08:11

## 2020-11-10 RX ADMIN — Medication 10 ML: at 06:11

## 2020-11-10 RX ADMIN — DOCUSATE SODIUM AND SENNOSIDES 2 TABLET: 8.6; 5 TABLET, FILM COATED ORAL at 08:11

## 2020-11-10 RX ADMIN — Medication 10 ML: at 05:11

## 2020-11-10 RX ADMIN — OXYCODONE HYDROCHLORIDE 20 MG: 10 TABLET ORAL at 06:11

## 2020-11-10 RX ADMIN — ENOXAPARIN SODIUM 40 MG: 40 INJECTION SUBCUTANEOUS at 05:11

## 2020-11-10 RX ADMIN — OXYCODONE HYDROCHLORIDE 20 MG: 10 TABLET ORAL at 12:11

## 2020-11-10 NOTE — ASSESSMENT & PLAN NOTE
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 CTS has evaluated the patient and does not plan for acute surgical intervention. Course complicated by respiratory failure requiring intubation and pressor support. Blood cultures remained positive from 10/24 - 10/28. She has been and is currently on Vancomycin. Repeat blood cx of 10/31 and 11/1 are NGTD. Extubated to NC 11/2, fevers had resolved and she was stepped down to the floor. Plan was to continue Vancomycin x 6 weeks. ID re-consulted on 11/6 as patient developed recurrent fevers on 11/4 with hypoxia.     Cefepime added empirically on 11/6. Repeat CT C/A revealed continued evolution of previously demonstrated lung disease, noting continued extensive bilateral opacities, some of which demonstrate central cavitation, small bilateral pleural effusions, significant diffuse body wall anasarca, new small pericardial effusion. Repeat 2D echo now with flail tricuspid valve and severe regurgitation. Repeat blood cx remain sterile. Leukocytosis resolved. Continues to have intermittent fevers, though to be expected given extent of infection. O2 sats stable. Moving slighlty more air on R lung, Kidney function overall improving.    Recommendations  - Continue IV Vancomycin x 6 weeks as planned (tentative end date 12/12/20). Goal trough 15-20  - Continue Cefepime 2 g IV q 12 hours for a total of 5 days  - Monitor closely for any decompensation  - CTS to review 2D echo and CT and re discuss surgical candidacy. Awaiting recs.   - ID will follow

## 2020-11-10 NOTE — PROGRESS NOTES
Hospital Medicine  Progress Note      Patient Name: Марина Britton  MRN: [id number]  Date of Admission: 10/26/2020     Principal Problem: Endocarditis of tricuspid valve     Subjective     Ms. Britton feels a little better again today. Her fever curve, while still abnormal, seems to be improving. Her mom came and helped her shower which helped her feel more like herself. No new complaints.    Review of Systems    Constitutional: +fever Negative for chills, fatigue   Respiratory: +SOB (improved) Negative for cough  Cardiovascular: +chest pain (improved) Negative for palpitations, leg swelling.   Gastrointestinal: Negative for abdominal pain, constipation, diarrhea, nausea, vomiting.   Neurological: Negative for dizziness, syncope, weakness, light-headedness.     Medications  Scheduled Meds:   ceFEPime (MAXIPIME) IVPB  2 g Intravenous Q12H    enoxaparin  40 mg Subcutaneous Q24H    mirtazapine  7.5 mg Oral QHS    oxyCODONE  20 mg Per OG tube Q6H    polyethylene glycol  17 g Oral Daily    senna-docusate 8.6-50 mg  2 tablet Oral BID    sodium chloride 0.9%  10 mL Intravenous Q6H    vancomycin (VANCOCIN) IVPB  750 mg Intravenous Q24H     Continuous Infusions:    PRN Meds:.acetaminophen, acetaminophen, acetaminophen, calcium carbonate, diphenhydrAMINE, hydrOXYzine HCL, melatonin, oxyCODONE, polyethylene glycol, sodium chloride 0.9%, Flushing PICC Protocol **AND** sodium chloride 0.9% **AND** sodium chloride 0.9%, vancomycin - pharmacy to dose    Objective    Physical Examination    Temp:  [97.8 °F (36.6 °C)-99.9 °F (37.7 °C)]   Pulse:  [100-113]   Resp:  [16-20]   BP: (125-155)/()   SpO2:  [92 %-98 %]     Gen: NAD, conversant  CV: RRR, +murmur, 1+ lower extremity edema bilaterally   Resp: CTAB, no increased work of breathing on room air  GI: Soft, NT, ND, +BS  Ext: MAEW, RUE PICC clean and dry at insertion site  Neuro: AAOx3, CN grossly intact, no focal neurologic deficits    CBC  Recent Labs   Lab  11/07/20  0542 11/08/20  0642 11/09/20  0448   WBC 11.78 12.23 11.37   HGB 7.6* 7.4* 7.4*   HCT 24.6* 24.6* 24.3*    282 262     CMP  Recent Labs   Lab 11/07/20  0542 11/08/20  0642 11/09/20  0448   * 130* 132*   K 4.6 4.6 5.0    105 107   CO2 19* 19* 21*   BUN 38* 35* 30*   CREATININE 1.8* 1.7* 1.4   * 100 80   CALCIUM 7.1* 7.2* 7.5*   MG 2.3 2.3 2.2   PHOS 4.8* 4.3 3.6   ALKPHOS 119 122 105   ALT 9* 8* 8*   AST 17 16 17   ALBUMIN 1.3* 1.3* 1.3*   PROT 5.6* 5.8* 5.8*   BILITOT 0.4 0.4 0.4       Hospital Course:    Ms. Britton is a 30-year-old woman with HCV, IVDU who was transferred from [facility] where she presented with chest pain and was diagnosed with MRSA bacteremia complicated by TV IE with septic emboli, acute hypoxic respiratory failure, septic/metabolic encephalopathy, septic PE, septic shock, and acute anemia. [place] [facility] [place] and was intubated for respiratory failure. She has subsequently been evaluated by ID, CTS, and psychiatry. She required pRBC transfusion on 10/29, and norepi on 10/30. Her admission was further complicated by DERICK which subsequently resolved. She was extubated on 11/2, then transferred to the floor on 11/3 for further care, planning on long-term IV antibiotics with vancomycin and repeat CTS evaluation as an outpatient. Subsequently her DERICK has returned, and she is again febrile for which BCx were obtained on 11/4 and 11/5 and have remained NGTD. ID was reconsulted on 11/6, and cefepime was added to her regimen. CT C/A/P obtained shows continued infectious lung processes.     Assessment and Plan:    MRSA bacteremia  Pneumonia of both lungs due to infectious organism  Pulmonary emboli (septic)  Sepsis  Leukocytosis - resolved  Endocarditis of tricuspid valve    - Febrile again, BCx from 11/4 and 11/5 NGTD. Leukocytosis resolved  - Continue vancomycin (pharmacy dosing)  - PT/OT following and recommending IPR, anticipate LTAC   -  "CBC daily for WBC  - Needs 6 weeks IV antibiotics, then f/u CTS  - Ideally the CT C/A/P would have been contrasted, renal function prohibitive  - Repeat TTE shows more advanced disease, truscpid valve now flail, but even "distinguishing between vegetation, valve, and chord is difficult, even with these good images" per interpreting cardiologist. Also shows elevated CVP. Will start lasix tonight and touch base with CTS to see if this changes the plan going forward  - Tele    DERICK (acute kidney injury)  Hyperkalemia  Hyponatremia    - sCr improved  - Etiology likely multifactorial with recent sepsis and vancomycin and poor PO intake  - Na 131  - Urine studies c/w pre-renal (though cardiorenal would have a similar picture)  - Lasix as above    IVDU (intravenous drug user)  Opioid use disorder, severe, dependence    - Psych evaluated earlier this admission  - Would benefit from LTAC disposition    Substance induced mood disorder    - Psych evaluated, starting on mirtazapine, seems to be improving.    Diet: Adult regular  VTE PPX: LMWH  Goals of care: Curative, full code      Alli Garcia M.D.  Department of Hospital Medicine  [facility] [place]  [phone] (pager)   "

## 2020-11-10 NOTE — PROGRESS NOTES
[facility]  Infectious Disease  Progress Note    Patient Name: Марина Britton  MRN: [id number]  Admission Date: 10/26/2020  Length of Stay: 15 days  Attending Physician: Alli Garcia MD  Primary Care Provider: Luis Felipe Jose Iii, MD    Isolation Status: Contact  Assessment/Plan:     MRSA bacteremia     See endocarditis    Endocarditis of tricuspid valve     31 y/o female with history of IVDU and HCV transferred from OSH  for management of septic shock secondary to MRSA bacteremia, tricuspid valve endocarditis with associated pulmonary septic emboli. CTS has evaluated the patient and does not plan for acute surgical intervention. Course complicated by respiratory failure requiring intubation and pressor support. Blood cultures remained positive from 10/24 - 10/28. She has been and is currently on Vancomycin. Repeat blood cx of 10/31 and 11/1 are NGTD. Extubated to NC 11/2, fevers had resolved and she was stepped down to the floor. Plan was to continue Vancomycin x 6 weeks. ID re-consulted on 11/6 as patient developed recurrent fevers on 11/4 with hypoxia.     Cefepime added empirically on 11/6. Repeat CT C/A revealed continued evolution of previously demonstrated lung disease, noting continued extensive bilateral opacities, some of which demonstrate central cavitation, small bilateral pleural effusions, significant diffuse body wall anasarca, new small pericardial effusion. Repeat 2D echo now with flail tricuspid valve and severe regurgitation. Repeat blood cx remain sterile. Leukocytosis resolved. Continues to have intermittent fevers, though to be expected given extent of infection. O2 sats stable. Moving slighlty more air on R lung, Kidney function overall improving.    Recommendations  - Continue IV Vancomycin x 6 weeks as planned (tentative end date 12/12/20). Goal trough 15-20  - Continue Cefepime 2 g IV q 12 hours for a total of 5 days  - Monitor closely for any decompensation  -  "CTS to review 2D echo and CT and re discuss surgical candidacy. Awaiting recs.   - ID will follow          Please call for any questions. Thank you.  Evelyne Brand PA-C  Phone: [phone]  Pager: [phone]      Subjective:     Principal Problem:Endocarditis of tricuspid valve    HPI: History obtained per chart review. Pt critically ill. Intubated, sedated.       30F with PMHx IVUD heroin (last use approx 2wk ago), Hepatitis C presents as transfer from OSH for septic endocarditis with b/l PE requiring higher level of care.  Per patient and chart review, she has had progressively worsening chest and back pain over the past several days with a Tmax of 105F at home.  She presented to [facility] and was admitted for sepsis on 10/24.  Bcx showed +MRSA and found to have large vegetation on tricuspid valve on TTE. Noted to have septic emobli on imaging studies. She was started on empiric vancomycin and zosyn. She was stepped up to the ICU on 10/25 for worsening tachypnea.  She also received 2U pRBC x2 for anemia with a pavel Hb of 6.5.  She was transferred to [facility] 10/26 for a higher level of care.    Per chart review. She has been seen in ED for fatigue and wound of her left foot. Per nursing note," Pt shows me a wound on her left foot/ankle, states it has been there over 1 month.  Pt states it started off as an are where she injected heroin, then turned in to a blister, abscess.  She states she was recently seen in [place] ED and placed on antibiotics.  Pt has not followed up with anyone regarding this wound. Spoke with ER MD Dr. Linares, pt does not tomas to be started on any antibiotics at this time, pt needs referral to wound care.  This was explained to pt who stated "ok."   she has been on different abx including bactrim and clindamycin without improvement. No follow up.   Interval History: No AEON.  Continues to have intermittent fevers, which are to be expected given the extent of her infection. She feels " better since showering with assistance of her mom. Still with LE weakness and reports aching to her left hip 2/2 swelling. Lasix started. No new complaints.       Review of Systems   Constitutional: Negative for activity change, chills, diaphoresis and fever.   Respiratory: Positive for cough, chest tightness and shortness of breath.    Cardiovascular: Positive for chest pain (pleuritic- improving) and leg swelling.   Gastrointestinal: Negative for abdominal pain, constipation, diarrhea, nausea and vomiting.   Genitourinary: Negative for dysuria, frequency and urgency.   Musculoskeletal: Positive for arthralgias.   Neurological: Positive for weakness. Negative for dizziness.   Hematological: Does not bruise/bleed easily.     Objective:     Vital Signs (Most Recent):  Temp: 98.7 °F (37.1 °C) (11/10/20 1204)  Pulse: 93 (11/10/20 1204)  Resp: 14 (11/10/20 1228)  BP: (!) 141/105 (11/10/20 1204)  SpO2: 96 % (11/10/20 1204) Vital Signs (24h Range):  Temp:  [97.8 °F (36.6 °C)-101 °F (38.3 °C)] 98.7 °F (37.1 °C)  Pulse:  [] 93  Resp:  [14-20] 14  SpO2:  [91 %-98 %] 96 %  BP: (125-155)/() 141/105     Weight: 92.8 kg (204 lb 9.4 oz)  Body mass index is 37.42 kg/m².    Estimated Creatinine Clearance: 61.8 mL/min (based on SCr of 1.4 mg/dL).    Physical Exam  Vitals signs and nursing note reviewed.   Constitutional:       General: She is not in acute distress.     Appearance: She is ill-appearing.       HENT:      Head: Normocephalic and atraumatic.      Nose: No congestion.   Eyes:      General: No scleral icterus.     Conjunctiva/sclera: Conjunctivae normal.   Cardiovascular:      Rate and Rhythm: Normal rate and regular rhythm.      Heart sounds: Murmur present.   Pulmonary:      Effort: No tachypnea or respiratory distress.      Breath sounds: No stridor. Examination of the right-upper field reveals decreased breath sounds and rales. Examination of the left-upper field reveals rales. Examination of the  right-middle field reveals decreased breath sounds and rales. Examination of the left-middle field reveals rales. Examination of the right-lower field reveals decreased breath sounds. Examination of the left-lower field reveals rales. Decreased breath sounds (slightly improved) and rales present. No wheezing or rhonchi.   Abdominal:      General: Abdomen is flat. There is no distension.      Palpations: Abdomen is soft. There is no mass.   Musculoskeletal:      Right lower leg: Edema present.      Left lower leg: Edema present.   Skin:     General: Skin is warm and dry.      Comments: Janeway lesions on digits  Wound on ankle noted  Tattoos    RUE PICC Line c/d/i.   Neurological:      Mental Status: She is alert.   Psychiatric:         Mood and Affect: Mood normal.         Behavior: Behavior normal.         Thought Content: Thought content normal.         Significant Labs:   Blood Culture:   Recent Labs   Lab 11/05/20  0307 11/06/20  0742 11/06/20  0745 11/08/20  0642 11/09/20  0447   LABBLOO No growth after 5 days.  No growth after 5 days. No Growth to date  No Growth to date  No Growth to date  No Growth to date  No Growth to date No Growth to date  No Growth to date  No Growth to date  No Growth to date  No Growth to date No Growth to date  No Growth to date  No Growth to date  No Growth to date  No Growth to date  No Growth to date No Growth to date  No Growth to date     CBC:   Recent Labs   Lab 11/09/20  0448 11/10/20  0656   WBC 11.37 11.88   HGB 7.4* 6.9*   HCT 24.3* 22.9*    228     CMP:   Recent Labs   Lab 11/09/20  0448 11/10/20  0656   * 135*   K 5.0 4.5    107   CO2 21* 22*   GLU 80 109   BUN 30* 28*   CREATININE 1.4 1.4   CALCIUM 7.5* 7.7*   PROT 5.8* 5.9*   ALBUMIN 1.3* 1.3*   BILITOT 0.4 0.4   ALKPHOS 105 106   AST 17 15   ALT 8* 8*   ANIONGAP 4* 6*   EGFRNONAA 50.1* 50.1*     Wound Culture: No results for input(s): LABAERO in the last 4320 hours.  All pertinent labs  within the past 24 hours have been reviewed.    Significant Imaging: I have reviewed all pertinent imaging results/findings within the past 24 hours.   CT Chest Abdomen Pelvis Without Contrast (XPD) [[id number]] Resulted: 11/07/20 2248   Order Status: Completed Updated: 11/07/20 2250   Narrative:     EXAMINATION:   CT CHEST ABDOMEN PELVIS WITHOUT CONTRAST(XPD)     CLINICAL HISTORY:   fever/sepsis, known abscesses, renal function prohibitive of IV contrast which would be preferred;     TECHNIQUE:   Low dose axial images, sagittal and coronal reformations were obtained from the thoracic inlet to the pubic symphysis without IV contrast.  .  Oral contrast was not administered.     COMPARISON:   CTA chest 10/24/2020     FINDINGS:   Examination of the vascular and soft tissue structures at the base of the neck is unremarkable.  There is a right-sided PICC line with tip terminating in the right atrium.     The thoracic aorta maintains normal caliber, contour, and course without significant atherosclerotic calcification within its course.  The heart is not enlarged.  There is a small pericardial effusion present, new from prior exam.     The esophagus maintains a normal course and caliber. No new bulky mediastinal or axillary lymph node enlargement appreciated allowing for limitations of noncontrast technique.     There appear to be a few secretions noted within the distal trachea.  Proximal airways are patent.  There is continued temporal evolution of previously demonstrated extensive bilateral opacities, some of which demonstrate central cavitation.  Scattered nodular opacities appear somewhat more consolidative compared to prior study of 10/24/2020.  There are small bilateral pleural effusions, mildly increased in volume compared to prior examination with associated lower lobe atelectasis and/or consolidation.     The liver is enlarged.  There is a subcentimeter left hepatic lobe hypodensity which is too small to  accurately characterize.  Gallbladder is poorly visualized, although there is suggestion of circumferential gallbladder wall thickening.  No radiopaque calculi appreciated.  There is no intra-or extrahepatic biliary ductal dilatation.     The stomach, pancreas, and adrenal glands appear within normal limits.  The spleen is enlarged measuring 13.9 cm.     Kidneys are normal in size and location.  No evidence of nephrolithiasis or hydronephrosis.  The urinary bladder demonstrates no significant abnormality. The uterus is unremarkable.     The abdominal aorta is normal in course and caliber without significant atherosclerotic calcifications.     There is significant volume retained stool throughout the colon suggesting constipation.  The appendix appears to be surgically absent.  There is moderate volume of peritoneal fluid throughout the abdomen and pelvis.  No evidence of free intraperitoneal air or portal venous gas.     Osseous structures demonstrate no acute abnormalities.  There is significant diffuse body wall anasarca present.  There are scattered foci of air within the anterior abdominal wall subcutaneous soft tissues, presumably relating to recent injection sites.    Impression:       1.  Continued temporal evolution of previously demonstrated lung disease presumably relating to septic emboli, noting continued extensive bilateral opacities, some of which demonstrate central cavitation.  Findings appear relatively similar compared to prior examination with some interval coalescence of previously demonstrated more scattered nodular opacities.     2.  Small bilateral pleural effusions, increased in volume from prior examination, with associated lower lobe atelectasis and/or consolidation.     3.  Significant diffuse body wall anasarca.  Moderate volume of peritoneal fluid throughout the abdomen and pelvis.     4.  Small pericardial effusion, new from prior examination.     5.  Mild gallbladder wall thickening  which is a nonspecific finding, although may relate to hepatic dysfunction or volume status given peritoneal fluid and anasarca.  No evidence of cholelithiasis.  Clinical correlation advised.     6.  Hepatosplenomegaly.  Subcentimeter left hepatic lobe hypodensity, too small to accurately characterize.     7.  Moderate volume retained stool throughout the colon suggesting constipation.     8.  Additional findings as above.       Electronically signed by: Frandy Fan MD   Date: 11/07/2020   Time: 22:48   X-Ray Chest PA And Lateral [[id number]] Resulted: 11/06/20 1000   Order Status: Completed Updated: 11/06/20 1002   Narrative:     EXAMINATION:   XR CHEST PA AND LATERAL     CLINICAL HISTORY:   fever, known septic emboli;     TECHNIQUE:   PA and lateral views of the chest were performed.     COMPARISON:   Chest radiograph: 11/05/2020, 06:17 hours.  11/03/2020.  10/29/2020.     Chest CTA: 10/24/2020.     FINDINGS:   PICC line placed via the right upper extremity has its tip overlying the mediastinum near the junction of SVC and right atrium.     Multifocal patchy subsegmental opacities persist throughout both lungs.  I suspect dependent pleural fluid is well.  The extent of disease is no better than on yesterday's study of 06/17 hours.  I note the clinical history of septic emboli.     I detect no pneumothorax, pneumomediastinum, pneumoperitoneum or significant osseous abnormality.    Impression:       No significant change compared to yesterday's study in this patient with bilateral pulmonary parenchymal disease consistent with septic emboli.       Electronically signed by: Anastasiia Leo MD   Date: 11/06/2020   Time: 10:00   US Retroperitoneal Complete (Kidney and [[id number]] Resulted: 11/05/20 1008   Order Status: Completed Updated: 11/05/20 1010   Narrative:     EXAMINATION:   US RETROPERITONEAL COMPLETE     CLINICAL HISTORY:   DERICK;     TECHNIQUE:   Ultrasound of the kidneys and urinary bladder was performed  including color flow and Doppler evaluation of the kidneys.     COMPARISON:   Retroperitoneal ultrasound 10/29/2020.     FINDINGS:   Right kidney: The right kidney measures 12.5 cm in length.  No cortical thinning. No loss of corticomedullary distinction.  Perfusion is normal.  Resistive index measures 0.70.  No mass. There is a 3 mm hyperechoic focus with twinkling artifact, which may represent nonobstructing stone within the mid right kidney.  No hydronephrosis.     Left kidney: The left kidney measures 13.8 cm in length.  No cortical thinning. No loss of corticomedullary distinction.  Perfusion is normal.  Resistive index measures 0.71.  No mass. No renal stone. No hydronephrosis.     The bladder is partially distended at the time of scanning and has an unremarkable appearance.     Miscellaneous: Partially visualized left pleural effusion    Impression:       3 mm right nonobstructing nephrolithiasis.     Partially visualized left pleural effusion.     Electronically signed by resident: Sophia Trent   Date: 11/05/2020   Time: 09:35     Electronically signed by: James Rousseau MD   Date: 11/05/2020   Time: 10:08   X-Ray Chest 1 View [[id number]] Resulted: 11/05/20 0740   Order Status: Completed Updated: 11/05/20 0743   Narrative:     EXAMINATION:   XR CHEST 1 VIEW     CLINICAL HISTORY:   Pneumonia;     TECHNIQUE:   Single frontal view of the chest was performed.     COMPARISON:   11/03/2020.     FINDINGS:   There is a right upper extremity PICC line with tip near the junction of the superior vena cava and right atrium.  The right internal jugular central venous catheter has been removed.     Heart and mediastinal structures are unchanged.  There continues to be patchy bilateral pulmonary consolidation within both lungs predominantly within the mid to lower lungs.  Findings are similar to the prior examination.  The hemidiaphragms and costophrenic angles are poorly delineated and small layering pleural effusions  may be present.  There is no evidence for pneumothorax.  Bony structures are grossly intact.    Impression:       Right upper extremity PICC line with tip near the junction of the superior vena cava and right atrium.  Right IJ line has been removed.     Persistent patchy bilateral pulmonary opacities predominantly within the mid to lower lungs with little interval change when compared to 11/03/2020.     Poor delineation of the hemidiaphragms and costophrenic angle suggesting small layering bilateral pleural effusions.       Electronically signed by: James Rousseau MD   Date: 11/05/2020   Time: 07:40   X-Ray Chest 1 View for PICC_Central line [[id number]] Resulted: 11/03/20 1349   Order Status: Completed Updated: 11/03/20 1351   Narrative:     EXAMINATION:   XR CHEST 1 VIEW     CLINICAL HISTORY:   Evaluate PICC line placement;     TECHNIQUE:   Single frontal view of the chest was performed.     COMPARISON:   11/01/2020.  10/29/2020.  10/27/2020.     FINDINGS:   PICC line placed via the right upper extremity has its tip projected over the mediastinum near the junction of SVC and right atrium.  Central venous catheter placed via the right neck has its tip in its usual location, also near the junction of SVC and right atrium.     Interval extubation and removal of NG tube.  Extrinsic devices overlie the image.     Multifocal patchy subsegmental opacities persist in both lungs with peripheral predominance.  Overall pulmonary parenchymal aeration has improved since 10/27/2020.  No new pulmonary disease detected.    Impression:       Please see above.       Electronically signed by: Anastasiia Leo MD   Date: 11/03/2020   Time: 13:49   Imaging History    2020  Date Procedure Name Status Accession Number Location   11/07/20 06:38 PM CT Chest Abdomen Pelvis Without Contrast (XPD) Final [id number] [facility]   11/06/20 09:49 AM X-Ray Chest PA And Lateral Final [id number] [facility]   11/05/20 09:32 AM US Retroperitoneal Complete (Kidney and  Final [id number] [facility]   11/05/20 06:17 AM X-Ray Chest 1 View Final [id number] [facility]   11/03/20 01:35 PM X-Ray Chest 1 View for PICC_Central line Final [id number] [facility]   11/01/20 02:32 PM X-Ray Chest AP Portable Final [id number] [facility]   10/29/20 06:17 PM US Retroperitoneal Complete (Kidney and Final [id number] [facility]   10/29/20 12:37 PM X-Ray Chest AP Portable Final [id number] [facility]   10/29/20 05:49 AM X-Ray Abdomen AP 1 View Final [id number] [facility]   10/27/20 03:05 AM CT Head Without Contrast Final [id number] [facility]   10/27/20 01:32 AM X-Ray Chest AP Portable Final [id number] [facility]   10/26/20 11:18 PM X-Ray Chest AP Portable Final [id number] [facility]   10/26/20 09:57 PM X-ray chest AP portable Final [id number] [facility]   10/24/20 08:22 PM CTA Chest Non-Coronary Final [id number] [facility]   10/24/20 01:55 AM X-Ray Foot Complete Left Final [id number] [facility]   10/24/20 01:54 AM X-Ray Chest 1 View Final [id number] [facility]   10/24/20 12:00 AM CARDIAC MONITORING STRIPS Final     10/26/20 10:54 AM Echo Color Flow Doppler? Yes Final [id number] [facility]

## 2020-11-10 NOTE — PLAN OF CARE
This patient began to run a temp of 101 at 2300. Tylenol given. Temp now is 97.8 . No other complains at this time. All safety measure have been implemented.      Problem: Adult Inpatient Plan of Care  Goal: Plan of Care Review  Outcome: Ongoing, Progressing  Goal: Patient-Specific Goal (Individualization)  Outcome: Ongoing, Progressing  Goal: Absence of Hospital-Acquired Illness or Injury  Outcome: Ongoing, Progressing  Goal: Optimal Comfort and Wellbeing  Outcome: Ongoing, Progressing  Goal: Readiness for Transition of Care  Outcome: Ongoing, Progressing  Goal: Rounds/Family Conference  Outcome: Ongoing, Progressing     Problem: Adjustment to Illness (Sepsis/Septic Shock)  Goal: Optimal Coping  Outcome: Ongoing, Progressing     Problem: Bleeding (Sepsis/Septic Shock)  Goal: Absence of Bleeding  Outcome: Ongoing, Progressing     Problem: Glycemic Control Impaired (Sepsis/Septic Shock)  Goal: Blood Glucose Level Within Desired Range  Outcome: Ongoing, Progressing     Problem: Hemodynamic Instability (Sepsis/Septic Shock)  Goal: Effective Tissue Perfusion  Outcome: Ongoing, Progressing     Problem: Infection (Sepsis/Septic Shock)  Goal: Absence of Infection Signs/Symptoms  Outcome: Ongoing, Progressing     Problem: Nutrition Impaired (Sepsis/Septic Shock)  Goal: Optimal Nutrition Intake  Outcome: Ongoing, Progressing     Problem: Respiratory Compromise (Sepsis/Septic Shock)  Goal: Effective Oxygenation and Ventilation  Outcome: Ongoing, Progressing     Problem: Fluid Imbalance (Pneumonia)  Goal: Fluid Balance  Outcome: Ongoing, Progressing     Problem: Infection (Pneumonia)  Goal: Resolution of Infection Signs/Symptoms  Outcome: Ongoing, Progressing     Problem: Respiratory Compromise (Pneumonia)  Goal: Effective Oxygenation and Ventilation  Outcome: Ongoing, Progressing     Problem: Infection  Goal: Infection Symptom Resolution  Outcome: Ongoing, Progressing     Problem: Wound  Goal: Optimal Wound Healing  Outcome:  Ongoing, Progressing     Problem: Skin Injury Risk Increased  Goal: Skin Health and Integrity  Outcome: Ongoing, Progressing     Problem: Communication Impairment (Mechanical Ventilation, Invasive)  Goal: Effective Communication  Outcome: Ongoing, Progressing     Problem: Device-Related Complication Risk (Mechanical Ventilation, Invasive)  Goal: Optimal Device Function  Outcome: Ongoing, Progressing     Problem: Inability to Wean (Mechanical Ventilation, Invasive)  Goal: Mechanical Ventilation Liberation  Outcome: Ongoing, Progressing     Problem: Nutrition Impairment (Mechanical Ventilation, Invasive)  Goal: Optimal Nutrition Delivery  Outcome: Ongoing, Progressing     Problem: Skin and Tissue Injury (Mechanical Ventilation, Invasive)  Goal: Absence of Device-Related Skin and Tissue Injury  Outcome: Ongoing, Progressing     Problem: Ventilator-Induced Lung Injury (Mechanical Ventilation, Invasive)  Goal: Absence of Ventilator-Induced Lung Injury  Outcome: Ongoing, Progressing     Problem: Communication Impairment (Artificial Airway)  Goal: Effective Communication  Outcome: Ongoing, Progressing     Problem: Device-Related Complication Risk (Artificial Airway)  Goal: Optimal Device Function  Outcome: Ongoing, Progressing     Problem: Skin and Tissue Injury (Artificial Airway)  Goal: Absence of Device-Related Skin or Tissue Injury  Outcome: Ongoing, Progressing     Problem: Noninvasive Ventilation Acute  Goal: Effective Unassisted Ventilation and Oxygenation  Outcome: Ongoing, Progressing     Problem: Fall Injury Risk  Goal: Absence of Fall and Fall-Related Injury  Outcome: Ongoing, Progressing     Problem: Restraint, Nonbehavioral (Nonviolent)  Goal: Discontinuation Criteria Achieved  Outcome: Ongoing, Progressing  Goal: Personal Dignity and Safety Maintained  Outcome: Ongoing, Progressing     Problem: Electrolyte Imbalance (Acute Kidney Injury/Impairment)  Goal: Serum Electrolyte Balance  Outcome: Ongoing,  Progressing     Problem: Fluid Imbalance (Acute Kidney Injury/Impairment)  Goal: Optimal Fluid Balance  Outcome: Ongoing, Progressing     Problem: Hematologic Alteration (Acute Kidney Injury/Impairment)  Goal: Hemoglobin, Hematocrit and Platelets Within Normal Range  Outcome: Ongoing, Progressing     Problem: Oral Intake Inadequate (Acute Kidney Injury/Impairment)  Goal: Optimal Nutrition Intake  Outcome: Ongoing, Progressing     Problem: Renal Function Impairment (Acute Kidney Injury/Impairment)  Goal: Effective Renal Function  Outcome: Ongoing, Progressing

## 2020-11-10 NOTE — PROGRESS NOTES
Pharmacokinetic Assessment Follow Up: IV Vancomycin    Vancomycin serum concentration assessment(s):    The trough level was drawn correctly and can be used to guide therapy at this time. The measurement is below the desired definitive target range of 15 to 20 mcg/mL. Scr trending down.    Vancomycin Regimen Plan:    Change regimen to Vancomycin 1000 mg IV every 24 hours with next serum trough concentration measured at 0830 prior to third dose on 11/13.    Drug levels (last 3 results):  Recent Labs   Lab Result Units 11/08/20  0642 11/10/20  0831   Vancomycin, Random ug/mL 16.9  --    Vancomycin-Trough ug/mL  --  13.5       Pharmacy will continue to follow and monitor vancomycin.    Please contact pharmacy at extension [phone] for questions regarding this assessment.    Thank you for the consult,   Rosa Garcia       Patient brief summary:  Марина Britton is a 30 y.o. female initiated on antimicrobial therapy with IV Vancomycin for treatment of endocarditis    The patient's current regimen is 750mg q 24h    Drug Allergies:   Review of patient's allergies indicates:   Allergen Reactions    Soap Rash     [facility] - Hospital supplied - cleanser shampoo & body wash gel  Ingredients - purified water, sodium laureth sulfate, sodium chloride, cocamide william, cocamidopropylamine oxide, fragrance, aloe barbadensis leaf juice, propylene alcohol, peg-150 distearate, diazolidinyl urea, ciric acid, methylparaben, & propulparaben       Actual Body Weight:   92.8kg    Renal Function:   Estimated Creatinine Clearance: 61.8 mL/min (based on SCr of 1.4 mg/dL).,     Dialysis Method (if applicable):  N/A    CBC (last 72 hours):  Recent Labs   Lab Result Units 11/08/20  0642 11/09/20  0448 11/10/20  0656   WBC K/uL 12.23 11.37 11.88   Hemoglobin g/dL 7.4* 7.4* 6.9*   Hematocrit % 24.6* 24.3* 22.9*   Platelets K/uL 282 262 228   Gran % % 79.8* 71.7 73.6*   Lymph % % 8.9* 14.7* 13.6*   Mono % % 6.2 7.7 7.5   Eosinophil % % 2.5 2.6  2.7   Basophil % % 0.9 1.2 1.0   Differential Method  Automated Automated Automated       Metabolic Panel (last 72 hours):  Recent Labs   Lab Result Units 11/08/20  0642 11/09/20  0448 11/10/20  0656   Sodium mmol/L 130* 132* 135*   Potassium mmol/L 4.6 5.0 4.5   Chloride mmol/L 105 107 107   CO2 mmol/L 19* 21* 22*   Glucose mg/dL 100 80 109   BUN mg/dL 35* 30* 28*   Creatinine mg/dL 1.7* 1.4 1.4   Albumin g/dL 1.3* 1.3* 1.3*   Total Bilirubin mg/dL 0.4 0.4 0.4   Alkaline Phosphatase U/L 122 105 106   AST U/L 16 17 15   ALT U/L 8* 8* 8*   Magnesium mg/dL 2.3 2.2 1.9   Phosphorus mg/dL 4.3 3.6 3.4       Vancomycin Administrations:  vancomycin given in the last 96 hours                   vancomycin 750 mg in dextrose 5 % 250 mL IVPB (ready to mix system) (mg) 750 mg New Bag 11/10/20 0925     750 mg New Bag 11/09/20 1019     750 mg New Bag 11/08/20 0837    vancomycin 750 mg in dextrose 5 % 250 mL IVPB (ready to mix system) (mg) 750 mg New Bag 11/07/20 1013                Microbiologic Results:  Microbiology Results (last 7 days)     Procedure Component Value Units Date/Time    Blood culture [[id number]] Collected: 11/09/20 0447    Order Status: Completed Specimen: Blood Updated: 11/10/20 1212     Blood Culture, Routine No Growth to date      No Growth to date    Narrative:      Collection has been rescheduled by CHELSIE at 11/09/2020 04:49 Reason:   unable to collect    Blood culture [[id number]] Collected: 11/08/20 0642    Order Status: Completed Specimen: Blood Updated: 11/10/20 0822     Blood Culture, Routine No Growth to date      No Growth to date      No Growth to date    Blood culture [[id number]] Collected: 11/08/20 0642    Order Status: Completed Specimen: Blood Updated: 11/10/20 0812     Blood Culture, Routine No Growth to date      No Growth to date      No Growth to date    Blood culture [[id number]] Collected: 11/05/20 0307    Order Status: Completed Specimen: Blood Updated: 11/10/20 0812     Blood Culture,  Routine No growth after 5 days.    Blood culture [[id number]] Collected: 11/06/20 0745    Order Status: Completed Specimen: Blood from Peripheral, Left Hand Updated: 11/10/20 0812     Blood Culture, Routine No Growth to date      No Growth to date      No Growth to date      No Growth to date      No Growth to date    Narrative:      Collection has been rescheduled by VX at 11/06/2020 05:05 Reason:   Unable to collect blood culture and other labs RN Maribel is aware  Collection has been rescheduled by VX at 11/06/2020 05:05 Reason:   Unable to collect blood culture and other labs RN Maribel is aware    Blood culture [[id number]] Collected: 11/06/20 0742    Order Status: Completed Specimen: Blood from Antecubital, Left Arm Updated: 11/10/20 0812     Blood Culture, Routine No Growth to date      No Growth to date      No Growth to date      No Growth to date      No Growth to date    Narrative:      Collection has been rescheduled by VX at 11/06/2020 05:05 Reason:   Unable to collect blood culture and other labs MILLY López is aware  Collection has been rescheduled by VX at 11/06/2020 05:05 Reason:   Unable to collect blood culture and other labs RN Maribel is aware    Blood culture [[id number]] Collected: 11/05/20 0307    Order Status: Completed Specimen: Blood Updated: 11/10/20 0812     Blood Culture, Routine No growth after 5 days.    Blood culture [[id number]] Collected: 11/10/20 0656    Order Status: Sent Specimen: Blood from Antecubital, Left Arm Updated: 11/10/20 0718    Narrative:      Specimen collection performed by Alternate Phlebotomist: nurse  Specimen collection performed by Alternate Phlebotomist: nurse    Blood culture [[id number]] Collected: 11/10/20 0650    Order Status: Sent Specimen: Blood Updated: 11/10/20 0718    Blood culture [[id number]] Collected: 11/09/20 0448    Order Status: Completed Specimen: Blood Updated: 11/09/20 1411    Narrative:      Collection has been rescheduled by TJ at 11/09/2020 04:49 Reason:    unable to collect    Blood culture [[id number]] Collected: 11/04/20 0442    Order Status: Completed Specimen: Blood Updated: 11/09/20 1014     Blood Culture, Routine No growth after 5 days.    Blood culture [[id number]] Collected: 11/04/20 0442    Order Status: Completed Specimen: Blood Updated: 11/09/20 1013     Blood Culture, Routine No growth after 5 days.    Urine culture [[id number]] Collected: 11/06/20 0638    Order Status: Completed Specimen: Urine Updated: 11/07/20 1038     Urine Culture, Routine No growth    Narrative:      Specimen Source->Urine    Blood culture [[id number]]     Order Status: Sent Specimen: Blood     Blood culture [[id number]]     Order Status: Sent Specimen: Blood     Blood culture [[id number]] Collected: 11/01/20 1213    Order Status: Completed Specimen: Blood from Peripheral, Antecubital, Left Updated: 11/06/20 1412     Blood Culture, Routine No growth after 5 days.    Blood culture [[id number]] Collected: 11/01/20 1145    Order Status: Completed Specimen: Blood from Peripheral, Antecubital, Right Updated: 11/06/20 1412     Blood Culture, Routine No growth after 5 days.    Urine Culture High Risk [[id number]] Collected: 11/05/20 0615    Order Status: Completed Specimen: Urine, Clean Catch Updated: 11/06/20 1228     Urine Culture, Routine No growth    Narrative:      Indicated criteria for high risk culture:->Other  Other (specify):->high risk    Blood culture [[id number]] Collected: 10/31/20 1610    Order Status: Completed Specimen: Blood from Peripheral, Antecubital, Right Updated: 11/05/20 2212     Blood Culture, Routine No growth after 5 days.    Blood culture [[id number]] Collected: 10/31/20 1626    Order Status: Completed Specimen: Blood from Peripheral, Antecubital, Left Updated: 11/05/20 2212     Blood Culture, Routine No growth after 5 days.

## 2020-11-10 NOTE — PT/OT/SLP PROGRESS
Physical Therapy Treatment    Patient Name:  Марина Britton   MRN:  [id number]    Recommendations:     Discharge Recommendations:  rehabilitation facility   Discharge Equipment Recommendations: walker, rolling, shower chair   Barriers to discharge: decreased functional mobility requiring increased assist      Assessment:     Марина Britton is a 30 y.o. female admitted with a medical diagnosis of Endocarditis of tricuspid valve.  She presents with the following impairments/functional limitations:  weakness, impaired endurance, impaired self care skills, impaired functional mobilty, decreased lower extremity function, gait instability. Pt treated on this date declining gait training but performed bed mobility and transfer training to BSC requiring SBA. Pt would benefit from continued skilled acute PT 3x/wk to improve functional mobility.  Recommending pt receive PT services in Rehab setting following discharge from hospital once medically cleared.     Rehab Prognosis: Fair; patient would benefit from acute skilled PT services to address these deficits and reach maximum level of function.    Recent Surgery: * No surgery found *      Plan:     During this hospitalization, patient to be seen 3 x/week to address the identified rehab impairments via gait training, therapeutic activities, therapeutic exercises, neuromuscular re-education and progress toward the following goals:    · Plan of Care Expires:  11/12/20    Subjective     Chief Complaint: N&V  Patient/Family Comments/goals: pt declined gait training on this date  Pain/Comfort:  ·        Objective:     Communicated with NSG prior to session.      General Precautions: Standard, fall   Orthopedic Precautions:N/A   Braces:       Functional Mobility:  · Bed Mobility:     · Scooting: modified independence  · Bridging: modified independence  · Supine to Sit: modified independence  · Transfers:     · Sit to Stand:  stand by assistance with no AD  · BSC to Bed: stand  by assistance with  no AD  using  Step Transfer  · Gait: declined   · Balance: SBA      AM-PAC 6 CLICK MOBILITY  Turning over in bed (including adjusting bedclothes, sheets and blankets)?: 4  Sitting down on and standing up from a chair with arms (e.g., wheelchair, bedside commode, etc.): 3  Moving from lying on back to sitting on the side of the bed?: 4  Moving to and from a bed to a chair (including a wheelchair)?: 3  Need to walk in hospital room?: 3  Climbing 3-5 steps with a railing?: 3  Basic Mobility Total Score: 20       Therapeutic Activities and Exercises:   - Pt educated on:   -PT roles, expectations, and POC    -Safety with mobility   -Benefits of OOB activities to increase strength and functional mobility    -Performing ther ex for increasing LE ROM and strength   -Discharge recommendations     Patient left HOB elevated with call button in reach..    GOALS:   Multidisciplinary Problems     Physical Therapy Goals        Problem: Physical Therapy Goal    Goal Priority Disciplines Outcome Goal Variances Interventions   Physical Therapy Goal     PT, PT/OT Ongoing, Progressing     Description: Goals to be met by: 20     Patient will increase functional independence with mobility by performin. Supine to sit with Modified [place]  2. Sit to supine with Modified [place]  3. Sit to stand transfer with Stand-by Assistance  4. Gait  x 50 feet with Contact Guard Assistance using LRAD, if needed.   5. Ascend/descend 3 stairs with left Handrails Contact Guard Assistance.                      Time Tracking:     PT Received On: 11/10/20  PT Start Time: 1001     PT Stop Time: 1009  PT Total Time (min): 8 min     Billable Minutes: Therapeutic Activity 8    Treatment Type: Treatment  PT/PTA: PT     PTA Visit Number: 0     Nitin Olivier, PT  11/10/2020

## 2020-11-10 NOTE — SUBJECTIVE & OBJECTIVE
Interval History: No AEON.  Continues to have intermittent fevers, which are to be expected given the extent of her infection. She feels better since showering with assistance of her mom. Still with LE weakness and reports aching to her left hip 2/2 swelling. Lasix started. No new complaints.       Review of Systems   Constitutional: Negative for activity change, chills, diaphoresis and fever.   Respiratory: Positive for cough, chest tightness and shortness of breath.    Cardiovascular: Positive for chest pain (pleuritic- improving) and leg swelling.   Gastrointestinal: Negative for abdominal pain, constipation, diarrhea, nausea and vomiting.   Genitourinary: Negative for dysuria, frequency and urgency.   Musculoskeletal: Positive for arthralgias.   Neurological: Positive for weakness. Negative for dizziness.   Hematological: Does not bruise/bleed easily.     Objective:     Vital Signs (Most Recent):  Temp: 98.7 °F (37.1 °C) (11/10/20 1204)  Pulse: 93 (11/10/20 1204)  Resp: 14 (11/10/20 1228)  BP: (!) 141/105 (11/10/20 1204)  SpO2: 96 % (11/10/20 1204) Vital Signs (24h Range):  Temp:  [97.8 °F (36.6 °C)-101 °F (38.3 °C)] 98.7 °F (37.1 °C)  Pulse:  [] 93  Resp:  [14-20] 14  SpO2:  [91 %-98 %] 96 %  BP: (125-155)/() 141/105     Weight: 92.8 kg (204 lb 9.4 oz)  Body mass index is 37.42 kg/m².    Estimated Creatinine Clearance: 61.8 mL/min (based on SCr of 1.4 mg/dL).    Physical Exam  Vitals signs and nursing note reviewed.   Constitutional:       General: She is not in acute distress.     Appearance: She is ill-appearing.       HENT:      Head: Normocephalic and atraumatic.      Nose: No congestion.   Eyes:      General: No scleral icterus.     Conjunctiva/sclera: Conjunctivae normal.   Cardiovascular:      Rate and Rhythm: Normal rate and regular rhythm.      Heart sounds: Murmur present.   Pulmonary:      Effort: No tachypnea or respiratory distress.      Breath sounds: No stridor. Examination of the  right-upper field reveals decreased breath sounds and rales. Examination of the left-upper field reveals rales. Examination of the right-middle field reveals decreased breath sounds and rales. Examination of the left-middle field reveals rales. Examination of the right-lower field reveals decreased breath sounds. Examination of the left-lower field reveals rales. Decreased breath sounds (slightly improved) and rales present. No wheezing or rhonchi.   Abdominal:      General: Abdomen is flat. There is no distension.      Palpations: Abdomen is soft. There is no mass.   Musculoskeletal:      Right lower leg: Edema present.      Left lower leg: Edema present.   Skin:     General: Skin is warm and dry.      Comments: Janeway lesions on digits  Wound on ankle noted  Tattoos    RUE PICC Line c/d/i.   Neurological:      Mental Status: She is alert.   Psychiatric:         Mood and Affect: Mood normal.         Behavior: Behavior normal.         Thought Content: Thought content normal.         Significant Labs:   Blood Culture:   Recent Labs   Lab 11/05/20  0307 11/06/20  0742 11/06/20  0745 11/08/20  0642 11/09/20  0447   LABBLOO No growth after 5 days.  No growth after 5 days. No Growth to date  No Growth to date  No Growth to date  No Growth to date  No Growth to date No Growth to date  No Growth to date  No Growth to date  No Growth to date  No Growth to date No Growth to date  No Growth to date  No Growth to date  No Growth to date  No Growth to date  No Growth to date No Growth to date  No Growth to date     CBC:   Recent Labs   Lab 11/09/20  0448 11/10/20  0656   WBC 11.37 11.88   HGB 7.4* 6.9*   HCT 24.3* 22.9*    228     CMP:   Recent Labs   Lab 11/09/20  0448 11/10/20  0656   * 135*   K 5.0 4.5    107   CO2 21* 22*   GLU 80 109   BUN 30* 28*   CREATININE 1.4 1.4   CALCIUM 7.5* 7.7*   PROT 5.8* 5.9*   ALBUMIN 1.3* 1.3*   BILITOT 0.4 0.4   ALKPHOS 105 106   AST 17 15   ALT 8* 8*    ANIONGAP 4* 6*   EGFRNONAA 50.1* 50.1*     Wound Culture: No results for input(s): LABAERO in the last 4320 hours.  All pertinent labs within the past 24 hours have been reviewed.    Significant Imaging: I have reviewed all pertinent imaging results/findings within the past 24 hours.   CT Chest Abdomen Pelvis Without Contrast (XPD) [[id number]] Resulted: 11/07/20 2248   Order Status: Completed Updated: 11/07/20 2250   Narrative:     EXAMINATION:   CT CHEST ABDOMEN PELVIS WITHOUT CONTRAST(XPD)     CLINICAL HISTORY:   fever/sepsis, known abscesses, renal function prohibitive of IV contrast which would be preferred;     TECHNIQUE:   Low dose axial images, sagittal and coronal reformations were obtained from the thoracic inlet to the pubic symphysis without IV contrast.  .  Oral contrast was not administered.     COMPARISON:   CTA chest 10/24/2020     FINDINGS:   Examination of the vascular and soft tissue structures at the base of the neck is unremarkable.  There is a right-sided PICC line with tip terminating in the right atrium.     The thoracic aorta maintains normal caliber, contour, and course without significant atherosclerotic calcification within its course.  The heart is not enlarged.  There is a small pericardial effusion present, new from prior exam.     The esophagus maintains a normal course and caliber. No new bulky mediastinal or axillary lymph node enlargement appreciated allowing for limitations of noncontrast technique.     There appear to be a few secretions noted within the distal trachea.  Proximal airways are patent.  There is continued temporal evolution of previously demonstrated extensive bilateral opacities, some of which demonstrate central cavitation.  Scattered nodular opacities appear somewhat more consolidative compared to prior study of 10/24/2020.  There are small bilateral pleural effusions, mildly increased in volume compared to prior examination with associated lower lobe  atelectasis and/or consolidation.     The liver is enlarged.  There is a subcentimeter left hepatic lobe hypodensity which is too small to accurately characterize.  Gallbladder is poorly visualized, although there is suggestion of circumferential gallbladder wall thickening.  No radiopaque calculi appreciated.  There is no intra-or extrahepatic biliary ductal dilatation.     The stomach, pancreas, and adrenal glands appear within normal limits.  The spleen is enlarged measuring 13.9 cm.     Kidneys are normal in size and location.  No evidence of nephrolithiasis or hydronephrosis.  The urinary bladder demonstrates no significant abnormality. The uterus is unremarkable.     The abdominal aorta is normal in course and caliber without significant atherosclerotic calcifications.     There is significant volume retained stool throughout the colon suggesting constipation.  The appendix appears to be surgically absent.  There is moderate volume of peritoneal fluid throughout the abdomen and pelvis.  No evidence of free intraperitoneal air or portal venous gas.     Osseous structures demonstrate no acute abnormalities.  There is significant diffuse body wall anasarca present.  There are scattered foci of air within the anterior abdominal wall subcutaneous soft tissues, presumably relating to recent injection sites.    Impression:       1.  Continued temporal evolution of previously demonstrated lung disease presumably relating to septic emboli, noting continued extensive bilateral opacities, some of which demonstrate central cavitation.  Findings appear relatively similar compared to prior examination with some interval coalescence of previously demonstrated more scattered nodular opacities.     2.  Small bilateral pleural effusions, increased in volume from prior examination, with associated lower lobe atelectasis and/or consolidation.     3.  Significant diffuse body wall anasarca.  Moderate volume of peritoneal fluid  throughout the abdomen and pelvis.     4.  Small pericardial effusion, new from prior examination.     5.  Mild gallbladder wall thickening which is a nonspecific finding, although may relate to hepatic dysfunction or volume status given peritoneal fluid and anasarca.  No evidence of cholelithiasis.  Clinical correlation advised.     6.  Hepatosplenomegaly.  Subcentimeter left hepatic lobe hypodensity, too small to accurately characterize.     7.  Moderate volume retained stool throughout the colon suggesting constipation.     8.  Additional findings as above.       Electronically signed by: Frandy Fan MD   Date: 11/07/2020   Time: 22:48   X-Ray Chest PA And Lateral [[id number]] Resulted: 11/06/20 1000   Order Status: Completed Updated: 11/06/20 1002   Narrative:     EXAMINATION:   XR CHEST PA AND LATERAL     CLINICAL HISTORY:   fever, known septic emboli;     TECHNIQUE:   PA and lateral views of the chest were performed.     COMPARISON:   Chest radiograph: 11/05/2020, 06:17 hours.  11/03/2020.  10/29/2020.     Chest CTA: 10/24/2020.     FINDINGS:   PICC line placed via the right upper extremity has its tip overlying the mediastinum near the junction of SVC and right atrium.     Multifocal patchy subsegmental opacities persist throughout both lungs.  I suspect dependent pleural fluid is well.  The extent of disease is no better than on yesterday's study of 06/17 hours.  I note the clinical history of septic emboli.     I detect no pneumothorax, pneumomediastinum, pneumoperitoneum or significant osseous abnormality.    Impression:       No significant change compared to yesterday's study in this patient with bilateral pulmonary parenchymal disease consistent with septic emboli.       Electronically signed by: Anastasiia Leo MD   Date: 11/06/2020   Time: 10:00   US Retroperitoneal Complete (Kidney and [[id number]] Resulted: 11/05/20 1008   Order Status: Completed Updated: 11/05/20 1010   Narrative:      EXAMINATION:   US RETROPERITONEAL COMPLETE     CLINICAL HISTORY:   DERICK;     TECHNIQUE:   Ultrasound of the kidneys and urinary bladder was performed including color flow and Doppler evaluation of the kidneys.     COMPARISON:   Retroperitoneal ultrasound 10/29/2020.     FINDINGS:   Right kidney: The right kidney measures 12.5 cm in length.  No cortical thinning. No loss of corticomedullary distinction.  Perfusion is normal.  Resistive index measures 0.70.  No mass. There is a 3 mm hyperechoic focus with twinkling artifact, which may represent nonobstructing stone within the mid right kidney.  No hydronephrosis.     Left kidney: The left kidney measures 13.8 cm in length.  No cortical thinning. No loss of corticomedullary distinction.  Perfusion is normal.  Resistive index measures 0.71.  No mass. No renal stone. No hydronephrosis.     The bladder is partially distended at the time of scanning and has an unremarkable appearance.     Miscellaneous: Partially visualized left pleural effusion    Impression:       3 mm right nonobstructing nephrolithiasis.     Partially visualized left pleural effusion.     Electronically signed by resident: Sophia Trent   Date: 11/05/2020   Time: 09:35     Electronically signed by: James Rousseau MD   Date: 11/05/2020   Time: 10:08   X-Ray Chest 1 View [[id number]] Resulted: 11/05/20 0740   Order Status: Completed Updated: 11/05/20 0743   Narrative:     EXAMINATION:   XR CHEST 1 VIEW     CLINICAL HISTORY:   Pneumonia;     TECHNIQUE:   Single frontal view of the chest was performed.     COMPARISON:   11/03/2020.     FINDINGS:   There is a right upper extremity PICC line with tip near the junction of the superior vena cava and right atrium.  The right internal jugular central venous catheter has been removed.     Heart and mediastinal structures are unchanged.  There continues to be patchy bilateral pulmonary consolidation within both lungs predominantly within the mid to lower lungs.   Findings are similar to the prior examination.  The hemidiaphragms and costophrenic angles are poorly delineated and small layering pleural effusions may be present.  There is no evidence for pneumothorax.  Bony structures are grossly intact.    Impression:       Right upper extremity PICC line with tip near the junction of the superior vena cava and right atrium.  Right IJ line has been removed.     Persistent patchy bilateral pulmonary opacities predominantly within the mid to lower lungs with little interval change when compared to 11/03/2020.     Poor delineation of the hemidiaphragms and costophrenic angle suggesting small layering bilateral pleural effusions.       Electronically signed by: James Rousseau MD   Date: 11/05/2020   Time: 07:40   X-Ray Chest 1 View for PICC_Central line [[id number]] Resulted: 11/03/20 1349   Order Status: Completed Updated: 11/03/20 1351   Narrative:     EXAMINATION:   XR CHEST 1 VIEW     CLINICAL HISTORY:   Evaluate PICC line placement;     TECHNIQUE:   Single frontal view of the chest was performed.     COMPARISON:   11/01/2020.  10/29/2020.  10/27/2020.     FINDINGS:   PICC line placed via the right upper extremity has its tip projected over the mediastinum near the junction of SVC and right atrium.  Central venous catheter placed via the right neck has its tip in its usual location, also near the junction of SVC and right atrium.     Interval extubation and removal of NG tube.  Extrinsic devices overlie the image.     Multifocal patchy subsegmental opacities persist in both lungs with peripheral predominance.  Overall pulmonary parenchymal aeration has improved since 10/27/2020.  No new pulmonary disease detected.    Impression:       Please see above.       Electronically signed by: Anastasiia Leo MD   Date: 11/03/2020   Time: 13:49   Imaging History    2020  Date Procedure Name Status Accession Number Location   11/07/20 06:38 PM CT Chest Abdomen Pelvis Without Contrast  (XPD) Final [id number] [facility]   11/06/20 09:49 AM X-Ray Chest PA And Lateral Final [id number] [facility]   11/05/20 09:32 AM US Retroperitoneal Complete (Kidney and Final [id number] [facility]   11/05/20 06:17 AM X-Ray Chest 1 View Final [id number] [facility]   11/03/20 01:35 PM X-Ray Chest 1 View for PICC_Central line Final [id number] [facility]   11/01/20 02:32 PM X-Ray Chest AP Portable Final [id number] [facility]   10/29/20 06:17 PM US Retroperitoneal Complete (Kidney and Final [id number] [facility]   10/29/20 12:37 PM X-Ray Chest AP Portable Final [id number] [facility]   10/29/20 05:49 AM X-Ray Abdomen AP 1 View Final [id number] [facility]   10/27/20 03:05 AM CT Head Without Contrast Final [id number] [facility]   10/27/20 01:32 AM X-Ray Chest AP Portable Final [id number] [facility]   10/26/20 11:18 PM X-Ray Chest AP Portable Final [id number] [facility]   10/26/20 09:57 PM X-ray chest AP portable Final [id number] [facility]   10/24/20 08:22 PM CTA Chest Non-Coronary Final [id number] [facility]   10/24/20 01:55 AM X-Ray Foot Complete Left Final [id number] [facility]   10/24/20 01:54 AM X-Ray Chest 1 View Final [id number] [facility]   10/24/20 12:00 AM CARDIAC MONITORING STRIPS Final     10/26/20 10:54 AM Echo Color Flow Doppler? Yes Final [id number] [facility]

## 2020-11-10 NOTE — PLAN OF CARE
11/10/20 0818   Post-Acute Status   Post-Acute Authorization Placement   Home Health Status Awaiting Internal Medical Clearance

## 2020-11-11 LAB
ALBUMIN SERPL BCP-MCNC: 1.4 G/DL (ref 3.5–5.2)
ALP SERPL-CCNC: 112 U/L (ref 55–135)
ALT SERPL W/O P-5'-P-CCNC: 8 U/L (ref 10–44)
ANION GAP SERPL CALC-SCNC: 9 MMOL/L (ref 8–16)
AST SERPL-CCNC: 14 U/L (ref 10–40)
BACTERIA BLD CULT: NORMAL
BACTERIA BLD CULT: NORMAL
BASOPHILS # BLD AUTO: 0.15 K/UL (ref 0–0.2)
BASOPHILS NFR BLD: 1.2 % (ref 0–1.9)
BILIRUB SERPL-MCNC: 0.4 MG/DL (ref 0.1–1)
BLD PROD TYP BPU: NORMAL
BLOOD UNIT EXPIRATION DATE: NORMAL
BLOOD UNIT TYPE CODE: 5100
BLOOD UNIT TYPE: NORMAL
BUN SERPL-MCNC: 23 MG/DL (ref 6–20)
CALCIUM SERPL-MCNC: 7.7 MG/DL (ref 8.7–10.5)
CHLORIDE SERPL-SCNC: 103 MMOL/L (ref 95–110)
CO2 SERPL-SCNC: 22 MMOL/L (ref 23–29)
CODING SYSTEM: NORMAL
CREAT SERPL-MCNC: 1.3 MG/DL (ref 0.5–1.4)
DIFFERENTIAL METHOD: ABNORMAL
DISPENSE STATUS: NORMAL
EOSINOPHIL # BLD AUTO: 0.3 K/UL (ref 0–0.5)
EOSINOPHIL NFR BLD: 2.6 % (ref 0–8)
ERYTHROCYTE [DISTWIDTH] IN BLOOD BY AUTOMATED COUNT: 17.7 % (ref 11.5–14.5)
EST. GFR  (AFRICAN AMERICAN): >60 ML/MIN/1.73 M^2
EST. GFR  (NON AFRICAN AMERICAN): 54.8 ML/MIN/1.73 M^2
GLUCOSE SERPL-MCNC: 127 MG/DL (ref 70–110)
HCT VFR BLD AUTO: 28.1 % (ref 37–48.5)
HGB BLD-MCNC: 8.6 G/DL (ref 12–16)
IMM GRANULOCYTES # BLD AUTO: 0.16 K/UL (ref 0–0.04)
IMM GRANULOCYTES NFR BLD AUTO: 1.3 % (ref 0–0.5)
LYMPHOCYTES # BLD AUTO: 1.3 K/UL (ref 1–4.8)
LYMPHOCYTES NFR BLD: 10.1 % (ref 18–48)
MAGNESIUM SERPL-MCNC: 1.6 MG/DL (ref 1.6–2.6)
MCH RBC QN AUTO: 26.2 PG (ref 27–31)
MCHC RBC AUTO-ENTMCNC: 30.6 G/DL (ref 32–36)
MCV RBC AUTO: 86 FL (ref 82–98)
MONOCYTES # BLD AUTO: 0.7 K/UL (ref 0.3–1)
MONOCYTES NFR BLD: 5.8 % (ref 4–15)
NEUTROPHILS # BLD AUTO: 9.9 K/UL (ref 1.8–7.7)
NEUTROPHILS NFR BLD: 79 % (ref 38–73)
NRBC BLD-RTO: 0 /100 WBC
NUM UNITS TRANS PACKED RBC: NORMAL
PLATELET # BLD AUTO: 252 K/UL (ref 150–350)
PMV BLD AUTO: 9.5 FL (ref 9.2–12.9)
POTASSIUM SERPL-SCNC: 4 MMOL/L (ref 3.5–5.1)
PROT SERPL-MCNC: 6.4 G/DL (ref 6–8.4)
RBC # BLD AUTO: 3.28 M/UL (ref 4–5.4)
SODIUM SERPL-SCNC: 134 MMOL/L (ref 136–145)
WBC # BLD AUTO: 12.47 K/UL (ref 3.9–12.7)

## 2020-11-11 PROCEDURE — 25000003 PHARM REV CODE 250: Performed by: NURSE PRACTITIONER

## 2020-11-11 PROCEDURE — 25000003 PHARM REV CODE 250: Performed by: HOSPITALIST

## 2020-11-11 PROCEDURE — 36430 TRANSFUSION BLD/BLD COMPNT: CPT

## 2020-11-11 PROCEDURE — 99233 SBSQ HOSP IP/OBS HIGH 50: CPT | Mod: ,,, | Performed by: PHYSICIAN ASSISTANT

## 2020-11-11 PROCEDURE — P9016 RBC LEUKOCYTES REDUCED: HCPCS

## 2020-11-11 PROCEDURE — 63600175 PHARM REV CODE 636 W HCPCS: Performed by: INTERNAL MEDICINE

## 2020-11-11 PROCEDURE — 97530 THERAPEUTIC ACTIVITIES: CPT

## 2020-11-11 PROCEDURE — U0003 INFECTIOUS AGENT DETECTION BY NUCLEIC ACID (DNA OR RNA); SEVERE ACUTE RESPIRATORY SYNDROME CORONAVIRUS 2 (SARS-COV-2) (CORONAVIRUS DISEASE [COVID-19]), AMPLIFIED PROBE TECHNIQUE, MAKING USE OF HIGH THROUGHPUT TECHNOLOGIES AS DESCRIBED BY CMS-2020-01-R: HCPCS

## 2020-11-11 PROCEDURE — 25000003 PHARM REV CODE 250: Performed by: STUDENT IN AN ORGANIZED HEALTH CARE EDUCATION/TRAINING PROGRAM

## 2020-11-11 PROCEDURE — 83735 ASSAY OF MAGNESIUM: CPT

## 2020-11-11 PROCEDURE — 36415 COLL VENOUS BLD VENIPUNCTURE: CPT

## 2020-11-11 PROCEDURE — 25000003 PHARM REV CODE 250: Performed by: INTERNAL MEDICINE

## 2020-11-11 PROCEDURE — 99232 PR SUBSEQUENT HOSPITAL CARE,LEVL II: ICD-10-PCS | Mod: ,,, | Performed by: INTERNAL MEDICINE

## 2020-11-11 PROCEDURE — 11000001 HC ACUTE MED/SURG PRIVATE ROOM

## 2020-11-11 PROCEDURE — 80053 COMPREHEN METABOLIC PANEL: CPT

## 2020-11-11 PROCEDURE — 99232 SBSQ HOSP IP/OBS MODERATE 35: CPT | Mod: ,,, | Performed by: INTERNAL MEDICINE

## 2020-11-11 PROCEDURE — 85025 COMPLETE CBC W/AUTO DIFF WBC: CPT

## 2020-11-11 PROCEDURE — 99233 PR SUBSEQUENT HOSPITAL CARE,LEVL III: ICD-10-PCS | Mod: ,,, | Performed by: PHYSICIAN ASSISTANT

## 2020-11-11 PROCEDURE — A4216 STERILE WATER/SALINE, 10 ML: HCPCS | Performed by: INTERNAL MEDICINE

## 2020-11-11 RX ORDER — FUROSEMIDE 40 MG/1
40 TABLET ORAL 2 TIMES DAILY
Qty: 30 TABLET | Refills: 11 | Status: ON HOLD
Start: 2020-11-11 | End: 2020-12-22 | Stop reason: HOSPADM

## 2020-11-11 RX ORDER — LABETALOL HCL 20 MG/4 ML
10 SYRINGE (ML) INTRAVENOUS ONCE
Status: COMPLETED | OUTPATIENT
Start: 2020-11-11 | End: 2020-11-11

## 2020-11-11 RX ORDER — ACETAMINOPHEN 325 MG/1
650 TABLET ORAL EVERY 6 HOURS PRN
Refills: 0 | Status: ON HOLD
Start: 2020-11-11 | End: 2020-12-22 | Stop reason: HOSPADM

## 2020-11-11 RX ORDER — MIRTAZAPINE 7.5 MG/1
7.5 TABLET, FILM COATED ORAL NIGHTLY
Qty: 30 TABLET | Refills: 11 | Status: ON HOLD
Start: 2020-11-11 | End: 2020-12-22 | Stop reason: HOSPADM

## 2020-11-11 RX ORDER — HYDROXYZINE HYDROCHLORIDE 25 MG/1
25 TABLET, FILM COATED ORAL 3 TIMES DAILY PRN
Status: ON HOLD
Start: 2020-11-11 | End: 2020-12-22 | Stop reason: HOSPADM

## 2020-11-11 RX ORDER — AMOXICILLIN 250 MG
2 CAPSULE ORAL 2 TIMES DAILY
Status: ON HOLD
Start: 2020-11-11 | End: 2020-12-22 | Stop reason: HOSPADM

## 2020-11-11 RX ORDER — OXYCODONE HYDROCHLORIDE 10 MG/1
20 TABLET ORAL EVERY 6 HOURS PRN
Refills: 0 | Status: ON HOLD
Start: 2020-11-11 | End: 2020-12-22 | Stop reason: HOSPADM

## 2020-11-11 RX ORDER — TALC
6 POWDER (GRAM) TOPICAL NIGHTLY PRN
Refills: 0 | Status: ON HOLD
Start: 2020-11-11 | End: 2020-12-22 | Stop reason: HOSPADM

## 2020-11-11 RX ORDER — ENOXAPARIN SODIUM 100 MG/ML
40 INJECTION SUBCUTANEOUS DAILY
Status: ON HOLD
Start: 2020-11-11 | End: 2020-12-22 | Stop reason: HOSPADM

## 2020-11-11 RX ORDER — VANCOMYCIN HCL IN 5 % DEXTROSE 1G/250ML
1000 PLASTIC BAG, INJECTION (ML) INTRAVENOUS DAILY
Start: 2020-11-11 | End: 2020-11-24 | Stop reason: HOSPADM

## 2020-11-11 RX ADMIN — Medication 10 ML: at 06:11

## 2020-11-11 RX ADMIN — OXYCODONE HYDROCHLORIDE 20 MG: 10 TABLET ORAL at 11:11

## 2020-11-11 RX ADMIN — Medication 10 ML: at 12:11

## 2020-11-11 RX ADMIN — OXYCODONE HYDROCHLORIDE 20 MG: 10 TABLET ORAL at 12:11

## 2020-11-11 RX ADMIN — VANCOMYCIN HYDROCHLORIDE 1000 MG: 1 INJECTION, POWDER, LYOPHILIZED, FOR SOLUTION INTRAVENOUS at 08:11

## 2020-11-11 RX ADMIN — ENOXAPARIN SODIUM 40 MG: 40 INJECTION SUBCUTANEOUS at 04:11

## 2020-11-11 RX ADMIN — ACETAMINOPHEN 650 MG: 325 TABLET ORAL at 04:11

## 2020-11-11 RX ADMIN — FUROSEMIDE 40 MG: 10 INJECTION, SOLUTION INTRAMUSCULAR; INTRAVENOUS at 08:11

## 2020-11-11 RX ADMIN — OXYCODONE HYDROCHLORIDE 20 MG: 10 TABLET ORAL at 05:11

## 2020-11-11 RX ADMIN — SODIUM CHLORIDE: 0.9 INJECTION, SOLUTION INTRAVENOUS at 04:11

## 2020-11-11 RX ADMIN — CLONIDINE HYDROCHLORIDE 0.1 MG: 0.1 TABLET ORAL at 08:11

## 2020-11-11 RX ADMIN — Medication 10 MG: at 12:11

## 2020-11-11 RX ADMIN — OXYCODONE HYDROCHLORIDE 20 MG: 10 TABLET ORAL at 06:11

## 2020-11-11 RX ADMIN — MIRTAZAPINE 7.5 MG: 7.5 TABLET ORAL at 08:11

## 2020-11-11 NOTE — PLAN OF CARE
CM on rounds, updated pt and mom at bedside regarding dc process and explanation for COVID testing. Will cont to follow.    Stephanie Villatoro, MSN  Case Management  Ext [phone]

## 2020-11-11 NOTE — PLAN OF CARE
Problem: Occupational Therapy Goal  Goal: Occupational Therapy Goal  Description: Goals to be met by: 11/18    Patient will increase functional independence with ADLs by performing:    UE Dressing with Set-up Assistance.  LE Dressing with Set-up Assistance. Goal met for socks.  Grooming while standing at sink with Supervision. Goal met.  Toileting from toilet with Modified [place] for hygiene and clothing management.   Toilet transfer to toilet with Modified [place].    Outcome: Ongoing, Progressing    OT goals remain appropriate.    Gus Martin OT   11/11/2020

## 2020-11-11 NOTE — PT/OT/SLP PROGRESS
"Occupational Therapy   Treatment    Name: Марина Britton  MRN: [id number]  Admitting Diagnosis:  Endocarditis of tricuspid valve       Recommendations:     Discharge Recommendations: rehabilitation facility  Discharge Equipment Recommendations:  bedside commode, walker, rolling, other (see comments)(TBD at next level of care)  Barriers to discharge:  None    Assessment:     Марина Britton is a 30 y.o. female with a medical diagnosis of Endocarditis of tricuspid valve.  Pt with fair participation this date, performing EOB activity but declining OOB activity.  Therapist had earlier seen someone leave the pt's room prior to session, who pt stated is her mother.  Pt stated she had just returned to bed after getting up and using the restroom while her mother was present.  Despite maximal coaxing from therapist to perform OOB activity, pt respectfully declined.   She presents with the following deficits. Performance deficits affecting function are weakness, impaired endurance, impaired self care skills, impaired functional mobilty, decreased lower extremity function, gait instability, pain, edema.     Rehab Prognosis:  Fair; patient would benefit from acute skilled OT services to address these deficits and reach maximum level of function.       Plan:     Patient to be seen 4 x/week to address the above listed problems via self-care/home management, therapeutic activities, therapeutic exercises, neuromuscular re-education  · Plan of Care Expires: 12/04/20  · Plan of Care Reviewed with: patient    Subjective   "Both of my hips hurt due to the Lasix."  "I just got back into bed.  I got up and used the toilet and brushed my teeth when my mother was here earlier."  Pain/Comfort:  · Pain Rating 1: 8/10  · Location - Side 1: Bilateral  · Location 1: hip  · Pain Addressed 1: Reposition, Distraction, Cessation of Activity  · Pain Rating Post-Intervention 1: other (see comments)(the same)    Objective:     Communicated with: RN " "prior to session.  Patient found HOB elevated with (no active lines) upon OT entry to room.    General Precautions: Standard, fall, contact   Orthopedic Precautions:N/A   Braces: N/A     Occupational Performance:     Bed Mobility:    · Patient completed Rolling/Turning to Left with  modified independence  · Patient completed Scooting/Bridging with modified independence  · Patient completed Supine to Sit with modified independence  · Patient completed Sit to Supine with modified independence     Functional Mobility/Transfers:  · Pt declined OOB activity.  · Pt sat EOB for a few minutes with Supervision.     Activities of Daily Living:  · Grooming: set-up assistance to wash her face while longsitting in bed      [facility] 6 Click ADL: 22    Treatment & Education:  -Educated pt on importance of performing BUE AROM exercises while in bed.  Pt stated doing these "are silly to do in the hospital."  Therapist educated pt on using her arms to push into the bed to lift herself up intermittently throughout the day in order to prevent her triceps from weakening, which assist with bed mobility and functional transfers.  Pt verbalized understanding.    -Pt stated she has been performing plantar flexion/dosrsiflexion exercises in bed.  Educated pt on benefit of performing knee and hip flexion/extension exercises while in bed.  Pt demonstrated the exercises and verbalized understanding.    Patient left HOB elevated with all lines intact, call button in reach and RN notifiedEducation:      GOALS:   Multidisciplinary Problems     Occupational Therapy Goals        Problem: Occupational Therapy Goal    Goal Priority Disciplines Outcome Interventions   Occupational Therapy Goal     OT, PT/OT Ongoing, Progressing    Description: Goals to be met by: 11/18    Patient will increase functional independence with ADLs by performing:    UE Dressing with Set-up Assistance.  LE Dressing with Set-up Assistance. Goal met for socks.  Grooming while " standing at sink with Supervision. Goal met.  Toileting from toilet with Modified [place] for hygiene and clothing management.   Toilet transfer to toilet with Modified [place].                     Time Tracking:     OT Date of Treatment: 11/11/20  OT Start Time: 1600  OT Stop Time: 1609  OT Total Time (min): 9 min    Billable Minutes:Therapeutic Activity 9 min.    Gus Martin, OT  11/11/2020

## 2020-11-11 NOTE — PLAN OF CARE
11/11/20 0829   Post-Acute Status   Post-Acute Authorization Placement   Post-Acute Placement Status Referrals Sent     Pt may be stable to d/c today, Sw sent updated information to all 3 LTAC facilities, Sw to follow with acceptance, LTAC orders and auth.

## 2020-11-11 NOTE — PROGRESS NOTES
Hospital Medicine  Progress Note      Patient Name: Марина Britton  MRN: [id number]  Date of Admission: 10/26/2020     Principal Problem: Endocarditis of tricuspid valve     Subjective     Ms. Britton has no new complaints today. She continues to spike fevers intermittently that improve with tylenol.    I discussed her case with Dr. Pritchett, specifically her continued fevers and most recent TTE, who commented that she will ultimately need surgery but that given her current condition, including her significant hypoalbuminemia, this cannot be performed now.     ID recommending a total of 5d cefepime, otherwise continuing six weeks of vancomycin with VIANNEY 12/12/2020    Lastly she was anemic to 6.9 g/dL today, after spending several days in the low 7s. Iron studies ordered along with one unit of pRBCs after consent was obtained from her and her mother at bedside. No blood loss.    Review of Systems    Constitutional: +fever Negative for chills, fatigue   Respiratory: +SOB (improved) Negative for cough  Cardiovascular: +chest pain (improved) Negative for palpitations, leg swelling.   Gastrointestinal: Negative for abdominal pain, constipation, diarrhea, nausea, vomiting.   Neurological: Negative for dizziness, syncope, weakness, light-headedness.     Medications  Scheduled Meds:   ceFEPime (MAXIPIME) IVPB  2 g Intravenous Q12H    enoxaparin  40 mg Subcutaneous Q24H    furosemide (LASIX) IV  40 mg Intravenous Daily    furosemide (LASIX) IV  40 mg Intravenous Once    mirtazapine  7.5 mg Oral QHS    oxyCODONE  20 mg Per OG tube Q6H    polyethylene glycol  17 g Oral Daily    senna-docusate 8.6-50 mg  2 tablet Oral BID    sodium chloride 0.9%  10 mL Intravenous Q6H    [START ON 11/11/2020] vancomycin (VANCOCIN) IVPB  1,000 mg Intravenous Q24H     Continuous Infusions:    PRN Meds:.sodium chloride, acetaminophen, acetaminophen, acetaminophen, calcium carbonate, diphenhydrAMINE, hydrOXYzine HCL, melatonin, oxyCODONE,  polyethylene glycol, sodium chloride 0.9%, Flushing PICC Protocol **AND** sodium chloride 0.9% **AND** sodium chloride 0.9%, vancomycin - pharmacy to dose    Objective    Physical Examination    Temp:  [97.8 °F (36.6 °C)-101 °F (38.3 °C)]   Pulse:  []   Resp:  [14-20]   BP: (128-154)/()   SpO2:  [91 %-98 %]     Gen: NAD, conversant  CV: RRR, +murmur, 1+ lower extremity edema bilaterally   Resp: CTAB, no increased work of breathing on room air  GI: Soft, NT, ND, +BS  Ext: MAEW, RUE PICC clean and dry at insertion site  Neuro: AAOx3, CN grossly intact, no focal neurologic deficits    CBC  Recent Labs   Lab 11/08/20  0642 11/09/20  0448 11/10/20  0656   WBC 12.23 11.37 11.88   HGB 7.4* 7.4* 6.9*   HCT 24.6* 24.3* 22.9*    262 228     CMP  Recent Labs   Lab 11/08/20  0642 11/09/20  0448 11/10/20  0656   * 132* 135*   K 4.6 5.0 4.5    107 107   CO2 19* 21* 22*   BUN 35* 30* 28*   CREATININE 1.7* 1.4 1.4    80 109   CALCIUM 7.2* 7.5* 7.7*   MG 2.3 2.2 1.9   PHOS 4.3 3.6 3.4   ALKPHOS 122 105 106   ALT 8* 8* 8*   AST 16 17 15   ALBUMIN 1.3* 1.3* 1.3*   PROT 5.8* 5.8* 5.9*   BILITOT 0.4 0.4 0.4       Hospital Course:    Ms. Britton is a 30-year-old woman with HCV, IVDU who was transferred from [facility] where she presented with chest pain and was diagnosed with MRSA bacteremia complicated by TV IE with septic emboli, acute hypoxic respiratory failure, septic/metabolic encephalopathy, septic PE, septic shock, and acute anemia. She was admitted to the [facility] MICU on 10/27 and was intubated for respiratory failure. She has subsequently been evaluated by ID, CTS, and psychiatry. She required pRBC transfusion on 10/29, and norepi on 10/30. Her admission was further complicated by DERICK which subsequently resolved. She was extubated on 11/2, then transferred to the floor on 11/3 for further care, planning on long-term IV antibiotics with vancomycin and repeat CTS evaluation as an outpatient.  "Subsequently her DERICK has returned, and she is again febrile for which BCx were obtained on 11/4 and 11/5 and have remained NGTD. ID was reconsulted on 11/6, and cefepime was added to her regimen. CT C/A/P obtained shows continued infectious lung processes. TTE shows progressive valvular damage with flail valve and severe MR.    Assessment and Plan:    MRSA bacteremia  Pneumonia of both lungs due to infectious organism  Pulmonary emboli (septic)  Sepsis  Leukocytosis - resolved  Endocarditis of tricuspid valve    - Febrile intermittently, BCx from 11/4 and 11/5 NGTD. Leukocytosis resolved. Discussed with ID who stated that she will likely continue to have intermittent fevers from her infectious process  - Continue vancomycin (pharmacy dosing) and cefepime (5d)  - PT/OT following and recommending IPR, LTAC referrals out  - CBC daily for WBC  - Needs 6 weeks IV antibiotics, then f/u CTS    Flail tricuspid valve  Severe tricuspid regurgitation  Lower extremity edema    - Repeat TTE shows more advanced disease, truscpid valve now flail, but even "distinguishing between vegetation, valve, and chord is difficult, even with these good images" per interpreting cardiologist. Also shows elevated CVP.   - LE edema improving with lasix  - Continue lasix, will plan for daily dosing, increased to BID today due to pRBC infusion  - Discussed findings and ongoing fevers with Dr. Pritchett who recommended continued medical management    DERICK (acute kidney injury)  Hyperkalemia  Hyponatremia    - sCr stable, holding steady with diuresis  - Etiology likely multifactorial with recent sepsis and vancomycin and poor PO intake  - Na 135  - Urine studies c/w pre-renal (though cardiorenal would have a similar picture)  - Lasix as above    IVDU (intravenous drug user)  Opioid use disorder, severe, dependence    - Psych evaluated earlier this admission  - Would benefit from LTAC disposition    Substance induced mood disorder    - Psych evaluated, " starting on mirtazapine, seems to be improving.    Chronic inflammatory anemia    - Hemoglobin 6.9 g/dL today, down from mid to low 7s where it had been for the last 72h  - Iron studies consistent with inflammatory process, in keeping with her systemic infection  - Consented and pRBC administered. Ordered lasix post-transfusion  - CBC daily    Malnutrition    - Dietetics consulted  - Added boost to all meals    Diet: Adult regular  VTE PPX: LMWH  Goals of care: Curative, full code      Alli Garcia M.D.  Department of Hospital Medicine  [facility]  [phone] (pager)

## 2020-11-11 NOTE — CONSULTS
"  [facility]  Adult Nutrition  Consult Note    SUMMARY     Recommendations    Recommendations:  1. Continue Regular Diet, encourage PO intake   --If unable to eat >50% of meals, recommend order Boost Breeze for variety (pt does not like Boost Plus)     2. RD following.    Goals: Meet % EEN, EPN by RD f/u date  Nutrition Goal Status: goal met  Communication of RD Recs: other (comment)(POC)    Reason for Assessment    Reason For Assessment: consult, RD follow-up  Diagnosis: infection/sepsis(septic endocarditis)  Relevant Medical History: IVDU  Interdisciplinary Rounds: did not attend  General Information Comments: RD follow up/consult from team. Spoke with pt and mom at bedside. She reports poor appetite at home PTA, eating 2 meals per day consisting of "junk" or snack foods. Mom and pt unable to identify UBW, but say she weighs anywhere from 100-105#, and fluid is putting significant weight on her here in the hospital. Pt states she hasn't lost more than 10# ("probably 7#") and over a time frame of 3 months. This is 6.6-7% wt loss which is just under 7.5%, not qualifying for malnutrition criteria from weight standpoint. NFPE completed, to reveal mild-moderate wasting in orbital region, although well-nourished upper arm region. Unable to fully assess thigh and calf d/t fluid retention. Pt does not like Boost Plus, but is eating majority of meals here (100% per flowsheets). Pt at risk for malnutrition but does not meet qualifications, RD following/monitoring.   Nutrition Discharge Planning: Regular Diet with adeqaute PO intake    Nutrition Risk Screen    Nutrition Risk Screen: no indicators present    Nutrition/Diet History    Spiritual, Cultural Beliefs, Sikh Practices, Values that Affect Care: no  Factors Affecting Nutritional Intake: None identified at this time    Anthropometrics    Temp: 98.5 °F (36.9 °C)  Height: 5' 2"  Height (inches): 62 in  Weight Method: Bed Scale  Weight: 89.9 " kg (198 lb 3.1 oz)  Weight (lb): 198.2 lb  Ideal Body Weight (IBW), Female: 110 lb  % Ideal Body Weight, Female (lb): 138.18 %  BMI (Calculated): 36.2  BMI Grade: 18.5-24.9 - normal       Lab/Procedures/Meds    Pertinent Labs Reviewed: reviewed  Pertinent Labs Comments: Na 135, BUN 28, GFR 50.1, Ca 7.7  Pertinent Medications Reviewed: reviewed  Pertinent Medications Comments: lasix, miralax, senna    Estimated/Assessed Needs    Weight Used For Calorie Calculations: 53.2 kg (117 lb 4.6 oz)  Energy Calorie Requirements (kcal): 1680 kcal/day  Energy Need Method: [place]-St Jeor(x1.4 PAL)  Protein Requirements: 53-64 gm/day(1-1.2 g/kg)  Weight Used For Protein Calculations: 53.2 kg (117 lb 4.6 oz)  Fluid Requirements (mL): 1 mL/kcal or per MD  Estimated Fluid Requirement Method: RDA Method  RDA Method (mL): 1680  CHO Requirement: -      Nutrition Prescription Ordered    Current Diet Order: Regular Diet  Nutrition Order Comments: -  Current Nutrition Support Formula Ordered: (Discontinued)  Current Nutrition Support Rate Ordered: 10 (ml)  Current Nutrition Support Frequency Ordered: mL/hr  Oral Nutrition Supplement: Boost Plus All Meals    Evaluation of Received Nutrient/Fluid Intake    Enteral Calories (kcal): 0  Enteral Protein (gm): 0  Enteral (Free Water) Fluid (mL): 0  I/O: +8.7L since 10/28  Energy Calories Required: meeting needs  Protein Required: meeting needs  Fluid Required: other (see comments)(per MD)  Comments: LBM 11/9  Tolerance: tolerating  % Intake of Estimated Energy Needs: 75 - 100 %  % Meal Intake: 75 - 100 %    Nutrition Risk    Level of Risk/Frequency of Follow-up: low-moderate     Assessment and Plan    Nutrition Problem  Inadequate energy intake     Related to (etiology):   Decreased appetite PTA     Signs and Symptoms (as evidenced by):   Pt report, weight loss      Interventions(treatment strategy):  Collaboration of nutrition care with other providers   [employer]     Nutrition  "Diagnosis Status:   New, Improving       Monitor and Evaluation    Food and Nutrient Intake: energy intake, food and beverage intake  Food and Nutrient Adminstration: diet order  Knowledge/Beliefs/Attitudes: beliefs and attitudes  Physical Activity and Function: nutrition-related ADLs and IADLs  Anthropometric Measurements: weight, weight change, body mass index  Biochemical Data, Medical Tests and Procedures: electrolyte and renal panel, gastrointestinal profile, glucose/endocrine profile, inflammatory profile, lipid profile  Nutrition-Focused Physical Findings: overall appearance     Malnutrition Assessment  Malnutrition Type: acute illness or injury  Energy Intake: other (see comments), moderate energy intake(2 meals per day (snacking, "junk" per pt report, poor appetite))          Subcutaneous Fat (Malnutrition): mild depletion  Fluid Accumulation (Malnutrition): mild   Orbital Region (Subcutaneous Fat Loss): mild depletion  Upper Arm Region (Subcutaneous Fat Loss): well nourished   Clavicle and Acromion Bone Region (Muscle Loss): mild depletion  Patellar Region (Muscle Loss): other (see comments)(Unable to assess d/t fluid)  Anterior Thigh Region (Muscle Loss): other (see comments)(Unable to assess d/t fluid)  Posterior Calf Region (Muscle Loss): other (see comments)(Unable to assess d/t fluid)   Edema (Fluid Accumulation): 2-->mild   Subcutaneous Fat Loss (Final Summary): mild protein-calorie malnutrition  Fluid Accumulation Evaluation: mild    Moderate Weight Loss (Malnutrition): other (see comments)(Does not qualify (6.6-7% loss in 3 months))    Nutrition Follow-Up    RD Follow-up?: Yes  "

## 2020-11-11 NOTE — PROGRESS NOTES
Hospital Medicine  Progress Note      Patient Name: Марина Britton  MRN: [id number]  Date of Admission: 10/26/2020     Principal Problem: Endocarditis of tricuspid valve     Subjective     Ms. Britton has no new complaints today. She continues to spike fevers intermittently that improve with tylenol. Her lower extremity edema has improved with lasix. She had hypertension overnight for which she was given labetalol. Awaiting AM labs     Review of Systems    Constitutional: +fever Negative for chills, fatigue   Respiratory: +SOB (improved) Negative for cough  Cardiovascular: +chest pain (improved) Negative for palpitations, leg swelling.   Gastrointestinal: Negative for abdominal pain, constipation, diarrhea, nausea, vomiting.   Neurological: Negative for dizziness, syncope, weakness, light-headedness.     Medications  Scheduled Meds:   enoxaparin  40 mg Subcutaneous Q24H    furosemide (LASIX) IV  40 mg Intravenous Daily    mirtazapine  7.5 mg Oral QHS    oxyCODONE  20 mg Per OG tube Q6H    polyethylene glycol  17 g Oral Daily    senna-docusate 8.6-50 mg  2 tablet Oral BID    sodium chloride 0.9%  10 mL Intravenous Q6H    vancomycin (VANCOCIN) IVPB  1,000 mg Intravenous Q24H     Continuous Infusions:    PRN Meds:.sodium chloride, acetaminophen, acetaminophen, acetaminophen, calcium carbonate, diphenhydrAMINE, hydrOXYzine HCL, melatonin, oxyCODONE, polyethylene glycol, sodium chloride 0.9%, Flushing PICC Protocol **AND** sodium chloride 0.9% **AND** sodium chloride 0.9%, vancomycin - pharmacy to dose    Objective    Physical Examination    Temp:  [96.9 °F (36.1 °C)-101.7 °F (38.7 °C)]   Pulse:  []   Resp:  [14-20]   BP: (122-161)/()   SpO2:  [94 %-100 %]     Gen: NAD, conversant  CV: RRR, +murmur, 1+ lower extremity edema bilaterally   Resp: CTAB, no increased work of breathing on room air  GI: Soft, NT, ND, +BS  Ext: MAEW, RUE PICC clean and dry at insertion site  Neuro: AAOx3, CN grossly intact,  no focal neurologic deficits    CBC  Recent Labs   Lab 11/08/20  0642 11/09/20  0448 11/10/20  0656   WBC 12.23 11.37 11.88   HGB 7.4* 7.4* 6.9*   HCT 24.6* 24.3* 22.9*    262 228     CMP  Recent Labs   Lab 11/08/20  0642 11/09/20  0448 11/10/20  0656   * 132* 135*   K 4.6 5.0 4.5    107 107   CO2 19* 21* 22*   BUN 35* 30* 28*   CREATININE 1.7* 1.4 1.4    80 109   CALCIUM 7.2* 7.5* 7.7*   MG 2.3 2.2 1.9   PHOS 4.3 3.6 3.4   ALKPHOS 122 105 106   ALT 8* 8* 8*   AST 16 17 15   ALBUMIN 1.3* 1.3* 1.3*   PROT 5.8* 5.8* 5.9*   BILITOT 0.4 0.4 0.4       Hospital Course:    Ms. Britton is a 30-year-old woman with HCV, IVDU who was transferred from [facility] where she presented with chest pain and was diagnosed with MRSA bacteremia complicated by TV IE with septic emboli, acute hypoxic respiratory failure, septic/metabolic encephalopathy, septic PE, septic shock, and acute anemia. She was admitted to the [facility] MICU on 10/27 and was intubated for respiratory failure. She has subsequently been evaluated by ID, CTS, and psychiatry. She required pRBC transfusion on 10/29, and norepi on 10/30. Her admission was further complicated by DERICK which subsequently resolved. She was extubated on 11/2, then transferred to the floor on 11/3 for further care, planning on long-term IV antibiotics with vancomycin and repeat CTS evaluation as an outpatient. Subsequently her DERICK has returned, and she is again febrile for which BCx were obtained on 11/4 and 11/5 and have remained NGTD. ID was reconsulted on 11/6, and cefepime was added to her regimen. CT C/A/P obtained shows continued infectious lung processes. TTE shows progressive valvular damage with flail valve and severe MR, for which she is being diuresed.    Assessment and Plan:    MRSA bacteremia  Pneumonia of both lungs due to infectious organism  Pulmonary emboli (septic)  Sepsis  Leukocytosis - resolved  Endocarditis of tricuspid valve    - Febrile  "intermittently, BCx from 11/4 and 11/5 NGTD. Leukocytosis resolved. Discussed with ID who stated that she will likely continue to have intermittent fevers from her infectious process  - Continue vancomycin (pharmacy dosing). She has completed five days of cefepime  - PT/OT following and recommending IPR, LTAC referrals out  - CBC daily for WBC  - Needs 6 weeks IV antibiotics, then f/u CTS    Flail tricuspid valve  Severe tricuspid regurgitation  Lower extremity edema    - Repeat TTE shows more advanced disease, truscpid valve now flail, but also "distinguishing between vegetation, valve, and chord is difficult, even with these good images" per interpreting cardiologist. Also shows elevated CVP.   - LE edema improving with lasix  - Continue lasix, will plan for daily dosing  - Discussed findings and ongoing fevers with Dr. Pritchett on 11/10 who recommended continued medical management as her current deconditioned status is a contraindication to surgical intervention    DERICK (acute kidney injury)  Hyperkalemia  Hyponatremia    - sCr stable, holding steady with diuresis though awaiting labs from today  - Etiology likely multifactorial with recent sepsis and vancomycin and poor PO intake  - Na 135  - Urine studies c/w pre-renal (though cardiorenal would have a similar picture)  - Lasix as above    IVDU (intravenous drug user)  Opioid use disorder, severe, dependence    - Psych evaluated earlier this admission  - Would benefit from LTAC disposition    Substance induced mood disorder    - Psych evaluated, starting on mirtazapine, seems to be improving.    Chronic inflammatory anemia    - Hemoglobin 6.9 g/dL 11/10, given one unit of pRBCs  - Iron studies consistent with inflammatory process, in keeping with her systemic infection  - Consented and pRBC administered  - CBC daily    Malnutrition    - Dietetics following  - Added boost to all meals    Diet: Adult regular  VTE PPX: LMWH  Goals of care: Curative, full code  "     Alli Garcia M.D.  Department of [place] [facility] Medicine  [facility]  [phone] (pager)

## 2020-11-11 NOTE — SUBJECTIVE & OBJECTIVE
Interval History: No AEON.  Continues to have intermittent fevers, which are to be expected given the extent of her infection. She feels better since showering. Her left ankle wound did get soaked. She noted foul odor. Tender to touch. Still with LE weakness and heaviness 2/2 swelling. Lasix started with some improvement. No new complaints.       Review of Systems   Constitutional: Positive for activity change and fatigue. Negative for chills, diaphoresis and fever.   Respiratory: Positive for cough, chest tightness and shortness of breath.    Cardiovascular: Positive for chest pain (pleuritic- improving) and leg swelling.   Gastrointestinal: Negative for abdominal pain, constipation, diarrhea, nausea and vomiting.   Genitourinary: Negative for dysuria, frequency and urgency.   Musculoskeletal: Positive for arthralgias.   Neurological: Positive for weakness. Negative for dizziness.   Hematological: Does not bruise/bleed easily.     Objective:     Vital Signs (Most Recent):  Temp: 98 °F (36.7 °C) (11/11/20 1120)  Pulse: 88 (11/11/20 1120)  Resp: 14 (11/11/20 1243)  BP: (!) 150/103 (11/11/20 1120)  SpO2: (!) 94 % (11/11/20 1120) Vital Signs (24h Range):  Temp:  [96.9 °F (36.1 °C)-101.7 °F (38.7 °C)] 98 °F (36.7 °C)  Pulse:  [] 88  Resp:  [14-20] 14  SpO2:  [94 %-100 %] 94 %  BP: (122-161)/() 150/103     Weight: 89.9 kg (198 lb 3.1 oz)  Body mass index is 36.25 kg/m².    Estimated Creatinine Clearance: 60.7 mL/min (based on SCr of 1.4 mg/dL).    Physical Exam  Vitals signs and nursing note reviewed.   Constitutional:       General: She is not in acute distress.     Appearance: She is ill-appearing.       HENT:      Head: Normocephalic and atraumatic.      Nose: No congestion.   Eyes:      General: No scleral icterus.     Conjunctiva/sclera: Conjunctivae normal.   Cardiovascular:      Rate and Rhythm: Normal rate and regular rhythm.      Heart sounds: Murmur present.   Pulmonary:      Effort: No tachypnea or  respiratory distress.      Breath sounds: Examination of the right-upper field reveals decreased breath sounds and rales. Examination of the left-upper field reveals rales. Examination of the right-middle field reveals decreased breath sounds and rales. Examination of the left-middle field reveals rales. Examination of the right-lower field reveals decreased breath sounds. Examination of the left-lower field reveals rales. Decreased breath sounds (slightly improved) and rales present. No wheezing.   Abdominal:      General: Abdomen is flat. There is no distension.      Palpations: Abdomen is soft.   Musculoskeletal:      Right lower leg: Edema present.      Left lower leg: Edema present.   Skin:     General: Skin is warm and dry.      Comments: Janeway lesions on digits  Wound on ankle with blue dressed. Periwound tenderness  Tattoos    RUE PICC Line c/d/i.   Neurological:      Mental Status: She is alert.   Psychiatric:         Mood and Affect: Mood normal.         Behavior: Behavior normal.         Thought Content: Thought content normal.         Significant Labs:   Blood Culture:   Recent Labs   Lab 11/06/20  0745 11/08/20  0642 11/09/20  0447 11/10/20  0650 11/10/20  0656   LABBLOO No growth after 5 days. No Growth to date  No Growth to date  No Growth to date  No Growth to date  No Growth to date  No Growth to date  No Growth to date  No Growth to date No Growth to date  No Growth to date  No Growth to date No Growth to date  No Growth to date No Growth to date  No Growth to date     CBC:   Recent Labs   Lab 11/10/20  0656   WBC 11.88   HGB 6.9*   HCT 22.9*        CMP:   Recent Labs   Lab 11/10/20  0656   *   K 4.5      CO2 22*      BUN 28*   CREATININE 1.4   CALCIUM 7.7*   PROT 5.9*   ALBUMIN 1.3*   BILITOT 0.4   ALKPHOS 106   AST 15   ALT 8*   ANIONGAP 6*   EGFRNONAA 50.1*     Wound Culture: No results for input(s): LABAERO in the last 4320 hours.  All pertinent labs  within the past 24 hours have been reviewed.    Significant Imaging: I have reviewed all pertinent imaging results/findings within the past 24 hours.   CT Chest Abdomen Pelvis Without Contrast (XPD) [[id number]] Resulted: 11/07/20 2248   Order Status: Completed Updated: 11/07/20 2250   Narrative:     EXAMINATION:   CT CHEST ABDOMEN PELVIS WITHOUT CONTRAST(XPD)     CLINICAL HISTORY:   fever/sepsis, known abscesses, renal function prohibitive of IV contrast which would be preferred;     TECHNIQUE:   Low dose axial images, sagittal and coronal reformations were obtained from the thoracic inlet to the pubic symphysis without IV contrast.  .  Oral contrast was not administered.     COMPARISON:   CTA chest 10/24/2020     FINDINGS:   Examination of the vascular and soft tissue structures at the base of the neck is unremarkable.  There is a right-sided PICC line with tip terminating in the right atrium.     The thoracic aorta maintains normal caliber, contour, and course without significant atherosclerotic calcification within its course.  The heart is not enlarged.  There is a small pericardial effusion present, new from prior exam.     The esophagus maintains a normal course and caliber. No new bulky mediastinal or axillary lymph node enlargement appreciated allowing for limitations of noncontrast technique.     There appear to be a few secretions noted within the distal trachea.  Proximal airways are patent.  There is continued temporal evolution of previously demonstrated extensive bilateral opacities, some of which demonstrate central cavitation.  Scattered nodular opacities appear somewhat more consolidative compared to prior study of 10/24/2020.  There are small bilateral pleural effusions, mildly increased in volume compared to prior examination with associated lower lobe atelectasis and/or consolidation.     The liver is enlarged.  There is a subcentimeter left hepatic lobe hypodensity which is too small to  accurately characterize.  Gallbladder is poorly visualized, although there is suggestion of circumferential gallbladder wall thickening.  No radiopaque calculi appreciated.  There is no intra-or extrahepatic biliary ductal dilatation.     The stomach, pancreas, and adrenal glands appear within normal limits.  The spleen is enlarged measuring 13.9 cm.     Kidneys are normal in size and location.  No evidence of nephrolithiasis or hydronephrosis.  The urinary bladder demonstrates no significant abnormality. The uterus is unremarkable.     The abdominal aorta is normal in course and caliber without significant atherosclerotic calcifications.     There is significant volume retained stool throughout the colon suggesting constipation.  The appendix appears to be surgically absent.  There is moderate volume of peritoneal fluid throughout the abdomen and pelvis.  No evidence of free intraperitoneal air or portal venous gas.     Osseous structures demonstrate no acute abnormalities.  There is significant diffuse body wall anasarca present.  There are scattered foci of air within the anterior abdominal wall subcutaneous soft tissues, presumably relating to recent injection sites.    Impression:       1.  Continued temporal evolution of previously demonstrated lung disease presumably relating to septic emboli, noting continued extensive bilateral opacities, some of which demonstrate central cavitation.  Findings appear relatively similar compared to prior examination with some interval coalescence of previously demonstrated more scattered nodular opacities.     2.  Small bilateral pleural effusions, increased in volume from prior examination, with associated lower lobe atelectasis and/or consolidation.     3.  Significant diffuse body wall anasarca.  Moderate volume of peritoneal fluid throughout the abdomen and pelvis.     4.  Small pericardial effusion, new from prior examination.     5.  Mild gallbladder wall thickening  which is a nonspecific finding, although may relate to hepatic dysfunction or volume status given peritoneal fluid and anasarca.  No evidence of cholelithiasis.  Clinical correlation advised.     6.  Hepatosplenomegaly.  Subcentimeter left hepatic lobe hypodensity, too small to accurately characterize.     7.  Moderate volume retained stool throughout the colon suggesting constipation.     8.  Additional findings as above.       Electronically signed by: Frandy Fan MD   Date: 11/07/2020   Time: 22:48   X-Ray Chest PA And Lateral [[id number]] Resulted: 11/06/20 1000   Order Status: Completed Updated: 11/06/20 1002   Narrative:     EXAMINATION:   XR CHEST PA AND LATERAL     CLINICAL HISTORY:   fever, known septic emboli;     TECHNIQUE:   PA and lateral views of the chest were performed.     COMPARISON:   Chest radiograph: 11/05/2020, 06:17 hours.  11/03/2020.  10/29/2020.     Chest CTA: 10/24/2020.     FINDINGS:   PICC line placed via the right upper extremity has its tip overlying the mediastinum near the junction of SVC and right atrium.     Multifocal patchy subsegmental opacities persist throughout both lungs.  I suspect dependent pleural fluid is well.  The extent of disease is no better than on yesterday's study of 06/17 hours.  I note the clinical history of septic emboli.     I detect no pneumothorax, pneumomediastinum, pneumoperitoneum or significant osseous abnormality.    Impression:       No significant change compared to yesterday's study in this patient with bilateral pulmonary parenchymal disease consistent with septic emboli.       Electronically signed by: Anastasiia Leo MD   Date: 11/06/2020   Time: 10:00   US Retroperitoneal Complete (Kidney and [[id number]] Resulted: 11/05/20 1008   Order Status: Completed Updated: 11/05/20 1010   Narrative:     EXAMINATION:   US RETROPERITONEAL COMPLETE     CLINICAL HISTORY:   DERICK;     TECHNIQUE:   Ultrasound of the kidneys and urinary bladder was performed  including color flow and Doppler evaluation of the kidneys.     COMPARISON:   Retroperitoneal ultrasound 10/29/2020.     FINDINGS:   Right kidney: The right kidney measures 12.5 cm in length.  No cortical thinning. No loss of corticomedullary distinction.  Perfusion is normal.  Resistive index measures 0.70.  No mass. There is a 3 mm hyperechoic focus with twinkling artifact, which may represent nonobstructing stone within the mid right kidney.  No hydronephrosis.     Left kidney: The left kidney measures 13.8 cm in length.  No cortical thinning. No loss of corticomedullary distinction.  Perfusion is normal.  Resistive index measures 0.71.  No mass. No renal stone. No hydronephrosis.     The bladder is partially distended at the time of scanning and has an unremarkable appearance.     Miscellaneous: Partially visualized left pleural effusion    Impression:       3 mm right nonobstructing nephrolithiasis.     Partially visualized left pleural effusion.     Electronically signed by resident: Sophia Trent   Date: 11/05/2020   Time: 09:35     Electronically signed by: James Rousseau MD   Date: 11/05/2020   Time: 10:08   X-Ray Chest 1 View [[id number]] Resulted: 11/05/20 0740   Order Status: Completed Updated: 11/05/20 0743   Narrative:     EXAMINATION:   XR CHEST 1 VIEW     CLINICAL HISTORY:   Pneumonia;     TECHNIQUE:   Single frontal view of the chest was performed.     COMPARISON:   11/03/2020.     FINDINGS:   There is a right upper extremity PICC line with tip near the junction of the superior vena cava and right atrium.  The right internal jugular central venous catheter has been removed.     Heart and mediastinal structures are unchanged.  There continues to be patchy bilateral pulmonary consolidation within both lungs predominantly within the mid to lower lungs.  Findings are similar to the prior examination.  The hemidiaphragms and costophrenic angles are poorly delineated and small layering pleural effusions  may be present.  There is no evidence for pneumothorax.  Bony structures are grossly intact.    Impression:       Right upper extremity PICC line with tip near the junction of the superior vena cava and right atrium.  Right IJ line has been removed.     Persistent patchy bilateral pulmonary opacities predominantly within the mid to lower lungs with little interval change when compared to 11/03/2020.     Poor delineation of the hemidiaphragms and costophrenic angle suggesting small layering bilateral pleural effusions.       Electronically signed by: James Rousseau MD   Date: 11/05/2020   Time: 07:40   X-Ray Chest 1 View for PICC_Central line [[id number]] Resulted: 11/03/20 1349   Order Status: Completed Updated: 11/03/20 1351   Narrative:     EXAMINATION:   XR CHEST 1 VIEW     CLINICAL HISTORY:   Evaluate PICC line placement;     TECHNIQUE:   Single frontal view of the chest was performed.     COMPARISON:   11/01/2020.  10/29/2020.  10/27/2020.     FINDINGS:   PICC line placed via the right upper extremity has its tip projected over the mediastinum near the junction of SVC and right atrium.  Central venous catheter placed via the right neck has its tip in its usual location, also near the junction of SVC and right atrium.     Interval extubation and removal of NG tube.  Extrinsic devices overlie the image.     Multifocal patchy subsegmental opacities persist in both lungs with peripheral predominance.  Overall pulmonary parenchymal aeration has improved since 10/27/2020.  No new pulmonary disease detected.    Impression:       Please see above.       Electronically signed by: Anastasiia Leo MD   Date: 11/03/2020   Time: 13:49   Imaging History    2020  Date Procedure Name Status Accession Number Location   11/07/20 06:38 PM CT Chest Abdomen Pelvis Without Contrast (XPD) Final [id number] [facility]   11/06/20 09:49 AM X-Ray Chest PA And Lateral Final [id number] [facility]   11/05/20 09:32 AM US Retroperitoneal Complete (Kidney and  Final [id number] [facility]   11/05/20 06:17 AM X-Ray Chest 1 View Final [id number] [facility]   11/03/20 01:35 PM X-Ray Chest 1 View for PICC_Central line Final [id number] [facility]   11/01/20 02:32 PM X-Ray Chest AP Portable Final [id number] [facility]   10/29/20 06:17 PM US Retroperitoneal Complete (Kidney and Final [id number] [facility]   10/29/20 12:37 PM X-Ray Chest AP Portable Final [id number] [facility]   10/29/20 05:49 AM X-Ray Abdomen AP 1 View Final [id number] [facility]   10/27/20 03:05 AM CT Head Without Contrast Final [id number] [facility]   10/27/20 01:32 AM X-Ray Chest AP Portable Final [id number] [facility]   10/26/20 11:18 PM X-Ray Chest AP Portable Final [id number] [facility]   10/26/20 09:57 PM X-ray chest AP portable Final [id number] [facility]   10/24/20 08:22 PM CTA Chest Non-Coronary Final [id number] [facility]   10/24/20 01:55 AM X-Ray Foot Complete Left Final [id number] [facility]   10/24/20 01:54 AM X-Ray Chest 1 View Final [id number] [facility]   10/24/20 12:00 AM CARDIAC MONITORING STRIPS Final     10/26/20 10:54 AM Echo Color Flow Doppler? Yes Final [id number] [facility]

## 2020-11-11 NOTE — ASSESSMENT & PLAN NOTE
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 CTS has evaluated the patient and does not plan for acute surgical intervention. Course complicated by respiratory failure requiring intubation and pressor support. Blood cultures remained positive from 10/24 - 10/28. She has been and is currently on Vancomycin. Repeat blood cx of 10/31 and 11/1 are NGTD. Extubated to NC 11/2, fevers had resolved and she was stepped down to the floor. Plan was to continue Vancomycin x 6 weeks. ID re-consulted on 11/6 as patient developed recurrent fevers on 11/4 with hypoxia.     Repeat CT C/A revealed continued evolution of previously demonstrated lung disease, noting continued extensive bilateral opacities, some of which demonstrate central cavitation, small bilateral pleural effusions, significant diffuse body wall anasarca, new small pericardial effusion. Repeat 2D echo now with flail tricuspid valve and severe regurgitation. CTS re-evaluated, feels the patient needs surgery but due to her deconditioned status she is not a surgical candidate at this time. All repeat blood cx since 10/31 remain sterile. Leukocytosis resolved. Continues to have intermittent fevers, though to be expected given extent of infection. O2 sats stable. Kidney function improved.    Recommendations  Vancomycin 1 g IV q 24 hours x 6 weeks from day blood cultures cleared      End date of IV antibiotics: 12/12/20     Weekly outpatient laboratory on Monday or Tuesday while on IV antibiotics.   · CBC  · CMP  · ESR and CRP  · Vancomycin trough. Target 15-20     Inpatient pharmacy managing Vanc dosing. Next Vanc trough is due on 11/13.  If vancomycin trough is not at target (15-20) prior to discharge, the please perform vancomycin trough before their fourth outpatient dose.     Fax laboratory results to [facility]  at [phone] with attn: Evelyne Brand     Recommend discharge to an LTAC for IV antibiotic administration as patient is not a candidate for outpatient IV antibiotics. Please consult ID at LTAC to follow patient and patient's labs.      Outpatient Infectious Diseases clinic follow up will be arranged and found in patient calendar at end of antibiotic therapy. Also needs CTS follow up.     Prior to discharge, please ensure the patient's follow-up has been scheduled.  If there is still no follow-up scheduled in Infectious Diseases clinic, please send an EPIC message to Karmen Sánchez in Infectious Diseases.     ID will sign off at this time. Please call or re-consult ID with any questions or concerns.

## 2020-11-11 NOTE — PROGRESS NOTES
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 CTS has evaluated the patient and does not plan for acute surgical intervention. Course complicated by respiratory failure requiring intubation and pressor support. Blood cultures remained positive from 10/24 - 10/28. She has been and is currently on Vancomycin. Repeat blood cx of 10/31 and 11/1 are NGTD. Extubated to NC 11/2, fevers had resolved and she was stepped down to the floor. Plan was to continue Vancomycin x 6 weeks. ID re-consulted on 11/6 as patient developed recurrent fevers on 11/4 with hypoxia.     Repeat CT C/A revealed continued evolution of previously demonstrated lung disease, noting continued extensive bilateral opacities, some of which demonstrate central cavitation, small bilateral pleural effusions, significant diffuse body wall anasarca, new small pericardial effusion. Repeat 2D echo now with flail tricuspid valve and severe regurgitation. CTS re-evaluated, feels the patient needs surgery but due to her deconditioned status she is not a surgical candidate at this time. All repeat blood cx since 10/31 remain sterile. Leukocytosis resolved. Continues to have intermittent fevers, though to be expected given extent of infection. O2 sats stable. Kidney function improved.    Recommendations  Vancomycin 1 g IV q 24 hours x 6 weeks from day blood cultures cleared      End date of IV antibiotics: 12/12/20     Weekly  outpatient laboratory on Monday or Tuesday while on IV antibiotics.   · CBC  · CMP  · ESR and CRP  · Vancomycin trough. Target 15-20     Inpatient pharmacy managing Vanc dosing. Next Vanc trough is due on 11/13.  If vancomycin trough is not at target (15-20) prior to discharge, the please perform vancomycin trough before their fourth outpatient dose.     Fax laboratory results to [facility] at [phone] with attn: Evelyne Brand     Recommend discharge to an LTAC for IV antibiotic administration as patient is not a candidate for outpatient IV antibiotics. Please consult ID at LTAC to follow patient and patient's labs.      Outpatient Infectious Diseases clinic follow up will be arranged and found in patient calendar at end of antibiotic therapy. Also needs CTS follow up.     Prior to discharge, please ensure the patient's follow-up has been scheduled.  If there is still no follow-up scheduled in Infectious Diseases clinic, please send an EPIC message to Karmen Sánchez in Infectious Diseases.     ID will sign off at this time. Please call or re-consult ID with any questions or concerns.          Please call for any questions. Thank you.  Evelyne Brand PA-C  Phone: [phone]  Pager: [phone]      Subjective:     Principal Problem:Endocarditis of tricuspid valve    HPI: History obtained per chart review. Pt critically ill. Intubated, sedated.       30F with PMHx IVUD heroin (last use approx 2wk ago), Hepatitis C presents as transfer from OSH for septic endocarditis with b/l PE requiring higher level of care.  Per patient and chart review, she has had progressively worsening chest and back pain over the past several days with a Tmax of 105F at home.  She presented to [facility] and was admitted for sepsis on 10/24.  Bcx showed +MRSA and found to have large vegetation on tricuspid valve on TTE. Noted to have septic emobli on imaging studies. She was started on empiric vancomycin and zosyn. She was stepped  "up to the ICU on 10/25 for worsening tachypnea.  She also received 2U pRBC x2 for anemia with a pavel Hb of 6.5.  She was transferred to [facility] 10/26 for a higher level of care.    Per chart review. She has been seen in ED for fatigue and wound of her left foot. Per nursing note," Pt shows me a wound on her left foot/ankle, states it has been there over 1 month.  Pt states it started off as an are where she injected heroin, then turned in to a blister, abscess.  She states she was recently seen in [place] ED and placed on antibiotics.  Pt has not followed up with anyone regarding this wound. Spoke with ER MD Dr. Linares, pt does not tomas to be started on any antibiotics at this time, pt needs referral to wound care.  This was explained to pt who stated "ok."   she has been on different abx including bactrim and clindamycin without improvement. No follow up.   Interval History: No AEON.  Continues to have intermittent fevers, which are to be expected given the extent of her infection. She feels better since showering. Her left ankle wound did get soaked. She noted foul odor. Tender to touch. Still with LE weakness and heaviness 2/2 swelling. Lasix started with some improvement. No new complaints.       Review of Systems   Constitutional: Positive for activity change and fatigue. Negative for chills, diaphoresis and fever.   Respiratory: Positive for cough, chest tightness and shortness of breath.    Cardiovascular: Positive for chest pain (pleuritic- improving) and leg swelling.   Gastrointestinal: Negative for abdominal pain, constipation, diarrhea, nausea and vomiting.   Genitourinary: Negative for dysuria, frequency and urgency.   Musculoskeletal: Positive for arthralgias.   Neurological: Positive for weakness. Negative for dizziness.   Hematological: Does not bruise/bleed easily.     Objective:     Vital Signs (Most Recent):  Temp: 98 °F (36.7 °C) (11/11/20 1120)  Pulse: 88 (11/11/20 1120)  Resp: 14 (11/11/20 " 1243)  BP: (!) 150/103 (11/11/20 1120)  SpO2: (!) 94 % (11/11/20 1120) Vital Signs (24h Range):  Temp:  [96.9 °F (36.1 °C)-101.7 °F (38.7 °C)] 98 °F (36.7 °C)  Pulse:  [] 88  Resp:  [14-20] 14  SpO2:  [94 %-100 %] 94 %  BP: (122-161)/() 150/103     Weight: 89.9 kg (198 lb 3.1 oz)  Body mass index is 36.25 kg/m².    Estimated Creatinine Clearance: 60.7 mL/min (based on SCr of 1.4 mg/dL).    Physical Exam  Vitals signs and nursing note reviewed.   Constitutional:       General: She is not in acute distress.     Appearance: She is ill-appearing.       HENT:      Head: Normocephalic and atraumatic.      Nose: No congestion.   Eyes:      General: No scleral icterus.     Conjunctiva/sclera: Conjunctivae normal.   Cardiovascular:      Rate and Rhythm: Normal rate and regular rhythm.      Heart sounds: Murmur present.   Pulmonary:      Effort: No tachypnea or respiratory distress.      Breath sounds: Examination of the right-upper field reveals decreased breath sounds and rales. Examination of the left-upper field reveals rales. Examination of the right-middle field reveals decreased breath sounds and rales. Examination of the left-middle field reveals rales. Examination of the right-lower field reveals decreased breath sounds. Examination of the left-lower field reveals rales. Decreased breath sounds (slightly improved) and rales present. No wheezing.   Abdominal:      General: Abdomen is flat. There is no distension.      Palpations: Abdomen is soft.   Musculoskeletal:      Right lower leg: Edema present.      Left lower leg: Edema present.   Skin:     General: Skin is warm and dry.      Comments: Janeway lesions on digits  Wound on ankle with blue dressed. Periwound tenderness  Tattoos    RUE PICC Line c/d/i.   Neurological:      Mental Status: She is alert.   Psychiatric:         Mood and Affect: Mood normal.         Behavior: Behavior normal.         Thought Content: Thought content normal.          Significant Labs:   Blood Culture:   Recent Labs   Lab 11/06/20  0745 11/08/20  0642 11/09/20  0447 11/10/20  0650 11/10/20  0656   LABBLOO No growth after 5 days. No Growth to date  No Growth to date  No Growth to date  No Growth to date  No Growth to date  No Growth to date  No Growth to date  No Growth to date No Growth to date  No Growth to date  No Growth to date No Growth to date  No Growth to date No Growth to date  No Growth to date     CBC:   Recent Labs   Lab 11/10/20  0656   WBC 11.88   HGB 6.9*   HCT 22.9*        CMP:   Recent Labs   Lab 11/10/20  0656   *   K 4.5      CO2 22*      BUN 28*   CREATININE 1.4   CALCIUM 7.7*   PROT 5.9*   ALBUMIN 1.3*   BILITOT 0.4   ALKPHOS 106   AST 15   ALT 8*   ANIONGAP 6*   EGFRNONAA 50.1*     Wound Culture: No results for input(s): LABAERO in the last 4320 hours.  All pertinent labs within the past 24 hours have been reviewed.    Significant Imaging: I have reviewed all pertinent imaging results/findings within the past 24 hours.   CT Chest Abdomen Pelvis Without Contrast (XPD) [[id number]] Resulted: 11/07/20 2248   Order Status: Completed Updated: 11/07/20 2250   Narrative:     EXAMINATION:   CT CHEST ABDOMEN PELVIS WITHOUT CONTRAST(XPD)     CLINICAL HISTORY:   fever/sepsis, known abscesses, renal function prohibitive of IV contrast which would be preferred;     TECHNIQUE:   Low dose axial images, sagittal and coronal reformations were obtained from the thoracic inlet to the pubic symphysis without IV contrast.  .  Oral contrast was not administered.     COMPARISON:   CTA chest 10/24/2020     FINDINGS:   Examination of the vascular and soft tissue structures at the base of the neck is unremarkable.  There is a right-sided PICC line with tip terminating in the right atrium.     The thoracic aorta maintains normal caliber, contour, and course without significant atherosclerotic calcification within its course.  The heart is  not enlarged.  There is a small pericardial effusion present, new from prior exam.     The esophagus maintains a normal course and caliber. No new bulky mediastinal or axillary lymph node enlargement appreciated allowing for limitations of noncontrast technique.     There appear to be a few secretions noted within the distal trachea.  Proximal airways are patent.  There is continued temporal evolution of previously demonstrated extensive bilateral opacities, some of which demonstrate central cavitation.  Scattered nodular opacities appear somewhat more consolidative compared to prior study of 10/24/2020.  There are small bilateral pleural effusions, mildly increased in volume compared to prior examination with associated lower lobe atelectasis and/or consolidation.     The liver is enlarged.  There is a subcentimeter left hepatic lobe hypodensity which is too small to accurately characterize.  Gallbladder is poorly visualized, although there is suggestion of circumferential gallbladder wall thickening.  No radiopaque calculi appreciated.  There is no intra-or extrahepatic biliary ductal dilatation.     The stomach, pancreas, and adrenal glands appear within normal limits.  The spleen is enlarged measuring 13.9 cm.     Kidneys are normal in size and location.  No evidence of nephrolithiasis or hydronephrosis.  The urinary bladder demonstrates no significant abnormality. The uterus is unremarkable.     The abdominal aorta is normal in course and caliber without significant atherosclerotic calcifications.     There is significant volume retained stool throughout the colon suggesting constipation.  The appendix appears to be surgically absent.  There is moderate volume of peritoneal fluid throughout the abdomen and pelvis.  No evidence of free intraperitoneal air or portal venous gas.     Osseous structures demonstrate no acute abnormalities.  There is significant diffuse body wall anasarca present.  There are scattered  foci of air within the anterior abdominal wall subcutaneous soft tissues, presumably relating to recent injection sites.    Impression:       1.  Continued temporal evolution of previously demonstrated lung disease presumably relating to septic emboli, noting continued extensive bilateral opacities, some of which demonstrate central cavitation.  Findings appear relatively similar compared to prior examination with some interval coalescence of previously demonstrated more scattered nodular opacities.     2.  Small bilateral pleural effusions, increased in volume from prior examination, with associated lower lobe atelectasis and/or consolidation.     3.  Significant diffuse body wall anasarca.  Moderate volume of peritoneal fluid throughout the abdomen and pelvis.     4.  Small pericardial effusion, new from prior examination.     5.  Mild gallbladder wall thickening which is a nonspecific finding, although may relate to hepatic dysfunction or volume status given peritoneal fluid and anasarca.  No evidence of cholelithiasis.  Clinical correlation advised.     6.  Hepatosplenomegaly.  Subcentimeter left hepatic lobe hypodensity, too small to accurately characterize.     7.  Moderate volume retained stool throughout the colon suggesting constipation.     8.  Additional findings as above.       Electronically signed by: Frandy Fan MD   Date: 11/07/2020   Time: 22:48   X-Ray Chest PA And Lateral [[id number]] Resulted: 11/06/20 1000   Order Status: Completed Updated: 11/06/20 1002   Narrative:     EXAMINATION:   XR CHEST PA AND LATERAL     CLINICAL HISTORY:   fever, known septic emboli;     TECHNIQUE:   PA and lateral views of the chest were performed.     COMPARISON:   Chest radiograph: 11/05/2020, 06:17 hours.  11/03/2020.  10/29/2020.     Chest CTA: 10/24/2020.     FINDINGS:   PICC line placed via the right upper extremity has its tip overlying the mediastinum near the junction of SVC and right atrium.      Multifocal patchy subsegmental opacities persist throughout both lungs.  I suspect dependent pleural fluid is well.  The extent of disease is no better than on yesterday's study of 06/17 hours.  I note the clinical history of septic emboli.     I detect no pneumothorax, pneumomediastinum, pneumoperitoneum or significant osseous abnormality.    Impression:       No significant change compared to yesterday's study in this patient with bilateral pulmonary parenchymal disease consistent with septic emboli.       Electronically signed by: Anastasiia Leo MD   Date: 11/06/2020   Time: 10:00   US Retroperitoneal Complete (Kidney and [[id number]] Resulted: 11/05/20 1008   Order Status: Completed Updated: 11/05/20 1010   Narrative:     EXAMINATION:   US RETROPERITONEAL COMPLETE     CLINICAL HISTORY:   DERICK;     TECHNIQUE:   Ultrasound of the kidneys and urinary bladder was performed including color flow and Doppler evaluation of the kidneys.     COMPARISON:   Retroperitoneal ultrasound 10/29/2020.     FINDINGS:   Right kidney: The right kidney measures 12.5 cm in length.  No cortical thinning. No loss of corticomedullary distinction.  Perfusion is normal.  Resistive index measures 0.70.  No mass. There is a 3 mm hyperechoic focus with twinkling artifact, which may represent nonobstructing stone within the mid right kidney.  No hydronephrosis.     Left kidney: The left kidney measures 13.8 cm in length.  No cortical thinning. No loss of corticomedullary distinction.  Perfusion is normal.  Resistive index measures 0.71.  No mass. No renal stone. No hydronephrosis.     The bladder is partially distended at the time of scanning and has an unremarkable appearance.     Miscellaneous: Partially visualized left pleural effusion    Impression:       3 mm right nonobstructing nephrolithiasis.     Partially visualized left pleural effusion.     Electronically signed by resident: Sophia Trent   Date: 11/05/2020   Time: 09:35      Electronically signed by: James Rousseau MD   Date: 11/05/2020   Time: 10:08   X-Ray Chest 1 View [[id number]] Resulted: 11/05/20 0740   Order Status: Completed Updated: 11/05/20 0743   Narrative:     EXAMINATION:   XR CHEST 1 VIEW     CLINICAL HISTORY:   Pneumonia;     TECHNIQUE:   Single frontal view of the chest was performed.     COMPARISON:   11/03/2020.     FINDINGS:   There is a right upper extremity PICC line with tip near the junction of the superior vena cava and right atrium.  The right internal jugular central venous catheter has been removed.     Heart and mediastinal structures are unchanged.  There continues to be patchy bilateral pulmonary consolidation within both lungs predominantly within the mid to lower lungs.  Findings are similar to the prior examination.  The hemidiaphragms and costophrenic angles are poorly delineated and small layering pleural effusions may be present.  There is no evidence for pneumothorax.  Bony structures are grossly intact.    Impression:       Right upper extremity PICC line with tip near the junction of the superior vena cava and right atrium.  Right IJ line has been removed.     Persistent patchy bilateral pulmonary opacities predominantly within the mid to lower lungs with little interval change when compared to 11/03/2020.     Poor delineation of the hemidiaphragms and costophrenic angle suggesting small layering bilateral pleural effusions.       Electronically signed by: James Rousseau MD   Date: 11/05/2020   Time: 07:40   X-Ray Chest 1 View for PICC_Central line [[id number]] Resulted: 11/03/20 1349   Order Status: Completed Updated: 11/03/20 1351   Narrative:     EXAMINATION:   XR CHEST 1 VIEW     CLINICAL HISTORY:   Evaluate PICC line placement;     TECHNIQUE:   Single frontal view of the chest was performed.     COMPARISON:   11/01/2020.  10/29/2020.  10/27/2020.     FINDINGS:   PICC line placed via the right upper extremity has its tip projected over the  mediastinum near the junction of SVC and right atrium.  Central venous catheter placed via the right neck has its tip in its usual location, also near the junction of SVC and right atrium.     Interval extubation and removal of NG tube.  Extrinsic devices overlie the image.     Multifocal patchy subsegmental opacities persist in both lungs with peripheral predominance.  Overall pulmonary parenchymal aeration has improved since 10/27/2020.  No new pulmonary disease detected.    Impression:       Please see above.       Electronically signed by: Anastasiia Leo MD   Date: 11/03/2020   Time: 13:49   Imaging History    2020  Date Procedure Name Status Accession Number Location   11/07/20 06:38 PM CT Chest Abdomen Pelvis Without Contrast (XPD) Final [id number] [facility]   11/06/20 09:49 AM X-Ray Chest PA And Lateral Final [id number] [facility]   11/05/20 09:32 AM US Retroperitoneal Complete (Kidney and Final [id number] [facility]   11/05/20 06:17 AM X-Ray Chest 1 View Final [id number] [facility]   11/03/20 01:35 PM X-Ray Chest 1 View for PICC_Central line Final [id number] [facility]   11/01/20 02:32 PM X-Ray Chest AP Portable Final [id number] [facility]   10/29/20 06:17 PM US Retroperitoneal Complete (Kidney and Final [id number] [facility]   10/29/20 12:37 PM X-Ray Chest AP Portable Final [id number] [facility]   10/29/20 05:49 AM X-Ray Abdomen AP 1 View Final [id number] [facility]   10/27/20 03:05 AM CT Head Without Contrast Final [id number] [facility]   10/27/20 01:32 AM X-Ray Chest AP Portable Final [id number] [facility]   10/26/20 11:18 PM X-Ray Chest AP Portable Final [id number] [facility]   10/26/20 09:57 PM X-ray chest AP portable Final [id number] [facility]   10/24/20 08:22 PM CTA Chest Non-Coronary Final [id number] [facility]   10/24/20 01:55 AM X-Ray Foot Complete Left Final [id number] [facility]   10/24/20 01:54 AM X-Ray Chest 1 View Final [id number] [facility]   10/24/20 12:00 AM CARDIAC MONITORING STRIPS Final     10/26/20 10:54 AM Echo Color Flow Doppler? Yes Final [id number] [facility]

## 2020-11-11 NOTE — PLAN OF CARE
[facility]    FACILITY TRANSFER ORDERS      Patient Name: Марина Britton  YOB: 1990    PCP: Luis Felipe Jose Iii, MD   PCP Address: [address] [place] [address]  PCP Phone Number: [phone]  PCP Fax: [phone]    Encounter Date: 11/11/2020    Admit to: LTAC    Vital Signs:  Routine    Diagnoses:   Active Hospital Problems    Diagnosis  POA    *Endocarditis of tricuspid valve [I07.9]  Yes     MRSA due to IV heroin      Bacteremia [R78.81]  Yes    Hyperkalemia [E87.5]  No    Bacterial endocarditis [I33.0]  Yes    Opioid use disorder, severe, dependence [F11.20]  Yes     Chronic    Cannabis use disorder, moderate, dependence [F12.20]  Yes     Chronic    Sedative, hypnotic or anxiolytic use disorder, severe, 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]  Yes     Chronic    Substance induced mood disorder [F19.94]  Yes     Chronic    DERICK (acute kidney injury) [N17.9]  No    MRSA bacteremia [R78.81, B95.62]  Yes    Pulmonary emboli [I26.99]  Yes    IVDU (intravenous drug user) [F19.90]  Yes     Chronic    Pneumonia of both lungs due to infectious organism [J18.9]  Yes      Resolved Hospital Problems    Diagnosis Date Resolved POA    Sepsis with acute hypoxic respiratory failure [A41.9, R65.20, J96.01] 10/27/2020 Yes    Severe sepsis [A41.9, R65.20] 11/04/2020 Yes    Tachypnea [R06.82] 11/04/2020 Yes       Allergies:  Review of patient's allergies indicates:   Allergen Reactions    Soap Rash     [employer] [facility] - Hospital supplied - cleanser shampoo & body wash gel  Ingredients - purified water, sodium laureth sulfate, sodium chloride, cocamide william, cocamidopropylamine oxide, fragrance, aloe barbadensis leaf juice, propylene alcohol, peg-150 distearate, diazolidinyl urea, ciric acid, methylparaben, & propulparaben       Diet: regular diet    House nutritional supplement    Activities: Activity as tolerated    Nursing: per facility protocol     Labs:     Per facility protocol, needs CBC  and BMP daily initially, can likely de-escalate to biweekly soon. Goal vancomycin trough 15-20    CONSULTS:    Physical Therapy to evaluate and treat. , Occupational Therapy to evaluate and treat. and  to evaluate for community resources/long-range planning. Dietetics    MISCELLANEOUS CARE:  N/A    WOUND CARE ORDERS  None     Марина Britton   Home Medication Instructions LIBBY:[id number]    Printed on:11/11/20 3108   Medication Information                      acetaminophen (TYLENOL) 325 MG tablet  Take 2 tablets (650 mg total) by mouth every 6 (six) hours as needed.             enoxaparin (LOVENOX) 40 mg/0.4 mL Syrg  Inject 0.4 mLs (40 mg total) into the skin once daily.             furosemide (LASIX) 40 MG tablet  Take 1 tablet (40 mg total) by mouth 2 (two) times a day.             hydrOXYzine HCL (ATARAX) 25 MG tablet  Take 1 tablet (25 mg total) by mouth 3 (three) times daily as needed for Anxiety.             melatonin (MELATIN) 3 mg tablet  Take 2 tablets (6 mg total) by mouth nightly as needed for Insomnia.             mirtazapine (REMERON) 7.5 MG Tab  Take 1 tablet (7.5 mg total) by mouth nightly.             oxyCODONE (ROXICODONE) 10 mg Tab immediate release tablet  Take 2 tablets (20 mg total) by mouth every 6 (six) hours as needed.             senna-docusate 8.6-50 mg (PERICOLACE) 8.6-50 mg per tablet  Take 2 tablets by mouth 2 (two) times daily.             vancomycin HCl in 5 % dextrose (VANCOMYCIN IN DEXTROSE 5 %) 1 gram/250 mL Soln  Inject 250 mLs (1,000 mg total) into the vein once daily until 12/12/2020                     _________________________________  Alli Garcia MD  11/11/2020

## 2020-11-11 NOTE — PLAN OF CARE
PT is aao x 4. She voiced a c/o SOB that started after receiving Lasix on previous shift and refused her PM dose. She cont to have increased BP. Blood was started at appx 0447 with consent from NP even though BP was elevated. BP cont to be closely monitored. Will cont to monitor BP. PT refused to have labs obtained this am. Changed to unit collect and staff will obtain lab from picc line once blood is finished. Dressing to picc is d/t be changed but with blood being administered it will be held off until blood has finished. Bed is low and locked with call light with in easy reach. Will cont to monitor PT closely.       Problem: Adult Inpatient Plan of Care  Goal: Plan of Care Review  Outcome: Ongoing, Progressing  Flowsheets (Taken 11/11/2020 0538)  Plan of Care Reviewed With:   patient   mother  Goal: Absence of Hospital-Acquired Illness or Injury  Outcome: Ongoing, Progressing  Intervention: Identify and Manage Fall Risk  Flowsheets (Taken 11/11/2020 0538)  Safety Promotion/Fall Prevention:   assistive device/personal item within reach   nonskid shoes/socks when out of bed   side rails raised x 2  Intervention: Prevent VTE (venous thromboembolism)  Flowsheets (Taken 11/11/2020 0538)  VTE Prevention/Management:   ambulation promoted   ROM (active) performed   dorsiflexion/plantar flexion performed  Goal: Optimal Comfort and Wellbeing  Outcome: Ongoing, Progressing  Intervention: Provide Person-Centered Care  Flowsheets (Taken 11/11/2020 0538)  Trust Relationship/Rapport:   care explained   choices provided   empathic listening provided   thoughts/feelings acknowledged     Problem: Adjustment to Illness (Sepsis/Septic Shock)  Goal: Optimal Coping  Outcome: Ongoing, Progressing  Intervention: Optimize Psychosocial Adjustment to Illness  Flowsheets (Taken 11/11/2020 0538)  Supportive Measures: relaxation techniques promoted

## 2020-11-11 NOTE — PLAN OF CARE
Recommendations:  1. Continue Regular Diet, encourage PO intake   --If unable to eat >50% of meals, recommend order Boost Breeze for variety (pt does not like Boost Plus)     2. RD following.    Goals: Meet % EEN, EPN by RD f/u date  Nutrition Goal Status: goal met  Communication of RD Recs: other (comment)(POC)

## 2020-11-11 NOTE — NURSING
Labetalol 10mg IVP was administered and tolerated well. No ill effects noted. Will cont to monitor BP

## 2020-11-12 PROBLEM — U07.1 COVID-19 VIRUS DETECTED: Status: ACTIVE | Noted: 2020-11-12

## 2020-11-12 LAB
ALBUMIN SERPL BCP-MCNC: 1.5 G/DL (ref 3.5–5.2)
ALP SERPL-CCNC: 110 U/L (ref 55–135)
ALT SERPL W/O P-5'-P-CCNC: 9 U/L (ref 10–44)
ANION GAP SERPL CALC-SCNC: 7 MMOL/L (ref 8–16)
AST SERPL-CCNC: 15 U/L (ref 10–40)
BASOPHILS # BLD AUTO: 0.17 K/UL (ref 0–0.2)
BASOPHILS NFR BLD: 1.2 % (ref 0–1.9)
BILIRUB SERPL-MCNC: 0.5 MG/DL (ref 0.1–1)
BUN SERPL-MCNC: 24 MG/DL (ref 6–20)
CALCIUM SERPL-MCNC: 7.5 MG/DL (ref 8.7–10.5)
CHLORIDE SERPL-SCNC: 103 MMOL/L (ref 95–110)
CO2 SERPL-SCNC: 22 MMOL/L (ref 23–29)
CREAT SERPL-MCNC: 1.3 MG/DL (ref 0.5–1.4)
CRP SERPL-MCNC: 111.2 MG/L (ref 0–8.2)
D DIMER PPP IA.FEU-MCNC: 5.98 MG/L FEU
DIFFERENTIAL METHOD: ABNORMAL
EOSINOPHIL # BLD AUTO: 0.2 K/UL (ref 0–0.5)
EOSINOPHIL NFR BLD: 1.1 % (ref 0–8)
ERYTHROCYTE [DISTWIDTH] IN BLOOD BY AUTOMATED COUNT: 17.7 % (ref 11.5–14.5)
EST. GFR  (AFRICAN AMERICAN): >60 ML/MIN/1.73 M^2
EST. GFR  (NON AFRICAN AMERICAN): 54.8 ML/MIN/1.73 M^2
GLUCOSE SERPL-MCNC: 99 MG/DL (ref 70–110)
HCT VFR BLD AUTO: 28 % (ref 37–48.5)
HGB BLD-MCNC: 8 G/DL (ref 12–16)
IMM GRANULOCYTES # BLD AUTO: 0.17 K/UL (ref 0–0.04)
IMM GRANULOCYTES NFR BLD AUTO: 1.2 % (ref 0–0.5)
LYMPHOCYTES # BLD AUTO: 1.4 K/UL (ref 1–4.8)
LYMPHOCYTES NFR BLD: 9.6 % (ref 18–48)
MAGNESIUM SERPL-MCNC: 1.6 MG/DL (ref 1.6–2.6)
MCH RBC QN AUTO: 25.9 PG (ref 27–31)
MCHC RBC AUTO-ENTMCNC: 28.6 G/DL (ref 32–36)
MCV RBC AUTO: 91 FL (ref 82–98)
MONOCYTES # BLD AUTO: 1 K/UL (ref 0.3–1)
MONOCYTES NFR BLD: 6.9 % (ref 4–15)
NEUTROPHILS # BLD AUTO: 11.6 K/UL (ref 1.8–7.7)
NEUTROPHILS NFR BLD: 80 % (ref 38–73)
NRBC BLD-RTO: 0 /100 WBC
PLATELET # BLD AUTO: 258 K/UL (ref 150–350)
PMV BLD AUTO: 9.7 FL (ref 9.2–12.9)
POTASSIUM SERPL-SCNC: 4.2 MMOL/L (ref 3.5–5.1)
PROCALCITONIN SERPL IA-MCNC: 0.22 NG/ML
PROT SERPL-MCNC: 6.3 G/DL (ref 6–8.4)
RBC # BLD AUTO: 3.09 M/UL (ref 4–5.4)
SARS-COV-2 RNA RESP QL NAA+PROBE: DETECTED
SODIUM SERPL-SCNC: 132 MMOL/L (ref 136–145)
WBC # BLD AUTO: 14.47 K/UL (ref 3.9–12.7)

## 2020-11-12 PROCEDURE — A4216 STERILE WATER/SALINE, 10 ML: HCPCS | Performed by: INTERNAL MEDICINE

## 2020-11-12 PROCEDURE — 99233 PR SUBSEQUENT HOSPITAL CARE,LEVL III: ICD-10-PCS | Mod: ,,, | Performed by: HOSPITALIST

## 2020-11-12 PROCEDURE — 86140 C-REACTIVE PROTEIN: CPT

## 2020-11-12 PROCEDURE — 85379 FIBRIN DEGRADATION QUANT: CPT

## 2020-11-12 PROCEDURE — 63600175 PHARM REV CODE 636 W HCPCS: Performed by: NURSE PRACTITIONER

## 2020-11-12 PROCEDURE — 84145 PROCALCITONIN (PCT): CPT

## 2020-11-12 PROCEDURE — 25000003 PHARM REV CODE 250: Performed by: NURSE PRACTITIONER

## 2020-11-12 PROCEDURE — 85025 COMPLETE CBC W/AUTO DIFF WBC: CPT

## 2020-11-12 PROCEDURE — 99233 SBSQ HOSP IP/OBS HIGH 50: CPT | Mod: ,,, | Performed by: HOSPITALIST

## 2020-11-12 PROCEDURE — 94799 UNLISTED PULMONARY SVC/PX: CPT

## 2020-11-12 PROCEDURE — 99233 SBSQ HOSP IP/OBS HIGH 50: CPT | Mod: ,,, | Performed by: PSYCHIATRY & NEUROLOGY

## 2020-11-12 PROCEDURE — 94761 N-INVAS EAR/PLS OXIMETRY MLT: CPT

## 2020-11-12 PROCEDURE — 20600001 HC STEP DOWN PRIVATE ROOM

## 2020-11-12 PROCEDURE — 25000003 PHARM REV CODE 250: Performed by: INTERNAL MEDICINE

## 2020-11-12 PROCEDURE — 80053 COMPREHEN METABOLIC PANEL: CPT

## 2020-11-12 PROCEDURE — 25000003 PHARM REV CODE 250: Performed by: STUDENT IN AN ORGANIZED HEALTH CARE EDUCATION/TRAINING PROGRAM

## 2020-11-12 PROCEDURE — 25000003 PHARM REV CODE 250: Performed by: HOSPITALIST

## 2020-11-12 PROCEDURE — 63600175 PHARM REV CODE 636 W HCPCS: Performed by: INTERNAL MEDICINE

## 2020-11-12 PROCEDURE — 83735 ASSAY OF MAGNESIUM: CPT

## 2020-11-12 PROCEDURE — 99233 PR SUBSEQUENT HOSPITAL CARE,LEVL III: ICD-10-PCS | Mod: ,,, | Performed by: PSYCHIATRY & NEUROLOGY

## 2020-11-12 RX ORDER — CHOLECALCIFEROL (VITAMIN D3) 25 MCG
1000 TABLET ORAL DAILY
Status: DISCONTINUED | OUTPATIENT
Start: 2020-11-12 | End: 2020-11-24 | Stop reason: HOSPADM

## 2020-11-12 RX ORDER — FUROSEMIDE 10 MG/ML
40 INJECTION INTRAMUSCULAR; INTRAVENOUS 2 TIMES DAILY
Status: DISCONTINUED | OUTPATIENT
Start: 2020-11-12 | End: 2020-11-13

## 2020-11-12 RX ORDER — ASCORBIC ACID 500 MG
500 TABLET ORAL 2 TIMES DAILY
Status: DISCONTINUED | OUTPATIENT
Start: 2020-11-12 | End: 2020-11-24 | Stop reason: HOSPADM

## 2020-11-12 RX ORDER — MIRTAZAPINE 7.5 MG/1
15 TABLET, FILM COATED ORAL NIGHTLY
Status: DISCONTINUED | OUTPATIENT
Start: 2020-11-12 | End: 2020-11-24 | Stop reason: HOSPADM

## 2020-11-12 RX ORDER — AMLODIPINE BESYLATE 10 MG/1
10 TABLET ORAL DAILY
Status: DISCONTINUED | OUTPATIENT
Start: 2020-11-12 | End: 2020-11-21

## 2020-11-12 RX ORDER — GABAPENTIN 300 MG/1
300 CAPSULE ORAL 3 TIMES DAILY
Status: DISCONTINUED | OUTPATIENT
Start: 2020-11-12 | End: 2020-11-24 | Stop reason: HOSPADM

## 2020-11-12 RX ADMIN — OXYCODONE HYDROCHLORIDE 20 MG: 10 TABLET ORAL at 05:11

## 2020-11-12 RX ADMIN — ENOXAPARIN SODIUM 40 MG: 40 INJECTION SUBCUTANEOUS at 05:11

## 2020-11-12 RX ADMIN — ACETAMINOPHEN 650 MG: 325 TABLET ORAL at 05:11

## 2020-11-12 RX ADMIN — Medication 10 ML: at 06:11

## 2020-11-12 RX ADMIN — OXYCODONE HYDROCHLORIDE AND ACETAMINOPHEN 500 MG: 500 TABLET ORAL at 09:11

## 2020-11-12 RX ADMIN — OXYCODONE HYDROCHLORIDE 10 MG: 10 TABLET ORAL at 09:11

## 2020-11-12 RX ADMIN — GABAPENTIN 300 MG: 300 CAPSULE ORAL at 10:11

## 2020-11-12 RX ADMIN — DEXAMETHASONE 6 MG: 4 TABLET ORAL at 09:11

## 2020-11-12 RX ADMIN — HYDROXYZINE HYDROCHLORIDE 25 MG: 25 TABLET, FILM COATED ORAL at 09:11

## 2020-11-12 RX ADMIN — Medication 10 ML: at 12:11

## 2020-11-12 RX ADMIN — ACETAMINOPHEN 650 MG: 325 TABLET ORAL at 03:11

## 2020-11-12 RX ADMIN — GABAPENTIN 300 MG: 300 CAPSULE ORAL at 03:11

## 2020-11-12 RX ADMIN — DOCUSATE SODIUM AND SENNOSIDES 2 TABLET: 8.6; 5 TABLET, FILM COATED ORAL at 10:11

## 2020-11-12 RX ADMIN — OXYCODONE HYDROCHLORIDE 20 MG: 10 TABLET ORAL at 12:11

## 2020-11-12 RX ADMIN — VANCOMYCIN HYDROCHLORIDE 1000 MG: 1 INJECTION, POWDER, LYOPHILIZED, FOR SOLUTION INTRAVENOUS at 09:11

## 2020-11-12 RX ADMIN — CHOLECALCIFEROL TAB 25 MCG (1000 UNIT) 1000 UNITS: 25 TAB at 09:11

## 2020-11-12 RX ADMIN — MIRTAZAPINE 15 MG: 7.5 TABLET ORAL at 10:11

## 2020-11-12 RX ADMIN — FUROSEMIDE 40 MG: 10 INJECTION, SOLUTION INTRAMUSCULAR; INTRAVENOUS at 09:11

## 2020-11-12 RX ADMIN — AMLODIPINE BESYLATE 10 MG: 10 TABLET ORAL at 12:11

## 2020-11-12 RX ADMIN — OXYCODONE HYDROCHLORIDE AND ACETAMINOPHEN 500 MG: 500 TABLET ORAL at 10:11

## 2020-11-12 NOTE — NURSING
Secure chat sent to TIKI Zendejas NP on call to alert of BP and HR upon arrival to floor and arrival to the floor as well as intervention for temp 100.0.
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
## 2020-11-12 NOTE — NURSING
Alerted Rapid response team of pt HR, BP, and desat as well as refusal to wear monitoring equipment and 02. Rapid response to round on patient. Pt refuses to verbalize understanding of severity of illness.

## 2020-11-12 NOTE — PLAN OF CARE
Currently not stable for discharge - increased confusion - COVID + on  11/11/2020 - presently on [facility] - REC is REHAB when stable for discharge. Will continue to follow.    Abeba Lang RN  Case Management  Ext [phone]       11/12/20 4751   Discharge Reassessment   Assessment Type Discharge Planning Reassessment   Provided patient/caregiver education on the expected discharge date and the discharge plan Yes   Do you have any problems affording any of your prescribed medications? No   Discharge Plan A Rehab   Discharge Plan B Skilled Nursing Facility   DME Needed Upon Discharge  walker, rolling;bedside commode;other (see comments)  (additional to be determined at next level of care)   Anticipated Discharge Disposition Rehab   Can the patient/caregiver answer the patient profile reliably? No, cognitively impaired   How does the patient rate their overall health at the present time? Fair   Describe the patient's ability to walk at the present time. Walks with the help of equipment   Post-Acute Status   Post-Acute Authorization Placement   Post-Acute Placement Status Awaiting Internal Medical Clearance   Discharge Delays None known at this time

## 2020-11-12 NOTE — CARE UPDATE
"RAPID RESPONSE NURSE PROACTIVE ROUNDING NOTE     Time of Visit: 0910    Admit Date: 10/26/2020  LOS: 17  Code Status: Full Code   Date of Visit: 2020  : 1990  Age: 30 y.o.  Sex: female  Race: White  Bed: 44596/42723 A:   MRN: [id number]  Was the patient discharged from an ICU this admission? no   Was the patient discharged from a PACU within last 24 hours?  no  Did the patient receive conscious sedation/general anesthesia in last 24 hours?  no  Was the patient in the ED within the past 24 hours?  no  Was the patient started on NIPPV within the past 24 hours?  no  Attending Physician: Arti Max MD  Primary Service: [facility] [place] [place] HOSP MED B    ASSESSMENT     Notified by Charge RN via phone call  Reason for alert: Agitation, refusing treatments and monitoring. Increased HR and O2 requirements with exertion.    Diagnosis: Endocarditis of tricuspid valve    Abnormal Vital Signs: BP (!) 149/104 (BP Location: Left arm, Patient Position: Lying)   Pulse 88   Temp 98.4 °F (36.9 °C) (Oral)   Resp (!) 24   Ht 5' 2" (1.575 m)   Wt 89.9 kg (198 lb 3.1 oz)   LMP  (LMP Unknown)   SpO2 95%   Breastfeeding No Comment: pt unable to answer  BMI 36.25 kg/m²      Clinical Issues: Respiratory    Patient  has a past medical history of Anxiety, Borderline personality disorder, Endocarditis of tricuspid valve, PTSD (post-traumatic stress disorder), and Substance abuse.      Patient resting in bed, per Charge RN still refusing oxygen and tele monitoring at this time. HR 91 SPO2 94% on RA.      INTERVENTIONS/ RECOMMENDATIONS     Discussed POC with Karlene Torrez RN, noted that patient had xanax on home medication list, recommended to give the hydroxyzine listed under PRN meds on eMAR. Charge Rn has been in contact with Dr Max with patients noncompliance and will speak to her when rounding. Monitor VS if allowed. Provide comfort with PRN pain/anxiety medications as needed.     Discussed plan of care with Karlene ATKINS Charge " RN.    PHYSICIAN ESCALATION     Yes/No  no    Orders received and case discussed with NA.    Disposition: Remain in room 99240.    FOLLOW-UP     Call back the Rapid Response Nurse, Valeria Bonilla RN at [phone] for additional questions or concerns.

## 2020-11-12 NOTE — PLAN OF CARE
Problem: Adult Inpatient Plan of Care  Goal: Plan of Care Review  Outcome: Ongoing, Progressing     Problem: Adult Inpatient Plan of Care  Goal: Optimal Comfort and Wellbeing  Outcome: Ongoing, Not Progressing  Goal: Readiness for Transition of Care  Outcome: Ongoing, Not Progressing

## 2020-11-12 NOTE — NURSING
"Pt received to bed 92 via transfer from 6th floor hospital side. Pt uncooperative and irritable. Pt arrives and is unable to get out of bed without assistance from RN x 2. Pt is short of breath with exertion. Pt denies shortness of breath at rest. Cough noted at this time. No sputum noted at this time. Pt reports fever and chills. Pt temp 100.0. Will administer tylenol per MAR. Pt declines tylenol suppository and requests PO. Pt denies pain with breathing. +3 edema to upper and lower bilateral extremities. Edema noted to abdomen. Abdomen firm and rounded. Pt reports tenderness during palpation. Pt unable to speak in full and complete sentences during assessment without rest period. Skin color is pale. Work of breathing is labored with accessory muscle use during exertion. Bounding pulse to R carotid. Pt provided with bedside commode and will request walker from supply. Pt declines purewick external female catheter. Pts  at rest and with exertion in bed 156. Pt became anxious and self removed tele monitor leads. Pt yelling " I can't do this. Take these off. It's too much. This is too much. Get it off." Instructed pt of medical necessity to monitor HR. Pt refuses cardiac monitoring. Will notify MD. BP elevated 160s/100s. Will also notify MD. Dressing change to LLE performed. Redness and skin breakdown noted to sacrum. Barrier cream applied and area cleansed prior to cream administration. Charge RN notified and to assess patient. Pt also desats to 84% on room air with exertion. 02 at bedside. Pt refuses to wear nasal cannula. Pt back to 92% after adequate rest period. Assessment done per flowsheet. Awaiting MD orders/ disposition. Bed rails up x 2 with bed locked in lowest position. Call light in reach. Will continue to monitor. ADMIT evaluation continues. Pt verbalizes understanding of plan of care.     PICC dressing changed ESPINOZA 11/12/2020 after arriving to room 21321.   "

## 2020-11-12 NOTE — NURSING
Pt and pt mom informed of positive COVID-19 test result. Transferred pt to room 41650, pt stable and alert.

## 2020-11-12 NOTE — PROGRESS NOTES
Hospital Medicine  Progress Note      Patient Name: Марина Britton  MRN: [id number]  Date of Admission: 10/26/2020     Principal Problem: Endocarditis of tricuspid valve     Subjective     COVID test ordered yesterday for placement purposes was positive  so she was transferred to COVID unit. Rapid response called this morning due to agitation, refusing treatments & monitoring in setting of increased HR and O2 requirements with exertion. During my evaluation, patient is calm, comfortable appearing, and off oxygen. She has no complaints. TMax 101.4. Her shortness of breath and chest pain are stable. Denies cough, myalgias, diarrhea.      Review of Systems    Constitutional: +fever Negative for chills, fatigue   Respiratory: +SOB (improved) Negative for cough  Cardiovascular: +chest pain (improved) Negative for palpitations, leg swelling.   Gastrointestinal: Negative for abdominal pain, constipation, diarrhea, nausea, vomiting.   Neurological: Negative for dizziness, syncope, weakness, light-headedness.     Medications  Scheduled Meds:   ascorbic acid (vitamin C)  500 mg Oral BID    dexAMETHasone  6 mg Oral Daily    enoxaparin  40 mg Subcutaneous Q24H    furosemide (LASIX) IV  40 mg Intravenous Daily    mirtazapine  7.5 mg Oral QHS    oxyCODONE  20 mg Per OG tube Q6H    polyethylene glycol  17 g Oral Daily    senna-docusate 8.6-50 mg  2 tablet Oral BID    sodium chloride 0.9%  10 mL Intravenous Q6H    vancomycin (VANCOCIN) IVPB  1,000 mg Intravenous Q24H    vitamin D  1,000 Units Oral Daily     Continuous Infusions:    PRN Meds:.sodium chloride, acetaminophen, acetaminophen, acetaminophen, calcium carbonate, diphenhydrAMINE, hydrOXYzine HCL, melatonin, oxyCODONE, polyethylene glycol, sodium chloride 0.9%, Flushing PICC Protocol **AND** sodium chloride 0.9% **AND** sodium chloride 0.9%, vancomycin - pharmacy to dose    Objective    Physical Examination    Temp:  [98 °F (36.7 °C)-101.4 °F (38.6 °C)]   Pulse:   []   Resp:  [14-30]   BP: (137-160)/()   SpO2:  [84 %-95 %]     Gen: NAD, conversant  CV: RRR, +murmur, 1+ lower extremity edema bilaterally   Resp: CTAB, no increased work of breathing on room air  GI: Soft, NT, ND, +BS  Ext: MAEW, RUE PICC clean and dry at insertion site  Neuro: AAOx3, CN grossly intact, no focal neurologic deficits    CBC  Recent Labs   Lab 11/10/20  0656 11/11/20  1257 11/12/20  0643   WBC 11.88 12.47 14.47*   HGB 6.9* 8.6* 8.0*   HCT 22.9* 28.1* 28.0*    252 258     CMP  Recent Labs   Lab 11/08/20  0642 11/09/20  0448 11/10/20  0656 11/11/20  1257 11/12/20  0643   * 132* 135* 134* 132*   K 4.6 5.0 4.5 4.0 4.2    107 107 103 103   CO2 19* 21* 22* 22* 22*   BUN 35* 30* 28* 23* 24*   CREATININE 1.7* 1.4 1.4 1.3 1.3    80 109 127* 99   CALCIUM 7.2* 7.5* 7.7* 7.7* 7.5*   MG 2.3 2.2 1.9 1.6 1.6   PHOS 4.3 3.6 3.4  --   --    ALKPHOS 122 105 106 112 110   ALT 8* 8* 8* 8* 9*   AST 16 17 15 14 15   ALBUMIN 1.3* 1.3* 1.3* 1.4* 1.5*   PROT 5.8* 5.8* 5.9* 6.4 6.3   BILITOT 0.4 0.4 0.4 0.4 0.5       Hospital Course:    Ms. Britton is a 30-year-old woman with HCV, IVDU who was transferred from [facility] where she presented with chest pain and was diagnosed with MRSA bacteremia complicated by TV IE with septic emboli, acute hypoxic respiratory failure, septic/metabolic encephalopathy, septic PE, septic shock, and acute anemia. She was [place] [facility] [place]27 and was intubated for respiratory failure. She has subsequently been evaluated by ID, CTS, and psychiatry. She required pRBC transfusion on 10/29, and norepi on 10/30. Her admission was further complicated by DERICK which subsequently resolved. She was extubated on 11/2, then transferred to the floor on 11/3 for further care, planning on long-term IV antibiotics with vancomycin and repeat CTS evaluation as an outpatient. Subsequently her DERICK has returned, and she is again febrile for which BCx were  "obtained on 11/4 and 11/5 and have remained NGTD. ID was reconsulted on 11/6, and cefepime was added to her regimen. CT C/A/P obtained shows continued infectious lung processes. TTE shows progressive valvular damage with flail valve and severe MR, for which she is being diuresed.    Assessment and Plan:    MRSA bacteremia  Pneumonia of both lungs due to infectious organism  Pulmonary emboli (septic)  Sepsis  Endocarditis of tricuspid valve    - Febrile intermittently, BCx from 11/4 and 11/5 NGTD. Discussed with ID who stated that she will likely continue to have intermittent fevers from her infectious process  - Continue vancomycin (pharmacy dosing). She has completed five days of cefepime  - PT/OT following and recommending IPR, LTAC referrals out  - Needs 6 weeks IV antibiotics, then f/u CTS    Flail tricuspid valve  Severe tricuspid regurgitation  Lower extremity edema    - Repeat TTE shows more advanced disease, tricuspid valve now flail, but also "distinguishing between vegetation, valve, and chord is difficult, even with these good images" per interpreting cardiologist. Also shows elevated CVP.   - LE edema improving with lasix  - Continue IV diuresis  - Previous provider discussed findings and ongoing fevers with Dr. Pritchett on 11/10 who recommended continued medical management as her current deconditioned status is a contraindication to surgical intervention    COVID infection  - COVID-19 testing: Collection Date: 11/11/2020 Collection Time:  11:09 AM   - Isolation: Airborne/Droplet. Surgical mask on patient. Notify Infection Control  - Diagnostics: Trend Q48hrs if stable, more frequently if patient decompensating     Laboratory/Study Frequency Result   CBC Admit & Q48h    CMP Admit & Q48h    Magnesium level Admit    D-dimer Admit & Q48h 8.2 > 5.9   Ferritin Admit & Q48h 686   CRP Admit & Q48h 31 > 174 > 111   CPK Admit & Q24h if elevated    LDH Admit & Q48h 382   Vitamin D Admit ordered   BNP Admit ordered "   Troponin Admit & Q6h if elevated    Glucose-6-phos dehydrogenase Admit    Procalcitonin Admit 0.22   Lipid Panel If using statin    Rapid influenza Admit    Resp Infection Panel Admit if BMT/organ transplant    Legionella Antigen Admit    Sputum Culture Admit    Blood Culture Admit    Urinalysis & Culture Admit    ECG Admit & Q48h if on HCQ    CXR Admit    Lymphopenia, hyponatremia, hyperferritinemia, elevated troponin, elevated d-dimer, age, and medical comorbidities are significant predictors of poor clinical outcome    - [facility] ([facility]/15/20)    - Monitoring:   - Telemetry & Continuous Pulse Oximetry    - Nutrition:    - Multivitamin PO daily   - Add Boost supplement   - Vitamin D 1000IU daily if deficient   - Ascorbic acid 500mg PO bid    - Supportive Care:   - acetaminophen 650mg PO Q6hr PRN fever/headache   - loperamide PRN viral diarrhea   - IVF if indicated, restrictive strategy preferred, no maintenance IV if able   - VTE PPx: enoxaparin or heparin SQ unless contraindicated    - Antibiotics:  - if indications, CXR findings, elevated procal. See protocol for alternatives.   - Discontinue early if low concern for bacterial co-infection   - ceftriaxone 1g Q24h x 5 days  - azithromycin 500mg po x1, then 250mg po daily x 4 days    - Investigational Therapies:   - If patient meets criteria  - atorvastatin 40mg po daily      DERICK (acute kidney injury)  Hyperkalemia  Hyponatremia    - sCr stable, holding steady with diuresis though awaiting labs from today  - Etiology likely multifactorial with recent sepsis and vancomycin and poor PO intake  - Na 135  - Urine studies c/w pre-renal (though cardiorenal would have a similar picture)  - Lasix as above    IVDU (intravenous drug user)  Opioid use disorder, severe, dependence    - Psych evaluated earlier this admission  - Would benefit from LTAC disposition    Substance induced mood disorder    - Psych evaluated, started on  mirtazapine; will reevaluate today due to agitation today.    Chronic inflammatory anemia    - Hemoglobin 6.9 g/dL 11/10, given one unit of pRBCs  - Iron studies consistent with inflammatory process, in keeping with her systemic infection  - Monitor    Malnutrition    - Dietetics following  - Added boost to all meals    Diet: Adult regular  VTE PPX: LMWH  Goals of care: Curative, full code        Arti Max MD  Hospital Medicine Staff  [facility]

## 2020-11-12 NOTE — NURSING
Pt placed in new bed at this time. Pt received full linen change and full hygiene measures. Pt self removed abdominal piercing and placed jewelry in personal bag.

## 2020-11-13 LAB
25(OH)D3+25(OH)D2 SERPL-MCNC: 9 NG/ML (ref 30–96)
BACTERIA BLD CULT: NORMAL
BACTERIA BLD CULT: NORMAL
BNP SERPL-MCNC: 557 PG/ML (ref 0–99)
VANCOMYCIN TROUGH SERPL-MCNC: 15.5 UG/ML (ref 10–22)

## 2020-11-13 PROCEDURE — 83880 ASSAY OF NATRIURETIC PEPTIDE: CPT

## 2020-11-13 PROCEDURE — 25000003 PHARM REV CODE 250: Performed by: INTERNAL MEDICINE

## 2020-11-13 PROCEDURE — 97116 GAIT TRAINING THERAPY: CPT

## 2020-11-13 PROCEDURE — 63600175 PHARM REV CODE 636 W HCPCS: Performed by: HOSPITALIST

## 2020-11-13 PROCEDURE — 93005 ELECTROCARDIOGRAM TRACING: CPT

## 2020-11-13 PROCEDURE — 94761 N-INVAS EAR/PLS OXIMETRY MLT: CPT

## 2020-11-13 PROCEDURE — 99900035 HC TECH TIME PER 15 MIN (STAT)

## 2020-11-13 PROCEDURE — 63600175 PHARM REV CODE 636 W HCPCS: Performed by: NURSE PRACTITIONER

## 2020-11-13 PROCEDURE — 99233 PR SUBSEQUENT HOSPITAL CARE,LEVL III: ICD-10-PCS | Mod: ,,, | Performed by: INTERNAL MEDICINE

## 2020-11-13 PROCEDURE — 99233 SBSQ HOSP IP/OBS HIGH 50: CPT | Mod: ,,, | Performed by: INTERNAL MEDICINE

## 2020-11-13 PROCEDURE — 25000003 PHARM REV CODE 250: Performed by: NURSE PRACTITIONER

## 2020-11-13 PROCEDURE — 97530 THERAPEUTIC ACTIVITIES: CPT

## 2020-11-13 PROCEDURE — 25000003 PHARM REV CODE 250: Performed by: HOSPITALIST

## 2020-11-13 PROCEDURE — A4216 STERILE WATER/SALINE, 10 ML: HCPCS | Performed by: INTERNAL MEDICINE

## 2020-11-13 PROCEDURE — 93010 EKG 12-LEAD: ICD-10-PCS | Mod: ,,, | Performed by: INTERNAL MEDICINE

## 2020-11-13 PROCEDURE — 63600175 PHARM REV CODE 636 W HCPCS: Performed by: INTERNAL MEDICINE

## 2020-11-13 PROCEDURE — 80202 ASSAY OF VANCOMYCIN: CPT

## 2020-11-13 PROCEDURE — 20600001 HC STEP DOWN PRIVATE ROOM

## 2020-11-13 PROCEDURE — 25000003 PHARM REV CODE 250: Performed by: STUDENT IN AN ORGANIZED HEALTH CARE EDUCATION/TRAINING PROGRAM

## 2020-11-13 PROCEDURE — 93010 ELECTROCARDIOGRAM REPORT: CPT | Mod: ,,, | Performed by: INTERNAL MEDICINE

## 2020-11-13 PROCEDURE — 82306 VITAMIN D 25 HYDROXY: CPT

## 2020-11-13 RX ADMIN — OXYCODONE HYDROCHLORIDE 20 MG: 10 TABLET ORAL at 12:11

## 2020-11-13 RX ADMIN — Medication 10 ML: at 12:11

## 2020-11-13 RX ADMIN — CHOLECALCIFEROL TAB 25 MCG (1000 UNIT) 1000 UNITS: 25 TAB at 09:11

## 2020-11-13 RX ADMIN — AMLODIPINE BESYLATE 10 MG: 10 TABLET ORAL at 09:11

## 2020-11-13 RX ADMIN — DEXAMETHASONE 6 MG: 4 TABLET ORAL at 09:11

## 2020-11-13 RX ADMIN — VANCOMYCIN HYDROCHLORIDE 1000 MG: 1 INJECTION, POWDER, LYOPHILIZED, FOR SOLUTION INTRAVENOUS at 09:11

## 2020-11-13 RX ADMIN — OXYCODONE HYDROCHLORIDE AND ACETAMINOPHEN 500 MG: 500 TABLET ORAL at 09:11

## 2020-11-13 RX ADMIN — GABAPENTIN 300 MG: 300 CAPSULE ORAL at 03:11

## 2020-11-13 RX ADMIN — MELATONIN TAB 3 MG 6 MG: 3 TAB at 08:11

## 2020-11-13 RX ADMIN — ENOXAPARIN SODIUM 40 MG: 40 INJECTION SUBCUTANEOUS at 05:11

## 2020-11-13 RX ADMIN — OXYCODONE HYDROCHLORIDE 20 MG: 10 TABLET ORAL at 05:11

## 2020-11-13 RX ADMIN — GABAPENTIN 300 MG: 300 CAPSULE ORAL at 08:11

## 2020-11-13 RX ADMIN — FUROSEMIDE 40 MG: 10 INJECTION, SOLUTION INTRAMUSCULAR; INTRAVENOUS at 09:11

## 2020-11-13 RX ADMIN — Medication 10 ML: at 05:11

## 2020-11-13 RX ADMIN — GABAPENTIN 300 MG: 300 CAPSULE ORAL at 09:11

## 2020-11-13 RX ADMIN — OXYCODONE HYDROCHLORIDE 20 MG: 10 TABLET ORAL at 11:11

## 2020-11-13 RX ADMIN — MIRTAZAPINE 15 MG: 7.5 TABLET ORAL at 08:11

## 2020-11-13 RX ADMIN — OXYCODONE HYDROCHLORIDE AND ACETAMINOPHEN 500 MG: 500 TABLET ORAL at 08:11

## 2020-11-13 NOTE — PLAN OF CARE
Problem: Occupational Therapy Goal  Goal: Occupational Therapy Goal  Description: Goals to be met by: 11/18    Patient will increase functional independence with ADLs by performing:    UE Dressing with Set-up Assistance.  LE Dressing with Set-up Assistance.   Grooming while standing at sink with Supervision.   Toileting from toilet with Modified [place] for hygiene and clothing management.   Toilet transfer to toilet with Modified [place].    Outcome: Ongoing, Progressing    Leesa Doyle, OTR/L  Pager: [phone]  11/13/2020

## 2020-11-13 NOTE — PLAN OF CARE
Patient has flat affect, Patient was very concerned about her liver functions regarding the lasix treatment.  Patient took most of her meds today, .  Later on stacie back ashok agreed to put an oxygen saturation probe on to monitor her Sats.    Completed wound dressing changes this shift.    Picc line for infusion and ICP meds only.  Patient is a lab draw, lab already aware.      Problem: Adult Inpatient Plan of Care  Goal: Plan of Care Review  11/12/2020 1954 by Estefania Randle RN  Outcome: Ongoing, Progressing  11/12/2020 1954 by Estefania Randle RN  Outcome: Ongoing, Progressing  Goal: Patient-Specific Goal (Individualization)  11/12/2020 1954 by Estefania Randle RN  Outcome: Ongoing, Progressing  11/12/2020 1954 by Estefania Randle RN  Outcome: Ongoing, Progressing  Goal: Absence of Hospital-Acquired Illness or Injury  11/12/2020 1954 by Estefania Randle RN  Outcome: Ongoing, Progressing  11/12/2020 1954 by Estefania Randle RN  Outcome: Ongoing, Progressing  Goal: Optimal Comfort and Wellbeing  11/12/2020 1954 by Estefania Randle RN  Outcome: Ongoing, Progressing  11/12/2020 1954 by Estefania Randle RN  Outcome: Ongoing, Progressing  Goal: Readiness for Transition of Care  11/12/2020 1954 by Estefania Randle RN  Outcome: Ongoing, Progressing  11/12/2020 1954 by Estefania Randle RN  Outcome: Ongoing, Progressing  Goal: Rounds/Family Conference  11/12/2020 1954 by Estefania Randle RN  Outcome: Ongoing, Progressing  11/12/2020 1954 by Estefania Randle RN  Outcome: Ongoing, Progressing     Problem: Adjustment to Illness (Sepsis/Septic Shock)  Goal: Optimal Coping  11/12/2020 1954 by Estefania Randle RN  Outcome: Ongoing, Progressing  11/12/2020 1954 by Estefania Randle RN  Outcome: Ongoing, Progressing     Problem: Bleeding (Sepsis/Septic Shock)  Goal: Absence of Bleeding  11/12/2020 1954 by Estefania Randle RN  Outcome: Ongoing, Progressing  11/12/2020 1954 by Estefania Randle RN  Outcome: Ongoing, Progressing      Problem: Glycemic Control Impaired (Sepsis/Septic Shock)  Goal: Blood Glucose Level Within Desired Range  11/12/2020 1954 by Estefania Randle RN  Outcome: Ongoing, Progressing  11/12/2020 1954 by Estefania Randle RN  Outcome: Ongoing, Progressing     Problem: Hemodynamic Instability (Sepsis/Septic Shock)  Goal: Effective Tissue Perfusion  11/12/2020 1954 by Estefania Randle RN  Outcome: Ongoing, Progressing  11/12/2020 1954 by Estefania Randle RN  Outcome: Ongoing, Progressing     Problem: Infection (Sepsis/Septic Shock)  Goal: Absence of Infection Signs/Symptoms  11/12/2020 1954 by Estefania Randle RN  Outcome: Ongoing, Progressing  11/12/2020 1954 by Estefania Randle RN  Outcome: Ongoing, Progressing     Problem: Nutrition Impaired (Sepsis/Septic Shock)  Goal: Optimal Nutrition Intake  11/12/2020 1954 by Estefania Randle RN  Outcome: Ongoing, Progressing  11/12/2020 1954 by Estefania Randle RN  Outcome: Ongoing, Progressing     Problem: Respiratory Compromise (Sepsis/Septic Shock)  Goal: Effective Oxygenation and Ventilation  11/12/2020 1954 by Estefania Randle RN  Outcome: Ongoing, Progressing  11/12/2020 1954 by Estefania Randle RN  Outcome: Ongoing, Progressing     Problem: Fluid Imbalance (Pneumonia)  Goal: Fluid Balance  11/12/2020 1954 by Estefania Randle RN  Outcome: Ongoing, Progressing  11/12/2020 1954 by Estefania Randle RN  Outcome: Ongoing, Progressing     Problem: Infection (Pneumonia)  Goal: Resolution of Infection Signs/Symptoms  11/12/2020 1954 by Estefania Randle RN  Outcome: Ongoing, Progressing  11/12/2020 1954 by Estefania Randle RN  Outcome: Ongoing, Progressing     Problem: Respiratory Compromise (Pneumonia)  Goal: Effective Oxygenation and Ventilation  11/12/2020 1954 by Estefania Randle RN  Outcome: Ongoing, Progressing  11/12/2020 1954 by Estefania Randle RN  Outcome: Ongoing, Progressing     Problem: Infection  Goal: Infection Symptom Resolution  11/12/2020 1954 by Estefania Randle  RN  Outcome: Ongoing, Progressing  11/12/2020 1954 by Estefania Randle RN  Outcome: Ongoing, Progressing     Problem: Wound  Goal: Optimal Wound Healing  11/12/2020 1954 by Estefania Randle RN  Outcome: Ongoing, Progressing  11/12/2020 1954 by Estefania Randle RN  Outcome: Ongoing, Progressing     Problem: Skin Injury Risk Increased  Goal: Skin Health and Integrity  11/12/2020 1954 by Estefania Randle RN  Outcome: Ongoing, Progressing  11/12/2020 1954 by Estefania Randle RN  Outcome: Ongoing, Progressing     Problem: Communication Impairment (Mechanical Ventilation, Invasive)  Goal: Effective Communication  11/12/2020 1954 by Estefania Randle RN  Outcome: Ongoing, Progressing  11/12/2020 1954 by Estefania Randle RN  Outcome: Ongoing, Progressing     Problem: Device-Related Complication Risk (Mechanical Ventilation, Invasive)  Goal: Optimal Device Function  11/12/2020 1954 by Estefania Randle RN  Outcome: Ongoing, Progressing  11/12/2020 1954 by Estefania Randle RN  Outcome: Ongoing, Progressing     Problem: Inability to Wean (Mechanical Ventilation, Invasive)  Goal: Mechanical Ventilation Liberation  11/12/2020 1954 by Estefania Randle RN  Outcome: Ongoing, Progressing  11/12/2020 1954 by Estefania Randle RN  Outcome: Ongoing, Progressing     Problem: Nutrition Impairment (Mechanical Ventilation, Invasive)  Goal: Optimal Nutrition Delivery  11/12/2020 1954 by Estefania Randle RN  Outcome: Ongoing, Progressing  11/12/2020 1954 by Estefania Randle RN  Outcome: Ongoing, Progressing     Problem: Skin and Tissue Injury (Mechanical Ventilation, Invasive)  Goal: Absence of Device-Related Skin and Tissue Injury  11/12/2020 1954 by Estefania Randle RN  Outcome: Ongoing, Progressing  11/12/2020 1954 by Estefania Randle RN  Outcome: Ongoing, Progressing     Problem: Ventilator-Induced Lung Injury (Mechanical Ventilation, Invasive)  Goal: Absence of Ventilator-Induced Lung Injury  11/12/2020 1954 by Estefania Randle  RN  Outcome: Ongoing, Progressing  11/12/2020 1954 by Estefania Randle RN  Outcome: Ongoing, Progressing     Problem: Communication Impairment (Artificial Airway)  Goal: Effective Communication  11/12/2020 1954 by Estefania Randle RN  Outcome: Ongoing, Progressing  11/12/2020 1954 by Estefania Randle RN  Outcome: Ongoing, Progressing     Problem: Device-Related Complication Risk (Artificial Airway)  Goal: Optimal Device Function  11/12/2020 1954 by Estefania Randle RN  Outcome: Ongoing, Progressing  11/12/2020 1954 by Estefania Randle RN  Outcome: Ongoing, Progressing     Problem: Skin and Tissue Injury (Artificial Airway)  Goal: Absence of Device-Related Skin or Tissue Injury  11/12/2020 1954 by Estefania Randle RN  Outcome: Ongoing, Progressing  11/12/2020 1954 by Estefania Randle RN  Outcome: Ongoing, Progressing     Problem: Noninvasive Ventilation Acute  Goal: Effective Unassisted Ventilation and Oxygenation  11/12/2020 1954 by Estefania Randle RN  Outcome: Ongoing, Progressing  11/12/2020 1954 by Estefania Randle RN  Outcome: Ongoing, Progressing     Problem: Fall Injury Risk  Goal: Absence of Fall and Fall-Related Injury  11/12/2020 1954 by Estefania Randle RN  Outcome: Ongoing, Progressing  11/12/2020 1954 by Estefania Randle RN  Outcome: Ongoing, Progressing     Problem: Restraint, Nonbehavioral (Nonviolent)  Goal: Discontinuation Criteria Achieved  11/12/2020 1954 by Estefania Randle RN  Outcome: Ongoing, Progressing  11/12/2020 1954 by Estefania Randle RN  Outcome: Ongoing, Progressing  Goal: Personal Dignity and Safety Maintained  11/12/2020 1954 by Estefania Randle RN  Outcome: Ongoing, Progressing  11/12/2020 1954 by Estefania Randle RN  Outcome: Ongoing, Progressing     Problem: Electrolyte Imbalance (Acute Kidney Injury/Impairment)  Goal: Serum Electrolyte Balance  11/12/2020 1954 by Estefania Randle RN  Outcome: Ongoing, Progressing  11/12/2020 1954 by Estefania Randle RN  Outcome: Ongoing,  Progressing     Problem: Fluid Imbalance (Acute Kidney Injury/Impairment)  Goal: Optimal Fluid Balance  11/12/2020 1954 by Estefania Randle RN  Outcome: Ongoing, Progressing  11/12/2020 1954 by Estefania Randle RN  Outcome: Ongoing, Progressing     Problem: Hematologic Alteration (Acute Kidney Injury/Impairment)  Goal: Hemoglobin, Hematocrit and Platelets Within Normal Range  11/12/2020 1954 by Estefania Randle RN  Outcome: Ongoing, Progressing  11/12/2020 1954 by Estefania Randle RN  Outcome: Ongoing, Progressing     Problem: Oral Intake Inadequate (Acute Kidney Injury/Impairment)  Goal: Optimal Nutrition Intake  11/12/2020 1954 by Estefania Randle RN  Outcome: Ongoing, Progressing  11/12/2020 1954 by Estefania Randle RN  Outcome: Ongoing, Progressing     Problem: Renal Function Impairment (Acute Kidney Injury/Impairment)  Goal: Effective Renal Function  11/12/2020 1954 by Estefania Randle RN  Outcome: Ongoing, Progressing  11/12/2020 1954 by Estefania Randle RN  Outcome: Ongoing, Progressing

## 2020-11-13 NOTE — PROGRESS NOTES
Hospital Medicine  Progress Note      Patient Name: Марина Britton  MRN: [id number]  Date of Admission: 10/26/2020     Principal Problem: Endocarditis of tricuspid valve     Subjective     Patient transferred to [facility] service, 2/2 + test ordered for placement. Patient is stable on room air, no cough, sob. Afebrile over last 24 hours.  Reports leg swelling improving with furosemide and leg elevation.     Review of Systems    Constitutional:  Negative for chills, fatigue   Respiratory:  Negative for cough, SOB  Cardiovascular: +chest pain (improved) Negative for palpitations, leg swelling.   Gastrointestinal: Negative for abdominal pain, constipation, diarrhea, nausea, vomiting.   Neurological: Negative for dizziness, syncope, weakness, light-headedness.     Medications  Scheduled Meds:   amLODIPine  10 mg Oral Daily    ascorbic acid (vitamin C)  500 mg Oral BID    enoxaparin  40 mg Subcutaneous Q24H    furosemide (LASIX) IV  40 mg Intravenous BID    gabapentin  300 mg Oral TID    mirtazapine  15 mg Oral QHS    oxyCODONE  20 mg Per OG tube Q6H    polyethylene glycol  17 g Oral Daily    senna-docusate 8.6-50 mg  2 tablet Oral BID    sodium chloride 0.9%  10 mL Intravenous Q6H    vancomycin (VANCOCIN) IVPB  1,000 mg Intravenous Q24H    vitamin D  1,000 Units Oral Daily     Continuous Infusions:    PRN Meds:.sodium chloride, acetaminophen, acetaminophen, acetaminophen, calcium carbonate, diphenhydrAMINE, hydrOXYzine HCL, melatonin, oxyCODONE, polyethylene glycol, sodium chloride 0.9%, Flushing PICC Protocol **AND** sodium chloride 0.9% **AND** sodium chloride 0.9%, valproate sodium (DEPACON) IVPB, vancomycin - pharmacy to dose    Objective    Physical Examination    Temp:  [97.4 °F (36.3 °C)-98.2 °F (36.8 °C)]   Pulse:  []   Resp:  [18-24]   BP: (115-159)/(84-99)   SpO2:  [93 %-100 %]     Gen: NAD, conversant  CV: RRR, +murmur, 1+ lower extremity edema bilaterally   Resp: CTAB, no increased work of  breathing on room air  GI: Soft, NT, ND, +BS  Ext: MAEW, ADINA PICC clean and dry at insertion site  Neuro: AAOx3, CN grossly intact, no focal neurologic deficits    CBC  Recent Labs   Lab 11/10/20  0656 11/11/20  1257 11/12/20  0643   WBC 11.88 12.47 14.47*   HGB 6.9* 8.6* 8.0*   HCT 22.9* 28.1* 28.0*    252 258     CMP  Recent Labs   Lab 11/08/20  0642 11/09/20  0448 11/10/20  0656 11/11/20  1257 11/12/20  0643   * 132* 135* 134* 132*   K 4.6 5.0 4.5 4.0 4.2    107 107 103 103   CO2 19* 21* 22* 22* 22*   BUN 35* 30* 28* 23* 24*   CREATININE 1.7* 1.4 1.4 1.3 1.3    80 109 127* 99   CALCIUM 7.2* 7.5* 7.7* 7.7* 7.5*   MG 2.3 2.2 1.9 1.6 1.6   PHOS 4.3 3.6 3.4  --   --    ALKPHOS 122 105 106 112 110   ALT 8* 8* 8* 8* 9*   AST 16 17 15 14 15   ALBUMIN 1.3* 1.3* 1.3* 1.4* 1.5*   PROT 5.8* 5.8* 5.9* 6.4 6.3   BILITOT 0.4 0.4 0.4 0.4 0.5       Hospital Course:    Ms. Britton is a 30-year-old woman with HCV, IVDU who was transferred from [facility] where she presented with chest pain and was diagnosed with MRSA bacteremia complicated by TV IE with septic emboli, acute hypoxic respiratory failure, septic/metabolic encephalopathy, septic PE, septic shock, and acute anemia. She was admitted to the [facility] on 10/27 and was intubated for respiratory failure. She has subsequently been evaluated by ID, CTS, and psychiatry. She required pRBC transfusion on 10/29, and norepi on 10/30. Her admission was further complicated by DERICK which subsequently resolved. She was extubated on 11/2, then transferred to the floor on 11/3 for further care, planning on long-term IV antibiotics with vancomycin and repeat CTS evaluation as an outpatient. Subsequently her DERICK has returned, and she is again febrile for which BCx were obtained on 11/4 and 11/5 and have remained NGTD. ID was reconsulted on 11/6, and cefepime was added to her regimen. CT C/A/P obtained shows continued infectious lung processes. TTE shows  "progressive valvular damage with flail valve and severe MR, for which she is being diuresed.    Covid test ordered for placement resulted + 11/11, patient transferred to covid isolation floor. No active signs/sx of covid    Assessment and Plan:    MRSA bacteremia  Pneumonia of both lungs due to infectious organism  Pulmonary emboli (septic)  Sepsis  Endocarditis of tricuspid valve  - Febrile intermittently, BCx from 11/4 and 11/5 NGTD. Discussed with ID who stated that she will likely continue to have intermittent fevers from her infectious process  - Continue vancomycin (pharmacy dosing). She has completed five days of cefepime  - PT/OT following and recommending IPR, LTAC referrals out  - Needs 6 weeks IV antibiotics, then f/u CTS    Flail tricuspid valve  Severe tricuspid regurgitation  Lower extremity edema    - Repeat TTE shows more advanced disease, tricuspid valve now flail, but also "distinguishing between vegetation, valve, and chord is difficult, even with these good images" per interpreting cardiologist. Also shows elevated CVP.   - LE edema improving with lasix  - Continue IV diuresis  - Previous provider discussed findings and ongoing fevers with Dr. Pritchett on 11/10 who recommended continued medical management as her current deconditioned status is a contraindication to surgical intervention    COVID infection  No symptoms  Stopped Dexamethasone today 11/13, since not requiring O2 at this time.  - COVID-19 testing: Collection Date: 11/11/2020 Collection Time:  11:09 AM   - Isolation: Airborne/Droplet. Surgical mask on patient. Notify Infection Control  - Diagnostics: Trend Q48hrs if stable, more frequently if patient decompensating     Laboratory/Study Frequency Result   CBC Admit & Q48h    CMP Admit & Q48h    Magnesium level Admit    D-dimer Admit & Q48h 8.2 > 5.9   Ferritin Admit & Q48h 686   CRP Admit & Q48h 31 > 174 > 111   CPK Admit & Q24h if elevated    LDH Admit & Q48h 382   Vitamin D Admit " ordered   BNP Admit ordered   Troponin Admit & Q6h if elevated    Glucose-6-phos dehydrogenase Admit    Procalcitonin Admit 0.22   Lipid Panel If using statin    Rapid influenza Admit    Resp Infection Panel Admit if BMT/organ transplant    Legionella Antigen Admit    Sputum Culture Admit    Blood Culture Admit    Urinalysis & Culture Admit    ECG Admit & Q48h if on HCQ    CXR Admit    Lymphopenia, hyponatremia, hyperferritinemia, elevated troponin, elevated d-dimer, age, and medical comorbidities are significant predictors of poor clinical outcome    - [facility]15[facility])    - Monitoring:   - Telemetry & Continuous Pulse Oximetry    - Nutrition:    - Multivitamin PO daily   - Add Boost supplement   - Vitamin D 1000IU daily if deficient   - Ascorbic acid 500mg PO bid    - Supportive Care:   - acetaminophen 650mg PO Q6hr PRN fever/headache   - loperamide PRN viral diarrhea   - IVF if indicated, restrictive strategy preferred, no maintenance IV if able   - VTE PPx: enoxaparin or heparin SQ unless contraindicated    - Antibiotics:  - if indications, CXR findings, elevated procal. See protocol for alternatives.   - Discontinue early if low concern for bacterial co-infection   - ceftriaxone 1g Q24h x 5 days  - azithromycin 500mg po x1, then 250mg po daily x 4 days    - Investigational Therapies:   - If patient meets criteria  - atorvastatin 40mg po daily      DERICK (acute kidney injury)  Hyperkalemia  Hyponatremia    - sCr stable, holding steady with diuresis though awaiting labs from today  - Etiology likely multifactorial with recent sepsis and vancomycin and poor PO intake  - Na 132  - Urine studies c/w pre-renal (though cardiorenal would have a similar picture)      IVDU (intravenous drug user)  Opioid use disorder, severe, dependence    - Psych evaluated earlier this admission  - Would benefit from LTAC disposition    Substance induced mood disorder  -continue mirtazepine 15mg  HS  -gabapentin 300 TID    Opioid Use disorder  Continue oxycodone 20mg q6 pRN  Will continue discussion MAT with pt.  Consider addiction psych    Chronic inflammatory anemia  - Hemoglobin 6.9 g/dL 11/10, given one unit of pRBCs  - Iron studies consistent with inflammatory process, in keeping with her systemic infection  - Monitor    Wound  Left foot and sacral spine  Wound care following. Appreciate recs.    LE edema  Mixed etiology, IV fluids and low albumin  Elevate feet, improve nutritional status  Lasix daily today. Will try and transition to PO tomorrow based on electrolytes/volume status, goal is euvolemia    Malnutrition  - Dietetics following  - Added boost to all meals    Diet: Adult regular  VTE PPX: LMWH  Goals of care: Curative, full code          Adelso Rivas MD  Hospital Medicine  Pager: [phone] Blxuotb; [phone]

## 2020-11-13 NOTE — PT/OT/SLP PROGRESS
Occupational Therapy   Co-Treatment with PT    Name: Марина Britton  MRN: [id number]  Admitting Diagnosis:  Endocarditis of tricuspid valve       Recommendations:     Discharge Recommendations: rehabilitation facility  Discharge Equipment Recommendations:  bedside commode, walker, rolling, shower chair  Barriers to discharge:  None    Assessment:     Марина Britton is a 30 y.o. female with a medical diagnosis of Endocarditis of tricuspid valve.  She presents with performance deficits affecting function are weakness, impaired endurance, impaired self care skills, impaired functional mobilty, gait instability, decreased lower extremity function, impaired cardiopulmonary response to activity, impaired balance, edema. Pt agreeable to therapy session and pleasant throughout session. Pt oriented to person, place, date, and situation. Pt would benefit from continued skilled acute OT services in order to maximize independence and safety with ADLs and functional mobility to ensure safe return to PLOF in the least restrictive environment. OT recommending IP Rehab (pending progress) once pt is medically appropriate for d/c.     Rehab Prognosis:  Good; patient would benefit from acute skilled OT services to address these deficits and reach maximum level of function.       Plan:     Patient to be seen 3 x/week to address the above listed problems via self-care/home management, therapeutic activities, therapeutic exercises, neuromuscular re-education  · Plan of Care Expires: 12/04/20  · Plan of Care Reviewed with: patient    Subjective     Pain/Comfort:  · Pain Rating 1: (Pt did not rate)  · Location - Side 1: Left  · Location - Orientation 1: generalized  · Location 1: hip  · Pain Addressed 1: Distraction, Cessation of Activity  · Pain Rating Post-Intervention 1: (pt did not rate)    Objective:     Communicated with: RN prior to session.  Patient found HOB elevated with telemetry, pulse ox (continuous), peripheral IV upon OT  entry to room. Pt agreeable to therapy session.     General Precautions: Standard, airborne, fall, droplet, contact   Orthopedic Precautions:N/A   Braces: N/A     Occupational Performance:     Bed Mobility:    · Patient completed Scooting/Bridging with supervision  · Patient completed Supine to Sit with supervision     Functional Mobility/Transfers:  · Patient completed Sit <> Stand Transfer from EOB with stand by assistance  with  no assistive device   · Functional Mobility: Pt engaging in functional mobility to simulate household/community distances approx 50ft  with CGA and utilizing RW in order to maximize functional activity tolerance and standing balance required for engagement in occupations of choice.   · Pt reported L hip pain during mobility   · Pt reported slight SOB and fatigue during mobility   · Mask donned prior to exiting room     Activities of Daily Living:  · Upper Body Dressing: minimum assistance donning gown like robe while sitting EOB    · Pt declined to performed grooming and toileting task due to recently performing earlier     [facility] 6 Click ADL: 21    Treatment & Education:   Pt educated on role of OT, POC, and goals for therapy.     POC was dicussed with patient/caregiver, who was included in its development and is in agreement with the identified goals and treatment plan.    B UEs elevated sitting UIC for edema management    Pt encouraged to sit UIC daily in order to maintain functional strength   Time provided for therapeutic counseling and discussion of health disposition.    Educated on importance of EOB/OOB mobility, maintaining routine, sitting up in chair, and maximizing independence with ADLs during admission    Pt completed ADLs and functional mobility for treatment session as noted above    Pt/caregiver verbalized understanding and expressed no further concerns/questions.   Updated communication board with level of assist required (SBA x 1 person assistance & using RW) &  educated RN/patient that pt is appropriate for transfers and mobility with RN/PCT.   Co-treatment performed due to patient's multiple deficits requiring two skilled therapists to appropriately and safely assess patient's strength and endurance while facilitating functional tasks in addition to accommodating for patient's activity tolerance.       Patient left up in chair with all lines intact, call button in reach and RN notifiedEducation:      GOALS:   Multidisciplinary Problems     Occupational Therapy Goals        Problem: Occupational Therapy Goal    Goal Priority Disciplines Outcome Interventions   Occupational Therapy Goal     OT, PT/OT Ongoing, Progressing    Description: Goals to be met by: 11/18    Patient will increase functional independence with ADLs by performing:    UE Dressing with Set-up Assistance.  LE Dressing with Set-up Assistance.   Grooming while standing at sink with Supervision.   Toileting from toilet with Modified McDonald for hygiene and clothing management.   Toilet transfer to toilet with Modified McDonald.                     Time Tracking:     OT Date of Treatment: 11/13/20  OT Start Time: 1038  OT Stop Time: 1050  OT Total Time (min): 12 min    Billable Minutes:Therapeutic Activity 12    Leesa Doyle OT  11/13/2020

## 2020-11-13 NOTE — CONSULTS
Consult placed on patient that has been previously evaluated by Psychiatry. See progress note from psychiatry today.       Shahnza Talley MD  [facility] PGY-2

## 2020-11-13 NOTE — PLAN OF CARE
11/13/20 1019   Post-Acute Status   Post-Acute Authorization Other   Other Status See Comments     SW left a voicemail for patient daughter awaiting a call back.      Padmini Myers LMSW  [facility]   [phone]

## 2020-11-13 NOTE — CONSULTS
Wound care consulted for left medial foot  PMH:  The patient states she had a boil on her foot, was on antibiotics but the infection went inside instead of coming out.   COVID-19 +, IVDU, Opioid use disorder, cannabis use disorder, sedative use disorder, substance induced mood disorder, pneumonia of both lungs due to infectious organism, pulmonary emboli, endocarditis of tricuspid valve, MRSA bacteremia, DERICK,   Assessment:  The patient is sitting up at bedside in a chair with bilateral legs elevated. The feet/ankles/legs are edematous.    The left medial foot wound has decrease in size. The wound bed is red/moist, the wound edges are rolled down/open. She complains of tenderness when cleansed/measuring depth.  No drainage on the old dressing.  The sacral spine skin is intact, blanches, has linear brown dried skin especially to right upper buttock.   The treatment plan was discussed and she verbalized understanding- frequent repositioning, new dressing change for left foot, wiggling toes/moving ankles frequently   Recommendations:   Left medial foot- medi-honey daily to wound bed, cover with mepore dressing  Sacral spine- continue barrier cream BID/prn, chair cushion to provide pressure redistribution to sacral/buttocks area.  Rash improving.    Nursing to continue pressure prevention measures  Wound care will follow-up uriel Flanagan RN, CWCN  [phone]    Left medial foot  1.4 cm L x 1.4 cm W x 0.3 cm D    Sacral spine.

## 2020-11-13 NOTE — PLAN OF CARE
Patient looks much better this morning,, BP lower than yesterday, sats good on RA.    PT  worked with patient, walked patient in dumont and in room.  PT got patient oob to chair.    Wound care nurse came to eval and treat wounds, completed wound care for today and ordered a new wound reginmed for patient.  Patient more talkative.  Patient still refusing to wear tele monitor, or take any bowel reginmend for bowels.  Patient not asking for pain meds on the clock.  Patient had 0600 scheduled dose of pain med, then I brought her in her scheduled 12 noon med and reported a pain level of 4/10 at 12 noon today.    1500 Stand by assist patient to shower, patient showered and washed her hair independently with only stand by assist from this Author, this RN.      Problem: Adult Inpatient Plan of Care  Goal: Plan of Care Review  Outcome: Ongoing, Progressing  Goal: Patient-Specific Goal (Individualization)  Outcome: Ongoing, Progressing  Goal: Absence of Hospital-Acquired Illness or Injury  Outcome: Ongoing, Progressing  Goal: Optimal Comfort and Wellbeing  Outcome: Ongoing, Progressing  Goal: Readiness for Transition of Care  Outcome: Ongoing, Progressing  Goal: Rounds/Family Conference  Outcome: Ongoing, Progressing     Problem: Adjustment to Illness (Sepsis/Septic Shock)  Goal: Optimal Coping  Outcome: Ongoing, Progressing     Problem: Bleeding (Sepsis/Septic Shock)  Goal: Absence of Bleeding  Outcome: Ongoing, Progressing     Problem: Glycemic Control Impaired (Sepsis/Septic Shock)  Goal: Blood Glucose Level Within Desired Range  Outcome: Ongoing, Progressing     Problem: Hemodynamic Instability (Sepsis/Septic Shock)  Goal: Effective Tissue Perfusion  Outcome: Ongoing, Progressing     Problem: Infection (Sepsis/Septic Shock)  Goal: Absence of Infection Signs/Symptoms  Outcome: Ongoing, Progressing     Problem: Nutrition Impaired (Sepsis/Septic Shock)  Goal: Optimal Nutrition Intake  Outcome: Ongoing, Progressing     Problem:  Respiratory Compromise (Sepsis/Septic Shock)  Goal: Effective Oxygenation and Ventilation  Outcome: Ongoing, Progressing     Problem: Fluid Imbalance (Pneumonia)  Goal: Fluid Balance  Outcome: Ongoing, Progressing     Problem: Infection (Pneumonia)  Goal: Resolution of Infection Signs/Symptoms  Outcome: Ongoing, Progressing     Problem: Respiratory Compromise (Pneumonia)  Goal: Effective Oxygenation and Ventilation  Outcome: Ongoing, Progressing     Problem: Infection  Goal: Infection Symptom Resolution  Outcome: Ongoing, Progressing     Problem: Wound  Goal: Optimal Wound Healing  Outcome: Ongoing, Progressing     Problem: Skin Injury Risk Increased  Goal: Skin Health and Integrity  Outcome: Ongoing, Progressing     Problem: Communication Impairment (Mechanical Ventilation, Invasive)  Goal: Effective Communication  Outcome: Ongoing, Progressing     Problem: Device-Related Complication Risk (Mechanical Ventilation, Invasive)  Goal: Optimal Device Function  Outcome: Ongoing, Progressing     Problem: Inability to Wean (Mechanical Ventilation, Invasive)  Goal: Mechanical Ventilation Liberation  Outcome: Ongoing, Progressing     Problem: Nutrition Impairment (Mechanical Ventilation, Invasive)  Goal: Optimal Nutrition Delivery  Outcome: Ongoing, Progressing     Problem: Skin and Tissue Injury (Mechanical Ventilation, Invasive)  Goal: Absence of Device-Related Skin and Tissue Injury  Outcome: Ongoing, Progressing     Problem: Ventilator-Induced Lung Injury (Mechanical Ventilation, Invasive)  Goal: Absence of Ventilator-Induced Lung Injury  Outcome: Ongoing, Progressing     Problem: Communication Impairment (Artificial Airway)  Goal: Effective Communication  Outcome: Ongoing, Progressing     Problem: Device-Related Complication Risk (Artificial Airway)  Goal: Optimal Device Function  Outcome: Ongoing, Progressing     Problem: Skin and Tissue Injury (Artificial Airway)  Goal: Absence of Device-Related Skin or Tissue  Injury  Outcome: Ongoing, Progressing     Problem: Noninvasive Ventilation Acute  Goal: Effective Unassisted Ventilation and Oxygenation  Outcome: Ongoing, Progressing     Problem: Fall Injury Risk  Goal: Absence of Fall and Fall-Related Injury  Outcome: Ongoing, Progressing     Problem: Restraint, Nonbehavioral (Nonviolent)  Goal: Discontinuation Criteria Achieved  Outcome: Ongoing, Progressing  Goal: Personal Dignity and Safety Maintained  Outcome: Ongoing, Progressing     Problem: Electrolyte Imbalance (Acute Kidney Injury/Impairment)  Goal: Serum Electrolyte Balance  Outcome: Ongoing, Progressing     Problem: Fluid Imbalance (Acute Kidney Injury/Impairment)  Goal: Optimal Fluid Balance  Outcome: Ongoing, Progressing     Problem: Hematologic Alteration (Acute Kidney Injury/Impairment)  Goal: Hemoglobin, Hematocrit and Platelets Within Normal Range  Outcome: Ongoing, Progressing     Problem: Oral Intake Inadequate (Acute Kidney Injury/Impairment)  Goal: Optimal Nutrition Intake  Outcome: Ongoing, Progressing     Problem: Renal Function Impairment (Acute Kidney Injury/Impairment)  Goal: Effective Renal Function  Outcome: Ongoing, Progressing

## 2020-11-13 NOTE — PT/OT/SLP PROGRESS
Physical Therapy Treatment    Patient Name:  Марина Britton   MRN:  [id number]  Admit Date: 10/26/2020  Admitting Diagnosis:  Endocarditis of tricuspid valve   Length of Stay: 18 days  Recent Surgery: * No surgery found *      Recommendations:     Discharge Recommendations:  rehabilitation facility   Discharge Equipment Recommendations: bedside commode, walker, rolling, shower chair   Barriers to discharge: None    Plan:     During this hospitalization, patient to be seen 3 x/week to address the listed problems via gait training, therapeutic exercises, therapeutic activities, neuromuscular re-education  · Plan of Care Expires:  20   Plan of Care Reviewed with: patient    Assessment:     Марина Britton is a 30 y.o. female admitted with a medical diagnosis of Endocarditis of tricuspid valve. SpO2 stable and WFL. HR increased to 120s with activity. Pt with primary c/o L hip pain creating an antalgic gait pattern. RW increased pt's gait stability with L hip pain.     Problem List: weakness, impaired endurance, impaired functional mobilty, gait instability, pain, impaired cardiopulmonary response to activity.  Rehab Prognosis: Good     GOALS:   Multidisciplinary Problems     Physical Therapy Goals        Problem: Physical Therapy Goal    Goal Priority Disciplines Outcome Goal Variances Interventions   Physical Therapy Goal     PT, PT/OT Ongoing, Progressing     Description: Goals to be met by: 20     Patient will increase functional independence with mobility by performin. Supine to sit with Modified [employer]  2. Sit to supine with Modified [employer]  3. Sit to stand transfer with Stand-by Assistance  4. Gait  x 50 feet with Contact Guard Assistance using LRAD, if needed.   5. Ascend/descend 3 stairs with left Handrails Contact Guard Assistance.                      Subjective   Communicated with RN prior to session.  Patient found HOB elevated upon PT entry to room, agreeable to  "evaluation. Марина Britton's alone during session.    Chief Complaint: pain  Patient/Family Comments/goals: to get better and return home   Pain/Comfort:  · Location 1: (L hip pain; unrated)  · Pain Addressed 1: Reposition, Distraction    Objective:   Patient found with: telemetry, pulse ox (continuous), blood pressure cuff, PICC line   General Precautions: Standard, Cardiac airborne, contact, droplet, fall(COVID (+))   Orthopedic Precautions:N/A   Braces: N/A   Oxygen Device: Room Air  Vitals: /87 (BP Location: Left arm, Patient Position: Sitting)   Pulse (!) 114   Temp 98 °F (36.7 °C) (Oral)   Resp (!) 24   Ht 5' 2" (1.575 m)   Wt 88.5 kg (195 lb)   LMP  (LMP Unknown)   SpO2 97%   Breastfeeding No Comment: pt unable to answer  BMI 35.67 kg/m²     Outcome Measures:  AM-PAC 6 CLICK MOBILITY  Turning over in bed (including adjusting bedclothes, sheets and blankets)?: 4  Sitting down on and standing up from a chair with arms (e.g., wheelchair, bedside commode, etc.): 3  Moving from lying on back to sitting on the side of the bed?: 4  Moving to and from a bed to a chair (including a wheelchair)?: 3  Need to walk in hospital room?: 3  Climbing 3-5 steps with a railing?: 3  Basic Mobility Total Score: 20       Functional Mobility:  Additional staff present: OT  Bed Mobility:    Supine to Sit: supervision; HOB elevated   Scooting anteriorly to EOB to have both feet planted on floor: supervision    Sitting Balance at Edge of Bed:   Assistance Level Required: Supervision      Transfers:    Sit <> Stand Transfer: stand by assistance with no assistive device from EOB  o Pt with c/o L hip pain       Gait:  · Patient ambulated: 50ft   · Patient required: contact guard  · Patient used: rolling walker  · Gait Pattern observed: reciprocal gait  · Gait Deviation(s): unsteady gait, decreased step length, antalgic gait, flexed posture and decreased michel  · Impairments due to: pain  · Comments:   · Mask " donned  · Cuing for RW management and pacing     Therapeutic Activities, Exercises, and Education:   Educated pt on PT role/POC  Educated pt on importance of OOB activity and daily ambulation  Pt verbalized understanding    RW ordered to room 48299    T/f to chair to increase tolerance to OOB activity and to create optimal positioning for lung expansion     Patient left up in chair with all lines intact, call button in reach and RN notified..    Time Tracking:     PT Received On: 11/13/20  PT Start Time: 1035     PT Stop Time: 1050  PT Total Time (min): 15 min       Billable Minutes:   · Gait Training 8    Treatment Type: Treatment  PT/PTA: PT       Elsie Vásquez, PT, DPT  11/13/2020  [phone]

## 2020-11-13 NOTE — PROGRESS NOTES
Pharmacokinetic Assessment Follow Up: IV Vancomycin    Vancomycin Assessment & Plan:  -Vancomycin level prior to 4th dose on vancomycin 1000 mg q24h returned therapeutic at 15.5 mcg/mL. Dose appears to have been scanned prior to trough. To ensure correct trough will order Vt 11/14 with AM labs.  -Scr continuing to improve. Will continue current regimen at this time.     Drug levels (last 3 results):  Recent Labs   Lab Result Units 11/13/20  0946   Vancomycin-Trough ug/mL 15.5       Pharmacy will continue to follow and monitor vancomycin.    Please contact pharmacy at extension [phone] for questions regarding this assessment.    Thank you for the consult,   Faye Zepeda       Patient brief summary:  Марина Britton is a 30 y.o. female initiated on antimicrobial therapy with IV Vancomycin for treatment of endocarditis    The patient's current regimen is vancomycin 1000 mg q24h    Drug Allergies:   Review of patient's allergies indicates:   Allergen Reactions    Soap Rash     [facility] [facility] - Hospital supplied - cleanser shampoo & body wash gel  Ingredients - purified water, sodium laureth sulfate, sodium chloride, cocamide william, cocamidopropylamine oxide, fragrance, aloe barbadensis leaf juice, propylene alcohol, peg-150 distearate, diazolidinyl urea, ciric acid, methylparaben, & propulparaben       Actual Body Weight:   88.5 kg    Renal Function:   Estimated Creatinine Clearance: 64.8 mL/min (based on SCr of 1.3 mg/dL).,     Dialysis Method (if applicable):  N/A

## 2020-11-14 LAB
ALBUMIN SERPL BCP-MCNC: 1.8 G/DL (ref 3.5–5.2)
ALP SERPL-CCNC: 105 U/L (ref 55–135)
ALT SERPL W/O P-5'-P-CCNC: 19 U/L (ref 10–44)
ANION GAP SERPL CALC-SCNC: 10 MMOL/L (ref 8–16)
AST SERPL-CCNC: 21 U/L (ref 10–40)
BACTERIA BLD CULT: NORMAL
BILIRUB SERPL-MCNC: 0.3 MG/DL (ref 0.1–1)
BUN SERPL-MCNC: 34 MG/DL (ref 6–20)
CALCIUM SERPL-MCNC: 8.2 MG/DL (ref 8.7–10.5)
CHLORIDE SERPL-SCNC: 106 MMOL/L (ref 95–110)
CO2 SERPL-SCNC: 22 MMOL/L (ref 23–29)
CREAT SERPL-MCNC: 0.9 MG/DL (ref 0.5–1.4)
D DIMER PPP IA.FEU-MCNC: 7.36 MG/L FEU
EST. GFR  (AFRICAN AMERICAN): >60 ML/MIN/1.73 M^2
EST. GFR  (NON AFRICAN AMERICAN): >60 ML/MIN/1.73 M^2
FERRITIN SERPL-MCNC: 767 NG/ML (ref 20–300)
GLUCOSE SERPL-MCNC: 123 MG/DL (ref 70–110)
MAGNESIUM SERPL-MCNC: 1.7 MG/DL (ref 1.6–2.6)
POCT GLUCOSE: 131 MG/DL (ref 70–110)
POTASSIUM SERPL-SCNC: 4 MMOL/L (ref 3.5–5.1)
PROT SERPL-MCNC: 6.7 G/DL (ref 6–8.4)
SODIUM SERPL-SCNC: 138 MMOL/L (ref 136–145)
VANCOMYCIN TROUGH SERPL-MCNC: 12.3 UG/ML (ref 10–22)

## 2020-11-14 PROCEDURE — 99233 PR SUBSEQUENT HOSPITAL CARE,LEVL III: ICD-10-PCS | Mod: ,,, | Performed by: INTERNAL MEDICINE

## 2020-11-14 PROCEDURE — 82728 ASSAY OF FERRITIN: CPT

## 2020-11-14 PROCEDURE — 25000003 PHARM REV CODE 250: Performed by: HOSPITALIST

## 2020-11-14 PROCEDURE — 25000003 PHARM REV CODE 250: Performed by: INTERNAL MEDICINE

## 2020-11-14 PROCEDURE — 93005 ELECTROCARDIOGRAM TRACING: CPT

## 2020-11-14 PROCEDURE — 83735 ASSAY OF MAGNESIUM: CPT

## 2020-11-14 PROCEDURE — 94761 N-INVAS EAR/PLS OXIMETRY MLT: CPT

## 2020-11-14 PROCEDURE — 20600001 HC STEP DOWN PRIVATE ROOM

## 2020-11-14 PROCEDURE — 25000003 PHARM REV CODE 250: Performed by: NURSE PRACTITIONER

## 2020-11-14 PROCEDURE — 99233 SBSQ HOSP IP/OBS HIGH 50: CPT | Mod: ,,, | Performed by: INTERNAL MEDICINE

## 2020-11-14 PROCEDURE — 85379 FIBRIN DEGRADATION QUANT: CPT

## 2020-11-14 PROCEDURE — A4216 STERILE WATER/SALINE, 10 ML: HCPCS | Performed by: INTERNAL MEDICINE

## 2020-11-14 PROCEDURE — 93010 EKG 12-LEAD: ICD-10-PCS | Mod: ,,, | Performed by: INTERNAL MEDICINE

## 2020-11-14 PROCEDURE — 80202 ASSAY OF VANCOMYCIN: CPT

## 2020-11-14 PROCEDURE — 25000003 PHARM REV CODE 250: Performed by: STUDENT IN AN ORGANIZED HEALTH CARE EDUCATION/TRAINING PROGRAM

## 2020-11-14 PROCEDURE — 99900035 HC TECH TIME PER 15 MIN (STAT)

## 2020-11-14 PROCEDURE — 63600175 PHARM REV CODE 636 W HCPCS: Performed by: INTERNAL MEDICINE

## 2020-11-14 PROCEDURE — 93010 ELECTROCARDIOGRAM REPORT: CPT | Mod: ,,, | Performed by: INTERNAL MEDICINE

## 2020-11-14 PROCEDURE — 36415 COLL VENOUS BLD VENIPUNCTURE: CPT

## 2020-11-14 PROCEDURE — 80053 COMPREHEN METABOLIC PANEL: CPT

## 2020-11-14 RX ORDER — FUROSEMIDE 40 MG/1
40 TABLET ORAL DAILY
Status: DISCONTINUED | OUTPATIENT
Start: 2020-11-14 | End: 2020-11-19

## 2020-11-14 RX ADMIN — GABAPENTIN 300 MG: 300 CAPSULE ORAL at 08:11

## 2020-11-14 RX ADMIN — DOCUSATE SODIUM AND SENNOSIDES 2 TABLET: 8.6; 5 TABLET, FILM COATED ORAL at 08:11

## 2020-11-14 RX ADMIN — ACETAMINOPHEN 1000 MG: 500 TABLET ORAL at 09:11

## 2020-11-14 RX ADMIN — ENOXAPARIN SODIUM 40 MG: 40 INJECTION SUBCUTANEOUS at 05:11

## 2020-11-14 RX ADMIN — OXYCODONE HYDROCHLORIDE AND ACETAMINOPHEN 500 MG: 500 TABLET ORAL at 08:11

## 2020-11-14 RX ADMIN — Medication 10 ML: at 12:11

## 2020-11-14 RX ADMIN — FUROSEMIDE 40 MG: 40 TABLET ORAL at 10:11

## 2020-11-14 RX ADMIN — Medication 10 ML: at 05:11

## 2020-11-14 RX ADMIN — OXYCODONE HYDROCHLORIDE AND ACETAMINOPHEN 500 MG: 500 TABLET ORAL at 09:11

## 2020-11-14 RX ADMIN — CHOLECALCIFEROL TAB 25 MCG (1000 UNIT) 1000 UNITS: 25 TAB at 08:11

## 2020-11-14 RX ADMIN — OXYCODONE HYDROCHLORIDE 20 MG: 10 TABLET ORAL at 01:11

## 2020-11-14 RX ADMIN — OXYCODONE HYDROCHLORIDE 20 MG: 10 TABLET ORAL at 05:11

## 2020-11-14 RX ADMIN — MELATONIN TAB 3 MG 6 MG: 3 TAB at 09:11

## 2020-11-14 RX ADMIN — Medication 10 ML: at 01:11

## 2020-11-14 RX ADMIN — GABAPENTIN 300 MG: 300 CAPSULE ORAL at 09:11

## 2020-11-14 RX ADMIN — MIRTAZAPINE 15 MG: 7.5 TABLET ORAL at 09:11

## 2020-11-14 RX ADMIN — VANCOMYCIN HYDROCHLORIDE 1000 MG: 1 INJECTION, POWDER, LYOPHILIZED, FOR SOLUTION INTRAVENOUS at 10:11

## 2020-11-14 RX ADMIN — AMLODIPINE BESYLATE 10 MG: 10 TABLET ORAL at 08:11

## 2020-11-14 RX ADMIN — GABAPENTIN 300 MG: 300 CAPSULE ORAL at 03:11

## 2020-11-14 NOTE — PROGRESS NOTES
Hospital Medicine  Progress Note      Patient Name: Марина Britton  MRN: [id number]  Date of Admission: 10/26/2020     Principal Problem: Endocarditis of tricuspid valve     Subjective     No events overnight. Stable on room air. Reports swelling in her legs is improving. Denies cough, SOB,        Review of Systems    Constitutional:  Negative for chills, fatigue   Respiratory:  Negative for cough, SOB  Cardiovascular: +chest pain (improved) Negative for palpitations, leg swelling.   Gastrointestinal: Negative for abdominal pain, constipation, diarrhea, nausea, vomiting.   Neurological: Negative for dizziness, syncope, weakness, light-headedness.     Medications  Scheduled Meds:   amLODIPine  10 mg Oral Daily    ascorbic acid (vitamin C)  500 mg Oral BID    enoxaparin  40 mg Subcutaneous Q24H    furosemide  40 mg Oral Daily    gabapentin  300 mg Oral TID    mirtazapine  15 mg Oral QHS    oxyCODONE  20 mg Per OG tube Q6H    polyethylene glycol  17 g Oral Daily    senna-docusate 8.6-50 mg  2 tablet Oral BID    sodium chloride 0.9%  10 mL Intravenous Q6H    vancomycin (VANCOCIN) IVPB  1,000 mg Intravenous Q24H    vitamin D  1,000 Units Oral Daily     Continuous Infusions:    PRN Meds:.sodium chloride, acetaminophen, acetaminophen, acetaminophen, calcium carbonate, diphenhydrAMINE, hydrOXYzine HCL, melatonin, oxyCODONE, polyethylene glycol, sodium chloride 0.9%, Flushing PICC Protocol **AND** sodium chloride 0.9% **AND** sodium chloride 0.9%, valproate sodium (DEPACON) IVPB, vancomycin - pharmacy to dose    Objective    Physical Examination    Temp:  [97.6 °F (36.4 °C)-98 °F (36.7 °C)]   Pulse:  []   Resp:  [20-25]   BP: (115-136)/(83-92)   SpO2:  [96 %-100 %]     Gen: NAD, conversant  CV: RRR, +murmur, 1+ lower extremity edema bilaterally   Resp: CTAB, no increased work of breathing on room air  GI: Soft, NT, ND, +BS  Ext: MAEW, RUE PICC clean and dry at insertion site  Neuro: AAOx3, CN grossly  intact, no focal neurologic deficits    CBC  Recent Labs   Lab 11/10/20  0656 11/11/20  1257 11/12/20  0643   WBC 11.88 12.47 14.47*   HGB 6.9* 8.6* 8.0*   HCT 22.9* 28.1* 28.0*    252 258     CMP  Recent Labs   Lab 11/08/20  0642 11/09/20  0448 11/10/20  0656 11/11/20  1257 11/12/20  0643 11/14/20  0821   * 132* 135* 134* 132* 138   K 4.6 5.0 4.5 4.0 4.2 4.0    107 107 103 103 106   CO2 19* 21* 22* 22* 22* 22*   BUN 35* 30* 28* 23* 24* 34*   CREATININE 1.7* 1.4 1.4 1.3 1.3 0.9    80 109 127* 99 123*   CALCIUM 7.2* 7.5* 7.7* 7.7* 7.5* 8.2*   MG 2.3 2.2 1.9 1.6 1.6 1.7   PHOS 4.3 3.6 3.4  --   --   --    ALKPHOS 122 105 106 112 110 105   ALT 8* 8* 8* 8* 9* 19   AST 16 17 15 14 15 21   ALBUMIN 1.3* 1.3* 1.3* 1.4* 1.5* 1.8*   PROT 5.8* 5.8* 5.9* 6.4 6.3 6.7   BILITOT 0.4 0.4 0.4 0.4 0.5 0.3       Hospital Course:    Ms. Britton is a 30-year-old woman with HCV, IVDU who was transferred from [facility] where she presented with chest pain and was diagnosed with MRSA bacteremia complicated by TV IE with septic emboli, acute hypoxic respiratory failure, septic/metabolic encephalopathy, septic PE, septic shock, and acute anemia. She was admitted to the [facility] MICU on 10/27 and was intubated for respiratory failure. She has subsequently been evaluated by ID, CTS, and psychiatry. She required pRBC transfusion on 10/29, and norepi on 10/30. Her admission was further complicated by DERICK which subsequently resolved. She was extubated on 11/2, then transferred to the floor on 11/3 for further care, planning on long-term IV antibiotics with vancomycin and repeat CTS evaluation as an outpatient. Subsequently her DERICK has returned, and she is again febrile for which BCx were obtained on 11/4 and 11/5 and have remained NGTD. ID was reconsulted on 11/6, and cefepime was added to her regimen. CT C/A/P obtained shows continued infectious lung processes. TTE shows progressive valvular damage with flail valve and  "severe MR, for which she is being diuresed.    Covid test ordered for placement resulted + 11/11, patient transferred to covid isolation floor. No active signs/sx of covid    Assessment and Plan:    MRSA bacteremia  Pneumonia of both lungs due to infectious organism  Pulmonary emboli (septic)  Sepsis  Endocarditis of tricuspid valve  - Febrile intermittently, BCx from 11/4 and 11/5 NGTD. Discussed with ID who stated that she will likely continue to have intermittent fevers from her infectious process  - Continue vancomycin (pharmacy dosing). She has completed five days of cefepime  - PT/OT following and recommending IPR, LTAC referrals out  - Needs 6 weeks IV antibiotics, then f/u CTS  Currently on oxycodone 10mg QID PRN For Pleuritic CP      Flail tricuspid valve  Severe tricuspid regurgitation  - Repeat TTE shows more advanced disease, tricuspid valve now flail, but also "distinguishing between vegetation, valve, and chord is difficult, even with these good images" per interpreting cardiologist. Also shows elevated CVP.   - Previous provider discussed findings and ongoing fevers with Dr. Pritchett on 11/10 who recommended continued medical management as her current deconditioned status is a contraindication to surgical intervention      COVID infection  No symptoms, will monitor for now and reassess if indicated  Stopped Dexamethasone today 11/13, since not requiring O2 at this time.  - COVID-19 testing: Collection Date: 11/11/2020 Collection Time:  11:09 AM   - Isolation: Airborne/Droplet. Surgical mask on patient. Notify Infection Control  - Diagnostics: Trend Q48hrs if stable, more frequently if patient decompensating     Laboratory/Study Frequency Result   CBC Admit & Q48h    CMP Admit & Q48h    Magnesium level Admit    D-dimer Admit & Q48h 8.2 > 5.9   Ferritin Admit & Q48h 686   CRP Admit & Q48h 31 > 174 > 111   CPK Admit & Q24h if elevated    LDH Admit & Q48h 382   Vitamin D Admit ordered   BNP Admit ordered "   Troponin Admit & Q6h if elevated    Glucose-6-phos dehydrogenase Admit    Procalcitonin Admit 0.22   Lipid Panel If using statin    Rapid influenza Admit    Resp Infection Panel Admit if BMT/organ transplant    Legionella Antigen Admit    Sputum Culture Admit    Blood Culture Admit    Urinalysis & Culture Admit    ECG Admit & Q48h if on HCQ    CXR Admit    Lymphopenia, hyponatremia, hyperferritinemia, elevated troponin, elevated d-dimer, age, and medical comorbidities are significant predictors of poor clinical outcome    - [facility]    - Monitoring:   - Telemetry & Continuous Pulse Oximetry    - Nutrition:    - Multivitamin PO daily   - Add Boost supplement   - Vitamin D 1000IU daily if deficient   - Ascorbic acid 500mg PO bid    - Supportive Care:   - acetaminophen 650mg PO Q6hr PRN fever/headache   - loperamide PRN viral diarrhea   - IVF if indicated, restrictive strategy preferred, no maintenance IV if able   - VTE PPx: enoxaparin or heparin SQ unless contraindicated    - Antibiotics:  - if indications, CXR findings, elevated procal. See protocol for alternatives.   - Discontinue early if low concern for bacterial co-infection   - ceftriaxone 1g Q24h x 5 days  - azithromycin 500mg po x1, then 250mg po daily x 4 days    - Investigational Therapies:   - If patient meets criteria  - atorvastatin 40mg po daily      DERICK (acute kidney injury)  -Resolved    IVDU (intravenous drug user)  Opioid use disorder, severe, dependence  - Psych evaluated earlier this admission, patient interested in opioid cessation but not MAT    Substance induced mood disorder  -continue mirtazepine 15mg HS  -gabapentin 300 TID      Chronic inflammatory anemia  - Hemoglobin 6.9 g/dL 11/10, given one unit of pRBCs  - Iron studies consistent with inflammatory process, in keeping with her systemic infection  - Monitor    Wound  Left foot and sacral spine  Wound care following. Appreciate  recs.    LE edema  Mixed etiology, IV fluids and low albumin  Improving today  Will transition to lasix 40mg PO daily  Elevate feet, improve nutritional status  Monitor I&os, goal net even to  -500      Malnutrition  - Dietetics following  - Added boost to all meals    Diet: Adult regular  VTE PPX: LMWH  Goals of care: Curative, full code    Dispo: LTAC for IV abx when placed      Adelso Rivas MD  Hospital Medicine  Pager: [phone] [place]; [phone]

## 2020-11-14 NOTE — PROGRESS NOTES
Pharmacokinetic Assessment Follow Up: IV Vancomycin    Vancomycin serum concentration assessment(s):    · The trough level of 12.3 mcg/mL was drawn correctly and can be used to guide therapy at this time. The measurement is below the desired definitive target range of 15 to 20 mcg/mL.  · Renal function has improved.    Vancomycin Regimen Plan:    · Change regimen to Vancomycin 1250 mg IV every 24 hours with next serum trough concentration measured at 0830 prior to third dose on 11/17 or sooner if clinical status changes.    Drug levels (last 3 results):  Recent Labs   Lab Result Units 11/13/20  0946 11/14/20  0821   Vancomycin-Trough ug/mL 15.5 12.3       Pharmacy will continue to follow and monitor vancomycin.    Please contact pharmacy at extension [phone] for questions regarding this assessment.    Thank you for the consult,   Lin Luong       Patient brief summary:  Марина Britton is a 30 y.o. female initiated on antimicrobial therapy with IV Vancomycin for treatment of endocarditis.    The patient's current regimen is 1000 mg every 24 hours.    Drug Allergies:   Review of patient's allergies indicates:   Allergen Reactions    Soap Rash     [facility] [facility] - Hospital supplied - cleanser shampoo & body wash gel  Ingredients - purified water, sodium laureth sulfate, sodium chloride, cocamide william, cocamidopropylamine oxide, fragrance, aloe barbadensis leaf juice, propylene alcohol, peg-150 distearate, diazolidinyl urea, ciric acid, methylparaben, & propulparaben       Actual Body Weight:   88.5 kg    Renal Function:   Estimated Creatinine Clearance: 93.7 mL/min (based on SCr of 0.9 mg/dL).,     Dialysis Method (if applicable):  N/A    CBC (last 72 hours):  Recent Labs   Lab Result Units 11/12/20  0643   WBC K/uL 14.47*   Hemoglobin g/dL 8.0*   Hematocrit % 28.0*   Platelets K/uL 258   Gran % % 80.0*   Lymph % % 9.6*   Mono % % 6.9   Eosinophil % % 1.1   Basophil % % 1.2   Differential Method  Automated        Metabolic Panel (last 72 hours):  Recent Labs   Lab Result Units 11/12/20  0643 11/14/20  0821   Sodium mmol/L 132* 138   Potassium mmol/L 4.2 4.0   Chloride mmol/L 103 106   CO2 mmol/L 22* 22*   Glucose mg/dL 99 123*   BUN mg/dL 24* 34*   Creatinine mg/dL 1.3 0.9   Albumin g/dL 1.5* 1.8*   Total Bilirubin mg/dL 0.5 0.3   Alkaline Phosphatase U/L 110 105   AST U/L 15 21   ALT U/L 9* 19   Magnesium mg/dL 1.6 1.7       Vancomycin Administrations:  vancomycin given in the last 96 hours                   vancomycin in dextrose 5 % 1 gram/250 mL IVPB 1,000 mg (mg) 1,000 mg New Bag 11/14/20 1003     1,000 mg New Bag 11/13/20 0922     1,000 mg New Bag 11/12/20 0917     1,000 mg New Bag 11/11/20 0841                Microbiologic Results:  Microbiology Results (last 7 days)     Procedure Component Value Units Date/Time    Blood culture [[id number]] Collected: 11/09/20 0447    Order Status: Completed Specimen: Blood Updated: 11/14/20 1212     Blood Culture, Routine No growth after 5 days.    Narrative:      Collection has been rescheduled by CHELSIE at 11/09/2020 04:49 Reason:   unable to collect    Blood culture [[id number]] Collected: 11/10/20 0656    Order Status: Completed Specimen: Blood from Antecubital, Left Arm Updated: 11/14/20 0812     Blood Culture, Routine No Growth to date      No Growth to date      No Growth to date      No Growth to date      No Growth to date    Narrative:      Specimen collection performed by Alternate Phlebotomist: nurse  Specimen collection performed by Alternate Phlebotomist: nurse    Blood culture [[id number]] Collected: 11/10/20 0650    Order Status: Completed Specimen: Blood Updated: 11/14/20 0812     Blood Culture, Routine No Growth to date      No Growth to date      No Growth to date      No Growth to date      No Growth to date    Blood culture [[id number]] Collected: 11/08/20 0642    Order Status: Completed Specimen: Blood Updated: 11/13/20 0822     Blood Culture, Routine No growth  after 5 days.    Blood culture [[id number]] Collected: 11/08/20 0642    Order Status: Completed Specimen: Blood Updated: 11/13/20 0812     Blood Culture, Routine No growth after 5 days.    Blood culture [[id number]] Collected: 11/06/20 0742    Order Status: Completed Specimen: Blood from Antecubital, Left Arm Updated: 11/11/20 0812     Blood Culture, Routine No growth after 5 days.    Narrative:      Collection has been rescheduled by VX at 11/06/2020 05:05 Reason:   Unable to collect blood culture and other labs MILLY López is aware  Collection has been rescheduled by VX at 11/06/2020 05:05 Reason:   Unable to collect blood culture and other labs MILLY López is aware    Blood culture [[id number]] Collected: 11/06/20 0745    Order Status: Completed Specimen: Blood from Peripheral, Left Hand Updated: 11/11/20 0812     Blood Culture, Routine No growth after 5 days.    Narrative:      Collection has been rescheduled by VX at 11/06/2020 05:05 Reason:   Unable to collect blood culture and other labs MILLY López is aware  Collection has been rescheduled by VX at 11/06/2020 05:05 Reason:   Unable to collect blood culture and other labs MILLY López is aware    Blood culture [[id number]] Collected: 11/05/20 0307    Order Status: Completed Specimen: Blood Updated: 11/10/20 0812     Blood Culture, Routine No growth after 5 days.    Blood culture [[id number]] Collected: 11/05/20 0307    Order Status: Completed Specimen: Blood Updated: 11/10/20 0812     Blood Culture, Routine No growth after 5 days.    Blood culture [[id number]] Collected: 11/09/20 0448    Order Status: Completed Specimen: Blood Updated: 11/09/20 1411    Narrative:      Collection has been rescheduled by [facility] at 11/09/2020 04:49 Reason:   unable to collect    Blood culture [[id number]] Collected: 11/04/20 0442    Order Status: Completed Specimen: Blood Updated: 11/09/20 1014     Blood Culture, Routine No growth after 5 days.    Blood culture [[id number]] Collected: 11/04/20 0442     Order Status: Completed Specimen: Blood Updated: 11/09/20 1013     Blood Culture, Routine No growth after 5 days.

## 2020-11-14 NOTE — PLAN OF CARE
Problem: Adult Inpatient Plan of Care  Goal: Plan of Care Review  11/13/2020 2316 by Rios Pablo RN  Outcome: Ongoing, Progressing  11/13/2020 2241 by Rios Pablo RN  Outcome: Ongoing, Progressing  Goal: Patient-Specific Goal (Individualization)  11/13/2020 2316 by Rios Pablo RN  Outcome: Ongoing, Progressing  11/13/2020 2241 by Rios Pablo RN  Outcome: Ongoing, Progressing  Goal: Absence of Hospital-Acquired Illness or Injury  11/13/2020 2316 by Rios Pablo RN  Outcome: Ongoing, Progressing  11/13/2020 2241 by Rios Pablo RN  Outcome: Ongoing, Progressing  Goal: Optimal Comfort and Wellbeing  11/13/2020 2316 by Rios Pablo RN  Outcome: Ongoing, Progressing  11/13/2020 2241 by Rios Pablo RN  Outcome: Ongoing, Progressing  Goal: Readiness for Transition of Care  11/13/2020 2316 by Rios Pablo RN  Outcome: Ongoing, Progressing  11/13/2020 2241 by Rios Pablo RN  Outcome: Ongoing, Progressing  Goal: Rounds/Family Conference  11/13/2020 2316 by Rios Pablo RN  Outcome: Ongoing, Progressing  11/13/2020 2241 by Rios Pablo RN  Outcome: Ongoing, Progressing     Problem: Adjustment to Illness (Sepsis/Septic Shock)  Goal: Optimal Coping  11/13/2020 2316 by Rios Pablo RN  Outcome: Ongoing, Progressing  11/13/2020 2241 by Rios Pablo RN  Outcome: Ongoing, Progressing     Problem: Bleeding (Sepsis/Septic Shock)  Goal: Absence of Bleeding  Outcome: Ongoing, Progressing     Problem: Glycemic Control Impaired (Sepsis/Septic Shock)  Goal: Blood Glucose Level Within Desired Range  Outcome: Ongoing, Progressing     Problem: Hemodynamic Instability (Sepsis/Septic Shock)  Goal: Effective Tissue Perfusion  11/13/2020 2316 by Rios Pablo RN  Outcome: Ongoing, Progressing  11/13/2020 2241 by Rios Pablo RN  Outcome: Ongoing, Progressing     Problem: Infection (Sepsis/Septic Shock)  Goal: Absence of Infection  Signs/Symptoms  11/13/2020 2316 by Rios Pablo RN  Outcome: Ongoing, Progressing  11/13/2020 2241 by Rios Pablo RN  Outcome: Ongoing, Progressing     Problem: Nutrition Impaired (Sepsis/Septic Shock)  Goal: Optimal Nutrition Intake  11/13/2020 2316 by Rios Pablo RN  Outcome: Ongoing, Progressing  11/13/2020 2241 by Rios Pablo RN  Outcome: Ongoing, Progressing     Problem: Respiratory Compromise (Sepsis/Septic Shock)  Goal: Effective Oxygenation and Ventilation  11/13/2020 2316 by Rios Pablo RN  Outcome: Ongoing, Progressing  11/13/2020 2241 by Rios Pablo RN  Outcome: Ongoing, Progressing     Problem: Fluid Imbalance (Pneumonia)  Goal: Fluid Balance  11/13/2020 2316 by Rios Pablo RN  Outcome: Ongoing, Progressing  11/13/2020 2241 by Rios Pablo RN  Outcome: Ongoing, Progressing     Problem: Infection (Pneumonia)  Goal: Resolution of Infection Signs/Symptoms  11/13/2020 2316 by Rios Pablo RN  Outcome: Ongoing, Progressing  11/13/2020 2241 by Rios Pablo RN  Outcome: Ongoing, Progressing     Problem: Respiratory Compromise (Pneumonia)  Goal: Effective Oxygenation and Ventilation  11/13/2020 2316 by Rios Pablo RN  Outcome: Ongoing, Progressing  11/13/2020 2241 by Rios Pablo RN  Outcome: Ongoing, Progressing     Problem: Infection  Goal: Infection Symptom Resolution  11/13/2020 2316 by Rios Pablo RN  Outcome: Ongoing, Progressing  11/13/2020 2241 by Rios Pablo RN  Outcome: Ongoing, Progressing     Problem: Wound  Goal: Optimal Wound Healing  11/13/2020 2316 by Rios Pablo RN  Outcome: Ongoing, Progressing  11/13/2020 2241 by Rios Pablo RN  Outcome: Ongoing, Progressing     Problem: Skin Injury Risk Increased  Goal: Skin Health and Integrity  11/13/2020 2316 by Rios Pablo RN  Outcome: Ongoing, Progressing  11/13/2020 2241 by Rios Pablo RN  Outcome: Ongoing,  Progressing     Problem: Noninvasive Ventilation Acute  Goal: Effective Unassisted Ventilation and Oxygenation  Outcome: Ongoing, Progressing     Problem: Fall Injury Risk  Goal: Absence of Fall and Fall-Related Injury  11/13/2020 2316 by Rios Pablo RN  Outcome: Ongoing, Progressing  11/13/2020 2241 by Rios Pablo RN  Outcome: Ongoing, Progressing     Problem: Restraint, Nonbehavioral (Nonviolent)  Goal: Discontinuation Criteria Achieved  Outcome: Ongoing, Progressing  Goal: Personal Dignity and Safety Maintained  11/13/2020 2316 by Rios Pablo RN  Outcome: Ongoing, Progressing  11/13/2020 2241 by Rios Pablo RN  Outcome: Ongoing, Progressing     Problem: Electrolyte Imbalance (Acute Kidney Injury/Impairment)  Goal: Serum Electrolyte Balance  11/13/2020 2316 by Rios Pablo RN  Outcome: Ongoing, Progressing  11/13/2020 2241 by Rios Pablo RN  Outcome: Ongoing, Progressing     Problem: Fluid Imbalance (Acute Kidney Injury/Impairment)  Goal: Optimal Fluid Balance  11/13/2020 2316 by Rios Pablo RN  Outcome: Ongoing, Progressing  11/13/2020 2241 by Rios Pablo RN  Outcome: Ongoing, Progressing     Problem: Hematologic Alteration (Acute Kidney Injury/Impairment)  Goal: Hemoglobin, Hematocrit and Platelets Within Normal Range  11/13/2020 2316 by Rios Pablo RN  Outcome: Ongoing, Progressing  11/13/2020 2241 by Rios Pablo RN  Outcome: Ongoing, Progressing     Problem: Oral Intake Inadequate (Acute Kidney Injury/Impairment)  Goal: Optimal Nutrition Intake  11/13/2020 2316 by Rios Pablo RN  Outcome: Ongoing, Progressing  11/13/2020 2241 by Rios Pablo RN  Outcome: Ongoing, Progressing     Problem: Renal Function Impairment (Acute Kidney Injury/Impairment)  Goal: Effective Renal Function  11/13/2020 2316 by Rios Pablo RN  Outcome: Ongoing, Progressing  11/13/2020 2241 by Rios Pablo RN  Outcome: Ongoing,  Progressing     ASSUMED CARE OF PT.  PT REFUSES TO WEAR TELE. VSS NAD. ASSESSMENT DONE/CHARTED.

## 2020-11-14 NOTE — CONSULTS
[facility] - ICU 15 [facility] Medicine  Telemedicine Consult Note    Patient Name: Марина Britton  MRN: [id number]  Admission Date: 10/26/2020  Hospital Length of Stay: 19 days  Attending Physician: Adelso Rivas MD   Primary Care Provider: Luis Felipe Jose Iii, MD         Марина Britton has been accepted for transfer to [facility] and will be followed through telemedicine services beginning 11/15/20 at 7 AM.          Tila Saez MD  Department of Hospital Medicine   [facility] - ICU 15

## 2020-11-15 PROBLEM — E55.9 VITAMIN D DEFICIENCY: Status: ACTIVE | Noted: 2020-11-15

## 2020-11-15 PROBLEM — N28.9 ACUTE RENAL INSUFFICIENCY: Status: ACTIVE | Noted: 2020-11-03

## 2020-11-15 PROBLEM — E87.5 HYPERKALEMIA: Status: RESOLVED | Noted: 2020-11-04 | Resolved: 2020-11-15

## 2020-11-15 LAB
BACTERIA BLD CULT: NORMAL
BACTERIA BLD CULT: NORMAL
POCT GLUCOSE: 78 MG/DL (ref 70–110)

## 2020-11-15 PROCEDURE — 20600001 HC STEP DOWN PRIVATE ROOM

## 2020-11-15 PROCEDURE — 25000003 PHARM REV CODE 250: Performed by: HOSPITALIST

## 2020-11-15 PROCEDURE — 99233 SBSQ HOSP IP/OBS HIGH 50: CPT | Mod: 95,,, | Performed by: INTERNAL MEDICINE

## 2020-11-15 PROCEDURE — 63600175 PHARM REV CODE 636 W HCPCS: Performed by: INTERNAL MEDICINE

## 2020-11-15 PROCEDURE — 25000003 PHARM REV CODE 250: Performed by: STUDENT IN AN ORGANIZED HEALTH CARE EDUCATION/TRAINING PROGRAM

## 2020-11-15 PROCEDURE — 25000003 PHARM REV CODE 250: Performed by: INTERNAL MEDICINE

## 2020-11-15 PROCEDURE — 93005 ELECTROCARDIOGRAM TRACING: CPT

## 2020-11-15 PROCEDURE — A4216 STERILE WATER/SALINE, 10 ML: HCPCS | Performed by: INTERNAL MEDICINE

## 2020-11-15 PROCEDURE — 25000003 PHARM REV CODE 250: Performed by: NURSE PRACTITIONER

## 2020-11-15 PROCEDURE — 93010 EKG 12-LEAD: ICD-10-PCS | Mod: ,,, | Performed by: INTERNAL MEDICINE

## 2020-11-15 PROCEDURE — 99233 PR SUBSEQUENT HOSPITAL CARE,LEVL III: ICD-10-PCS | Mod: 95,,, | Performed by: INTERNAL MEDICINE

## 2020-11-15 PROCEDURE — 93010 ELECTROCARDIOGRAM REPORT: CPT | Mod: ,,, | Performed by: INTERNAL MEDICINE

## 2020-11-15 RX ADMIN — OXYCODONE HYDROCHLORIDE 20 MG: 10 TABLET ORAL at 12:11

## 2020-11-15 RX ADMIN — CHOLECALCIFEROL TAB 25 MCG (1000 UNIT) 1000 UNITS: 25 TAB at 08:11

## 2020-11-15 RX ADMIN — Medication 10 ML: at 05:11

## 2020-11-15 RX ADMIN — Medication 10 ML: at 06:11

## 2020-11-15 RX ADMIN — VANCOMYCIN HYDROCHLORIDE 1250 MG: 1.25 INJECTION, POWDER, LYOPHILIZED, FOR SOLUTION INTRAVENOUS at 08:11

## 2020-11-15 RX ADMIN — OXYCODONE HYDROCHLORIDE 20 MG: 10 TABLET ORAL at 05:11

## 2020-11-15 RX ADMIN — FUROSEMIDE 40 MG: 40 TABLET ORAL at 08:11

## 2020-11-15 RX ADMIN — OXYCODONE HYDROCHLORIDE AND ACETAMINOPHEN 500 MG: 500 TABLET ORAL at 08:11

## 2020-11-15 RX ADMIN — Medication 10 ML: at 12:11

## 2020-11-15 RX ADMIN — GABAPENTIN 300 MG: 300 CAPSULE ORAL at 08:11

## 2020-11-15 RX ADMIN — MIRTAZAPINE 15 MG: 7.5 TABLET ORAL at 08:11

## 2020-11-15 RX ADMIN — ACETAMINOPHEN 1000 MG: 500 TABLET ORAL at 12:11

## 2020-11-15 RX ADMIN — MELATONIN TAB 3 MG 6 MG: 3 TAB at 08:11

## 2020-11-15 RX ADMIN — ACETAMINOPHEN 1000 MG: 500 TABLET ORAL at 08:11

## 2020-11-15 RX ADMIN — AMLODIPINE BESYLATE 10 MG: 10 TABLET ORAL at 08:11

## 2020-11-15 RX ADMIN — ENOXAPARIN SODIUM 40 MG: 40 INJECTION SUBCUTANEOUS at 05:11

## 2020-11-15 RX ADMIN — OXYCODONE HYDROCHLORIDE 20 MG: 10 TABLET ORAL at 06:11

## 2020-11-15 NOTE — NURSING
"Patient refused telemetry monitoring " all the wires get in my way and I really not want that." Patient educated on risk of refusing telemetry and verbalized understanding.  "

## 2020-11-15 NOTE — PROGRESS NOTES
[facility] - ICU 15 [facility] Medicine  Telemedicine Progress Note    Patient Name: Марина Britton  MRN: [id number]  Patient Class: IP- Inpatient   Admission Date: 10/26/2020  Length of Stay: 20 days  Attending Physician: Tila Saez MD  Primary Care Provider: Luis Felipe Jose Iii, MD    [facility] Medicine Team: [facility] VIRTUAL TEAM 10 Tila Saez MD  Virtual Telemedicine Progress Note  Start time: 1502  Chief complaint: Endocarditis of tricuspid valve  The patient location is: [id number]  The patient arrived at: 10/26/2020  7:45 PM  Present with the patient at the time of the telemed/virtual assessment: n/a  End time:  1507  Total time spent with patient: 5 min  I have assessed findings virtually using a telemedicine platform and with assistance of the bedside nurse or telemedicine presenter.  The attending portion of this evaluation, treatment, and documentation was performed per Tila Saez MD via audiovisual.    Patient was transferred to the telemedicine service on: 11/15/2020    Subjective:     Admission CC:   Chief Complaint   Patient presents with    Foot Pain       Fever and left foot pain for the past 3 days. Foot pain worse today Here 3 days ago for foot abscess, prescribed clindamycin.    Chest Pain       Sharp chest pain when taking a deep breath that started earlier today, Denies n/v. +SOB     Follow up visit for: Endocarditis of tricuspid valve    Interval History / Events Overnight:   The patient is able to provide adequate history. Additional history was obtained from past medical records and chart review. No significant events reported by Nursing.  Patient complains of dyspnea and nonproductive cough. Symptoms have worsened since yesterday. Associated symptoms include: pleuritic chest pain and fatigue. Symptoms are increasing in both severity and frequency. Alleviating factors include: nothing.  SpO2 98% on RA    Data reviewed 11/15/2020: Lab test(s) reviewed:  WBC elevated. BUN elevated.    Review of Systems   Constitutional: Positive for fever.   Respiratory: Positive for cough, chest tightness and shortness of breath.    Cardiovascular: Positive for chest pain.     Objective:     Vital Signs (Most Recent):  Temp: (!) 100.4 °F (38 °C) (11/15/20 1202)  Pulse: (!) 123 (11/15/20 1202)  Resp: (!) 22 (11/15/20 1206)  BP: 128/85 (11/15/20 1202)  SpO2: 98 % (11/15/20 1202) Vital Signs (24h Range):  Temp:  [97.7 °F (36.5 °C)-100.4 °F (38 °C)] 100.4 °F (38 °C)  Pulse:  [] 123  Resp:  [15-22] 22  SpO2:  [98 %] 98 %  BP: (122-137)/(85-93) 128/85     Weight: 88.5 kg (195 lb)  Body mass index is 35.67 kg/m².    Intake/Output Summary (Last 24 hours) at 11/15/2020 1508  Last data filed at 11/15/2020 0400  Gross per 24 hour   Intake 480 ml   Output 300 ml   Net 180 ml      Physical Exam  Constitutional:       General: She is not in acute distress.     Appearance: Normal appearance. She is not diaphoretic.   Eyes:      General: Lids are normal. No scleral icterus.        Right eye: No discharge.         Left eye: No discharge.      Conjunctiva/sclera: Conjunctivae normal.   Cardiovascular:      Rate and Rhythm: Tachycardia present.   Pulmonary:      Effort: Pulmonary effort is normal. Tachypnea present. No accessory muscle usage or respiratory distress.   Abdominal:      General: There is no distension.   Musculoskeletal:      Right lower leg: Edema present.      Left lower leg: Edema present.   Skin:     Coloration: Skin is not cyanotic.   Neurological:      Mental Status: She is alert. She is not disoriented.   Psychiatric:         Attention and Perception: Attention normal.         Mood and Affect: Affect normal.         Speech: Speech normal.         Behavior: Behavior is cooperative.         Significant Labs:   :   Recent Labs   Lab 11/14/20  2016 11/15/20  0756   POCTGLUCOSE 131* 78     Recent Labs   Lab 11/10/20  0656 11/11/20  1257 11/12/20  0643   WBC 11.88 12.47 14.47*    HGB 6.9* 8.6* 8.0*   HCT 22.9* 28.1* 28.0*    252 258     Recent Labs   Lab 11/10/20  0656 11/11/20  1257 11/12/20  0643   GRAN 73.6*  8.7* 79.0*  9.9* 80.0*  11.6*   LYMPH 13.6*  1.6 10.1*  1.3 9.6*  1.4   MONO 7.5  0.9 5.8  0.7 6.9  1.0   EOS 0.3 0.3 0.2     Recent Labs   Lab 11/09/20  0448 11/10/20  0656 11/11/20  1257 11/12/20  0643 11/14/20  0821   * 135* 134* 132* 138   K 5.0 4.5 4.0 4.2 4.0    107 103 103 106   CO2 21* 22* 22* 22* 22*   BUN 30* 28* 23* 24* 34*   CREATININE 1.4 1.4 1.3 1.3 0.9   GLU 80 109 127* 99 123*   CALCIUM 7.5* 7.7* 7.7* 7.5* 8.2*   ALBUMIN 1.3* 1.3* 1.4* 1.5* 1.8*   MG 2.2 1.9 1.6 1.6 1.7   PHOS 3.6 3.4  --   --   --      Recent Labs   Lab 11/11/20  1257 11/12/20  0643 11/14/20  0821   ALKPHOS 112 110 105   ALT 8* 9* 19   AST 14 15 21   PROT 6.4 6.3 6.7   BILITOT 0.4 0.5 0.3     Procalcitonin (ng/mL)   Date Value   11/12/2020 0.22     Lactate (Lactic Acid) (mmol/L)   Date Value   10/26/2020 1.8     BNP (pg/mL)   Date Value   11/13/2020 557 (H)     CRP   Date Value   11/12/2020 111.2 mg/L (H)   11/07/2020 174.2 mg/L (H)   10/24/2020 31.00 mg/dL (H)     D-Dimer (mg/L FEU)   Date Value   11/14/2020 7.36 (H)   11/12/2020 5.98 (H)   10/24/2020 8.22 (H)     Ferritin (ng/mL)   Date Value   11/14/2020 767 (H)   11/10/2020 686 (H)     LD (U/L)   Date Value   10/28/2020 382 (H)     Troponin I (ng/mL)   Date Value   10/24/2020 <0.020   10/24/2020 0.055 (H)     CPK (U/L)   Date Value   10/29/2020 55   10/24/2020 17 (L)   10/24/2020 17 (L)     Results for orders placed or performed during the hospital encounter of 10/26/20   Vitamin D   Result Value Ref Range    Vit D, 25-Hydroxy 9 (L) 30 - 96 ng/mL     SARS-CoV2 (COVID-19) Qualitative PCR (no units)   Date Value   11/11/2020 Detected (A)     SARS-CoV-2 RNA, Amplification, Qual (no units)   Date Value   10/26/2020 Negative   10/24/2020 Negative       Echo Color Flow Doppler? Yes  · The left ventricle is normal in size  with normal systolic function. The   estimated ejection fraction is 60%.  · Moderate right ventricular enlargement with normal right ventricular   systolic function.  · Normal left ventricular diastolic function.  · Severe right atrial enlargement.  · The anterior leaflet of the tricuspid valve is completely flail with   resultant severe regurgitation. Valve and chordal tissue prolapsing well   back into the right atrium likely have associated vegetation, but   distinguishing between vegetation, valve, and chord is difficult, even   with these good images.  · The estimated PA systolic pressure is 54 mmHg.  · Elevated central venous pressure (15 mmHg).       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] 11/03/2020 Yes     Chronic    Substance induced mood disorder [F19.94] 11/03/2020 Yes     Chronic    Acute renal insufficiency [N28.9] 11/03/2020 No    MRSA bacteremia [R78.81, B95.62] 10/27/2020 Yes    Pulmonary emboli [I26.99] 10/26/2020 Yes    IVDU (intravenous drug user) [F19.90] 10/26/2020 Yes     Chronic    Pneumonia of both lungs due to infectious organism [J18.9] 10/24/2020 Yes      Problems Resolved During this Admission:    Diagnosis Date Noted Date Resolved POA    Hyperkalemia [E87.5] 11/04/2020 11/15/2020 No    Sepsis with acute hypoxic respiratory failure [A41.9, R65.20, J96.01] 10/27/2020 10/27/2020 Yes    Severe sepsis [A41.9, R65.20] 10/26/2020 11/04/2020 Yes    Tachypnea [R06.82]  10/26/2020 11/04/2020 Yes       Overview / ICU Course:    Марина Britton is a 30 y.o. female admitted for Endocarditis of tricuspid valve. Patient with HCV, IVDU who was transferred from [facility] where she presented with chest pain and was diagnosed with MRSA bacteremia associated with TV IE with septic emboli, acute hypoxic respiratory failure, septic/metabolic encephalopathy, septic PE, septic shock, and acute anemia. She was admitted to the [facility] MICU on 10/27 and was intubated for respiratory failure. She has subsequently been evaluated by ID, CTS, and Psychiatry. She required pRBC transfusion on 10/29, and norepi on 10/30. She developed acute renal insufficiency which subsequently resolved. She was extubated on 11/2, then transferred to Hospital Medicine on 11/3 for further care, planning on long-term IV antibiotics with vancomycin and repeat CTS evaluation as an outpatient. Subsequently her acute renal insufficiency returned, and again febrile for which BCx were obtained on 11/4 and 11/5 and have remained NGTD. ID was reconsulted on 11/6, and cefepime was added to her regimen. CT C/A/P obtained shows continued infectious lung processes. TTE shows progressive valvular damage with flail valve and severe MR, for which she is being diuresed.  Covid testing ordered for placement resulted positive 11/11, patient transferred to COVID19 isolation unit. Monitoring for signs/symptoms of COVID-19 and awaiting LTAC placement.    Inpatient Medications Prescribed for Management of Current Problems:     Scheduled Meds:    amLODIPine  10 mg Oral Daily    ascorbic acid (vitamin C)  500 mg Oral BID    enoxaparin  40 mg Subcutaneous Q24H    furosemide  40 mg Oral Daily    gabapentin  300 mg Oral TID    mirtazapine  15 mg Oral QHS    oxyCODONE  20 mg Per OG tube Q6H    polyethylene glycol  17 g Oral Daily    senna-docusate 8.6-50 mg  2 tablet Oral BID    sodium chloride 0.9%  10 mL Intravenous Q6H     "vancomycin (VANCOCIN) IVPB  1,250 mg Intravenous Q24H    vitamin D  1,000 Units Oral Daily     Continuous Infusions:   As Needed: sodium chloride, acetaminophen, acetaminophen, acetaminophen, calcium carbonate, diphenhydrAMINE, hydrOXYzine HCL, melatonin, oxyCODONE, polyethylene glycol, sodium chloride 0.9%, Flushing PICC Protocol **AND** sodium chloride 0.9% **AND** sodium chloride 0.9%, valproate sodium (DEPACON) IVPB, vancomycin - pharmacy to dose    Assessment and Plan by Problem    MRSA bacteremia  Pneumonia of both lungs due to infectious organism  Pulmonary emboli (septic)  Sepsis  Endocarditis of tricuspid valve  - Febrile intermittently, BCx from 11/4 and 11/5 NGTD. Per ID, she will likely continue to have intermittent fevers from her infectious process  - Continue vancomycin (pharmacy dosing). She has completed five days of cefepime  - PT/OT following and recommending IPR, LTAC referrals out  - Needs 6 weeks IV antibiotics, then follow up with CTS     Flail tricuspid valve  Severe tricuspid regurgitation  Lower extremity edema  - Repeat TTE shows more advanced disease, tricuspid valve now flail, but also "distinguishing between vegetation, valve, and chord is difficult, even with these good images" per interpreting Cardiologist. Also shows elevated CVP.   - LE edema improving with Lasix  - Continue IV diuresis  - Previous provider discussed findings and ongoing fevers with Dr. Pritchett on 11/10 who recommended continued medical management as her current deconditioned status is a contraindication to surgical intervention     COVID infection  Stopped Dexamethasone 11/13, since not requiring O2 at this time.  - COVID-19 testing: Collection Date: 11/11/2020 Collection Time:  11:09 AM   - Isolation: Airborne/Droplet. Surgical mask on patient. Notify Infection Control  - Diagnostics: Trend Q48hrs if stable, more frequently if patient decompensating        Laboratory/Study Frequency Result   CBC Admit & Q48h   "   CMP Admit & Q48h     Magnesium level Admit     D-dimer Admit & Q48h 8.2 > 5.9   Ferritin Admit & Q48h 686   CRP Admit & Q48h 31 > 174 > 111   CPK Admit & Q24h if elevated     LDH Admit & Q48h 382   Vitamin D Admit ordered   BNP Admit ordered   Troponin Admit & Q6h if elevated     Glucose-6-phos dehydrogenase Admit     Procalcitonin Admit 0.22   Lipid Panel If using statin     Rapid influenza Admit     Resp Infection Panel Admit if BMT/organ transplant     Legionella Antigen Admit     Sputum Culture Admit     Blood Culture Admit     Urinalysis & Culture Admit     ECG Admit & Q48h if on HCQ     CXR Admit     Lymphopenia, hyponatremia, hyperferritinemia, elevated troponin, elevated d-dimer, age, and medical comorbidities are significant predictors of poor clinical outcome     - [facility]    - Monitoring:              - Telemetry & Continuous Pulse Oximetry    - Nutrition:               - Multivitamin PO daily              - Add Boost supplement              - Vitamin D 1000IU daily as she is Vitamin D deficient              - Ascorbic acid 500mg PO bid    - Supportive Care:              - acetaminophen 650mg PO Q6hr PRN fever/headache              - loperamide PRN viral diarrhea              - IVF if indicated, restrictive strategy preferred, no maintenance IV if able              - VTE PPx: enoxaparin or heparin SQ unless contraindicated    - Antibiotics:  - if indications, CXR findings, elevated procal. See protocol for alternatives.   - Discontinue early if low concern for bacterial co-infection              - ceftriaxone 1g Q24h x 5 days - not started  - azithromycin 500mg po x1, then 250mg po daily x 4 days - not started    - Therapies:              - If patient meets criteria  - atorvastatin 40mg po daily     Acute renal insufficiency  Hyperkalemia  Hyponatremia  - sCr stable, steady with diuresis   - Etiology likely multifactorial with recent sepsis and  vancomycin and poor PO intake  - Urine studies consistent with pre-renal (though cardiorenal would have a similar picture)     IVDU (intravenous drug user)  Opioid use disorder, severe, dependence  - Psych evaluated earlier this admission  - Would benefit from LTAC disposition     Substance induced mood disorder  -continue mirtazepine 15mg HS  -gabapentin 300 TID     Opioid Use disorder  Continue oxycodone 20mg q6 pRN  Continue discussion MAT with patient  Consider Addiction Psych     Chronic inflammatory anemia  - Hemoglobin 6.9 g/dL 11/10, given one unit of pRBCs  - Iron studies consistent with inflammatory process, in keeping with her systemic infection  - Monitor     Wounds  Left foot and sacral spine  Wound care following. Appreciate recs.     LE edema  Mixed etiology, IV fluids and low albumin  Elevate feet, improve nutritional status  Lasix daily today. Will try and transition to PO tomorrow based on electrolytes/volume status, goal is euvolemia     Malnutrition  - Nutrition following  - Added Boost to all meals      Diet: Diet Adult Regular (IDDSI Level 7)  GI Prophylaxis: Not indicated  Significant LDAs:   IV Access Type: PICC  Urinary Catheter Indication if present: Patient Does Not Have Urinary Catheter  Other Lines/Tubes/Drains:    HIGH RISK CONDITION(S):   Patient is currently on drug therapy requiring intensive monitoring for toxicity: Vancomycin     Goals of Care:    Previous admission:  10/24/20  Likely prognosis:  Poor  Code Status: Full Code  Comfort Only: No  Hospice: No  Goals at discharge: remain at home, with physician follow-up    Discharge Planning   VIANNEY: 11/16/2020     Code Status: Full Code   Is the patient medically ready for discharge?: Yes    Reason for patient still in hospital (select all that apply): Patient trending condition and Pending disposition  Discharge Plan A: Rehab   Discharge Delays: None known at this time    VTE Risk Mitigation (From admission, onward)         Ordered      enoxaparin injection 40 mg  Every 24 hours      11/08/20 0838     Place sequential compression device  Until discontinued      10/26/20 2023                   Tila Saez MD  Department of Hospital Medicine   [facility] - ICU [facility]

## 2020-11-15 NOTE — PLAN OF CARE
Problem: Adult Inpatient Plan of Care  Goal: Plan of Care Review  Outcome: Ongoing, Progressing  Goal: Patient-Specific Goal (Individualization)  Outcome: Ongoing, Progressing  Goal: Absence of Hospital-Acquired Illness or Injury  Outcome: Ongoing, Progressing  Goal: Optimal Comfort and Wellbeing  Outcome: Ongoing, Progressing  Goal: Readiness for Transition of Care  Outcome: Ongoing, Progressing  Goal: Rounds/Family Conference  Outcome: Ongoing, Progressing     Problem: Adjustment to Illness (Sepsis/Septic Shock)  Goal: Optimal Coping  Outcome: Ongoing, Progressing     Problem: Bleeding (Sepsis/Septic Shock)  Goal: Absence of Bleeding  Outcome: Ongoing, Progressing     Problem: Glycemic Control Impaired (Sepsis/Septic Shock)  Goal: Blood Glucose Level Within Desired Range  Outcome: Ongoing, Progressing     Problem: Hemodynamic Instability (Sepsis/Septic Shock)  Goal: Effective Tissue Perfusion  Outcome: Ongoing, Progressing     Problem: Infection (Sepsis/Septic Shock)  Goal: Absence of Infection Signs/Symptoms  Outcome: Ongoing, Progressing     Problem: Nutrition Impaired (Sepsis/Septic Shock)  Goal: Optimal Nutrition Intake  Outcome: Ongoing, Progressing     Problem: Respiratory Compromise (Sepsis/Septic Shock)  Goal: Effective Oxygenation and Ventilation  Outcome: Ongoing, Progressing     Problem: Fluid Imbalance (Pneumonia)  Goal: Fluid Balance  Outcome: Ongoing, Progressing     Problem: Infection (Pneumonia)  Goal: Resolution of Infection Signs/Symptoms  Outcome: Ongoing, Progressing     Problem: Respiratory Compromise (Pneumonia)  Goal: Effective Oxygenation and Ventilation  Outcome: Ongoing, Progressing     Problem: Infection  Goal: Infection Symptom Resolution  Outcome: Ongoing, Progressing     Problem: Wound  Goal: Optimal Wound Healing  Outcome: Ongoing, Progressing     Problem: Skin Injury Risk Increased  Goal: Skin Health and Integrity  Outcome: Ongoing, Progressing     Problem: Fall Injury Risk  Goal:  Absence of Fall and Fall-Related Injury  Outcome: Ongoing, Progressing     Problem: Restraint, Nonbehavioral (Nonviolent)  Goal: Personal Dignity and Safety Maintained  Outcome: Ongoing, Progressing     Problem: Electrolyte Imbalance (Acute Kidney Injury/Impairment)  Goal: Serum Electrolyte Balance  Outcome: Ongoing, Progressing     Problem: Fluid Imbalance (Acute Kidney Injury/Impairment)  Goal: Optimal Fluid Balance  Outcome: Ongoing, Progressing     Problem: Hematologic Alteration (Acute Kidney Injury/Impairment)  Goal: Hemoglobin, Hematocrit and Platelets Within Normal Range  Outcome: Ongoing, Progressing     Problem: Oral Intake Inadequate (Acute Kidney Injury/Impairment)  Goal: Optimal Nutrition Intake  Outcome: Ongoing, Progressing     Problem: Renal Function Impairment (Acute Kidney Injury/Impairment)  Goal: Effective Renal Function  Outcome: Ongoing, Progressing     ASSUMED CARE OF PT. VSS NAD ASSESSMENT DONE/CHARTED. UNEVENTFUL SHIFT. REPORT GIVEN TO ONCOMING RN

## 2020-11-16 LAB
ALBUMIN SERPL BCP-MCNC: 1.7 G/DL (ref 3.5–5.2)
ALP SERPL-CCNC: 169 U/L (ref 55–135)
ALT SERPL W/O P-5'-P-CCNC: 67 U/L (ref 10–44)
ANION GAP SERPL CALC-SCNC: 9 MMOL/L (ref 8–16)
AST SERPL-CCNC: 62 U/L (ref 10–40)
BASOPHILS # BLD AUTO: 0.09 K/UL (ref 0–0.2)
BASOPHILS NFR BLD: 0.7 % (ref 0–1.9)
BILIRUB SERPL-MCNC: 0.5 MG/DL (ref 0.1–1)
BUN SERPL-MCNC: 28 MG/DL (ref 6–20)
CALCIUM SERPL-MCNC: 8 MG/DL (ref 8.7–10.5)
CHLORIDE SERPL-SCNC: 103 MMOL/L (ref 95–110)
CO2 SERPL-SCNC: 30 MMOL/L (ref 23–29)
CREAT SERPL-MCNC: 1 MG/DL (ref 0.5–1.4)
CRP SERPL-MCNC: 54 MG/L (ref 0–8.2)
D DIMER PPP IA.FEU-MCNC: 5.13 MG/L FEU
DIFFERENTIAL METHOD: ABNORMAL
EOSINOPHIL # BLD AUTO: 0.1 K/UL (ref 0–0.5)
EOSINOPHIL NFR BLD: 0.9 % (ref 0–8)
ERYTHROCYTE [DISTWIDTH] IN BLOOD BY AUTOMATED COUNT: 18.2 % (ref 11.5–14.5)
EST. GFR  (AFRICAN AMERICAN): >60 ML/MIN/1.73 M^2
EST. GFR  (NON AFRICAN AMERICAN): >60 ML/MIN/1.73 M^2
FERRITIN SERPL-MCNC: 567 NG/ML (ref 20–300)
GLUCOSE SERPL-MCNC: 80 MG/DL (ref 70–110)
HCT VFR BLD AUTO: 31 % (ref 37–48.5)
HGB BLD-MCNC: 8.9 G/DL (ref 12–16)
IMM GRANULOCYTES # BLD AUTO: 0.11 K/UL (ref 0–0.04)
IMM GRANULOCYTES NFR BLD AUTO: 0.9 % (ref 0–0.5)
LDH SERPL L TO P-CCNC: 233 U/L (ref 110–260)
LYMPHOCYTES # BLD AUTO: 2 K/UL (ref 1–4.8)
LYMPHOCYTES NFR BLD: 15.2 % (ref 18–48)
MAGNESIUM SERPL-MCNC: 1.6 MG/DL (ref 1.6–2.6)
MCH RBC QN AUTO: 25.6 PG (ref 27–31)
MCHC RBC AUTO-ENTMCNC: 28.7 G/DL (ref 32–36)
MCV RBC AUTO: 89 FL (ref 82–98)
MONOCYTES # BLD AUTO: 0.7 K/UL (ref 0.3–1)
MONOCYTES NFR BLD: 5.5 % (ref 4–15)
NEUTROPHILS # BLD AUTO: 9.9 K/UL (ref 1.8–7.7)
NEUTROPHILS NFR BLD: 76.8 % (ref 38–73)
NRBC BLD-RTO: 0 /100 WBC
PLATELET # BLD AUTO: 375 K/UL (ref 150–350)
PMV BLD AUTO: 9.4 FL (ref 9.2–12.9)
POTASSIUM SERPL-SCNC: 3.3 MMOL/L (ref 3.5–5.1)
PROCALCITONIN SERPL IA-MCNC: 0.1 NG/ML
PROT SERPL-MCNC: 6.4 G/DL (ref 6–8.4)
RBC # BLD AUTO: 3.47 M/UL (ref 4–5.4)
SODIUM SERPL-SCNC: 142 MMOL/L (ref 136–145)
WBC # BLD AUTO: 12.84 K/UL (ref 3.9–12.7)

## 2020-11-16 PROCEDURE — 25000003 PHARM REV CODE 250: Performed by: NURSE PRACTITIONER

## 2020-11-16 PROCEDURE — 83615 LACTATE (LD) (LDH) ENZYME: CPT

## 2020-11-16 PROCEDURE — 25000003 PHARM REV CODE 250: Performed by: STUDENT IN AN ORGANIZED HEALTH CARE EDUCATION/TRAINING PROGRAM

## 2020-11-16 PROCEDURE — 85025 COMPLETE CBC W/AUTO DIFF WBC: CPT

## 2020-11-16 PROCEDURE — 97803 MED NUTRITION INDIV SUBSEQ: CPT

## 2020-11-16 PROCEDURE — 86140 C-REACTIVE PROTEIN: CPT

## 2020-11-16 PROCEDURE — 93005 ELECTROCARDIOGRAM TRACING: CPT

## 2020-11-16 PROCEDURE — 63600175 PHARM REV CODE 636 W HCPCS: Performed by: INTERNAL MEDICINE

## 2020-11-16 PROCEDURE — 85379 FIBRIN DEGRADATION QUANT: CPT

## 2020-11-16 PROCEDURE — 80053 COMPREHEN METABOLIC PANEL: CPT

## 2020-11-16 PROCEDURE — 83735 ASSAY OF MAGNESIUM: CPT

## 2020-11-16 PROCEDURE — 99233 SBSQ HOSP IP/OBS HIGH 50: CPT | Mod: 95,,, | Performed by: INTERNAL MEDICINE

## 2020-11-16 PROCEDURE — 82728 ASSAY OF FERRITIN: CPT

## 2020-11-16 PROCEDURE — 99900035 HC TECH TIME PER 15 MIN (STAT)

## 2020-11-16 PROCEDURE — 94761 N-INVAS EAR/PLS OXIMETRY MLT: CPT

## 2020-11-16 PROCEDURE — 25000003 PHARM REV CODE 250: Performed by: INTERNAL MEDICINE

## 2020-11-16 PROCEDURE — 25000003 PHARM REV CODE 250: Performed by: HOSPITALIST

## 2020-11-16 PROCEDURE — 20600001 HC STEP DOWN PRIVATE ROOM

## 2020-11-16 PROCEDURE — A4216 STERILE WATER/SALINE, 10 ML: HCPCS | Performed by: INTERNAL MEDICINE

## 2020-11-16 PROCEDURE — 99233 PR SUBSEQUENT HOSPITAL CARE,LEVL III: ICD-10-PCS | Mod: 95,,, | Performed by: INTERNAL MEDICINE

## 2020-11-16 PROCEDURE — 84145 PROCALCITONIN (PCT): CPT

## 2020-11-16 PROCEDURE — 93010 EKG 12-LEAD: ICD-10-PCS | Mod: ,,, | Performed by: INTERNAL MEDICINE

## 2020-11-16 PROCEDURE — 93010 ELECTROCARDIOGRAM REPORT: CPT | Mod: ,,, | Performed by: INTERNAL MEDICINE

## 2020-11-16 PROCEDURE — 87040 BLOOD CULTURE FOR BACTERIA: CPT

## 2020-11-16 RX ORDER — LANOLIN ALCOHOL/MO/W.PET/CERES
400 CREAM (GRAM) TOPICAL 2 TIMES DAILY
Status: DISCONTINUED | OUTPATIENT
Start: 2020-11-16 | End: 2020-11-24 | Stop reason: HOSPADM

## 2020-11-16 RX ORDER — POTASSIUM CHLORIDE 20 MEQ/1
40 TABLET, EXTENDED RELEASE ORAL 3 TIMES DAILY
Status: COMPLETED | OUTPATIENT
Start: 2020-11-16 | End: 2020-11-16

## 2020-11-16 RX ADMIN — GABAPENTIN 300 MG: 300 CAPSULE ORAL at 07:11

## 2020-11-16 RX ADMIN — MIRTAZAPINE 15 MG: 7.5 TABLET ORAL at 07:11

## 2020-11-16 RX ADMIN — OXYCODONE HYDROCHLORIDE 20 MG: 10 TABLET ORAL at 01:11

## 2020-11-16 RX ADMIN — Medication 10 ML: at 12:11

## 2020-11-16 RX ADMIN — MAGNESIUM OXIDE 400 MG (241.3 MG MAGNESIUM) TABLET 400 MG: TABLET at 07:11

## 2020-11-16 RX ADMIN — GUAIFENESIN AND DEXTROMETHORPHAN HYDROBROMIDE 1 TABLET: 600; 30 TABLET, EXTENDED RELEASE ORAL at 01:11

## 2020-11-16 RX ADMIN — ACETAMINOPHEN 1000 MG: 500 TABLET ORAL at 07:11

## 2020-11-16 RX ADMIN — POTASSIUM CHLORIDE 40 MEQ: 1500 TABLET, EXTENDED RELEASE ORAL at 07:11

## 2020-11-16 RX ADMIN — CHOLECALCIFEROL TAB 25 MCG (1000 UNIT) 1000 UNITS: 25 TAB at 09:11

## 2020-11-16 RX ADMIN — OXYCODONE HYDROCHLORIDE AND ACETAMINOPHEN 500 MG: 500 TABLET ORAL at 07:11

## 2020-11-16 RX ADMIN — AMLODIPINE BESYLATE 10 MG: 10 TABLET ORAL at 09:11

## 2020-11-16 RX ADMIN — OXYCODONE HYDROCHLORIDE 20 MG: 10 TABLET ORAL at 05:11

## 2020-11-16 RX ADMIN — GABAPENTIN 300 MG: 300 CAPSULE ORAL at 09:11

## 2020-11-16 RX ADMIN — GABAPENTIN 300 MG: 300 CAPSULE ORAL at 02:11

## 2020-11-16 RX ADMIN — VANCOMYCIN HYDROCHLORIDE 1250 MG: 1.25 INJECTION, POWDER, LYOPHILIZED, FOR SOLUTION INTRAVENOUS at 09:11

## 2020-11-16 RX ADMIN — FUROSEMIDE 40 MG: 40 TABLET ORAL at 09:11

## 2020-11-16 RX ADMIN — GUAIFENESIN AND DEXTROMETHORPHAN HYDROBROMIDE 1 TABLET: 600; 30 TABLET, EXTENDED RELEASE ORAL at 07:11

## 2020-11-16 RX ADMIN — OXYCODONE HYDROCHLORIDE AND ACETAMINOPHEN 500 MG: 500 TABLET ORAL at 09:11

## 2020-11-16 RX ADMIN — POTASSIUM CHLORIDE 40 MEQ: 1500 TABLET, EXTENDED RELEASE ORAL at 06:11

## 2020-11-16 RX ADMIN — ENOXAPARIN SODIUM 40 MG: 40 INJECTION SUBCUTANEOUS at 05:11

## 2020-11-16 RX ADMIN — Medication 10 ML: at 05:11

## 2020-11-16 RX ADMIN — Medication 10 ML: at 01:11

## 2020-11-16 RX ADMIN — ACETAMINOPHEN 1000 MG: 500 TABLET ORAL at 12:11

## 2020-11-16 RX ADMIN — OXYCODONE HYDROCHLORIDE 20 MG: 10 TABLET ORAL at 12:11

## 2020-11-16 RX ADMIN — OXYCODONE HYDROCHLORIDE 20 MG: 10 TABLET ORAL at 11:11

## 2020-11-16 RX ADMIN — GUAIFENESIN AND DEXTROMETHORPHAN HYDROBROMIDE 1 TABLET: 600; 30 TABLET, EXTENDED RELEASE ORAL at 09:11

## 2020-11-16 NOTE — PLAN OF CARE
Problem: Adult Inpatient Plan of Care  Goal: Plan of Care Review  Outcome: Ongoing, Progressing  Goal: Patient-Specific Goal (Individualization)  Outcome: Ongoing, Progressing  Goal: Absence of Hospital-Acquired Illness or Injury  Outcome: Ongoing, Progressing  Goal: Optimal Comfort and Wellbeing  Outcome: Ongoing, Progressing  Goal: Readiness for Transition of Care  Outcome: Ongoing, Progressing  Goal: Rounds/Family Conference  Outcome: Ongoing, Progressing     Problem: Adjustment to Illness (Sepsis/Septic Shock)  Goal: Optimal Coping  Outcome: Ongoing, Progressing     Problem: Respiratory Compromise (Sepsis/Septic Shock)  Goal: Effective Oxygenation and Ventilation  Outcome: Ongoing, Progressing     Problem: Fluid Imbalance (Pneumonia)  Goal: Fluid Balance  Outcome: Ongoing, Progressing     Problem: Infection (Pneumonia)  Goal: Resolution of Infection Signs/Symptoms  Outcome: Ongoing, Progressing     Problem: Respiratory Compromise (Pneumonia)  Goal: Effective Oxygenation and Ventilation  Outcome: Ongoing, Progressing     Problem: Infection  Goal: Infection Symptom Resolution  Outcome: Ongoing, Progressing     Problem: Wound  Goal: Optimal Wound Healing  Outcome: Ongoing, Progressing     Problem: Skin Injury Risk Increased  Goal: Skin Health and Integrity  Outcome: Ongoing, Progressing     Problem: Fall Injury Risk  Goal: Absence of Fall and Fall-Related Injury  Outcome: Ongoing, Progressing     Problem: Restraint, Nonbehavioral (Nonviolent)  Goal: Discontinuation Criteria Achieved  Outcome: Ongoing, Progressing  Goal: Personal Dignity and Safety Maintained  Outcome: Ongoing, Progressing     Problem: Electrolyte Imbalance (Acute Kidney Injury/Impairment)  Goal: Serum Electrolyte Balance  Outcome: Ongoing, Progressing     Problem: Oral Intake Inadequate (Acute Kidney Injury/Impairment)  Goal: Optimal Nutrition Intake  Outcome: Ongoing, Progressing     Problem: Renal Function Impairment (Acute Kidney  Injury/Impairment)  Goal: Effective Renal Function  Outcome: Ongoing, Progressing   ASSUMED CARE OF PT. VSS NAD ASSESSMENT DONE/CHARTED. UNEVENTFUL SHIFT. REPORT GIVEN TO ONCOMING RN

## 2020-11-16 NOTE — PLAN OF CARE
Pt. needs met. VSS throughout shift. Pt ambulated in room. Remains free from fall, injury, skin breakdown. Pt on room air. Waffle mattress in use. All alarms active and auduble. Pt presented with education, needs reinfocrcement. POC reviewed will continue to monitor and adjust POC all questions answered.Bed locked and in lowest position. Call light w/I reach. No needs at this time. Purposeful hourly rounding.    Problem: Adult Inpatient Plan of Care  Goal: Plan of Care Review  Outcome: Ongoing, Progressing  Goal: Patient-Specific Goal (Individualization)  Outcome: Ongoing, Progressing  Goal: Absence of Hospital-Acquired Illness or Injury  Outcome: Ongoing, Progressing  Goal: Optimal Comfort and Wellbeing  Outcome: Ongoing, Progressing  Goal: Readiness for Transition of Care  Outcome: Ongoing, Progressing  Goal: Rounds/Family Conference  Outcome: Ongoing, Progressing     Problem: Adjustment to Illness (Sepsis/Septic Shock)  Goal: Optimal Coping  Outcome: Ongoing, Progressing     Problem: Bleeding (Sepsis/Septic Shock)  Goal: Absence of Bleeding  Outcome: Ongoing, Progressing     Problem: Glycemic Control Impaired (Sepsis/Septic Shock)  Goal: Blood Glucose Level Within Desired Range  Outcome: Ongoing, Progressing     Problem: Hemodynamic Instability (Sepsis/Septic Shock)  Goal: Effective Tissue Perfusion  Outcome: Ongoing, Progressing     Problem: Infection (Sepsis/Septic Shock)  Goal: Absence of Infection Signs/Symptoms  Outcome: Ongoing, Progressing     Problem: Nutrition Impaired (Sepsis/Septic Shock)  Goal: Optimal Nutrition Intake  Outcome: Ongoing, Progressing     Problem: Respiratory Compromise (Sepsis/Septic Shock)  Goal: Effective Oxygenation and Ventilation  Outcome: Ongoing, Progressing     Problem: Fluid Imbalance (Pneumonia)  Goal: Fluid Balance  Outcome: Ongoing, Progressing     Problem: Infection (Pneumonia)  Goal: Resolution of Infection Signs/Symptoms  Outcome: Ongoing, Progressing     Problem:  Respiratory Compromise (Pneumonia)  Goal: Effective Oxygenation and Ventilation  Outcome: Ongoing, Progressing     Problem: Infection  Goal: Infection Symptom Resolution  Outcome: Ongoing, Progressing     Problem: Wound  Goal: Optimal Wound Healing  Outcome: Ongoing, Progressing     Problem: Skin Injury Risk Increased  Goal: Skin Health and Integrity  Outcome: Ongoing, Progressing     Problem: Communication Impairment (Mechanical Ventilation, Invasive)  Goal: Effective Communication  Outcome: Ongoing, Progressing     Problem: Device-Related Complication Risk (Mechanical Ventilation, Invasive)  Goal: Optimal Device Function  Outcome: Ongoing, Progressing     Problem: Inability to Wean (Mechanical Ventilation, Invasive)  Goal: Mechanical Ventilation Liberation  Outcome: Ongoing, Progressing     Problem: Nutrition Impairment (Mechanical Ventilation, Invasive)  Goal: Optimal Nutrition Delivery  Outcome: Ongoing, Progressing     Problem: Skin and Tissue Injury (Mechanical Ventilation, Invasive)  Goal: Absence of Device-Related Skin and Tissue Injury  Outcome: Ongoing, Progressing     Problem: Ventilator-Induced Lung Injury (Mechanical Ventilation, Invasive)  Goal: Absence of Ventilator-Induced Lung Injury  Outcome: Ongoing, Progressing     Problem: Communication Impairment (Artificial Airway)  Goal: Effective Communication  Outcome: Ongoing, Progressing     Problem: Device-Related Complication Risk (Artificial Airway)  Goal: Optimal Device Function  Outcome: Ongoing, Progressing     Problem: Skin and Tissue Injury (Artificial Airway)  Goal: Absence of Device-Related Skin or Tissue Injury  Outcome: Ongoing, Progressing     Problem: Noninvasive Ventilation Acute  Goal: Effective Unassisted Ventilation and Oxygenation  Outcome: Ongoing, Progressing     Problem: Fall Injury Risk  Goal: Absence of Fall and Fall-Related Injury  Outcome: Ongoing, Progressing     Problem: Restraint, Nonbehavioral (Nonviolent)  Goal:  Discontinuation Criteria Achieved  Outcome: Ongoing, Progressing  Goal: Personal Dignity and Safety Maintained  Outcome: Ongoing, Progressing     Problem: Electrolyte Imbalance (Acute Kidney Injury/Impairment)  Goal: Serum Electrolyte Balance  Outcome: Ongoing, Progressing     Problem: Fluid Imbalance (Acute Kidney Injury/Impairment)  Goal: Optimal Fluid Balance  Outcome: Ongoing, Progressing     Problem: Hematologic Alteration (Acute Kidney Injury/Impairment)  Goal: Hemoglobin, Hematocrit and Platelets Within Normal Range  Outcome: Ongoing, Progressing     Problem: Oral Intake Inadequate (Acute Kidney Injury/Impairment)  Goal: Optimal Nutrition Intake  Outcome: Ongoing, Progressing     Problem: Renal Function Impairment (Acute Kidney Injury/Impairment)  Goal: Effective Renal Function  Outcome: Ongoing, Progressing

## 2020-11-16 NOTE — PHYSICIAN QUERY
PT Name: Марина Britton  MR #: [id number]     PRESENT ON ADMISSION (POA) DIAGNOSIS CLARIFICATION     CDS: Faye Gonzalez RN, CCDS         Contact information :ext [phone] [email or web address]     This form is a permanent document in the medical record.     Query Date: November 16, 2020    By submitting this query, we are merely seeking further clarification of documentation.  Please utilize your independent clinical judgment when addressing the question(s) below.     The Medical Record contains following diagnoses documented:    Clinical Information Location in Medical Records     Pneumonia of both lungs due to infectious organism  - bcx with GPC, likely staph  - continue vanc, DC zosyn    SARS-CoV-2 RNA Amplification, Qual-Negative    SARS-CoV2 (COVID-19) Qualitative PCR-Detected       Covid testing ordered for placement resulted positive 11/11, patient transferred to COVID19 isolation unit. 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 where she presented with chest pain and was diagnosed with MRSA bacteremia associated with TV IE with septic emboli, acute hypoxic respiratory failure, septic/metabolic encephalopathy, septic PE, septic shock, and acute anemia.      H&P 10/26/20        Lab 10/26/20    Lab 11/11/10      Hospital Medicine PN 11/15/20        Hospital Medicine PN 11/15/20    Hospital Medicine PN 11/15/20     Present on admission (POA) is defined as present at the time inpatient admission occurs. Conditions that develop during an outpatient encounter, including emergency department, observation or outpatient surgery, are considered as present on admission. [facility] 4th Quarter 2008      Please clarify the Present on Admission (POA) status of the diagnosis: COVID-19 virus    Present on admission (POA)  status:   [  x  ] Yes (Y)               [     ] Clinically Undetermined (W)        [    ] No (N)                 [    ] Documentation insufficient to determine if condition is POA (U)

## 2020-11-16 NOTE — PLAN OF CARE
Currently not stable for discharge -  now showing symptoms of covid infection - FEBRILE, COUGH, CHEST TIGHTNESS - VANCOMYCIN Q24 HOURS - monitoring labs for toxicity. Anticipate LTAC v/s  IRF at time of discharge. Will continue to follow    Abeba Lang RN  Case Management  Ext [phone]       11/16/20 [id number]   Discharge Reassessment   Assessment Type Discharge Planning Reassessment   Provided patient/caregiver education on the expected discharge date and the discharge plan Yes   Do you have any problems affording any of your prescribed medications? No   Discharge Plan A Long-term acute care facility (LTAC)   Discharge Plan B Rehab   DME Needed Upon Discharge  bedside commode;walker, rolling;shower chair   Patient choice form signed by patient/caregiver No   Anticipated Discharge Disposition LTAC   Can the patient/caregiver answer the patient profile reliably? Yes, cognitively intact   How does the patient rate their overall health at the present time? Fair   Describe the patient's ability to walk at the present time. Walks with the help of equipment   Post-Acute Status   Post-Acute Authorization Placement   Post-Acute Placement Status Awaiting Internal Medical Clearance

## 2020-11-16 NOTE — PROGRESS NOTES
"[facility] - ICU 15 WT  Adult Nutrition  Progress Note    SUMMARY       Recommendations    1. Continue Regular diet, encourage PO intake   2. Continue Boost Plus TID (chocolate only)    Goals: Meet % EEN, EPN by RD f/u date  Nutrition Goal Status: goal met  Communication of RD Recs: other (comment)(POC)    Reason for Assessment    Reason For Assessment: RD follow-up  Diagnosis: (Endocarditis of tricuspid valve)  Relevant Medical History: IVDU  Interdisciplinary Rounds: did not attend  General Information Comments: Pt +COVID-19. UBW ~100-105# per last nutrition note and chart review. Wt gain noted, related to fluid. Eating 100% of meals and likes "Chocolate drink." NFPE completed on 11/11, to reveal mild-moderate wasting in orbital region, although well-nourished upper arm region. Unable to fully assess thigh and calf d/t fluid retention. Pt at risk for malnutrition but does not meet qualifications, RD following/monitoring.    Nutrition Discharge Planning: Regular Diet with adeqaute PO intake    Nutrition Risk Screen    Nutrition Risk Screen: no indicators present    Nutrition/Diet History    Patient Reported Diet/Restrictions/Preferences: general  Spiritual, Cultural Beliefs, Faith Practices, Values that Affect Care: no  Food Allergies: NKFA  Factors Affecting Nutritional Intake: None identified at this time    Anthropometrics    Temp: 98 °F (36.7 °C)  Height: 5' 2" (157.5 cm)  Height (inches): 62 in  Weight Method: Bed Scale  Weight: 88.5 kg (195 lb)  Weight (lb): 195 lb  Ideal Body Weight (IBW), Female: 110 lb  % Ideal Body Weight, Female (lb): 138.18 %  BMI (Calculated): 35.7  BMI Grade: 35 - 39.9 - obesity - grade II       Lab/Procedures/Meds    Pertinent Labs Reviewed: reviewed  Pertinent Labs Comments: K 3.3, BUN 28, AST 62, ALT 67  Pertinent Medications Reviewed: reviewed  Pertinent Medications Comments: Vit C, enoxaparin, furosemide, gabapentin, polyethylene glycol, senna-docusate, " vancomycin, Vit D    Estimated/Assessed Needs    Weight Used For Calorie Calculations: 53.2 kg (117 lb 4.6 oz)  Energy Calorie Requirements (kcal): 1680 kcal/day  Energy Need Method: [place]-St Jeor(x1.4 PAL)  Protein Requirements: 53-64 gm/day(1-1.2 g/kg)  Weight Used For Protein Calculations: 53.2 kg (117 lb 4.6 oz)  Fluid Requirements (mL): 1 mL/kcal or per MD  Estimated Fluid Requirement Method: RDA Method  RDA Method (mL): 1680  CHO Requirement: -      Nutrition Prescription Ordered    Current Diet Order: Regular Diet  Oral Nutrition Supplement: Boost Plus All Meals    Evaluation of Received Nutrient/Fluid Intake    I/O: +8.7L since 10/28  Energy Calories Required: meeting needs  Protein Required: meeting needs  Fluid Required: other (see comments)(per MD)  Comments: LBM: 11/13  Tolerance: tolerating  % Intake of Estimated Energy Needs: 75 - 100 %  % Meal Intake: 75 - 100 %    Nutrition Risk    Level of Risk/Frequency of Follow-up: low     Assessment and Plan    Nutrition Problem  Inadequate energy intake     Related to (etiology):   Decreased appetite PTA     Signs and Symptoms (as evidenced by):   Pt report, weight loss      Interventions(treatment strategy):  Collaboration of nutrition care with other providers   Commercial beverage  General/healthful diet      Nutrition Diagnosis Status:   Improving    Monitor and Evaluation    Food and Nutrient Intake: energy intake, food and beverage intake  Food and Nutrient Adminstration: diet order  Knowledge/Beliefs/Attitudes: beliefs and attitudes  Physical Activity and Function: nutrition-related ADLs and IADLs  Anthropometric Measurements: weight, weight change, body mass index  Biochemical Data, Medical Tests and Procedures: electrolyte and renal panel, inflammatory profile  Nutrition-Focused Physical Findings: overall appearance     Malnutrition Assessment  Malnutrition Type: acute illness or injury  Energy Intake: other (see comments), moderate energy intake(2  "meals per day (snacking, "junk" per pt report, poor appetite))          Orbital Region (Subcutaneous Fat Loss): mild depletion  Upper Arm Region (Subcutaneous Fat Loss): well nourished   Mormonism Region (Muscle Loss): mild depletion  Clavicle Bone Region (Muscle Loss): mild depletion  Clavicle and Acromion Bone Region (Muscle Loss): mild depletion  Dorsal Hand (Muscle Loss): well nourished  Patellar Region (Muscle Loss): other (see comments)(Unable to assess d/t fluid)  Anterior Thigh Region (Muscle Loss): other (see comments)(Unable to assess d/t fluid)  Posterior Calf Region (Muscle Loss): other (see comments)(Unable to assess d/t fluid)   Edema (Fluid Accumulation): 3-->moderate   Moderate Weight Loss (Malnutrition): other (see comments)(Does not qualify (6.6-7% loss in 3 months))    Nutrition Follow-Up    RD Follow-up?: Yes    "

## 2020-11-16 NOTE — PLAN OF CARE
Recommendations    1. Continue Regular diet, encourage PO intake   2. Continue Boost Plus TID (chocolate only)    Goals: Meet % EEN, EPN by RD f/u date  Nutrition Goal Status: goal met  Communication of RD Recs: other (comment)(POC)

## 2020-11-16 NOTE — PROGRESS NOTES
Wound care follow up:    L foot wound- unknown etiology- 1.5x1.5x0.3cm full thickness- moderate serosanguinous drainage. Will modify wound care orders to d/c medihoney and begin for silver hydrofiber to decrease bioburden, manage drainage, and promote healing.    Sacrum intact with dry skin.  No redness. No signs of pressure or moisture related skin breakdown.

## 2020-11-16 NOTE — PROGRESS NOTES
[facility] - ICU 15 [facility] Medicine  Telemedicine Progress Note    Patient Name: Марина Britton  MRN: [id number]  Patient Class: IP- Inpatient   Admission Date: 10/26/2020  Length of Stay: 21 days  Attending Physician: Tila Saez MD  Primary Care Provider: Luis Felipe Jose Iii, MD    [facility] Medicine Team: [facility] VIRTUAL TEAM 10 Tila Saez MD  Virtual Telemedicine Progress Note  Start time: 1641  Chief complaint: Endocarditis of tricuspid valve  The patient location is: [id number] A  The patient arrived at: 10/26/2020  7:45 PM  Present with the patient at the time of the telemed/virtual assessment: n/a  End time:  1647  Total time spent with patient: 5 min  I have assessed findings virtually using a telemedicine platform and with assistance of the bedside nurse or telemedicine presenter.  The attending portion of this evaluation, treatment, and documentation was performed per Tila Saez MD via audiovisual.    Patient was transferred to the telemedicine service on: 11/15/2020    Subjective:     Admission CC:   Chief Complaint   Patient presents with    Foot Pain       Fever and left foot pain for the past 3 days. Foot pain worse today Here 3 days ago for foot abscess, prescribed clindamycin.    Chest Pain       Sharp chest pain when taking a deep breath that started earlier today, Denies n/v. +SOB     Follow up visit for: Endocarditis of tricuspid valve    Interval History / Events Overnight:   The patient is able to provide adequate history. Additional history was obtained from past medical records and chart review. No significant events reported by Nursing.  Patient complains of dyspnea and nonproductive cough. Symptoms have worsened since yesterday. Associated symptoms include: pleuritic chest pain and fatigue. Symptoms are increasing in both severity and frequency. Alleviating factors include: nothing.  SpO2 98% on RA    Data reviewed 11/16/2020: Lab test(s) reviewed:  WBC elevated. BUN elevated. Procal negative.    Review of Systems   Constitutional: Positive for fever. Negative for appetite change.   Respiratory: Positive for cough, chest tightness and shortness of breath.    Cardiovascular: Positive for chest pain.   Gastrointestinal: Negative for diarrhea.     Objective:     Vital Signs (Most Recent):  Temp: 98.9 °F (37.2 °C) (11/16/20 1700)  Pulse: (!) 117 (11/16/20 1700)  Resp: 20 (11/16/20 1712)  BP: 125/88 (11/16/20 1700)  SpO2: 100 % (11/16/20 1700) Vital Signs (24h Range):  Temp:  [98 °F (36.7 °C)-101.8 °F (38.8 °C)] 98.9 °F (37.2 °C)  Pulse:  [] 117  Resp:  [18-20] 20  SpO2:  [84 %-100 %] 100 %  BP: (125-144)/() 125/88     Weight: 88.5 kg (195 lb)  Body mass index is 35.67 kg/m².  No intake or output data in the 24 hours ending 11/16/20 1730   Physical Exam  Constitutional:       General: She is not in acute distress.     Appearance: Normal appearance. She is not diaphoretic.   Eyes:      General: Lids are normal. No scleral icterus.        Right eye: No discharge.         Left eye: No discharge.      Conjunctiva/sclera: Conjunctivae normal.   Cardiovascular:      Rate and Rhythm: Tachycardia present.   Pulmonary:      Effort: Pulmonary effort is normal. No tachypnea, accessory muscle usage or respiratory distress.   Abdominal:      General: There is no distension.   Musculoskeletal:      Right lower leg: Edema present.      Left lower leg: Edema present.   Skin:     Coloration: Skin is not cyanotic.   Neurological:      Mental Status: She is alert. She is not disoriented.   Psychiatric:         Attention and Perception: Attention normal.         Mood and Affect: Affect normal.         Speech: Speech normal.         Behavior: Behavior is cooperative.         Significant Labs:   :   Recent Labs   Lab 11/14/20  2016 11/15/20  0756   POCTGLUCOSE 131* 78     Recent Labs   Lab 11/11/20  1257 11/12/20  0643 11/16/20  0353   WBC 12.47 14.47* 12.84*   HGB 8.6* 8.0*  8.9*   HCT 28.1* 28.0* 31.0*    258 375*     Recent Labs   Lab 11/11/20  1257 11/12/20  0643 11/16/20  0353   GRAN 79.0*  9.9* 80.0*  11.6* 76.8*  9.9*   LYMPH 10.1*  1.3 9.6*  1.4 15.2*  2.0   MONO 5.8  0.7 6.9  1.0 5.5  0.7   EOS 0.3 0.2 0.1     Recent Labs   Lab 11/10/20  0656  11/12/20  0643 11/14/20  0821 11/16/20  0353   *   < > 132* 138 142   K 4.5   < > 4.2 4.0 3.3*      < > 103 106 103   CO2 22*   < > 22* 22* 30*   BUN 28*   < > 24* 34* 28*   CREATININE 1.4   < > 1.3 0.9 1.0      < > 99 123* 80   CALCIUM 7.7*   < > 7.5* 8.2* 8.0*   ALBUMIN 1.3*   < > 1.5* 1.8* 1.7*   MG 1.9   < > 1.6 1.7 1.6   PHOS 3.4  --   --   --   --     < > = values in this interval not displayed.     Recent Labs   Lab 11/12/20  0643 11/14/20  0821 11/16/20 0353   ALKPHOS 110 105 169*   ALT 9* 19 67*   AST 15 21 62*   PROT 6.3 6.7 6.4   BILITOT 0.5 0.3 0.5     Procalcitonin (ng/mL)   Date Value   11/16/2020 0.10   11/12/2020 0.22     Lactate (Lactic Acid) (mmol/L)   Date Value   10/26/2020 1.8     BNP (pg/mL)   Date Value   11/13/2020 557 (H)     CRP   Date Value   11/16/2020 54.0 mg/L (H)   11/12/2020 111.2 mg/L (H)   11/07/2020 174.2 mg/L (H)   10/24/2020 31.00 mg/dL (H)     D-Dimer (mg/L FEU)   Date Value   11/16/2020 5.13 (H)   11/14/2020 7.36 (H)   11/12/2020 5.98 (H)   10/24/2020 8.22 (H)     Ferritin (ng/mL)   Date Value   11/16/2020 567 (H)   11/14/2020 767 (H)   11/10/2020 686 (H)     LD (U/L)   Date Value   11/16/2020 233   10/28/2020 382 (H)     Troponin I (ng/mL)   Date Value   10/24/2020 <0.020   10/24/2020 0.055 (H)     CPK (U/L)   Date Value   10/29/2020 55   10/24/2020 17 (L)   10/24/2020 17 (L)     Results for orders placed or performed during the hospital encounter of 10/26/20   Vitamin D   Result Value Ref Range    Vit D, 25-Hydroxy 9 (L) 30 - 96 ng/mL     SARS-CoV2 (COVID-19) Qualitative PCR (no units)   Date Value   11/11/2020 Detected (A)     SARS-CoV-2 RNA, Amplification, Qual  (no units)   Date Value   10/26/2020 Negative   10/24/2020 Negative       Echo Color Flow Doppler? Yes  · The left ventricle is normal in size with normal systolic function. The   estimated ejection fraction is 60%.  · Moderate right ventricular enlargement with normal right ventricular   systolic function.  · Normal left ventricular diastolic function.  · Severe right atrial enlargement.  · The anterior leaflet of the tricuspid valve is completely flail with   resultant severe regurgitation. Valve and chordal tissue prolapsing well   back into the right atrium likely have associated vegetation, but   distinguishing between vegetation, valve, and chord is difficult, even   with these good images.  · The estimated PA systolic pressure is 54 mmHg.  · Elevated central venous pressure (15 mmHg).       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] 11/03/2020 Yes     Chronic    Substance induced mood disorder [F19.94] 11/03/2020 Yes     Chronic    Acute renal insufficiency [N28.9] 11/03/2020 No    MRSA bacteremia [R78.81, B95.62] 10/27/2020 Yes    Pulmonary emboli [I26.99] 10/26/2020 Yes    IVDU (intravenous drug user) [F19.90] 10/26/2020 Yes     Chronic    Pneumonia of both lungs due to infectious organism [J18.9] 10/24/2020 Yes      Problems Resolved During this Admission:    Diagnosis Date Noted Date Resolved POA    Hyperkalemia [E87.5] 11/04/2020 11/15/2020 No    Sepsis with acute hypoxic respiratory failure  [A41.9, R65.20, J96.01] 10/27/2020 10/27/2020 Yes    Severe sepsis [A41.9, R65.20] 10/26/2020 11/04/2020 Yes    Tachypnea [R06.82] 10/26/2020 11/04/2020 Yes       Overview / ICU Course:    Марина Britton is a 30 y.o. female admitted for Endocarditis of tricuspid valve. Patient with HCV, IVDU who was transferred from [facility] where she presented with chest pain and was diagnosed with MRSA bacteremia associated with TV IE with septic emboli, acute hypoxic respiratory failure, septic/metabolic encephalopathy, septic PE, septic shock, and acute anemia. She was admitted [facility] the [facility] MICU on 10/27 and was intubated for respiratory failure. She has subsequently been evaluated by ID, CTS, and Psychiatry. She required pRBC transfusion on 10/29, and norepi on 10/30. She developed acute renal insufficiency which subsequently resolved. She was extubated on 11/2, then transferred to Hospital Medicine on 11/3 for further care, planning on long-term IV antibiotics with vancomycin and repeat CTS evaluation as an outpatient. Subsequently her acute renal insufficiency returned, and again febrile for which BCx were obtained on 11/4 and 11/5 and have remained NGTD. ID was reconsulted on 11/6, and cefepime was added to her regimen. CT C/A/P obtained shows continued infectious lung processes. TTE shows progressive valvular damage with flail valve and severe MR, for which she is being diuresed.  Covid testing ordered for placement resulted positive 11/11, patient transferred to COVID19 isolation unit. Monitoring for signs/symptoms of COVID-19 and awaiting LTAC placement.    Inpatient Medications Prescribed for Management of Current Problems:     Scheduled Meds:    amLODIPine  10 mg Oral Daily    ascorbic acid (vitamin C)  500 mg Oral BID    dextromethorphan-guaifenesin  mg  1 tablet Oral BID    enoxaparin  40 mg Subcutaneous Q24H    furosemide  40 mg Oral Daily    gabapentin  300 mg Oral TID    mirtazapine  15 mg  "Oral QHS    oxyCODONE  20 mg Per OG tube Q6H    polyethylene glycol  17 g Oral Daily    senna-docusate 8.6-50 mg  2 tablet Oral BID    sodium chloride 0.9%  10 mL Intravenous Q6H    vancomycin (VANCOCIN) IVPB  1,250 mg Intravenous Q24H    vitamin D  1,000 Units Oral Daily     Continuous Infusions:   As Needed: sodium chloride, acetaminophen, acetaminophen, acetaminophen, calcium carbonate, diphenhydrAMINE, hydrOXYzine HCL, melatonin, oxyCODONE, polyethylene glycol, sodium chloride 0.9%, Flushing PICC Protocol **AND** sodium chloride 0.9% **AND** sodium chloride 0.9%, valproate sodium (DEPACON) IVPB, vancomycin - pharmacy to dose    Assessment and Plan by Problem    MRSA bacteremia  Pneumonia of both lungs due to infectious organism  Pulmonary emboli (septic)  Sepsis  Endocarditis of tricuspid valve  - Febrile intermittently, BCx from 11/4 and 11/5 NGTD. Per ID, she will likely continue to have intermittent fevers from her infectious process  - Continue vancomycin (pharmacy dosing). She has completed five days of cefepime  - PT/OT following and recommending IPR, LTAC referrals out  - Needs 6 weeks IV antibiotics, then follow up with CTS     Flail tricuspid valve  Severe tricuspid regurgitation  Lower extremity edema  - Repeat TTE shows more advanced disease, tricuspid valve now flail, but also "distinguishing between vegetation, valve, and chord is difficult, even with these good images" per interpreting Cardiologist. Also shows elevated CVP.   - LE edema improving with Lasix  - Continue IV diuresis  - Previous provider discussed findings and ongoing fevers with Dr. Pritchett on 11/10 who recommended continued medical management as her current deconditioned status is a contraindication to surgical intervention     COVID infection  Stopped Dexamethasone 11/13, since not requiring O2 at this time.  - COVID-19 testing: Collection Date: 11/11/2020 Collection Time:  11:09 AM   - Isolation: Airborne/Droplet. Surgical " mask on patient. Notify Infection Control  - Diagnostics: Trend Q48hrs if stable, more frequently if patient decompensating        Laboratory/Study Frequency Result   CBC Admit & Q48h     CMP Admit & Q48h     Magnesium level Admit     D-dimer Admit & Q48h 8.2 > 5.9   Ferritin Admit & Q48h 686   CRP Admit & Q48h 31 > 174 > 111   CPK Admit & Q24h if elevated     LDH Admit & Q48h 382   Vitamin D Admit ordered   BNP Admit ordered   Troponin Admit & Q6h if elevated     Glucose-6-phos dehydrogenase Admit     Procalcitonin Admit 0.22   Lipid Panel If using statin     Rapid influenza Admit     Resp Infection Panel Admit if BMT/organ transplant     Legionella Antigen Admit     Sputum Culture Admit     Blood Culture Admit     Urinalysis & Culture Admit     ECG Admit & Q48h if on HCQ     CXR Admit     Lymphopenia, hyponatremia, hyperferritinemia, elevated troponin, elevated d-dimer, age, and medical comorbidities are significant predictors of poor clinical outcome     - Management: per [facility] COVID Treatment Protocol (4/15/20)    - Monitoring:              - Telemetry & Continuous Pulse Oximetry    - Nutrition:               - Multivitamin PO daily              - Add Boost supplement              - Vitamin D 1000IU daily as she is Vitamin D deficient              - Ascorbic acid 500mg PO bid    - Supportive Care:              - acetaminophen 650mg PO Q6hr PRN fever/headache              - loperamide PRN viral diarrhea              - IVF if indicated, restrictive strategy preferred, no maintenance IV if able              - VTE PPx: enoxaparin or heparin SQ unless contraindicated    - Antibiotics:  - if indications, CXR findings, elevated procal. See protocol for alternatives.   - Discontinue early if low concern for bacterial co-infection              - ceftriaxone 1g Q24h x 5 days - not started  - azithromycin 500mg po x1, then 250mg po daily x 4 days - not started    - Therapies:              - If patient meets criteria  -  atorvastatin 40mg po daily  - Consult for RDV 11/17     Acute renal insufficiency  Hyperkalemia  Hyponatremia  - sCr stable, steady with diuresis   - Etiology likely multifactorial with recent sepsis and vancomycin and poor PO intake  - Urine studies consistent with pre-renal (though cardiorenal would have a similar picture)     IVDU (intravenous drug user)  Opioid use disorder, severe, dependence  - Psych evaluated earlier this admission  - Would benefit from LTAC disposition     Substance induced mood disorder  -continue mirtazepine 15mg HS  -gabapentin 300 TID     Opioid Use disorder  Continue oxycodone 20mg q6 pRN  Continue discussion MAT with patient  Consider Addiction Psych     Chronic inflammatory anemia  - Hemoglobin 6.9 g/dL 11/10, given one unit of pRBCs  - Iron studies consistent with inflammatory process, in keeping with her systemic infection  - Monitor     Wounds  Left foot and sacral spine  Wound care following. Appreciate recs.     LE edema  Mixed etiology, IV fluids and low albumin  Elevate feet, improve nutritional status  Lasix daily. Transition to PO, goal is euvolemia     Malnutrition  - Nutrition following  - Added Boost to all meals      Diet: Diet Adult Regular (IDDSI Level 7)  GI Prophylaxis: Not indicated  Significant LDAs:   IV Access Type: PICC  Urinary Catheter Indication if present: Patient Does Not Have Urinary Catheter  Other Lines/Tubes/Drains:    HIGH RISK CONDITION(S):   Patient is currently on drug therapy requiring intensive monitoring for toxicity: Vancomycin     Goals of Care:    Previous admission:  10/24/20  Likely prognosis:  Poor  Code Status: Full Code  Comfort Only: No  Hospice: No  Goals at discharge: remain at home, with physician follow-up    Discharge Planning   VIANNEY: 11/19/2020     Code Status: Full Code   Is the patient medically ready for discharge?: No    Reason for patient still in hospital (select all that apply): Patient trending condition and Pending  disposition  Discharge Plan A: Long-term acute care facility (LTAC)   Discharge Delays: None known at this time    VTE Risk Mitigation (From admission, onward)         Ordered     enoxaparin injection 40 mg  Every 24 hours      11/08/20 0838     Place sequential compression device  Until discontinued      10/26/20 2023                   Tila Saez MD  Department of Hospital Medicine   [facility] - ICU 15 WT

## 2020-11-16 NOTE — PHYSICIAN QUERY
PT Name: Марина Britton  MR #: [id number]    Nutrition Clarification     CDS: Faye Gonzalez RN, CCDS         Contact information :ext [phone] [email or web address]       This form is a permanent document in the medical record.     Query Date: November 16, 2020    By submitting this query, we are merely seeking further clarification of documentation.. Please utilize your independent clinical judgment when addressing the question(s) below.    The medical record contains the following:   Indicators  Supporting Clinical Findings Location in Medical Record   x % of Estimated Energy Intake over a time frame from p.o., TF, or TPN Inability to consume sufficient energy  NPO RD Consult 10/30/20   x Weight Status over a time frame   Moderate Weight Loss (Malnutrition): other (see comments)(Does not qualify (6.6-7% loss in 3 months))   RD Consult Note 11/11/20   x Subcutaneous Fat and/or Muscle Loss Subcutaneous Fat Loss (Final Summary): mild protein-calorie malnutrition    NFPE completed on 11/11, to reveal mild-moderate wasting in orbital region, although well-nourished upper arm region. RD Consult Note 11/11/20        RD Note 11/16/20   x Fluid Accumulation or Edema Fluid Accumulation Evaluation: mild   Unable to fully assess thigh and calf d/t fluid retention. RD Consult Note 11/11/20  RD Note 11/16/20   x Wt/BMI/Usual Body Weight BMI (Calculated): 21.4  BMI (Calculated): 35.7 RD Consult 10/30/20  RD Note 11/16/20    Delayed Wound Healing/Failure to Thrive     x Acute or Chronic Illness Severe sepsis  Pneumonia of both lungs due to infectious organism  PMHx IVUD heroin (last use approx 2wk ago) presents as transfer from [facility] for septic endocarditis with b/l PE requiring higher level of care H&P 10/27/20    Medication     x Treatment 1. As tolerated, increase TF rate (of Isosource 1.5) to 50 mL/hr to provide 1800 calories, 82 grams of protein, 917 mL fluid.   2. RD to monitor & follow-up. RD Consult POC Note  10/30/20   x Other Malnutrition  - Dietetics consulted  - Added boost to all meals      Pt at risk for malnutrition but does not meet qualifications, RD following/monitoring.    Hospital medicine PN 11/11/20      RD Note 11/16/20     AND / ASPEN Clinical Characteristics (October 2011)  A minimum of two characteristics is recommended for diagnosing either moderate or severe malnutrition   Mild Malnutrition Moderate Malnutrition Severe Malnutrition   Energy Intake from p.o., TF or TPN. < 75% intake of estimated energy needs for less than 7 days < 75% intake of estimated energy needs for greater than 7 days < 50% intake of estimated energy needs for > 5 days   Weight Loss 1-2% in 1 month  5% in 3 months  7.5% in 6 months  10% in 1 year 1-2 % in 1 week  5% in 1 month  7.5% in 3 months  10% in 6 months  20% in 1 year > 2% in 1 week  > 5% in 1 month  > 7.5% in 3 months  > 10% in 6 months  > 20% in 1 year   Physical Findings     None *Mild subcutaneous fat and/or muscle loss  *Mild fluid accumulation  *Stage II decubitus  *Surgical wound or non-healing wound *Mod/severe subcutaneous fat and/or muscle loss  *Mod/severe fluid accumulation  *Stage III or IV decubitus  *Non-healing surgical wound     Provider, please specify diagnosis or diagnoses associated with above clinical findings.  Please specify degree of malnutrition.    [   ] Mild Protein-Calorie Malnutrition   [ x ] Moderate Protein-Calorie Malnutrition   [   ] Other degree Protein-Calorie Malnutrition, please sepcify,____   [   ] Malnutrition, Unspecified degree   [   ] Malnutrition ruled out   [   ] Other clarification, ______   [  ] Clinically Undetermined       Please document in your progress notes daily for the duration of treatment until resolved and include in your discharge summary.

## 2020-11-17 LAB
SARS-COV-2 RDRP RESP QL NAA+PROBE: NEGATIVE
VANCOMYCIN TROUGH SERPL-MCNC: 11.9 UG/ML (ref 10–22)

## 2020-11-17 PROCEDURE — U0002 COVID-19 LAB TEST NON-CDC: HCPCS

## 2020-11-17 PROCEDURE — 25000003 PHARM REV CODE 250: Performed by: NURSE PRACTITIONER

## 2020-11-17 PROCEDURE — 25000003 PHARM REV CODE 250: Performed by: INTERNAL MEDICINE

## 2020-11-17 PROCEDURE — 97110 THERAPEUTIC EXERCISES: CPT

## 2020-11-17 PROCEDURE — 36415 COLL VENOUS BLD VENIPUNCTURE: CPT

## 2020-11-17 PROCEDURE — 63600175 PHARM REV CODE 636 W HCPCS: Performed by: INTERNAL MEDICINE

## 2020-11-17 PROCEDURE — 97535 SELF CARE MNGMENT TRAINING: CPT

## 2020-11-17 PROCEDURE — 80202 ASSAY OF VANCOMYCIN: CPT

## 2020-11-17 PROCEDURE — 93010 ELECTROCARDIOGRAM REPORT: CPT | Mod: ,,, | Performed by: INTERNAL MEDICINE

## 2020-11-17 PROCEDURE — 93010 EKG 12-LEAD: ICD-10-PCS | Mod: ,,, | Performed by: INTERNAL MEDICINE

## 2020-11-17 PROCEDURE — 20600001 HC STEP DOWN PRIVATE ROOM

## 2020-11-17 PROCEDURE — 99233 SBSQ HOSP IP/OBS HIGH 50: CPT | Mod: 95,,, | Performed by: INTERNAL MEDICINE

## 2020-11-17 PROCEDURE — 93005 ELECTROCARDIOGRAM TRACING: CPT

## 2020-11-17 PROCEDURE — 94761 N-INVAS EAR/PLS OXIMETRY MLT: CPT

## 2020-11-17 PROCEDURE — 99233 PR SUBSEQUENT HOSPITAL CARE,LEVL III: ICD-10-PCS | Mod: 95,,, | Performed by: INTERNAL MEDICINE

## 2020-11-17 PROCEDURE — A4216 STERILE WATER/SALINE, 10 ML: HCPCS | Performed by: INTERNAL MEDICINE

## 2020-11-17 PROCEDURE — 25000003 PHARM REV CODE 250: Performed by: HOSPITALIST

## 2020-11-17 PROCEDURE — 99900035 HC TECH TIME PER 15 MIN (STAT)

## 2020-11-17 PROCEDURE — 25000003 PHARM REV CODE 250: Performed by: STUDENT IN AN ORGANIZED HEALTH CARE EDUCATION/TRAINING PROGRAM

## 2020-11-17 RX ORDER — MUPIROCIN 20 MG/G
OINTMENT TOPICAL 2 TIMES DAILY
Status: DISPENSED | OUTPATIENT
Start: 2020-11-17 | End: 2020-11-22

## 2020-11-17 RX ADMIN — ENOXAPARIN SODIUM 40 MG: 40 INJECTION SUBCUTANEOUS at 04:11

## 2020-11-17 RX ADMIN — GABAPENTIN 300 MG: 300 CAPSULE ORAL at 08:11

## 2020-11-17 RX ADMIN — Medication 10 ML: at 07:11

## 2020-11-17 RX ADMIN — GUAIFENESIN AND DEXTROMETHORPHAN HYDROBROMIDE 1 TABLET: 600; 30 TABLET, EXTENDED RELEASE ORAL at 08:11

## 2020-11-17 RX ADMIN — REMDESIVIR 200 MG: 100 INJECTION, POWDER, LYOPHILIZED, FOR SOLUTION INTRAVENOUS at 04:11

## 2020-11-17 RX ADMIN — OXYCODONE HYDROCHLORIDE AND ACETAMINOPHEN 500 MG: 500 TABLET ORAL at 08:11

## 2020-11-17 RX ADMIN — MAGNESIUM OXIDE 400 MG (241.3 MG MAGNESIUM) TABLET 400 MG: TABLET at 08:11

## 2020-11-17 RX ADMIN — OXYCODONE HYDROCHLORIDE 20 MG: 10 TABLET ORAL at 05:11

## 2020-11-17 RX ADMIN — Medication 10 ML: at 12:11

## 2020-11-17 RX ADMIN — OXYCODONE HYDROCHLORIDE 20 MG: 10 TABLET ORAL at 11:11

## 2020-11-17 RX ADMIN — GABAPENTIN 300 MG: 300 CAPSULE ORAL at 03:11

## 2020-11-17 RX ADMIN — VANCOMYCIN HYDROCHLORIDE 1250 MG: 1.25 INJECTION, POWDER, LYOPHILIZED, FOR SOLUTION INTRAVENOUS at 08:11

## 2020-11-17 RX ADMIN — ACETAMINOPHEN 1000 MG: 500 TABLET ORAL at 02:11

## 2020-11-17 RX ADMIN — OXYCODONE HYDROCHLORIDE 20 MG: 10 TABLET ORAL at 07:11

## 2020-11-17 RX ADMIN — FUROSEMIDE 40 MG: 40 TABLET ORAL at 08:11

## 2020-11-17 RX ADMIN — Medication 10 ML: at 05:11

## 2020-11-17 RX ADMIN — MIRTAZAPINE 15 MG: 7.5 TABLET ORAL at 08:11

## 2020-11-17 RX ADMIN — MUPIROCIN: 20 OINTMENT TOPICAL at 08:11

## 2020-11-17 RX ADMIN — AMLODIPINE BESYLATE 10 MG: 10 TABLET ORAL at 08:11

## 2020-11-17 RX ADMIN — CHOLECALCIFEROL TAB 25 MCG (1000 UNIT) 1000 UNITS: 25 TAB at 08:11

## 2020-11-17 RX ADMIN — Medication 10 ML: at 11:11

## 2020-11-17 RX ADMIN — MELATONIN TAB 3 MG 6 MG: 3 TAB at 08:11

## 2020-11-17 NOTE — PLAN OF CARE
Discharge Recommendation: HHPT.    2 goals met today. PT goals appropriate.    Patient is safe to perform ambulation 2-3x/day with SBA with nursing staff.    Sonali Sutherland, PT, DPT  2020  Pager: [phone]            Problem: Physical Therapy Goal  Goal: Physical Therapy Goal  Description: Goals to be met by: 20 - extended to     Patient will increase functional independence with mobility by performin. Supine to sit with Modified [place]  2. Sit to supine with Modified [place]  3. Sit to stand transfer with Stand-by Assistance - met   3a. Sit to stand transfer with Mod I and LRAd  4. Gait  x 50 feet with Contact Guard Assistance using LRAD, if needed. - met   4a. Gait x 200 feet with Mod I and LRAD  5. Ascend/descend 3 stairs with left Handrails Contact Guard Assistance.

## 2020-11-17 NOTE — PT/OT/SLP PROGRESS
Occupational Therapy   Co-Treatment with PT    Name: Марина Britton  MRN: [id number]  Admitting Diagnosis:  Endocarditis of tricuspid valve       Recommendations:     Discharge Recommendations: home health PT, home health OT  Discharge Equipment Recommendations:  bedside commode, shower chair, walker, rolling  Barriers to discharge:  None    Assessment:     Марина Britton is a 30 y.o. female with a medical diagnosis of Endocarditis of tricuspid valve.  She presents with performance deficits affecting function are weakness, impaired endurance, impaired self care skills, impaired functional mobilty, impaired cardiopulmonary response to activity, decreased lower extremity function, impaired balance. D/c recs changed to HH PT/OT due to improved functional mobility and independence with ADLs. Pt would benefit from continued skilled acute OT services in order to maximize independence and safety with ADLs and functional mobility to ensure safe return to PLOF in the least restrictive environment. OT recommending HH PT/OT (per medical team [facility])  once pt is medically appropriate for d/c.         Rehab Prognosis:  Good; patient would benefit from acute skilled OT services to address these deficits and reach maximum level of function.       Plan:     Patient to be seen 3 x/week to address the above listed problems via self-care/home management, therapeutic activities, therapeutic exercises, neuromuscular re-education  · Plan of Care Expires: 12/04/20  · Plan of Care Reviewed with: patient    Subjective     Pain/Comfort:  Pain Rating 1: (Pt reported L hip pain during mobility but did not rate)  Pain Addressed 1: Distraction, Cessation of Activity    Objective:     Communicated with: RN prior to session.  Patient found HOB elevated with pulse ox (continuous), telemetry, PICC line upon OT entry to room.    General Precautions: Standard, airborne, contact, droplet, fall   Orthopedic Precautions:N/A   Braces: N/A      Occupational Performance:     Bed Mobility:    · Patient completed Scooting/Bridging with modified independence  · Patient completed Supine to Sit with modified independence  · Patient completed Sit to Supine with modified independence     Functional Mobility/Transfers:  · Patient completed Sit <> Stand Transfer with supervision  with  rolling walker   · Patient completed Toilet Transfer with functional ambulation to toilet and step transfer  technique with modified independence with  rolling walker  · Functional Mobility: Pt engaging in functional mobility to simulate household/community distances within pt's room and hallway  with SBA and utilizing RW in order to maximize functional activity tolerance and standing balance required for engagement in occupations of choice.  · Pt reported L hip pain during mobility   · Mask donned prior to exiting room   · No LOB or SOB present     Activities of Daily Living:  · Grooming: supervision pt performed hand hygiene standing at sink   · Lower Body Dressing: independence donning B  socks while sitting EOB via figre 4 position   · Toileting: independence for clothing management and hygiene     [facility] 6 Click ADL: 23    Treatment & Education:   Pt educated on role of OT, POC, and goals for therapy.     POC was dicussed with patient/caregiver, who was included in its development and is in agreement with the identified goals and treatment plan.    Time provided for therapeutic counseling and discussion of health disposition.    Educated on importance of EOB/OOB mobility, maintaining routine, sitting up in chair, and maximizing independence with ADLs during admission    Pt completed ADLs and functional mobility for treatment session as noted above    Pt/caregiver verbalized understanding and expressed no further concerns/questions.   Updated communication board with level of assist required (supervision x 1 person assistance & using RW) & educated RN/patient that pt  is appropriate for transfers and mobility with RN/PCT.     .Co-treatment performed due to patient's multiple deficits requiring two skilled therapists to appropriately and safely assess patient's strength and endurance while facilitating functional tasks in addition to accommodating for patient's activity tolerance.           Patient left HOB elevated with all lines intact, call button in reach and RN  notifiedEducation:      GOALS:   Multidisciplinary Problems     Occupational Therapy Goals        Problem: Occupational Therapy Goal    Goal Priority Disciplines Outcome Interventions   Occupational Therapy Goal     OT, PT/OT Ongoing, Progressing    Description: Goals to be met by: 11/18    Patient will increase functional independence with ADLs by performing:    UE Dressing with Set-up Assistance.  LE Dressing with Set-up Assistance.   Grooming while standing at sink with Supervision.   Toileting from toilet with Modified [place] for hygiene and clothing management.   Toilet transfer to toilet with Modified [place].                     Time Tracking:     OT Date of Treatment: 11/17/20  OT Start Time: 1422  OT Stop Time: 1436  OT Total Time (min): 14 min    Billable Minutes:Self Care/Home Management 10    Leesa Doyle OT  11/17/2020

## 2020-11-17 NOTE — PLAN OF CARE
Problem: Occupational Therapy Goal  Goal: Occupational Therapy Goal  Description: Goals to be met by: 11/18    Patient will increase functional independence with ADLs by performing:    UE Dressing with Set-up Assistance.  LE Dressing with Set-up Assistance.   Grooming while standing at sink with Supervision.  MET 11/17  Toileting from toilet with Modified [place] for hygiene and clothing management. - MET 11/17  Toilet transfer to toilet with Modified [place].    Outcome: Ongoing, Progressing    Leesa Doyle, OTR/L  Pager: [phone]  11/17/2020

## 2020-11-17 NOTE — PROGRESS NOTES
Pharmacokinetic Assessment Follow Up: IV Vancomycin    Vancomycin Assessment & Plan:  -Vancomycin trough prior to 3rd dose on new regimen of vancomycin 1250 mg q12h returned sub therapeutic at 11.9 mcg/mL (~22h level), goal vancomycin trough between 15-20 mcg/mL  -Scr noted to be improving, will increase vancomycin to 1500 mg q24h to begin 11/18 0700 (pushed dose up to adjust for lower trough, as dose already given 11/17 AM)  -Vancomycin trough ordered prior to new third dose 11/20 @0600     Drug levels (last 3 results):  Recent Labs   Lab Result Units 11/17/20  0811   Vancomycin-Trough ug/mL 11.9       Pharmacy will continue to follow and monitor vancomycin.    Please contact pharmacy at extension [phone] for questions regarding this assessment.    Thank you for the consult,   Faye Zepeda       Patient brief summary:  Марина Britton is a 30 y.o. female initiated on antimicrobial therapy with IV Vancomycin for treatment of endocarditis    The patient's current regimen is vancomycin 1250 mg q24h    Drug Allergies:   Review of patient's allergies indicates:   Allergen Reactions    Soap Rash     [facility] - Hospital supplied - cleanser shampoo & body wash gel  Ingredients - purified water, sodium laureth sulfate, sodium chloride, cocamide william, cocamidopropylamine oxide, fragrance, aloe barbadensis leaf juice, propylene alcohol, peg-150 distearate, diazolidinyl urea, ciric acid, methylparaben, & propulparaben       Actual Body Weight:   88.5 kg    Renal Function:   Estimated Creatinine Clearance: 84.3 mL/min (based on SCr of 1 mg/dL).,     Dialysis Method (if applicable):  N/A

## 2020-11-17 NOTE — PLAN OF CARE
Problem: Adult Inpatient Plan of Care  Goal: Plan of Care Review  Outcome: Ongoing, Progressing  Goal: Patient-Specific Goal (Individualization)  Outcome: Ongoing, Progressing   POC reviewed with patient Questions encouraged and answered. Reviewed O2 sat goals, pain control with use of pain medication and positioning, and room safety. Patient states understanding. POC is ongoing.

## 2020-11-17 NOTE — PLAN OF CARE
11/17/20 0938   Post-Acute Status   Post-Acute Authorization Placement   Post-Acute Placement Status Referrals Sent     SW sent additional LTAC referrals via .       Padmini Myers LMSW  [facility]   [id number]

## 2020-11-18 LAB
ALBUMIN SERPL BCP-MCNC: 1.8 G/DL (ref 3.5–5.2)
ALP SERPL-CCNC: 169 U/L (ref 55–135)
ALT SERPL W/O P-5'-P-CCNC: 65 U/L (ref 10–44)
ANION GAP SERPL CALC-SCNC: 11 MMOL/L (ref 8–16)
AST SERPL-CCNC: 44 U/L (ref 10–40)
BASOPHILS # BLD AUTO: 0.07 K/UL (ref 0–0.2)
BASOPHILS NFR BLD: 0.6 % (ref 0–1.9)
BILIRUB SERPL-MCNC: 0.4 MG/DL (ref 0.1–1)
BUN SERPL-MCNC: 26 MG/DL (ref 6–20)
CALCIUM SERPL-MCNC: 7.9 MG/DL (ref 8.7–10.5)
CHLORIDE SERPL-SCNC: 101 MMOL/L (ref 95–110)
CO2 SERPL-SCNC: 28 MMOL/L (ref 23–29)
CREAT SERPL-MCNC: 0.9 MG/DL (ref 0.5–1.4)
CRP SERPL-MCNC: 105.5 MG/L (ref 0–8.2)
DIFFERENTIAL METHOD: ABNORMAL
EOSINOPHIL # BLD AUTO: 0.3 K/UL (ref 0–0.5)
EOSINOPHIL NFR BLD: 2.8 % (ref 0–8)
ERYTHROCYTE [DISTWIDTH] IN BLOOD BY AUTOMATED COUNT: 17.7 % (ref 11.5–14.5)
EST. GFR  (AFRICAN AMERICAN): >60 ML/MIN/1.73 M^2
EST. GFR  (NON AFRICAN AMERICAN): >60 ML/MIN/1.73 M^2
FERRITIN SERPL-MCNC: 628 NG/ML (ref 20–300)
GLUCOSE SERPL-MCNC: 91 MG/DL (ref 70–110)
HCT VFR BLD AUTO: 25.3 % (ref 37–48.5)
HGB BLD-MCNC: 7.6 G/DL (ref 12–16)
IMM GRANULOCYTES # BLD AUTO: 0.08 K/UL (ref 0–0.04)
IMM GRANULOCYTES NFR BLD AUTO: 0.7 % (ref 0–0.5)
LDH SERPL L TO P-CCNC: 246 U/L (ref 110–260)
LYMPHOCYTES # BLD AUTO: 1.5 K/UL (ref 1–4.8)
LYMPHOCYTES NFR BLD: 13.5 % (ref 18–48)
MAGNESIUM SERPL-MCNC: 1.6 MG/DL (ref 1.6–2.6)
MCH RBC QN AUTO: 26.7 PG (ref 27–31)
MCHC RBC AUTO-ENTMCNC: 30 G/DL (ref 32–36)
MCV RBC AUTO: 89 FL (ref 82–98)
MONOCYTES # BLD AUTO: 0.8 K/UL (ref 0.3–1)
MONOCYTES NFR BLD: 7.3 % (ref 4–15)
NEUTROPHILS # BLD AUTO: 8.6 K/UL (ref 1.8–7.7)
NEUTROPHILS NFR BLD: 75.1 % (ref 38–73)
NRBC BLD-RTO: 0 /100 WBC
PLATELET # BLD AUTO: 295 K/UL (ref 150–350)
PMV BLD AUTO: 9.6 FL (ref 9.2–12.9)
POTASSIUM SERPL-SCNC: 3.9 MMOL/L (ref 3.5–5.1)
PROT SERPL-MCNC: 5.9 G/DL (ref 6–8.4)
RBC # BLD AUTO: 2.85 M/UL (ref 4–5.4)
SODIUM SERPL-SCNC: 140 MMOL/L (ref 136–145)
WBC # BLD AUTO: 11.39 K/UL (ref 3.9–12.7)

## 2020-11-18 PROCEDURE — 93005 ELECTROCARDIOGRAM TRACING: CPT

## 2020-11-18 PROCEDURE — A4216 STERILE WATER/SALINE, 10 ML: HCPCS | Performed by: INTERNAL MEDICINE

## 2020-11-18 PROCEDURE — 80053 COMPREHEN METABOLIC PANEL: CPT

## 2020-11-18 PROCEDURE — 86140 C-REACTIVE PROTEIN: CPT

## 2020-11-18 PROCEDURE — 20600001 HC STEP DOWN PRIVATE ROOM

## 2020-11-18 PROCEDURE — 99233 SBSQ HOSP IP/OBS HIGH 50: CPT | Mod: 95,,, | Performed by: INTERNAL MEDICINE

## 2020-11-18 PROCEDURE — 99233 PR SUBSEQUENT HOSPITAL CARE,LEVL III: ICD-10-PCS | Mod: 95,,, | Performed by: INTERNAL MEDICINE

## 2020-11-18 PROCEDURE — 25000003 PHARM REV CODE 250: Performed by: HOSPITALIST

## 2020-11-18 PROCEDURE — 25000003 PHARM REV CODE 250: Performed by: STUDENT IN AN ORGANIZED HEALTH CARE EDUCATION/TRAINING PROGRAM

## 2020-11-18 PROCEDURE — 25000003 PHARM REV CODE 250: Performed by: INTERNAL MEDICINE

## 2020-11-18 PROCEDURE — 63600175 PHARM REV CODE 636 W HCPCS: Performed by: INTERNAL MEDICINE

## 2020-11-18 PROCEDURE — 85025 COMPLETE CBC W/AUTO DIFF WBC: CPT

## 2020-11-18 PROCEDURE — 82728 ASSAY OF FERRITIN: CPT

## 2020-11-18 PROCEDURE — 25000003 PHARM REV CODE 250: Performed by: NURSE PRACTITIONER

## 2020-11-18 PROCEDURE — 36415 COLL VENOUS BLD VENIPUNCTURE: CPT

## 2020-11-18 PROCEDURE — 83615 LACTATE (LD) (LDH) ENZYME: CPT

## 2020-11-18 PROCEDURE — 99900035 HC TECH TIME PER 15 MIN (STAT)

## 2020-11-18 PROCEDURE — 83735 ASSAY OF MAGNESIUM: CPT

## 2020-11-18 PROCEDURE — 94761 N-INVAS EAR/PLS OXIMETRY MLT: CPT

## 2020-11-18 PROCEDURE — 93010 ELECTROCARDIOGRAM REPORT: CPT | Mod: ,,, | Performed by: INTERNAL MEDICINE

## 2020-11-18 PROCEDURE — 93010 EKG 12-LEAD: ICD-10-PCS | Mod: ,,, | Performed by: INTERNAL MEDICINE

## 2020-11-18 RX ADMIN — OXYCODONE HYDROCHLORIDE 20 MG: 10 TABLET ORAL at 06:11

## 2020-11-18 RX ADMIN — Medication 10 ML: at 05:11

## 2020-11-18 RX ADMIN — ACETAMINOPHEN 1000 MG: 500 TABLET ORAL at 05:11

## 2020-11-18 RX ADMIN — GABAPENTIN 300 MG: 300 CAPSULE ORAL at 03:11

## 2020-11-18 RX ADMIN — Medication 10 ML: at 12:11

## 2020-11-18 RX ADMIN — MUPIROCIN: 20 OINTMENT TOPICAL at 08:11

## 2020-11-18 RX ADMIN — OXYCODONE HYDROCHLORIDE 20 MG: 10 TABLET ORAL at 05:11

## 2020-11-18 RX ADMIN — CHOLECALCIFEROL TAB 25 MCG (1000 UNIT) 1000 UNITS: 25 TAB at 09:11

## 2020-11-18 RX ADMIN — Medication 10 ML: at 08:11

## 2020-11-18 RX ADMIN — ENOXAPARIN SODIUM 40 MG: 40 INJECTION SUBCUTANEOUS at 04:11

## 2020-11-18 RX ADMIN — OXYCODONE HYDROCHLORIDE AND ACETAMINOPHEN 500 MG: 500 TABLET ORAL at 09:11

## 2020-11-18 RX ADMIN — MAGNESIUM OXIDE 400 MG (241.3 MG MAGNESIUM) TABLET 400 MG: TABLET at 09:11

## 2020-11-18 RX ADMIN — REMDESIVIR 100 MG: 100 INJECTION, POWDER, LYOPHILIZED, FOR SOLUTION INTRAVENOUS at 04:11

## 2020-11-18 RX ADMIN — MUPIROCIN: 20 OINTMENT TOPICAL at 09:11

## 2020-11-18 RX ADMIN — VANCOMYCIN HYDROCHLORIDE 1500 MG: 1.5 INJECTION, POWDER, LYOPHILIZED, FOR SOLUTION INTRAVENOUS at 09:11

## 2020-11-18 RX ADMIN — OXYCODONE HYDROCHLORIDE 20 MG: 10 TABLET ORAL at 12:11

## 2020-11-18 RX ADMIN — OXYCODONE HYDROCHLORIDE 20 MG: 10 TABLET ORAL at 01:11

## 2020-11-18 RX ADMIN — GABAPENTIN 300 MG: 300 CAPSULE ORAL at 08:11

## 2020-11-18 RX ADMIN — MIRTAZAPINE 15 MG: 7.5 TABLET ORAL at 08:11

## 2020-11-18 RX ADMIN — GABAPENTIN 300 MG: 300 CAPSULE ORAL at 09:11

## 2020-11-18 RX ADMIN — GUAIFENESIN AND DEXTROMETHORPHAN HYDROBROMIDE 1 TABLET: 600; 30 TABLET, EXTENDED RELEASE ORAL at 08:11

## 2020-11-18 RX ADMIN — GUAIFENESIN AND DEXTROMETHORPHAN HYDROBROMIDE 1 TABLET: 600; 30 TABLET, EXTENDED RELEASE ORAL at 09:11

## 2020-11-18 RX ADMIN — ACETAMINOPHEN 1000 MG: 500 TABLET ORAL at 04:11

## 2020-11-18 RX ADMIN — OXYCODONE HYDROCHLORIDE AND ACETAMINOPHEN 500 MG: 500 TABLET ORAL at 08:11

## 2020-11-18 RX ADMIN — MAGNESIUM OXIDE 400 MG (241.3 MG MAGNESIUM) TABLET 400 MG: TABLET at 08:11

## 2020-11-18 RX ADMIN — FUROSEMIDE 40 MG: 40 TABLET ORAL at 09:11

## 2020-11-18 RX ADMIN — AMLODIPINE BESYLATE 10 MG: 10 TABLET ORAL at 09:11

## 2020-11-18 NOTE — PROGRESS NOTES
[facility] - ICU 15 [facility] Medicine  Telemedicine Progress Note    Patient Name: Марина Britton  MRN: [id number]  Patient Class: IP- Inpatient   Admission Date: 10/26/2020  Length of Stay: 23 days  Attending Physician: Tila Saez MD  Primary Care Provider: Luis Felipe Jose Iii, MD    [facility] Medicine [place]: [facility] [place] 10 Tila Saez MD  Virtual Telemedicine Progress Note  Start time: 1638  Chief complaint: Endocarditis of tricuspid valve  The patient location is: [id number]  The patient arrived at: 10/26/2020  7:45 PM  Present with the patient at the time of the telemed/virtual assessment: n/a  End time:  1643  Total time spent with patient: 5 min  I have assessed findings virtually using a telemedicine platform and with assistance of the bedside nurse or telemedicine presenter.  The attending portion of this evaluation, treatment, and documentation was performed per Tila Saez MD via audiovisual.    Patient was transferred to the telemedicine service on: 11/15/2020    Subjective:     Admission CC:   Chief Complaint   Patient presents with    Foot Pain       Fever and left foot pain for the past 3 days. Foot pain worse today Here 3 days ago for foot abscess, prescribed clindamycin.    Chest Pain       Sharp chest pain when taking a deep breath that started earlier today, Denies n/v. +SOB     Follow up visit for: Endocarditis of tricuspid valve    Interval History / Events Overnight:   The patient is able to provide adequate history. Additional history was obtained from past medical records and chart review. No significant events reported by Nursing. Fever curve down-trending  Patient complains of dyspnea and nonproductive cough. Symptoms have worsened since yesterday. Associated symptoms include: pleuritic chest pain, fatigue and increased hip pain. Symptoms are increasing in both severity and frequency. Alleviating factors include: nothing.  SpO2 93% on  RA    Data reviewed 11/18/2020: Lab test(s) reviewed: CRP increased. Ferritin relatively stable    Review of Systems   Constitutional: Positive for fever. Negative for appetite change.   Respiratory: Positive for cough, chest tightness and shortness of breath.    Cardiovascular: Positive for chest pain.   Gastrointestinal: Negative for diarrhea.     Objective:     Vital Signs (Most Recent):  Temp: 100 °F (37.8 °C) (11/18/20 1635)  Pulse: (!) 112 (11/18/20 0815)  Resp: 16 (11/18/20 1305)  BP: 115/79 (11/18/20 1120)  SpO2: (!) 93 % (11/18/20 0815) Vital Signs (24h Range):  Temp:  [98.1 °F (36.7 °C)-100.1 °F (37.8 °C)] 100 °F (37.8 °C)  Pulse:  [111-132] 112  Resp:  [16-20] 16  SpO2:  [91 %-94 %] 93 %  BP: (115-131)/(76-82) 115/79     Weight: 88.5 kg (195 lb)  Body mass index is 35.67 kg/m².  No intake or output data in the 24 hours ending 11/18/20 1642   Physical Exam  Constitutional:       General: She is not in acute distress.     Appearance: Normal appearance. She is not diaphoretic.   Eyes:      General: Lids are normal. No scleral icterus.        Right eye: No discharge.         Left eye: No discharge.      Conjunctiva/sclera: Conjunctivae normal.   Cardiovascular:      Rate and Rhythm: Tachycardia present.   Pulmonary:      Effort: Pulmonary effort is normal. No tachypnea, accessory muscle usage or respiratory distress.   Abdominal:      General: There is no distension.   Musculoskeletal:      Right lower leg: Edema present.      Left lower leg: Edema present.   Skin:     Coloration: Skin is not cyanotic.   Neurological:      Mental Status: She is alert. She is not disoriented.   Psychiatric:         Attention and Perception: Attention normal.         Mood and Affect: Affect normal.         Speech: Speech normal.         Behavior: Behavior is cooperative.         Significant Labs:     Recent Labs   Lab 11/12/20  0643 11/16/20  0353 11/18/20  0605   WBC 14.47* 12.84* 11.39   HGB 8.0* 8.9* 7.6*   HCT 28.0* 31.0*  25.3*    375* 295     Recent Labs   Lab 11/12/20  0643 11/16/20  0353 11/18/20  0605   GRAN 80.0*  11.6* 76.8*  9.9* 75.1*  8.6*   LYMPH 9.6*  1.4 15.2*  2.0 13.5*  1.5   MONO 6.9  1.0 5.5  0.7 7.3  0.8   EOS 0.2 0.1 0.3     Recent Labs   Lab 11/14/20  0821 11/16/20  0353 11/18/20  0605    142 140   K 4.0 3.3* 3.9    103 101   CO2 22* 30* 28   BUN 34* 28* 26*   CREATININE 0.9 1.0 0.9   * 80 91   CALCIUM 8.2* 8.0* 7.9*   ALBUMIN 1.8* 1.7* 1.8*   MG 1.7 1.6 1.6     Recent Labs   Lab 11/14/20  0821 11/16/20 0353 11/18/20  0605   ALKPHOS 105 169* 169*   ALT 19 67* 65*   AST 21 62* 44*   PROT 6.7 6.4 5.9*   BILITOT 0.3 0.5 0.4     Procalcitonin (ng/mL)   Date Value   11/16/2020 0.10   11/12/2020 0.22     Lactate (Lactic Acid) (mmol/L)   Date Value   10/26/2020 1.8     BNP (pg/mL)   Date Value   11/13/2020 557 (H)     CRP   Date Value   11/18/2020 105.5 mg/L (H)   11/16/2020 54.0 mg/L (H)   11/12/2020 111.2 mg/L (H)   11/07/2020 174.2 mg/L (H)   10/24/2020 31.00 mg/dL (H)     D-Dimer (mg/L FEU)   Date Value   11/16/2020 5.13 (H)   11/14/2020 7.36 (H)   11/12/2020 5.98 (H)   10/24/2020 8.22 (H)     Ferritin (ng/mL)   Date Value   11/18/2020 628 (H)   11/16/2020 567 (H)   11/14/2020 767 (H)   11/10/2020 686 (H)     LD (U/L)   Date Value   11/18/2020 246   11/16/2020 233   10/28/2020 382 (H)     Troponin I (ng/mL)   Date Value   10/24/2020 <0.020   10/24/2020 0.055 (H)     CPK (U/L)   Date Value   10/29/2020 55   10/24/2020 17 (L)   10/24/2020 17 (L)     Results for orders placed or performed during the hospital encounter of 10/26/20   Vitamin D   Result Value Ref Range    Vit D, 25-Hydroxy 9 (L) 30 - 96 ng/mL     SARS-CoV2 (COVID-19) Qualitative PCR (no units)   Date Value   11/11/2020 Detected (A)     SARS-CoV-2 RNA, Amplification, Qual (no units)   Date Value   11/17/2020 Negative   10/26/2020 Negative   10/24/2020 Negative       Echo Color Flow Doppler? Yes  · The left ventricle is  normal in size with normal systolic function. The   estimated ejection fraction is 60%.  · Moderate right ventricular enlargement with normal right ventricular   systolic function.  · Normal left ventricular diastolic function.  · Severe right atrial enlargement.  · The anterior leaflet of the tricuspid valve is completely flail with   resultant severe regurgitation. Valve and chordal tissue prolapsing well   back into the right atrium likely have associated vegetation, but   distinguishing between vegetation, valve, and chord is difficult, even   with these good images.  · The estimated PA systolic pressure is 54 mmHg.  · Elevated central venous pressure (15 mmHg).       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] 11/03/2020 Yes     Chronic    Substance induced mood disorder [F19.94] 11/03/2020 Yes     Chronic    Acute renal insufficiency [N28.9] 11/03/2020 No    MRSA bacteremia [R78.81, B95.62] 10/27/2020 Yes    Pulmonary emboli [I26.99] 10/26/2020 Yes    IVDU (intravenous drug user) [F19.90] 10/26/2020 Yes     Chronic    Pneumonia of both lungs due to infectious organism [J18.9] 10/24/2020 Yes      Problems Resolved During this Admission:    Diagnosis Date Noted Date Resolved POA    Hyperkalemia [E87.5] 11/04/2020 11/15/2020 No    Sepsis with acute hypoxic respiratory failure [A41.9, R65.20, J96.01] 10/27/2020 10/27/2020 Yes    Severe sepsis [A41.9, R65.20] 10/26/2020 11/04/2020 Yes    Tachypnea  [R06.82] 10/26/2020 11/04/2020 Yes       Overview / ICU Course:    Марина Britton is a 30 y.o. female admitted for Endocarditis of tricuspid valve. Patient with HCV, IVDU who was transferred from [facility] where she presented with chest pain and was diagnosed with MRSA bacteremia associated with TV IE with septic emboli, acute hypoxic respiratory failure, septic/metabolic encephalopathy, septic PE, septic shock, and acute anemia. She was admitted to the [facility] [place] MICU on 10/27 and was intubated for respiratory failure. She has subsequently been evaluated by ID, CTS, and Psychiatry. She required pRBC transfusion on 10/29, and norepi on 10/30. She developed acute renal insufficiency which subsequently resolved. She was extubated on 11/2, then transferred to Hospital Medicine on 11/3 for further care, planning on long-term IV antibiotics with vancomycin and repeat CTS evaluation as an outpatient. Subsequently her acute renal insufficiency returned, and again febrile for which BCx were obtained on 11/4 and 11/5 and have remained NGTD. ID was reconsulted on 11/6, and cefepime was added to her regimen. CT C/A/P obtained shows continued infectious lung processes. TTE shows progressive valvular damage with flail valve and severe MR, for which she is being diuresed.  Covid testing ordered for placement resulted positive 11/11, patient transferred to COVID19 isolation unit. Monitoring for signs/symptoms of COVID-19 and awaiting LTAC placement.    Inpatient Medications Prescribed for Management of Current Problems:     Scheduled Meds:    amLODIPine  10 mg Oral Daily    ascorbic acid (vitamin C)  500 mg Oral BID    dextromethorphan-guaifenesin  mg  1 tablet Oral BID    enoxaparin  40 mg Subcutaneous Q24H    furosemide  40 mg Oral Daily    gabapentin  300 mg Oral TID    magnesium oxide  400 mg Oral BID    mirtazapine  15 mg Oral QHS    mupirocin   Nasal BID    oxyCODONE  20 mg Per OG tube Q6H    polyethylene  "glycol  17 g Oral Daily    remdesivir infusion  100 mg Intravenous Q24H    senna-docusate 8.6-50 mg  2 tablet Oral BID    sodium chloride 0.9%  10 mL Intravenous Q6H    vancomycin (VANCOCIN) IVPB  1,500 mg Intravenous Q24H    vitamin D  1,000 Units Oral Daily     Continuous Infusions:   As Needed: sodium chloride, acetaminophen, acetaminophen, acetaminophen, calcium carbonate, diphenhydrAMINE, hydrOXYzine HCL, melatonin, oxyCODONE, polyethylene glycol, sodium chloride 0.9%, Flushing PICC Protocol **AND** sodium chloride 0.9% **AND** sodium chloride 0.9%, valproate sodium (DEPACON) IVPB, vancomycin - pharmacy to dose    Assessment and Plan by Problem    MRSA bacteremia  Pneumonia of both lungs due to infectious organism  Pulmonary emboli (septic)  Sepsis  Endocarditis of tricuspid valve  - Febrile intermittently, BCx from 11/4 and 11/5 NGTD. Per ID, she will likely continue to have intermittent fevers from her infectious process  - Continue vancomycin (pharmacy dosing). She has completed five days of cefepime  - PT/OT following and recommending IPR, LTAC referrals out  - Needs 6 weeks IV antibiotics, then follow up with CTS. awaiting placement.  - Anticipate medical stability following 5-day course of RDV.     Flail tricuspid valve  Severe tricuspid regurgitation  Lower extremity edema  - Repeat TTE shows more advanced disease, tricuspid valve now flail, but also "distinguishing between vegetation, valve, and chord is difficult, even with these good images" per interpreting Cardiologist. Also shows elevated CVP.   - LE edema improving with Lasix  - Continue IV diuresis  - Previous provider discussed findings and ongoing fevers with Dr. Pritchett on 11/10 who recommended continued medical management as her current deconditioned status is a contraindication to surgical intervention     COVID infection  Stopped Dexamethasone 11/13, since not requiring O2 at this time.  - COVID-19 testing: Collection Date: 11/11/2020 " Collection Time:  11:09 AM   - Isolation: Airborne/Droplet. Surgical mask on patient. Notify Infection Control  - Diagnostics: Trend Q48hrs if stable, more frequently if patient decompensating        Laboratory/Study Frequency Result   CBC Admit & Q48h     CMP Admit & Q48h     Magnesium level Admit     D-dimer Admit & Q48h 8.2 > 5.9   Ferritin Admit & Q48h 686   CRP Admit & Q48h 31 > 174 > 111   CPK Admit & Q24h if elevated     LDH Admit & Q48h 382   Vitamin D Admit ordered   BNP Admit ordered   Troponin Admit & Q6h if elevated     Glucose-6-phos dehydrogenase Admit     Procalcitonin Admit 0.22   Lipid Panel If using statin     Rapid influenza Admit     Resp Infection Panel Admit if BMT/organ transplant     Legionella Antigen Admit     Sputum Culture Admit     Blood Culture Admit     Urinalysis & Culture Admit     ECG Admit & Q48h if on HCQ     CXR Admit     Lymphopenia, hyponatremia, hyperferritinemia, elevated troponin, elevated d-dimer, age, and medical comorbidities are significant predictors of poor clinical outcome     - [facility]    - Monitoring:              - Telemetry & Continuous Pulse Oximetry    - Nutrition:               - Multivitamin PO daily              - Add Boost supplement              - Vitamin D 1000IU daily as she is Vitamin D deficient              - Ascorbic acid 500mg PO bid    - Supportive Care:              - acetaminophen 650mg PO Q6hr PRN fever/headache              - loperamide PRN viral diarrhea              - IVF if indicated, restrictive strategy preferred, no maintenance IV if able              - VTE PPx: enoxaparin or heparin SQ unless contraindicated    - Antibiotics:  - if indications, CXR findings, elevated procal. See protocol for alternatives.   - Discontinue early if low concern for bacterial co-infection              - ceftriaxone 1g Q24h x 5 days - not started  - azithromycin 500mg po x1, then 250mg po daily x 4 days - not  started    - Therapies:              - If patient meets criteria  - atorvastatin 40mg po daily  - Consult for RDV; febrile, meets criteria - RDV started 11/17     Acute renal insufficiency  Hyperkalemia  Hyponatremia  - sCr stable, steady with diuresis   - Etiology likely multifactorial with recent sepsis and vancomycin and poor PO intake  - Urine studies consistent with pre-renal (though cardiorenal would have a similar picture)     IVDU (intravenous drug user)  Opioid use disorder, severe, dependence  - Psych evaluated earlier this admission  - Would benefit from LTAC disposition     Substance induced mood disorder  -continue mirtazepine 15mg HS  -gabapentin 300 TID     Opioid Use disorder  Continue oxycodone 20mg q6 pRN  Continue discussion MAT with patient  Consider Addiction Psych     Chronic inflammatory anemia  - Hemoglobin 6.9 g/dL 11/10, given one unit of pRBCs  - Iron studies consistent with inflammatory process, in keeping with her systemic infection  - Monitor     Wounds  Left foot and sacral spine  Wound care following. Appreciate recs.     LE edema  Mixed etiology, IV fluids and low albumin  Elevate feet, improve nutritional status  Lasix daily. Transition to PO, goal is euvolemia     Malnutrition  - Nutrition following  - Added Boost to all meals      Diet: Diet Adult Regular (IDDSI Level 7)  GI Prophylaxis: Not indicated  Significant LDAs:   IV Access Type: PICC  Urinary Catheter Indication if present: Patient Does Not Have Urinary Catheter  Other Lines/Tubes/Drains:    HIGH RISK CONDITION(S):   Patient is currently on drug therapy requiring intensive monitoring for toxicity: Vancomycin     Goals of Care:    Previous admission:  10/24/20  Likely prognosis:  Poor  Code Status: Full Code  Comfort Only: No  Hospice: No  Goals at discharge: remain at home, with physician follow-up    Discharge Planning   VIANNEY: 11/19/2020     Code Status: Full Code   Is the patient medically ready for discharge?: No     Reason for patient still in hospital (select all that apply): Patient trending condition and Pending disposition  Discharge Plan A: Long-term acute care facility (LTAC)   Discharge Delays: None known at this time    VTE Risk Mitigation (From admission, onward)         Ordered     enoxaparin injection 40 mg  Every 24 hours      11/08/20 0838     Place sequential compression device  Until discontinued      10/26/20 2023                   Tila Saez MD  Department of Hospital Medicine   [facility] - ICU 15 [facility]

## 2020-11-18 NOTE — PLAN OF CARE
11/18/20 1005   Post-Acute Status   Post-Acute Authorization Placement     Patient is in need of LTAC placement post discharge. [facility] has sent several referrals, still awaiting acceptance of placement . SW spoke with patient in regard to the dc plan of Ltac placement. Patient will possibly discharge on Friday. [facility] will continue to follow up.        Padmini Myers LMSW  [facility]   [phone]

## 2020-11-18 NOTE — PLAN OF CARE
Problem: Adult Inpatient Plan of Care  Goal: Plan of Care Review  Outcome: Ongoing, Progressing  Goal: Patient-Specific Goal (Individualization)  Outcome: Ongoing, Progressing   POC reviewed with patient. Questions encouraged and answered. Reviewed new medications, goals of shift, and patient safety. Patient states understanding. POC is ongoing.

## 2020-11-19 PROCEDURE — 25000003 PHARM REV CODE 250: Performed by: INTERNAL MEDICINE

## 2020-11-19 PROCEDURE — 93005 ELECTROCARDIOGRAM TRACING: CPT

## 2020-11-19 PROCEDURE — 63600175 PHARM REV CODE 636 W HCPCS: Performed by: INTERNAL MEDICINE

## 2020-11-19 PROCEDURE — 99900035 HC TECH TIME PER 15 MIN (STAT)

## 2020-11-19 PROCEDURE — 99232 PR SUBSEQUENT HOSPITAL CARE,LEVL II: ICD-10-PCS | Mod: ,,, | Performed by: INTERNAL MEDICINE

## 2020-11-19 PROCEDURE — A4216 STERILE WATER/SALINE, 10 ML: HCPCS | Performed by: INTERNAL MEDICINE

## 2020-11-19 PROCEDURE — 25000003 PHARM REV CODE 250: Performed by: HOSPITALIST

## 2020-11-19 PROCEDURE — 99232 SBSQ HOSP IP/OBS MODERATE 35: CPT | Mod: ,,, | Performed by: INTERNAL MEDICINE

## 2020-11-19 PROCEDURE — 20600001 HC STEP DOWN PRIVATE ROOM

## 2020-11-19 PROCEDURE — 25000003 PHARM REV CODE 250: Performed by: STUDENT IN AN ORGANIZED HEALTH CARE EDUCATION/TRAINING PROGRAM

## 2020-11-19 PROCEDURE — 94761 N-INVAS EAR/PLS OXIMETRY MLT: CPT

## 2020-11-19 PROCEDURE — 97110 THERAPEUTIC EXERCISES: CPT

## 2020-11-19 PROCEDURE — 27000221 HC OXYGEN, UP TO 24 HOURS

## 2020-11-19 PROCEDURE — 93010 EKG 12-LEAD: ICD-10-PCS | Mod: ,,, | Performed by: INTERNAL MEDICINE

## 2020-11-19 PROCEDURE — 25000003 PHARM REV CODE 250: Performed by: NURSE PRACTITIONER

## 2020-11-19 PROCEDURE — 93010 ELECTROCARDIOGRAM REPORT: CPT | Mod: ,,, | Performed by: INTERNAL MEDICINE

## 2020-11-19 RX ORDER — BENZONATATE 100 MG/1
100 CAPSULE ORAL 3 TIMES DAILY PRN
Status: DISCONTINUED | OUTPATIENT
Start: 2020-11-19 | End: 2020-11-24 | Stop reason: HOSPADM

## 2020-11-19 RX ORDER — GUAIFENESIN/DEXTROMETHORPHAN 100-10MG/5
10 SYRUP ORAL EVERY 6 HOURS
Status: DISCONTINUED | OUTPATIENT
Start: 2020-11-19 | End: 2020-11-21

## 2020-11-19 RX ORDER — FUROSEMIDE 10 MG/ML
40 INJECTION INTRAMUSCULAR; INTRAVENOUS DAILY
Status: DISCONTINUED | OUTPATIENT
Start: 2020-11-20 | End: 2020-11-20

## 2020-11-19 RX ORDER — OXYCODONE HYDROCHLORIDE 10 MG/1
20 TABLET ORAL EVERY 6 HOURS
Status: DISCONTINUED | OUTPATIENT
Start: 2020-11-19 | End: 2020-11-24 | Stop reason: HOSPADM

## 2020-11-19 RX ADMIN — Medication 10 ML: at 06:11

## 2020-11-19 RX ADMIN — OXYCODONE HYDROCHLORIDE 20 MG: 10 TABLET ORAL at 05:11

## 2020-11-19 RX ADMIN — FUROSEMIDE 40 MG: 40 TABLET ORAL at 08:11

## 2020-11-19 RX ADMIN — GABAPENTIN 300 MG: 300 CAPSULE ORAL at 04:11

## 2020-11-19 RX ADMIN — Medication 10 ML: at 04:11

## 2020-11-19 RX ADMIN — REMDESIVIR 100 MG: 100 INJECTION, POWDER, LYOPHILIZED, FOR SOLUTION INTRAVENOUS at 04:11

## 2020-11-19 RX ADMIN — ACETAMINOPHEN 1000 MG: 500 TABLET ORAL at 06:11

## 2020-11-19 RX ADMIN — AMLODIPINE BESYLATE 10 MG: 10 TABLET ORAL at 08:11

## 2020-11-19 RX ADMIN — MUPIROCIN: 20 OINTMENT TOPICAL at 08:11

## 2020-11-19 RX ADMIN — OXYCODONE HYDROCHLORIDE 20 MG: 10 TABLET ORAL at 12:11

## 2020-11-19 RX ADMIN — Medication 10 ML: at 05:11

## 2020-11-19 RX ADMIN — GABAPENTIN 300 MG: 300 CAPSULE ORAL at 08:11

## 2020-11-19 RX ADMIN — ACETAMINOPHEN 1000 MG: 500 TABLET ORAL at 04:11

## 2020-11-19 RX ADMIN — OXYCODONE HYDROCHLORIDE AND ACETAMINOPHEN 500 MG: 500 TABLET ORAL at 08:11

## 2020-11-19 RX ADMIN — ENOXAPARIN SODIUM 40 MG: 40 INJECTION SUBCUTANEOUS at 04:11

## 2020-11-19 RX ADMIN — Medication 10 ML: at 12:11

## 2020-11-19 RX ADMIN — OXYCODONE HYDROCHLORIDE 20 MG: 10 TABLET ORAL at 06:11

## 2020-11-19 RX ADMIN — MAGNESIUM OXIDE 400 MG (241.3 MG MAGNESIUM) TABLET 400 MG: TABLET at 08:11

## 2020-11-19 RX ADMIN — MIRTAZAPINE 15 MG: 7.5 TABLET ORAL at 08:11

## 2020-11-19 RX ADMIN — GUAIFENESIN AND DEXTROMETHORPHAN HYDROBROMIDE 1 TABLET: 600; 30 TABLET, EXTENDED RELEASE ORAL at 08:11

## 2020-11-19 RX ADMIN — CHOLECALCIFEROL TAB 25 MCG (1000 UNIT) 1000 UNITS: 25 TAB at 08:11

## 2020-11-19 RX ADMIN — VANCOMYCIN HYDROCHLORIDE 1500 MG: 1.5 INJECTION, POWDER, LYOPHILIZED, FOR SOLUTION INTRAVENOUS at 06:11

## 2020-11-19 RX ADMIN — GUAIFENESIN AND DEXTROMETHORPHAN 10 ML: 100; 10 SYRUP ORAL at 05:11

## 2020-11-19 NOTE — PLAN OF CARE
Problem: Physical Therapy Goal  Goal: Physical Therapy Goal  Description: Goals to be met by: 20 - extended to     Patient will increase functional independence with mobility by performin. Supine to sit with Modified [place]  2. Sit to supine with Modified [place]  3. Sit to stand transfer with Stand-by Assistance - met   3a. Sit to stand transfer with Mod I and LRAd- met 2020  4. Gait  x 50 feet with Contact Guard Assistance using LRAD, if needed. - met   4a. Gait x 200 feet with Mod I and LRAD- met 2020  5. Ascend/descend 3 stairs with left Handrails Contact Guard Assistance.- not met /2 no access to stairs on COVID unit     Outcome: Met     Pt has met goals, independent with mobility with use of rollator as needed. Pt does not require further acute skilled therapy intervention. Discharge from PT services and re-consult if pt experiences a change in status.     Fariha Briceno, PT, DPT  2020  [phone]     Never smoker

## 2020-11-19 NOTE — PLAN OF CARE
Currently not stable for discharge - not presently on O2.  Intermittent fever. REMDESIVIR to end 11/22/2020.  PICC line in place - remains on Vancomycin every 24 hours - monitoring labs.  Will DC to LTAC to complete 6 weeks IVABX. Requiring wound care. Will continue to follow    Abeba Lang RN  Case Management  Ext [phone]       11/19/20 8148   Discharge Reassessment   Assessment Type Discharge Planning Reassessment   Provided patient/caregiver education on the expected discharge date and the discharge plan Yes   Do you have any problems affording any of your prescribed medications? No   Discharge Plan A Long-term acute care facility (LTAC)   Discharge Plan B Long-term acute care facility (LTAC)   DME Needed Upon Discharge  other (see comments)  (TBD)   Patient choice form signed by patient/caregiver Yes   Anticipated Discharge Disposition LTAC   Can the patient/caregiver answer the patient profile reliably? Yes, cognitively intact   How does the patient rate their overall health at the present time? Fair   Describe the patient's ability to walk at the present time. No restrictions   Post-Acute Status   Post-Acute Authorization Placement   Post-Acute Placement Status Awaiting Internal Medical Clearance   Discharge Delays None known at this time

## 2020-11-19 NOTE — PLAN OF CARE
EOS: No acute changes. Patient without any complaints. Slept through the night. Medication given as ordered. Wound care provided. Vss. Care plan reviewed, education provided, q 2 rounding.[facility]  Problem: Adult Inpatient Plan of Care  Goal: Plan of Care Review  Outcome: Ongoing, Progressing  Goal: Patient-Specific Goal (Individualization)  Outcome: Ongoing, Progressing  Goal: Absence of Hospital-Acquired Illness or Injury  Outcome: Ongoing, Progressing  Goal: Optimal Comfort and Wellbeing  Outcome: Ongoing, Progressing  Goal: Readiness for Transition of Care  Outcome: Ongoing, Progressing  Goal: Rounds/Family Conference  Outcome: Ongoing, Progressing     Problem: Adjustment to Illness (Sepsis/Septic Shock)  Goal: Optimal Coping  Outcome: Ongoing, Progressing     Problem: Bleeding (Sepsis/Septic Shock)  Goal: Absence of Bleeding  Outcome: Ongoing, Progressing     Problem: Glycemic Control Impaired (Sepsis/Septic Shock)  Goal: Blood Glucose Level Within Desired Range  Outcome: Ongoing, Progressing     Problem: Hemodynamic Instability (Sepsis/Septic Shock)  Goal: Effective Tissue Perfusion  Outcome: Ongoing, Progressing     Problem: Infection (Sepsis/Septic Shock)  Goal: Absence of Infection Signs/Symptoms  Outcome: Ongoing, Progressing     Problem: Nutrition Impaired (Sepsis/Septic Shock)  Goal: Optimal Nutrition Intake  Outcome: Ongoing, Progressing     Problem: Respiratory Compromise (Sepsis/Septic Shock)  Goal: Effective Oxygenation and Ventilation  Outcome: Ongoing, Progressing     Problem: Fluid Imbalance (Pneumonia)  Goal: Fluid Balance  Outcome: Ongoing, Progressing     Problem: Infection (Pneumonia)  Goal: Resolution of Infection Signs/Symptoms  Outcome: Ongoing, Progressing     Problem: Respiratory Compromise (Pneumonia)  Goal: Effective Oxygenation and Ventilation  Outcome: Ongoing, Progressing     Problem: Infection  Goal: Infection Symptom Resolution  Outcome: Ongoing, Progressing     Problem: Wound  Goal:  Optimal Wound Healing  Outcome: Ongoing, Progressing     Problem: Skin Injury Risk Increased  Goal: Skin Health and Integrity  Outcome: Ongoing, Progressing     Problem: Communication Impairment (Mechanical Ventilation, Invasive)  Goal: Effective Communication  Outcome: Ongoing, Progressing     Problem: Device-Related Complication Risk (Mechanical Ventilation, Invasive)  Goal: Optimal Device Function  Outcome: Ongoing, Progressing     Problem: Inability to Wean (Mechanical Ventilation, Invasive)  Goal: Mechanical Ventilation Liberation  Outcome: Ongoing, Progressing     Problem: Nutrition Impairment (Mechanical Ventilation, Invasive)  Goal: Optimal Nutrition Delivery  Outcome: Ongoing, Progressing     Problem: Skin and Tissue Injury (Mechanical Ventilation, Invasive)  Goal: Absence of Device-Related Skin and Tissue Injury  Outcome: Ongoing, Progressing     Problem: Ventilator-Induced Lung Injury (Mechanical Ventilation, Invasive)  Goal: Absence of Ventilator-Induced Lung Injury  Outcome: Ongoing, Progressing     Problem: Communication Impairment (Artificial Airway)  Goal: Effective Communication  Outcome: Ongoing, Progressing     Problem: Device-Related Complication Risk (Artificial Airway)  Goal: Optimal Device Function  Outcome: Ongoing, Progressing     Problem: Skin and Tissue Injury (Artificial Airway)  Goal: Absence of Device-Related Skin or Tissue Injury  Outcome: Ongoing, Progressing     Problem: Noninvasive Ventilation Acute  Goal: Effective Unassisted Ventilation and Oxygenation  Outcome: Ongoing, Progressing     Problem: Fall Injury Risk  Goal: Absence of Fall and Fall-Related Injury  Outcome: Ongoing, Progressing     Problem: Restraint, Nonbehavioral (Nonviolent)  Goal: Discontinuation Criteria Achieved  Outcome: Ongoing, Progressing  Goal: Personal Dignity and Safety Maintained  Outcome: Ongoing, Progressing     Problem: Electrolyte Imbalance (Acute Kidney Injury/Impairment)  Goal: Serum Electrolyte  Balance  Outcome: Ongoing, Progressing     Problem: Fluid Imbalance (Acute Kidney Injury/Impairment)  Goal: Optimal Fluid Balance  Outcome: Ongoing, Progressing     Problem: Hematologic Alteration (Acute Kidney Injury/Impairment)  Goal: Hemoglobin, Hematocrit and Platelets Within Normal Range  Outcome: Ongoing, Progressing     Problem: Oral Intake Inadequate (Acute Kidney Injury/Impairment)  Goal: Optimal Nutrition Intake  Outcome: Ongoing, Progressing     Problem: Renal Function Impairment (Acute Kidney Injury/Impairment)  Goal: Effective Renal Function  Outcome: Ongoing, Progressing

## 2020-11-19 NOTE — PT/OT/SLP PROGRESS
Physical Therapy Treatment and Discharge    Patient Name:  Марина Britton   MRN:  [id number]    Recommendations:     Discharge Recommendations:  home   Discharge Equipment Recommendations: walker, rolling, shower chair   Barriers to discharge: None    Assessment:     Марина Britton is a 30 y.o. female admitted with a medical diagnosis of Endocarditis of tricuspid valve.  She presents with the following impairments/functional limitations:  impaired endurance, impaired functional mobilty, gait instability. Pt demo'd independence with all mobility and ADLs. Pt completing self care in bathroom upon arrival. Pt ambulated >500ft with modified independence, opted to use RW for energy conservation. Pt does not require further acute skilled therapy intervention. Discharge from PT services and re-consult if pt experiences a change in status.       Rehab Prognosis: Good;     Recent Surgery: * No surgery found *      Plan:     · Plan of Care: Discharge from acute PT 11/19/2020    Subjective     Chief Complaint: c/o soreness to tailbone, reports improved swelling in B feet   Patient/Family Comments/goals: to get better and return home   Pain/Comfort:  · Pain Rating 1: (pt reported soreness over tailbone, did not quantify)  · Location - Orientation 1: generalized  · Location 1: coccyx  · Pain Addressed 1: (recommended to place a pillow in chair)      Objective:     Communicated with RN prior to session.  Patient found standing at sink in restroom with   no equipment upon PT entry to room.     General Precautions: Standard, airborne, contact, droplet   Orthopedic Precautions:N/A   Braces: N/A     Functional Mobility:  · Transfers:     · Sit to Stand:  From chair with independence with no AD  · Gait: Pt ambulated 20 ft in room with no AD and independence. Pt then ambulated >500 ft with RW and modified independence, no LOB, no SOB, no dizziness. PT discussed taking standing breaks as needed. Mask donned in hallway.       AM-PAC  6 CLICK MOBILITY  Turning over in bed (including adjusting bedclothes, sheets and blankets)?: 4  Sitting down on and standing up from a chair with arms (e.g., wheelchair, bedside commode, etc.): 4  Moving from lying on back to sitting on the side of the bed?: 4  Moving to and from a bed to a chair (including a wheelchair)?: 4  Need to walk in hospital room?: 4  Climbing 3-5 steps with a railing?: 4  Basic Mobility Total Score: 24       Therapeutic Activities and Exercises:   Pt educated on role of PT/POC. Pt verbalized understanding.   Pt encouraged to ambulate in [place] daily to maintain independent mobility and improve endurance. Pt agreeable.   RW provided for use during admission for energy conservation.     Patient left up in chair with all lines intact, call button in reach and RN notified..    GOALS:   Multidisciplinary Problems     Physical Therapy Goals     Not on file          Multidisciplinary Problems (Resolved)        Problem: Physical Therapy Goal    Goal Priority Disciplines Outcome Goal Variances Interventions   Physical Therapy Goal   (Resolved)     PT, PT/OT Met     Description: Goals to be met by: 20 - extended to     Patient will increase functional independence with mobility by performin. Supine to sit with Modified [place]  2. Sit to supine with Modified [place]  3. Sit to stand transfer with Stand-by Assistance - met   3a. Sit to stand transfer with Mod I and LRAd- met 2020  4. Gait  x 50 feet with Contact Guard Assistance using LRAD, if needed. - met   4a. Gait x 200 feet with Mod I and LRAD- met 2020  5. Ascend/descend 3 stairs with left Handrails Contact Guard Assistance.- not met 2/2 no access to stairs on COVID unit                      Time Tracking:     PT Received On: 20  PT Start Time: 1018     PT Stop Time: 1032  PT Total Time (min): 14 min     Billable Minutes: Therapeutic Exercise 14 mins     Treatment Type: Treatment  PT/PTA:  PT     PTA Visit Number: 0     Fariha Briceno, PT  11/19/2020

## 2020-11-19 NOTE — PLAN OF CARE
11/19/20 1145   Post-Acute Status   Post-Acute Authorization Placement   Post-Acute Placement Status Pending Payor Review     SW spoke with Thao coordinator with [facility] and she reports that she will submit for insurance auth today to began the [facility] contract needed to approval . Patient was declined by  [facility] [place] [facility] due to unable to meet patient needs.      Updated 2:10pm  SW received a call from representative Valeria with Medicaid [phone] in regard to LTAC placement for patient and she is stating that patient insurance is wanting as many in network facilities as possible before moving forward with the [facility] contract for [facility]. SW is awaiting a call from Alpa coordinator with [facility] . REJI also contacted Debbie [phone] or [phone] coordinator with [facility] and she request SW to cancel the referral and resend due to her not seeing it in [facility].         Padmini Myers, SHAN  [facility]   [id number]

## 2020-11-19 NOTE — PROGRESS NOTES
[facility] - ICU [facility] Medicine  Telemedicine Progress Note    Patient Name: Марина Britton  MRN: [id number]  Patient Class: IP- Inpatient   Admission Date: 10/26/2020  Length of Stay: 24 days  Attending Physician: Cierra Savage MD  Primary Care Provider: Luis Felipe Jose Iii, MD    [facility] Medicine Team: [facility] VIRTUAL TEAM 10 Cierra Savage MD  Virtual Telemedicine Progress Note  Start time: 110p  Chief complaint: Endocarditis of tricuspid valve  The patient location is: [facility]/[address] A  The patient arrived at: 10/26/2020  7:45 PM  Present with the patient at the time of the telemed/virtual assessment: n/a  End time:  121p  Total time spent with patient: 11 min  I have assessed findings virtually using a telemedicine platform and with assistance of the bedside nurse or telemedicine presenter.  The attending portion of this evaluation, treatment, and documentation was performed per Cierra Savage MD via audiovisual.    Patient was transferred to the telemedicine service on: 11/15/2020    Subjective:     Admission CC:   Chief Complaint   Patient presents with    Foot Pain       Fever and left foot pain for the past 3 days. Foot pain worse today Here 3 days ago for foot abscess, prescribed clindamycin.    Chest Pain       Sharp chest pain when taking a deep breath that started earlier today, Denies n/v. +SOB     Follow up visit for: Endocarditis of tricuspid valve    Interval History / Events Overnight:   Tmax 100 - on room air  -remains with pain to her chest/tailbone and back relieved with scheduled oxycodone; continues with dyspnea and cough (brown sputum) but improved; she also feels her fatigue is improving; her chest congestion is improved; she notes her fluid retention is unchanged despite lasix    Review of Systems   Constitutional: Positive for fever. Negative for appetite change.   Respiratory: Positive for cough, chest tightness and shortness of breath.    Cardiovascular:  Positive for chest pain.   Gastrointestinal: Negative for diarrhea.     Objective:     Vital Signs (Most Recent):  Temp: 98.8 °F (37.1 °C) (11/19/20 1340)  Pulse: (!) 113 (11/19/20 0815)  Resp: 16 (11/19/20 1208)  BP: 118/79 (11/19/20 1145)  SpO2: 95 % (11/19/20 1428) Vital Signs (24h Range):  Temp:  [98.4 °F (36.9 °C)-100 °F (37.8 °C)] 98.8 °F (37.1 °C)  Pulse:  [113-116] 113  Resp:  [16-18] 16  SpO2:  [93 %-98 %] 95 %  BP: (114-126)/(77-87) 118/79     Weight: 88.5 kg (195 lb)  Body mass index is 35.67 kg/m².    Intake/Output Summary (Last 24 hours) at 11/19/2020 1553  Last data filed at 11/19/2020 0500  Gross per 24 hour   Intake 370 ml   Output --   Net 370 ml      Physical Exam  Constitutional:       General: She is not in acute distress.     Appearance: Normal appearance. She is not diaphoretic.   Eyes:      General: Lids are normal. No scleral icterus.        Right eye: No discharge.         Left eye: No discharge.      Conjunctiva/sclera: Conjunctivae normal.   Cardiovascular:      Rate and Rhythm: Tachycardia present.   Pulmonary:      Effort: Pulmonary effort is normal. No tachypnea, accessory muscle usage or respiratory distress.   Abdominal:      General: There is no distension.   Musculoskeletal:      Right lower leg: Edema present.      Left lower leg: Edema present.   Skin:     Coloration: Skin is not cyanotic.   Neurological:      Mental Status: She is alert. She is not disoriented.   Psychiatric:         Attention and Perception: Attention normal.         Mood and Affect: Affect normal.         Speech: Speech normal.         Behavior: Behavior is cooperative.         Significant Labs:     Recent Labs   Lab 11/16/20  0353 11/18/20  0605   WBC 12.84* 11.39   HGB 8.9* 7.6*   HCT 31.0* 25.3*   * 295     Recent Labs   Lab 11/16/20  0353 11/18/20  0605   GRAN 76.8*  9.9* 75.1*  8.6*   LYMPH 15.2*  2.0 13.5*  1.5   MONO 5.5  0.7 7.3  0.8   EOS 0.1 0.3     Recent Labs   Lab 11/14/20  0820  11/16/20  0353 11/18/20  0605    142 140   K 4.0 3.3* 3.9    103 101   CO2 22* 30* 28   BUN 34* 28* 26*   CREATININE 0.9 1.0 0.9   * 80 91   CALCIUM 8.2* 8.0* 7.9*   ALBUMIN 1.8* 1.7* 1.8*   MG 1.7 1.6 1.6     Recent Labs   Lab 11/14/20  0821 11/16/20  0353 11/18/20  0605   ALKPHOS 105 169* 169*   ALT 19 67* 65*   AST 21 62* 44*   PROT 6.7 6.4 5.9*   BILITOT 0.3 0.5 0.4     Procalcitonin (ng/mL)   Date Value   11/16/2020 0.10   11/12/2020 0.22     Lactate (Lactic Acid) (mmol/L)   Date Value   10/26/2020 1.8     BNP (pg/mL)   Date Value   11/13/2020 557 (H)     CRP   Date Value   11/18/2020 105.5 mg/L (H)   11/16/2020 54.0 mg/L (H)   11/12/2020 111.2 mg/L (H)   11/07/2020 174.2 mg/L (H)   10/24/2020 31.00 mg/dL (H)     D-Dimer (mg/L FEU)   Date Value   11/16/2020 5.13 (H)   11/14/2020 7.36 (H)   11/12/2020 5.98 (H)   10/24/2020 8.22 (H)     Ferritin (ng/mL)   Date Value   11/18/2020 628 (H)   11/16/2020 567 (H)   11/14/2020 767 (H)   11/10/2020 686 (H)     LD (U/L)   Date Value   11/18/2020 246   11/16/2020 233   10/28/2020 382 (H)     Troponin I (ng/mL)   Date Value   10/24/2020 <0.020   10/24/2020 0.055 (H)     CPK (U/L)   Date Value   10/29/2020 55   10/24/2020 17 (L)   10/24/2020 17 (L)     Results for orders placed or performed during the hospital encounter of 10/26/20   Vitamin D   Result Value Ref Range    Vit D, 25-Hydroxy 9 (L) 30 - 96 ng/mL     SARS-CoV2 (COVID-19) Qualitative PCR (no units)   Date Value   11/11/2020 Detected (A)     SARS-CoV-2 RNA, Amplification, Qual (no units)   Date Value   11/17/2020 Negative   10/26/2020 Negative   10/24/2020 Negative       Echo Color Flow Doppler? Yes  · The left ventricle is normal in size with normal systolic function. The   estimated ejection fraction is 60%.  · Moderate right ventricular enlargement with normal right ventricular   systolic function.  · Normal left ventricular diastolic function.  · Severe right atrial enlargement.  · The  anterior leaflet of the tricuspid valve is completely flail with   resultant severe regurgitation. Valve and chordal tissue prolapsing well   back into the right atrium likely have associated vegetation, but   distinguishing between vegetation, valve, and chord is difficult, even   with these good images.  · The estimated PA systolic pressure is 54 mmHg.  · Elevated central venous pressure (15 mmHg).       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] 11/03/2020 Yes     Chronic    Substance induced mood disorder [F19.94] 11/03/2020 Yes     Chronic    Acute renal insufficiency [N28.9] 11/03/2020 No    MRSA bacteremia [R78.81, B95.62] 10/27/2020 Yes    Pulmonary emboli [I26.99] 10/26/2020 Yes    IVDU (intravenous drug user) [F19.90] 10/26/2020 Yes     Chronic    Pneumonia of both lungs due to infectious organism [J18.9] 10/24/2020 Yes      Problems Resolved During this Admission:    Diagnosis Date Noted Date Resolved POA    Hyperkalemia [E87.5] 11/04/2020 11/15/2020 No    Sepsis with acute hypoxic respiratory failure [A41.9, R65.20, J96.01] 10/27/2020 10/27/2020 Yes    Severe sepsis [A41.9, R65.20] 10/26/2020 11/04/2020 Yes    Tachypnea [R06.82] 10/26/2020 11/04/2020 Yes       Overview / ICU Course:    Марина Britton is a 30 y.o. female admitted for Endocarditis of tricuspid valve. Patient with HCV, IVDU who was transferred from [facility] where she presented with chest pain and was diagnosed with  MRSA bacteremia associated with TV IE with septic emboli, acute hypoxic respiratory failure, septic/metabolic encephalopathy, septic PE, septic shock, and acute anemia. [place] [facility] [place] and was intubated for respiratory failure. She has subsequently been evaluated by ID, CTS, and Psychiatry. She required pRBC transfusion on 10/29, and norepi on 10/30. She developed acute renal insufficiency which subsequently resolved. She was extubated on 11/2, then transferred to Hospital Medicine on 11/3 for further care, planning on long-term IV antibiotics with vancomycin and repeat CTS evaluation as an outpatient. Subsequently her acute renal insufficiency returned, and again febrile for which BCx were obtained on 11/4 and 11/5 and have remained NGTD. ID was reconsulted on 11/6, and cefepime was added to her regimen. CT C/A/P obtained shows continued infectious lung processes. TTE shows progressive valvular damage with flail valve and severe MR, for which she is being diuresed.  Covid testing ordered for placement resulted positive 11/11, patient transferred to COVID19 isolation unit. Monitoring for signs/symptoms of COVID-19 and awaiting LTAC placement.    Inpatient Medications Prescribed for Management of Current Problems:     Scheduled Meds:    amLODIPine  10 mg Oral Daily    ascorbic acid (vitamin C)  500 mg Oral BID    dextromethorphan-guaifenesin  mg/5 ml  10 mL Oral Q6H    enoxaparin  40 mg Subcutaneous Q24H    furosemide  40 mg Oral Daily    gabapentin  300 mg Oral TID    magnesium oxide  400 mg Oral BID    mirtazapine  15 mg Oral QHS    mupirocin   Nasal BID    oxyCODONE  20 mg Oral Q6H    polyethylene glycol  17 g Oral Daily    remdesivir infusion  100 mg Intravenous Q24H    senna-docusate 8.6-50 mg  2 tablet Oral BID    sodium chloride 0.9%  10 mL Intravenous Q6H    vancomycin (VANCOCIN) IVPB  1,500 mg Intravenous Q24H    vitamin D  1,000 Units Oral Daily     Continuous  "Infusions:   As Needed: sodium chloride, acetaminophen, acetaminophen, acetaminophen, benzonatate, calcium carbonate, diphenhydrAMINE, hydrOXYzine HCL, melatonin, oxyCODONE, polyethylene glycol, sodium chloride 0.9%, Flushing PICC Protocol **AND** sodium chloride 0.9% **AND** sodium chloride 0.9%, valproate sodium (DEPACON) IVPB, vancomycin - pharmacy to dose    Assessment and Plan by Problem    MRSA bacteremia  Pneumonia of both lungs due to infectious organism  Pulmonary emboli (septic)  Sepsis  Endocarditis of tricuspid valve  - Febrile intermittently, BCx from 11/4 and 11/5 NGTD. Per ID, she will likely continue to have intermittent fevers from her infectious process  - Continue vancomycin (pharmacy dosing). She has completed five days of cefepime  - PT/OT following and recommending IPR, LTAC referrals out  - Needs 6 weeks IV antibiotics, then follow up with CTS. awaiting placement.  - Anticipate medical stability following 5-day course of RDV - end 11/21     Flail tricuspid valve  Severe tricuspid regurgitation  Lower extremity edema  - Repeat TTE shows more advanced disease, tricuspid valve now flail, but also "distinguishing between vegetation, valve, and chord is difficult, even with these good images" per interpreting Cardiologist. Also shows elevated CVP.   - LE edema was improving with Lasix IV and now on po with edema unchanged - change to lasix IV daily to improve edema  - Previous provider discussed findings and ongoing fevers with Dr. Pritchett on 11/10 who recommended continued medical management as her current deconditioned status is a contraindication to surgical intervention - f/u schedulded on 12/9 with ID and CTS     COVID infection  Stopped Dexamethasone 11/13, since not requiring O2 at this time.  - COVID-19 testing: Collection Date: 11/11/2020 Collection Time:  11:09 AM   - Isolation: Airborne/Droplet. Surgical mask on patient. Notify Infection Control  - Diagnostics: Trend Q48hrs if stable, " more frequently if patient decompensating         Laboratory/Study Frequency Result   CBC Admit & Q48h     CMP Admit & Q48h     Magnesium level Admit     D-dimer Admit & Q48h 8.2 > 5.9   Ferritin Admit & Q48h 686   CRP Admit & Q48h 31 > 174 > 111   CPK Admit & Q24h if elevated     LDH Admit & Q48h 382   Vitamin D Admit ordered   BNP Admit ordered   Troponin Admit & Q6h if elevated     Glucose-6-phos dehydrogenase Admit     Procalcitonin Admit 0.22   Lipid Panel If using statin     Rapid influenza Admit     Resp Infection Panel Admit if BMT/organ transplant     Legionella Antigen Admit     Sputum Culture Admit     Blood Culture Admit     Urinalysis & Culture Admit     ECG Admit & Q48h if on HCQ     CXR Admit     Lymphopenia, hyponatremia, hyperferritinemia, elevated troponin, elevated d-dimer, age, and medical comorbidities are significant predictors of poor clinical outcome     - [facility])    - Monitoring:              - Telemetry & Continuous Pulse Oximetry    - Nutrition:               - Multivitamin PO daily              - Add Boost supplement              - Vitamin D 1000IU daily as she is Vitamin D deficient              - Ascorbic acid 500mg PO bid    - Supportive Care:              - acetaminophen 650mg PO Q6hr PRN fever/headache              - loperamide PRN viral diarrhea              - IVF if indicated, restrictive strategy preferred, no maintenance IV if able              - VTE PPx: enoxaparin or heparin SQ unless contraindicated    - Antibiotics:  - if indications, CXR findings, elevated procal. See protocol for alternatives.   - Discontinue early if low concern for bacterial co-infection              - ceftriaxone 1g Q24h x 5 days - not started  - azithromycin 500mg po x1, then 250mg po daily x 4 days - not started    - Therapies:              - If patient meets criteria  - atorvastatin 40mg po daily  - Consult for RDV; febrile, meets criteria - RDV  started 11/17     Acute renal insufficiency  Hyperkalemia  Hyponatremia - resolved  - sCr stable, steady with diuresis   - Etiology likely multifactorial with recent sepsis and vancomycin and poor PO intake  - Urine studies consistent with pre-renal (though cardiorenal would have a similar picture)     IVDU (intravenous drug user)  Opioid use disorder, severe, dependence  - Psych evaluated earlier this admission  - Would benefit from LTAC disposition     Substance induced mood disorder  -continue mirtazapine 15mg HS  -gabapentin 300 TID     Opioid Use disorder  Continue oxycodone 20mg q6 pRN  Continue discussion MAT with patient  Consider Addiction Psych     Chronic inflammatory anemia  - Hemoglobin 6.9 g/dL 11/10, given one unit of pRBCs  - Iron studies consistent with inflammatory process, in keeping with her systemic infection  - Monitor and transfuse for Hgb < 7     Wounds  Left foot and sacral spine  Wound care following. Appreciate recs.     LE edema  Mixed etiology, IV fluids and low albumin  Elevate feet, improve nutritional status  Lasix daily - IV for now     Malnutrition  - Nutrition following  - Added Boost to all meals      Diet: Diet Adult Regular (IDDSI Level 7)  GI Prophylaxis: Not indicated  Significant LDAs:   IV Access Type: PICC  Urinary Catheter Indication if present: Patient Does Not Have Urinary Catheter  Other Lines/Tubes/Drains:    HIGH RISK CONDITION(S):   Patient is currently on drug therapy requiring intensive monitoring for toxicity: Vancomycin     Goals of Care:    Previous admission:  10/24/20  Likely prognosis:  Poor  Code Status: Full Code  Comfort Only: No  Hospice: No  Goals at discharge: remain at home, with physician follow-up    Discharge Planning   VIANNEY: 11/23/2020     Code Status: Full Code   Is the patient medically ready for discharge?: No    Reason for patient still in hospital (select all that apply): Patient trending condition and Pending disposition  Discharge Plan A:  Long-term acute care facility (LTAC)   Discharge Delays: None known at this time    VTE Risk Mitigation (From admission, onward)         Ordered     enoxaparin injection 40 mg  Every 24 hours      11/08/20 0838     Place sequential compression device  Until discontinued      10/26/20 2023                   Cierra Savage MD  Department of Hospital Medicine   [facility] - ICU 15 [facility]  [phone]

## 2020-11-19 NOTE — PT/OT/SLP PROGRESS
Occupational Therapy      Patient Name:  Марина Britton   MRN:  [id number]    Patient not seen today for OT services. Pt seen mobilizing in hallway with PT with no demonstrated difficulty. Per PT Fariha, pt was found completing ADL tasks in bathroom independently. No further OT needs. Will d/c at this time.      Cally Cedeno OT  11/19/2020

## 2020-11-20 LAB
ALBUMIN SERPL BCP-MCNC: 2.1 G/DL (ref 3.5–5.2)
ALP SERPL-CCNC: 148 U/L (ref 55–135)
ALT SERPL W/O P-5'-P-CCNC: 39 U/L (ref 10–44)
ANION GAP SERPL CALC-SCNC: 11 MMOL/L (ref 8–16)
AST SERPL-CCNC: 16 U/L (ref 10–40)
BASOPHILS # BLD AUTO: 0.12 K/UL (ref 0–0.2)
BASOPHILS NFR BLD: 1.4 % (ref 0–1.9)
BILIRUB SERPL-MCNC: 0.5 MG/DL (ref 0.1–1)
BNP SERPL-MCNC: 360 PG/ML (ref 0–99)
BUN SERPL-MCNC: 18 MG/DL (ref 6–20)
CALCIUM SERPL-MCNC: 8.4 MG/DL (ref 8.7–10.5)
CHLORIDE SERPL-SCNC: 101 MMOL/L (ref 95–110)
CO2 SERPL-SCNC: 28 MMOL/L (ref 23–29)
CREAT SERPL-MCNC: 0.9 MG/DL (ref 0.5–1.4)
CRP SERPL-MCNC: 126.8 MG/L (ref 0–8.2)
D DIMER PPP IA.FEU-MCNC: 3.46 MG/L FEU
DIFFERENTIAL METHOD: ABNORMAL
EOSINOPHIL # BLD AUTO: 0.4 K/UL (ref 0–0.5)
EOSINOPHIL NFR BLD: 4.7 % (ref 0–8)
ERYTHROCYTE [DISTWIDTH] IN BLOOD BY AUTOMATED COUNT: 17.3 % (ref 11.5–14.5)
EST. GFR  (AFRICAN AMERICAN): >60 ML/MIN/1.73 M^2
EST. GFR  (NON AFRICAN AMERICAN): >60 ML/MIN/1.73 M^2
FERRITIN SERPL-MCNC: 618 NG/ML (ref 20–300)
GLUCOSE SERPL-MCNC: 82 MG/DL (ref 70–110)
HCT VFR BLD AUTO: 29.4 % (ref 37–48.5)
HGB BLD-MCNC: 8.5 G/DL (ref 12–16)
IMM GRANULOCYTES # BLD AUTO: 0.04 K/UL (ref 0–0.04)
IMM GRANULOCYTES NFR BLD AUTO: 0.5 % (ref 0–0.5)
LDH SERPL L TO P-CCNC: 232 U/L (ref 110–260)
LYMPHOCYTES # BLD AUTO: 1.5 K/UL (ref 1–4.8)
LYMPHOCYTES NFR BLD: 17 % (ref 18–48)
MAGNESIUM SERPL-MCNC: 1.9 MG/DL (ref 1.6–2.6)
MCH RBC QN AUTO: 25.6 PG (ref 27–31)
MCHC RBC AUTO-ENTMCNC: 28.9 G/DL (ref 32–36)
MCV RBC AUTO: 89 FL (ref 82–98)
MONOCYTES # BLD AUTO: 0.7 K/UL (ref 0.3–1)
MONOCYTES NFR BLD: 8.4 % (ref 4–15)
NEUTROPHILS # BLD AUTO: 5.9 K/UL (ref 1.8–7.7)
NEUTROPHILS NFR BLD: 68 % (ref 38–73)
NRBC BLD-RTO: 0 /100 WBC
PLATELET # BLD AUTO: 420 K/UL (ref 150–350)
PMV BLD AUTO: 9.2 FL (ref 9.2–12.9)
POTASSIUM SERPL-SCNC: 3.7 MMOL/L (ref 3.5–5.1)
PROT SERPL-MCNC: 6.8 G/DL (ref 6–8.4)
RBC # BLD AUTO: 3.32 M/UL (ref 4–5.4)
SODIUM SERPL-SCNC: 140 MMOL/L (ref 136–145)
VANCOMYCIN TROUGH SERPL-MCNC: 56.7 UG/ML (ref 10–22)
WBC # BLD AUTO: 8.72 K/UL (ref 3.9–12.7)

## 2020-11-20 PROCEDURE — 93005 ELECTROCARDIOGRAM TRACING: CPT

## 2020-11-20 PROCEDURE — 93010 EKG 12-LEAD: ICD-10-PCS | Mod: ,,, | Performed by: INTERNAL MEDICINE

## 2020-11-20 PROCEDURE — 99232 SBSQ HOSP IP/OBS MODERATE 35: CPT | Mod: ,,, | Performed by: INTERNAL MEDICINE

## 2020-11-20 PROCEDURE — 63600175 PHARM REV CODE 636 W HCPCS: Performed by: INTERNAL MEDICINE

## 2020-11-20 PROCEDURE — 20600001 HC STEP DOWN PRIVATE ROOM

## 2020-11-20 PROCEDURE — 86140 C-REACTIVE PROTEIN: CPT

## 2020-11-20 PROCEDURE — 80202 ASSAY OF VANCOMYCIN: CPT

## 2020-11-20 PROCEDURE — 83880 ASSAY OF NATRIURETIC PEPTIDE: CPT

## 2020-11-20 PROCEDURE — 25000003 PHARM REV CODE 250: Performed by: INTERNAL MEDICINE

## 2020-11-20 PROCEDURE — 93010 ELECTROCARDIOGRAM REPORT: CPT | Mod: ,,, | Performed by: INTERNAL MEDICINE

## 2020-11-20 PROCEDURE — 85379 FIBRIN DEGRADATION QUANT: CPT

## 2020-11-20 PROCEDURE — 80053 COMPREHEN METABOLIC PANEL: CPT

## 2020-11-20 PROCEDURE — 83735 ASSAY OF MAGNESIUM: CPT

## 2020-11-20 PROCEDURE — A4216 STERILE WATER/SALINE, 10 ML: HCPCS | Performed by: INTERNAL MEDICINE

## 2020-11-20 PROCEDURE — 25000003 PHARM REV CODE 250: Performed by: NURSE PRACTITIONER

## 2020-11-20 PROCEDURE — 83615 LACTATE (LD) (LDH) ENZYME: CPT

## 2020-11-20 PROCEDURE — 85025 COMPLETE CBC W/AUTO DIFF WBC: CPT

## 2020-11-20 PROCEDURE — 82728 ASSAY OF FERRITIN: CPT

## 2020-11-20 PROCEDURE — 99232 PR SUBSEQUENT HOSPITAL CARE,LEVL II: ICD-10-PCS | Mod: ,,, | Performed by: INTERNAL MEDICINE

## 2020-11-20 PROCEDURE — 25000003 PHARM REV CODE 250: Performed by: HOSPITALIST

## 2020-11-20 PROCEDURE — 94761 N-INVAS EAR/PLS OXIMETRY MLT: CPT

## 2020-11-20 PROCEDURE — 36415 COLL VENOUS BLD VENIPUNCTURE: CPT

## 2020-11-20 RX ORDER — TORSEMIDE 20 MG/1
20 TABLET ORAL DAILY
Status: DISCONTINUED | OUTPATIENT
Start: 2020-11-21 | End: 2020-11-22

## 2020-11-20 RX ADMIN — FUROSEMIDE 40 MG: 10 INJECTION, SOLUTION INTRAMUSCULAR; INTRAVENOUS at 10:11

## 2020-11-20 RX ADMIN — CHOLECALCIFEROL TAB 25 MCG (1000 UNIT) 1000 UNITS: 25 TAB at 10:11

## 2020-11-20 RX ADMIN — REMDESIVIR 100 MG: 100 INJECTION, POWDER, LYOPHILIZED, FOR SOLUTION INTRAVENOUS at 05:11

## 2020-11-20 RX ADMIN — Medication 10 ML: at 12:11

## 2020-11-20 RX ADMIN — GUAIFENESIN AND DEXTROMETHORPHAN 10 ML: 100; 10 SYRUP ORAL at 01:11

## 2020-11-20 RX ADMIN — VANCOMYCIN HYDROCHLORIDE 1500 MG: 1.5 INJECTION, POWDER, LYOPHILIZED, FOR SOLUTION INTRAVENOUS at 06:11

## 2020-11-20 RX ADMIN — GUAIFENESIN AND DEXTROMETHORPHAN 10 ML: 100; 10 SYRUP ORAL at 06:11

## 2020-11-20 RX ADMIN — GUAIFENESIN AND DEXTROMETHORPHAN 10 ML: 100; 10 SYRUP ORAL at 12:11

## 2020-11-20 RX ADMIN — GABAPENTIN 300 MG: 300 CAPSULE ORAL at 07:11

## 2020-11-20 RX ADMIN — OXYCODONE HYDROCHLORIDE AND ACETAMINOPHEN 500 MG: 500 TABLET ORAL at 10:11

## 2020-11-20 RX ADMIN — GUAIFENESIN AND DEXTROMETHORPHAN 10 ML: 100; 10 SYRUP ORAL at 05:11

## 2020-11-20 RX ADMIN — OXYCODONE HYDROCHLORIDE AND ACETAMINOPHEN 500 MG: 500 TABLET ORAL at 07:11

## 2020-11-20 RX ADMIN — OXYCODONE HYDROCHLORIDE 20 MG: 10 TABLET ORAL at 06:11

## 2020-11-20 RX ADMIN — OXYCODONE HYDROCHLORIDE 20 MG: 10 TABLET ORAL at 12:11

## 2020-11-20 RX ADMIN — OXYCODONE HYDROCHLORIDE 20 MG: 10 TABLET ORAL at 05:11

## 2020-11-20 RX ADMIN — GABAPENTIN 300 MG: 300 CAPSULE ORAL at 03:11

## 2020-11-20 RX ADMIN — ACETAMINOPHEN 650 MG: 325 TABLET ORAL at 12:11

## 2020-11-20 RX ADMIN — MAGNESIUM OXIDE 400 MG (241.3 MG MAGNESIUM) TABLET 400 MG: TABLET at 10:11

## 2020-11-20 RX ADMIN — GABAPENTIN 300 MG: 300 CAPSULE ORAL at 10:11

## 2020-11-20 RX ADMIN — Medication 10 ML: at 06:11

## 2020-11-20 RX ADMIN — ACETAMINOPHEN 650 MG: 325 TABLET ORAL at 10:11

## 2020-11-20 RX ADMIN — OXYCODONE HYDROCHLORIDE 20 MG: 10 TABLET ORAL at 01:11

## 2020-11-20 RX ADMIN — MIRTAZAPINE 15 MG: 7.5 TABLET ORAL at 07:11

## 2020-11-20 RX ADMIN — ACETAMINOPHEN 650 MG: 325 TABLET ORAL at 05:11

## 2020-11-20 RX ADMIN — MUPIROCIN: 20 OINTMENT TOPICAL at 09:11

## 2020-11-20 RX ADMIN — MAGNESIUM OXIDE 400 MG (241.3 MG MAGNESIUM) TABLET 400 MG: TABLET at 07:11

## 2020-11-20 RX ADMIN — ENOXAPARIN SODIUM 40 MG: 40 INJECTION SUBCUTANEOUS at 05:11

## 2020-11-20 NOTE — PLAN OF CARE
EOS: No changes. Patient denied any needs. Complaints of her hip hurting, medications given as ordered. Care of plan reviewed, education provided, q 2 hourly rounding.EMILY Manjarrez    Problem: Adult Inpatient Plan of Care  Goal: Plan of Care Review  Outcome: Ongoing, Progressing  Goal: Patient-Specific Goal (Individualization)  Outcome: Ongoing, Progressing  Goal: Absence of Hospital-Acquired Illness or Injury  Outcome: Ongoing, Progressing  Goal: Optimal Comfort and Wellbeing  Outcome: Ongoing, Progressing  Goal: Readiness for Transition of Care  Outcome: Ongoing, Progressing  Goal: Rounds/Family Conference  Outcome: Ongoing, Progressing     Problem: Adjustment to Illness (Sepsis/Septic Shock)  Goal: Optimal Coping  Outcome: Ongoing, Progressing     Problem: Bleeding (Sepsis/Septic Shock)  Goal: Absence of Bleeding  Outcome: Ongoing, Progressing     Problem: Glycemic Control Impaired (Sepsis/Septic Shock)  Goal: Blood Glucose Level Within Desired Range  Outcome: Ongoing, Progressing     Problem: Hemodynamic Instability (Sepsis/Septic Shock)  Goal: Effective Tissue Perfusion  Outcome: Ongoing, Progressing     Problem: Infection (Sepsis/Septic Shock)  Goal: Absence of Infection Signs/Symptoms  Outcome: Ongoing, Progressing     Problem: Nutrition Impaired (Sepsis/Septic Shock)  Goal: Optimal Nutrition Intake  Outcome: Ongoing, Progressing     Problem: Respiratory Compromise (Sepsis/Septic Shock)  Goal: Effective Oxygenation and Ventilation  Outcome: Ongoing, Progressing     Problem: Fluid Imbalance (Pneumonia)  Goal: Fluid Balance  Outcome: Ongoing, Progressing     Problem: Infection (Pneumonia)  Goal: Resolution of Infection Signs/Symptoms  Outcome: Ongoing, Progressing     Problem: Respiratory Compromise (Pneumonia)  Goal: Effective Oxygenation and Ventilation  Outcome: Ongoing, Progressing     Problem: Infection  Goal: Infection Symptom Resolution  Outcome: Ongoing, Progressing     Problem: Wound  Goal: Optimal Wound  Healing  Outcome: Ongoing, Progressing     Problem: Skin Injury Risk Increased  Goal: Skin Health and Integrity  Outcome: Ongoing, Progressing     Problem: Communication Impairment (Mechanical Ventilation, Invasive)  Goal: Effective Communication  Outcome: Ongoing, Progressing     Problem: Device-Related Complication Risk (Mechanical Ventilation, Invasive)  Goal: Optimal Device Function  Outcome: Ongoing, Progressing     Problem: Inability to Wean (Mechanical Ventilation, Invasive)  Goal: Mechanical Ventilation Liberation  Outcome: Ongoing, Progressing     Problem: Nutrition Impairment (Mechanical Ventilation, Invasive)  Goal: Optimal Nutrition Delivery  Outcome: Ongoing, Progressing     Problem: Skin and Tissue Injury (Mechanical Ventilation, Invasive)  Goal: Absence of Device-Related Skin and Tissue Injury  Outcome: Ongoing, Progressing     Problem: Ventilator-Induced Lung Injury (Mechanical Ventilation, Invasive)  Goal: Absence of Ventilator-Induced Lung Injury  Outcome: Ongoing, Progressing     Problem: Communication Impairment (Artificial Airway)  Goal: Effective Communication  Outcome: Ongoing, Progressing     Problem: Device-Related Complication Risk (Artificial Airway)  Goal: Optimal Device Function  Outcome: Ongoing, Progressing     Problem: Skin and Tissue Injury (Artificial Airway)  Goal: Absence of Device-Related Skin or Tissue Injury  Outcome: Ongoing, Progressing     Problem: Noninvasive Ventilation Acute  Goal: Effective Unassisted Ventilation and Oxygenation  Outcome: Ongoing, Progressing     Problem: Fall Injury Risk  Goal: Absence of Fall and Fall-Related Injury  Outcome: Ongoing, Progressing     Problem: Restraint, Nonbehavioral (Nonviolent)  Goal: Discontinuation Criteria Achieved  Outcome: Ongoing, Progressing  Goal: Personal Dignity and Safety Maintained  Outcome: Ongoing, Progressing     Problem: Electrolyte Imbalance (Acute Kidney Injury/Impairment)  Goal: Serum Electrolyte Balance  Outcome:  Ongoing, Progressing     Problem: Fluid Imbalance (Acute Kidney Injury/Impairment)  Goal: Optimal Fluid Balance  Outcome: Ongoing, Progressing     Problem: Hematologic Alteration (Acute Kidney Injury/Impairment)  Goal: Hemoglobin, Hematocrit and Platelets Within Normal Range  Outcome: Ongoing, Progressing     Problem: Oral Intake Inadequate (Acute Kidney Injury/Impairment)  Goal: Optimal Nutrition Intake  Outcome: Ongoing, Progressing     Problem: Renal Function Impairment (Acute Kidney Injury/Impairment)  Goal: Effective Renal Function  Outcome: Ongoing, Progressing

## 2020-11-20 NOTE — PROGRESS NOTES
Pharmacokinetic Assessment Follow Up: IV Vancomycin    Vancomycin Assessment & Plan:  -Vancomycin trough prior to 3rd dose on new regimen of vancomycin 1500 mg q24h drawn ~1.5hr post start of vancomycin infusion, vancomycin level considered peak.   -Re-time vancomycin trough prior to 4th dose 11/21 0600  -Scr remains stable    Drug levels (last 3 results):  Recent Labs   Lab Result Units 11/20/20  0811   Vancomycin-Trough ug/mL 56.7*       Pharmacy will continue to follow and monitor vancomycin.    Please contact pharmacy at extension [phone] for questions regarding this assessment.    Thank you for the consult,   Faye Zepeda       Patient brief summary:  Марина Britton is a 30 y.o. female initiated on antimicrobial therapy with IV Vancomycin for treatment of endocarditis    The patient's current regimen is vancomycin 1250 mg q24h    Drug Allergies:   Review of patient's allergies indicates:   Allergen Reactions    Soap Rash     [facility] - Hospital supplied - cleanser shampoo & body wash gel  Ingredients - purified water, sodium laureth sulfate, sodium chloride, cocamide william, cocamidopropylamine oxide, fragrance, aloe barbadensis leaf juice, propylene alcohol, peg-150 distearate, diazolidinyl urea, ciric acid, methylparaben, & propulparaben       Actual Body Weight:   88.5 kg    Renal Function:   Estimated Creatinine Clearance: 93.7 mL/min (based on SCr of 0.9 mg/dL).,     Dialysis Method (if applicable):  N/A

## 2020-11-20 NOTE — PLAN OF CARE
11/20/20 0849   Post-Acute Status   Post-Acute Authorization Placement     REJI contacted Debbie admissions coordinator with [facility] [phone] to follow up on patient referrals and she reports that the referral is still under review at this time.     REJI contacted Alpa coordinator with [facility] and she is stating that patient referral is being reviewed and will contact  with a decision.    Updated 1:42pm  SW received a call from Alpa, coordinator with [facility] and she states that patient will not be accepted due to having COV 19.    Updated 1:45pm  SW contacted [facility] and left a voicemail for Debbie awaiting a call back.      Updated 3:09pm  REJI received a call from Valeria preston with [employer] and informed her that [facility] declined.      Padmini Myers, SHAN  [facility]   [phone]

## 2020-11-21 LAB
BACTERIA #/AREA URNS AUTO: ABNORMAL /HPF
BACTERIA BLD CULT: NORMAL
BILIRUB UR QL STRIP: NEGATIVE
CLARITY UR REFRACT.AUTO: ABNORMAL
COLOR UR AUTO: YELLOW
GLUCOSE UR QL STRIP: NEGATIVE
HGB UR QL STRIP: ABNORMAL
KETONES UR QL STRIP: NEGATIVE
LEUKOCYTE ESTERASE UR QL STRIP: ABNORMAL
MICROSCOPIC COMMENT: ABNORMAL
NITRITE UR QL STRIP: NEGATIVE
NON-SQ EPI CELLS #/AREA URNS AUTO: <1 /HPF
PH UR STRIP: 7 [PH] (ref 5–8)
PROT UR QL STRIP: NEGATIVE
RBC #/AREA URNS AUTO: >100 /HPF (ref 0–4)
SP GR UR STRIP: 1.01 (ref 1–1.03)
SQUAMOUS #/AREA URNS AUTO: 1 /HPF
URN SPEC COLLECT METH UR: ABNORMAL
VANCOMYCIN TROUGH SERPL-MCNC: 61.9 UG/ML (ref 10–22)
WBC #/AREA URNS AUTO: 13 /HPF (ref 0–5)

## 2020-11-21 PROCEDURE — 80202 ASSAY OF VANCOMYCIN: CPT

## 2020-11-21 PROCEDURE — 36415 COLL VENOUS BLD VENIPUNCTURE: CPT

## 2020-11-21 PROCEDURE — 25000003 PHARM REV CODE 250: Performed by: HOSPITALIST

## 2020-11-21 PROCEDURE — 93010 ELECTROCARDIOGRAM REPORT: CPT | Mod: ,,, | Performed by: INTERNAL MEDICINE

## 2020-11-21 PROCEDURE — 99233 SBSQ HOSP IP/OBS HIGH 50: CPT | Mod: ,,, | Performed by: INTERNAL MEDICINE

## 2020-11-21 PROCEDURE — 87086 URINE CULTURE/COLONY COUNT: CPT

## 2020-11-21 PROCEDURE — 87088 URINE BACTERIA CULTURE: CPT

## 2020-11-21 PROCEDURE — 94761 N-INVAS EAR/PLS OXIMETRY MLT: CPT

## 2020-11-21 PROCEDURE — 94640 AIRWAY INHALATION TREATMENT: CPT

## 2020-11-21 PROCEDURE — A4216 STERILE WATER/SALINE, 10 ML: HCPCS | Performed by: INTERNAL MEDICINE

## 2020-11-21 PROCEDURE — 63600175 PHARM REV CODE 636 W HCPCS: Performed by: INTERNAL MEDICINE

## 2020-11-21 PROCEDURE — 25000003 PHARM REV CODE 250: Performed by: INTERNAL MEDICINE

## 2020-11-21 PROCEDURE — 93005 ELECTROCARDIOGRAM TRACING: CPT

## 2020-11-21 PROCEDURE — 20600001 HC STEP DOWN PRIVATE ROOM

## 2020-11-21 PROCEDURE — 81001 URINALYSIS AUTO W/SCOPE: CPT

## 2020-11-21 PROCEDURE — 87040 BLOOD CULTURE FOR BACTERIA: CPT

## 2020-11-21 PROCEDURE — 25000003 PHARM REV CODE 250: Performed by: NURSE PRACTITIONER

## 2020-11-21 PROCEDURE — 99900035 HC TECH TIME PER 15 MIN (STAT)

## 2020-11-21 PROCEDURE — 25000242 PHARM REV CODE 250 ALT 637 W/ HCPCS: Performed by: INTERNAL MEDICINE

## 2020-11-21 PROCEDURE — 99233 PR SUBSEQUENT HOSPITAL CARE,LEVL III: ICD-10-PCS | Mod: ,,, | Performed by: INTERNAL MEDICINE

## 2020-11-21 PROCEDURE — 93010 EKG 12-LEAD: ICD-10-PCS | Mod: ,,, | Performed by: INTERNAL MEDICINE

## 2020-11-21 RX ORDER — LEVALBUTEROL INHALATION SOLUTION 0.63 MG/3ML
0.63 SOLUTION RESPIRATORY (INHALATION) EVERY 8 HOURS
Status: DISCONTINUED | OUTPATIENT
Start: 2020-11-21 | End: 2020-11-23

## 2020-11-21 RX ORDER — AMLODIPINE BESYLATE 5 MG/1
5 TABLET ORAL DAILY
Status: DISCONTINUED | OUTPATIENT
Start: 2020-11-22 | End: 2020-11-24 | Stop reason: HOSPADM

## 2020-11-21 RX ORDER — CEFEPIME HYDROCHLORIDE 1 G/1
1 INJECTION, POWDER, FOR SOLUTION INTRAMUSCULAR; INTRAVENOUS
Status: DISCONTINUED | OUTPATIENT
Start: 2020-11-21 | End: 2020-11-24 | Stop reason: HOSPADM

## 2020-11-21 RX ORDER — GUAIFENESIN 600 MG/1
600 TABLET, EXTENDED RELEASE ORAL 2 TIMES DAILY
Status: DISCONTINUED | OUTPATIENT
Start: 2020-11-21 | End: 2020-11-21

## 2020-11-21 RX ORDER — IPRATROPIUM BROMIDE 0.5 MG/2.5ML
0.5 SOLUTION RESPIRATORY (INHALATION) EVERY 8 HOURS
Status: DISCONTINUED | OUTPATIENT
Start: 2020-11-21 | End: 2020-11-24 | Stop reason: HOSPADM

## 2020-11-21 RX ORDER — IBUPROFEN 400 MG/1
400 TABLET ORAL EVERY 6 HOURS PRN
Status: DISCONTINUED | OUTPATIENT
Start: 2020-11-21 | End: 2020-11-24 | Stop reason: HOSPADM

## 2020-11-21 RX ORDER — GUAIFENESIN 600 MG/1
600 TABLET, EXTENDED RELEASE ORAL 2 TIMES DAILY
Status: DISCONTINUED | OUTPATIENT
Start: 2020-11-21 | End: 2020-11-24 | Stop reason: HOSPADM

## 2020-11-21 RX ADMIN — GUAIFENESIN AND DEXTROMETHORPHAN 10 ML: 100; 10 SYRUP ORAL at 06:11

## 2020-11-21 RX ADMIN — Medication 10 ML: at 12:11

## 2020-11-21 RX ADMIN — MUPIROCIN: 20 OINTMENT TOPICAL at 08:11

## 2020-11-21 RX ADMIN — GUAIFENESIN AND DEXTROMETHORPHAN 10 ML: 100; 10 SYRUP ORAL at 12:11

## 2020-11-21 RX ADMIN — OXYCODONE HYDROCHLORIDE 20 MG: 10 TABLET ORAL at 12:11

## 2020-11-21 RX ADMIN — TORSEMIDE 20 MG: 20 TABLET ORAL at 08:11

## 2020-11-21 RX ADMIN — MAGNESIUM OXIDE 400 MG (241.3 MG MAGNESIUM) TABLET 400 MG: TABLET at 08:11

## 2020-11-21 RX ADMIN — CHOLECALCIFEROL TAB 25 MCG (1000 UNIT) 1000 UNITS: 25 TAB at 08:11

## 2020-11-21 RX ADMIN — CEFEPIME 1 G: 1 INJECTION, POWDER, FOR SOLUTION INTRAMUSCULAR; INTRAVENOUS at 02:11

## 2020-11-21 RX ADMIN — CEFEPIME 1 G: 1 INJECTION, POWDER, FOR SOLUTION INTRAMUSCULAR; INTRAVENOUS at 08:11

## 2020-11-21 RX ADMIN — GABAPENTIN 300 MG: 300 CAPSULE ORAL at 08:11

## 2020-11-21 RX ADMIN — Medication 10 ML: at 06:11

## 2020-11-21 RX ADMIN — MUPIROCIN: 20 OINTMENT TOPICAL at 09:11

## 2020-11-21 RX ADMIN — LEVALBUTEROL HYDROCHLORIDE 0.63 MG: 0.63 SOLUTION RESPIRATORY (INHALATION) at 04:11

## 2020-11-21 RX ADMIN — OXYCODONE HYDROCHLORIDE 20 MG: 10 TABLET ORAL at 06:11

## 2020-11-21 RX ADMIN — REMDESIVIR 100 MG: 100 INJECTION, POWDER, LYOPHILIZED, FOR SOLUTION INTRAVENOUS at 05:11

## 2020-11-21 RX ADMIN — ACETAMINOPHEN 650 MG: 325 TABLET ORAL at 08:11

## 2020-11-21 RX ADMIN — IPRATROPIUM BROMIDE 0.5 MG: 0.5 SOLUTION RESPIRATORY (INHALATION) at 04:11

## 2020-11-21 RX ADMIN — ACETAMINOPHEN 650 MG: 325 TABLET ORAL at 12:11

## 2020-11-21 RX ADMIN — ENOXAPARIN SODIUM 40 MG: 40 INJECTION SUBCUTANEOUS at 05:11

## 2020-11-21 RX ADMIN — OXYCODONE HYDROCHLORIDE 20 MG: 10 TABLET ORAL at 05:11

## 2020-11-21 RX ADMIN — GUAIFENESIN 600 MG: 600 TABLET, EXTENDED RELEASE ORAL at 08:11

## 2020-11-21 RX ADMIN — OXYCODONE HYDROCHLORIDE AND ACETAMINOPHEN 500 MG: 500 TABLET ORAL at 08:11

## 2020-11-21 RX ADMIN — VANCOMYCIN HYDROCHLORIDE 1500 MG: 1.5 INJECTION, POWDER, LYOPHILIZED, FOR SOLUTION INTRAVENOUS at 06:11

## 2020-11-21 RX ADMIN — GABAPENTIN 300 MG: 300 CAPSULE ORAL at 02:11

## 2020-11-21 RX ADMIN — MIRTAZAPINE 15 MG: 7.5 TABLET ORAL at 08:11

## 2020-11-21 RX ADMIN — ACETAMINOPHEN 1000 MG: 500 TABLET ORAL at 12:11

## 2020-11-21 NOTE — PLAN OF CARE
EOS: no acute changes. Patient slept through the night. Vss. Fever tmax 102, last temp 99.2. Denied any needs. Care of plan reviewed, education provided, q 2 rounding. [facility]    Problem: Adult Inpatient Plan of Care  Goal: Plan of Care Review  Outcome: Ongoing, Progressing  Goal: Patient-Specific Goal (Individualization)  Outcome: Ongoing, Progressing  Goal: Absence of Hospital-Acquired Illness or Injury  Outcome: Ongoing, Progressing  Goal: Optimal Comfort and Wellbeing  Outcome: Ongoing, Progressing  Goal: Readiness for Transition of Care  Outcome: Ongoing, Progressing  Goal: Rounds/Family Conference  Outcome: Ongoing, Progressing     Problem: Adjustment to Illness (Sepsis/Septic Shock)  Goal: Optimal Coping  Outcome: Ongoing, Progressing     Problem: Bleeding (Sepsis/Septic Shock)  Goal: Absence of Bleeding  Outcome: Ongoing, Progressing     Problem: Glycemic Control Impaired (Sepsis/Septic Shock)  Goal: Blood Glucose Level Within Desired Range  Outcome: Ongoing, Progressing     Problem: Hemodynamic Instability (Sepsis/Septic Shock)  Goal: Effective Tissue Perfusion  Outcome: Ongoing, Progressing     Problem: Infection (Sepsis/Septic Shock)  Goal: Absence of Infection Signs/Symptoms  Outcome: Ongoing, Progressing     Problem: Nutrition Impaired (Sepsis/Septic Shock)  Goal: Optimal Nutrition Intake  Outcome: Ongoing, Progressing     Problem: Respiratory Compromise (Sepsis/Septic Shock)  Goal: Effective Oxygenation and Ventilation  Outcome: Ongoing, Progressing     Problem: Fluid Imbalance (Pneumonia)  Goal: Fluid Balance  Outcome: Ongoing, Progressing     Problem: Infection (Pneumonia)  Goal: Resolution of Infection Signs/Symptoms  Outcome: Ongoing, Progressing     Problem: Respiratory Compromise (Pneumonia)  Goal: Effective Oxygenation and Ventilation  Outcome: Ongoing, Progressing     Problem: Infection  Goal: Infection Symptom Resolution  Outcome: Ongoing, Progressing     Problem: Wound  Goal: Optimal Wound  Healing  Outcome: Ongoing, Progressing     Problem: Skin Injury Risk Increased  Goal: Skin Health and Integrity  Outcome: Ongoing, Progressing     Problem: Communication Impairment (Mechanical Ventilation, Invasive)  Goal: Effective Communication  Outcome: Ongoing, Progressing     Problem: Device-Related Complication Risk (Mechanical Ventilation, Invasive)  Goal: Optimal Device Function  Outcome: Ongoing, Progressing     Problem: Inability to Wean (Mechanical Ventilation, Invasive)  Goal: Mechanical Ventilation Liberation  Outcome: Ongoing, Progressing     Problem: Nutrition Impairment (Mechanical Ventilation, Invasive)  Goal: Optimal Nutrition Delivery  Outcome: Ongoing, Progressing     Problem: Skin and Tissue Injury (Mechanical Ventilation, Invasive)  Goal: Absence of Device-Related Skin and Tissue Injury  Outcome: Ongoing, Progressing     Problem: Ventilator-Induced Lung Injury (Mechanical Ventilation, Invasive)  Goal: Absence of Ventilator-Induced Lung Injury  Outcome: Ongoing, Progressing     Problem: Communication Impairment (Artificial Airway)  Goal: Effective Communication  Outcome: Ongoing, Progressing     Problem: Device-Related Complication Risk (Artificial Airway)  Goal: Optimal Device Function  Outcome: Ongoing, Progressing     Problem: Skin and Tissue Injury (Artificial Airway)  Goal: Absence of Device-Related Skin or Tissue Injury  Outcome: Ongoing, Progressing     Problem: Noninvasive Ventilation Acute  Goal: Effective Unassisted Ventilation and Oxygenation  Outcome: Ongoing, Progressing     Problem: Fall Injury Risk  Goal: Absence of Fall and Fall-Related Injury  Outcome: Ongoing, Progressing     Problem: Restraint, Nonbehavioral (Nonviolent)  Goal: Discontinuation Criteria Achieved  Outcome: Ongoing, Progressing  Goal: Personal Dignity and Safety Maintained  Outcome: Ongoing, Progressing     Problem: Electrolyte Imbalance (Acute Kidney Injury/Impairment)  Goal: Serum Electrolyte Balance  Outcome:  Ongoing, Progressing     Problem: Fluid Imbalance (Acute Kidney Injury/Impairment)  Goal: Optimal Fluid Balance  Outcome: Ongoing, Progressing     Problem: Hematologic Alteration (Acute Kidney Injury/Impairment)  Goal: Hemoglobin, Hematocrit and Platelets Within Normal Range  Outcome: Ongoing, Progressing     Problem: Oral Intake Inadequate (Acute Kidney Injury/Impairment)  Goal: Optimal Nutrition Intake  Outcome: Ongoing, Progressing     Problem: Renal Function Impairment (Acute Kidney Injury/Impairment)  Goal: Effective Renal Function  Outcome: Ongoing, Progressing

## 2020-11-21 NOTE — PROGRESS NOTES
[facility] - ICU 15 [facility] Medicine  Telemedicine Progress Note    Patient Name: Марина Britton  MRN: [id number]  Patient Class: IP- Inpatient   Admission Date: 10/26/2020  Length of Stay: 25 days  Attending Physician: Cierra Savage MD  Primary Care Provider: Luis Felipe Jose Iii, MD    [facility] [place]: [facility] 10 Cierra Savage MD  Virtual Telemedicine Progress Note  Start time: 124p  Chief complaint: Endocarditis of tricuspid valve  The patient location is: [facility]/[address] A  The patient arrived at: 10/26/2020  7:45 PM  Present with the patient at the time of the telemed/virtual assessment: n/a  End time:  136p  Total time spent with patient: 12 min  I have assessed findings virtually using a telemedicine platform and with assistance of the bedside nurse or telemedicine presenter.  The attending portion of this evaluation, treatment, and documentation was performed per Cierra Savage MD via audiovisual.    Patient was transferred to the telemedicine service on: 11/15/2020    Subjective:     Admission CC:   Chief Complaint   Patient presents with    Foot Pain       Fever and left foot pain for the past 3 days. Foot pain worse today Here 3 days ago for foot abscess, prescribed clindamycin.    Chest Pain       Sharp chest pain when taking a deep breath that started earlier today, Denies n/v. +SOB     Follow up visit for: Endocarditis of tricuspid valve    Interval History / Events Overnight:   Elevated temp to 102 this pm - had right ear pain after using IS and feeling a pop; hearing feels muffled; has been taking tylenol twice daily; IV lasix makes her feel strange even though she is urinating more; fatigue is improved    Review of Systems   Constitutional: Positive for fever. Negative for appetite change.   Respiratory: Positive for cough, chest tightness and shortness of breath.    Cardiovascular: Positive for chest pain.   Gastrointestinal: Negative for diarrhea.      Objective:     Vital Signs (Most Recent):  Temp: (!) 102 °F (38.9 °C) (11/20/20 1940)  Pulse: (!) 128 (11/20/20 1940)  Resp: 16 (11/20/20 1728)  BP: 127/83 (11/20/20 1940)  SpO2: (!) 90 % (11/20/20 1940) Vital Signs (24h Range):  Temp:  [99.1 °F (37.3 °C)-102 °F (38.9 °C)] 102 °F (38.9 °C)  Pulse:  [100-128] 128  Resp:  [16] 16  SpO2:  [90 %-95 %] 90 %  BP: (115-127)/(82-87) 127/83     Weight: 88.5 kg (195 lb)  Body mass index is 35.67 kg/m².    Intake/Output Summary (Last 24 hours) at 11/20/2020 2147  Last data filed at 11/20/2020 0700  Gross per 24 hour   Intake 240 ml   Output --   Net 240 ml      Physical Exam  Constitutional:       General: She is not in acute distress.     Appearance: Normal appearance. She is not diaphoretic.   Eyes:      General: Lids are normal. No scleral icterus.        Right eye: No discharge.         Left eye: No discharge.      Conjunctiva/sclera: Conjunctivae normal.   Cardiovascular:      Rate and Rhythm: Tachycardia present.   Pulmonary:      Effort: Pulmonary effort is normal. No tachypnea, accessory muscle usage or respiratory distress.   Abdominal:      General: There is no distension.   Musculoskeletal:      Right lower leg: Edema present.      Left lower leg: Edema present.   Skin:     Coloration: Skin is not cyanotic.   Neurological:      Mental Status: She is alert. She is not disoriented.   Psychiatric:         Attention and Perception: Attention normal.         Mood and Affect: Affect normal.         Speech: Speech normal.         Behavior: Behavior is cooperative.         Significant Labs:     Recent Labs   Lab 11/16/20  0353 11/18/20  0605 11/20/20  0811   WBC 12.84* 11.39 8.72   HGB 8.9* 7.6* 8.5*   HCT 31.0* 25.3* 29.4*   * 295 420*     Recent Labs   Lab 11/16/20  0353 11/18/20  0605 11/20/20  0811   GRAN 76.8*  9.9* 75.1*  8.6* 68.0  5.9   LYMPH 15.2*  2.0 13.5*  1.5 17.0*  1.5   MONO 5.5  0.7 7.3  0.8 8.4  0.7   EOS 0.1 0.3 0.4     Recent Labs    Lab 11/16/20  0353 11/18/20  0605 11/20/20  0811    140 140   K 3.3* 3.9 3.7    101 101   CO2 30* 28 28   BUN 28* 26* 18   CREATININE 1.0 0.9 0.9   GLU 80 91 82   CALCIUM 8.0* 7.9* 8.4*   ALBUMIN 1.7* 1.8* 2.1*   MG 1.6 1.6 1.9     Recent Labs   Lab 11/16/20  0353 11/18/20  0605 11/20/20  0811   ALKPHOS 169* 169* 148*   ALT 67* 65* 39   AST 62* 44* 16   PROT 6.4 5.9* 6.8   BILITOT 0.5 0.4 0.5     Procalcitonin (ng/mL)   Date Value   11/16/2020 0.10   11/12/2020 0.22     Lactate (Lactic Acid) (mmol/L)   Date Value   10/26/2020 1.8     BNP (pg/mL)   Date Value   11/20/2020 360 (H)   11/13/2020 557 (H)     CRP   Date Value   11/20/2020 126.8 mg/L (H)   11/18/2020 105.5 mg/L (H)   11/16/2020 54.0 mg/L (H)   11/12/2020 111.2 mg/L (H)   11/07/2020 174.2 mg/L (H)   10/24/2020 31.00 mg/dL (H)     D-Dimer (mg/L FEU)   Date Value   11/20/2020 3.46 (H)   11/16/2020 5.13 (H)   11/14/2020 7.36 (H)   11/12/2020 5.98 (H)   10/24/2020 8.22 (H)     Ferritin (ng/mL)   Date Value   11/20/2020 618 (H)   11/18/2020 628 (H)   11/16/2020 567 (H)   11/14/2020 767 (H)   11/10/2020 686 (H)     LD (U/L)   Date Value   11/20/2020 232   11/18/2020 246   11/16/2020 233   10/28/2020 382 (H)     Troponin I (ng/mL)   Date Value   10/24/2020 <0.020   10/24/2020 0.055 (H)     CPK (U/L)   Date Value   10/29/2020 55   10/24/2020 17 (L)   10/24/2020 17 (L)     Results for orders placed or performed during the hospital encounter of 10/26/20   Vitamin D   Result Value Ref Range    Vit D, 25-Hydroxy 9 (L) 30 - 96 ng/mL     SARS-CoV2 (COVID-19) Qualitative PCR (no units)   Date Value   11/11/2020 Detected (A)     SARS-CoV-2 RNA, Amplification, Qual (no units)   Date Value   11/17/2020 Negative   10/26/2020 Negative   10/24/2020 Negative       Echo Color Flow Doppler? Yes  · The left ventricle is normal in size with normal systolic function. The   estimated ejection fraction is 60%.  · Moderate right ventricular enlargement with normal right  ventricular   systolic function.  · Normal left ventricular diastolic function.  · Severe right atrial enlargement.  · The anterior leaflet of the tricuspid valve is completely flail with   resultant severe regurgitation. Valve and chordal tissue prolapsing well   back into the right atrium likely have associated vegetation, but   distinguishing between vegetation, valve, and chord is difficult, even   with these good images.  · The estimated PA systolic pressure is 54 mmHg.  · Elevated central venous pressure (15 mmHg).       Assessment/Plan:      Active Diagnoses:    Diagnosis Date Noted POA    PRINCIPAL PROBLEM:  Endocarditis of tricuspid valve [I07.9] 10/26/2020 Yes    Tricuspid valve insufficiency [I07.1]  Yes    Vitamin D deficiency [E55.9] 11/15/2020 Yes    COVID-19 virus detected [U07.1] 11/12/2020 Yes    Bacteremia [R78.81]  Yes    Bacterial endocarditis [I33.0]  Yes    Opioid use disorder, severe, dependence [F11.20] 11/03/2020 Yes     Chronic    Cannabis use disorder, moderate, dependence [F12.20] 11/03/2020 Yes     Chronic    Sedative, hypnotic or anxiolytic use disorder, severe, 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] 11/03/2020 Yes     Chronic    Substance induced mood disorder [F19.94] 11/03/2020 Yes     Chronic    Acute renal insufficiency [N28.9] 11/03/2020 No    MRSA bacteremia [R78.81, B95.62] 10/27/2020 Yes    Pulmonary emboli [I26.99] 10/26/2020 Yes    IVDU (intravenous drug user) [F19.90] 10/26/2020 Yes     Chronic    Pneumonia of both lungs due to infectious organism [J18.9] 10/24/2020 Yes      Problems Resolved During this Admission:    Diagnosis Date Noted Date Resolved POA    Hyperkalemia [E87.5] 11/04/2020 11/15/2020 No    Sepsis with acute hypoxic respiratory failure [A41.9, R65.20, J96.01] 10/27/2020 10/27/2020 Yes    Severe sepsis [A41.9, R65.20] 10/26/2020 11/04/2020 Yes    Tachypnea [R06.82] 10/26/2020 11/04/2020 Yes       Overview / ICU Course:    Марина Britton is a 30 y.o. female  admitted for Endocarditis of tricuspid valve. Patient with HCV, IVDU who was transferred from [facility] where she presented with chest pain and was diagnosed with MRSA bacteremia associated with TV IE with septic emboli, acute hypoxic respiratory failure, septic/metabolic encephalopathy, septic PE, septic shock, and acute anemia. [place] [facility] [place] and was intubated for respiratory failure. She has subsequently been evaluated by ID, CTS, and Psychiatry. She required pRBC transfusion on 10/29, and norepi on 10/30. She developed acute renal insufficiency which subsequently resolved. She was extubated on 11/2, then transferred to Hospital Medicine on 11/3 for further care, planning on long-term IV antibiotics with vancomycin and repeat CTS evaluation as an outpatient. Subsequently her acute renal insufficiency returned, and again febrile for which BCx were obtained on 11/4 and 11/5 and have remained NGTD. ID was reconsulted on 11/6, and cefepime was added to her regimen. CT C/A/P obtained shows continued infectious lung processes. TTE shows progressive valvular damage with flail valve and severe MR, for which she is being diuresed.  Covid testing ordered for placement resulted positive 11/11, patient transferred to COVID19 isolation unit. Monitoring for signs/symptoms of COVID-19 and awaiting LTAC placement.    Inpatient Medications Prescribed for Management of Current Problems:     Scheduled Meds:    amLODIPine  10 mg Oral Daily    ascorbic acid (vitamin C)  500 mg Oral BID    dextromethorphan-guaifenesin  mg/5 ml  10 mL Oral Q6H    enoxaparin  40 mg Subcutaneous Q24H    gabapentin  300 mg Oral TID    magnesium oxide  400 mg Oral BID    mirtazapine  15 mg Oral QHS    mupirocin   Nasal BID    oxyCODONE  20 mg Oral Q6H    polyethylene glycol  17 g Oral Daily    remdesivir infusion  100 mg Intravenous Q24H    senna-docusate 8.6-50 mg  2 tablet Oral BID    sodium chloride  "0.9%  10 mL Intravenous Q6H    [START ON 11/21/2020] torsemide  20 mg Oral Daily    [START ON 11/21/2020] vancomycin (VANCOCIN) IVPB  1,500 mg Intravenous Q24H    vitamin D  1,000 Units Oral Daily     Continuous Infusions:   As Needed: sodium chloride, acetaminophen, acetaminophen, acetaminophen, benzonatate, calcium carbonate, diphenhydrAMINE, hydrOXYzine HCL, melatonin, oxyCODONE, polyethylene glycol, sodium chloride 0.9%, Flushing PICC Protocol **AND** sodium chloride 0.9% **AND** sodium chloride 0.9%, valproate sodium (DEPACON) IVPB, vancomycin - pharmacy to dose    Assessment and Plan by Problem    MRSA bacteremia  Pneumonia of both lungs due to infectious organism  Pulmonary emboli (septic)  Sepsis  Endocarditis of tricuspid valve  - Febrile intermittently, BCx from 11/4 and 11/5 NGTD. Per ID, she will likely continue to have intermittent fevers from her infectious process  - Continue vancomycin (pharmacy dosing). She has completed five days of cefepime  - PT/OT following and recommending IPR, LTAC referrals out  - Needs 6 weeks IV antibiotics, then follow up with CTS. awaiting placement.  - Anticipate medical stability following 5-day course of RDV - end 11/21  - repeat blood culture, urine studies and respiratory culture ordered      Flail tricuspid valve  Severe tricuspid regurgitation  Lower extremity edema  - Repeat TTE shows more advanced disease, tricuspid valve now flail, but also "distinguishing between vegetation, valve, and chord is difficult, even with these good images" per interpreting Cardiologist. Also shows elevated CVP.   - LE edema was improving with Lasix IV and now on po with edema unchanged - changed to lasix IV daily to improve edema but she dos not tolerate IV lasix due to strange effects; trial of oral torsemide to improve edema  - Previous provider discussed findings and ongoing fevers with Dr. Pritchett on 11/10 who recommended continued medical management as her current " deconditioned status is a contraindication to surgical intervention - f/u schedulded on 12/9 with ID and CTS     COVID infection  Stopped Dexamethasone 11/13, since not requiring O2 at this time.  - COVID-19 testing: Collection Date: 11/11/2020 Collection Time:  11:09 AM   - Isolation: Airborne/Droplet. Surgical mask on patient. Notify Infection Control  - Diagnostics: Trend Q48hrs if stable, more frequently if patient decompensating         Laboratory/Study Frequency Result   CBC Admit & Q48h     CMP Admit & Q48h     Magnesium level Admit     D-dimer Admit & Q48h 8.2 > 5.9   Ferritin Admit & Q48h 686   CRP Admit & Q48h 31 > 174 > 111   CPK Admit & Q24h if elevated     LDH Admit & Q48h 382   Vitamin D Admit ordered   BNP Admit ordered   Troponin Admit & Q6h if elevated     Glucose-6-phos dehydrogenase Admit     Procalcitonin Admit 0.22   Lipid Panel If using statin     Rapid influenza Admit     Resp Infection Panel Admit if BMT/organ transplant     Legionella Antigen Admit     Sputum Culture Admit     Blood Culture Admit     Urinalysis & Culture Admit     ECG Admit & Q48h if on HCQ     CXR Admit     Lymphopenia, hyponatremia, hyperferritinemia, elevated troponin, elevated d-dimer, age, and medical comorbidities are significant predictors of poor clinical outcome     - Management: per [facility] COVID Treatment Protocol (4/15/20)    - Monitoring:              - Telemetry & Continuous Pulse Oximetry    - Nutrition:               - Multivitamin PO daily              - Add Boost supplement              - Vitamin D 1000IU daily as she is Vitamin D deficient              - Ascorbic acid 500mg PO bid    - Supportive Care:              - acetaminophen 650mg PO Q6hr PRN fever/headache              - loperamide PRN viral diarrhea              - IVF if indicated, restrictive strategy preferred, no maintenance IV if able              - VTE PPx: enoxaparin or heparin SQ unless contraindicated    - Antibiotics:  - if indications,  CXR findings, elevated procal. See protocol for alternatives.   - Discontinue early if low concern for bacterial co-infection              - ceftriaxone 1g Q24h x 5 days - not started  - azithromycin 500mg po x1, then 250mg po daily x 4 days - not started    - Therapies:              - If patient meets criteria  - atorvastatin 40mg po daily  - Consult for RDV; febrile, meets criteria - RDV started 11/17     Acute renal insufficiency  Hyperkalemia  Hyponatremia - resolved  - sCr stable, steady with diuresis   - Etiology likely multifactorial with recent sepsis and vancomycin and poor PO intake  - Urine studies consistent with pre-renal (though cardiorenal would have a similar picture)     IVDU (intravenous drug user)  Opioid use disorder, severe, dependence  - Psych evaluated earlier this admission  - Would benefit from LTAC disposition     Substance induced mood disorder  -continue mirtazapine 15mg HS  -gabapentin 300 TID     Opioid Use disorder  Continue oxycodone 20mg q6 pRN  Continue discussion MAT with patient  Consider Addiction Psych     Chronic inflammatory anemia  - Hemoglobin 6.9 g/dL 11/10, given one unit of pRBCs  - Iron studies consistent with inflammatory process, in keeping with her systemic infection  - Monitor and transfuse for Hgb < 7     Wounds  Left foot and sacral spine  Wound care following. Appreciate recs.     LE edema  Mixed etiology, IV fluids and low albumin  Elevate feet, improve nutritional status  Lasix daily - IV for now     Malnutrition  - Nutrition following  - Added Boost to all meals      Diet: Diet Adult Regular (IDDSI Level 7)  GI Prophylaxis: Not indicated  Significant LDAs:   IV Access Type: PICC  Urinary Catheter Indication if present: Patient Does Not Have Urinary Catheter  Other Lines/Tubes/Drains:    HIGH RISK CONDITION(S):   Patient is currently on drug therapy requiring intensive monitoring for toxicity: Vancomycin     Goals of Care:    Previous admission:   10/24/20  Likely prognosis:  Poor  Code Status: Full Code  Comfort Only: No  Hospice: No  Goals at discharge: remain at home, with physician follow-up    Discharge Planning   VIANNEY: 11/23/2020     Code Status: Full Code   Is the patient medically ready for discharge?: No    Reason for patient still in hospital (select all that apply): Patient trending condition and Pending disposition  Discharge Plan A: Long-term acute care facility (LTAC)   Discharge Delays: None known at this time    VTE Risk Mitigation (From admission, onward)         Ordered     enoxaparin injection 40 mg  Every 24 hours      11/08/20 0838     Place sequential compression device  Until discontinued      10/26/20 2023                   Cierra Savage MD  Department of Hospital Medicine   [facility] - ICU 15 [facility]  [phone]

## 2020-11-21 NOTE — PROGRESS NOTES
[facility] - ICU 15 [facility] Medicine  Telemedicine Progress Note    Patient Name: Марина Britton  MRN: [id number]  Patient Class: IP- Inpatient   Admission Date: 10/26/2020  Length of Stay: 26 days  Attending Physician: Cierra Savage MD  Primary Care Provider: Luis Felipe Jose Iii, MD    [facility] Medicine Team: [facility] VIRTUAL TEAM 10 Cierra Savage MD  Virtual Telemedicine Progress Note  Start time: 105p  Chief complaint: Endocarditis of tricuspid valve  The patient location is: [facility]  The patient arrived at: 10/26/2020  7:45 PM  Present with the patient at the time of the telemed/virtual assessment: n/a  End time:  112p  Total time spent with patient: 12 min  I have assessed findings virtually using a telemedicine platform and with assistance of the bedside nurse or telemedicine presenter.  The attending portion of this evaluation, treatment, and documentation was performed per Cierra Savage MD via audiovisual.    Patient was transferred to the telemedicine service on: 11/15/2020    Subjective:     Admission CC:   Chief Complaint   Patient presents with    Foot Pain       Fever and left foot pain for the past 3 days. Foot pain worse today Here 3 days ago for foot abscess, prescribed clindamycin.    Chest Pain       Sharp chest pain when taking a deep breath that started earlier today, Denies n/v. +SOB     Follow up visit for: Endocarditis of tricuspid valve    Interval History / Events Overnight:   Elevated temp to 102.4 again today; cultures pending from last pm and CXR ordered; right ear pain improved but remains with some diminished hearing; has not been using IS anymore; no adverse effects when she took torsemide po as she had with lasix IV and she has noticed urinating more.; she does feel improved despite high temp      Review of Systems   Constitutional: Positive for fever. Negative for appetite change.   Respiratory: Positive for cough, chest tightness and shortness of  breath.    Cardiovascular: Positive for chest pain.   Gastrointestinal: Negative for diarrhea.     Objective:     Vital Signs (Most Recent):  Temp: (!) 102.4 °F (39.1 °C) (11/21/20 1230)  Pulse: (!) 128 (11/21/20 1230)  Resp: 16 (11/21/20 1230)  BP: (!) 133/91 (11/21/20 1230)  SpO2: (!) 94 % (11/21/20 1230) Vital Signs (24h Range):  Temp:  [98.8 °F (37.1 °C)-102.4 °F (39.1 °C)] 102.4 °F (39.1 °C)  Pulse:  [109-128] 128  Resp:  [16] 16  SpO2:  [93 %-96 %] 94 %  BP: (116-133)/(77-91) 133/91     Weight: 88.5 kg (195 lb)  Body mass index is 35.67 kg/m².    Intake/Output Summary (Last 24 hours) at 11/21/2020 1315  Last data filed at 11/21/2020 0900  Gross per 24 hour   Intake 600 ml   Output --   Net 600 ml      Physical Exam  Constitutional:       General: She is not in acute distress.     Appearance: Normal appearance. She is not diaphoretic.   Eyes:      General: Lids are normal. No scleral icterus.        Right eye: No discharge.         Left eye: No discharge.      Conjunctiva/sclera: Conjunctivae normal.   Cardiovascular:      Rate and Rhythm: Tachycardia present.   Pulmonary:      Effort: Pulmonary effort is normal. No tachypnea, accessory muscle usage or respiratory distress.   Abdominal:      General: There is no distension.   Musculoskeletal:      Right lower leg: Edema present.      Left lower leg: Edema present.   Skin:     Coloration: Skin is not cyanotic.   Neurological:      General: No focal deficit present.      Mental Status: She is alert and oriented to person, place, and time. She is not disoriented.      Cranial Nerves: No cranial nerve deficit.      Motor: No weakness.   Psychiatric:         Attention and Perception: Attention normal.         Mood and Affect: Affect normal.         Speech: Speech normal.         Behavior: Behavior is cooperative.         Significant Labs:     Recent Labs   Lab 11/16/20  0353 11/18/20  0605 11/20/20  0811   WBC 12.84* 11.39 8.72   HGB 8.9* 7.6* 8.5*   HCT 31.0* 25.3*  29.4*   * 295 420*     Recent Labs   Lab 11/16/20  0353 11/18/20  0605 11/20/20  0811   GRAN 76.8*  9.9* 75.1*  8.6* 68.0  5.9   LYMPH 15.2*  2.0 13.5*  1.5 17.0*  1.5   MONO 5.5  0.7 7.3  0.8 8.4  0.7   EOS 0.1 0.3 0.4     Recent Labs   Lab 11/16/20  0353 11/18/20  0605 11/20/20  0811    140 140   K 3.3* 3.9 3.7    101 101   CO2 30* 28 28   BUN 28* 26* 18   CREATININE 1.0 0.9 0.9   GLU 80 91 82   CALCIUM 8.0* 7.9* 8.4*   ALBUMIN 1.7* 1.8* 2.1*   MG 1.6 1.6 1.9     Recent Labs   Lab 11/16/20 0353 11/18/20  0605 11/20/20  0811   ALKPHOS 169* 169* 148*   ALT 67* 65* 39   AST 62* 44* 16   PROT 6.4 5.9* 6.8   BILITOT 0.5 0.4 0.5     Procalcitonin (ng/mL)   Date Value   11/16/2020 0.10   11/12/2020 0.22     Lactate (Lactic Acid) (mmol/L)   Date Value   10/26/2020 1.8     BNP (pg/mL)   Date Value   11/20/2020 360 (H)   11/13/2020 557 (H)     CRP   Date Value   11/20/2020 126.8 mg/L (H)   11/18/2020 105.5 mg/L (H)   11/16/2020 54.0 mg/L (H)   11/12/2020 111.2 mg/L (H)   11/07/2020 174.2 mg/L (H)   10/24/2020 31.00 mg/dL (H)     D-Dimer (mg/L FEU)   Date Value   11/20/2020 3.46 (H)   11/16/2020 5.13 (H)   11/14/2020 7.36 (H)   11/12/2020 5.98 (H)   10/24/2020 8.22 (H)     Ferritin (ng/mL)   Date Value   11/20/2020 618 (H)   11/18/2020 628 (H)   11/16/2020 567 (H)   11/14/2020 767 (H)   11/10/2020 686 (H)     LD (U/L)   Date Value   11/20/2020 232   11/18/2020 246   11/16/2020 233   10/28/2020 382 (H)     Troponin I (ng/mL)   Date Value   10/24/2020 <0.020   10/24/2020 0.055 (H)     CPK (U/L)   Date Value   10/29/2020 55   10/24/2020 17 (L)   10/24/2020 17 (L)     Results for orders placed or performed during the hospital encounter of 10/26/20   Vitamin D   Result Value Ref Range    Vit D, 25-Hydroxy 9 (L) 30 - 96 ng/mL     SARS-CoV2 (COVID-19) Qualitative PCR (no units)   Date Value   11/11/2020 Detected (A)     SARS-CoV-2 RNA, Amplification, Qual (no units)   Date Value   11/17/2020 Negative    10/26/2020 Negative   10/24/2020 Negative       Echo Color Flow Doppler? Yes  · The left ventricle is normal in size with normal systolic function. The   estimated ejection fraction is 60%.  · Moderate right ventricular enlargement with normal right ventricular   systolic function.  · Normal left ventricular diastolic function.  · Severe right atrial enlargement.  · The anterior leaflet of the tricuspid valve is completely flail with   resultant severe regurgitation. Valve and chordal tissue prolapsing well   back into the right atrium likely have associated vegetation, but   distinguishing between vegetation, valve, and chord is difficult, even   with these good images.  · The estimated PA systolic pressure is 54 mmHg.  · Elevated central venous pressure (15 mmHg).       Assessment/Plan:      Active Diagnoses:    Diagnosis Date Noted POA    PRINCIPAL PROBLEM:  Endocarditis of tricuspid valve [I07.9] 10/26/2020 Yes    Tricuspid valve insufficiency [I07.1]  Yes    Vitamin D deficiency [E55.9] 11/15/2020 Yes    COVID-19 virus detected [U07.1] 11/12/2020 Yes    Bacteremia [R78.81]  Yes    Bacterial endocarditis [I33.0]  Yes    Opioid use disorder, severe, dependence [F11.20] 11/03/2020 Yes     Chronic    Cannabis use disorder, moderate, dependence [F12.20] 11/03/2020 Yes     Chronic    Sedative, hypnotic or anxiolytic use disorder, severe, 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] 11/03/2020 Yes     Chronic    Substance induced mood disorder [F19.94] 11/03/2020 Yes     Chronic    Acute renal insufficiency [N28.9] 11/03/2020 No    MRSA bacteremia [R78.81, B95.62] 10/27/2020 Yes    Pulmonary emboli [I26.99] 10/26/2020 Yes    IVDU (intravenous drug user) [F19.90] 10/26/2020 Yes     Chronic    Pneumonia of both lungs due to infectious organism [J18.9] 10/24/2020 Yes      Problems Resolved During this Admission:    Diagnosis Date Noted Date Resolved POA    Hyperkalemia [E87.5] 11/04/2020 11/15/2020 No    Sepsis with acute  hypoxic respiratory failure [A41.9, R65.20, J96.01] 10/27/2020 10/27/2020 Yes    Severe sepsis [A41.9, R65.20] 10/26/2020 11/04/2020 Yes    Tachypnea [R06.82] 10/26/2020 11/04/2020 Yes       Overview / ICU Course:    Марина Britton is a 30 y.o. female admitted for Endocarditis of tricuspid valve. Patient with HCV, IVDU who was transferred from [facility] where she presented with chest pain and was diagnosed with MRSA bacteremia associated with TV IE with septic emboli, acute hypoxic respiratory failure, septic/metabolic encephalopathy, septic PE, septic shock, and acute anemia. She was admitted to the [facility] MICU on 10/27 and was intubated for respiratory failure. She has subsequently been evaluated by ID, CTS, and Psychiatry. She required pRBC transfusion on 10/29, and norepi on 10/30. She developed acute renal insufficiency which subsequently resolved. She was extubated on 11/2, then transferred to Hospital Medicine on 11/3 for further care, planning on long-term IV antibiotics with vancomycin and repeat CTS evaluation as an outpatient. Subsequently her acute renal insufficiency returned, and again febrile for which BCx were obtained on 11/4 and 11/5 and have remained NGTD. ID was reconsulted on 11/6, and cefepime was added to her regimen. CT C/A/P obtained shows continued infectious lung processes. TTE shows progressive valvular damage with flail valve and severe MR, for which she is being diuresed.  Covid testing ordered for placement resulted positive 11/11, patient transferred to COVID19 isolation unit. Monitoring for signs/symptoms of COVID-19 and awaiting LTAC placement.    Inpatient Medications Prescribed for Management of Current Problems:     Scheduled Meds:    [START ON 11/22/2020] amLODIPine  5 mg Oral Daily    ascorbic acid (vitamin C)  500 mg Oral BID    ceFEPime (MAXIPIME) IVPB  1 g Intravenous Q8H    enoxaparin  40 mg Subcutaneous Q24H    gabapentin  300 mg Oral TID    guaiFENesin  600  "mg Oral BID    ipratropium  0.5 mg Nebulization Q8H    levalbuterol  0.63 mg Nebulization Q8H    magnesium oxide  400 mg Oral BID    mirtazapine  15 mg Oral QHS    mupirocin   Nasal BID    oxyCODONE  20 mg Oral Q6H    polyethylene glycol  17 g Oral Daily    remdesivir infusion  100 mg Intravenous Q24H    senna-docusate 8.6-50 mg  2 tablet Oral BID    sodium chloride 0.9%  10 mL Intravenous Q6H    torsemide  20 mg Oral Daily    [START ON 11/22/2020] vancomycin (VANCOCIN) IVPB  1,500 mg Intravenous Q24H    vitamin D  1,000 Units Oral Daily     Continuous Infusions:   As Needed: sodium chloride, acetaminophen, acetaminophen, acetaminophen, benzonatate, calcium carbonate, diphenhydrAMINE, hydrOXYzine HCL, ibuprofen, melatonin, oxyCODONE, polyethylene glycol, sodium chloride 0.9%, Flushing PICC Protocol **AND** sodium chloride 0.9% **AND** sodium chloride 0.9%, valproate sodium (DEPACON) IVPB, vancomycin - pharmacy to dose    Assessment and Plan by Problem    MRSA bacteremia  Pneumonia of both lungs due to infectious organism  Pulmonary emboli (septic)  Sepsis  Endocarditis of tricuspid valve  - Febrile intermittently, BCx from 11/4 and 11/5 NGTD. Per ID, she will likely continue to have intermittent fevers from her infectious process  - Continue vancomycin (pharmacy dosing). She has completed five days of cefepime  - PT/OT following and recommending IPR, LTAC referrals out  - Needs 6 weeks IV antibiotics, then follow up with CTS. awaiting placement.  - completing 5-day course of RDV for COVID - end 11/21  - repeat blood culture, urine studies collected and respiratory culture pending ; added cefepime until resulted and CXR added  - vanc trough being drawn as a peak for 2 days - nursing aware and times changed again     Flail tricuspid valve  Severe tricuspid regurgitation  Lower extremity edema  - Repeat TTE shows more advanced disease, tricuspid valve now flail, but also "distinguishing between vegetation, " "valve, and chord is difficult, even with these good images" per interpreting Cardiologist. Also shows elevated CVP.   - LE edema was improving with Lasix IV and now on po with edema unchanged - changed to lasix IV daily to improve edema but she does not tolerate IV lasix due to strange effects when she takes; trial of oral torsemide to improve edema  - Previous provider discussed findings and ongoing fevers with Dr. Pritchett on 11/10 who recommended continued medical management as her current deconditioned status is a contraindication to surgical intervention - f/u schedulded on 12/9 with ID and CTS     COVID infection  Stopped Dexamethasone 11/13, since not requiring O2 at this time.  - COVID-19 testing: Collection Date: 11/11/2020 Collection Time:  11:09 AM   - Isolation: Airborne/Droplet. Surgical mask on patient. Notify Infection Control  - Diagnostics: Trend Q48hrs if stable, more frequently if patient decompensating         Laboratory/Study Frequency Result   CBC Admit & Q48h     CMP Admit & Q48h     Magnesium level Admit     D-dimer Admit & Q48h 8.2 > 5.9   Ferritin Admit & Q48h 686   CRP Admit & Q48h 31 > 174 > 111   CPK Admit & Q24h if elevated     LDH Admit & Q48h 382   Vitamin D Admit ordered   BNP Admit ordered   Troponin Admit & Q6h if elevated     Glucose-6-phos dehydrogenase Admit     Procalcitonin Admit 0.22   Lipid Panel If using statin     Rapid influenza Admit     Resp Infection Panel Admit if BMT/organ transplant     Legionella Antigen Admit     Sputum Culture Admit     Blood Culture Admit     Urinalysis & Culture Admit     ECG Admit & Q48h if on HCQ     CXR Admit     Lymphopenia, hyponatremia, hyperferritinemia, elevated troponin, elevated d-dimer, age, and medical comorbidities are significant predictors of poor clinical outcome     - Management: per [facility] COVID Treatment Protocol (4/15/20)    - Monitoring:              - Telemetry & Continuous Pulse Oximetry    - Nutrition:               - " Multivitamin PO daily              - Add Boost supplement              - Vitamin D 1000IU daily as she is Vitamin D deficient              - Ascorbic acid 500mg PO bid    - Supportive Care:              - acetaminophen 650mg PO Q6hr PRN fever/headache              - loperamide PRN viral diarrhea              - IVF if indicated, restrictive strategy preferred, no maintenance IV if able              - VTE PPx: enoxaparin or heparin SQ unless contraindicated    - Antibiotics:  - if indications, CXR findings, elevated procal. See protocol for alternatives.   - Discontinue early if low concern for bacterial co-infection              - ceftriaxone 1g Q24h x 5 days - not started  - azithromycin 500mg po x1, then 250mg po daily x 4 days - not started    - Therapies:              - If patient meets criteria  - atorvastatin 40mg po daily  - Consult for RDV; febrile, meets criteria - RDV started 11/17     Acute renal insufficiency  Hyperkalemia  Hyponatremia - resolved  - sCr stable, steady with diuresis   - Etiology likely multifactorial with recent sepsis and vancomycin and poor PO intake  - Urine studies consistent with pre-renal (though cardiorenal would have a similar picture)     IVDU (intravenous drug user)  Opioid use disorder, severe, dependence  - Psych evaluated earlier this admission  - Would benefit from LTAC disposition     Substance induced mood disorder  -continue mirtazapine 15mg HS  -gabapentin 300 TID     Opioid Use disorder  Continue oxycodone 20mg q6 pRN  Continue discussion MAT with patient  Consider Addiction Psych     Chronic inflammatory anemia  - Hemoglobin 6.9 g/dL 11/10, given one unit of pRBCs  - Iron studies consistent with inflammatory process, in keeping with her systemic infection  - Monitor and transfuse for Hgb < 7     Wounds  Left foot and sacral spine  Wound care following. Appreciate recs.     LE edema  Mixed etiology, IV fluids and low albumin  Elevate feet, improve nutritional  status  Lasix daily - IV for now     Malnutrition  - Nutrition following  - Added Boost to all meals      Diet: Diet Adult Regular (IDDSI Level 7)  GI Prophylaxis: Not indicated  Significant LDAs:   IV Access Type: PICC  Urinary Catheter Indication if present: Patient Does Not Have Urinary Catheter  Other Lines/Tubes/Drains:    HIGH RISK CONDITION(S):   Patient is currently on drug therapy requiring intensive monitoring for toxicity: Vancomycin     Goals of Care:    Previous admission:  10/24/20  Likely prognosis:  Poor  Code Status: Full Code  Comfort Only: No  Hospice: No  Goals at discharge: remain at home, with physician follow-up    Discharge Planning   VIANNEY: 11/23/2020     Code Status: Full Code   Is the patient medically ready for discharge?: No    Reason for patient still in hospital (select all that apply): Patient trending condition and Pending disposition  Discharge Plan A: Long-term acute care facility (LTAC)   Discharge Delays: None known at this time    VTE Risk Mitigation (From admission, onward)         Ordered     enoxaparin injection 40 mg  Every 24 hours      11/08/20 0838     Place sequential compression device  Until discontinued      10/26/20 2023                   Cierra Savage MD  Department of Hospital Medicine   [facility] - ICU [facility]  [phone]

## 2020-11-21 NOTE — PROGRESS NOTES
Pharmacokinetic Assessment Follow Up: IV Vancomycin    Vancomycin serum concentration assessment(s):    The trough level was drawn incorrectly, ~ 1.5 hours after the start of infusion. Will order next trough to be drawn 60 minutes before next dose on 11/22 at 0630.    Vancomycin Regimen Plan:    Continue regimen to Vancomycin 1500 mg IV every 24 hours.    Drug levels (last 3 results):  Recent Labs   Lab Result Units 11/20/20  0811 11/21/20  0754   Vancomycin-Trough ug/mL 56.7* 61.9*       Pharmacy will continue to follow and monitor vancomycin.    Please contact pharmacy for questions regarding this assessment.    Thank you for the consult,   Ana Paula Hanley       Patient brief summary:  Марина Britton is a 30 y.o. female initiated on antimicrobial therapy with IV Vancomycin for treatment of endocarditis.    The patient's current regimen is vancomycin 1500 mg every 24 hours.    Drug Allergies:   Review of patient's allergies indicates:   Allergen Reactions    Soap Rash     [facility] [facility] - Hospital supplied - cleanser shampoo & body wash gel  Ingredients - purified water, sodium laureth sulfate, sodium chloride, cocamide william, cocamidopropylamine oxide, fragrance, aloe barbadensis leaf juice, propylene alcohol, peg-150 distearate, diazolidinyl urea, ciric acid, methylparaben, & propulparaben       Actual Body Weight:   88.5 kg    Renal Function:   Estimated Creatinine Clearance: 93.7 mL/min (based on SCr of 0.9 mg/dL).,     Dialysis Method (if applicable):  N/A    CBC (last 72 hours):  Recent Labs   Lab Result Units 11/20/20  0811   WBC K/uL 8.72   Hemoglobin g/dL 8.5*   Hematocrit % 29.4*   Platelets K/uL 420*   Gran % % 68.0   Lymph % % 17.0*   Mono % % 8.4   Eosinophil % % 4.7   Basophil % % 1.4   Differential Method  Automated       Metabolic Panel (last 72 hours):  Recent Labs   Lab Result Units 11/20/20  0811 11/21/20  0644   Sodium mmol/L 140  --    Potassium mmol/L 3.7  --    Chloride mmol/L 101  --    CO2  mmol/L 28  --    Glucose mg/dL 82  --    Glucose, UA   --  Negative   BUN mg/dL 18  --    Creatinine mg/dL 0.9  --    Albumin g/dL 2.1*  --    Total Bilirubin mg/dL 0.5  --    Alkaline Phosphatase U/L 148*  --    AST U/L 16  --    ALT U/L 39  --    Magnesium mg/dL 1.9  --        Vancomycin Administrations:  vancomycin given in the last 96 hours                     vancomycin 1.5 g in dextrose 5 % 250 mL IVPB (ready to mix) (mg) 1,500 mg New Bag 11/21/20 0626    vancomycin 1.5 g in dextrose 5 % 250 mL IVPB (ready to mix) (mg) 1,500 mg New Bag 11/20/20 0631     1,500 mg New Bag 11/19/20 0625     1,500 mg New Bag 11/18/20 0944                    Microbiologic Results:  Microbiology Results (last 7 days)       Procedure Component Value Units Date/Time    Blood culture [[id number]] Collected: 11/21/20 0754    Order Status: Sent Specimen: Blood Updated: 11/21/20 0826    Narrative:      Collection has been rescheduled by [email or web address] at 11/20/2020 22:23 Reason:   Unable to collect  Collection has been rescheduled by [email or web address] at 11/20/2020 22:23 Reason:   Unable to collect    Blood culture [[id number]] Collected: 11/16/20 0353    Order Status: Completed Specimen: Blood from Antecubital, Left Arm Updated: 11/21/20 0812     Blood Culture, Routine No growth after 5 days.    Urine culture [[id number]] Collected: 11/21/20 0644    Order Status: No result Specimen: Urine Updated: 11/21/20 0708    Culture, Respiratory with Gram Stain [[id number]]     Order Status: No result Specimen: Respiratory     Blood culture [[id number]] Collected: 11/10/20 0656    Order Status: Completed Specimen: Blood from Antecubital, Left Arm Updated: 11/15/20 0812     Blood Culture, Routine No growth after 5 days.    Narrative:      Specimen collection performed by Alternate Phlebotomist: nurse  Specimen collection performed by Alternate Phlebotomist: nurse    Blood culture [[id number]] Collected: 11/10/20 0650    Order Status: Completed Specimen: Blood Updated: 11/15/20  0812     Blood Culture, Routine No growth after 5 days.

## 2020-11-22 LAB
ALBUMIN SERPL BCP-MCNC: 2.6 G/DL (ref 3.5–5.2)
ALP SERPL-CCNC: 144 U/L (ref 55–135)
ALT SERPL W/O P-5'-P-CCNC: 28 U/L (ref 10–44)
ANION GAP SERPL CALC-SCNC: 13 MMOL/L (ref 8–16)
AST SERPL-CCNC: 14 U/L (ref 10–40)
BACTERIA UR CULT: ABNORMAL
BASOPHILS # BLD AUTO: 0.15 K/UL (ref 0–0.2)
BASOPHILS NFR BLD: 1.3 % (ref 0–1.9)
BILIRUB SERPL-MCNC: 0.5 MG/DL (ref 0.1–1)
BUN SERPL-MCNC: 23 MG/DL (ref 6–20)
CALCIUM SERPL-MCNC: 9 MG/DL (ref 8.7–10.5)
CHLORIDE SERPL-SCNC: 99 MMOL/L (ref 95–110)
CO2 SERPL-SCNC: 28 MMOL/L (ref 23–29)
CREAT SERPL-MCNC: 1.2 MG/DL (ref 0.5–1.4)
CRP SERPL-MCNC: 147.8 MG/L (ref 0–8.2)
D DIMER PPP IA.FEU-MCNC: 3.91 MG/L FEU
DIFFERENTIAL METHOD: ABNORMAL
EOSINOPHIL # BLD AUTO: 0.4 K/UL (ref 0–0.5)
EOSINOPHIL NFR BLD: 3.3 % (ref 0–8)
ERYTHROCYTE [DISTWIDTH] IN BLOOD BY AUTOMATED COUNT: 17.7 % (ref 11.5–14.5)
EST. GFR  (AFRICAN AMERICAN): >60 ML/MIN/1.73 M^2
EST. GFR  (NON AFRICAN AMERICAN): >60 ML/MIN/1.73 M^2
FERRITIN SERPL-MCNC: 540 NG/ML (ref 20–300)
GLUCOSE SERPL-MCNC: 108 MG/DL (ref 70–110)
HCT VFR BLD AUTO: 30.2 % (ref 37–48.5)
HGB BLD-MCNC: 9.2 G/DL (ref 12–16)
IMM GRANULOCYTES # BLD AUTO: 0.05 K/UL (ref 0–0.04)
IMM GRANULOCYTES NFR BLD AUTO: 0.4 % (ref 0–0.5)
LDH SERPL L TO P-CCNC: 282 U/L (ref 110–260)
LYMPHOCYTES # BLD AUTO: 1.7 K/UL (ref 1–4.8)
LYMPHOCYTES NFR BLD: 14.2 % (ref 18–48)
MAGNESIUM SERPL-MCNC: 2.2 MG/DL (ref 1.6–2.6)
MCH RBC QN AUTO: 26.2 PG (ref 27–31)
MCHC RBC AUTO-ENTMCNC: 30.5 G/DL (ref 32–36)
MCV RBC AUTO: 86 FL (ref 82–98)
MONOCYTES # BLD AUTO: 1.2 K/UL (ref 0.3–1)
MONOCYTES NFR BLD: 9.9 % (ref 4–15)
NEUTROPHILS # BLD AUTO: 8.5 K/UL (ref 1.8–7.7)
NEUTROPHILS NFR BLD: 70.9 % (ref 38–73)
NRBC BLD-RTO: 0 /100 WBC
PLATELET # BLD AUTO: 589 K/UL (ref 150–350)
PMV BLD AUTO: 9 FL (ref 9.2–12.9)
POTASSIUM SERPL-SCNC: 4 MMOL/L (ref 3.5–5.1)
PROT SERPL-MCNC: 7.9 G/DL (ref 6–8.4)
RBC # BLD AUTO: 3.51 M/UL (ref 4–5.4)
SODIUM SERPL-SCNC: 140 MMOL/L (ref 136–145)
VANCOMYCIN TROUGH SERPL-MCNC: 12.4 UG/ML (ref 10–22)
WBC # BLD AUTO: 12 K/UL (ref 3.9–12.7)

## 2020-11-22 PROCEDURE — 25000003 PHARM REV CODE 250: Performed by: STUDENT IN AN ORGANIZED HEALTH CARE EDUCATION/TRAINING PROGRAM

## 2020-11-22 PROCEDURE — 93010 ELECTROCARDIOGRAM REPORT: CPT | Mod: ,,, | Performed by: INTERNAL MEDICINE

## 2020-11-22 PROCEDURE — 93005 ELECTROCARDIOGRAM TRACING: CPT

## 2020-11-22 PROCEDURE — 25000003 PHARM REV CODE 250: Performed by: INTERNAL MEDICINE

## 2020-11-22 PROCEDURE — 25000003 PHARM REV CODE 250: Performed by: HOSPITALIST

## 2020-11-22 PROCEDURE — 63600175 PHARM REV CODE 636 W HCPCS: Performed by: INTERNAL MEDICINE

## 2020-11-22 PROCEDURE — 80202 ASSAY OF VANCOMYCIN: CPT

## 2020-11-22 PROCEDURE — 99232 SBSQ HOSP IP/OBS MODERATE 35: CPT | Mod: ,,, | Performed by: INTERNAL MEDICINE

## 2020-11-22 PROCEDURE — 20600001 HC STEP DOWN PRIVATE ROOM

## 2020-11-22 PROCEDURE — 83615 LACTATE (LD) (LDH) ENZYME: CPT

## 2020-11-22 PROCEDURE — 85379 FIBRIN DEGRADATION QUANT: CPT

## 2020-11-22 PROCEDURE — 82728 ASSAY OF FERRITIN: CPT

## 2020-11-22 PROCEDURE — 86140 C-REACTIVE PROTEIN: CPT

## 2020-11-22 PROCEDURE — 80053 COMPREHEN METABOLIC PANEL: CPT

## 2020-11-22 PROCEDURE — 25000242 PHARM REV CODE 250 ALT 637 W/ HCPCS: Performed by: INTERNAL MEDICINE

## 2020-11-22 PROCEDURE — 36415 COLL VENOUS BLD VENIPUNCTURE: CPT

## 2020-11-22 PROCEDURE — 83735 ASSAY OF MAGNESIUM: CPT

## 2020-11-22 PROCEDURE — 93010 EKG 12-LEAD: ICD-10-PCS | Mod: ,,, | Performed by: INTERNAL MEDICINE

## 2020-11-22 PROCEDURE — 99900035 HC TECH TIME PER 15 MIN (STAT)

## 2020-11-22 PROCEDURE — 94640 AIRWAY INHALATION TREATMENT: CPT

## 2020-11-22 PROCEDURE — 99232 PR SUBSEQUENT HOSPITAL CARE,LEVL II: ICD-10-PCS | Mod: ,,, | Performed by: INTERNAL MEDICINE

## 2020-11-22 PROCEDURE — A4216 STERILE WATER/SALINE, 10 ML: HCPCS | Performed by: INTERNAL MEDICINE

## 2020-11-22 PROCEDURE — 25000003 PHARM REV CODE 250: Performed by: NURSE PRACTITIONER

## 2020-11-22 PROCEDURE — 85025 COMPLETE CBC W/AUTO DIFF WBC: CPT

## 2020-11-22 PROCEDURE — 94761 N-INVAS EAR/PLS OXIMETRY MLT: CPT

## 2020-11-22 RX ORDER — TORSEMIDE 10 MG/1
10 TABLET ORAL DAILY
Status: DISCONTINUED | OUTPATIENT
Start: 2020-11-24 | End: 2020-11-23

## 2020-11-22 RX ADMIN — OXYCODONE HYDROCHLORIDE 20 MG: 10 TABLET ORAL at 11:11

## 2020-11-22 RX ADMIN — Medication 10 ML: at 05:11

## 2020-11-22 RX ADMIN — POLYETHYLENE GLYCOL 3350 17 G: 17 POWDER, FOR SOLUTION ORAL at 08:11

## 2020-11-22 RX ADMIN — GUAIFENESIN 600 MG: 600 TABLET, EXTENDED RELEASE ORAL at 08:11

## 2020-11-22 RX ADMIN — IPRATROPIUM BROMIDE 0.5 MG: 0.5 SOLUTION RESPIRATORY (INHALATION) at 08:11

## 2020-11-22 RX ADMIN — OXYCODONE HYDROCHLORIDE AND ACETAMINOPHEN 500 MG: 500 TABLET ORAL at 08:11

## 2020-11-22 RX ADMIN — LEVALBUTEROL HYDROCHLORIDE 0.63 MG: 0.63 SOLUTION RESPIRATORY (INHALATION) at 12:11

## 2020-11-22 RX ADMIN — TORSEMIDE 20 MG: 20 TABLET ORAL at 08:11

## 2020-11-22 RX ADMIN — CEFEPIME 1 G: 1 INJECTION, POWDER, FOR SOLUTION INTRAMUSCULAR; INTRAVENOUS at 06:11

## 2020-11-22 RX ADMIN — CEFEPIME 1 G: 1 INJECTION, POWDER, FOR SOLUTION INTRAMUSCULAR; INTRAVENOUS at 01:11

## 2020-11-22 RX ADMIN — ACETAMINOPHEN 1000 MG: 500 TABLET ORAL at 06:11

## 2020-11-22 RX ADMIN — Medication 10 ML: at 11:11

## 2020-11-22 RX ADMIN — OXYCODONE HYDROCHLORIDE 20 MG: 10 TABLET ORAL at 12:11

## 2020-11-22 RX ADMIN — VANCOMYCIN HYDROCHLORIDE 1500 MG: 1.5 INJECTION, POWDER, LYOPHILIZED, FOR SOLUTION INTRAVENOUS at 08:11

## 2020-11-22 RX ADMIN — ACETAMINOPHEN 650 MG: 325 TABLET ORAL at 03:11

## 2020-11-22 RX ADMIN — OXYCODONE HYDROCHLORIDE 20 MG: 10 TABLET ORAL at 05:11

## 2020-11-22 RX ADMIN — ENOXAPARIN SODIUM 40 MG: 40 INJECTION SUBCUTANEOUS at 05:11

## 2020-11-22 RX ADMIN — Medication 10 ML: at 12:11

## 2020-11-22 RX ADMIN — OXYCODONE HYDROCHLORIDE 20 MG: 10 TABLET ORAL at 06:11

## 2020-11-22 RX ADMIN — IPRATROPIUM BROMIDE 0.5 MG: 0.5 SOLUTION RESPIRATORY (INHALATION) at 12:11

## 2020-11-22 RX ADMIN — DOCUSATE SODIUM AND SENNOSIDES 2 TABLET: 8.6; 5 TABLET, FILM COATED ORAL at 08:11

## 2020-11-22 RX ADMIN — MAGNESIUM OXIDE 400 MG (241.3 MG MAGNESIUM) TABLET 400 MG: TABLET at 08:11

## 2020-11-22 RX ADMIN — GABAPENTIN 300 MG: 300 CAPSULE ORAL at 08:11

## 2020-11-22 RX ADMIN — CHOLECALCIFEROL TAB 25 MCG (1000 UNIT) 1000 UNITS: 25 TAB at 08:11

## 2020-11-22 RX ADMIN — MUPIROCIN: 20 OINTMENT TOPICAL at 08:11

## 2020-11-22 RX ADMIN — AMLODIPINE BESYLATE 5 MG: 5 TABLET ORAL at 08:11

## 2020-11-22 RX ADMIN — CEFEPIME 1 G: 1 INJECTION, POWDER, FOR SOLUTION INTRAMUSCULAR; INTRAVENOUS at 11:11

## 2020-11-22 RX ADMIN — ACETAMINOPHEN 1000 MG: 500 TABLET ORAL at 09:11

## 2020-11-22 RX ADMIN — MIRTAZAPINE 15 MG: 7.5 TABLET ORAL at 08:11

## 2020-11-22 RX ADMIN — GABAPENTIN 300 MG: 300 CAPSULE ORAL at 03:11

## 2020-11-22 RX ADMIN — Medication 10 ML: at 06:11

## 2020-11-22 RX ADMIN — LEVALBUTEROL HYDROCHLORIDE 0.63 MG: 0.63 SOLUTION RESPIRATORY (INHALATION) at 08:11

## 2020-11-22 NOTE — PLAN OF CARE
Care plan reviewed with patient. Patient complains of back and hip pain, scheduled pain medication given. Dressing change to left ankle. Picc dressing c,d,I. wctm    Problem: Adult Inpatient Plan of Care  Goal: Plan of Care Review  Outcome: Ongoing, Progressing  Goal: Patient-Specific Goal (Individualization)  Outcome: Ongoing, Progressing  Goal: Absence of Hospital-Acquired Illness or Injury  Outcome: Ongoing, Progressing  Goal: Optimal Comfort and Wellbeing  Outcome: Ongoing, Progressing  Goal: Readiness for Transition of Care  Outcome: Ongoing, Progressing  Goal: Rounds/Family Conference  Outcome: Ongoing, Progressing     Problem: Adjustment to Illness (Sepsis/Septic Shock)  Goal: Optimal Coping  Outcome: Ongoing, Progressing     Problem: Bleeding (Sepsis/Septic Shock)  Goal: Absence of Bleeding  Outcome: Ongoing, Progressing     Problem: Glycemic Control Impaired (Sepsis/Septic Shock)  Goal: Blood Glucose Level Within Desired Range  Outcome: Ongoing, Progressing     Problem: Hemodynamic Instability (Sepsis/Septic Shock)  Goal: Effective Tissue Perfusion  Outcome: Ongoing, Progressing     Problem: Infection (Sepsis/Septic Shock)  Goal: Absence of Infection Signs/Symptoms  Outcome: Ongoing, Progressing     Problem: Nutrition Impaired (Sepsis/Septic Shock)  Goal: Optimal Nutrition Intake  Outcome: Ongoing, Progressing     Problem: Respiratory Compromise (Sepsis/Septic Shock)  Goal: Effective Oxygenation and Ventilation  Outcome: Ongoing, Progressing     Problem: Fluid Imbalance (Pneumonia)  Goal: Fluid Balance  Outcome: Ongoing, Progressing     Problem: Infection (Pneumonia)  Goal: Resolution of Infection Signs/Symptoms  Outcome: Ongoing, Progressing     Problem: Respiratory Compromise (Pneumonia)  Goal: Effective Oxygenation and Ventilation  Outcome: Ongoing, Progressing     Problem: Infection  Goal: Infection Symptom Resolution  Outcome: Ongoing, Progressing     Problem: Wound  Goal: Optimal Wound  Healing  Outcome: Ongoing, Progressing     Problem: Skin Injury Risk Increased  Goal: Skin Health and Integrity  Outcome: Ongoing, Progressing     Problem: Communication Impairment (Mechanical Ventilation, Invasive)  Goal: Effective Communication  Outcome: Ongoing, Progressing     Problem: Device-Related Complication Risk (Mechanical Ventilation, Invasive)  Goal: Optimal Device Function  Outcome: Ongoing, Progressing     Problem: Inability to Wean (Mechanical Ventilation, Invasive)  Goal: Mechanical Ventilation Liberation  Outcome: Ongoing, Progressing     Problem: Nutrition Impairment (Mechanical Ventilation, Invasive)  Goal: Optimal Nutrition Delivery  Outcome: Ongoing, Progressing     Problem: Skin and Tissue Injury (Mechanical Ventilation, Invasive)  Goal: Absence of Device-Related Skin and Tissue Injury  Outcome: Ongoing, Progressing     Problem: Ventilator-Induced Lung Injury (Mechanical Ventilation, Invasive)  Goal: Absence of Ventilator-Induced Lung Injury  Outcome: Ongoing, Progressing     Problem: Communication Impairment (Artificial Airway)  Goal: Effective Communication  Outcome: Ongoing, Progressing     Problem: Device-Related Complication Risk (Artificial Airway)  Goal: Optimal Device Function  Outcome: Ongoing, Progressing     Problem: Skin and Tissue Injury (Artificial Airway)  Goal: Absence of Device-Related Skin or Tissue Injury  Outcome: Ongoing, Progressing     Problem: Noninvasive Ventilation Acute  Goal: Effective Unassisted Ventilation and Oxygenation  Outcome: Ongoing, Progressing     Problem: Fall Injury Risk  Goal: Absence of Fall and Fall-Related Injury  Outcome: Ongoing, Progressing     Problem: Restraint, Nonbehavioral (Nonviolent)  Goal: Discontinuation Criteria Achieved  Outcome: Ongoing, Progressing  Goal: Personal Dignity and Safety Maintained  Outcome: Ongoing, Progressing     Problem: Electrolyte Imbalance (Acute Kidney Injury/Impairment)  Goal: Serum Electrolyte Balance  Outcome:  Ongoing, Progressing     Problem: Fluid Imbalance (Acute Kidney Injury/Impairment)  Goal: Optimal Fluid Balance  Outcome: Ongoing, Progressing     Problem: Hematologic Alteration (Acute Kidney Injury/Impairment)  Goal: Hemoglobin, Hematocrit and Platelets Within Normal Range  Outcome: Ongoing, Progressing     Problem: Oral Intake Inadequate (Acute Kidney Injury/Impairment)  Goal: Optimal Nutrition Intake  Outcome: Ongoing, Progressing     Problem: Renal Function Impairment (Acute Kidney Injury/Impairment)  Goal: Effective Renal Function  Outcome: Ongoing, Progressing

## 2020-11-22 NOTE — NURSING
Patient is AAOX4 and on room air. No c/o of discomfort but a pain level of 6. I changed patient sheets. Estefania changed ankle dressing before my arrival and it is good for 3 days unless soiled before then. Patient in good spirits, but still concerned with low grade fevers. Vancomycin lab drawn at 645am from PICC line. Straightened up room and got ice for patient before leaving. Will continue to monitor.

## 2020-11-22 NOTE — PROGRESS NOTES
[facility] - ICU [facility] Medicine  Telemedicine Progress Note    Patient Name: Марина Britton  MRN: [id number]  Patient Class: IP- Inpatient   Admission Date: 10/26/2020  Length of Stay: 27 days  Attending Physician: Cierra Savage MD  Primary Care Provider: Luis Felipe Jose Iii, MD    [facility] Medicine Team: [facility] [place] [facility] VIRTUAL TEAM 10 Cierra Savage MD  Virtual Telemedicine Progress Note  Start time: 1255  Chief complaint: Endocarditis of tricuspid valve  The patient location is: [facility]/[id number] A  The patient arrived at: 10/26/2020  7:45 PM  Present with the patient at the time of the telemed/virtual assessment: n/a  End time:  1307  Total time spent with patient: 12 min  I have assessed findings virtually using a telemedicine platform and with assistance of the bedside nurse or telemedicine presenter.  The attending portion of this evaluation, treatment, and documentation was performed per Cierra Savage MD via audiovisual.    Patient was transferred to the telemedicine service on: 11/15/2020    Subjective:     Admission CC:   Chief Complaint   Patient presents with    Foot Pain       Fever and left foot pain for the past 3 days. Foot pain worse today Here 3 days ago for foot abscess, prescribed clindamycin.    Chest Pain       Sharp chest pain when taking a deep breath that started earlier today, Denies n/v. +SOB     Follow up visit for: Endocarditis of tricuspid valve    Interval History / Events Overnight:   tmax - 100.1   - Patient feels well today; she is refusing cardiac monitor and pulse ox; she continues to have popping of ears with diminished hearing; she feels her edema is markedly reduced since starting torsemide; labs pending; she feels her abdomen remains distended (fluid and constipation on last CT); she has been having regular BM's and denies hard stools or straining despite refusing laxatives and chronic narcotic meds      Review of Systems   Constitutional: Positive for fever.  Negative for appetite change.   Respiratory: Positive for cough, chest tightness and shortness of breath.    Cardiovascular: Positive for chest pain.   Gastrointestinal: Negative for diarrhea.     Objective:     Vital Signs (Most Recent):  Temp: 99.1 °F (37.3 °C) (11/22/20 1130)  Pulse: (!) 117 (11/22/20 1130)  Resp: 18 (11/22/20 1136)  BP: 124/86 (11/22/20 1130)  SpO2: (!) 94 % (11/22/20 1130) Vital Signs (24h Range):  Temp:  [98.9 °F (37.2 °C)-100.1 °F (37.8 °C)] 99.1 °F (37.3 °C)  Pulse:  [107-120] 117  Resp:  [16-20] 18  SpO2:  [92 %-100 %] 94 %  BP: (118-128)/(76-89) 124/86     Weight: 88.5 kg (195 lb)  Body mass index is 35.67 kg/m².    Intake/Output Summary (Last 24 hours) at 11/22/2020 1500  Last data filed at 11/22/2020 0500  Gross per 24 hour   Intake 120 ml   Output 300 ml   Net -180 ml      Physical Exam  Constitutional:       General: She is not in acute distress.     Appearance: Normal appearance. She is not diaphoretic.   Eyes:      General: Lids are normal. No scleral icterus.        Right eye: No discharge.         Left eye: No discharge.      Conjunctiva/sclera: Conjunctivae normal.   Cardiovascular:      Rate and Rhythm: Tachycardia present.   Pulmonary:      Effort: Pulmonary effort is normal. No tachypnea, accessory muscle usage or respiratory distress.   Abdominal:      General: There is no distension.   Musculoskeletal:      Right lower leg: Edema present.      Left lower leg: Edema present.   Skin:     Coloration: Skin is not cyanotic.   Neurological:      General: No focal deficit present.      Mental Status: She is alert and oriented to person, place, and time. She is not disoriented.      Cranial Nerves: No cranial nerve deficit.      Motor: No weakness.   Psychiatric:         Attention and Perception: Attention normal.         Mood and Affect: Affect normal.         Speech: Speech normal.         Behavior: Behavior is cooperative.         Significant Labs:     Recent Labs   Lab  11/18/20  0605 11/20/20  0811 11/22/20  0600   WBC 11.39 8.72 12.00   HGB 7.6* 8.5* 9.2*   HCT 25.3* 29.4* 30.2*    420* 589*     Recent Labs   Lab 11/18/20  0605 11/20/20  0811 11/22/20  0600   GRAN 75.1*  8.6* 68.0  5.9 70.9  8.5*   LYMPH 13.5*  1.5 17.0*  1.5 14.2*  1.7   MONO 7.3  0.8 8.4  0.7 9.9  1.2*   EOS 0.3 0.4 0.4     Recent Labs   Lab 11/16/20  0353 11/18/20  0605 11/20/20  0811    140 140   K 3.3* 3.9 3.7    101 101   CO2 30* 28 28   BUN 28* 26* 18   CREATININE 1.0 0.9 0.9   GLU 80 91 82   CALCIUM 8.0* 7.9* 8.4*   ALBUMIN 1.7* 1.8* 2.1*   MG 1.6 1.6 1.9     Recent Labs   Lab 11/16/20  0353 11/18/20  0605 11/20/20  0811   ALKPHOS 169* 169* 148*   ALT 67* 65* 39   AST 62* 44* 16   PROT 6.4 5.9* 6.8   BILITOT 0.5 0.4 0.5     Procalcitonin (ng/mL)   Date Value   11/16/2020 0.10   11/12/2020 0.22     Lactate (Lactic Acid) (mmol/L)   Date Value   10/26/2020 1.8     BNP (pg/mL)   Date Value   11/20/2020 360 (H)   11/13/2020 557 (H)     CRP   Date Value   11/20/2020 126.8 mg/L (H)   11/18/2020 105.5 mg/L (H)   11/16/2020 54.0 mg/L (H)   11/12/2020 111.2 mg/L (H)   11/07/2020 174.2 mg/L (H)   10/24/2020 31.00 mg/dL (H)     D-Dimer (mg/L FEU)   Date Value   11/22/2020 3.91 (H)   11/20/2020 3.46 (H)   11/16/2020 5.13 (H)   11/14/2020 7.36 (H)   11/12/2020 5.98 (H)   10/24/2020 8.22 (H)     Ferritin (ng/mL)   Date Value   11/20/2020 618 (H)   11/18/2020 628 (H)   11/16/2020 567 (H)   11/14/2020 767 (H)   11/10/2020 686 (H)     LD (U/L)   Date Value   11/20/2020 232   11/18/2020 246   11/16/2020 233   10/28/2020 382 (H)     Troponin I (ng/mL)   Date Value   10/24/2020 <0.020   10/24/2020 0.055 (H)     CPK (U/L)   Date Value   10/29/2020 55   10/24/2020 17 (L)   10/24/2020 17 (L)     Results for orders placed or performed during the hospital encounter of 10/26/20   Vitamin D   Result Value Ref Range    Vit D, 25-Hydroxy 9 (L) 30 - 96 ng/mL     SARS-CoV2 (COVID-19) Qualitative PCR (no  units)   Date Value   11/11/2020 Detected (A)     SARS-CoV-2 RNA, Amplification, Qual (no units)   Date Value   11/17/2020 Negative   10/26/2020 Negative   10/24/2020 Negative       X-Ray Chest AP Portable  Narrative: EXAMINATION:  XR CHEST AP PORTABLE    CLINICAL HISTORY:  fever, increased cough;    TECHNIQUE:  Single frontal view of the chest was performed.    COMPARISON:  November 6, 2020.    FINDINGS:  Right-sided PICC catheter remains.  Heart is enlarged.  There are patchy irregular parenchymal opacities bilaterally.  Probable bibasilar effusions.  No pneumothorax.  Impression: Persistent coarse irregular parenchymal opacification with bibasilar effusions slightly increased compared to prior.    Electronically signed by: Mario Paiz MD  Date:    11/21/2020  Time:    14:51    Assessment/Plan:      Active Diagnoses:    Diagnosis Date Noted POA    PRINCIPAL PROBLEM:  Endocarditis of tricuspid valve [I07.9] 10/26/2020 Yes    Tricuspid valve insufficiency [I07.1]  Yes    Vitamin D deficiency [E55.9] 11/15/2020 Yes    COVID-19 virus detected [U07.1] 11/12/2020 Yes    Bacteremia [R78.81]  Yes    Bacterial endocarditis [I33.0]  Yes    Opioid use disorder, severe, dependence [F11.20] 11/03/2020 Yes     Chronic    Cannabis use disorder, moderate, dependence [F12.20] 11/03/2020 Yes     Chronic    Sedative, hypnotic or anxiolytic use disorder, severe, 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] 11/03/2020 Yes     Chronic    Substance induced mood disorder [F19.94] 11/03/2020 Yes     Chronic    Acute renal insufficiency [N28.9] 11/03/2020 No    MRSA bacteremia [R78.81, B95.62] 10/27/2020 Yes    Pulmonary emboli [I26.99] 10/26/2020 Yes    IVDU (intravenous drug user) [F19.90] 10/26/2020 Yes     Chronic    Pneumonia of both lungs due to infectious organism [J18.9] 10/24/2020 Yes      Problems Resolved During this Admission:    Diagnosis Date Noted Date Resolved POA    Hyperkalemia [E87.5] 11/04/2020 11/15/2020 No    Sepsis with  acute hypoxic respiratory failure [A41.9, R65.20, J96.01] 10/27/2020 10/27/2020 Yes    Severe sepsis [A41.9, R65.20] 10/26/2020 11/04/2020 Yes    Tachypnea [R06.82] 10/26/2020 11/04/2020 Yes       Overview / ICU Course:    Марина Britton is a 30 y.o. female admitted for Endocarditis of tricuspid valve. Patient with HCV, IVDU who was transferred from [facility] where she presented with chest pain and was diagnosed with MRSA bacteremia associated with TV IE with septic emboli, acute hypoxic respiratory failure, septic/metabolic encephalopathy, septic PE, septic shock, and acute anemia. She was [place] [facility] [place] on [place]27 and was intubated for respiratory failure. She has subsequently been evaluated by ID, CTS, and Psychiatry. She required pRBC transfusion on 10/29, and norepi on 10/30. She developed acute renal insufficiency which subsequently resolved. She was extubated on 11/2, then transferred to Hospital Medicine on 11/3 for further care, planning on long-term IV antibiotics with vancomycin and repeat CTS evaluation as an outpatient. Subsequently her acute renal insufficiency returned, and again febrile for which BCx were obtained on 11/4 and 11/5 and have remained NGTD. ID was reconsulted on 11/6, and cefepime was added to her regimen. CT C/A/P obtained shows continued infectious lung processes. TTE shows progressive valvular damage with flail valve and severe MR, for which she is being diuresed.  Covid testing ordered for placement resulted positive 11/11, patient transferred to COVID19 isolation unit. Monitoring for signs/symptoms of COVID-19 and awaiting LTAC placement.    Inpatient Medications Prescribed for Management of Current Problems:     Scheduled Meds:    amLODIPine  5 mg Oral Daily    ascorbic acid (vitamin C)  500 mg Oral BID    ceFEPime (MAXIPIME) IVPB  1 g Intravenous Q8H    enoxaparin  40 mg Subcutaneous Q24H    gabapentin  300 mg Oral TID    guaiFENesin  600 mg Oral BID     "ipratropium  0.5 mg Nebulization Q8H    levalbuterol  0.63 mg Nebulization Q8H    magnesium oxide  400 mg Oral BID    mirtazapine  15 mg Oral QHS    mupirocin   Nasal BID    oxyCODONE  20 mg Oral Q6H    polyethylene glycol  17 g Oral Daily    senna-docusate 8.6-50 mg  2 tablet Oral BID    sodium chloride 0.9%  10 mL Intravenous Q6H    torsemide  20 mg Oral Daily    vancomycin (VANCOCIN) IVPB  1,500 mg Intravenous Q24H    vitamin D  1,000 Units Oral Daily     Continuous Infusions:   As Needed: sodium chloride, acetaminophen, acetaminophen, acetaminophen, benzonatate, calcium carbonate, diphenhydrAMINE, hydrOXYzine HCL, ibuprofen, melatonin, oxyCODONE, polyethylene glycol, sodium chloride 0.9%, Flushing PICC Protocol **AND** sodium chloride 0.9% **AND** sodium chloride 0.9%, valproate sodium (DEPACON) IVPB, vancomycin - pharmacy to dose    Assessment and Plan by Problem    MRSA bacteremia  Pneumonia of both lungs due to infectious organism  Pulmonary emboli (septic)  Sepsis  Endocarditis of tricuspid valve  - Febrile intermittently, BCx from 11/4 and 11/5 NGTD. Per ID, she will likely continue to have intermittent fevers from her infectious process  - Continue vancomycin (pharmacy dosing). She has completed five days of cefepime  - PT/OT following and recommending IPR, LTAC referrals out  - Needs 6 weeks IV antibiotics, then follow up with CTS. awaiting placement.  - completing 5-day course of RDV for COVID - ended 11/21  - repeat blood culture neg, urine culture pending and respiratory culture not collected; added cefepime until resulted; discuss with ID and repeat echo if no source found     Flail tricuspid valve  Severe tricuspid regurgitation  Lower extremity edema  - Repeat TTE shows more advanced disease, tricuspid valve now flail, but also "distinguishing between vegetation, valve, and chord is difficult, even with these good images" per interpreting Cardiologist. Also shows elevated CVP.   - LE " edema was improving with Lasix IV and now on po with edema unchanged - changed to lasix IV daily to improve edema but she does not tolerate IV lasix due to strange effects when she takes; trial of oral torsemide to improve edema  - Previous provider discussed findings and ongoing fevers with Dr. Pritchett on 11/10 who recommended continued medical management as her current deconditioned status is a contraindication to surgical intervention - f/u schedulded on 12/9 with ID and CTS     COVID infection  Stopped Dexamethasone 11/13, since not requiring O2 at this time.  - COVID-19 testing: Collection Date: 11/11/2020 Collection Time:  11:09 AM   - Isolation: Airborne/Droplet. Surgical mask on patient. Notify Infection Control  - Diagnostics: Trend Q48hrs if stable, more frequently if patient decompensating         Laboratory/Study Frequency Result   CBC Admit & Q48h     CMP Admit & Q48h     Magnesium level Admit     D-dimer Admit & Q48h 8.2 > 5.9   Ferritin Admit & Q48h 686   CRP Admit & Q48h 31 > 174 > 111   CPK Admit & Q24h if elevated     LDH Admit & Q48h 382   Vitamin D Admit ordered   BNP Admit ordered   Troponin Admit & Q6h if elevated     Glucose-6-phos dehydrogenase Admit     Procalcitonin Admit 0.22   Lipid Panel If using statin     Rapid influenza Admit     Resp Infection Panel Admit if BMT/organ transplant     Legionella Antigen Admit     Sputum Culture Admit     Blood Culture Admit     Urinalysis & Culture Admit     ECG Admit & Q48h if on HCQ     CXR Admit     Lymphopenia, hyponatremia, hyperferritinemia, elevated troponin, elevated d-dimer, age, and medical comorbidities are significant predictors of poor clinical outcome     - Management: per [facility] COVID Treatment Protocol (4/15/20)    - Monitoring:              - Telemetry & Continuous Pulse Oximetry    - Nutrition:               - Multivitamin PO daily              - Add Boost supplement              - Vitamin D 1000IU daily as she is Vitamin D  deficient              - Ascorbic acid 500mg PO bid    - Supportive Care:              - acetaminophen 650mg PO Q6hr PRN fever/headache              - loperamide PRN viral diarrhea              - IVF if indicated, restrictive strategy preferred, no maintenance IV if able              - VTE PPx: enoxaparin or heparin SQ unless contraindicated    - Antibiotics:  - if indications, CXR findings, elevated procal. See protocol for alternatives.   - Discontinue early if low concern for bacterial co-infection              - ceftriaxone 1g Q24h x 5 days - not started  - azithromycin 500mg po x1, then 250mg po daily x 4 days - not started    - Therapies:              - If patient meets criteria  - Consult for RDV; febrile, meets criteria - RDV started 11/17     Acute renal insufficiency  Hyperkalemia  Hyponatremia - resolved  - sCr stable, steady with diuresis   - Etiology likely multifactorial with recent sepsis and vancomycin and poor PO intake  - Urine studies consistent with pre-renal (though cardiorenal would have a similar picture)     IVDU (intravenous drug user)  Opioid use disorder, severe, dependence  - Psych evaluated earlier this admission  - Would benefit from LTAC disposition     Substance induced mood disorder  -continue mirtazapine 15mg HS  -gabapentin 300 TID     Opioid Use disorder  Continue oxycodone 20mg q6 pRN  Continue discussion MAT with patient  Consider Addiction Psych     Chronic inflammatory anemia  - Hemoglobin 6.9 g/dL 11/10, given one unit of pRBCs  - Iron studies consistent with inflammatory process, in keeping with her systemic infection  - Monitor and transfuse for Hgb < 7     Wounds  Left foot and sacral spine  Wound care following. Appreciate recs.     LE edema  Mixed etiology, IV fluids and low albumin  Elevate feet, improve nutritional status  Lasix daily - IV for now     Malnutrition  - Nutrition following  - Added Boost to all meals      Diet: Diet Adult Regular (IDDSI Level 7)  GI  Prophylaxis: Not indicated  Significant LDAs:   IV Access Type: PICC  Urinary Catheter Indication if present: Patient Does Not Have Urinary Catheter  Other Lines/Tubes/Drains:    HIGH RISK CONDITION(S):   Patient is currently on drug therapy requiring intensive monitoring for toxicity: Vancomycin     Goals of Care:    Previous admission:  10/24/20  Likely prognosis:  Poor  Code Status: Full Code  Comfort Only: No  Hospice: No  Goals at discharge: remain at home, with physician follow-up    Discharge Planning   VIANNEY: 11/23/2020     Code Status: Full Code   Is the patient medically ready for discharge?: No    Reason for patient still in hospital (select all that apply): Patient trending condition and Pending disposition  Discharge Plan A: Long-term acute care facility (LTAC)   Discharge Delays: None known at this time    VTE Risk Mitigation (From admission, onward)         Ordered     enoxaparin injection 40 mg  Every 24 hours      11/08/20 0838     Place sequential compression device  Until discontinued      10/26/20 2023                   Cierra Savage MD  Department of Hospital Medicine   [facility] - ICU 15 [facility]  [phone]

## 2020-11-22 NOTE — PROGRESS NOTES
Pharmacokinetic Assessment Follow Up: IV Vancomycin    Vancomycin serum concentration assessment(s):    The trough level was drawn correctly and can be used to guide therapy at this time. The measurement is below the desired definitive target range of 15 to 20 mcg/mL.    Vancomycin Regimen Plan:    Change regimen to Vancomycin 1750 mg IV every 24 hours with next serum trough concentration measured at 0730 prior to 3rd dose on 11/24    Drug levels (last 3 results):  Recent Labs   Lab Result Units 11/20/20  0811 11/21/20  0754 11/22/20  0600   Vancomycin-Trough ug/mL 56.7* 61.9* 12.4       Pharmacy will continue to follow and monitor vancomycin.    Please contact pharmacy at extension [phone] for questions regarding this assessment.    Thank you for the consult,   Real Fisher       Patient brief summary:  Марина Britton is a 30 y.o. female initiated on antimicrobial therapy with IV Vancomycin for treatment of endocarditis    The patient's current regimen is 1500mg IV every 24hrs.     Drug Allergies:   Review of patient's allergies indicates:   Allergen Reactions    Soap Rash     [facility] - Hospital supplied - cleanser shampoo & body wash gel  Ingredients - purified water, sodium laureth sulfate, sodium chloride, cocamide william, cocamidopropylamine oxide, fragrance, aloe barbadensis leaf juice, propylene alcohol, peg-150 distearate, diazolidinyl urea, ciric acid, methylparaben, & propulparaben       Actual Body Weight:   88.5kg    Renal Function:   Estimated Creatinine Clearance: 70.2 mL/min (based on SCr of 1.2 mg/dL).,     Dialysis Method (if applicable):  N/A    CBC (last 72 hours):  Recent Labs   Lab Result Units 11/20/20  0811 11/22/20  0600   WBC K/uL 8.72 12.00   Hemoglobin g/dL 8.5* 9.2*   Hematocrit % 29.4* 30.2*   Platelets K/uL 420* 589*   Gran % % 68.0 70.9   Lymph % % 17.0* 14.2*   Mono % % 8.4 9.9   Eosinophil % % 4.7 3.3   Basophil % % 1.4 1.3   Differential Method  Automated Automated        Metabolic Panel (last 72 hours):  Recent Labs   Lab Result Units 11/20/20  0811 11/21/20  0644 11/22/20  0600   Sodium mmol/L 140  --  140   Potassium mmol/L 3.7  --  4.0   Chloride mmol/L 101  --  99   CO2 mmol/L 28  --  28   Glucose mg/dL 82  --  108   Glucose, UA   --  Negative  --    BUN mg/dL 18  --  23*   Creatinine mg/dL 0.9  --  1.2   Albumin g/dL 2.1*  --  2.6*   Total Bilirubin mg/dL 0.5  --  0.5   Alkaline Phosphatase U/L 148*  --  144*   AST U/L 16  --  14   ALT U/L 39  --  28   Magnesium mg/dL 1.9  --  2.2       Vancomycin Administrations:  vancomycin given in the last 96 hours                   vancomycin 1.5 g in dextrose 5 % 250 mL IVPB (ready to mix) (mg) 1,500 mg New Bag 11/22/20 0840    vancomycin 1.5 g in dextrose 5 % 250 mL IVPB (ready to mix) (mg) 1,500 mg New Bag 11/21/20 0626    vancomycin 1.5 g in dextrose 5 % 250 mL IVPB (ready to mix) (mg) 1,500 mg New Bag 11/20/20 0631     1,500 mg New Bag 11/19/20 0625                Microbiologic Results:  Microbiology Results (last 7 days)     Procedure Component Value Units Date/Time    Urine culture [[id number]]  (Abnormal) Collected: 11/21/20 0644    Order Status: Completed Specimen: Urine Updated: 11/22/20 1359     Urine Culture, Routine COAGULASE-NEGATIVE STAPHYLOCOCCUS SPECIES  10,000 - 49,999 cfu/ml  Susceptibility testing not routinely performed.      Narrative:      Specimen Source->Urine    Blood culture [[id number]] Collected: 11/21/20 0754    Order Status: Completed Specimen: Blood Updated: 11/22/20 1012     Blood Culture, Routine No Growth to date      No Growth to date    Narrative:      Collection has been rescheduled by KR3 at 11/20/2020 22:23 Reason:   Unable to collect  Collection has been rescheduled by KR3 at 11/20/2020 22:23 Reason:   Unable to collect    Blood culture [[id number]] Collected: 11/16/20 0353    Order Status: Completed Specimen: Blood from Antecubital, Left Arm Updated: 11/21/20 0812     Blood Culture, Routine No  growth after 5 days.    Culture, Respiratory with Gram Stain [[id number]]     Order Status: No result Specimen: Respiratory

## 2020-11-23 PROBLEM — H92.01 OTALGIA, RIGHT: Status: ACTIVE | Noted: 2020-11-23

## 2020-11-23 PROCEDURE — 63600175 PHARM REV CODE 636 W HCPCS: Performed by: INTERNAL MEDICINE

## 2020-11-23 PROCEDURE — 97803 MED NUTRITION INDIV SUBSEQ: CPT

## 2020-11-23 PROCEDURE — 93005 ELECTROCARDIOGRAM TRACING: CPT

## 2020-11-23 PROCEDURE — 25000003 PHARM REV CODE 250: Performed by: HOSPITALIST

## 2020-11-23 PROCEDURE — 99232 SBSQ HOSP IP/OBS MODERATE 35: CPT | Mod: ,,, | Performed by: INTERNAL MEDICINE

## 2020-11-23 PROCEDURE — 25000003 PHARM REV CODE 250: Performed by: INTERNAL MEDICINE

## 2020-11-23 PROCEDURE — 94761 N-INVAS EAR/PLS OXIMETRY MLT: CPT

## 2020-11-23 PROCEDURE — 93010 EKG 12-LEAD: ICD-10-PCS | Mod: ,,, | Performed by: INTERNAL MEDICINE

## 2020-11-23 PROCEDURE — 25000003 PHARM REV CODE 250: Performed by: NURSE PRACTITIONER

## 2020-11-23 PROCEDURE — 99900035 HC TECH TIME PER 15 MIN (STAT)

## 2020-11-23 PROCEDURE — A4216 STERILE WATER/SALINE, 10 ML: HCPCS | Performed by: INTERNAL MEDICINE

## 2020-11-23 PROCEDURE — 94640 AIRWAY INHALATION TREATMENT: CPT

## 2020-11-23 PROCEDURE — 94664 DEMO&/EVAL PT USE INHALER: CPT

## 2020-11-23 PROCEDURE — 25000242 PHARM REV CODE 250 ALT 637 W/ HCPCS: Performed by: INTERNAL MEDICINE

## 2020-11-23 PROCEDURE — 93010 ELECTROCARDIOGRAM REPORT: CPT | Mod: ,,, | Performed by: INTERNAL MEDICINE

## 2020-11-23 PROCEDURE — 99232 PR SUBSEQUENT HOSPITAL CARE,LEVL II: ICD-10-PCS | Mod: ,,, | Performed by: INTERNAL MEDICINE

## 2020-11-23 PROCEDURE — 20600001 HC STEP DOWN PRIVATE ROOM

## 2020-11-23 PROCEDURE — 25000003 PHARM REV CODE 250: Performed by: STUDENT IN AN ORGANIZED HEALTH CARE EDUCATION/TRAINING PROGRAM

## 2020-11-23 RX ORDER — METOPROLOL TARTRATE 1 MG/ML
2.5 INJECTION, SOLUTION INTRAVENOUS EVERY 8 HOURS PRN
Status: DISCONTINUED | OUTPATIENT
Start: 2020-11-23 | End: 2020-11-24 | Stop reason: HOSPADM

## 2020-11-23 RX ORDER — FLUTICASONE PROPIONATE 50 MCG
2 SPRAY, SUSPENSION (ML) NASAL DAILY
Status: DISCONTINUED | OUTPATIENT
Start: 2020-11-23 | End: 2020-11-23

## 2020-11-23 RX ADMIN — ACETAMINOPHEN 650 MG: 325 TABLET ORAL at 11:11

## 2020-11-23 RX ADMIN — IPRATROPIUM BROMIDE 0.5 MG: 0.5 SOLUTION RESPIRATORY (INHALATION) at 11:11

## 2020-11-23 RX ADMIN — DOCUSATE SODIUM AND SENNOSIDES 2 TABLET: 8.6; 5 TABLET, FILM COATED ORAL at 09:11

## 2020-11-23 RX ADMIN — MIRTAZAPINE 15 MG: 7.5 TABLET ORAL at 09:11

## 2020-11-23 RX ADMIN — GUAIFENESIN 600 MG: 600 TABLET, EXTENDED RELEASE ORAL at 10:11

## 2020-11-23 RX ADMIN — IPRATROPIUM BROMIDE 0.5 MG: 0.5 SOLUTION RESPIRATORY (INHALATION) at 03:11

## 2020-11-23 RX ADMIN — OXYCODONE HYDROCHLORIDE 20 MG: 10 TABLET ORAL at 05:11

## 2020-11-23 RX ADMIN — OXYCODONE HYDROCHLORIDE AND ACETAMINOPHEN 500 MG: 500 TABLET ORAL at 09:11

## 2020-11-23 RX ADMIN — LEVALBUTEROL HYDROCHLORIDE 0.63 MG: 0.63 SOLUTION RESPIRATORY (INHALATION) at 08:11

## 2020-11-23 RX ADMIN — ENOXAPARIN SODIUM 40 MG: 40 INJECTION SUBCUTANEOUS at 04:11

## 2020-11-23 RX ADMIN — OXYCODONE HYDROCHLORIDE 10 MG: 10 TABLET ORAL at 09:11

## 2020-11-23 RX ADMIN — CEFEPIME 1 G: 1 INJECTION, POWDER, FOR SOLUTION INTRAMUSCULAR; INTRAVENOUS at 10:11

## 2020-11-23 RX ADMIN — CEFEPIME 1 G: 1 INJECTION, POWDER, FOR SOLUTION INTRAMUSCULAR; INTRAVENOUS at 06:11

## 2020-11-23 RX ADMIN — HYDROXYZINE HYDROCHLORIDE 25 MG: 25 TABLET, FILM COATED ORAL at 04:11

## 2020-11-23 RX ADMIN — GABAPENTIN 300 MG: 300 CAPSULE ORAL at 09:11

## 2020-11-23 RX ADMIN — MAGNESIUM OXIDE 400 MG (241.3 MG MAGNESIUM) TABLET 400 MG: TABLET at 09:11

## 2020-11-23 RX ADMIN — Medication 10 ML: at 05:11

## 2020-11-23 RX ADMIN — Medication 10 ML: at 06:11

## 2020-11-23 RX ADMIN — ACETAMINOPHEN 650 MG: 325 TABLET ORAL at 04:11

## 2020-11-23 RX ADMIN — CEFEPIME 1 G: 1 INJECTION, POWDER, FOR SOLUTION INTRAMUSCULAR; INTRAVENOUS at 01:11

## 2020-11-23 RX ADMIN — OXYCODONE HYDROCHLORIDE 20 MG: 10 TABLET ORAL at 11:11

## 2020-11-23 RX ADMIN — IPRATROPIUM BROMIDE 0.5 MG: 0.5 SOLUTION RESPIRATORY (INHALATION) at 08:11

## 2020-11-23 RX ADMIN — Medication 10 ML: at 12:11

## 2020-11-23 RX ADMIN — GABAPENTIN 300 MG: 300 CAPSULE ORAL at 03:11

## 2020-11-23 RX ADMIN — GUAIFENESIN 600 MG: 600 TABLET, EXTENDED RELEASE ORAL at 09:11

## 2020-11-23 RX ADMIN — DIPHENHYDRAMINE HYDROCHLORIDE 25 MG: 25 CAPSULE ORAL at 05:11

## 2020-11-23 RX ADMIN — CHOLECALCIFEROL TAB 25 MCG (1000 UNIT) 1000 UNITS: 25 TAB at 09:11

## 2020-11-23 RX ADMIN — VANCOMYCIN HYDROCHLORIDE 1750 MG: 750 INJECTION, POWDER, LYOPHILIZED, FOR SOLUTION INTRAVENOUS at 09:11

## 2020-11-23 RX ADMIN — AMLODIPINE BESYLATE 5 MG: 5 TABLET ORAL at 09:11

## 2020-11-23 RX ADMIN — Medication 10 ML: at 11:11

## 2020-11-23 NOTE — ASSESSMENT & PLAN NOTE
30 y.o F with day history of R sided otalgia after using IS. Ear examination WNL, no evidence of perforation or effusion. Suspect ETD as source.     - Discussed with patient autoinsufflation techniques to pressure relief  - Can use flonase daily  - Would add saline spray TID if using flonase as patient is at risk of worsening epistaxis with flonase  - No other ENT interventions necessary, follow up as needed  - Call/page with questions/concerns

## 2020-11-23 NOTE — PROGRESS NOTES
"[facility] - [facility]  Adult Nutrition  Progress Note    SUMMARY       Recommendations    1. Continue Regular diet, encourage PO intake   2. Continue Boost Plus TID (chocolate only)    Goals: Meet % EEN, EPN by RD f/u date  Nutrition Goal Status: goal met  Communication of RD Recs: other (comment)(POC)    Reason for Assessment    Reason For Assessment: RD follow-up  Diagnosis: (Endocarditis of tricuspid valve)  Relevant Medical History: IVDU  Interdisciplinary Rounds: did not attend  General Information Comments: Pt +COVID-19. PO intake %, likes Boost chocolate. Will drink Boost when she is not full and when the drink is cold. Denies n/v/d/c. Appetite is good. NFPE completed on 11/11, to reveal mild-moderate wasting in orbital region, although well-nourished upper arm region. Unable to fully assess thigh and calf d/t fluid retention.    Nutrition Discharge Planning: Regular Diet with adeqaute PO intake    Nutrition Risk Screen    Nutrition Risk Screen: no indicators present    Nutrition/Diet History    Patient Reported Diet/Restrictions/Preferences: general  Spiritual, Cultural Beliefs, Adventism Practices, Values that Affect Care: no  Food Allergies: NKFA  Factors Affecting Nutritional Intake: None identified at this time    Anthropometrics    Temp: 99.5 °F (37.5 °C)  Height: 5' 2" (157.5 cm)  Height (inches): 62 in  Weight Method: Bed Scale  Weight: 88.5 kg (195 lb)  Weight (lb): 195 lb  Ideal Body Weight (IBW), Female: 110 lb  % Ideal Body Weight, Female (lb): 138.18 %  BMI (Calculated): 35.7  BMI Grade: 35 - 39.9 - obesity - grade II       Lab/Procedures/Meds    Pertinent Labs Reviewed: reviewed  Pertinent Labs Comments: BUN 23, .8  Pertinent Medications Reviewed: reviewed  Pertinent Medications Comments: Vit C, enoxaparin, gabapentin, polyethylene glycol, senna-docusate, vancomycin, Vit D    Estimated/Assessed Needs    Weight Used For Calorie Calculations: 53.2 kg (117 lb 4.6 " "oz)  Energy Calorie Requirements (kcal): 1680 kcal/day  Energy Need Method: [place]-St Jeor(x1.4 PAL)  Protein Requirements: 53-64 gm/day(1-1.2 g/kg)  Weight Used For Protein Calculations: 53.2 kg (117 lb 4.6 oz)  Fluid Requirements (mL): 1 mL/kcal or per MD  Estimated Fluid Requirement Method: RDA Method  RDA Method (mL): 1680  CHO Requirement: -      Nutrition Prescription Ordered    Current Diet Order: Regular Diet  Oral Nutrition Supplement: Boost Plus All Meals    Evaluation of Received Nutrient/Fluid Intake    I/O: +8.7L since 10/28  Energy Calories Required: meeting needs  Protein Required: meeting needs  Fluid Required: other (see comments)(per MD)  Comments: LBM: 11/22  Tolerance: tolerating  % Intake of Estimated Energy Needs: 75 - 100 %  % Meal Intake: 75 - 100 %    Nutrition Risk    Level of Risk/Frequency of Follow-up: low     Assessment and Plan    Nutrition Problem  Inadequate energy intake     Related to (etiology):   Decreased appetite PTA     Signs and Symptoms (as evidenced by):   Pt report, weight loss      Interventions(treatment strategy):  Collaboration of nutrition care with other providers   Commercial beverage  General/healthful diet      Nutrition Diagnosis Status:   Improving    Monitor and Evaluation    Food and Nutrient Intake: energy intake, food and beverage intake  Food and Nutrient Adminstration: diet order  Knowledge/Beliefs/Attitudes: beliefs and attitudes  Physical Activity and Function: nutrition-related ADLs and IADLs  Anthropometric Measurements: weight, weight change, body mass index  Biochemical Data, Medical Tests and Procedures: electrolyte and renal panel, inflammatory profile  Nutrition-Focused Physical Findings: overall appearance     Malnutrition Assessment  Malnutrition Type: acute illness or injury  Energy Intake: other (see comments), moderate energy intake(2 meals per day (snacking, "junk" per pt report, poor appetite))          Subcutaneous Fat (Malnutrition): " mild depletion  Fluid Accumulation (Malnutrition): mild   Orbital Region (Subcutaneous Fat Loss): mild depletion  Upper Arm Region (Subcutaneous Fat Loss): well nourished   Christianity Region (Muscle Loss): mild depletion  Clavicle Bone Region (Muscle Loss): mild depletion  Clavicle and Acromion Bone Region (Muscle Loss): mild depletion  Dorsal Hand (Muscle Loss): well nourished  Patellar Region (Muscle Loss): other (see comments)(Unable to assess d/t fluid)  Anterior Thigh Region (Muscle Loss): other (see comments)(Unable to assess d/t fluid)  Posterior Calf Region (Muscle Loss): other (see comments)(Unable to assess d/t fluid)   Edema (Fluid Accumulation): 3-->moderate   Subcutaneous Fat Loss (Final Summary): mild protein-calorie malnutrition  Fluid Accumulation Evaluation: mild    Moderate Weight Loss (Malnutrition): other (see comments)(Does not qualify (6.6-7% loss in 3 months))    Nutrition Follow-Up    RD Follow-up?: Yes

## 2020-11-23 NOTE — CONSULTS
[facility] - [facility]  Otorhinolaryngology-Head & Neck Surgery  Consult Note    Patient Name: Марина Britton  MRN: [id number]  Code Status: Full Code  Admission Date: 10/26/2020  Hospital Length of Stay: 28 days  Attending Physician: Cierra Savage MD  Primary Care Provider: Luis Felipe Jose Iii, MD    Patient information was obtained from patient and past medical records.     Inpatient consult to ENT  Consult performed by: Margie Webster MD  Consult ordered by: Cierra Savage MD        Subjective:     Chief Complaint: R otalgia    History of Present Illness: 30 y.o F COVID+ admitted for endocarditis with 3 day history of R sided otalgia that began while using IS. Patient reports she felt a popping sensation of her R ear at that time and since then feels intermittent pressure sensation described as a dull ache. She has never had this symptom before. She feels her hearing is normal but occasionally sounds muffled since onset. She denies any L sided ear symptoms. She does have bilateral tinnitus. She does feel her nasal congestion has worsened since admission.      Medications:  Continuous Infusions:  Scheduled Meds:   amLODIPine  5 mg Oral Daily    ascorbic acid (vitamin C)  500 mg Oral BID    ceFEPime (MAXIPIME) IVPB  1 g Intravenous Q8H    enoxaparin  40 mg Subcutaneous Q24H    gabapentin  300 mg Oral TID    guaiFENesin  600 mg Oral BID    ipratropium  0.5 mg Nebulization Q8H    levalbuterol  0.63 mg Nebulization Q8H    magnesium oxide  400 mg Oral BID    mirtazapine  15 mg Oral QHS    oxyCODONE  20 mg Oral Q6H    polyethylene glycol  17 g Oral Daily    senna-docusate 8.6-50 mg  2 tablet Oral BID    sodium chloride 0.9%  10 mL Intravenous Q6H    [START ON 11/24/2020] torsemide  10 mg Oral Daily    vancomycin (VANCOCIN) IVPB  20 mg/kg Intravenous Q24H    vitamin D  1,000 Units Oral Daily     PRN Meds:sodium chloride, acetaminophen, acetaminophen, acetaminophen, benzonatate,  calcium carbonate, diphenhydrAMINE, hydrOXYzine HCL, ibuprofen, melatonin, oxyCODONE, polyethylene glycol, sodium chloride 0.9%, Flushing PICC Protocol **AND** sodium chloride 0.9% **AND** sodium chloride 0.9%, valproate sodium (DEPACON) IVPB, vancomycin - pharmacy to dose     No current facility-administered medications on file prior to encounter.      No current outpatient medications on file prior to encounter.       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    MRSA due to IV heroin    PTSD (post-traumatic stress disorder)     Substance abuse      Past Surgical History:   Procedure Laterality Date     SECTION, LOW TRANSVERSE      x4    COSMETIC SURGERY       Family History     None        Tobacco Use    Smoking status: Never Smoker   Substance and Sexual Activity    Alcohol use: Yes     Comment: occ    Drug use: Yes     Types: IV, Marijuana     Comment: no heroin x3 days    Sexual activity: Yes     Partners: Male     Birth control/protection: Partner-Vasectomy     Review of Systems   HENT: Positive for ear pain, hearing loss, nosebleeds, rhinorrhea and tinnitus.    All other systems reviewed and are negative.    Objective:     Vital Signs (Most Recent):  Temp: 99.5 °F (37.5 °C) (20)  Pulse: (!) 133 (20)  Resp: 20 (20)  BP: 130/80 (20)  SpO2: (!) 92 % (20) Vital Signs (24h Range):  Temp:  [98.7 °F (37.1 °C)-99.5 °F (37.5 °C)] 99.5 °F (37.5  °C)  Pulse:  [] 133  Resp:  [18-20] 20  SpO2:  [92 %-98 %] 92 %  BP: (129-131)/(80-90) 130/80     Weight: 88.5 kg (195 lb)  Body mass index is 35.67 kg/m².        Physical Exam  Constitutional:       General: She is not in acute distress.     Appearance: Normal appearance.   HENT:      Head: Normocephalic and atraumatic.      Right Ear: Tympanic membrane, ear canal and external ear normal.      Left Ear: Tympanic membrane, ear canal and external ear normal.      Nose:      Comments: Evidence of prominent blood vessels on R anterior septum with possible dried blood        Mouth/Throat:      Mouth: Mucous membranes are moist.      Pharynx: Oropharynx is clear.   Eyes:      Extraocular Movements: Extraocular movements intact.   Neck:      Musculoskeletal: Normal range of motion.   Pulmonary:      Breath sounds: No stridor. No wheezing.      Comments: Mildly increased work of breathing   Skin:     General: Skin is warm.   Neurological:      Mental Status: She is alert and oriented to person, place, and time.   Psychiatric:         Mood and Affect: Mood normal.         Behavior: Behavior normal.         Significant Labs:  CBC:   Recent Labs   Lab 11/22/20  0600   WBC 12.00   RBC 3.51*   HGB 9.2*   HCT 30.2*   *   MCV 86   MCH 26.2*   MCHC 30.5*     CMP:   Recent Labs   Lab 11/22/20  0600      CALCIUM 9.0   ALBUMIN 2.6*   PROT 7.9      K 4.0   CO2 28   CL 99   BUN 23*   CREATININE 1.2   ALKPHOS 144*   ALT 28   AST 14   BILITOT 0.5       Significant Diagnostics:  None    Assessment/Plan:     Otalgia, right  30 y.o F with day history of R sided otalgia after using IS. Ear examination WNL, no evidence of perforation or effusion. Suspect ETD as source.     - Discussed with patient autoinsufflation techniques to pressure relief  - Can use flonase daily  - Would add saline spray TID if using flonase as patient is at risk of worsening epistaxis with flonase  - No other ENT interventions necessary,  follow up as needed  - Call/page with questions/concerns        VTE Risk Mitigation (From admission, onward)         Ordered     enoxaparin injection 40 mg  Every 24 hours      11/08/20 0838     Place sequential compression device  Until discontinued      10/26/20 2023                Margie Webster MD  Otorhinolaryngology-Head & Neck Surgery  [facility] - [facility]

## 2020-11-23 NOTE — HPI
30 y.o F COVID+ admitted for endocarditis with 3 day history of R sided otalgia that began while using IS. Patient reports she felt a popping sensation of her R ear at that time and since then feels intermittent pressure sensation described as a dull ache. She has never had this symptom before. She feels her hearing is normal but occasionally sounds muffled since onset. She denies any L sided ear symptoms. She does have bilateral tinnitus. She does feel her nasal congestion has worsened since admission.

## 2020-11-23 NOTE — SUBJECTIVE & OBJECTIVE
Left message to call back. Offered nurse appointment for 4/7 at 330pm.   Medications:  Continuous Infusions:  Scheduled Meds:   amLODIPine  5 mg Oral Daily    ascorbic acid (vitamin C)  500 mg Oral BID    ceFEPime (MAXIPIME) IVPB  1 g Intravenous Q8H    enoxaparin  40 mg Subcutaneous Q24H    gabapentin  300 mg Oral TID    guaiFENesin  600 mg Oral BID    ipratropium  0.5 mg Nebulization Q8H    levalbuterol  0.63 mg Nebulization Q8H    magnesium oxide  400 mg Oral BID    mirtazapine  15 mg Oral QHS    oxyCODONE  20 mg Oral Q6H    polyethylene glycol  17 g Oral Daily    senna-docusate 8.6-50 mg  2 tablet Oral BID    sodium chloride 0.9%  10 mL Intravenous Q6H    [START ON 11/24/2020] torsemide  10 mg Oral Daily    vancomycin (VANCOCIN) IVPB  20 mg/kg Intravenous Q24H    vitamin D  1,000 Units Oral Daily     PRN Meds:sodium chloride, acetaminophen, acetaminophen, acetaminophen, benzonatate, calcium carbonate, diphenhydrAMINE, hydrOXYzine HCL, ibuprofen, melatonin, oxyCODONE, polyethylene glycol, sodium chloride 0.9%, Flushing PICC Protocol **AND** sodium chloride 0.9% **AND** sodium chloride 0.9%, valproate sodium (DEPACON) IVPB, vancomycin - pharmacy to dose     No current facility-administered medications on file prior to encounter.      No current outpatient medications on file prior to encounter.       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    MRSA due to IV heroin    PTSD (post-traumatic stress disorder)     Substance abuse      Past Surgical History:   Procedure  Laterality Date     SECTION, LOW TRANSVERSE      x4    COSMETIC SURGERY       Family History     None        Tobacco Use    Smoking status: Never Smoker   Substance and Sexual Activity    Alcohol use: Yes     Comment: occ    Drug use: Yes     Types: IV, Marijuana     Comment: no heroin x3 days    Sexual activity: Yes     Partners: Male     Birth control/protection: Partner-Vasectomy     Review of Systems   HENT: Positive for ear pain, hearing loss, nosebleeds, rhinorrhea and tinnitus.    All other systems reviewed and are negative.    Objective:     Vital Signs (Most Recent):  Temp: 99.5 °F (37.5 °C) (20 1125)  Pulse: (!) 133 (20 112)  Resp: 20 (20)  BP: 130/80 (20)  SpO2: (!) 92 % (20) Vital Signs (24h Range):  Temp:  [98.7 °F (37.1 °C)-99.5 °F (37.5 °C)] 99.5 °F (37.5 °C)  Pulse:  [] 133  Resp:  [18-20] 20  SpO2:  [92 %-98 %] 92 %  BP: (129-131)/(80-90) 130/80     Weight: 88.5 kg (195 lb)  Body mass index is 35.67 kg/m².        Physical Exam  Constitutional:       General: She is not in acute distress.     Appearance: Normal appearance.   HENT:      Head: Normocephalic and atraumatic.      Right Ear: Tympanic membrane, ear canal and external ear normal.      Left Ear: Tympanic membrane, ear canal and external ear normal.      Nose:      Comments: Evidence of prominent blood vessels on R anterior septum with possible dried blood        Mouth/Throat:      Mouth: Mucous membranes are moist.      Pharynx: Oropharynx is clear.   Eyes:      Extraocular Movements: Extraocular movements intact.   Neck:      Musculoskeletal: Normal range of motion.   Pulmonary:      Breath sounds: No stridor. No wheezing.      Comments: Mildly increased work of breathing   Skin:     General: Skin is warm.   Neurological:      Mental Status: She is alert and oriented to person, place, and time.   Psychiatric:         Mood and Affect: Mood normal.         Behavior: Behavior  normal.         Significant Labs:  CBC:   Recent Labs   Lab 11/22/20  0600   WBC 12.00   RBC 3.51*   HGB 9.2*   HCT 30.2*   *   MCV 86   MCH 26.2*   MCHC 30.5*     CMP:   Recent Labs   Lab 11/22/20  0600      CALCIUM 9.0   ALBUMIN 2.6*   PROT 7.9      K 4.0   CO2 28   CL 99   BUN 23*   CREATININE 1.2   ALKPHOS 144*   ALT 28   AST 14   BILITOT 0.5       Significant Diagnostics:  None

## 2020-11-23 NOTE — PROGRESS NOTES
Wound care follow up:  L foot wound healing with minimal slough and smaller measurements of 0.3x0.3x0.1cm - epithelium edges- moderate serosanguinous drainage- no redness- no odor   Recommend to continue with wound care orders in place and wound care team to follow pt prn [id number]    L foot wound- see above note

## 2020-11-23 NOTE — PROGRESS NOTES
[facility] - ICU 15 [facility] Medicine  Telemedicine Progress Note    Patient Name: Марина Britton  MRN: [id number]  Patient Class: IP- Inpatient   Admission Date: 10/26/2020  Length of Stay: 28 days  Attending Physician: Cierra Savage MD  Primary Care Provider: Luis Felipe Jose Iii, MD    [facility] Medicine Team: [facility] [place] 10 Cierra Savage MD  Virtual Telemedicine Progress Note  Start time: 1255  Chief complaint: Endocarditis of tricuspid valve  The patient location is: [id number]  The patient arrived at: 10/26/2020  7:45 PM  Present with the patient at the time of the telemed/virtual assessment: n/a  End time:  1307  Total time spent with patient: 12 min  I have assessed findings virtually using a telemedicine platform and with assistance of the bedside nurse or telemedicine presenter.  The attending portion of this evaluation, treatment, and documentation was performed per Cierra Savage MD via audiovisual.    Patient was transferred to the telemedicine service on: 11/15/2020    Subjective:     Admission CC:   Chief Complaint   Patient presents with    Foot Pain       Fever and left foot pain for the past 3 days. Foot pain worse today Here 3 days ago for foot abscess, prescribed clindamycin.    Chest Pain       Sharp chest pain when taking a deep breath that started earlier today, Denies n/v. +SOB     Follow up visit for: Endocarditis of tricuspid valve    Interval History / Events Overnight:   tmax - 102  -appreciate ENT eval - patient cannot tolerate nasal meds (all her life)  -increased tachycardia so xopenex stopped; she denies worsened edema; wearing monitor and pulse ox today      Review of Systems   Constitutional: Positive for fever. Negative for appetite change.   Respiratory: Positive for cough, chest tightness and shortness of breath.    Cardiovascular: Positive for chest pain.   Gastrointestinal: Negative for diarrhea.     Objective:     Vital Signs (Most  Recent):  Temp: (!) 101.9 °F (38.8 °C) (11/23/20 1606)  Pulse: (!) 128 (11/23/20 1600)  Resp: 20 (11/23/20 1600)  BP: 120/76 (11/23/20 1600)  SpO2: 95 % (11/23/20 1600) Vital Signs (24h Range):  Temp:  [99 °F (37.2 °C)-101.9 °F (38.8 °C)] 101.9 °F (38.8 °C)  Pulse:  [] 128  Resp:  [18-20] 20  SpO2:  [92 %-96 %] 95 %  BP: (120-130)/(76-90) 120/76     Weight: 88.5 kg (195 lb)  Body mass index is 35.67 kg/m².    Intake/Output Summary (Last 24 hours) at 11/23/2020 1722  Last data filed at 11/23/2020 1635  Gross per 24 hour   Intake 480 ml   Output --   Net 480 ml      Physical Exam  Constitutional:       General: She is not in acute distress.     Appearance: Normal appearance. She is not diaphoretic.   Eyes:      General: Lids are normal. No scleral icterus.        Right eye: No discharge.         Left eye: No discharge.      Conjunctiva/sclera: Conjunctivae normal.   Cardiovascular:      Rate and Rhythm: Tachycardia present.   Pulmonary:      Effort: Pulmonary effort is normal. No tachypnea, accessory muscle usage or respiratory distress.   Abdominal:      General: There is no distension.   Musculoskeletal:      Right lower leg: Edema present.      Left lower leg: Edema present.   Skin:     Coloration: Skin is not cyanotic.   Neurological:      General: No focal deficit present.      Mental Status: She is alert and oriented to person, place, and time. She is not disoriented.      Cranial Nerves: No cranial nerve deficit.      Motor: No weakness.   Psychiatric:         Attention and Perception: Attention normal.         Mood and Affect: Affect normal.         Speech: Speech normal.         Behavior: Behavior is cooperative.         Significant Labs:     Recent Labs   Lab 11/18/20  0605 11/20/20  0811 11/22/20  0600   WBC 11.39 8.72 12.00   HGB 7.6* 8.5* 9.2*   HCT 25.3* 29.4* 30.2*    420* 589*     Recent Labs   Lab 11/18/20  0605 11/20/20  0811 11/22/20  0600   GRAN 75.1*  8.6* 68.0  5.9 70.9  8.5*    LYMPH 13.5*  1.5 17.0*  1.5 14.2*  1.7   MONO 7.3  0.8 8.4  0.7 9.9  1.2*   EOS 0.3 0.4 0.4     Recent Labs   Lab 11/18/20  0605 11/20/20  0811 11/22/20  0600    140 140   K 3.9 3.7 4.0    101 99   CO2 28 28 28   BUN 26* 18 23*   CREATININE 0.9 0.9 1.2   GLU 91 82 108   CALCIUM 7.9* 8.4* 9.0   ALBUMIN 1.8* 2.1* 2.6*   MG 1.6 1.9 2.2     Recent Labs   Lab 11/18/20  0605 11/20/20  0811 11/22/20  0600   ALKPHOS 169* 148* 144*   ALT 65* 39 28   AST 44* 16 14   PROT 5.9* 6.8 7.9   BILITOT 0.4 0.5 0.5     Procalcitonin (ng/mL)   Date Value   11/16/2020 0.10   11/12/2020 0.22     Lactate (Lactic Acid) (mmol/L)   Date Value   10/26/2020 1.8     BNP (pg/mL)   Date Value   11/20/2020 360 (H)   11/13/2020 557 (H)     CRP   Date Value   11/22/2020 147.8 mg/L (H)   11/20/2020 126.8 mg/L (H)   11/18/2020 105.5 mg/L (H)   11/16/2020 54.0 mg/L (H)   11/12/2020 111.2 mg/L (H)   11/07/2020 174.2 mg/L (H)   10/24/2020 31.00 mg/dL (H)     D-Dimer (mg/L FEU)   Date Value   11/22/2020 3.91 (H)   11/20/2020 3.46 (H)   11/16/2020 5.13 (H)   11/14/2020 7.36 (H)   11/12/2020 5.98 (H)   10/24/2020 8.22 (H)     Ferritin (ng/mL)   Date Value   11/22/2020 540 (H)   11/20/2020 618 (H)   11/18/2020 628 (H)   11/16/2020 567 (H)   11/14/2020 767 (H)   11/10/2020 686 (H)     LD (U/L)   Date Value   11/22/2020 282 (H)   11/20/2020 232   11/18/2020 246   11/16/2020 233   10/28/2020 382 (H)     Troponin I (ng/mL)   Date Value   10/24/2020 <0.020   10/24/2020 0.055 (H)     CPK (U/L)   Date Value   10/29/2020 55   10/24/2020 17 (L)   10/24/2020 17 (L)     Results for orders placed or performed during the hospital encounter of 10/26/20   Vitamin D   Result Value Ref Range    Vit D, 25-Hydroxy 9 (L) 30 - 96 ng/mL     SARS-CoV2 (COVID-19) Qualitative PCR (no units)   Date Value   11/11/2020 Detected (A)     SARS-CoV-2 RNA, Amplification, Qual (no units)   Date Value   11/17/2020 Negative   10/26/2020 Negative   10/24/2020 Negative        X-Ray Chest AP Portable  Narrative: EXAMINATION:  XR CHEST AP PORTABLE    CLINICAL HISTORY:  fever, increased cough;    TECHNIQUE:  Single frontal view of the chest was performed.    COMPARISON:  November 6, 2020.    FINDINGS:  Right-sided PICC catheter remains.  Heart is enlarged.  There are patchy irregular parenchymal opacities bilaterally.  Probable bibasilar effusions.  No pneumothorax.  Impression: Persistent coarse irregular parenchymal opacification with bibasilar effusions slightly increased compared to prior.    Electronically signed by: Mario Paiz MD  Date:    11/21/2020  Time:    14:51    Assessment/Plan:      Active Diagnoses:    Diagnosis Date Noted POA    PRINCIPAL PROBLEM:  Endocarditis of tricuspid valve [I07.9] 10/26/2020 Yes    Otalgia, right [H92.01] 11/23/2020 No    Tricuspid valve insufficiency [I07.1]  Yes    Vitamin D deficiency [E55.9] 11/15/2020 Yes    COVID-19 virus detected [U07.1] 11/12/2020 Yes    Bacteremia [R78.81]  Yes    Bacterial endocarditis [I33.0]  Yes    Opioid use disorder, severe, dependence [F11.20] 11/03/2020 Yes     Chronic    Cannabis use disorder, moderate, dependence [F12.20] 11/03/2020 Yes     Chronic    Sedative, hypnotic or anxiolytic use disorder, severe, 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] 11/03/2020 Yes     Chronic    Substance induced mood disorder [F19.94] 11/03/2020 Yes     Chronic    Acute renal insufficiency [N28.9] 11/03/2020 No    MRSA bacteremia [R78.81, B95.62] 10/27/2020 Yes    Pulmonary emboli [I26.99] 10/26/2020 Yes    IVDU (intravenous drug user) [F19.90] 10/26/2020 Yes     Chronic    Pneumonia of both lungs due to infectious organism [J18.9] 10/24/2020 Yes      Problems Resolved During this Admission:    Diagnosis Date Noted Date Resolved POA    Hyperkalemia [E87.5] 11/04/2020 11/15/2020 No    Sepsis with acute hypoxic respiratory failure [A41.9, R65.20, J96.01] 10/27/2020 10/27/2020 Yes    Severe sepsis [A41.9, R65.20] 10/26/2020  11/04/2020 Yes    Tachypnea [R06.82] 10/26/2020 11/04/2020 Yes       Overview / ICU Course:    Марина Britton is a 30 y.o. female admitted for Endocarditis of tricuspid valve. Patient with HCV, IVDU who was transferred from [facility] where she presented with chest pain and was diagnosed with MRSA bacteremia associated with TV IE with septic emboli, acute hypoxic respiratory failure, septic/metabolic encephalopathy, septic PE, septic shock, and acute anemia. [place] [facility] [place] and was intubated for respiratory failure. She has subsequently been evaluated by ID, CTS, and Psychiatry. She required pRBC transfusion on 10/29, and norepi on 10/30. She developed acute renal insufficiency which subsequently resolved. She was extubated on 11/2, then transferred to Hospital Medicine on 11/3 for further care, planning on long-term IV antibiotics with vancomycin and repeat CTS evaluation as an outpatient. Subsequently her acute renal insufficiency returned, and again febrile for which BCx were obtained on 11/4 and 11/5 and have remained NGTD. ID was reconsulted on 11/6, and cefepime was added to her regimen. CT C/A/P obtained shows continued infectious lung processes. TTE shows progressive valvular damage with flail valve and severe MR, for which she is being diuresed.  Covid testing ordered for placement resulted positive 11/11, patient transferred to COVID19 isolation unit. Monitoring for signs/symptoms of COVID-19 and awaiting LTAC placement.    Inpatient Medications Prescribed for Management of Current Problems:     Scheduled Meds:    amLODIPine  5 mg Oral Daily    ascorbic acid (vitamin C)  500 mg Oral BID    ceFEPime (MAXIPIME) IVPB  1 g Intravenous Q8H    enoxaparin  40 mg Subcutaneous Q24H    gabapentin  300 mg Oral TID    guaiFENesin  600 mg Oral BID    ipratropium  0.5 mg Nebulization Q8H    magnesium oxide  400 mg Oral BID    mirtazapine  15 mg Oral QHS    oxyCODONE  20 mg  "Oral Q6H    polyethylene glycol  17 g Oral Daily    senna-docusate 8.6-50 mg  2 tablet Oral BID    sodium chloride 0.9%  10 mL Intravenous Q6H    vancomycin (VANCOCIN) IVPB  20 mg/kg Intravenous Q24H    vitamin D  1,000 Units Oral Daily     Continuous Infusions:   As Needed: sodium chloride, acetaminophen, acetaminophen, acetaminophen, benzonatate, calcium carbonate, diphenhydrAMINE, hydrOXYzine HCL, ibuprofen, melatonin, oxyCODONE, polyethylene glycol, sodium chloride 0.9%, Flushing PICC Protocol **AND** sodium chloride 0.9% **AND** sodium chloride 0.9%, valproate sodium (DEPACON) IVPB, vancomycin - pharmacy to dose    Assessment and Plan by Problem    MRSA bacteremia  Pneumonia of both lungs due to infectious organism  Pulmonary emboli (septic)  Sepsis  Endocarditis of tricuspid valve  - Febrile intermittently, BCx from 11/4 and 11/5 NGTD. Per ID, she will likely continue to have intermittent fevers from her infectious process  - Continue vancomycin (pharmacy dosing). She has completed five days of cefepime  - PT/OT following and recommending IPR, LTAC referrals out  - Needs 6 weeks IV antibiotics, then follow up with CTS. awaiting placement.  - completing 5-day course of RDV for COVID - ended 11/21  - repeat blood culture neg, urine culture with coag neg staph and respiratory culture not collected; added cefepime until resulted; repeat echo; discuss with ID if no source found and CRP continues to rise     Flail tricuspid valve  Severe tricuspid regurgitation  Lower extremity edema  - Repeat TTE shows more advanced disease, tricuspid valve now flail, but also "distinguishing between vegetation, valve, and chord is difficult, even with these good images" per interpreting Cardiologist. Also shows elevated CVP.   - LE edema was improving with Lasix IV and now on po with edema unchanged - changed to lasix IV daily to improve edema but she does not tolerate IV lasix due to strange effects when she takes; trial of " oral torsemide to improve edema  - Previous provider discussed findings and ongoing fevers with Dr. Pritchett on 11/10 who recommended continued medical management as her current deconditioned status is a contraindication to surgical intervention - f/u schedulded on 12/9 with ID and CTS     COVID infection  Stopped Dexamethasone 11/13, since not requiring O2 at this time.  - COVID-19 testing: Collection Date: 11/11/2020 Collection Time:  11:09 AM   - Isolation: Airborne/Droplet. Surgical mask on patient. Notify Infection Control  - Diagnostics: Trend Q48hrs if stable, more frequently if patient decompensating         Laboratory/Study Frequency Result   CBC Admit & Q48h     CMP Admit & Q48h     Magnesium level Admit     D-dimer Admit & Q48h 8.2 > 5.9   Ferritin Admit & Q48h 686   CRP Admit & Q48h 31 > 174 > 111   CPK Admit & Q24h if elevated     LDH Admit & Q48h 382   Vitamin D Admit ordered   BNP Admit ordered   Troponin Admit & Q6h if elevated     Glucose-6-phos dehydrogenase Admit     Procalcitonin Admit 0.22   Lipid Panel If using statin     Rapid influenza Admit     Resp Infection Panel Admit if BMT/organ transplant     Legionella Antigen Admit     Sputum Culture Admit     Blood Culture Admit     Urinalysis & Culture Admit     ECG Admit & Q48h if on HCQ     CXR Admit     Lymphopenia, hyponatremia, hyperferritinemia, elevated troponin, elevated d-dimer, age, and medical comorbidities are significant predictors of poor clinical outcome     - Management: per [facility] COVID Treatment Protocol (4/15/20)    - Monitoring:              - Telemetry & Continuous Pulse Oximetry    - Nutrition:               - Multivitamin PO daily              - Add Boost supplement              - Vitamin D 1000IU daily as she is Vitamin D deficient              - Ascorbic acid 500mg PO bid    - Supportive Care:              - acetaminophen 650mg PO Q6hr PRN fever/headache              - loperamide PRN viral diarrhea              - IVF if  indicated, restrictive strategy preferred, no maintenance IV if able              - VTE PPx: enoxaparin or heparin SQ unless contraindicated    - Antibiotics:  - if indications, CXR findings, elevated procal. See protocol for alternatives.   - Discontinue early if low concern for bacterial co-infection              - ceftriaxone 1g Q24h x 5 days - not started  - azithromycin 500mg po x1, then 250mg po daily x 4 days - not started    - Therapies:              - If patient meets criteria  - Consult for RDV; febrile, meets criteria - RDV started 11/17     Acute renal insufficiency  Hyperkalemia - resolved  Hyponatremia - resolved  - sCr stable, steady with diuresis   - Etiology likely multifactorial with recent sepsis and vancomycin and poor PO intake  - Urine studies consistent with pre-renal (though cardiorenal would have a similar picture)     IVDU (intravenous drug user)  Opioid use disorder, severe, dependence  - Psych evaluated earlier this admission  - Would benefit from LTAC disposition     Substance induced mood disorder  -continue mirtazapine 15mg HS  -gabapentin 300 TID     Opioid Use disorder  Continue oxycodone 20mg q6 pRN  Continue discussion MAT with patient  Consider Addiction Psych     Chronic inflammatory anemia  - Hemoglobin 6.9 g/dL 11/10, given one unit of pRBCs  - Iron studies consistent with inflammatory process, in keeping with her systemic infection  - Monitor and transfuse for Hgb < 7     Wounds  Left foot and sacral spine  Wound care following. Appreciate recs.     LE edema  Mixed etiology, IV fluids and low albumin  Elevate feet, improve nutritional status  Lasix daily - IV for now     Malnutrition  - Nutrition following  - Added Boost to all meals      Diet: Diet Adult Regular (IDDSI Level 7)  GI Prophylaxis: Not indicated  Significant LDAs:   IV Access Type: PICC  Urinary Catheter Indication if present: Patient Does Not Have Urinary Catheter  Other Lines/Tubes/Drains:    HIGH RISK  CONDITION(S):   Patient is currently on drug therapy requiring intensive monitoring for toxicity: Vancomycin     Goals of Care:    Previous admission:  10/24/20  Likely prognosis:  Poor  Code Status: Full Code  Comfort Only: No  Hospice: No  Goals at discharge: remain at home, with physician follow-up    Discharge Planning   VIANNEY: 11/25/2020     Code Status: Full Code   Is the patient medically ready for discharge?: No    Reason for patient still in hospital (select all that apply): Patient trending condition and Pending disposition  Discharge Plan A: Long-term acute care facility (LTAC)   Discharge Delays: None known at this time    VTE Risk Mitigation (From admission, onward)         Ordered     enoxaparin injection 40 mg  Every 24 hours      11/08/20 0838     Place sequential compression device  Until discontinued      10/26/20 2023                   Cierra Savage MD  Department of Hospital Medicine   [facility] - ICU 15 [facility]  [phone]

## 2020-11-24 VITALS
OXYGEN SATURATION: 98 % | HEART RATE: 122 BPM | HEIGHT: 62 IN | SYSTOLIC BLOOD PRESSURE: 122 MMHG | RESPIRATION RATE: 20 BRPM | DIASTOLIC BLOOD PRESSURE: 81 MMHG | WEIGHT: 195 LBS | TEMPERATURE: 99 F | BODY MASS INDEX: 35.88 KG/M2

## 2020-11-24 LAB
ALBUMIN SERPL BCP-MCNC: 2.3 G/DL (ref 3.5–5.2)
ALP SERPL-CCNC: 114 U/L (ref 55–135)
ALT SERPL W/O P-5'-P-CCNC: 17 U/L (ref 10–44)
ANION GAP SERPL CALC-SCNC: 7 MMOL/L (ref 8–16)
AST SERPL-CCNC: 10 U/L (ref 10–40)
BASOPHILS # BLD AUTO: 0.11 K/UL (ref 0–0.2)
BASOPHILS NFR BLD: 1.3 % (ref 0–1.9)
BILIRUB SERPL-MCNC: 0.4 MG/DL (ref 0.1–1)
BUN SERPL-MCNC: 23 MG/DL (ref 6–20)
CALCIUM SERPL-MCNC: 8.5 MG/DL (ref 8.7–10.5)
CHLORIDE SERPL-SCNC: 101 MMOL/L (ref 95–110)
CO2 SERPL-SCNC: 29 MMOL/L (ref 23–29)
CREAT SERPL-MCNC: 0.9 MG/DL (ref 0.5–1.4)
CRP SERPL-MCNC: 109.1 MG/L (ref 0–8.2)
D DIMER PPP IA.FEU-MCNC: 2.59 MG/L FEU
DIFFERENTIAL METHOD: ABNORMAL
EOSINOPHIL # BLD AUTO: 0.4 K/UL (ref 0–0.5)
EOSINOPHIL NFR BLD: 4.7 % (ref 0–8)
ERYTHROCYTE [DISTWIDTH] IN BLOOD BY AUTOMATED COUNT: 17.3 % (ref 11.5–14.5)
EST. GFR  (AFRICAN AMERICAN): >60 ML/MIN/1.73 M^2
EST. GFR  (NON AFRICAN AMERICAN): >60 ML/MIN/1.73 M^2
FERRITIN SERPL-MCNC: 483 NG/ML (ref 20–300)
GLUCOSE SERPL-MCNC: 96 MG/DL (ref 70–110)
HCT VFR BLD AUTO: 25.8 % (ref 37–48.5)
HGB BLD-MCNC: 7.6 G/DL (ref 12–16)
IMM GRANULOCYTES # BLD AUTO: 0.04 K/UL (ref 0–0.04)
IMM GRANULOCYTES NFR BLD AUTO: 0.5 % (ref 0–0.5)
LDH SERPL L TO P-CCNC: 181 U/L (ref 110–260)
LYMPHOCYTES # BLD AUTO: 1.6 K/UL (ref 1–4.8)
LYMPHOCYTES NFR BLD: 19.7 % (ref 18–48)
MAGNESIUM SERPL-MCNC: 2.3 MG/DL (ref 1.6–2.6)
MCH RBC QN AUTO: 25.8 PG (ref 27–31)
MCHC RBC AUTO-ENTMCNC: 29.5 G/DL (ref 32–36)
MCV RBC AUTO: 88 FL (ref 82–98)
MONOCYTES # BLD AUTO: 0.9 K/UL (ref 0.3–1)
MONOCYTES NFR BLD: 11.3 % (ref 4–15)
NEUTROPHILS # BLD AUTO: 5.1 K/UL (ref 1.8–7.7)
NEUTROPHILS NFR BLD: 62.5 % (ref 38–73)
NRBC BLD-RTO: 0 /100 WBC
PLATELET # BLD AUTO: 487 K/UL (ref 150–350)
PMV BLD AUTO: 8.7 FL (ref 9.2–12.9)
POTASSIUM SERPL-SCNC: 3.9 MMOL/L (ref 3.5–5.1)
PROT SERPL-MCNC: 6.9 G/DL (ref 6–8.4)
RBC # BLD AUTO: 2.95 M/UL (ref 4–5.4)
SODIUM SERPL-SCNC: 137 MMOL/L (ref 136–145)
TSH SERPL DL<=0.005 MIU/L-ACNC: 1.36 UIU/ML (ref 0.4–4)
WBC # BLD AUTO: 8.22 K/UL (ref 3.9–12.7)

## 2020-11-24 PROCEDURE — 25000003 PHARM REV CODE 250: Performed by: STUDENT IN AN ORGANIZED HEALTH CARE EDUCATION/TRAINING PROGRAM

## 2020-11-24 PROCEDURE — 25000003 PHARM REV CODE 250: Performed by: INTERNAL MEDICINE

## 2020-11-24 PROCEDURE — 85025 COMPLETE CBC W/AUTO DIFF WBC: CPT

## 2020-11-24 PROCEDURE — A4216 STERILE WATER/SALINE, 10 ML: HCPCS | Performed by: INTERNAL MEDICINE

## 2020-11-24 PROCEDURE — 25000242 PHARM REV CODE 250 ALT 637 W/ HCPCS: Performed by: INTERNAL MEDICINE

## 2020-11-24 PROCEDURE — 83735 ASSAY OF MAGNESIUM: CPT

## 2020-11-24 PROCEDURE — 94761 N-INVAS EAR/PLS OXIMETRY MLT: CPT

## 2020-11-24 PROCEDURE — 80053 COMPREHEN METABOLIC PANEL: CPT

## 2020-11-24 PROCEDURE — 63600175 PHARM REV CODE 636 W HCPCS: Performed by: INTERNAL MEDICINE

## 2020-11-24 PROCEDURE — 99239 HOSP IP/OBS DSCHRG MGMT >30: CPT | Mod: ,,, | Performed by: INTERNAL MEDICINE

## 2020-11-24 PROCEDURE — 84443 ASSAY THYROID STIM HORMONE: CPT

## 2020-11-24 PROCEDURE — 25000003 PHARM REV CODE 250: Performed by: HOSPITALIST

## 2020-11-24 PROCEDURE — 82728 ASSAY OF FERRITIN: CPT

## 2020-11-24 PROCEDURE — 86140 C-REACTIVE PROTEIN: CPT

## 2020-11-24 PROCEDURE — 99239 PR HOSPITAL DISCHARGE DAY,>30 MIN: ICD-10-PCS | Mod: ,,, | Performed by: INTERNAL MEDICINE

## 2020-11-24 PROCEDURE — 83615 LACTATE (LD) (LDH) ENZYME: CPT

## 2020-11-24 PROCEDURE — 85379 FIBRIN DEGRADATION QUANT: CPT

## 2020-11-24 PROCEDURE — 25000003 PHARM REV CODE 250: Performed by: NURSE PRACTITIONER

## 2020-11-24 PROCEDURE — 94640 AIRWAY INHALATION TREATMENT: CPT

## 2020-11-24 RX ORDER — AMOXICILLIN 250 MG
2 CAPSULE ORAL 2 TIMES DAILY
Status: CANCELLED | OUTPATIENT
Start: 2020-11-24

## 2020-11-24 RX ORDER — POLYETHYLENE GLYCOL 3350 17 G/17G
17 POWDER, FOR SOLUTION ORAL DAILY PRN
Status: CANCELLED | OUTPATIENT
Start: 2020-11-24

## 2020-11-24 RX ORDER — HYDROXYZINE HYDROCHLORIDE 25 MG/1
25 TABLET, FILM COATED ORAL DAILY PRN
Status: CANCELLED | OUTPATIENT
Start: 2020-11-24

## 2020-11-24 RX ORDER — ENOXAPARIN SODIUM 100 MG/ML
40 INJECTION SUBCUTANEOUS EVERY 24 HOURS
Status: CANCELLED | OUTPATIENT
Start: 2020-11-24

## 2020-11-24 RX ORDER — OXYCODONE HYDROCHLORIDE 10 MG/1
10 TABLET ORAL EVERY 6 HOURS PRN
Status: CANCELLED | OUTPATIENT
Start: 2020-11-24

## 2020-11-24 RX ORDER — ACETAMINOPHEN 500 MG
1000 TABLET ORAL EVERY 6 HOURS PRN
Status: CANCELLED | OUTPATIENT
Start: 2020-11-24

## 2020-11-24 RX ORDER — MIRTAZAPINE 7.5 MG/1
15 TABLET, FILM COATED ORAL NIGHTLY
Status: CANCELLED | OUTPATIENT
Start: 2020-11-24

## 2020-11-24 RX ORDER — GABAPENTIN 300 MG/1
300 CAPSULE ORAL 3 TIMES DAILY
Status: CANCELLED | OUTPATIENT
Start: 2020-11-24

## 2020-11-24 RX ORDER — POLYETHYLENE GLYCOL 3350 17 G/17G
17 POWDER, FOR SOLUTION ORAL DAILY
Status: CANCELLED | OUTPATIENT
Start: 2020-11-25

## 2020-11-24 RX ORDER — OXYCODONE HYDROCHLORIDE 10 MG/1
20 TABLET ORAL EVERY 6 HOURS
Status: CANCELLED | OUTPATIENT
Start: 2020-11-24

## 2020-11-24 RX ORDER — METOPROLOL TARTRATE 25 MG/1
25 TABLET, FILM COATED ORAL 2 TIMES DAILY
Status: CANCELLED | OUTPATIENT
Start: 2020-11-24

## 2020-11-24 RX ORDER — CEFEPIME HYDROCHLORIDE 1 G/1
1 INJECTION, POWDER, FOR SOLUTION INTRAMUSCULAR; INTRAVENOUS
Status: CANCELLED | OUTPATIENT
Start: 2020-11-24 | End: 2020-11-26

## 2020-11-24 RX ORDER — IPRATROPIUM BROMIDE 0.5 MG/2.5ML
0.5 SOLUTION RESPIRATORY (INHALATION) EVERY 8 HOURS
Status: CANCELLED | OUTPATIENT
Start: 2020-11-24

## 2020-11-24 RX ORDER — ASCORBIC ACID 500 MG
500 TABLET ORAL 2 TIMES DAILY
Status: CANCELLED | OUTPATIENT
Start: 2020-11-24

## 2020-11-24 RX ORDER — GUAIFENESIN 600 MG/1
600 TABLET, EXTENDED RELEASE ORAL 2 TIMES DAILY
Status: CANCELLED | OUTPATIENT
Start: 2020-11-24

## 2020-11-24 RX ORDER — BENZONATATE 100 MG/1
100 CAPSULE ORAL 3 TIMES DAILY PRN
Status: CANCELLED | OUTPATIENT
Start: 2020-11-24

## 2020-11-24 RX ORDER — SODIUM CHLORIDE 0.9 % (FLUSH) 0.9 %
10 SYRINGE (ML) INJECTION EVERY 6 HOURS
Status: CANCELLED | OUTPATIENT
Start: 2020-11-24

## 2020-11-24 RX ORDER — CHOLECALCIFEROL (VITAMIN D3) 25 MCG
1000 TABLET ORAL DAILY
Status: CANCELLED | OUTPATIENT
Start: 2020-11-25

## 2020-11-24 RX ORDER — METOPROLOL TARTRATE 1 MG/ML
2.5 INJECTION, SOLUTION INTRAVENOUS EVERY 8 HOURS PRN
Status: CANCELLED | OUTPATIENT
Start: 2020-11-24

## 2020-11-24 RX ORDER — DIPHENHYDRAMINE HCL 25 MG
25 CAPSULE ORAL EVERY 6 HOURS PRN
Status: CANCELLED | OUTPATIENT
Start: 2020-11-24

## 2020-11-24 RX ORDER — IBUPROFEN 400 MG/1
400 TABLET ORAL EVERY 6 HOURS PRN
Status: CANCELLED | OUTPATIENT
Start: 2020-11-24

## 2020-11-24 RX ORDER — CALCIUM CARBONATE 200(500)MG
500 TABLET,CHEWABLE ORAL 3 TIMES DAILY PRN
Status: CANCELLED | OUTPATIENT
Start: 2020-11-24

## 2020-11-24 RX ORDER — SODIUM CHLORIDE 0.9 % (FLUSH) 0.9 %
10 SYRINGE (ML) INJECTION
Status: CANCELLED | OUTPATIENT
Start: 2020-11-24

## 2020-11-24 RX ORDER — TORSEMIDE 20 MG/1
20 TABLET ORAL
Status: CANCELLED | OUTPATIENT
Start: 2020-11-26

## 2020-11-24 RX ORDER — LANOLIN ALCOHOL/MO/W.PET/CERES
400 CREAM (GRAM) TOPICAL 2 TIMES DAILY
Status: CANCELLED | OUTPATIENT
Start: 2020-11-24

## 2020-11-24 RX ORDER — TALC
6 POWDER (GRAM) TOPICAL NIGHTLY PRN
Status: CANCELLED | OUTPATIENT
Start: 2020-11-24

## 2020-11-24 RX ORDER — AMLODIPINE BESYLATE 2.5 MG/1
2.5 TABLET ORAL DAILY
Status: CANCELLED | OUTPATIENT
Start: 2020-11-25

## 2020-11-24 RX ADMIN — OXYCODONE HYDROCHLORIDE 20 MG: 10 TABLET ORAL at 06:11

## 2020-11-24 RX ADMIN — CHOLECALCIFEROL TAB 25 MCG (1000 UNIT) 1000 UNITS: 25 TAB at 08:11

## 2020-11-24 RX ADMIN — Medication 10 ML: at 11:11

## 2020-11-24 RX ADMIN — GUAIFENESIN 600 MG: 600 TABLET, EXTENDED RELEASE ORAL at 08:11

## 2020-11-24 RX ADMIN — MAGNESIUM OXIDE 400 MG (241.3 MG MAGNESIUM) TABLET 400 MG: TABLET at 08:11

## 2020-11-24 RX ADMIN — AMLODIPINE BESYLATE 5 MG: 5 TABLET ORAL at 08:11

## 2020-11-24 RX ADMIN — CEFEPIME 1 G: 1 INJECTION, POWDER, FOR SOLUTION INTRAMUSCULAR; INTRAVENOUS at 06:11

## 2020-11-24 RX ADMIN — GABAPENTIN 300 MG: 300 CAPSULE ORAL at 08:11

## 2020-11-24 RX ADMIN — VANCOMYCIN HYDROCHLORIDE 1750 MG: 750 INJECTION, POWDER, LYOPHILIZED, FOR SOLUTION INTRAVENOUS at 08:11

## 2020-11-24 RX ADMIN — Medication 10 ML: at 04:11

## 2020-11-24 RX ADMIN — HYDROXYZINE HYDROCHLORIDE 25 MG: 25 TABLET, FILM COATED ORAL at 08:11

## 2020-11-24 RX ADMIN — DOCUSATE SODIUM AND SENNOSIDES 2 TABLET: 8.6; 5 TABLET, FILM COATED ORAL at 08:11

## 2020-11-24 RX ADMIN — IPRATROPIUM BROMIDE 0.5 MG: 0.5 SOLUTION RESPIRATORY (INHALATION) at 08:11

## 2020-11-24 RX ADMIN — OXYCODONE HYDROCHLORIDE 20 MG: 10 TABLET ORAL at 11:11

## 2020-11-24 RX ADMIN — OXYCODONE HYDROCHLORIDE AND ACETAMINOPHEN 500 MG: 500 TABLET ORAL at 08:11

## 2020-11-24 NOTE — PLAN OF CARE
Problem: Adult Inpatient Plan of Care  Goal: Plan of Care Review  Outcome: Ongoing, Progressing  Goal: Absence of Hospital-Acquired Illness or Injury  Outcome: Ongoing, Progressing  Goal: Optimal Comfort and Wellbeing  Outcome: Ongoing, Progressing     Problem: Adjustment to Illness (Sepsis/Septic Shock)  Goal: Optimal Coping  Outcome: Ongoing, Progressing     POC reviewed with patient. All questions and concerns reviewed. Vital signs stable throughout shift. For full assessment please refer to flowsheet. Fall/ safety precautions implemented & maintained. Bed locked in lowest position, bed alarm activated & audible, & call light in reach. Will continue to monitor.

## 2020-11-24 NOTE — PLAN OF CARE
Patient transported via with EMS. X2 transporters to transport to [facility]. Report called to Vilma ATKINS. Patient to be transferred to room # 214. Pt verbalized understanding of all discharge instructions and follow-up appointments. No further questions at this time. Tele returned. Patient received oxy for pain 30 min. Prior to transport. BP stable. Patient afebrile. HR = 120s - MD Hernandezgs aware and orders to continue to discharge to LTAC. PICC line dressing is dry and intact. Dressing is due to be changed 11/27. Vilma ATKINS acknowledged in report. Dressing on left ankle completed today at 1030. Dressing is dry and intact. Dressing change is daily.

## 2020-11-24 NOTE — PLAN OF CARE
11/24/20 1058   Post-Acute Status   Post-Acute Authorization Placement   Post-Acute Placement Status Set-up Complete     Patient will be discharging today to  [facility]. Transport scheduled through [facility] and patient will be transported by stretcher and will report to room 214 Nurse call report to,[phone] Transport scheduled for 12:00 pm      Padmini Myers LMSW  [facility]   [id number]

## 2020-11-25 PROBLEM — E43 SEVERE MALNUTRITION: Status: ACTIVE | Noted: 2020-11-25

## 2020-11-26 LAB — BACTERIA BLD CULT: NORMAL

## 2020-12-02 NOTE — DISCHARGE SUMMARY
[facility] - ICU 15 [facility] Medicine  Discharge Summary      Patient Name: Марина Britton  MRN: [id number]  Admission Date: 10/26/2020  Hospital Length of Stay: 29 days  Discharge Date and Time: 11/24/2020 12:30 PM  Attending Physician: No att. providers found   Discharging Provider: Cierra Savage MD  Primary Care Provider: Luis Felipe Jose Iii, MD    Virtual Telemedicine Visit  Chief complaint: Endocarditis of tricuspid valve  The patient location is: 26042/28[address] A  The patient arrived at: 10/26/2020  7:45 PM  Present with the patient at the time of the telemed/virtual assessment: n/a  Total time spent with patient: 10 min  I have assessed findings virtually using a telemedicine platform and with assistance of the bedside nurse or telemedicine presenter.  The attending portion of this evaluation, treatment, and documentation was performed per Cierra Savage MD via audiovisual.  Patient was transferred to the telemedicine service on: 11/15/2020       HPI:   30F with PMHx IVUD heroin (last use approx 2wk ago) presents as transfer from [facility] for septic endocarditis with b/l PE requiring higher level of care.  Per patient and chart review, she has had progressively worsening chest and back pain over the past several days with a Tmax of 105F at home.  She presented to [facility] and was admitted for sepsis on 10/24.  Bcx showed GPC consistent with Staph Aureus, so she was started on Vanc/Zosyn.  She was stepped up to the ICU on 10/25 for worsening tachypnea.  She also received 2U pRBC x2 for anemia with a pavel Hb of 6.5.  She was transferred to [facility] 10/26 for a higher level of care.    * No surgery found *      Hospital Course:   Марина Britton is a 30 y.o. female admitted for Endocarditis of tricuspid valve. Patient with HCV, IVDU who was transferred from [facility] where she presented with chest pain and was diagnosed with MRSA bacteremia associated with TV IE with septic  emboli, acute hypoxic respiratory failure, septic/metabolic encephalopathy, septic PE, septic shock, and acute anemia. She was admitted [facility] the [facility] MICU on 10/27 and was intubated for respiratory failure. She has subsequently been evaluated by ID, CTS, and Psychiatry. She required pRBC transfusion on 10/29, and norepi on 10/30. She developed acute renal insufficiency which subsequently resolved. She was extubated on 11/2, then transferred to Hospital Medicine on 11/3 for further care, planning on long-term IV antibiotics with vancomycin and repeat CTS evaluation as an outpatient. Subsequently her acute renal insufficiency returned, and again febrile for which BCx were obtained on 11/4 and 11/5 and have remained NGTD. ID was reconsulted on 11/6, and cefepime was added to her regimen. CT C/A/P obtained shows continued infectious lung processes. TTE shows progressive valvular damage with flail valve and severe MR, for which she is being diuresed.  Covid testing ordered for placement resulted positive 11/11, patient transferred to COVID19 isolation unit. Monitoring for signs/symptoms of COVID-19 and awaiting LTAC placement.    MRSA bacteremia  Pneumonia of both lungs due to infectious organism  Pulmonary emboli (septic)  Sepsis  Endocarditis of tricuspid valve  - Febrile intermittently, BCx from 11/4 and 11/5 NGTD. Per ID, she will likely continue to have intermittent fevers from her infectious process  - Continue vancomycin (pharmacy dosing). She has completed five days of cefepime  - PT/OT following and recommending IPR, LTAC referrals out  - Needs 6 weeks IV antibiotics, then follow up with CTS. awaiting placement.  - completing 5-day course of RDV for COVID - ended 11/21  - repeat blood culture neg, urine culture with coag neg staph and respiratory culture not collected; added cefepime until resulted for 5 days;;  ID to follow at LTAC; CRP declining on discharge     Flail tricuspid valve  Severe tricuspid  "regurgitation  Lower extremity edema  - Repeat TTE shows more advanced disease, tricuspid valve now flail, but also "distinguishing between vegetation, valve, and chord is difficult, even with these good images" per interpreting Cardiologist. Also shows elevated CVP.   - LE edema was improving with Lasix IV and now on po with edema unchanged - changed to lasix IV daily to improve edema but she does not tolerate IV lasix due to strange effects when she takes; trial of oral torsemide to improve edema  - Previous provider discussed findings and ongoing fevers with Dr. Pritchett on 11/10 who recommended continued medical management as her current deconditioned status is a contraindication to surgical intervention - f/u schedulded on 12/9 with ID and CTS     COVID infection  Stopped Dexamethasone 11/13, since not requiring O2 at this time.  - COVID-19 testing: Collection Date: 11/11/2020  -completed 5 days of remdesivir  -did not require supplemental oxygen on discharge    Acute renal insufficiency - improved  Hyperkalemia - resolved  Hyponatremia - resolved  - sCr stable, steady with diuresis   - Etiology likely multifactorial with recent sepsis and vancomycin and poor PO intake  - Urine studies consistent with pre-renal (though cardiorenal would have a similar picture)     IVDU (intravenous drug user)  Opioid use disorder, severe, dependence  - Psych evaluated earlier this admission  - Would benefit from LTAC disposition     Substance induced mood disorder  -continue mirtazapine 15mg HS  -gabapentin 300 TID     Opioid Use disorder  Continue oxycodone 20mg q6 pRN  Continue discussion MAT with patient  Consider Addiction Psych     Chronic inflammatory anemia  - Hemoglobin 6.9 g/dL 11/10, given one unit of pRBCs  - Iron studies consistent with inflammatory process, in keeping with her systemic infection  - Monitor and transfuse for Hgb < 7     Wounds  Left foot and sacral spine  Wound care following. Appreciate recs.     LE " edema  Mixed etiology, IV fluids and low albumin  Elevate feet, improve nutritional status  Lasix daily - IV for now     Malnutrition  - Nutrition following  - Added Boost to all meals    Consults:   Consults (From admission, onward)        Status Ordering Provider     Inpatient consult to Cardiothoracic Surgery  Once     Provider:  (Not yet assigned)    Completed PILI ESTRADA     Inpatient consult to ENT  Once     Provider:  (Not yet assigned)    Completed NICHOLAS NUNEZ     Inpatient consult to Infectious Diseases  Once     Provider:  (Not yet assigned)    Completed TONI CAROLINA     Inpatient consult to Infectious Diseases  Once     Provider:  (Not yet assigned)    Completed BEVERLY PARMAR     Inpatient consult to Midline team  Once     Provider:  (Not yet assigned)    Completed CECELIA VALENZUELA     Inpatient consult to PICC team ([facility])  Once     Provider:  (Not yet assigned)    Completed CHANA MCKENZIE     Inpatient consult to Psychiatry  Once     Provider:  (Not yet assigned)    Completed CECELIA VALENZUELA     Inpatient consult to Psychiatry  Once     Provider:  (Not yet assigned)    Completed RAMONA DEUTSCH     Inpatient consult to Registered Dietitian/Nutritionist  Once     Provider:  (Not yet assigned)    Completed PILI ESTRADA     Inpatient consult to Registered Dietitian/Nutritionist  Once     Provider:  (Not yet assigned)    Completed BEVERLY PARMAR     Inpatient virtual consult to Hospital Medicine  Once     Provider:  (Not yet assigned)    Completed DOMINGO DUPONT          Final Active Diagnoses:    Diagnosis Date Noted POA    PRINCIPAL PROBLEM:  Endocarditis of tricuspid valve [I07.9] 10/26/2020 Yes    Otalgia, right [H92.01] 11/23/2020 No    Tricuspid valve insufficiency [I07.1]  Yes    Vitamin D deficiency [E55.9] 11/15/2020 Yes    COVID-19 virus detected [U07.1] 11/12/2020 Yes    Bacteremia [R78.81]  Yes    Bacterial endocarditis [I33.0]  Yes    Opioid use  disorder, severe, dependence [F11.20] 11/03/2020 Yes     Chronic    Cannabis use disorder, moderate, dependence [F12.20] 11/03/2020 Yes     Chronic    Sedative, hypnotic or anxiolytic use disorder, severe, 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] 11/03/2020 Yes     Chronic    Substance induced mood disorder [F19.94] 11/03/2020 Yes     Chronic    Acute renal insufficiency [N28.9] 11/03/2020 No    MRSA bacteremia [R78.81, B95.62] 10/27/2020 Yes    Pulmonary emboli [I26.99] 10/26/2020 Yes    IVDU (intravenous drug user) [F19.90] 10/26/2020 Yes     Chronic    Pneumonia of both lungs due to infectious organism [J18.9] 10/24/2020 Yes      Problems Resolved During this Admission:    Diagnosis Date Noted Date Resolved POA    Hyperkalemia [E87.5] 11/04/2020 11/15/2020 No    Sepsis with acute hypoxic respiratory failure [A41.9, R65.20, J96.01] 10/27/2020 10/27/2020 Yes    Severe sepsis [A41.9, R65.20] 10/26/2020 11/04/2020 Yes    Tachypnea [R06.82] 10/26/2020 11/04/2020 Yes      Discharged Condition: stable    Disposition: Long Term Acute Care (LTAC)    Follow Up:    Patient Instructions:   No discharge procedures on file.  Medications:  Reconciled Home Medications:      Medication List      START taking these medications    acetaminophen 325 MG tablet  Commonly known as: TYLENOL  Take 2 tablets (650 mg total) by mouth every 6 (six) hours as needed.     enoxaparin 40 mg/0.4 mL Syrg  Commonly known as: LOVENOX  Inject 0.4 mLs (40 mg total) into the skin once daily.     furosemide 40 MG tablet  Commonly known as: LASIX  Take 1 tablet (40 mg total) by mouth 2 (two) times a day.     hydrOXYzine HCL 25 MG tablet  Commonly known as: ATARAX  Take 1 tablet (25 mg total) by mouth 3 (three) times daily as needed for Anxiety.     melatonin 3 mg tablet  Commonly known as: MELATIN  Take 2 tablets (6 mg total) by mouth nightly as needed for Insomnia.     mirtazapine 7.5 MG Tab  Commonly known as: REMERON  Take 1 tablet (7.5 mg total) by mouth  nightly.     oxyCODONE 10 mg Tab immediate release tablet  Commonly known as: ROXICODONE  Take 2 tablets (20 mg total) by mouth every 6 (six) hours as needed.     senna-docusate 8.6-50 mg 8.6-50 mg per tablet  Commonly known as: PERICOLACE  Take 2 tablets by mouth 2 (two) times daily.        STOP taking these medications    ALPRAZolam 1 MG tablet  Commonly known as: XANAX     clindamycin 150 MG capsule  Commonly known as: CLEOCIN     HYDROcodone-acetaminophen  mg per tablet  Commonly known as: NORCO     mupirocin 2 % ointment  Commonly known as: BACTROBAN            Significant Diagnostic Studies: as above    Pending Diagnostic Studies:     Procedure Component Value Units Date/Time    C-Reactive Protein [[id number]] Collected: 11/14/20 0706    Order Status: Sent Lab Status: In process Updated: 11/14/20 0706    Specimen: Blood     CBC auto differential [[id number]] Collected: 11/14/20 0705    Order Status: Sent Lab Status: In process Updated: 11/14/20 0706    Specimen: Blood     EKG 12-lead [[id number]]     Order Status: Sent Lab Status: No result     EKG 12-lead [[id number]]     Order Status: Sent Lab Status: No result         Indwelling Lines/Drains at time of discharge:   Lines/Drains/Airways     Peripherally Inserted Central Catheter Line            PICC Double Lumen 11/03/20 1223 right basilic 28 days                Time spent on the discharge of patient: 37 minutes  Patient was seen and examined on the date of discharge and determined to be suitable for discharge.         Cierra Savage MD  Department of Hospital Medicine  [facility] - ICU 15 [facility]

## 2020-12-02 NOTE — PHYSICIAN QUERY
PT Name: Марина Britton  MR #: [id number]     RENAL CONDITION CLARIFICATION     CDS: Faye Gonzalez RN, CCDS         Contact information :ext [phone] [email or web address]     This form is a permanent document in the medical record.    Query Date: December 2, 2020    By submitting this query, we are merely seeking further clarification of documentation.  Please utilize your independent clinical judgment when addressing the question(s) below.    The Medical Record contains the following:   Indicator Supporting Clinical Findings Location in Medical Record   x Kidney (Renal) Insufficiency Acute renal insufficiency  Discharge summary 11/24/20-filed 12/2/20   x Kidney (Renal) Failure/Injury Acute kidney injury: Monitor urine output.  Renally dose all medications Critical Care Medicine PN 11/3/20    Nephrotoxic Agents     x BUN/Creatinine GFR BUN 37, creatinine 1.4, GFR 50.1  BUN 25, creatinine 0.8, GFR >60  BUN 42, creatinine 1.4, GFR 50.1  BUN 39, creatinine 1.8, GFR 37.9  BUN 40, creatinine 2.0, GFR 32,6  BUN 34, creatinine 0.9, GFR>60  BUN 23, creatinine 0.9, GFR>60 Lab 10/24/29  Lab 10/25/20  Lab 11/1/20  Lab 11/4/20  Lab 11/5/20  Lab 11/14/20  Lab 11/24/20    Urine: Casts         Eosinophils      Dehydration      Nausea/Vomiting      Dialysis/CRRT     x Treatment: Mild DERICK with Cr to 1.3. Will give fluid bolus and hold naproxen.   Critical Care Med PN 11/1/20   x Other:  She developed acute renal insufficiency which subsequently resolved. Discharge summary 11/24/20-filed 12/2/20     Acute Kidney Injury/Acute Renal Failure has different defining criteria. A generally accepted guideline is:   A greater than 100% (2X) rise in serum creatinine from baseline* occurring during the course of a single hospital stay.   *Baseline as determined by the providers judgment and consideration of previous lab values and other documentation, if available.    A diagnosis of Acute Kidney Injury/Acute Renal Failure should  incorporate abnormal labs and clinical findings that are clinically significant.      The clinical guidelines noted above are only a system guideline. It does not replace the providers clinical judgment.       Provider, please specify the diagnosis or diagnoses associated with above clinical findings.  Please clarify conflicting documentation re: DERICK diagnosis.     [  x ] Acute Renal Insufficiency  - Consider if SCr rise is transient and normalizes quickly with no efforts at real resuscitation of vital signs and perfusion   [   ] Unspecified Acute Kidney Failure/Injury     [    ] Other (please specify): _______________________________   [  ] Clinically Undetermined       References:     CAROL ANN Grace, DAYANARA Garcia, RAYRAY Razo. et al. Acute renal failure - definition, outcome measures, animal models, fluid therapy and information technology needs: the Second International Consensus Conference of the Acute Dialysis Quality Initiative (ADQI) Group. Crit Care 8, R204 (2004). https://doi.org/10.1186/ln9117    KDIGO Clinical Practice Guideline for Acute Kidney Injury. (2012, March). Retrieved October 21, 2020, from https://kdigo.org/wp-content/uploads/2016/10/FJICB-5274-FBR-Guideline-English.pdf    RAYO Thurston MD, EL Hauser MD, & MEMO Jung MD. (1960). Renal medullary necrosis [Abstract]. The American Journal of Medicine, 29(1), 132-156. doi:https://doi.org/10.1016/6795-4791(73)29590-3    MEMO Costello MD, & SANDER Thomas MD, MS. (2020, June 18). Definition and staging of chronic kidney disease in adults ([id number] EL Wagner MD, ScD & [id number] BLANCA Wolff MD, MSc, Eds.). Retrieved October 21, 2020, from https://www.[employer]/contents/definition-and-staging-of-chronic-kidney-disease-in-adults?search=ckd%20staging&source=search_result&selectedTitle=1~150&usage_type=default&display_rank=1    EMANI Burrows., RAYRAY Razo., Russell SKIMI. et al. Acute Kidney Injury Network: report of an initiative  to improve outcomes in acute kidney injury. Crit Care 11, R31 (2007). https://doi.org/10.1186/fp4021    CAROL ANN Martinez MD, FACP. (2015, Siria 15). Acute kidney injury revisited. Retrieved October 21, 2020, from https://acphospitalist.org/archives/2015/06/coding-acute-kidney-injury.htm    SHAGUFTA Lawler MD. (2019, July). Renal Cortical Necrosis. Retrieved October 21, 2020, from https://www.[employer]/professional/genitourinary-disorders/renovascular-disorders/renal-cortical-necrosis    Form No. 90647

## 2020-12-02 NOTE — PHYSICIAN QUERY
PT Name: Марина Britton  MR #: [id number]     INTEGUMENTARY CLARIFICATION     CDS: Faye Gonzalez RN, CCDS         Contact information :ext [phone] [email or web address]     This form is a permanent document in the medical record.     Query Date: December 2, 2020    By submitting this query, we are merely seeking further clarification of documentation.  Please utilize your independent clinical judgment when addressing the question(s) below.    The Medical Record contains the following:   Indicators   Supporting Clinical Findings Location in Medical Record    Non-blanchable erythema/redness     x Ulcer/Injury/Skin Breakdown Wound  Left foot and sacral spine  Wound care following. Appreciate [facility]    [facility]13[facility]20    Deep Tissue Injury     x Wound care consult The right buttock has scattered intact raised redness , no drainage- patient has a history of MRSA.   Right buttock- nursing to cleanse with cleansing cloths, apply barrier cream (purple top) to skin to protect from moisture/shearing.        The sacral spine skin is intact, blanches, has linear brown dried skin especially to right upper buttock.      Recommendations:   Left medial foot- medi-honey daily to wound bed, cover with mepore dressing  Sacral spine- continue barrier cream BID/prn, chair cushion to provide pressure redistribution to sacral/buttocks area. Rash improving.    Wound care Consult  11/2/20                Wound Care Consult 11/13/20    Acute/Chronic Illness      Medication/Treatment      Other         Provider, please provide the integumentary diagnosis related to the documentation of sacral spine/right buttock:  Please bogdan all that apply.     [   ] Shearing   [  x  ] Moisture associated dermatitis   [   ] Rash    [   ] Other Integumentary Diagnosis (please specify):______________   [  ] Clinically Undetermined         Form No.82849  (11/05/2020)

## 2021-01-07 ENCOUNTER — DOCUMENTATION ONLY (OUTPATIENT)
Dept: CARDIOTHORACIC SURGERY | Facility: CLINIC | Age: 31
End: 2021-01-07

## 2021-02-01 ENCOUNTER — HOSPITAL ENCOUNTER (EMERGENCY)
Facility: HOSPITAL | Age: 31
Discharge: ANOTHER HOSPITAL | End: 2021-02-02
Attending: EMERGENCY MEDICINE
Payer: MEDICAID

## 2021-02-01 DIAGNOSIS — J90 PLEURAL EFFUSION: ICD-10-CM

## 2021-02-01 DIAGNOSIS — F19.10 IV DRUG ABUSE: ICD-10-CM

## 2021-02-01 DIAGNOSIS — J18.9 PNEUMONIA DUE TO INFECTIOUS ORGANISM, UNSPECIFIED LATERALITY, UNSPECIFIED PART OF LUNG: Primary | ICD-10-CM

## 2021-02-01 DIAGNOSIS — R53.1 WEAKNESS: ICD-10-CM

## 2021-02-01 LAB
ALBUMIN SERPL BCP-MCNC: 1.9 G/DL (ref 3.5–5.2)
ALP SERPL-CCNC: 183 U/L (ref 55–135)
ALT SERPL W/O P-5'-P-CCNC: 17 U/L (ref 10–44)
AMPHET+METHAMPHET UR QL: NORMAL
ANION GAP SERPL CALC-SCNC: 7 MMOL/L (ref 8–16)
AST SERPL-CCNC: 19 U/L (ref 10–40)
B-HCG UR QL: NEGATIVE
BACTERIA #/AREA URNS HPF: ABNORMAL /HPF
BARBITURATES UR QL SCN>200 NG/ML: NEGATIVE
BASOPHILS NFR BLD: 0 % (ref 0–1.9)
BENZODIAZ UR QL SCN>200 NG/ML: NORMAL
BILIRUB SERPL-MCNC: 0.8 MG/DL (ref 0.1–1)
BILIRUB UR QL STRIP: ABNORMAL
BUN SERPL-MCNC: 27 MG/DL (ref 6–20)
BZE UR QL SCN: NEGATIVE
CALCIUM SERPL-MCNC: 8.1 MG/DL (ref 8.7–10.5)
CANNABINOIDS UR QL SCN: NEGATIVE
CHLORIDE SERPL-SCNC: 105 MMOL/L (ref 95–110)
CLARITY UR: ABNORMAL
CO2 SERPL-SCNC: 25 MMOL/L (ref 23–29)
COLOR UR: YELLOW
CREAT SERPL-MCNC: 1.5 MG/DL (ref 0.5–1.4)
CREAT UR-MCNC: 184 MG/DL (ref 15–325)
CTP QC/QA: YES
DIFFERENTIAL METHOD: ABNORMAL
EOSINOPHIL NFR BLD: 0 % (ref 0–8)
ERYTHROCYTE [DISTWIDTH] IN BLOOD BY AUTOMATED COUNT: 16.4 % (ref 11.5–14.5)
EST. GFR  (AFRICAN AMERICAN): 52.8 ML/MIN/1.73 M^2
EST. GFR  (NON AFRICAN AMERICAN): 45.8 ML/MIN/1.73 M^2
GLUCOSE SERPL-MCNC: 92 MG/DL (ref 70–110)
GLUCOSE UR QL STRIP: NEGATIVE
HCT VFR BLD AUTO: 34.8 % (ref 37–48.5)
HGB BLD-MCNC: 11.1 G/DL (ref 12–16)
HGB UR QL STRIP: ABNORMAL
HYALINE CASTS #/AREA URNS LPF: 5 /LPF
IMM GRANULOCYTES # BLD AUTO: ABNORMAL K/UL (ref 0–0.04)
IMM GRANULOCYTES NFR BLD AUTO: ABNORMAL % (ref 0–0.5)
KETONES UR QL STRIP: ABNORMAL
LACTATE SERPL-SCNC: 2.1 MMOL/L (ref 0.5–2.2)
LEUKOCYTE ESTERASE UR QL STRIP: ABNORMAL
LYMPHOCYTES NFR BLD: 7 % (ref 18–48)
MAGNESIUM SERPL-MCNC: 2.1 MG/DL (ref 1.6–2.6)
MCH RBC QN AUTO: 26.1 PG (ref 27–31)
MCHC RBC AUTO-ENTMCNC: 31.9 G/DL (ref 32–36)
MCV RBC AUTO: 82 FL (ref 82–98)
METHADONE UR QL SCN>300 NG/ML: NEGATIVE
MICROSCOPIC COMMENT: ABNORMAL
MONOCYTES NFR BLD: 1 % (ref 4–15)
NEUTROPHILS NFR BLD: 62 % (ref 38–73)
NEUTS BAND NFR BLD MANUAL: 30 %
NITRITE UR QL STRIP: NEGATIVE
NRBC BLD-RTO: 0 /100 WBC
NT-PROBNP SERPL-MCNC: ABNORMAL PG/ML (ref 5–450)
OPIATES UR QL SCN: NORMAL
PCP UR QL SCN>25 NG/ML: NEGATIVE
PH UR STRIP: 5 [PH] (ref 5–8)
PHOSPHATE SERPL-MCNC: 5.7 MG/DL (ref 2.7–4.5)
PLATELET # BLD AUTO: 74 K/UL (ref 150–350)
PMV BLD AUTO: 11.5 FL (ref 9.2–12.9)
POTASSIUM SERPL-SCNC: 4.5 MMOL/L (ref 3.5–5.1)
PROT SERPL-MCNC: 6.2 G/DL (ref 6–8.4)
PROT UR QL STRIP: ABNORMAL
RBC # BLD AUTO: 4.26 M/UL (ref 4–5.4)
RBC #/AREA URNS HPF: 8 /HPF (ref 0–4)
SARS-COV-2 RDRP RESP QL NAA+PROBE: NEGATIVE
SODIUM SERPL-SCNC: 137 MMOL/L (ref 136–145)
SP GR UR STRIP: 1.02 (ref 1–1.03)
SQUAMOUS #/AREA URNS HPF: 10 /HPF
TOXICOLOGY INFORMATION: NORMAL
TROPONIN I SERPL DL<=0.01 NG/ML-MCNC: <0.02 NG/ML (ref 0–0.03)
URN SPEC COLLECT METH UR: ABNORMAL
UROBILINOGEN UR STRIP-ACNC: 1 EU/DL
WBC # BLD AUTO: 17.9 K/UL (ref 3.9–12.7)
WBC #/AREA URNS HPF: 67 /HPF (ref 0–5)
WBC CASTS #/AREA URNS LPF: 2 /LPF

## 2021-02-01 PROCEDURE — 81025 URINE PREGNANCY TEST: CPT

## 2021-02-01 PROCEDURE — 80307 DRUG TEST PRSMV CHEM ANLYZR: CPT

## 2021-02-01 PROCEDURE — 85007 BL SMEAR W/DIFF WBC COUNT: CPT

## 2021-02-01 PROCEDURE — 87077 CULTURE AEROBIC IDENTIFY: CPT

## 2021-02-01 PROCEDURE — 87040 BLOOD CULTURE FOR BACTERIA: CPT

## 2021-02-01 PROCEDURE — 96367 TX/PROPH/DG ADDL SEQ IV INF: CPT

## 2021-02-01 PROCEDURE — 93010 ELECTROCARDIOGRAM REPORT: CPT | Mod: ,,, | Performed by: INTERNAL MEDICINE

## 2021-02-01 PROCEDURE — 63600175 PHARM REV CODE 636 W HCPCS: Performed by: EMERGENCY MEDICINE

## 2021-02-01 PROCEDURE — 96365 THER/PROPH/DIAG IV INF INIT: CPT

## 2021-02-01 PROCEDURE — 81000 URINALYSIS NONAUTO W/SCOPE: CPT | Mod: 59

## 2021-02-01 PROCEDURE — 99285 EMERGENCY DEPT VISIT HI MDM: CPT | Mod: 25

## 2021-02-01 PROCEDURE — 85027 COMPLETE CBC AUTOMATED: CPT

## 2021-02-01 PROCEDURE — 87186 SC STD MICRODIL/AGAR DIL: CPT

## 2021-02-01 PROCEDURE — 80053 COMPREHEN METABOLIC PANEL: CPT

## 2021-02-01 PROCEDURE — 93010 EKG 12-LEAD: ICD-10-PCS | Mod: ,,, | Performed by: INTERNAL MEDICINE

## 2021-02-01 PROCEDURE — 83880 ASSAY OF NATRIURETIC PEPTIDE: CPT

## 2021-02-01 PROCEDURE — 36415 COLL VENOUS BLD VENIPUNCTURE: CPT

## 2021-02-01 PROCEDURE — 93005 ELECTROCARDIOGRAM TRACING: CPT

## 2021-02-01 PROCEDURE — 83735 ASSAY OF MAGNESIUM: CPT

## 2021-02-01 PROCEDURE — 84484 ASSAY OF TROPONIN QUANT: CPT

## 2021-02-01 PROCEDURE — 83605 ASSAY OF LACTIC ACID: CPT

## 2021-02-01 PROCEDURE — U0002 COVID-19 LAB TEST NON-CDC: HCPCS | Performed by: EMERGENCY MEDICINE

## 2021-02-01 PROCEDURE — 25000003 PHARM REV CODE 250: Performed by: EMERGENCY MEDICINE

## 2021-02-01 PROCEDURE — 87086 URINE CULTURE/COLONY COUNT: CPT

## 2021-02-01 PROCEDURE — 84100 ASSAY OF PHOSPHORUS: CPT

## 2021-02-01 RX ORDER — FUROSEMIDE 10 MG/ML
20 INJECTION INTRAMUSCULAR; INTRAVENOUS
Status: DISCONTINUED | OUTPATIENT
Start: 2021-02-01 | End: 2021-02-01

## 2021-02-01 RX ADMIN — PIPERACILLIN AND TAZOBACTAM 4.5 G: 4; .5 INJECTION, POWDER, LYOPHILIZED, FOR SOLUTION INTRAVENOUS; PARENTERAL at 06:02

## 2021-02-01 RX ADMIN — VANCOMYCIN HYDROCHLORIDE 1250 MG: 1.25 INJECTION, POWDER, LYOPHILIZED, FOR SOLUTION INTRAVENOUS at 07:02

## 2021-02-02 ENCOUNTER — HOSPITAL ENCOUNTER (INPATIENT)
Facility: HOSPITAL | Age: 31
LOS: 17 days | Discharge: NURSING, REHAB OR LONG-TERM CARE | DRG: 853 | End: 2021-02-19
Attending: HOSPITALIST | Admitting: HOSPITALIST
Payer: MEDICAID

## 2021-02-02 ENCOUNTER — ANESTHESIA EVENT (OUTPATIENT)
Dept: SURGERY | Facility: HOSPITAL | Age: 31
DRG: 853 | End: 2021-02-02
Payer: MEDICAID

## 2021-02-02 VITALS
HEIGHT: 62 IN | RESPIRATION RATE: 16 BRPM | BODY MASS INDEX: 20.43 KG/M2 | SYSTOLIC BLOOD PRESSURE: 120 MMHG | DIASTOLIC BLOOD PRESSURE: 71 MMHG | WEIGHT: 111 LBS | HEART RATE: 97 BPM | OXYGEN SATURATION: 98 % | TEMPERATURE: 98 F

## 2021-02-02 DIAGNOSIS — J18.9 PNEUMONIA OF BOTH LUNGS DUE TO INFECTIOUS ORGANISM, UNSPECIFIED PART OF LUNG: ICD-10-CM

## 2021-02-02 DIAGNOSIS — I26.99 PULMONARY EMBOLISM, UNSPECIFIED CHRONICITY, UNSPECIFIED PULMONARY EMBOLISM TYPE, UNSPECIFIED WHETHER ACUTE COR PULMONALE PRESENT: ICD-10-CM

## 2021-02-02 DIAGNOSIS — M00.069 STAPHYLOCOCCAL ARTHRITIS OF KNEE, UNSPECIFIED LATERALITY: ICD-10-CM

## 2021-02-02 DIAGNOSIS — I33.0 INFECTIVE ENDOCARDITIS: ICD-10-CM

## 2021-02-02 DIAGNOSIS — I26.90 CHRONIC SEPTIC PULMONARY EMBOLISM, UNSPECIFIED WHETHER ACUTE COR PULMONALE PRESENT: ICD-10-CM

## 2021-02-02 DIAGNOSIS — M00.062 STAPHYLOCOCCAL ARTHRITIS OF LEFT KNEE: ICD-10-CM

## 2021-02-02 DIAGNOSIS — I33.0 BACTERIAL ENDOCARDITIS, UNSPECIFIED CHRONICITY: ICD-10-CM

## 2021-02-02 DIAGNOSIS — M25.511 PAIN OF RIGHT STERNOCLAVICULAR JOINT: ICD-10-CM

## 2021-02-02 DIAGNOSIS — M00.9 PYOGENIC ARTHRITIS OF KNEE, DUE TO UNSPECIFIED ORGANISM, UNSPECIFIED LATERALITY: ICD-10-CM

## 2021-02-02 DIAGNOSIS — F12.20 CANNABIS USE DISORDER, MODERATE, DEPENDENCE: Chronic | ICD-10-CM

## 2021-02-02 DIAGNOSIS — F19.90 IVDU (INTRAVENOUS DRUG USER): Chronic | ICD-10-CM

## 2021-02-02 DIAGNOSIS — R78.81 MRSA BACTEREMIA: ICD-10-CM

## 2021-02-02 DIAGNOSIS — M00.061 STAPHYLOCOCCAL ARTHRITIS OF RIGHT KNEE: ICD-10-CM

## 2021-02-02 DIAGNOSIS — M25.562 ACUTE PAIN OF BOTH KNEES: Primary | ICD-10-CM

## 2021-02-02 DIAGNOSIS — M25.561 ACUTE PAIN OF BOTH KNEES: Primary | ICD-10-CM

## 2021-02-02 DIAGNOSIS — I27.82 CHRONIC SEPTIC PULMONARY EMBOLISM, UNSPECIFIED WHETHER ACUTE COR PULMONALE PRESENT: ICD-10-CM

## 2021-02-02 DIAGNOSIS — A41.9 SEPSIS: ICD-10-CM

## 2021-02-02 DIAGNOSIS — F13.20 SEDATIVE, HYPNOTIC OR ANXIOLYTIC USE DISORDER, SEVERE, DEPENDENCE: Chronic | ICD-10-CM

## 2021-02-02 DIAGNOSIS — M00.9 SEPTIC ARTHRITIS OF RIGHT STERNOCLAVICULAR JOINT: ICD-10-CM

## 2021-02-02 DIAGNOSIS — L02.511 ABSCESS OF RIGHT HAND: ICD-10-CM

## 2021-02-02 DIAGNOSIS — I07.9 ENDOCARDITIS OF TRICUSPID VALVE: ICD-10-CM

## 2021-02-02 DIAGNOSIS — B95.62 MRSA BACTEREMIA: ICD-10-CM

## 2021-02-02 DIAGNOSIS — R07.9 CHEST PAIN: ICD-10-CM

## 2021-02-02 DIAGNOSIS — I26.99 PULMONARY EMBOLUS: ICD-10-CM

## 2021-02-02 DIAGNOSIS — F19.94 SUBSTANCE INDUCED MOOD DISORDER: Chronic | ICD-10-CM

## 2021-02-02 DIAGNOSIS — F11.20 OPIOID USE DISORDER, SEVERE, DEPENDENCE: Chronic | ICD-10-CM

## 2021-02-02 PROBLEM — N17.9 AKI (ACUTE KIDNEY INJURY): Status: ACTIVE | Noted: 2021-02-02

## 2021-02-02 PROBLEM — D69.6 THROMBOCYTOPENIA: Status: ACTIVE | Noted: 2021-02-02

## 2021-02-02 LAB
APPEARANCE FLD: NORMAL
APPEARANCE FLD: NORMAL
BNP SERPL-MCNC: 328 PG/ML (ref 0–99)
BODY FLD TYPE: NORMAL
COLOR FLD: YELLOW
COLOR FLD: YELLOW
CRP SERPL-MCNC: 332.9 MG/L (ref 0–8.2)
CRYSTALS FLD MICRO: NEGATIVE
CRYSTALS FLD MICRO: NEGATIVE
ERYTHROCYTE [SEDIMENTATION RATE] IN BLOOD BY WESTERGREN METHOD: 81 MM/HR (ref 0–36)
GRAM STN SPEC: NORMAL
LYMPHOCYTES NFR FLD MANUAL: 4 %
LYMPHOCYTES NFR FLD MANUAL: 6 %
MONOS+MACROS NFR FLD MANUAL: 3 %
MONOS+MACROS NFR FLD MANUAL: 8 %
NEUTROPHILS NFR FLD MANUAL: 86 %
NEUTROPHILS NFR FLD MANUAL: 93 %
WBC # FLD: NORMAL /CU MM
WBC # FLD: NORMAL /CU MM

## 2021-02-02 PROCEDURE — 25000003 PHARM REV CODE 250: Performed by: EMERGENCY MEDICINE

## 2021-02-02 PROCEDURE — 87077 CULTURE AEROBIC IDENTIFY: CPT

## 2021-02-02 PROCEDURE — 20600001 HC STEP DOWN PRIVATE ROOM

## 2021-02-02 PROCEDURE — 87102 FUNGUS ISOLATION CULTURE: CPT

## 2021-02-02 PROCEDURE — 89060 EXAM SYNOVIAL FLUID CRYSTALS: CPT | Mod: 91

## 2021-02-02 PROCEDURE — 99223 PR INITIAL HOSPITAL CARE,LEVL III: ICD-10-PCS | Mod: ,,, | Performed by: INTERNAL MEDICINE

## 2021-02-02 PROCEDURE — 25000003 PHARM REV CODE 250: Performed by: INTERNAL MEDICINE

## 2021-02-02 PROCEDURE — 85652 RBC SED RATE AUTOMATED: CPT

## 2021-02-02 PROCEDURE — 63600175 PHARM REV CODE 636 W HCPCS: Performed by: INTERNAL MEDICINE

## 2021-02-02 PROCEDURE — 97161 PT EVAL LOW COMPLEX 20 MIN: CPT

## 2021-02-02 PROCEDURE — 99223 1ST HOSP IP/OBS HIGH 75: CPT | Mod: ,,, | Performed by: INTERNAL MEDICINE

## 2021-02-02 PROCEDURE — 87205 SMEAR GRAM STAIN: CPT | Mod: 59

## 2021-02-02 PROCEDURE — 87186 SC STD MICRODIL/AGAR DIL: CPT

## 2021-02-02 PROCEDURE — 87206 SMEAR FLUORESCENT/ACID STAI: CPT | Mod: 91

## 2021-02-02 PROCEDURE — 89051 BODY FLUID CELL COUNT: CPT | Mod: 91

## 2021-02-02 PROCEDURE — 99223 1ST HOSP IP/OBS HIGH 75: CPT | Mod: ,,, | Performed by: PSYCHIATRY & NEUROLOGY

## 2021-02-02 PROCEDURE — 25000003 PHARM REV CODE 250: Performed by: STUDENT IN AN ORGANIZED HEALTH CARE EDUCATION/TRAINING PROGRAM

## 2021-02-02 PROCEDURE — 87070 CULTURE OTHR SPECIMN AEROBIC: CPT | Mod: 59

## 2021-02-02 PROCEDURE — 87116 MYCOBACTERIA CULTURE: CPT

## 2021-02-02 PROCEDURE — 87040 BLOOD CULTURE FOR BACTERIA: CPT | Mod: 59

## 2021-02-02 PROCEDURE — 87075 CULTR BACTERIA EXCEPT BLOOD: CPT

## 2021-02-02 PROCEDURE — 36415 COLL VENOUS BLD VENIPUNCTURE: CPT

## 2021-02-02 PROCEDURE — 63600175 PHARM REV CODE 636 W HCPCS: Performed by: STUDENT IN AN ORGANIZED HEALTH CARE EDUCATION/TRAINING PROGRAM

## 2021-02-02 PROCEDURE — 86140 C-REACTIVE PROTEIN: CPT

## 2021-02-02 PROCEDURE — 97165 OT EVAL LOW COMPLEX 30 MIN: CPT

## 2021-02-02 PROCEDURE — 99223 PR INITIAL HOSPITAL CARE,LEVL III: ICD-10-PCS | Mod: ,,, | Performed by: PSYCHIATRY & NEUROLOGY

## 2021-02-02 PROCEDURE — 83880 ASSAY OF NATRIURETIC PEPTIDE: CPT

## 2021-02-02 RX ORDER — IBUPROFEN 200 MG
200 TABLET ORAL EVERY 6 HOURS PRN
Status: ON HOLD | COMMUNITY
End: 2021-02-07 | Stop reason: HOSPADM

## 2021-02-02 RX ORDER — VANCOMYCIN HCL IN 5 % DEXTROSE 1G/250ML
1000 PLASTIC BAG, INJECTION (ML) INTRAVENOUS ONCE
Status: COMPLETED | OUTPATIENT
Start: 2021-02-02 | End: 2021-02-02

## 2021-02-02 RX ORDER — ACETAMINOPHEN 500 MG
500 TABLET ORAL EVERY 6 HOURS PRN
Status: ON HOLD | COMMUNITY
End: 2022-06-05 | Stop reason: HOSPADM

## 2021-02-02 RX ORDER — MUPIROCIN 20 MG/G
OINTMENT TOPICAL
Status: CANCELLED | OUTPATIENT
Start: 2021-02-02

## 2021-02-02 RX ORDER — GABAPENTIN 100 MG/1
100 CAPSULE ORAL 3 TIMES DAILY
Status: DISCONTINUED | OUTPATIENT
Start: 2021-02-02 | End: 2021-02-03

## 2021-02-02 RX ORDER — ONDANSETRON 8 MG/1
8 TABLET, ORALLY DISINTEGRATING ORAL EVERY 8 HOURS PRN
Status: DISCONTINUED | OUTPATIENT
Start: 2021-02-02 | End: 2021-02-19 | Stop reason: HOSPADM

## 2021-02-02 RX ORDER — GLUCAGON 1 MG
1 KIT INJECTION
Status: DISCONTINUED | OUTPATIENT
Start: 2021-02-02 | End: 2021-02-19 | Stop reason: HOSPADM

## 2021-02-02 RX ORDER — IBUPROFEN 200 MG
24 TABLET ORAL
Status: DISCONTINUED | OUTPATIENT
Start: 2021-02-02 | End: 2021-02-19 | Stop reason: HOSPADM

## 2021-02-02 RX ORDER — TALC
6 POWDER (GRAM) TOPICAL NIGHTLY PRN
Status: DISCONTINUED | OUTPATIENT
Start: 2021-02-02 | End: 2021-02-19 | Stop reason: HOSPADM

## 2021-02-02 RX ORDER — MIRTAZAPINE 7.5 MG/1
7.5 TABLET, FILM COATED ORAL NIGHTLY
Status: DISCONTINUED | OUTPATIENT
Start: 2021-02-02 | End: 2021-02-08

## 2021-02-02 RX ORDER — SODIUM CHLORIDE 9 MG/ML
INJECTION, SOLUTION INTRAVENOUS CONTINUOUS
Status: ACTIVE | OUTPATIENT
Start: 2021-02-02 | End: 2021-02-02

## 2021-02-02 RX ORDER — ACETAMINOPHEN 500 MG
1000 TABLET ORAL
Status: COMPLETED | OUTPATIENT
Start: 2021-02-02 | End: 2021-02-02

## 2021-02-02 RX ORDER — SODIUM CHLORIDE 9 MG/ML
INJECTION, SOLUTION INTRAVENOUS CONTINUOUS
Status: ACTIVE | OUTPATIENT
Start: 2021-02-02 | End: 2021-02-03

## 2021-02-02 RX ORDER — IBUPROFEN 200 MG
16 TABLET ORAL
Status: DISCONTINUED | OUTPATIENT
Start: 2021-02-02 | End: 2021-02-19 | Stop reason: HOSPADM

## 2021-02-02 RX ORDER — SODIUM CHLORIDE 0.9 % (FLUSH) 0.9 %
10 SYRINGE (ML) INJECTION
Status: DISCONTINUED | OUTPATIENT
Start: 2021-02-02 | End: 2021-02-19 | Stop reason: HOSPADM

## 2021-02-02 RX ORDER — LOPERAMIDE HYDROCHLORIDE 2 MG/1
2 CAPSULE ORAL 4 TIMES DAILY PRN
Status: DISCONTINUED | OUTPATIENT
Start: 2021-02-02 | End: 2021-02-19 | Stop reason: HOSPADM

## 2021-02-02 RX ORDER — MUPIROCIN 20 MG/G
OINTMENT TOPICAL 2 TIMES DAILY
Status: COMPLETED | OUTPATIENT
Start: 2021-02-02 | End: 2021-02-06

## 2021-02-02 RX ORDER — HYDROXYZINE PAMOATE 25 MG/1
50 CAPSULE ORAL NIGHTLY PRN
Status: DISCONTINUED | OUTPATIENT
Start: 2021-02-02 | End: 2021-02-03

## 2021-02-02 RX ORDER — TRAMADOL HYDROCHLORIDE 50 MG/1
50 TABLET ORAL EVERY 6 HOURS PRN
Status: DISCONTINUED | OUTPATIENT
Start: 2021-02-02 | End: 2021-02-03

## 2021-02-02 RX ORDER — ACETAMINOPHEN 325 MG/1
650 TABLET ORAL EVERY 8 HOURS PRN
Status: DISCONTINUED | OUTPATIENT
Start: 2021-02-02 | End: 2021-02-06

## 2021-02-02 RX ADMIN — ACETAMINOPHEN 1000 MG: 500 TABLET ORAL at 12:02

## 2021-02-02 RX ADMIN — PIPERACILLIN AND TAZOBACTAM 4.5 G: 4; .5 INJECTION, POWDER, LYOPHILIZED, FOR SOLUTION INTRAVENOUS; PARENTERAL at 05:02

## 2021-02-02 RX ADMIN — GABAPENTIN 100 MG: 100 CAPSULE ORAL at 11:02

## 2021-02-02 RX ADMIN — MUPIROCIN: 20 OINTMENT TOPICAL at 10:02

## 2021-02-02 RX ADMIN — MIRTAZAPINE 7.5 MG: 7.5 TABLET ORAL at 08:02

## 2021-02-02 RX ADMIN — GABAPENTIN 100 MG: 100 CAPSULE ORAL at 08:02

## 2021-02-02 RX ADMIN — ACETAMINOPHEN 650 MG: 325 TABLET ORAL at 10:02

## 2021-02-02 RX ADMIN — MUPIROCIN: 20 OINTMENT TOPICAL at 08:02

## 2021-02-02 RX ADMIN — SODIUM CHLORIDE: 0.9 INJECTION, SOLUTION INTRAVENOUS at 10:02

## 2021-02-02 RX ADMIN — SODIUM CHLORIDE: 0.9 INJECTION, SOLUTION INTRAVENOUS at 12:02

## 2021-02-02 RX ADMIN — PIPERACILLIN AND TAZOBACTAM 4.5 G: 4; .5 INJECTION, POWDER, LYOPHILIZED, FOR SOLUTION INTRAVENOUS; PARENTERAL at 12:02

## 2021-02-02 RX ADMIN — GABAPENTIN 100 MG: 100 CAPSULE ORAL at 05:02

## 2021-02-02 RX ADMIN — TRAMADOL HYDROCHLORIDE 50 MG: 50 TABLET, COATED ORAL at 11:02

## 2021-02-02 RX ADMIN — TRAMADOL HYDROCHLORIDE 50 MG: 50 TABLET, COATED ORAL at 05:02

## 2021-02-02 RX ADMIN — VANCOMYCIN HYDROCHLORIDE 1000 MG: 1 INJECTION, POWDER, LYOPHILIZED, FOR SOLUTION INTRAVENOUS at 10:02

## 2021-02-03 ENCOUNTER — ANESTHESIA (OUTPATIENT)
Dept: SURGERY | Facility: HOSPITAL | Age: 31
DRG: 853 | End: 2021-02-03
Payer: MEDICAID

## 2021-02-03 LAB
ABO + RH BLD: NORMAL
ALBUMIN SERPL BCP-MCNC: 1.8 G/DL (ref 3.5–5.2)
ALP SERPL-CCNC: 202 U/L (ref 55–135)
ALT SERPL W/O P-5'-P-CCNC: 12 U/L (ref 10–44)
ANION GAP SERPL CALC-SCNC: 11 MMOL/L (ref 8–16)
ANISOCYTOSIS BLD QL SMEAR: SLIGHT
ASCENDING AORTA: 2.6 CM
AST SERPL-CCNC: 24 U/L (ref 10–40)
AV INDEX (PROSTH): 1.01
AV MEAN GRADIENT: 3 MMHG
AV PEAK GRADIENT: 6 MMHG
AV VALVE AREA: 3.07 CM2
AV VELOCITY RATIO: 0.98
BACTERIA UR CULT: NORMAL
BACTERIA UR CULT: NORMAL
BASOPHILS # BLD AUTO: 0.09 K/UL (ref 0–0.2)
BASOPHILS NFR BLD: 0.4 % (ref 0–1.9)
BILIRUB SERPL-MCNC: 0.7 MG/DL (ref 0.1–1)
BLD GP AB SCN CELLS X3 SERPL QL: NORMAL
BSA FOR ECHO PROCEDURE: 1.47 M2
BUN SERPL-MCNC: 26 MG/DL (ref 6–20)
CALCIUM SERPL-MCNC: 7.5 MG/DL (ref 8.7–10.5)
CHLORIDE SERPL-SCNC: 107 MMOL/L (ref 95–110)
CO2 SERPL-SCNC: 18 MMOL/L (ref 23–29)
CREAT SERPL-MCNC: 1.1 MG/DL (ref 0.5–1.4)
CV ECHO LV RWT: 0.4 CM
DIFFERENTIAL METHOD: ABNORMAL
DOP CALC AO PEAK VEL: 1.21 M/S
DOP CALC AO VTI: 16.41 CM
DOP CALC LVOT AREA: 3 CM2
DOP CALC LVOT DIAMETER: 1.97 CM
DOP CALC LVOT PEAK VEL: 1.18 M/S
DOP CALC LVOT STROKE VOLUME: 50.39 CM3
DOP CALCLVOT PEAK VEL VTI: 16.54 CM
E WAVE DECELERATION TIME: 153.61 MSEC
E/A RATIO: 0.84
E/E' RATIO: 4.56 M/S
ECHO LV POSTERIOR WALL: 0.8 CM (ref 0.6–1.1)
EOSINOPHIL # BLD AUTO: 0 K/UL (ref 0–0.5)
EOSINOPHIL NFR BLD: 0.1 % (ref 0–8)
ERYTHROCYTE [DISTWIDTH] IN BLOOD BY AUTOMATED COUNT: 16.5 % (ref 11.5–14.5)
EST. GFR  (AFRICAN AMERICAN): >60 ML/MIN/1.73 M^2
EST. GFR  (NON AFRICAN AMERICAN): >60 ML/MIN/1.73 M^2
FRACTIONAL SHORTENING: 32 % (ref 28–44)
GLUCOSE SERPL-MCNC: 101 MG/DL (ref 70–110)
HCT VFR BLD AUTO: 31.9 % (ref 37–48.5)
HGB BLD-MCNC: 10.1 G/DL (ref 12–16)
HYPOCHROMIA BLD QL SMEAR: ABNORMAL
IMM GRANULOCYTES # BLD AUTO: 0.88 K/UL (ref 0–0.04)
IMM GRANULOCYTES NFR BLD AUTO: 4.3 % (ref 0–0.5)
INTERVENTRICULAR SEPTUM: 0.77 CM (ref 0.6–1.1)
IVRT: 82.78 MSEC
LA MAJOR: 4.96 CM
LA MINOR: 4.77 CM
LA WIDTH: 3.29 CM
LEFT ATRIUM SIZE: 3.74 CM
LEFT ATRIUM VOLUME INDEX MOD: 22.7 ML/M2
LEFT ATRIUM VOLUME INDEX: 34.6 ML/M2
LEFT ATRIUM VOLUME MOD: 33.33 CM3
LEFT ATRIUM VOLUME: 50.86 CM3
LEFT INTERNAL DIMENSION IN SYSTOLE: 2.73 CM (ref 2.1–4)
LEFT VENTRICLE DIASTOLIC VOLUME INDEX: 48.88 ML/M2
LEFT VENTRICLE DIASTOLIC VOLUME: 71.85 ML
LEFT VENTRICLE MASS INDEX: 63 G/M2
LEFT VENTRICLE SYSTOLIC VOLUME INDEX: 18.8 ML/M2
LEFT VENTRICLE SYSTOLIC VOLUME: 27.7 ML
LEFT VENTRICULAR INTERNAL DIMENSION IN DIASTOLE: 4.04 CM (ref 3.5–6)
LEFT VENTRICULAR MASS: 92.64 G
LV LATERAL E/E' RATIO: 4.38 M/S
LV SEPTAL E/E' RATIO: 4.75 M/S
LYMPHOCYTES # BLD AUTO: 1.7 K/UL (ref 1–4.8)
LYMPHOCYTES NFR BLD: 8.3 % (ref 18–48)
MAGNESIUM SERPL-MCNC: 2.1 MG/DL (ref 1.6–2.6)
MCH RBC QN AUTO: 26.1 PG (ref 27–31)
MCHC RBC AUTO-ENTMCNC: 31.7 G/DL (ref 32–36)
MCV RBC AUTO: 82 FL (ref 82–98)
MONOCYTES # BLD AUTO: 0.7 K/UL (ref 0.3–1)
MONOCYTES NFR BLD: 3.6 % (ref 4–15)
MV A" WAVE DURATION": 11.7 MSEC
MV PEAK A VEL: 0.68 M/S
MV PEAK E VEL: 0.57 M/S
NEUTROPHILS # BLD AUTO: 17.2 K/UL (ref 1.8–7.7)
NEUTROPHILS NFR BLD: 83.3 % (ref 38–73)
NRBC BLD-RTO: 0 /100 WBC
OVALOCYTES BLD QL SMEAR: ABNORMAL
PHOSPHATE SERPL-MCNC: 4.3 MG/DL (ref 2.7–4.5)
PISA TR MAX VEL: 3.03 M/S
PLATELET # BLD AUTO: 73 K/UL (ref 150–350)
PMV BLD AUTO: 11.1 FL (ref 9.2–12.9)
POIKILOCYTOSIS BLD QL SMEAR: SLIGHT
POLYCHROMASIA BLD QL SMEAR: ABNORMAL
POTASSIUM SERPL-SCNC: 3.9 MMOL/L (ref 3.5–5.1)
PROT SERPL-MCNC: 6.1 G/DL (ref 6–8.4)
PULM VEIN S/D RATIO: 0.9
PV PEAK D VEL: 0.51 M/S
PV PEAK S VEL: 0.46 M/S
RA MAJOR: 4.9 CM
RA PRESSURE: 15 MMHG
RA WIDTH: 4.98 CM
RBC # BLD AUTO: 3.87 M/UL (ref 4–5.4)
RIGHT ATRIAL AREA: 23 CM2
RIGHT VENTRICULAR END-DIASTOLIC DIMENSION: 4.38 CM
RV TISSUE DOPPLER FREE WALL SYSTOLIC VELOCITY 1 (APICAL 4 CHAMBER VIEW): 22.89 CM/S
SINUS: 2.79 CM
SODIUM SERPL-SCNC: 136 MMOL/L (ref 136–145)
STJ: 2.44 CM
TDI LATERAL: 0.13 M/S
TDI SEPTAL: 0.12 M/S
TDI: 0.13 M/S
TR MAX PG: 37 MMHG
TRICUSPID ANNULAR PLANE SYSTOLIC EXCURSION: 2.36 CM
TV REST PULMONARY ARTERY PRESSURE: 52 MMHG
VANCOMYCIN SERPL-MCNC: 14.1 UG/ML
WBC # BLD AUTO: 20.62 K/UL (ref 3.9–12.7)

## 2021-02-03 PROCEDURE — 87077 CULTURE AEROBIC IDENTIFY: CPT

## 2021-02-03 PROCEDURE — 80053 COMPREHEN METABOLIC PANEL: CPT

## 2021-02-03 PROCEDURE — 87181 SC STD AGAR DILUTION PER AGT: CPT

## 2021-02-03 PROCEDURE — 25000003 PHARM REV CODE 250: Performed by: STUDENT IN AN ORGANIZED HEALTH CARE EDUCATION/TRAINING PROGRAM

## 2021-02-03 PROCEDURE — 63600175 PHARM REV CODE 636 W HCPCS: Performed by: STUDENT IN AN ORGANIZED HEALTH CARE EDUCATION/TRAINING PROGRAM

## 2021-02-03 PROCEDURE — 99233 SBSQ HOSP IP/OBS HIGH 50: CPT | Mod: ,,, | Performed by: INTERNAL MEDICINE

## 2021-02-03 PROCEDURE — 83735 ASSAY OF MAGNESIUM: CPT

## 2021-02-03 PROCEDURE — 25000003 PHARM REV CODE 250: Performed by: INTERNAL MEDICINE

## 2021-02-03 PROCEDURE — 99232 PR SUBSEQUENT HOSPITAL CARE,LEVL II: ICD-10-PCS | Mod: ,,, | Performed by: PSYCHIATRY & NEUROLOGY

## 2021-02-03 PROCEDURE — 80202 ASSAY OF VANCOMYCIN: CPT

## 2021-02-03 PROCEDURE — 36415 COLL VENOUS BLD VENIPUNCTURE: CPT

## 2021-02-03 PROCEDURE — 87040 BLOOD CULTURE FOR BACTERIA: CPT

## 2021-02-03 PROCEDURE — 99223 1ST HOSP IP/OBS HIGH 75: CPT | Mod: ,,, | Performed by: PHYSICIAN ASSISTANT

## 2021-02-03 PROCEDURE — 87186 SC STD MICRODIL/AGAR DIL: CPT

## 2021-02-03 PROCEDURE — 86900 BLOOD TYPING SEROLOGIC ABO: CPT

## 2021-02-03 PROCEDURE — 84100 ASSAY OF PHOSPHORUS: CPT

## 2021-02-03 PROCEDURE — 99223 PR INITIAL HOSPITAL CARE,LEVL III: ICD-10-PCS | Mod: ,,, | Performed by: PHYSICIAN ASSISTANT

## 2021-02-03 PROCEDURE — 25500020 PHARM REV CODE 255: Performed by: INTERNAL MEDICINE

## 2021-02-03 PROCEDURE — 99233 PR SUBSEQUENT HOSPITAL CARE,LEVL III: ICD-10-PCS | Mod: ,,, | Performed by: INTERNAL MEDICINE

## 2021-02-03 PROCEDURE — 63600175 PHARM REV CODE 636 W HCPCS: Performed by: INTERNAL MEDICINE

## 2021-02-03 PROCEDURE — 85025 COMPLETE CBC W/AUTO DIFF WBC: CPT

## 2021-02-03 PROCEDURE — 87040 BLOOD CULTURE FOR BACTERIA: CPT | Mod: 59

## 2021-02-03 PROCEDURE — 20600001 HC STEP DOWN PRIVATE ROOM

## 2021-02-03 PROCEDURE — 99232 SBSQ HOSP IP/OBS MODERATE 35: CPT | Mod: ,,, | Performed by: PSYCHIATRY & NEUROLOGY

## 2021-02-03 PROCEDURE — A9585 GADOBUTROL INJECTION: HCPCS | Performed by: INTERNAL MEDICINE

## 2021-02-03 RX ORDER — GABAPENTIN 300 MG/1
300 CAPSULE ORAL 3 TIMES DAILY
Status: DISCONTINUED | OUTPATIENT
Start: 2021-02-03 | End: 2021-02-19 | Stop reason: HOSPADM

## 2021-02-03 RX ORDER — BUPRENORPHINE 2 MG/1
4 TABLET SUBLINGUAL 2 TIMES DAILY
Status: DISCONTINUED | OUTPATIENT
Start: 2021-02-03 | End: 2021-02-05

## 2021-02-03 RX ORDER — HYDROXYZINE PAMOATE 25 MG/1
50 CAPSULE ORAL EVERY 6 HOURS PRN
Status: DISCONTINUED | OUTPATIENT
Start: 2021-02-03 | End: 2021-02-19 | Stop reason: HOSPADM

## 2021-02-03 RX ORDER — GADOBUTROL 604.72 MG/ML
5 INJECTION INTRAVENOUS
Status: COMPLETED | OUTPATIENT
Start: 2021-02-03 | End: 2021-02-03

## 2021-02-03 RX ADMIN — GABAPENTIN 300 MG: 300 CAPSULE ORAL at 03:02

## 2021-02-03 RX ADMIN — GABAPENTIN 300 MG: 300 CAPSULE ORAL at 11:02

## 2021-02-03 RX ADMIN — BACLOFEN 5 MG: 10 TABLET ORAL at 10:02

## 2021-02-03 RX ADMIN — MUPIROCIN: 20 OINTMENT TOPICAL at 11:02

## 2021-02-03 RX ADMIN — BUPRENORPHINE HCL 4 MG: 2 TABLET SUBLINGUAL at 11:02

## 2021-02-03 RX ADMIN — MUPIROCIN: 20 OINTMENT TOPICAL at 10:02

## 2021-02-03 RX ADMIN — HYDROXYZINE PAMOATE 50 MG: 25 CAPSULE ORAL at 01:02

## 2021-02-03 RX ADMIN — TRAMADOL HYDROCHLORIDE 50 MG: 50 TABLET, COATED ORAL at 11:02

## 2021-02-03 RX ADMIN — PIPERACILLIN AND TAZOBACTAM 4.5 G: 4; .5 INJECTION, POWDER, LYOPHILIZED, FOR SOLUTION INTRAVENOUS; PARENTERAL at 02:02

## 2021-02-03 RX ADMIN — GADOBUTROL 5 ML: 604.72 INJECTION INTRAVENOUS at 05:02

## 2021-02-03 RX ADMIN — MIRTAZAPINE 7.5 MG: 7.5 TABLET ORAL at 11:02

## 2021-02-03 RX ADMIN — PIPERACILLIN AND TAZOBACTAM 4.5 G: 4; .5 INJECTION, POWDER, LYOPHILIZED, FOR SOLUTION INTRAVENOUS; PARENTERAL at 10:02

## 2021-02-03 RX ADMIN — GABAPENTIN 100 MG: 100 CAPSULE ORAL at 10:02

## 2021-02-03 RX ADMIN — ACETAMINOPHEN 650 MG: 325 TABLET ORAL at 01:02

## 2021-02-03 RX ADMIN — ACETAMINOPHEN 650 MG: 325 TABLET ORAL at 02:02

## 2021-02-03 RX ADMIN — MELATONIN TAB 3 MG 6 MG: 3 TAB at 02:02

## 2021-02-03 RX ADMIN — MELATONIN TAB 3 MG 6 MG: 3 TAB at 11:02

## 2021-02-03 RX ADMIN — VANCOMYCIN HYDROCHLORIDE 750 MG: 750 INJECTION, POWDER, LYOPHILIZED, FOR SOLUTION INTRAVENOUS at 01:02

## 2021-02-03 RX ADMIN — ACETAMINOPHEN 650 MG: 325 TABLET ORAL at 11:02

## 2021-02-03 RX ADMIN — SODIUM CHLORIDE 1000 ML: 0.9 INJECTION, SOLUTION INTRAVENOUS at 05:02

## 2021-02-04 ENCOUNTER — DOCUMENTATION ONLY (OUTPATIENT)
Dept: CARDIOTHORACIC SURGERY | Facility: CLINIC | Age: 31
End: 2021-02-04

## 2021-02-04 DIAGNOSIS — B33.21 SUBACUTE VIRAL ENDOCARDITIS: Primary | ICD-10-CM

## 2021-02-04 DIAGNOSIS — I07.1 TRICUSPID VALVE INSUFFICIENCY, UNSPECIFIED ETIOLOGY: ICD-10-CM

## 2021-02-04 PROBLEM — D69.6 THROMBOCYTOPENIA: Status: RESOLVED | Noted: 2021-02-02 | Resolved: 2021-02-04

## 2021-02-04 PROBLEM — B18.2 CHRONIC HEPATITIS C VIRUS INFECTION: Status: ACTIVE | Noted: 2021-02-04

## 2021-02-04 PROBLEM — I27.82: Status: ACTIVE | Noted: 2021-02-04

## 2021-02-04 PROBLEM — B18.2 CHRONIC HEPATITIS C VIRUS INFECTION: Status: RESOLVED | Noted: 2021-02-04 | Resolved: 2021-02-04

## 2021-02-04 PROBLEM — I26.90: Status: ACTIVE | Noted: 2021-02-04

## 2021-02-04 PROBLEM — E43 SEVERE MALNUTRITION: Status: RESOLVED | Noted: 2020-11-25 | Resolved: 2021-02-04

## 2021-02-04 PROBLEM — M00.9 PYOGENIC ARTHRITIS OF LEFT KNEE JOINT: Status: ACTIVE | Noted: 2021-02-04

## 2021-02-04 LAB
ALBUMIN SERPL BCP-MCNC: 1.6 G/DL (ref 3.5–5.2)
ALLENS TEST: ABNORMAL
ALP SERPL-CCNC: 135 U/L (ref 55–135)
ALT SERPL W/O P-5'-P-CCNC: 8 U/L (ref 10–44)
ANION GAP SERPL CALC-SCNC: 8 MMOL/L (ref 8–16)
APTT BLDCRRT: 31.9 SEC (ref 21–32)
APTT BLDCRRT: 34.4 SEC (ref 21–32)
AST SERPL-CCNC: 14 U/L (ref 10–40)
BACTERIA BLD CULT: ABNORMAL
BASOPHILS # BLD AUTO: 0.07 K/UL (ref 0–0.2)
BASOPHILS NFR BLD: 0.4 % (ref 0–1.9)
BILIRUB SERPL-MCNC: 0.6 MG/DL (ref 0.1–1)
BNP SERPL-MCNC: 288 PG/ML (ref 0–99)
BUN SERPL-MCNC: 18 MG/DL (ref 6–20)
CALCIUM SERPL-MCNC: 7.5 MG/DL (ref 8.7–10.5)
CHLORIDE SERPL-SCNC: 107 MMOL/L (ref 95–110)
CO2 SERPL-SCNC: 19 MMOL/L (ref 23–29)
CREAT SERPL-MCNC: 0.9 MG/DL (ref 0.5–1.4)
DELSYS: ABNORMAL
DIFFERENTIAL METHOD: ABNORMAL
EOSINOPHIL # BLD AUTO: 0.1 K/UL (ref 0–0.5)
EOSINOPHIL NFR BLD: 0.5 % (ref 0–8)
ERYTHROCYTE [DISTWIDTH] IN BLOOD BY AUTOMATED COUNT: 16.7 % (ref 11.5–14.5)
EST. GFR  (AFRICAN AMERICAN): >60 ML/MIN/1.73 M^2
EST. GFR  (NON AFRICAN AMERICAN): >60 ML/MIN/1.73 M^2
FLOW: 2
GLUCOSE SERPL-MCNC: 99 MG/DL (ref 70–110)
GRAM STN SPEC: NORMAL
GRAM STN SPEC: NORMAL
HCO3 UR-SCNC: 20 MMOL/L (ref 24–28)
HCT VFR BLD AUTO: 29.4 % (ref 37–48.5)
HGB BLD-MCNC: 9.1 G/DL (ref 12–16)
IMM GRANULOCYTES # BLD AUTO: 0.66 K/UL (ref 0–0.04)
IMM GRANULOCYTES NFR BLD AUTO: 3.3 % (ref 0–0.5)
LYMPHOCYTES # BLD AUTO: 2.2 K/UL (ref 1–4.8)
LYMPHOCYTES NFR BLD: 11.2 % (ref 18–48)
MAGNESIUM SERPL-MCNC: 2 MG/DL (ref 1.6–2.6)
MCH RBC QN AUTO: 26.3 PG (ref 27–31)
MCHC RBC AUTO-ENTMCNC: 31 G/DL (ref 32–36)
MCV RBC AUTO: 85 FL (ref 82–98)
MODE: ABNORMAL
MONOCYTES # BLD AUTO: 1.2 K/UL (ref 0.3–1)
MONOCYTES NFR BLD: 5.9 % (ref 4–15)
NEUTROPHILS # BLD AUTO: 15.8 K/UL (ref 1.8–7.7)
NEUTROPHILS NFR BLD: 78.7 % (ref 38–73)
NRBC BLD-RTO: 0 /100 WBC
PCO2 BLDA: 29.9 MMHG (ref 35–45)
PH SMN: 7.43 [PH] (ref 7.35–7.45)
PHOSPHATE SERPL-MCNC: 4.3 MG/DL (ref 2.7–4.5)
PLATELET # BLD AUTO: 105 K/UL (ref 150–350)
PMV BLD AUTO: 10.6 FL (ref 9.2–12.9)
PO2 BLDA: 56 MMHG (ref 80–100)
POC BE: -4 MMOL/L
POC SATURATED O2: 90 % (ref 95–100)
POC TCO2: 21 MMOL/L (ref 23–27)
POTASSIUM SERPL-SCNC: 4 MMOL/L (ref 3.5–5.1)
PROT SERPL-MCNC: 6 G/DL (ref 6–8.4)
RBC # BLD AUTO: 3.46 M/UL (ref 4–5.4)
SAMPLE: ABNORMAL
SITE: ABNORMAL
SODIUM SERPL-SCNC: 134 MMOL/L (ref 136–145)
SP02: 92
VANCOMYCIN TROUGH SERPL-MCNC: 16 UG/ML (ref 10–22)
WBC # BLD AUTO: 20 K/UL (ref 3.9–12.7)

## 2021-02-04 PROCEDURE — 25500020 PHARM REV CODE 255: Performed by: INTERNAL MEDICINE

## 2021-02-04 PROCEDURE — 99233 PR SUBSEQUENT HOSPITAL CARE,LEVL III: ICD-10-PCS | Mod: ,,, | Performed by: NURSE PRACTITIONER

## 2021-02-04 PROCEDURE — 99223 1ST HOSP IP/OBS HIGH 75: CPT | Mod: ,,, | Performed by: PHYSICIAN ASSISTANT

## 2021-02-04 PROCEDURE — 36000711: Performed by: ORTHOPAEDIC SURGERY

## 2021-02-04 PROCEDURE — 25000003 PHARM REV CODE 250: Performed by: NURSE ANESTHETIST, CERTIFIED REGISTERED

## 2021-02-04 PROCEDURE — 36600 WITHDRAWAL OF ARTERIAL BLOOD: CPT

## 2021-02-04 PROCEDURE — 71000033 HC RECOVERY, INTIAL HOUR: Performed by: ORTHOPAEDIC SURGERY

## 2021-02-04 PROCEDURE — 84100 ASSAY OF PHOSPHORUS: CPT

## 2021-02-04 PROCEDURE — 87102 FUNGUS ISOLATION CULTURE: CPT

## 2021-02-04 PROCEDURE — 99233 SBSQ HOSP IP/OBS HIGH 50: CPT | Mod: ,,, | Performed by: NURSE PRACTITIONER

## 2021-02-04 PROCEDURE — 71000039 HC RECOVERY, EACH ADD'L HOUR: Performed by: ORTHOPAEDIC SURGERY

## 2021-02-04 PROCEDURE — 99223 1ST HOSP IP/OBS HIGH 75: CPT | Mod: 57,,, | Performed by: ORTHOPAEDIC SURGERY

## 2021-02-04 PROCEDURE — 85025 COMPLETE CBC W/AUTO DIFF WBC: CPT

## 2021-02-04 PROCEDURE — 99233 PR SUBSEQUENT HOSPITAL CARE,LEVL III: ICD-10-PCS | Mod: ,,, | Performed by: HOSPITALIST

## 2021-02-04 PROCEDURE — 25000003 PHARM REV CODE 250: Performed by: ANESTHESIOLOGY

## 2021-02-04 PROCEDURE — D9220A PRA ANESTHESIA: ICD-10-PCS | Mod: CRNA,,, | Performed by: NURSE ANESTHETIST, CERTIFIED REGISTERED

## 2021-02-04 PROCEDURE — 37000008 HC ANESTHESIA 1ST 15 MINUTES: Performed by: ORTHOPAEDIC SURGERY

## 2021-02-04 PROCEDURE — 27201423 OPTIME MED/SURG SUP & DEVICES STERILE SUPPLY: Performed by: ORTHOPAEDIC SURGERY

## 2021-02-04 PROCEDURE — 64447 ADDUCTOR CANAL SINGLE SHOT: ICD-10-PCS | Mod: 59,50,, | Performed by: ANESTHESIOLOGY

## 2021-02-04 PROCEDURE — 99232 SBSQ HOSP IP/OBS MODERATE 35: CPT | Mod: ,,, | Performed by: PSYCHIATRY & NEUROLOGY

## 2021-02-04 PROCEDURE — 99232 PR SUBSEQUENT HOSPITAL CARE,LEVL II: ICD-10-PCS | Mod: ,,, | Performed by: PSYCHIATRY & NEUROLOGY

## 2021-02-04 PROCEDURE — 87205 SMEAR GRAM STAIN: CPT

## 2021-02-04 PROCEDURE — 80053 COMPREHEN METABOLIC PANEL: CPT

## 2021-02-04 PROCEDURE — D9220A PRA ANESTHESIA: ICD-10-PCS | Mod: ANES,,, | Performed by: ANESTHESIOLOGY

## 2021-02-04 PROCEDURE — 99233 SBSQ HOSP IP/OBS HIGH 50: CPT | Mod: ,,, | Performed by: HOSPITALIST

## 2021-02-04 PROCEDURE — 63600175 PHARM REV CODE 636 W HCPCS: Performed by: ORTHOPAEDIC SURGERY

## 2021-02-04 PROCEDURE — 29871 ARTHRS KNEE SURG FOR INFCTJ: CPT | Mod: 50,,, | Performed by: ORTHOPAEDIC SURGERY

## 2021-02-04 PROCEDURE — D9220A PRA ANESTHESIA: Mod: ANES,,, | Performed by: ANESTHESIOLOGY

## 2021-02-04 PROCEDURE — 36000710: Performed by: ORTHOPAEDIC SURGERY

## 2021-02-04 PROCEDURE — 94761 N-INVAS EAR/PLS OXIMETRY MLT: CPT

## 2021-02-04 PROCEDURE — 63600175 PHARM REV CODE 636 W HCPCS: Performed by: NURSE ANESTHETIST, CERTIFIED REGISTERED

## 2021-02-04 PROCEDURE — 87040 BLOOD CULTURE FOR BACTERIA: CPT

## 2021-02-04 PROCEDURE — 87206 SMEAR FLUORESCENT/ACID STAI: CPT

## 2021-02-04 PROCEDURE — 99223 PR INITIAL HOSPITAL CARE,LEVL III: ICD-10-PCS | Mod: ,,, | Performed by: INTERNAL MEDICINE

## 2021-02-04 PROCEDURE — 82803 BLOOD GASES ANY COMBINATION: CPT

## 2021-02-04 PROCEDURE — 64447 NJX AA&/STRD FEMORAL NRV IMG: CPT | Mod: 59,50,, | Performed by: ANESTHESIOLOGY

## 2021-02-04 PROCEDURE — 37000009 HC ANESTHESIA EA ADD 15 MINS: Performed by: ORTHOPAEDIC SURGERY

## 2021-02-04 PROCEDURE — 36415 COLL VENOUS BLD VENIPUNCTURE: CPT

## 2021-02-04 PROCEDURE — 25000003 PHARM REV CODE 250: Performed by: STUDENT IN AN ORGANIZED HEALTH CARE EDUCATION/TRAINING PROGRAM

## 2021-02-04 PROCEDURE — 63600175 PHARM REV CODE 636 W HCPCS: Performed by: INTERNAL MEDICINE

## 2021-02-04 PROCEDURE — 83735 ASSAY OF MAGNESIUM: CPT

## 2021-02-04 PROCEDURE — 63600175 PHARM REV CODE 636 W HCPCS: Performed by: ANESTHESIOLOGY

## 2021-02-04 PROCEDURE — 83880 ASSAY OF NATRIURETIC PEPTIDE: CPT

## 2021-02-04 PROCEDURE — D9220A PRA ANESTHESIA: Mod: CRNA,,, | Performed by: NURSE ANESTHETIST, CERTIFIED REGISTERED

## 2021-02-04 PROCEDURE — 80202 ASSAY OF VANCOMYCIN: CPT

## 2021-02-04 PROCEDURE — 85730 THROMBOPLASTIN TIME PARTIAL: CPT | Mod: 91

## 2021-02-04 PROCEDURE — 99223 PR INITIAL HOSPITAL CARE,LEVL III: ICD-10-PCS | Mod: ,,, | Performed by: PHYSICIAN ASSISTANT

## 2021-02-04 PROCEDURE — 63600175 PHARM REV CODE 636 W HCPCS: Performed by: STUDENT IN AN ORGANIZED HEALTH CARE EDUCATION/TRAINING PROGRAM

## 2021-02-04 PROCEDURE — 85730 THROMBOPLASTIN TIME PARTIAL: CPT

## 2021-02-04 PROCEDURE — 87075 CULTR BACTERIA EXCEPT BLOOD: CPT

## 2021-02-04 PROCEDURE — 87070 CULTURE OTHR SPECIMN AEROBIC: CPT

## 2021-02-04 PROCEDURE — 99900035 HC TECH TIME PER 15 MIN (STAT)

## 2021-02-04 PROCEDURE — 99223 1ST HOSP IP/OBS HIGH 75: CPT | Mod: ,,, | Performed by: INTERNAL MEDICINE

## 2021-02-04 PROCEDURE — 87116 MYCOBACTERIA CULTURE: CPT

## 2021-02-04 PROCEDURE — 20600001 HC STEP DOWN PRIVATE ROOM

## 2021-02-04 PROCEDURE — 29871 PR KNEE SCOPE,CLEAN/DRAIN: ICD-10-PCS | Mod: 50,,, | Performed by: ORTHOPAEDIC SURGERY

## 2021-02-04 PROCEDURE — 71000015 HC POSTOP RECOV 1ST HR: Performed by: ORTHOPAEDIC SURGERY

## 2021-02-04 PROCEDURE — 99223 PR INITIAL HOSPITAL CARE,LEVL III: ICD-10-PCS | Mod: 57,,, | Performed by: ORTHOPAEDIC SURGERY

## 2021-02-04 PROCEDURE — 25000003 PHARM REV CODE 250: Performed by: INTERNAL MEDICINE

## 2021-02-04 RX ORDER — PROPOFOL 10 MG/ML
VIAL (ML) INTRAVENOUS
Status: DISCONTINUED | OUTPATIENT
Start: 2021-02-04 | End: 2021-02-04

## 2021-02-04 RX ORDER — FUROSEMIDE 10 MG/ML
20 INJECTION INTRAMUSCULAR; INTRAVENOUS ONCE
Status: COMPLETED | OUTPATIENT
Start: 2021-02-04 | End: 2021-02-04

## 2021-02-04 RX ORDER — EPINEPHRINE 1 MG/ML
INJECTION, SOLUTION INTRACARDIAC; INTRAMUSCULAR; INTRAVENOUS; SUBCUTANEOUS
Status: DISCONTINUED | OUTPATIENT
Start: 2021-02-04 | End: 2021-02-04 | Stop reason: HOSPADM

## 2021-02-04 RX ORDER — ONDANSETRON 2 MG/ML
INJECTION INTRAMUSCULAR; INTRAVENOUS
Status: DISCONTINUED | OUTPATIENT
Start: 2021-02-04 | End: 2021-02-04

## 2021-02-04 RX ORDER — HEPARIN SODIUM,PORCINE/D5W 25000/250
0-40 INTRAVENOUS SOLUTION INTRAVENOUS CONTINUOUS
Status: DISCONTINUED | OUTPATIENT
Start: 2021-02-04 | End: 2021-02-08

## 2021-02-04 RX ORDER — MIDAZOLAM HYDROCHLORIDE 1 MG/ML
INJECTION, SOLUTION INTRAMUSCULAR; INTRAVENOUS
Status: DISCONTINUED | OUTPATIENT
Start: 2021-02-04 | End: 2021-02-04

## 2021-02-04 RX ORDER — HYDROMORPHONE HYDROCHLORIDE 1 MG/ML
0.2 INJECTION, SOLUTION INTRAMUSCULAR; INTRAVENOUS; SUBCUTANEOUS EVERY 5 MIN PRN
Status: DISCONTINUED | OUTPATIENT
Start: 2021-02-04 | End: 2021-02-04 | Stop reason: HOSPADM

## 2021-02-04 RX ORDER — BUPIVACAINE HYDROCHLORIDE 2.5 MG/ML
INJECTION, SOLUTION EPIDURAL; INFILTRATION; INTRACAUDAL
Status: COMPLETED | OUTPATIENT
Start: 2021-02-04 | End: 2021-02-04

## 2021-02-04 RX ORDER — DEXMEDETOMIDINE HYDROCHLORIDE 100 UG/ML
INJECTION, SOLUTION INTRAVENOUS
Status: DISCONTINUED | OUTPATIENT
Start: 2021-02-04 | End: 2021-02-04

## 2021-02-04 RX ORDER — PHENYLEPHRINE HYDROCHLORIDE 10 MG/ML
INJECTION INTRAVENOUS
Status: DISCONTINUED | OUTPATIENT
Start: 2021-02-04 | End: 2021-02-04

## 2021-02-04 RX ORDER — ROCURONIUM BROMIDE 10 MG/ML
INJECTION, SOLUTION INTRAVENOUS
Status: DISCONTINUED | OUTPATIENT
Start: 2021-02-04 | End: 2021-02-04

## 2021-02-04 RX ORDER — SODIUM CHLORIDE 0.9 % (FLUSH) 0.9 %
10 SYRINGE (ML) INJECTION
Status: DISCONTINUED | OUTPATIENT
Start: 2021-02-04 | End: 2021-02-04 | Stop reason: HOSPADM

## 2021-02-04 RX ORDER — IBUPROFEN 400 MG/1
400 TABLET ORAL ONCE
Status: COMPLETED | OUTPATIENT
Start: 2021-02-04 | End: 2021-02-04

## 2021-02-04 RX ORDER — VANCOMYCIN HYDROCHLORIDE 1 G/20ML
INJECTION, POWDER, LYOPHILIZED, FOR SOLUTION INTRAVENOUS
Status: DISCONTINUED | OUTPATIENT
Start: 2021-02-04 | End: 2021-02-04 | Stop reason: HOSPADM

## 2021-02-04 RX ORDER — NEOSTIGMINE METHYLSULFATE 0.5 MG/ML
INJECTION, SOLUTION INTRAVENOUS
Status: DISCONTINUED | OUTPATIENT
Start: 2021-02-04 | End: 2021-02-04

## 2021-02-04 RX ORDER — LIDOCAINE HYDROCHLORIDE 20 MG/ML
INJECTION INTRAVENOUS
Status: DISCONTINUED | OUTPATIENT
Start: 2021-02-04 | End: 2021-02-04

## 2021-02-04 RX ORDER — VANCOMYCIN HCL IN 5 % DEXTROSE 1G/250ML
1000 PLASTIC BAG, INJECTION (ML) INTRAVENOUS
Status: DISCONTINUED | OUTPATIENT
Start: 2021-02-05 | End: 2021-02-05

## 2021-02-04 RX ORDER — CEFEPIME HYDROCHLORIDE 1 G/50ML
INJECTION, SOLUTION INTRAVENOUS
Status: COMPLETED | OUTPATIENT
Start: 2021-02-04 | End: 2021-02-04

## 2021-02-04 RX ADMIN — FUROSEMIDE 20 MG: 10 INJECTION, SOLUTION INTRAMUSCULAR; INTRAVENOUS at 01:02

## 2021-02-04 RX ADMIN — IOHEXOL 75 ML: 350 INJECTION, SOLUTION INTRAVENOUS at 05:02

## 2021-02-04 RX ADMIN — GLYCOPYRROLATE 0.2 MCG: 0.2 INJECTION, SOLUTION INTRAMUSCULAR; INTRAVITREAL at 04:02

## 2021-02-04 RX ADMIN — HYDROMORPHONE HYDROCHLORIDE 0.2 MG: 1 INJECTION, SOLUTION INTRAMUSCULAR; INTRAVENOUS; SUBCUTANEOUS at 05:02

## 2021-02-04 RX ADMIN — PROPOFOL 80 MG: 10 INJECTION, EMULSION INTRAVENOUS at 03:02

## 2021-02-04 RX ADMIN — ROCURONIUM BROMIDE 30 MG: 10 INJECTION, SOLUTION INTRAVENOUS at 03:02

## 2021-02-04 RX ADMIN — BACLOFEN 5 MG: 10 TABLET ORAL at 10:02

## 2021-02-04 RX ADMIN — MIDAZOLAM HYDROCHLORIDE 2 MG: 1 INJECTION, SOLUTION INTRAMUSCULAR; INTRAVENOUS at 02:02

## 2021-02-04 RX ADMIN — MUPIROCIN: 20 OINTMENT TOPICAL at 10:02

## 2021-02-04 RX ADMIN — NEOSTIGMINE METHYLSULFATE 3 MG: 0.5 INJECTION INTRAVENOUS at 04:02

## 2021-02-04 RX ADMIN — BUPRENORPHINE HCL 4 MG: 2 TABLET SUBLINGUAL at 10:02

## 2021-02-04 RX ADMIN — LIDOCAINE HYDROCHLORIDE 50 MG: 20 INJECTION, SOLUTION INTRAVENOUS at 03:02

## 2021-02-04 RX ADMIN — PHENYLEPHRINE HYDROCHLORIDE 100 MCG: 10 INJECTION INTRAVENOUS at 03:02

## 2021-02-04 RX ADMIN — VANCOMYCIN HYDROCHLORIDE 750 MG: 750 INJECTION, POWDER, LYOPHILIZED, FOR SOLUTION INTRAVENOUS at 02:02

## 2021-02-04 RX ADMIN — DEXMEDETOMIDINE HYDROCHLORIDE 4 MCG: 100 INJECTION, SOLUTION, CONCENTRATE INTRAVENOUS at 04:02

## 2021-02-04 RX ADMIN — GABAPENTIN 300 MG: 300 CAPSULE ORAL at 10:02

## 2021-02-04 RX ADMIN — HEPARIN SODIUM AND DEXTROSE 18 UNITS/KG/HR: 10000; 5 INJECTION INTRAVENOUS at 10:02

## 2021-02-04 RX ADMIN — ACETAMINOPHEN 650 MG: 325 TABLET ORAL at 10:02

## 2021-02-04 RX ADMIN — HEPARIN SODIUM AND DEXTROSE 18 UNITS/KG/HR: 10000; 5 INJECTION INTRAVENOUS at 07:02

## 2021-02-04 RX ADMIN — VANCOMYCIN HYDROCHLORIDE 750 MG: 750 INJECTION, POWDER, LYOPHILIZED, FOR SOLUTION INTRAVENOUS at 01:02

## 2021-02-04 RX ADMIN — IBUPROFEN 400 MG: 400 TABLET, FILM COATED ORAL at 01:02

## 2021-02-04 RX ADMIN — MIRTAZAPINE 7.5 MG: 7.5 TABLET ORAL at 10:02

## 2021-02-04 RX ADMIN — ONDANSETRON 4 MG: 2 INJECTION, SOLUTION INTRAMUSCULAR; INTRAVENOUS at 04:02

## 2021-02-04 RX ADMIN — BUPIVACAINE HYDROCHLORIDE 30 ML: 2.5 INJECTION, SOLUTION EPIDURAL; INFILTRATION; INTRACAUDAL; PERINEURAL at 03:02

## 2021-02-04 RX ADMIN — DEXMEDETOMIDINE HYDROCHLORIDE 8 MCG: 100 INJECTION, SOLUTION, CONCENTRATE INTRAVENOUS at 04:02

## 2021-02-05 ENCOUNTER — TELEPHONE (OUTPATIENT)
Dept: PREADMISSION TESTING | Facility: HOSPITAL | Age: 31
End: 2021-02-05

## 2021-02-05 LAB
ALBUMIN SERPL BCP-MCNC: 1.8 G/DL (ref 3.5–5.2)
ALP SERPL-CCNC: 128 U/L (ref 55–135)
ALT SERPL W/O P-5'-P-CCNC: 6 U/L (ref 10–44)
ANION GAP SERPL CALC-SCNC: 10 MMOL/L (ref 8–16)
APTT BLDCRRT: 32.5 SEC (ref 21–32)
APTT BLDCRRT: 33 SEC (ref 21–32)
APTT BLDCRRT: 33.3 SEC (ref 21–32)
AST SERPL-CCNC: 13 U/L (ref 10–40)
BACTERIA BLD CULT: ABNORMAL
BACTERIA SPEC AEROBE CULT: ABNORMAL
BACTERIA SPEC AEROBE CULT: ABNORMAL
BASOPHILS # BLD AUTO: 0.05 K/UL (ref 0–0.2)
BASOPHILS NFR BLD: 0.3 % (ref 0–1.9)
BILIRUB SERPL-MCNC: 0.4 MG/DL (ref 0.1–1)
BUN SERPL-MCNC: 21 MG/DL (ref 6–20)
CALCIUM SERPL-MCNC: 7.4 MG/DL (ref 8.7–10.5)
CHLORIDE SERPL-SCNC: 106 MMOL/L (ref 95–110)
CO2 SERPL-SCNC: 20 MMOL/L (ref 23–29)
CREAT SERPL-MCNC: 0.9 MG/DL (ref 0.5–1.4)
DIFFERENTIAL METHOD: ABNORMAL
EOSINOPHIL # BLD AUTO: 0 K/UL (ref 0–0.5)
EOSINOPHIL NFR BLD: 0.1 % (ref 0–8)
ERYTHROCYTE [DISTWIDTH] IN BLOOD BY AUTOMATED COUNT: 16.7 % (ref 11.5–14.5)
EST. GFR  (AFRICAN AMERICAN): >60 ML/MIN/1.73 M^2
EST. GFR  (NON AFRICAN AMERICAN): >60 ML/MIN/1.73 M^2
GLUCOSE SERPL-MCNC: 134 MG/DL (ref 70–110)
HCT VFR BLD AUTO: 29.4 % (ref 37–48.5)
HGB BLD-MCNC: 9.2 G/DL (ref 12–16)
IMM GRANULOCYTES # BLD AUTO: 0.59 K/UL (ref 0–0.04)
IMM GRANULOCYTES NFR BLD AUTO: 3.1 % (ref 0–0.5)
INR PPP: 1.3 (ref 0.8–1.2)
LYMPHOCYTES # BLD AUTO: 1.8 K/UL (ref 1–4.8)
LYMPHOCYTES NFR BLD: 9.3 % (ref 18–48)
MAGNESIUM SERPL-MCNC: 2.3 MG/DL (ref 1.6–2.6)
MCH RBC QN AUTO: 26.4 PG (ref 27–31)
MCHC RBC AUTO-ENTMCNC: 31.3 G/DL (ref 32–36)
MCV RBC AUTO: 84 FL (ref 82–98)
MONOCYTES # BLD AUTO: 0.9 K/UL (ref 0.3–1)
MONOCYTES NFR BLD: 4.5 % (ref 4–15)
NEUTROPHILS # BLD AUTO: 16 K/UL (ref 1.8–7.7)
NEUTROPHILS NFR BLD: 82.7 % (ref 38–73)
NRBC BLD-RTO: 0 /100 WBC
PHOSPHATE SERPL-MCNC: 5.1 MG/DL (ref 2.7–4.5)
PLATELET # BLD AUTO: 120 K/UL (ref 150–350)
PMV BLD AUTO: 10.7 FL (ref 9.2–12.9)
POTASSIUM SERPL-SCNC: 4.5 MMOL/L (ref 3.5–5.1)
PROT SERPL-MCNC: 6.4 G/DL (ref 6–8.4)
PROTHROMBIN TIME: 14.1 SEC (ref 9–12.5)
RBC # BLD AUTO: 3.49 M/UL (ref 4–5.4)
SODIUM SERPL-SCNC: 136 MMOL/L (ref 136–145)
VANCOMYCIN TROUGH SERPL-MCNC: 23.8 UG/ML (ref 10–22)
WBC # BLD AUTO: 19.31 K/UL (ref 3.9–12.7)

## 2021-02-05 PROCEDURE — 85025 COMPLETE CBC W/AUTO DIFF WBC: CPT

## 2021-02-05 PROCEDURE — 99232 PR SUBSEQUENT HOSPITAL CARE,LEVL II: ICD-10-PCS | Mod: ,,, | Performed by: PSYCHIATRY & NEUROLOGY

## 2021-02-05 PROCEDURE — 85730 THROMBOPLASTIN TIME PARTIAL: CPT

## 2021-02-05 PROCEDURE — 99233 PR SUBSEQUENT HOSPITAL CARE,LEVL III: ICD-10-PCS | Mod: ,,, | Performed by: NURSE PRACTITIONER

## 2021-02-05 PROCEDURE — 25000003 PHARM REV CODE 250: Performed by: STUDENT IN AN ORGANIZED HEALTH CARE EDUCATION/TRAINING PROGRAM

## 2021-02-05 PROCEDURE — 99232 SBSQ HOSP IP/OBS MODERATE 35: CPT | Mod: ,,, | Performed by: PSYCHIATRY & NEUROLOGY

## 2021-02-05 PROCEDURE — 84100 ASSAY OF PHOSPHORUS: CPT

## 2021-02-05 PROCEDURE — 20600001 HC STEP DOWN PRIVATE ROOM

## 2021-02-05 PROCEDURE — 83735 ASSAY OF MAGNESIUM: CPT

## 2021-02-05 PROCEDURE — 99233 PR SUBSEQUENT HOSPITAL CARE,LEVL III: ICD-10-PCS | Mod: ,,, | Performed by: HOSPITALIST

## 2021-02-05 PROCEDURE — 25000003 PHARM REV CODE 250: Performed by: INTERNAL MEDICINE

## 2021-02-05 PROCEDURE — 25000003 PHARM REV CODE 250: Performed by: HOSPITALIST

## 2021-02-05 PROCEDURE — 80053 COMPREHEN METABOLIC PANEL: CPT

## 2021-02-05 PROCEDURE — 97535 SELF CARE MNGMENT TRAINING: CPT

## 2021-02-05 PROCEDURE — 63600175 PHARM REV CODE 636 W HCPCS: Performed by: HOSPITALIST

## 2021-02-05 PROCEDURE — 87040 BLOOD CULTURE FOR BACTERIA: CPT

## 2021-02-05 PROCEDURE — 36415 COLL VENOUS BLD VENIPUNCTURE: CPT

## 2021-02-05 PROCEDURE — 85610 PROTHROMBIN TIME: CPT

## 2021-02-05 PROCEDURE — 99233 SBSQ HOSP IP/OBS HIGH 50: CPT | Mod: ,,, | Performed by: NURSE PRACTITIONER

## 2021-02-05 PROCEDURE — 99233 SBSQ HOSP IP/OBS HIGH 50: CPT | Mod: ,,, | Performed by: HOSPITALIST

## 2021-02-05 PROCEDURE — 80202 ASSAY OF VANCOMYCIN: CPT

## 2021-02-05 RX ORDER — BUPRENORPHINE 2 MG/1
4 TABLET SUBLINGUAL 3 TIMES DAILY
Status: DISCONTINUED | OUTPATIENT
Start: 2021-02-05 | End: 2021-02-08

## 2021-02-05 RX ADMIN — GABAPENTIN 300 MG: 300 CAPSULE ORAL at 09:02

## 2021-02-05 RX ADMIN — MUPIROCIN: 20 OINTMENT TOPICAL at 08:02

## 2021-02-05 RX ADMIN — VANCOMYCIN HYDROCHLORIDE 1000 MG: 1 INJECTION, POWDER, LYOPHILIZED, FOR SOLUTION INTRAVENOUS at 12:02

## 2021-02-05 RX ADMIN — HEPARIN SODIUM AND DEXTROSE 24 UNITS/KG/HR: 10000; 5 INJECTION INTRAVENOUS at 03:02

## 2021-02-05 RX ADMIN — MIRTAZAPINE 7.5 MG: 7.5 TABLET ORAL at 08:02

## 2021-02-05 RX ADMIN — HYDROXYZINE PAMOATE 50 MG: 25 CAPSULE ORAL at 08:02

## 2021-02-05 RX ADMIN — GABAPENTIN 300 MG: 300 CAPSULE ORAL at 08:02

## 2021-02-05 RX ADMIN — BUPRENORPHINE HCL 4 MG: 2 TABLET SUBLINGUAL at 09:02

## 2021-02-05 RX ADMIN — GABAPENTIN 300 MG: 300 CAPSULE ORAL at 03:02

## 2021-02-05 RX ADMIN — BUPRENORPHINE HCL 4 MG: 2 TABLET SUBLINGUAL at 08:02

## 2021-02-05 RX ADMIN — BUPRENORPHINE HCL 4 MG: 2 TABLET SUBLINGUAL at 03:02

## 2021-02-05 RX ADMIN — MUPIROCIN: 20 OINTMENT TOPICAL at 09:02

## 2021-02-06 LAB
ALBUMIN SERPL BCP-MCNC: 1.7 G/DL (ref 3.5–5.2)
ALP SERPL-CCNC: 92 U/L (ref 55–135)
ALT SERPL W/O P-5'-P-CCNC: 7 U/L (ref 10–44)
ANION GAP SERPL CALC-SCNC: 10 MMOL/L (ref 8–16)
APTT BLDCRRT: 31.7 SEC (ref 21–32)
APTT BLDCRRT: 37.8 SEC (ref 21–32)
AST SERPL-CCNC: 15 U/L (ref 10–40)
BACTERIA BLD CULT: ABNORMAL
BASOPHILS # BLD AUTO: 0.04 K/UL (ref 0–0.2)
BASOPHILS NFR BLD: 0.3 % (ref 0–1.9)
BILIRUB SERPL-MCNC: 0.4 MG/DL (ref 0.1–1)
BUN SERPL-MCNC: 18 MG/DL (ref 6–20)
CALCIUM SERPL-MCNC: 7.3 MG/DL (ref 8.7–10.5)
CHLORIDE SERPL-SCNC: 104 MMOL/L (ref 95–110)
CK SERPL-CCNC: 63 U/L (ref 20–180)
CO2 SERPL-SCNC: 21 MMOL/L (ref 23–29)
CREAT SERPL-MCNC: 0.9 MG/DL (ref 0.5–1.4)
CRP SERPL-MCNC: 68.7 MG/L (ref 0–8.2)
DIFFERENTIAL METHOD: ABNORMAL
EOSINOPHIL # BLD AUTO: 0.1 K/UL (ref 0–0.5)
EOSINOPHIL NFR BLD: 0.5 % (ref 0–8)
ERYTHROCYTE [DISTWIDTH] IN BLOOD BY AUTOMATED COUNT: 16.7 % (ref 11.5–14.5)
EST. GFR  (AFRICAN AMERICAN): >60 ML/MIN/1.73 M^2
EST. GFR  (NON AFRICAN AMERICAN): >60 ML/MIN/1.73 M^2
GLUCOSE SERPL-MCNC: 94 MG/DL (ref 70–110)
HCT VFR BLD AUTO: 27.5 % (ref 37–48.5)
HGB BLD-MCNC: 8.2 G/DL (ref 12–16)
IMM GRANULOCYTES # BLD AUTO: 0.74 K/UL (ref 0–0.04)
IMM GRANULOCYTES NFR BLD AUTO: 4.8 % (ref 0–0.5)
LYMPHOCYTES # BLD AUTO: 2.1 K/UL (ref 1–4.8)
LYMPHOCYTES NFR BLD: 13.9 % (ref 18–48)
MAGNESIUM SERPL-MCNC: 1.9 MG/DL (ref 1.6–2.6)
MCH RBC QN AUTO: 25.9 PG (ref 27–31)
MCHC RBC AUTO-ENTMCNC: 29.8 G/DL (ref 32–36)
MCV RBC AUTO: 87 FL (ref 82–98)
MONOCYTES # BLD AUTO: 0.8 K/UL (ref 0.3–1)
MONOCYTES NFR BLD: 5.5 % (ref 4–15)
NEUTROPHILS # BLD AUTO: 11.5 K/UL (ref 1.8–7.7)
NEUTROPHILS NFR BLD: 75 % (ref 38–73)
NRBC BLD-RTO: 0 /100 WBC
PHOSPHATE SERPL-MCNC: 3.1 MG/DL (ref 2.7–4.5)
PLATELET # BLD AUTO: 141 K/UL (ref 150–350)
PMV BLD AUTO: 11.5 FL (ref 9.2–12.9)
POTASSIUM SERPL-SCNC: 3.6 MMOL/L (ref 3.5–5.1)
PROT SERPL-MCNC: 6.2 G/DL (ref 6–8.4)
RBC # BLD AUTO: 3.16 M/UL (ref 4–5.4)
SODIUM SERPL-SCNC: 135 MMOL/L (ref 136–145)
VANCOMYCIN TROUGH SERPL-MCNC: 15.3 UG/ML (ref 10–22)
WBC # BLD AUTO: 15.34 K/UL (ref 3.9–12.7)

## 2021-02-06 PROCEDURE — 25000003 PHARM REV CODE 250: Performed by: STUDENT IN AN ORGANIZED HEALTH CARE EDUCATION/TRAINING PROGRAM

## 2021-02-06 PROCEDURE — 87040 BLOOD CULTURE FOR BACTERIA: CPT | Mod: 59

## 2021-02-06 PROCEDURE — 87186 SC STD MICRODIL/AGAR DIL: CPT

## 2021-02-06 PROCEDURE — 63600175 PHARM REV CODE 636 W HCPCS: Performed by: HOSPITALIST

## 2021-02-06 PROCEDURE — 99233 SBSQ HOSP IP/OBS HIGH 50: CPT | Mod: ,,, | Performed by: HOSPITALIST

## 2021-02-06 PROCEDURE — 99233 PR SUBSEQUENT HOSPITAL CARE,LEVL III: ICD-10-PCS | Mod: ,,, | Performed by: HOSPITALIST

## 2021-02-06 PROCEDURE — 20600001 HC STEP DOWN PRIVATE ROOM

## 2021-02-06 PROCEDURE — 80053 COMPREHEN METABOLIC PANEL: CPT

## 2021-02-06 PROCEDURE — 80202 ASSAY OF VANCOMYCIN: CPT

## 2021-02-06 PROCEDURE — 82550 ASSAY OF CK (CPK): CPT

## 2021-02-06 PROCEDURE — 85025 COMPLETE CBC W/AUTO DIFF WBC: CPT

## 2021-02-06 PROCEDURE — 63600175 PHARM REV CODE 636 W HCPCS: Performed by: STUDENT IN AN ORGANIZED HEALTH CARE EDUCATION/TRAINING PROGRAM

## 2021-02-06 PROCEDURE — 25000003 PHARM REV CODE 250: Performed by: PHYSICIAN ASSISTANT

## 2021-02-06 PROCEDURE — 97116 GAIT TRAINING THERAPY: CPT

## 2021-02-06 PROCEDURE — 97530 THERAPEUTIC ACTIVITIES: CPT

## 2021-02-06 PROCEDURE — 25000003 PHARM REV CODE 250: Performed by: HOSPITALIST

## 2021-02-06 PROCEDURE — 85730 THROMBOPLASTIN TIME PARTIAL: CPT | Mod: 91

## 2021-02-06 PROCEDURE — 86140 C-REACTIVE PROTEIN: CPT

## 2021-02-06 PROCEDURE — 87077 CULTURE AEROBIC IDENTIFY: CPT

## 2021-02-06 PROCEDURE — 36415 COLL VENOUS BLD VENIPUNCTURE: CPT

## 2021-02-06 PROCEDURE — 84100 ASSAY OF PHOSPHORUS: CPT

## 2021-02-06 PROCEDURE — 25000003 PHARM REV CODE 250: Performed by: INTERNAL MEDICINE

## 2021-02-06 PROCEDURE — 83735 ASSAY OF MAGNESIUM: CPT

## 2021-02-06 PROCEDURE — 63600175 PHARM REV CODE 636 W HCPCS: Performed by: PHYSICIAN ASSISTANT

## 2021-02-06 RX ORDER — ACETAMINOPHEN 325 MG/1
650 TABLET ORAL EVERY 6 HOURS PRN
Status: DISCONTINUED | OUTPATIENT
Start: 2021-02-06 | End: 2021-02-08

## 2021-02-06 RX ORDER — VANCOMYCIN HCL IN 5 % DEXTROSE 1G/250ML
1000 PLASTIC BAG, INJECTION (ML) INTRAVENOUS
Status: DISCONTINUED | OUTPATIENT
Start: 2021-02-06 | End: 2021-02-08

## 2021-02-06 RX ADMIN — HEPARIN SODIUM AND DEXTROSE 31 UNITS/KG/HR: 10000; 5 INJECTION INTRAVENOUS at 04:02

## 2021-02-06 RX ADMIN — DAPTOMYCIN 490 MG: 350 INJECTION, POWDER, LYOPHILIZED, FOR SOLUTION INTRAVENOUS at 05:02

## 2021-02-06 RX ADMIN — GABAPENTIN 300 MG: 300 CAPSULE ORAL at 09:02

## 2021-02-06 RX ADMIN — MUPIROCIN: 20 OINTMENT TOPICAL at 09:02

## 2021-02-06 RX ADMIN — ACETAMINOPHEN 650 MG: 325 TABLET ORAL at 09:02

## 2021-02-06 RX ADMIN — MIRTAZAPINE 7.5 MG: 7.5 TABLET ORAL at 09:02

## 2021-02-06 RX ADMIN — BUPRENORPHINE HCL 4 MG: 2 TABLET SUBLINGUAL at 09:02

## 2021-02-06 RX ADMIN — ACETAMINOPHEN 650 MG: 325 TABLET ORAL at 04:02

## 2021-02-06 RX ADMIN — VANCOMYCIN HYDROCHLORIDE 750 MG: 750 INJECTION, POWDER, LYOPHILIZED, FOR SOLUTION INTRAVENOUS at 12:02

## 2021-02-06 RX ADMIN — HEPARIN SODIUM AND DEXTROSE 28 UNITS/KG/HR: 10000; 5 INJECTION INTRAVENOUS at 07:02

## 2021-02-06 RX ADMIN — ACETAMINOPHEN 650 MG: 325 TABLET ORAL at 01:02

## 2021-02-06 RX ADMIN — HYDROXYZINE PAMOATE 50 MG: 25 CAPSULE ORAL at 08:02

## 2021-02-06 RX ADMIN — BUPRENORPHINE HCL 4 MG: 2 TABLET SUBLINGUAL at 02:02

## 2021-02-06 RX ADMIN — GABAPENTIN 300 MG: 300 CAPSULE ORAL at 02:02

## 2021-02-07 ENCOUNTER — ANESTHESIA EVENT (OUTPATIENT)
Dept: SURGERY | Facility: HOSPITAL | Age: 31
DRG: 853 | End: 2021-02-07
Payer: MEDICAID

## 2021-02-07 PROBLEM — L02.511 ABSCESS OF RIGHT HAND: Status: ACTIVE | Noted: 2021-02-07

## 2021-02-07 LAB
ALBUMIN SERPL BCP-MCNC: 1.8 G/DL (ref 3.5–5.2)
ALP SERPL-CCNC: 91 U/L (ref 55–135)
ALT SERPL W/O P-5'-P-CCNC: 10 U/L (ref 10–44)
ANION GAP SERPL CALC-SCNC: 8 MMOL/L (ref 8–16)
APTT BLDCRRT: 28.8 SEC (ref 21–32)
APTT BLDCRRT: 40.1 SEC (ref 21–32)
APTT BLDCRRT: 44.7 SEC (ref 21–32)
AST SERPL-CCNC: 17 U/L (ref 10–40)
BACTERIA BLD CULT: ABNORMAL
BASOPHILS # BLD AUTO: 0.04 K/UL (ref 0–0.2)
BASOPHILS NFR BLD: 0.3 % (ref 0–1.9)
BILIRUB SERPL-MCNC: 0.6 MG/DL (ref 0.1–1)
BUN SERPL-MCNC: 13 MG/DL (ref 6–20)
CALCIUM SERPL-MCNC: 7.9 MG/DL (ref 8.7–10.5)
CHLORIDE SERPL-SCNC: 102 MMOL/L (ref 95–110)
CO2 SERPL-SCNC: 26 MMOL/L (ref 23–29)
CREAT SERPL-MCNC: 0.8 MG/DL (ref 0.5–1.4)
DIFFERENTIAL METHOD: ABNORMAL
EOSINOPHIL # BLD AUTO: 0.1 K/UL (ref 0–0.5)
EOSINOPHIL NFR BLD: 0.8 % (ref 0–8)
ERYTHROCYTE [DISTWIDTH] IN BLOOD BY AUTOMATED COUNT: 17 % (ref 11.5–14.5)
EST. GFR  (AFRICAN AMERICAN): >60 ML/MIN/1.73 M^2
EST. GFR  (NON AFRICAN AMERICAN): >60 ML/MIN/1.73 M^2
GLUCOSE SERPL-MCNC: 95 MG/DL (ref 70–110)
GRAM STN SPEC: NORMAL
GRAM STN SPEC: NORMAL
HCT VFR BLD AUTO: 29 % (ref 37–48.5)
HGB BLD-MCNC: 8.7 G/DL (ref 12–16)
IMM GRANULOCYTES # BLD AUTO: 0.55 K/UL (ref 0–0.04)
IMM GRANULOCYTES NFR BLD AUTO: 4.2 % (ref 0–0.5)
LYMPHOCYTES # BLD AUTO: 2.1 K/UL (ref 1–4.8)
LYMPHOCYTES NFR BLD: 15.8 % (ref 18–48)
MAGNESIUM SERPL-MCNC: 1.7 MG/DL (ref 1.6–2.6)
MCH RBC QN AUTO: 25.7 PG (ref 27–31)
MCHC RBC AUTO-ENTMCNC: 30 G/DL (ref 32–36)
MCV RBC AUTO: 86 FL (ref 82–98)
MONOCYTES # BLD AUTO: 0.6 K/UL (ref 0.3–1)
MONOCYTES NFR BLD: 4.7 % (ref 4–15)
NEUTROPHILS # BLD AUTO: 9.8 K/UL (ref 1.8–7.7)
NEUTROPHILS NFR BLD: 74.2 % (ref 38–73)
NRBC BLD-RTO: 0 /100 WBC
PHOSPHATE SERPL-MCNC: 3.2 MG/DL (ref 2.7–4.5)
PLATELET # BLD AUTO: 169 K/UL (ref 150–350)
PMV BLD AUTO: 11.1 FL (ref 9.2–12.9)
POTASSIUM SERPL-SCNC: 4 MMOL/L (ref 3.5–5.1)
PROT SERPL-MCNC: 6.9 G/DL (ref 6–8.4)
RBC # BLD AUTO: 3.38 M/UL (ref 4–5.4)
SARS-COV-2 RDRP RESP QL NAA+PROBE: NEGATIVE
SODIUM SERPL-SCNC: 136 MMOL/L (ref 136–145)
VANCOMYCIN TROUGH SERPL-MCNC: 9.7 UG/ML (ref 10–22)
WBC # BLD AUTO: 13.14 K/UL (ref 3.9–12.7)

## 2021-02-07 PROCEDURE — 87040 BLOOD CULTURE FOR BACTERIA: CPT | Mod: 59

## 2021-02-07 PROCEDURE — 99233 SBSQ HOSP IP/OBS HIGH 50: CPT | Mod: ,,, | Performed by: PHYSICIAN ASSISTANT

## 2021-02-07 PROCEDURE — 84100 ASSAY OF PHOSPHORUS: CPT

## 2021-02-07 PROCEDURE — U0002 COVID-19 LAB TEST NON-CDC: HCPCS

## 2021-02-07 PROCEDURE — 87206 SMEAR FLUORESCENT/ACID STAI: CPT

## 2021-02-07 PROCEDURE — 25000003 PHARM REV CODE 250: Performed by: PHYSICIAN ASSISTANT

## 2021-02-07 PROCEDURE — 87102 FUNGUS ISOLATION CULTURE: CPT

## 2021-02-07 PROCEDURE — 20600001 HC STEP DOWN PRIVATE ROOM

## 2021-02-07 PROCEDURE — 25000003 PHARM REV CODE 250: Performed by: HOSPITALIST

## 2021-02-07 PROCEDURE — 87205 SMEAR GRAM STAIN: CPT

## 2021-02-07 PROCEDURE — 85730 THROMBOPLASTIN TIME PARTIAL: CPT | Mod: 91

## 2021-02-07 PROCEDURE — 87116 MYCOBACTERIA CULTURE: CPT

## 2021-02-07 PROCEDURE — A9585 GADOBUTROL INJECTION: HCPCS | Performed by: HOSPITALIST

## 2021-02-07 PROCEDURE — 80053 COMPREHEN METABOLIC PANEL: CPT

## 2021-02-07 PROCEDURE — 36415 COLL VENOUS BLD VENIPUNCTURE: CPT

## 2021-02-07 PROCEDURE — 25000003 PHARM REV CODE 250: Performed by: STUDENT IN AN ORGANIZED HEALTH CARE EDUCATION/TRAINING PROGRAM

## 2021-02-07 PROCEDURE — 99233 PR SUBSEQUENT HOSPITAL CARE,LEVL III: ICD-10-PCS | Mod: ,,, | Performed by: PHYSICIAN ASSISTANT

## 2021-02-07 PROCEDURE — 99233 SBSQ HOSP IP/OBS HIGH 50: CPT | Mod: ,,, | Performed by: HOSPITALIST

## 2021-02-07 PROCEDURE — 80202 ASSAY OF VANCOMYCIN: CPT

## 2021-02-07 PROCEDURE — 87075 CULTR BACTERIA EXCEPT BLOOD: CPT

## 2021-02-07 PROCEDURE — 85025 COMPLETE CBC W/AUTO DIFF WBC: CPT

## 2021-02-07 PROCEDURE — 99233 PR SUBSEQUENT HOSPITAL CARE,LEVL III: ICD-10-PCS | Mod: ,,, | Performed by: HOSPITALIST

## 2021-02-07 PROCEDURE — 63600175 PHARM REV CODE 636 W HCPCS: Performed by: STUDENT IN AN ORGANIZED HEALTH CARE EDUCATION/TRAINING PROGRAM

## 2021-02-07 PROCEDURE — 83735 ASSAY OF MAGNESIUM: CPT

## 2021-02-07 PROCEDURE — 63600175 PHARM REV CODE 636 W HCPCS: Performed by: HOSPITALIST

## 2021-02-07 PROCEDURE — 87070 CULTURE OTHR SPECIMN AEROBIC: CPT

## 2021-02-07 PROCEDURE — 63600175 PHARM REV CODE 636 W HCPCS: Performed by: PHYSICIAN ASSISTANT

## 2021-02-07 PROCEDURE — 25500020 PHARM REV CODE 255: Performed by: HOSPITALIST

## 2021-02-07 RX ORDER — GADOBUTROL 604.72 MG/ML
5 INJECTION INTRAVENOUS
Status: COMPLETED | OUTPATIENT
Start: 2021-02-07 | End: 2021-02-07

## 2021-02-07 RX ORDER — MIRTAZAPINE 7.5 MG/1
7.5 TABLET, FILM COATED ORAL NIGHTLY
Qty: 30 TABLET | Refills: 11 | Status: SHIPPED | OUTPATIENT
Start: 2021-02-07 | End: 2021-02-17 | Stop reason: HOSPADM

## 2021-02-07 RX ORDER — GABAPENTIN 300 MG/1
300 CAPSULE ORAL 3 TIMES DAILY
Qty: 90 CAPSULE | Refills: 11 | Status: SHIPPED | OUTPATIENT
Start: 2021-02-07 | End: 2022-06-05

## 2021-02-07 RX ORDER — HYDROMORPHONE HYDROCHLORIDE 1 MG/ML
1 INJECTION, SOLUTION INTRAMUSCULAR; INTRAVENOUS; SUBCUTANEOUS ONCE
Status: COMPLETED | OUTPATIENT
Start: 2021-02-07 | End: 2021-02-07

## 2021-02-07 RX ADMIN — GABAPENTIN 300 MG: 300 CAPSULE ORAL at 04:02

## 2021-02-07 RX ADMIN — HEPARIN SODIUM AND DEXTROSE 33.96 UNITS/KG/HR: 10000; 5 INJECTION INTRAVENOUS at 09:02

## 2021-02-07 RX ADMIN — HYDROMORPHONE HYDROCHLORIDE 1 MG: 1 INJECTION, SOLUTION INTRAMUSCULAR; INTRAVENOUS; SUBCUTANEOUS at 10:02

## 2021-02-07 RX ADMIN — VANCOMYCIN HYDROCHLORIDE 1000 MG: 1 INJECTION, POWDER, LYOPHILIZED, FOR SOLUTION INTRAVENOUS at 03:02

## 2021-02-07 RX ADMIN — GADOBUTROL 5 ML: 604.72 INJECTION INTRAVENOUS at 09:02

## 2021-02-07 RX ADMIN — MIRTAZAPINE 7.5 MG: 7.5 TABLET ORAL at 09:02

## 2021-02-07 RX ADMIN — VANCOMYCIN HYDROCHLORIDE 1000 MG: 1 INJECTION, POWDER, LYOPHILIZED, FOR SOLUTION INTRAVENOUS at 04:02

## 2021-02-07 RX ADMIN — GABAPENTIN 300 MG: 300 CAPSULE ORAL at 09:02

## 2021-02-07 RX ADMIN — HEPARIN SODIUM AND DEXTROSE 30.96 UNITS/KG/HR: 10000; 5 INJECTION INTRAVENOUS at 07:02

## 2021-02-07 RX ADMIN — HEPARIN SODIUM AND DEXTROSE 34.01 UNITS/KG/HR: 10000; 5 INJECTION INTRAVENOUS at 09:02

## 2021-02-07 RX ADMIN — DAPTOMYCIN 490 MG: 350 INJECTION, POWDER, LYOPHILIZED, FOR SOLUTION INTRAVENOUS at 06:02

## 2021-02-07 RX ADMIN — ACETAMINOPHEN 650 MG: 325 TABLET ORAL at 05:02

## 2021-02-07 RX ADMIN — BUPRENORPHINE HCL 4 MG: 2 TABLET SUBLINGUAL at 09:02

## 2021-02-07 RX ADMIN — ACETAMINOPHEN 650 MG: 325 TABLET ORAL at 06:02

## 2021-02-08 ENCOUNTER — ANESTHESIA (OUTPATIENT)
Dept: SURGERY | Facility: HOSPITAL | Age: 31
DRG: 853 | End: 2021-02-08
Payer: MEDICAID

## 2021-02-08 ENCOUNTER — ANESTHESIA EVENT (OUTPATIENT)
Dept: SURGERY | Facility: HOSPITAL | Age: 31
DRG: 853 | End: 2021-02-08
Payer: MEDICAID

## 2021-02-08 PROBLEM — J18.9 PNEUMONIA OF BOTH LUNGS DUE TO INFECTIOUS ORGANISM: Status: RESOLVED | Noted: 2020-10-24 | Resolved: 2021-02-08

## 2021-02-08 LAB
ALBUMIN SERPL BCP-MCNC: 2 G/DL (ref 3.5–5.2)
ALP SERPL-CCNC: 94 U/L (ref 55–135)
ALT SERPL W/O P-5'-P-CCNC: 8 U/L (ref 10–44)
ANION GAP SERPL CALC-SCNC: 10 MMOL/L (ref 8–16)
APTT BLDCRRT: 35.8 SEC (ref 21–32)
APTT BLDCRRT: 46.6 SEC (ref 21–32)
APTT BLDCRRT: 70.9 SEC (ref 21–32)
AST SERPL-CCNC: 14 U/L (ref 10–40)
BACTERIA BLD CULT: ABNORMAL
BACTERIA SPEC AEROBE CULT: NO GROWTH
BACTERIA SPEC ANAEROBE CULT: NORMAL
BACTERIA SPEC ANAEROBE CULT: NORMAL
BASOPHILS # BLD AUTO: 0.05 K/UL (ref 0–0.2)
BASOPHILS NFR BLD: 0.4 % (ref 0–1.9)
BILIRUB SERPL-MCNC: 0.4 MG/DL (ref 0.1–1)
BUN SERPL-MCNC: 15 MG/DL (ref 6–20)
CALCIUM SERPL-MCNC: 8.1 MG/DL (ref 8.7–10.5)
CHLORIDE SERPL-SCNC: 101 MMOL/L (ref 95–110)
CK SERPL-CCNC: 16 U/L (ref 20–180)
CO2 SERPL-SCNC: 22 MMOL/L (ref 23–29)
CREAT SERPL-MCNC: 0.9 MG/DL (ref 0.5–1.4)
DIFFERENTIAL METHOD: ABNORMAL
EOSINOPHIL # BLD AUTO: 0.2 K/UL (ref 0–0.5)
EOSINOPHIL NFR BLD: 1.4 % (ref 0–8)
ERYTHROCYTE [DISTWIDTH] IN BLOOD BY AUTOMATED COUNT: 17 % (ref 11.5–14.5)
EST. GFR  (AFRICAN AMERICAN): >60 ML/MIN/1.73 M^2
EST. GFR  (NON AFRICAN AMERICAN): >60 ML/MIN/1.73 M^2
GLUCOSE SERPL-MCNC: 108 MG/DL (ref 70–110)
GRAM STN SPEC: NORMAL
HCT VFR BLD AUTO: 31.1 % (ref 37–48.5)
HGB BLD-MCNC: 9.2 G/DL (ref 12–16)
IMM GRANULOCYTES # BLD AUTO: 0.4 K/UL (ref 0–0.04)
IMM GRANULOCYTES NFR BLD AUTO: 3 % (ref 0–0.5)
LYMPHOCYTES # BLD AUTO: 2.2 K/UL (ref 1–4.8)
LYMPHOCYTES NFR BLD: 17 % (ref 18–48)
MAGNESIUM SERPL-MCNC: 1.8 MG/DL (ref 1.6–2.6)
MCH RBC QN AUTO: 25.6 PG (ref 27–31)
MCHC RBC AUTO-ENTMCNC: 29.6 G/DL (ref 32–36)
MCV RBC AUTO: 87 FL (ref 82–98)
MONOCYTES # BLD AUTO: 0.6 K/UL (ref 0.3–1)
MONOCYTES NFR BLD: 4.9 % (ref 4–15)
NEUTROPHILS # BLD AUTO: 9.7 K/UL (ref 1.8–7.7)
NEUTROPHILS NFR BLD: 73.3 % (ref 38–73)
NRBC BLD-RTO: 0 /100 WBC
PHOSPHATE SERPL-MCNC: 3.2 MG/DL (ref 2.7–4.5)
PLATELET # BLD AUTO: 164 K/UL (ref 150–350)
PMV BLD AUTO: 11.2 FL (ref 9.2–12.9)
POTASSIUM SERPL-SCNC: 4.3 MMOL/L (ref 3.5–5.1)
PROT SERPL-MCNC: 7.6 G/DL (ref 6–8.4)
RBC # BLD AUTO: 3.59 M/UL (ref 4–5.4)
SODIUM SERPL-SCNC: 133 MMOL/L (ref 136–145)
VANCOMYCIN TROUGH SERPL-MCNC: 14 UG/ML (ref 10–22)
WBC # BLD AUTO: 13.15 K/UL (ref 3.9–12.7)

## 2021-02-08 PROCEDURE — 94761 N-INVAS EAR/PLS OXIMETRY MLT: CPT

## 2021-02-08 PROCEDURE — 25000003 PHARM REV CODE 250: Performed by: NURSE ANESTHETIST, CERTIFIED REGISTERED

## 2021-02-08 PROCEDURE — 63600175 PHARM REV CODE 636 W HCPCS: Performed by: STUDENT IN AN ORGANIZED HEALTH CARE EDUCATION/TRAINING PROGRAM

## 2021-02-08 PROCEDURE — 36415 COLL VENOUS BLD VENIPUNCTURE: CPT

## 2021-02-08 PROCEDURE — 26070: ICD-10-PCS | Mod: 58,51,RT, | Performed by: ORTHOPAEDIC SURGERY

## 2021-02-08 PROCEDURE — 25000003 PHARM REV CODE 250: Performed by: STUDENT IN AN ORGANIZED HEALTH CARE EDUCATION/TRAINING PROGRAM

## 2021-02-08 PROCEDURE — 63600175 PHARM REV CODE 636 W HCPCS: Performed by: ORTHOPAEDIC SURGERY

## 2021-02-08 PROCEDURE — 63600175 PHARM REV CODE 636 W HCPCS: Mod: JG | Performed by: STUDENT IN AN ORGANIZED HEALTH CARE EDUCATION/TRAINING PROGRAM

## 2021-02-08 PROCEDURE — 99233 PR SUBSEQUENT HOSPITAL CARE,LEVL III: ICD-10-PCS | Mod: ,,, | Performed by: THORACIC SURGERY (CARDIOTHORACIC VASCULAR SURGERY)

## 2021-02-08 PROCEDURE — 87116 MYCOBACTERIA CULTURE: CPT

## 2021-02-08 PROCEDURE — 87206 SMEAR FLUORESCENT/ACID STAI: CPT | Mod: 91

## 2021-02-08 PROCEDURE — 99233 SBSQ HOSP IP/OBS HIGH 50: CPT | Mod: ,,, | Performed by: HOSPITALIST

## 2021-02-08 PROCEDURE — 87070 CULTURE OTHR SPECIMN AEROBIC: CPT | Mod: 59

## 2021-02-08 PROCEDURE — 82550 ASSAY OF CK (CPK): CPT

## 2021-02-08 PROCEDURE — D9220A PRA ANESTHESIA: Mod: ANES,,, | Performed by: STUDENT IN AN ORGANIZED HEALTH CARE EDUCATION/TRAINING PROGRAM

## 2021-02-08 PROCEDURE — 99900035 HC TECH TIME PER 15 MIN (STAT)

## 2021-02-08 PROCEDURE — 25000003 PHARM REV CODE 250: Performed by: HOSPITALIST

## 2021-02-08 PROCEDURE — 71000033 HC RECOVERY, INTIAL HOUR: Performed by: ORTHOPAEDIC SURGERY

## 2021-02-08 PROCEDURE — 37000009 HC ANESTHESIA EA ADD 15 MINS: Performed by: ORTHOPAEDIC SURGERY

## 2021-02-08 PROCEDURE — 87077 CULTURE AEROBIC IDENTIFY: CPT

## 2021-02-08 PROCEDURE — 26070 EXPLORE/TREAT HAND JOINT: CPT | Mod: 58,51,RT, | Performed by: ORTHOPAEDIC SURGERY

## 2021-02-08 PROCEDURE — 63600175 PHARM REV CODE 636 W HCPCS: Performed by: PHYSICIAN ASSISTANT

## 2021-02-08 PROCEDURE — 76942 ECHO GUIDE FOR BIOPSY: CPT | Mod: 26,,, | Performed by: ANESTHESIOLOGY

## 2021-02-08 PROCEDURE — 99232 SBSQ HOSP IP/OBS MODERATE 35: CPT | Mod: ,,, | Performed by: PSYCHIATRY & NEUROLOGY

## 2021-02-08 PROCEDURE — 20600001 HC STEP DOWN PRIVATE ROOM

## 2021-02-08 PROCEDURE — 71000015 HC POSTOP RECOV 1ST HR: Performed by: ORTHOPAEDIC SURGERY

## 2021-02-08 PROCEDURE — 25000003 PHARM REV CODE 250: Performed by: PHYSICIAN ASSISTANT

## 2021-02-08 PROCEDURE — 99232 PR SUBSEQUENT HOSPITAL CARE,LEVL II: ICD-10-PCS | Mod: ,,, | Performed by: PSYCHIATRY & NEUROLOGY

## 2021-02-08 PROCEDURE — 85025 COMPLETE CBC W/AUTO DIFF WBC: CPT

## 2021-02-08 PROCEDURE — 99233 PR SUBSEQUENT HOSPITAL CARE,LEVL III: ICD-10-PCS | Mod: ,,, | Performed by: HOSPITALIST

## 2021-02-08 PROCEDURE — 63600175 PHARM REV CODE 636 W HCPCS: Performed by: NURSE ANESTHETIST, CERTIFIED REGISTERED

## 2021-02-08 PROCEDURE — 10061 I&D ABSCESS COMP/MULTIPLE: CPT | Mod: 58,51,RT, | Performed by: ORTHOPAEDIC SURGERY

## 2021-02-08 PROCEDURE — 87186 SC STD MICRODIL/AGAR DIL: CPT | Mod: 59

## 2021-02-08 PROCEDURE — 85730 THROMBOPLASTIN TIME PARTIAL: CPT

## 2021-02-08 PROCEDURE — 87075 CULTR BACTERIA EXCEPT BLOOD: CPT

## 2021-02-08 PROCEDURE — 84100 ASSAY OF PHOSPHORUS: CPT

## 2021-02-08 PROCEDURE — 64416 NJX AA&/STRD BRCH PL NFS IMG: CPT | Performed by: ANESTHESIOLOGY

## 2021-02-08 PROCEDURE — 87102 FUNGUS ISOLATION CULTURE: CPT

## 2021-02-08 PROCEDURE — 63600175 PHARM REV CODE 636 W HCPCS: Performed by: HOSPITALIST

## 2021-02-08 PROCEDURE — 80202 ASSAY OF VANCOMYCIN: CPT

## 2021-02-08 PROCEDURE — 83735 ASSAY OF MAGNESIUM: CPT

## 2021-02-08 PROCEDURE — 36000706: Performed by: ORTHOPAEDIC SURGERY

## 2021-02-08 PROCEDURE — 64416 RIGHT INFRACLAVICULAR CATHETER: ICD-10-PCS | Mod: 59,RT,, | Performed by: ANESTHESIOLOGY

## 2021-02-08 PROCEDURE — 37000008 HC ANESTHESIA 1ST 15 MINUTES: Performed by: ORTHOPAEDIC SURGERY

## 2021-02-08 PROCEDURE — 64416 NJX AA&/STRD BRCH PL NFS IMG: CPT | Mod: 59,RT,, | Performed by: ANESTHESIOLOGY

## 2021-02-08 PROCEDURE — D9220A PRA ANESTHESIA: ICD-10-PCS | Mod: ANES,,, | Performed by: STUDENT IN AN ORGANIZED HEALTH CARE EDUCATION/TRAINING PROGRAM

## 2021-02-08 PROCEDURE — 76942 RIGHT INFRACLAVICULAR CATHETER: ICD-10-PCS | Mod: 26,,, | Performed by: ANESTHESIOLOGY

## 2021-02-08 PROCEDURE — D9220A PRA ANESTHESIA: Mod: CRNA,,, | Performed by: NURSE ANESTHETIST, CERTIFIED REGISTERED

## 2021-02-08 PROCEDURE — 36000707: Performed by: ORTHOPAEDIC SURGERY

## 2021-02-08 PROCEDURE — 87205 SMEAR GRAM STAIN: CPT

## 2021-02-08 PROCEDURE — 80053 COMPREHEN METABOLIC PANEL: CPT

## 2021-02-08 PROCEDURE — 26020 PR DRAIN HAND TENDON SHEATH: ICD-10-PCS | Mod: 58,RT,, | Performed by: ORTHOPAEDIC SURGERY

## 2021-02-08 PROCEDURE — D9220A PRA ANESTHESIA: ICD-10-PCS | Mod: CRNA,,, | Performed by: NURSE ANESTHETIST, CERTIFIED REGISTERED

## 2021-02-08 PROCEDURE — 27000221 HC OXYGEN, UP TO 24 HOURS

## 2021-02-08 PROCEDURE — 99233 SBSQ HOSP IP/OBS HIGH 50: CPT | Mod: ,,, | Performed by: THORACIC SURGERY (CARDIOTHORACIC VASCULAR SURGERY)

## 2021-02-08 PROCEDURE — 10061 PR DRAIN SKIN ABSCESS COMPLIC: ICD-10-PCS | Mod: 58,51,RT, | Performed by: ORTHOPAEDIC SURGERY

## 2021-02-08 PROCEDURE — 26020 DRAIN HAND TENDON SHEATH: CPT | Mod: 58,RT,, | Performed by: ORTHOPAEDIC SURGERY

## 2021-02-08 PROCEDURE — 87015 SPECIMEN INFECT AGNT CONCNTJ: CPT

## 2021-02-08 RX ORDER — ROPIVACAINE HYDROCHLORIDE 2 MG/ML
6 INJECTION, SOLUTION EPIDURAL; INFILTRATION; PERINEURAL CONTINUOUS
Status: DISCONTINUED | OUTPATIENT
Start: 2021-02-08 | End: 2021-02-12

## 2021-02-08 RX ORDER — CELECOXIB 100 MG/1
200 CAPSULE ORAL DAILY
Status: DISCONTINUED | OUTPATIENT
Start: 2021-02-09 | End: 2021-02-19 | Stop reason: HOSPADM

## 2021-02-08 RX ORDER — MIDAZOLAM HYDROCHLORIDE 1 MG/ML
INJECTION, SOLUTION INTRAMUSCULAR; INTRAVENOUS
Status: DISCONTINUED | OUTPATIENT
Start: 2021-02-08 | End: 2021-02-08

## 2021-02-08 RX ORDER — FENTANYL CITRATE 50 UG/ML
25 INJECTION, SOLUTION INTRAMUSCULAR; INTRAVENOUS EVERY 5 MIN PRN
Status: DISCONTINUED | OUTPATIENT
Start: 2021-02-08 | End: 2021-02-08 | Stop reason: HOSPADM

## 2021-02-08 RX ORDER — VANCOMYCIN HYDROCHLORIDE 1 G/20ML
INJECTION, POWDER, LYOPHILIZED, FOR SOLUTION INTRAVENOUS
Status: DISCONTINUED | OUTPATIENT
Start: 2021-02-08 | End: 2021-02-08 | Stop reason: HOSPADM

## 2021-02-08 RX ORDER — CELECOXIB 200 MG/1
400 CAPSULE ORAL ONCE
Status: COMPLETED | OUTPATIENT
Start: 2021-02-08 | End: 2021-02-08

## 2021-02-08 RX ORDER — ROPIVACAINE HYDROCHLORIDE 2 MG/ML
INJECTION, SOLUTION EPIDURAL; INFILTRATION; PERINEURAL CONTINUOUS
Status: DISCONTINUED | OUTPATIENT
Start: 2021-02-08 | End: 2021-02-08

## 2021-02-08 RX ORDER — MIRTAZAPINE 15 MG/1
15 TABLET, FILM COATED ORAL NIGHTLY
Status: DISCONTINUED | OUTPATIENT
Start: 2021-02-08 | End: 2021-02-19 | Stop reason: HOSPADM

## 2021-02-08 RX ORDER — SODIUM CHLORIDE 0.9 % (FLUSH) 0.9 %
10 SYRINGE (ML) INJECTION
Status: DISCONTINUED | OUTPATIENT
Start: 2021-02-08 | End: 2021-02-19 | Stop reason: HOSPADM

## 2021-02-08 RX ORDER — FENTANYL CITRATE 50 UG/ML
INJECTION, SOLUTION INTRAMUSCULAR; INTRAVENOUS
Status: DISCONTINUED | OUTPATIENT
Start: 2021-02-08 | End: 2021-02-08

## 2021-02-08 RX ORDER — MIDAZOLAM HYDROCHLORIDE 1 MG/ML
0.5 INJECTION INTRAMUSCULAR; INTRAVENOUS
Status: DISCONTINUED | OUTPATIENT
Start: 2021-02-08 | End: 2021-02-08

## 2021-02-08 RX ORDER — BUPIVACAINE HYDROCHLORIDE 5 MG/ML
INJECTION, SOLUTION EPIDURAL; INTRACAUDAL
Status: COMPLETED | OUTPATIENT
Start: 2021-02-08 | End: 2021-02-08

## 2021-02-08 RX ORDER — BUPRENORPHINE HYDROCHLORIDE AND NALOXONE HYDROCHLORIDE DIHYDRATE 2; .5 MG/1; MG/1
4 TABLET SUBLINGUAL 3 TIMES DAILY
Status: DISCONTINUED | OUTPATIENT
Start: 2021-02-08 | End: 2021-02-08

## 2021-02-08 RX ORDER — OXYCODONE HYDROCHLORIDE 5 MG/1
5 TABLET ORAL ONCE
Status: COMPLETED | OUTPATIENT
Start: 2021-02-08 | End: 2021-02-08

## 2021-02-08 RX ORDER — ACETAMINOPHEN 500 MG
1000 TABLET ORAL EVERY 6 HOURS
Status: DISCONTINUED | OUTPATIENT
Start: 2021-02-08 | End: 2021-02-12

## 2021-02-08 RX ORDER — BUPRENORPHINE HYDROCHLORIDE AND NALOXONE HYDROCHLORIDE DIHYDRATE 2; .5 MG/1; MG/1
4 TABLET SUBLINGUAL 2 TIMES DAILY
Status: DISCONTINUED | OUTPATIENT
Start: 2021-02-08 | End: 2021-02-08

## 2021-02-08 RX ORDER — KETAMINE HCL IN 0.9 % NACL 50 MG/5 ML
SYRINGE (ML) INTRAVENOUS
Status: DISCONTINUED | OUTPATIENT
Start: 2021-02-08 | End: 2021-02-08

## 2021-02-08 RX ORDER — DEXMEDETOMIDINE HYDROCHLORIDE 100 UG/ML
INJECTION, SOLUTION INTRAVENOUS
Status: DISCONTINUED | OUTPATIENT
Start: 2021-02-08 | End: 2021-02-08

## 2021-02-08 RX ORDER — FENTANYL CITRATE 50 UG/ML
25 INJECTION, SOLUTION INTRAMUSCULAR; INTRAVENOUS EVERY 5 MIN PRN
Status: DISCONTINUED | OUTPATIENT
Start: 2021-02-08 | End: 2021-02-08

## 2021-02-08 RX ADMIN — ACETAMINOPHEN 1000 MG: 500 TABLET ORAL at 11:02

## 2021-02-08 RX ADMIN — MIRTAZAPINE 15 MG: 15 TABLET, FILM COATED ORAL at 10:02

## 2021-02-08 RX ADMIN — FENTANYL CITRATE 25 MCG: 50 INJECTION, SOLUTION INTRAMUSCULAR; INTRAVENOUS at 12:02

## 2021-02-08 RX ADMIN — DEXMEDETOMIDINE HYDROCHLORIDE 8 MCG: 100 INJECTION, SOLUTION, CONCENTRATE INTRAVENOUS at 12:02

## 2021-02-08 RX ADMIN — ACETAMINOPHEN 1000 MG: 500 TABLET ORAL at 03:02

## 2021-02-08 RX ADMIN — CELECOXIB 400 MG: 200 CAPSULE ORAL at 03:02

## 2021-02-08 RX ADMIN — BUPIVACAINE HYDROCHLORIDE 30 ML: 5 INJECTION, SOLUTION EPIDURAL; INTRACAUDAL; PERINEURAL at 10:02

## 2021-02-08 RX ADMIN — MIDAZOLAM HYDROCHLORIDE 2 MG: 1 INJECTION, SOLUTION INTRAMUSCULAR; INTRAVENOUS at 12:02

## 2021-02-08 RX ADMIN — GABAPENTIN 300 MG: 300 CAPSULE ORAL at 03:02

## 2021-02-08 RX ADMIN — DAPTOMYCIN 490 MG: 350 INJECTION, POWDER, LYOPHILIZED, FOR SOLUTION INTRAVENOUS at 06:02

## 2021-02-08 RX ADMIN — FENTANYL CITRATE 50 MCG: 50 INJECTION, SOLUTION INTRAMUSCULAR; INTRAVENOUS at 11:02

## 2021-02-08 RX ADMIN — VANCOMYCIN HYDROCHLORIDE 750 MG: 750 INJECTION, POWDER, LYOPHILIZED, FOR SOLUTION INTRAVENOUS at 01:02

## 2021-02-08 RX ADMIN — Medication 20 MG: at 12:02

## 2021-02-08 RX ADMIN — VANCOMYCIN HYDROCHLORIDE 1000 MG: 1 INJECTION, POWDER, LYOPHILIZED, FOR SOLUTION INTRAVENOUS at 05:02

## 2021-02-08 RX ADMIN — MIDAZOLAM 2 MG: 1 INJECTION INTRAMUSCULAR; INTRAVENOUS at 11:02

## 2021-02-08 RX ADMIN — GABAPENTIN 300 MG: 300 CAPSULE ORAL at 10:02

## 2021-02-08 RX ADMIN — Medication 10 MG: at 12:02

## 2021-02-08 RX ADMIN — GABAPENTIN 300 MG: 300 CAPSULE ORAL at 08:02

## 2021-02-08 RX ADMIN — ACETAMINOPHEN 650 MG: 325 TABLET ORAL at 03:02

## 2021-02-08 RX ADMIN — OXYCODONE 5 MG: 5 TABLET ORAL at 11:02

## 2021-02-08 RX ADMIN — MIDAZOLAM 0.5 MG: 1 INJECTION INTRAMUSCULAR; INTRAVENOUS at 11:02

## 2021-02-08 RX ADMIN — ROPIVACAINE HYDROCHLORIDE 6 ML/HR: 2 INJECTION, SOLUTION EPIDURAL; INFILTRATION at 01:02

## 2021-02-08 RX ADMIN — DEXMEDETOMIDINE HYDROCHLORIDE 24 MCG: 100 INJECTION, SOLUTION, CONCENTRATE INTRAVENOUS at 12:02

## 2021-02-08 RX ADMIN — SODIUM CHLORIDE: 0.9 INJECTION, SOLUTION INTRAVENOUS at 11:02

## 2021-02-08 RX ADMIN — CEFTAROLINE FOSAMIL 600 MG: 600 POWDER, FOR SOLUTION INTRAVENOUS at 11:02

## 2021-02-09 ENCOUNTER — ANESTHESIA (OUTPATIENT)
Dept: SURGERY | Facility: HOSPITAL | Age: 31
DRG: 853 | End: 2021-02-09
Payer: MEDICAID

## 2021-02-09 ENCOUNTER — DOCUMENTATION ONLY (OUTPATIENT)
Dept: ORTHOPEDICS | Facility: CLINIC | Age: 31
End: 2021-02-09

## 2021-02-09 PROBLEM — M25.511 PAIN OF RIGHT STERNOCLAVICULAR JOINT: Status: ACTIVE | Noted: 2021-02-09

## 2021-02-09 LAB
ALBUMIN SERPL BCP-MCNC: 1.9 G/DL (ref 3.5–5.2)
ALP SERPL-CCNC: 78 U/L (ref 55–135)
ALT SERPL W/O P-5'-P-CCNC: 9 U/L (ref 10–44)
ANION GAP SERPL CALC-SCNC: 7 MMOL/L (ref 8–16)
AST SERPL-CCNC: 13 U/L (ref 10–40)
BACTERIA BLD CULT: ABNORMAL
BASOPHILS # BLD AUTO: 0.03 K/UL (ref 0–0.2)
BASOPHILS NFR BLD: 0.3 % (ref 0–1.9)
BILIRUB SERPL-MCNC: 0.3 MG/DL (ref 0.1–1)
BUN SERPL-MCNC: 18 MG/DL (ref 6–20)
CALCIUM SERPL-MCNC: 7.9 MG/DL (ref 8.7–10.5)
CHLORIDE SERPL-SCNC: 106 MMOL/L (ref 95–110)
CO2 SERPL-SCNC: 24 MMOL/L (ref 23–29)
CREAT SERPL-MCNC: 0.8 MG/DL (ref 0.5–1.4)
DIFFERENTIAL METHOD: ABNORMAL
EOSINOPHIL # BLD AUTO: 0.3 K/UL (ref 0–0.5)
EOSINOPHIL NFR BLD: 3 % (ref 0–8)
ERYTHROCYTE [DISTWIDTH] IN BLOOD BY AUTOMATED COUNT: 16.8 % (ref 11.5–14.5)
EST. GFR  (AFRICAN AMERICAN): >60 ML/MIN/1.73 M^2
EST. GFR  (NON AFRICAN AMERICAN): >60 ML/MIN/1.73 M^2
GLUCOSE SERPL-MCNC: 108 MG/DL (ref 70–110)
GRAM STN SPEC: NORMAL
GRAM STN SPEC: NORMAL
HCT VFR BLD AUTO: 27.3 % (ref 37–48.5)
HGB BLD-MCNC: 8.1 G/DL (ref 12–16)
IMM GRANULOCYTES # BLD AUTO: 0.17 K/UL (ref 0–0.04)
IMM GRANULOCYTES NFR BLD AUTO: 1.8 % (ref 0–0.5)
LYMPHOCYTES # BLD AUTO: 1.8 K/UL (ref 1–4.8)
LYMPHOCYTES NFR BLD: 18.1 % (ref 18–48)
MAGNESIUM SERPL-MCNC: 1.9 MG/DL (ref 1.6–2.6)
MCH RBC QN AUTO: 26 PG (ref 27–31)
MCHC RBC AUTO-ENTMCNC: 29.7 G/DL (ref 32–36)
MCV RBC AUTO: 88 FL (ref 82–98)
MONOCYTES # BLD AUTO: 0.6 K/UL (ref 0.3–1)
MONOCYTES NFR BLD: 6.6 % (ref 4–15)
NEUTROPHILS # BLD AUTO: 6.8 K/UL (ref 1.8–7.7)
NEUTROPHILS NFR BLD: 70.2 % (ref 38–73)
NRBC BLD-RTO: 0 /100 WBC
PHOSPHATE SERPL-MCNC: 3.5 MG/DL (ref 2.7–4.5)
PLATELET # BLD AUTO: 294 K/UL (ref 150–350)
PMV BLD AUTO: 10.9 FL (ref 9.2–12.9)
POTASSIUM SERPL-SCNC: 4.2 MMOL/L (ref 3.5–5.1)
PROT SERPL-MCNC: 7.2 G/DL (ref 6–8.4)
RBC # BLD AUTO: 3.12 M/UL (ref 4–5.4)
SODIUM SERPL-SCNC: 137 MMOL/L (ref 136–145)
WBC # BLD AUTO: 9.7 K/UL (ref 3.9–12.7)

## 2021-02-09 PROCEDURE — 97535 SELF CARE MNGMENT TRAINING: CPT

## 2021-02-09 PROCEDURE — 87186 SC STD MICRODIL/AGAR DIL: CPT

## 2021-02-09 PROCEDURE — D9220A PRA ANESTHESIA: ICD-10-PCS | Mod: ANES,,, | Performed by: ANESTHESIOLOGY

## 2021-02-09 PROCEDURE — 64415 NJX AA&/STRD BRCH PLXS IMG: CPT | Mod: 59,RT,, | Performed by: ANESTHESIOLOGY

## 2021-02-09 PROCEDURE — 23044 ARTHRT AC SC JT EXP/RMVL FB: CPT | Mod: RT,,, | Performed by: THORACIC SURGERY (CARDIOTHORACIC VASCULAR SURGERY)

## 2021-02-09 PROCEDURE — 25000003 PHARM REV CODE 250: Performed by: STUDENT IN AN ORGANIZED HEALTH CARE EDUCATION/TRAINING PROGRAM

## 2021-02-09 PROCEDURE — 63600175 PHARM REV CODE 636 W HCPCS: Performed by: ANESTHESIOLOGY

## 2021-02-09 PROCEDURE — 23044 PR ARTHROTOMY, ACROMCLAV STRNCLAV JT EXPL/DRAINAGE/RMVL FB: ICD-10-PCS | Mod: RT,,, | Performed by: THORACIC SURGERY (CARDIOTHORACIC VASCULAR SURGERY)

## 2021-02-09 PROCEDURE — 20600001 HC STEP DOWN PRIVATE ROOM

## 2021-02-09 PROCEDURE — 36415 COLL VENOUS BLD VENIPUNCTURE: CPT

## 2021-02-09 PROCEDURE — 63600175 PHARM REV CODE 636 W HCPCS: Mod: JG | Performed by: STUDENT IN AN ORGANIZED HEALTH CARE EDUCATION/TRAINING PROGRAM

## 2021-02-09 PROCEDURE — D9220A PRA ANESTHESIA: Mod: CRNA,,, | Performed by: NURSE ANESTHETIST, CERTIFIED REGISTERED

## 2021-02-09 PROCEDURE — 63600175 PHARM REV CODE 636 W HCPCS: Performed by: HOSPITALIST

## 2021-02-09 PROCEDURE — 87077 CULTURE AEROBIC IDENTIFY: CPT

## 2021-02-09 PROCEDURE — 71000039 HC RECOVERY, EACH ADD'L HOUR: Performed by: THORACIC SURGERY (CARDIOTHORACIC VASCULAR SURGERY)

## 2021-02-09 PROCEDURE — 87116 MYCOBACTERIA CULTURE: CPT

## 2021-02-09 PROCEDURE — 85025 COMPLETE CBC W/AUTO DIFF WBC: CPT

## 2021-02-09 PROCEDURE — D9220A PRA ANESTHESIA: Mod: ANES,,, | Performed by: ANESTHESIOLOGY

## 2021-02-09 PROCEDURE — 36000706: Performed by: THORACIC SURGERY (CARDIOTHORACIC VASCULAR SURGERY)

## 2021-02-09 PROCEDURE — 84100 ASSAY OF PHOSPHORUS: CPT

## 2021-02-09 PROCEDURE — 87015 SPECIMEN INFECT AGNT CONCNTJ: CPT

## 2021-02-09 PROCEDURE — 87102 FUNGUS ISOLATION CULTURE: CPT

## 2021-02-09 PROCEDURE — 36000707: Performed by: THORACIC SURGERY (CARDIOTHORACIC VASCULAR SURGERY)

## 2021-02-09 PROCEDURE — 76942: ICD-10-PCS | Mod: 26,,, | Performed by: ANESTHESIOLOGY

## 2021-02-09 PROCEDURE — 63600175 PHARM REV CODE 636 W HCPCS: Performed by: NURSE ANESTHETIST, CERTIFIED REGISTERED

## 2021-02-09 PROCEDURE — 25000003 PHARM REV CODE 250: Performed by: NURSE ANESTHETIST, CERTIFIED REGISTERED

## 2021-02-09 PROCEDURE — 99231 PR SUBSEQUENT HOSPITAL CARE,LEVL I: ICD-10-PCS | Mod: 25,,, | Performed by: ANESTHESIOLOGY

## 2021-02-09 PROCEDURE — 63600175 PHARM REV CODE 636 W HCPCS

## 2021-02-09 PROCEDURE — 87206 SMEAR FLUORESCENT/ACID STAI: CPT

## 2021-02-09 PROCEDURE — 99233 PR SUBSEQUENT HOSPITAL CARE,LEVL III: ICD-10-PCS | Mod: ,,, | Performed by: HOSPITALIST

## 2021-02-09 PROCEDURE — 37000009 HC ANESTHESIA EA ADD 15 MINS: Performed by: THORACIC SURGERY (CARDIOTHORACIC VASCULAR SURGERY)

## 2021-02-09 PROCEDURE — 97116 GAIT TRAINING THERAPY: CPT

## 2021-02-09 PROCEDURE — 99231 SBSQ HOSP IP/OBS SF/LOW 25: CPT | Mod: 25,,, | Performed by: ANESTHESIOLOGY

## 2021-02-09 PROCEDURE — 64415 NJX AA&/STRD BRCH PLXS IMG: CPT | Performed by: STUDENT IN AN ORGANIZED HEALTH CARE EDUCATION/TRAINING PROGRAM

## 2021-02-09 PROCEDURE — 64415: ICD-10-PCS | Mod: 59,RT,, | Performed by: ANESTHESIOLOGY

## 2021-02-09 PROCEDURE — 87205 SMEAR GRAM STAIN: CPT

## 2021-02-09 PROCEDURE — 63600175 PHARM REV CODE 636 W HCPCS: Performed by: STUDENT IN AN ORGANIZED HEALTH CARE EDUCATION/TRAINING PROGRAM

## 2021-02-09 PROCEDURE — 76942 ECHO GUIDE FOR BIOPSY: CPT | Mod: 26,,, | Performed by: ANESTHESIOLOGY

## 2021-02-09 PROCEDURE — 37000008 HC ANESTHESIA 1ST 15 MINUTES: Performed by: THORACIC SURGERY (CARDIOTHORACIC VASCULAR SURGERY)

## 2021-02-09 PROCEDURE — 83735 ASSAY OF MAGNESIUM: CPT

## 2021-02-09 PROCEDURE — 87070 CULTURE OTHR SPECIMN AEROBIC: CPT

## 2021-02-09 PROCEDURE — 94761 N-INVAS EAR/PLS OXIMETRY MLT: CPT

## 2021-02-09 PROCEDURE — 71000033 HC RECOVERY, INTIAL HOUR: Performed by: THORACIC SURGERY (CARDIOTHORACIC VASCULAR SURGERY)

## 2021-02-09 PROCEDURE — 76942 ECHO GUIDE FOR BIOPSY: CPT | Performed by: STUDENT IN AN ORGANIZED HEALTH CARE EDUCATION/TRAINING PROGRAM

## 2021-02-09 PROCEDURE — D9220A PRA ANESTHESIA: ICD-10-PCS | Mod: CRNA,,, | Performed by: NURSE ANESTHETIST, CERTIFIED REGISTERED

## 2021-02-09 PROCEDURE — 80053 COMPREHEN METABOLIC PANEL: CPT

## 2021-02-09 PROCEDURE — 71000015 HC POSTOP RECOV 1ST HR: Performed by: THORACIC SURGERY (CARDIOTHORACIC VASCULAR SURGERY)

## 2021-02-09 PROCEDURE — 99233 SBSQ HOSP IP/OBS HIGH 50: CPT | Mod: ,,, | Performed by: HOSPITALIST

## 2021-02-09 PROCEDURE — 87075 CULTR BACTERIA EXCEPT BLOOD: CPT

## 2021-02-09 RX ORDER — PHENYLEPHRINE HYDROCHLORIDE 10 MG/ML
INJECTION INTRAVENOUS
Status: DISCONTINUED | OUTPATIENT
Start: 2021-02-09 | End: 2021-02-09

## 2021-02-09 RX ORDER — HYDROMORPHONE HYDROCHLORIDE 2 MG/ML
INJECTION, SOLUTION INTRAMUSCULAR; INTRAVENOUS; SUBCUTANEOUS
Status: DISCONTINUED | OUTPATIENT
Start: 2021-02-09 | End: 2021-02-09

## 2021-02-09 RX ORDER — FENTANYL CITRATE 50 UG/ML
25 INJECTION, SOLUTION INTRAMUSCULAR; INTRAVENOUS EVERY 5 MIN PRN
Status: COMPLETED | OUTPATIENT
Start: 2021-02-09 | End: 2021-02-09

## 2021-02-09 RX ORDER — LIDOCAINE HYDROCHLORIDE 20 MG/ML
INJECTION INTRAVENOUS
Status: DISCONTINUED | OUTPATIENT
Start: 2021-02-09 | End: 2021-02-09

## 2021-02-09 RX ORDER — MIDAZOLAM HYDROCHLORIDE 1 MG/ML
0.5 INJECTION INTRAMUSCULAR; INTRAVENOUS
Status: DISCONTINUED | OUTPATIENT
Start: 2021-02-09 | End: 2021-02-09

## 2021-02-09 RX ORDER — ROPIVACAINE HYDROCHLORIDE 5 MG/ML
INJECTION, SOLUTION EPIDURAL; INFILTRATION; PERINEURAL
Status: COMPLETED | OUTPATIENT
Start: 2021-02-09 | End: 2021-02-09

## 2021-02-09 RX ORDER — BUPRENORPHINE 2 MG/1
4 TABLET SUBLINGUAL 3 TIMES DAILY
Status: DISCONTINUED | OUTPATIENT
Start: 2021-02-09 | End: 2021-02-10

## 2021-02-09 RX ORDER — KETAMINE HCL IN 0.9 % NACL 50 MG/5 ML
SYRINGE (ML) INTRAVENOUS
Status: DISCONTINUED | OUTPATIENT
Start: 2021-02-09 | End: 2021-02-09

## 2021-02-09 RX ORDER — DEXMEDETOMIDINE HYDROCHLORIDE 100 UG/ML
INJECTION, SOLUTION INTRAVENOUS
Status: DISCONTINUED | OUTPATIENT
Start: 2021-02-09 | End: 2021-02-09

## 2021-02-09 RX ORDER — NEOSTIGMINE METHYLSULFATE 0.5 MG/ML
INJECTION, SOLUTION INTRAVENOUS
Status: DISCONTINUED | OUTPATIENT
Start: 2021-02-09 | End: 2021-02-09

## 2021-02-09 RX ORDER — ONDANSETRON 2 MG/ML
INJECTION INTRAMUSCULAR; INTRAVENOUS
Status: DISCONTINUED | OUTPATIENT
Start: 2021-02-09 | End: 2021-02-09

## 2021-02-09 RX ORDER — HYDROMORPHONE HYDROCHLORIDE 1 MG/ML
INJECTION, SOLUTION INTRAMUSCULAR; INTRAVENOUS; SUBCUTANEOUS
Status: COMPLETED
Start: 2021-02-09 | End: 2021-02-09

## 2021-02-09 RX ORDER — HYDROMORPHONE HYDROCHLORIDE 1 MG/ML
1 INJECTION, SOLUTION INTRAMUSCULAR; INTRAVENOUS; SUBCUTANEOUS ONCE
Status: COMPLETED | OUTPATIENT
Start: 2021-02-09 | End: 2021-02-09

## 2021-02-09 RX ORDER — OXYCODONE HYDROCHLORIDE 5 MG/1
5 TABLET ORAL ONCE
Status: COMPLETED | OUTPATIENT
Start: 2021-02-09 | End: 2021-02-09

## 2021-02-09 RX ORDER — DEXAMETHASONE SODIUM PHOSPHATE 4 MG/ML
INJECTION, SOLUTION INTRA-ARTICULAR; INTRALESIONAL; INTRAMUSCULAR; INTRAVENOUS; SOFT TISSUE
Status: DISCONTINUED | OUTPATIENT
Start: 2021-02-09 | End: 2021-02-09

## 2021-02-09 RX ORDER — FENTANYL CITRATE 50 UG/ML
50 INJECTION, SOLUTION INTRAMUSCULAR; INTRAVENOUS EVERY 5 MIN PRN
Status: DISCONTINUED | OUTPATIENT
Start: 2021-02-09 | End: 2021-02-11

## 2021-02-09 RX ORDER — PROPOFOL 10 MG/ML
VIAL (ML) INTRAVENOUS
Status: DISCONTINUED | OUTPATIENT
Start: 2021-02-09 | End: 2021-02-09

## 2021-02-09 RX ORDER — OXYCODONE HYDROCHLORIDE 5 MG/1
5 TABLET ORAL EVERY 6 HOURS PRN
Status: DISCONTINUED | OUTPATIENT
Start: 2021-02-09 | End: 2021-02-10

## 2021-02-09 RX ORDER — CEFAZOLIN SODIUM 1 G/3ML
INJECTION, POWDER, FOR SOLUTION INTRAMUSCULAR; INTRAVENOUS
Status: DISCONTINUED | OUTPATIENT
Start: 2021-02-09 | End: 2021-02-09

## 2021-02-09 RX ORDER — HYDROMORPHONE HYDROCHLORIDE 1 MG/ML
INJECTION, SOLUTION INTRAMUSCULAR; INTRAVENOUS; SUBCUTANEOUS
Status: DISPENSED
Start: 2021-02-09 | End: 2021-02-10

## 2021-02-09 RX ORDER — FENTANYL CITRATE 50 UG/ML
INJECTION, SOLUTION INTRAMUSCULAR; INTRAVENOUS
Status: COMPLETED
Start: 2021-02-09 | End: 2021-02-09

## 2021-02-09 RX ORDER — FENTANYL CITRATE 50 UG/ML
INJECTION, SOLUTION INTRAMUSCULAR; INTRAVENOUS
Status: DISCONTINUED | OUTPATIENT
Start: 2021-02-09 | End: 2021-02-09

## 2021-02-09 RX ORDER — FENTANYL CITRATE 50 UG/ML
25 INJECTION, SOLUTION INTRAMUSCULAR; INTRAVENOUS EVERY 5 MIN PRN
Status: DISCONTINUED | OUTPATIENT
Start: 2021-02-09 | End: 2021-02-09

## 2021-02-09 RX ORDER — ONDANSETRON 2 MG/ML
4 INJECTION INTRAMUSCULAR; INTRAVENOUS DAILY PRN
Status: DISCONTINUED | OUTPATIENT
Start: 2021-02-09 | End: 2021-02-09 | Stop reason: HOSPADM

## 2021-02-09 RX ORDER — HYDROMORPHONE HYDROCHLORIDE 1 MG/ML
0.5 INJECTION, SOLUTION INTRAMUSCULAR; INTRAVENOUS; SUBCUTANEOUS EVERY 6 HOURS PRN
Status: DISCONTINUED | OUTPATIENT
Start: 2021-02-09 | End: 2021-02-11

## 2021-02-09 RX ORDER — HYDROMORPHONE HYDROCHLORIDE 1 MG/ML
0.2 INJECTION, SOLUTION INTRAMUSCULAR; INTRAVENOUS; SUBCUTANEOUS EVERY 5 MIN PRN
Status: DISCONTINUED | OUTPATIENT
Start: 2021-02-09 | End: 2021-02-09 | Stop reason: HOSPADM

## 2021-02-09 RX ORDER — MIDAZOLAM HYDROCHLORIDE 1 MG/ML
INJECTION, SOLUTION INTRAMUSCULAR; INTRAVENOUS
Status: DISCONTINUED | OUTPATIENT
Start: 2021-02-09 | End: 2021-02-09

## 2021-02-09 RX ORDER — FENTANYL CITRATE 50 UG/ML
INJECTION, SOLUTION INTRAMUSCULAR; INTRAVENOUS
Status: DISPENSED
Start: 2021-02-09 | End: 2021-02-10

## 2021-02-09 RX ORDER — ROCURONIUM BROMIDE 10 MG/ML
INJECTION, SOLUTION INTRAVENOUS
Status: DISCONTINUED | OUTPATIENT
Start: 2021-02-09 | End: 2021-02-09

## 2021-02-09 RX ADMIN — HYDROMORPHONE HYDROCHLORIDE 0.2 MG: 1 INJECTION, SOLUTION INTRAMUSCULAR; INTRAVENOUS; SUBCUTANEOUS at 05:02

## 2021-02-09 RX ADMIN — ROPIVACAINE HYDROCHLORIDE 6 ML/HR: 2 INJECTION, SOLUTION EPIDURAL; INFILTRATION at 03:02

## 2021-02-09 RX ADMIN — ROCURONIUM BROMIDE 50 MG: 10 INJECTION, SOLUTION INTRAVENOUS at 04:02

## 2021-02-09 RX ADMIN — CEFTAROLINE FOSAMIL 600 MG: 600 POWDER, FOR SOLUTION INTRAVENOUS at 11:02

## 2021-02-09 RX ADMIN — FENTANYL CITRATE 50 MCG: 50 INJECTION INTRAMUSCULAR; INTRAVENOUS at 06:02

## 2021-02-09 RX ADMIN — FENTANYL CITRATE 25 MCG: 50 INJECTION, SOLUTION INTRAMUSCULAR; INTRAVENOUS at 05:02

## 2021-02-09 RX ADMIN — MIDAZOLAM HYDROCHLORIDE 2 MG: 1 INJECTION, SOLUTION INTRAMUSCULAR; INTRAVENOUS at 03:02

## 2021-02-09 RX ADMIN — ACETAMINOPHEN 1000 MG: 500 TABLET ORAL at 05:02

## 2021-02-09 RX ADMIN — FENTANYL CITRATE 100 MCG: 50 INJECTION, SOLUTION INTRAMUSCULAR; INTRAVENOUS at 12:02

## 2021-02-09 RX ADMIN — DEXMEDETOMIDINE HYDROCHLORIDE 8 MCG: 100 INJECTION, SOLUTION, CONCENTRATE INTRAVENOUS at 04:02

## 2021-02-09 RX ADMIN — DEXMEDETOMIDINE HYDROCHLORIDE 12 MCG: 100 INJECTION, SOLUTION, CONCENTRATE INTRAVENOUS at 04:02

## 2021-02-09 RX ADMIN — OXYCODONE 5 MG: 5 TABLET ORAL at 05:02

## 2021-02-09 RX ADMIN — ROPIVACAINE HYDROCHLORIDE 20 ML: 5 INJECTION, SOLUTION EPIDURAL; INFILTRATION; PERINEURAL at 12:02

## 2021-02-09 RX ADMIN — FENTANYL CITRATE 50 MCG: 50 INJECTION INTRAMUSCULAR; INTRAVENOUS at 05:02

## 2021-02-09 RX ADMIN — Medication 25 MG: at 04:02

## 2021-02-09 RX ADMIN — FENTANYL CITRATE 100 MCG: 50 INJECTION, SOLUTION INTRAMUSCULAR; INTRAVENOUS at 04:02

## 2021-02-09 RX ADMIN — OXYCODONE 5 MG: 5 TABLET ORAL at 07:02

## 2021-02-09 RX ADMIN — PROPOFOL 100 MG: 10 INJECTION, EMULSION INTRAVENOUS at 04:02

## 2021-02-09 RX ADMIN — MIRTAZAPINE 15 MG: 15 TABLET, FILM COATED ORAL at 09:02

## 2021-02-09 RX ADMIN — ONDANSETRON 4 MG: 2 INJECTION, SOLUTION INTRAMUSCULAR; INTRAVENOUS at 04:02

## 2021-02-09 RX ADMIN — CEFAZOLIN 2 G: 330 INJECTION, POWDER, FOR SOLUTION INTRAMUSCULAR; INTRAVENOUS at 04:02

## 2021-02-09 RX ADMIN — HYDROMORPHONE HYDROCHLORIDE 1 MG: 1 INJECTION, SOLUTION INTRAMUSCULAR; INTRAVENOUS; SUBCUTANEOUS at 11:02

## 2021-02-09 RX ADMIN — CEFTAROLINE FOSAMIL 600 MG: 600 POWDER, FOR SOLUTION INTRAVENOUS at 05:02

## 2021-02-09 RX ADMIN — DEXMEDETOMIDINE HYDROCHLORIDE 6 MCG: 100 INJECTION, SOLUTION, CONCENTRATE INTRAVENOUS at 05:02

## 2021-02-09 RX ADMIN — GLYCOPYRROLATE 0.4 MG: 0.2 INJECTION, SOLUTION INTRAMUSCULAR; INTRAVITREAL at 04:02

## 2021-02-09 RX ADMIN — CELECOXIB 200 MG: 100 CAPSULE ORAL at 09:02

## 2021-02-09 RX ADMIN — ACETAMINOPHEN 1000 MG: 500 TABLET ORAL at 06:02

## 2021-02-09 RX ADMIN — PHENYLEPHRINE HYDROCHLORIDE 100 MCG: 10 INJECTION INTRAVENOUS at 04:02

## 2021-02-09 RX ADMIN — GABAPENTIN 300 MG: 300 CAPSULE ORAL at 09:02

## 2021-02-09 RX ADMIN — NEOSTIGMINE METHYLSULFATE 4 MG: 0.5 INJECTION INTRAVENOUS at 04:02

## 2021-02-09 RX ADMIN — SODIUM CHLORIDE: 0.9 INJECTION, SOLUTION INTRAVENOUS at 03:02

## 2021-02-09 RX ADMIN — DEXAMETHASONE SODIUM PHOSPHATE 4 MG: 4 INJECTION, SOLUTION INTRAMUSCULAR; INTRAVENOUS at 04:02

## 2021-02-09 RX ADMIN — HYDROMORPHONE HYDROCHLORIDE 1 MG: 2 INJECTION, SOLUTION INTRAMUSCULAR; INTRAVENOUS; SUBCUTANEOUS at 05:02

## 2021-02-09 RX ADMIN — MIDAZOLAM 6 MG: 1 INJECTION INTRAMUSCULAR; INTRAVENOUS at 12:02

## 2021-02-09 RX ADMIN — NEOSTIGMINE METHYLSULFATE 1 MG: 0.5 INJECTION INTRAVENOUS at 04:02

## 2021-02-09 RX ADMIN — LIDOCAINE HYDROCHLORIDE 50 MG: 20 INJECTION, SOLUTION INTRAVENOUS at 04:02

## 2021-02-10 LAB
ALBUMIN SERPL BCP-MCNC: 2.1 G/DL (ref 3.5–5.2)
ALP SERPL-CCNC: 82 U/L (ref 55–135)
ALT SERPL W/O P-5'-P-CCNC: 9 U/L (ref 10–44)
ANION GAP SERPL CALC-SCNC: 8 MMOL/L (ref 8–16)
APTT BLDCRRT: 24.6 SEC (ref 21–32)
AST SERPL-CCNC: 13 U/L (ref 10–40)
BACTERIA BLD CULT: ABNORMAL
BACTERIA SPEC AEROBE CULT: ABNORMAL
BACTERIA SPEC AEROBE CULT: ABNORMAL
BACTERIA SPEC AEROBE CULT: NO GROWTH
BASOPHILS # BLD AUTO: 0.03 K/UL (ref 0–0.2)
BASOPHILS NFR BLD: 0.3 % (ref 0–1.9)
BILIRUB SERPL-MCNC: 0.2 MG/DL (ref 0.1–1)
BUN SERPL-MCNC: 16 MG/DL (ref 6–20)
CALCIUM SERPL-MCNC: 8.4 MG/DL (ref 8.7–10.5)
CHLORIDE SERPL-SCNC: 105 MMOL/L (ref 95–110)
CO2 SERPL-SCNC: 22 MMOL/L (ref 23–29)
CREAT SERPL-MCNC: 0.8 MG/DL (ref 0.5–1.4)
DIFFERENTIAL METHOD: ABNORMAL
EOSINOPHIL # BLD AUTO: 0 K/UL (ref 0–0.5)
EOSINOPHIL NFR BLD: 0.1 % (ref 0–8)
ERYTHROCYTE [DISTWIDTH] IN BLOOD BY AUTOMATED COUNT: 17.1 % (ref 11.5–14.5)
EST. GFR  (AFRICAN AMERICAN): >60 ML/MIN/1.73 M^2
EST. GFR  (NON AFRICAN AMERICAN): >60 ML/MIN/1.73 M^2
GLUCOSE SERPL-MCNC: 105 MG/DL (ref 70–110)
HCT VFR BLD AUTO: 34.2 % (ref 37–48.5)
HGB BLD-MCNC: 9.9 G/DL (ref 12–16)
HIV 1+2 AB+HIV1 P24 AG SERPL QL IA: NEGATIVE
IMM GRANULOCYTES # BLD AUTO: 0.13 K/UL (ref 0–0.04)
IMM GRANULOCYTES NFR BLD AUTO: 1.5 % (ref 0–0.5)
INR PPP: 1.2 (ref 0.8–1.2)
LYMPHOCYTES # BLD AUTO: 1.6 K/UL (ref 1–4.8)
LYMPHOCYTES NFR BLD: 17.6 % (ref 18–48)
MAGNESIUM SERPL-MCNC: 2 MG/DL (ref 1.6–2.6)
MCH RBC QN AUTO: 25.1 PG (ref 27–31)
MCHC RBC AUTO-ENTMCNC: 28.9 G/DL (ref 32–36)
MCV RBC AUTO: 87 FL (ref 82–98)
MONOCYTES # BLD AUTO: 0.4 K/UL (ref 0.3–1)
MONOCYTES NFR BLD: 4.7 % (ref 4–15)
NEUTROPHILS # BLD AUTO: 6.7 K/UL (ref 1.8–7.7)
NEUTROPHILS NFR BLD: 75.8 % (ref 38–73)
NRBC BLD-RTO: 0 /100 WBC
PHOSPHATE SERPL-MCNC: 4 MG/DL (ref 2.7–4.5)
PLATELET # BLD AUTO: 378 K/UL (ref 150–350)
PMV BLD AUTO: 10.5 FL (ref 9.2–12.9)
POTASSIUM SERPL-SCNC: 4.9 MMOL/L (ref 3.5–5.1)
PROT SERPL-MCNC: 7.7 G/DL (ref 6–8.4)
PROTHROMBIN TIME: 12.8 SEC (ref 9–12.5)
RBC # BLD AUTO: 3.94 M/UL (ref 4–5.4)
SODIUM SERPL-SCNC: 135 MMOL/L (ref 136–145)
WBC # BLD AUTO: 8.86 K/UL (ref 3.9–12.7)

## 2021-02-10 PROCEDURE — 36415 COLL VENOUS BLD VENIPUNCTURE: CPT

## 2021-02-10 PROCEDURE — 99231 SBSQ HOSP IP/OBS SF/LOW 25: CPT | Mod: ,,, | Performed by: ANESTHESIOLOGY

## 2021-02-10 PROCEDURE — 25000003 PHARM REV CODE 250: Performed by: STUDENT IN AN ORGANIZED HEALTH CARE EDUCATION/TRAINING PROGRAM

## 2021-02-10 PROCEDURE — 83735 ASSAY OF MAGNESIUM: CPT

## 2021-02-10 PROCEDURE — 25500020 PHARM REV CODE 255: Performed by: HOSPITALIST

## 2021-02-10 PROCEDURE — 85730 THROMBOPLASTIN TIME PARTIAL: CPT

## 2021-02-10 PROCEDURE — 86703 HIV-1/HIV-2 1 RESULT ANTBDY: CPT

## 2021-02-10 PROCEDURE — 99232 SBSQ HOSP IP/OBS MODERATE 35: CPT | Mod: ,,, | Performed by: PSYCHIATRY & NEUROLOGY

## 2021-02-10 PROCEDURE — 85610 PROTHROMBIN TIME: CPT

## 2021-02-10 PROCEDURE — 84100 ASSAY OF PHOSPHORUS: CPT

## 2021-02-10 PROCEDURE — 76937 US GUIDE VASCULAR ACCESS: CPT

## 2021-02-10 PROCEDURE — 99233 PR SUBSEQUENT HOSPITAL CARE,LEVL III: ICD-10-PCS | Mod: ,,, | Performed by: HOSPITALIST

## 2021-02-10 PROCEDURE — 63600175 PHARM REV CODE 636 W HCPCS: Performed by: STUDENT IN AN ORGANIZED HEALTH CARE EDUCATION/TRAINING PROGRAM

## 2021-02-10 PROCEDURE — 63600175 PHARM REV CODE 636 W HCPCS: Mod: JG | Performed by: STUDENT IN AN ORGANIZED HEALTH CARE EDUCATION/TRAINING PROGRAM

## 2021-02-10 PROCEDURE — 99232 PR SUBSEQUENT HOSPITAL CARE,LEVL II: ICD-10-PCS | Mod: ,,, | Performed by: PSYCHIATRY & NEUROLOGY

## 2021-02-10 PROCEDURE — 85025 COMPLETE CBC W/AUTO DIFF WBC: CPT

## 2021-02-10 PROCEDURE — 20600001 HC STEP DOWN PRIVATE ROOM

## 2021-02-10 PROCEDURE — 25000003 PHARM REV CODE 250: Performed by: HOSPITALIST

## 2021-02-10 PROCEDURE — 87040 BLOOD CULTURE FOR BACTERIA: CPT

## 2021-02-10 PROCEDURE — 80053 COMPREHEN METABOLIC PANEL: CPT

## 2021-02-10 PROCEDURE — 36410 VNPNXR 3YR/> PHY/QHP DX/THER: CPT

## 2021-02-10 PROCEDURE — 99233 PR SUBSEQUENT HOSPITAL CARE,LEVL III: ICD-10-PCS | Mod: ,,, | Performed by: NURSE PRACTITIONER

## 2021-02-10 PROCEDURE — 99233 SBSQ HOSP IP/OBS HIGH 50: CPT | Mod: ,,, | Performed by: NURSE PRACTITIONER

## 2021-02-10 PROCEDURE — 99231 PR SUBSEQUENT HOSPITAL CARE,LEVL I: ICD-10-PCS | Mod: ,,, | Performed by: ANESTHESIOLOGY

## 2021-02-10 PROCEDURE — 99233 SBSQ HOSP IP/OBS HIGH 50: CPT | Mod: ,,, | Performed by: HOSPITALIST

## 2021-02-10 RX ORDER — OXYCODONE HYDROCHLORIDE 10 MG/1
10 TABLET ORAL EVERY 4 HOURS PRN
Status: DISCONTINUED | OUTPATIENT
Start: 2021-02-10 | End: 2021-02-11

## 2021-02-10 RX ORDER — HEPARIN SODIUM,PORCINE/D5W 25000/250
0-40 INTRAVENOUS SOLUTION INTRAVENOUS CONTINUOUS
Status: DISCONTINUED | OUTPATIENT
Start: 2021-02-10 | End: 2021-02-18

## 2021-02-10 RX ADMIN — OXYCODONE 5 MG: 5 TABLET ORAL at 06:02

## 2021-02-10 RX ADMIN — ACETAMINOPHEN 1000 MG: 500 TABLET ORAL at 06:02

## 2021-02-10 RX ADMIN — GABAPENTIN 300 MG: 300 CAPSULE ORAL at 10:02

## 2021-02-10 RX ADMIN — IOHEXOL 75 ML: 350 INJECTION, SOLUTION INTRAVENOUS at 05:02

## 2021-02-10 RX ADMIN — ACETAMINOPHEN 1000 MG: 500 TABLET ORAL at 02:02

## 2021-02-10 RX ADMIN — GABAPENTIN 300 MG: 300 CAPSULE ORAL at 09:02

## 2021-02-10 RX ADMIN — CEFTAROLINE FOSAMIL 600 MG: 600 POWDER, FOR SOLUTION INTRAVENOUS at 03:02

## 2021-02-10 RX ADMIN — OXYCODONE HYDROCHLORIDE 10 MG: 10 TABLET ORAL at 06:02

## 2021-02-10 RX ADMIN — CELECOXIB 200 MG: 100 CAPSULE ORAL at 10:02

## 2021-02-10 RX ADMIN — OXYCODONE HYDROCHLORIDE 10 MG: 10 TABLET ORAL at 02:02

## 2021-02-10 RX ADMIN — HYDROMORPHONE HYDROCHLORIDE 0.5 MG: 1 INJECTION, SOLUTION INTRAMUSCULAR; INTRAVENOUS; SUBCUTANEOUS at 10:02

## 2021-02-10 RX ADMIN — ACETAMINOPHEN 1000 MG: 500 TABLET ORAL at 09:02

## 2021-02-10 RX ADMIN — HEPARIN SODIUM AND DEXTROSE 18 UNITS/KG/HR: 10000; 5 INJECTION INTRAVENOUS at 06:02

## 2021-02-10 RX ADMIN — GABAPENTIN 300 MG: 300 CAPSULE ORAL at 03:02

## 2021-02-10 RX ADMIN — MIRTAZAPINE 15 MG: 15 TABLET, FILM COATED ORAL at 09:02

## 2021-02-11 LAB
ALBUMIN SERPL BCP-MCNC: 2.3 G/DL (ref 3.5–5.2)
ALP SERPL-CCNC: 76 U/L (ref 55–135)
ALT SERPL W/O P-5'-P-CCNC: 8 U/L (ref 10–44)
ANION GAP SERPL CALC-SCNC: 10 MMOL/L (ref 8–16)
APTT BLDCRRT: 26.9 SEC (ref 21–32)
APTT BLDCRRT: 27.7 SEC (ref 21–32)
APTT BLDCRRT: 30.4 SEC (ref 21–32)
APTT BLDCRRT: 32.8 SEC (ref 21–32)
AST SERPL-CCNC: 13 U/L (ref 10–40)
BACTERIA SPEC ANAEROBE CULT: NORMAL
BASOPHILS # BLD AUTO: 0.07 K/UL (ref 0–0.2)
BASOPHILS NFR BLD: 0.7 % (ref 0–1.9)
BILIRUB SERPL-MCNC: 0.2 MG/DL (ref 0.1–1)
BUN SERPL-MCNC: 14 MG/DL (ref 6–20)
CALCIUM SERPL-MCNC: 8.3 MG/DL (ref 8.7–10.5)
CHLORIDE SERPL-SCNC: 105 MMOL/L (ref 95–110)
CO2 SERPL-SCNC: 23 MMOL/L (ref 23–29)
CREAT SERPL-MCNC: 0.8 MG/DL (ref 0.5–1.4)
DIFFERENTIAL METHOD: ABNORMAL
EOSINOPHIL # BLD AUTO: 0.1 K/UL (ref 0–0.5)
EOSINOPHIL NFR BLD: 1.1 % (ref 0–8)
ERYTHROCYTE [DISTWIDTH] IN BLOOD BY AUTOMATED COUNT: 16.7 % (ref 11.5–14.5)
EST. GFR  (AFRICAN AMERICAN): >60 ML/MIN/1.73 M^2
EST. GFR  (NON AFRICAN AMERICAN): >60 ML/MIN/1.73 M^2
GLUCOSE SERPL-MCNC: 88 MG/DL (ref 70–110)
HCT VFR BLD AUTO: 28.6 % (ref 37–48.5)
HGB BLD-MCNC: 8.5 G/DL (ref 12–16)
IMM GRANULOCYTES # BLD AUTO: 0.13 K/UL (ref 0–0.04)
IMM GRANULOCYTES NFR BLD AUTO: 1.2 % (ref 0–0.5)
LYMPHOCYTES # BLD AUTO: 2.2 K/UL (ref 1–4.8)
LYMPHOCYTES NFR BLD: 20.8 % (ref 18–48)
MAGNESIUM SERPL-MCNC: 1.9 MG/DL (ref 1.6–2.6)
MCH RBC QN AUTO: 25.6 PG (ref 27–31)
MCHC RBC AUTO-ENTMCNC: 29.7 G/DL (ref 32–36)
MCV RBC AUTO: 86 FL (ref 82–98)
MONOCYTES # BLD AUTO: 0.7 K/UL (ref 0.3–1)
MONOCYTES NFR BLD: 7 % (ref 4–15)
NEUTROPHILS # BLD AUTO: 7.2 K/UL (ref 1.8–7.7)
NEUTROPHILS NFR BLD: 69.2 % (ref 38–73)
NRBC BLD-RTO: 0 /100 WBC
PHOSPHATE SERPL-MCNC: 3.8 MG/DL (ref 2.7–4.5)
PLATELET # BLD AUTO: 435 K/UL (ref 150–350)
PMV BLD AUTO: 10.2 FL (ref 9.2–12.9)
POTASSIUM SERPL-SCNC: 3.8 MMOL/L (ref 3.5–5.1)
PROT SERPL-MCNC: 7.6 G/DL (ref 6–8.4)
RBC # BLD AUTO: 3.32 M/UL (ref 4–5.4)
SARS-COV-2 RNA RESP QL NAA+PROBE: NOT DETECTED
SODIUM SERPL-SCNC: 138 MMOL/L (ref 136–145)
WBC # BLD AUTO: 10.47 K/UL (ref 3.9–12.7)

## 2021-02-11 PROCEDURE — 25000003 PHARM REV CODE 250: Performed by: STUDENT IN AN ORGANIZED HEALTH CARE EDUCATION/TRAINING PROGRAM

## 2021-02-11 PROCEDURE — 99231 SBSQ HOSP IP/OBS SF/LOW 25: CPT | Mod: ,,, | Performed by: ANESTHESIOLOGY

## 2021-02-11 PROCEDURE — 63600175 PHARM REV CODE 636 W HCPCS: Performed by: PHYSICIAN ASSISTANT

## 2021-02-11 PROCEDURE — 99233 PR SUBSEQUENT HOSPITAL CARE,LEVL III: ICD-10-PCS | Mod: ,,, | Performed by: INTERNAL MEDICINE

## 2021-02-11 PROCEDURE — 83735 ASSAY OF MAGNESIUM: CPT

## 2021-02-11 PROCEDURE — 99231 PR SUBSEQUENT HOSPITAL CARE,LEVL I: ICD-10-PCS | Mod: ,,, | Performed by: ANESTHESIOLOGY

## 2021-02-11 PROCEDURE — 63600175 PHARM REV CODE 636 W HCPCS: Performed by: STUDENT IN AN ORGANIZED HEALTH CARE EDUCATION/TRAINING PROGRAM

## 2021-02-11 PROCEDURE — C1751 CATH, INF, PER/CENT/MIDLINE: HCPCS

## 2021-02-11 PROCEDURE — 20600001 HC STEP DOWN PRIVATE ROOM

## 2021-02-11 PROCEDURE — 85730 THROMBOPLASTIN TIME PARTIAL: CPT | Mod: 91

## 2021-02-11 PROCEDURE — 99233 SBSQ HOSP IP/OBS HIGH 50: CPT | Mod: ,,, | Performed by: NURSE PRACTITIONER

## 2021-02-11 PROCEDURE — 25000003 PHARM REV CODE 250: Performed by: HOSPITALIST

## 2021-02-11 PROCEDURE — 85730 THROMBOPLASTIN TIME PARTIAL: CPT

## 2021-02-11 PROCEDURE — 99233 PR SUBSEQUENT HOSPITAL CARE,LEVL III: ICD-10-PCS | Mod: ,,, | Performed by: NURSE PRACTITIONER

## 2021-02-11 PROCEDURE — 25500020 PHARM REV CODE 255: Performed by: INTERNAL MEDICINE

## 2021-02-11 PROCEDURE — 84100 ASSAY OF PHOSPHORUS: CPT

## 2021-02-11 PROCEDURE — 25000003 PHARM REV CODE 250: Performed by: PHYSICIAN ASSISTANT

## 2021-02-11 PROCEDURE — U0003 INFECTIOUS AGENT DETECTION BY NUCLEIC ACID (DNA OR RNA); SEVERE ACUTE RESPIRATORY SYNDROME CORONAVIRUS 2 (SARS-COV-2) (CORONAVIRUS DISEASE [COVID-19]), AMPLIFIED PROBE TECHNIQUE, MAKING USE OF HIGH THROUGHPUT TECHNOLOGIES AS DESCRIBED BY CMS-2020-01-R: HCPCS

## 2021-02-11 PROCEDURE — 99233 SBSQ HOSP IP/OBS HIGH 50: CPT | Mod: ,,, | Performed by: INTERNAL MEDICINE

## 2021-02-11 PROCEDURE — 36415 COLL VENOUS BLD VENIPUNCTURE: CPT

## 2021-02-11 PROCEDURE — 80053 COMPREHEN METABOLIC PANEL: CPT

## 2021-02-11 PROCEDURE — 36573 INSJ PICC RS&I 5 YR+: CPT

## 2021-02-11 PROCEDURE — 85025 COMPLETE CBC W/AUTO DIFF WBC: CPT

## 2021-02-11 PROCEDURE — 76937 US GUIDE VASCULAR ACCESS: CPT

## 2021-02-11 PROCEDURE — 63600175 PHARM REV CODE 636 W HCPCS: Mod: JG | Performed by: STUDENT IN AN ORGANIZED HEALTH CARE EDUCATION/TRAINING PROGRAM

## 2021-02-11 RX ORDER — OXYCODONE HYDROCHLORIDE 5 MG/1
5 TABLET ORAL
Status: DISCONTINUED | OUTPATIENT
Start: 2021-02-11 | End: 2021-02-16

## 2021-02-11 RX ORDER — BUPIVACAINE HYDROCHLORIDE 2.5 MG/ML
INJECTION, SOLUTION EPIDURAL; INFILTRATION; INTRACAUDAL CODE/TRAUMA/SEDATION MEDICATION
Status: COMPLETED | OUTPATIENT
Start: 2021-02-11 | End: 2021-02-11

## 2021-02-11 RX ORDER — OXYCODONE HYDROCHLORIDE 10 MG/1
10 TABLET ORAL
Status: DISCONTINUED | OUTPATIENT
Start: 2021-02-11 | End: 2021-02-16

## 2021-02-11 RX ORDER — MIDAZOLAM HYDROCHLORIDE 1 MG/ML
INJECTION INTRAMUSCULAR; INTRAVENOUS CODE/TRAUMA/SEDATION MEDICATION
Status: COMPLETED | OUTPATIENT
Start: 2021-02-11 | End: 2021-02-11

## 2021-02-11 RX ORDER — MORPHINE SULFATE 2 MG/ML
2 INJECTION, SOLUTION INTRAMUSCULAR; INTRAVENOUS
Status: DISCONTINUED | OUTPATIENT
Start: 2021-02-11 | End: 2021-02-12

## 2021-02-11 RX ORDER — FENTANYL CITRATE 50 UG/ML
INJECTION, SOLUTION INTRAMUSCULAR; INTRAVENOUS CODE/TRAUMA/SEDATION MEDICATION
Status: COMPLETED | OUTPATIENT
Start: 2021-02-11 | End: 2021-02-11

## 2021-02-11 RX ADMIN — ACETAMINOPHEN 1000 MG: 500 TABLET ORAL at 09:02

## 2021-02-11 RX ADMIN — FENTANYL CITRATE 50 MCG: 50 INJECTION, SOLUTION INTRAMUSCULAR; INTRAVENOUS at 07:02

## 2021-02-11 RX ADMIN — OXYCODONE 5 MG: 5 TABLET ORAL at 05:02

## 2021-02-11 RX ADMIN — BUPIVACAINE HYDROCHLORIDE 3 ML: 2.5 INJECTION, SOLUTION EPIDURAL; INFILTRATION; INTRACAUDAL; PERINEURAL at 08:02

## 2021-02-11 RX ADMIN — GABAPENTIN 300 MG: 300 CAPSULE ORAL at 08:02

## 2021-02-11 RX ADMIN — MIDAZOLAM HYDROCHLORIDE 1 MG: 1 INJECTION, SOLUTION INTRAMUSCULAR; INTRAVENOUS at 07:02

## 2021-02-11 RX ADMIN — HEPARIN SODIUM AND DEXTROSE 21 UNITS/KG/HR: 10000; 5 INJECTION INTRAVENOUS at 09:02

## 2021-02-11 RX ADMIN — IOHEXOL 75 ML: 350 INJECTION, SOLUTION INTRAVENOUS at 01:02

## 2021-02-11 RX ADMIN — CEFTAROLINE FOSAMIL 600 MG: 600 POWDER, FOR SOLUTION INTRAVENOUS at 03:02

## 2021-02-11 RX ADMIN — OXYCODONE HYDROCHLORIDE 10 MG: 10 TABLET ORAL at 12:02

## 2021-02-11 RX ADMIN — DAPTOMYCIN 490 MG: 350 INJECTION, POWDER, LYOPHILIZED, FOR SOLUTION INTRAVENOUS at 08:02

## 2021-02-11 RX ADMIN — OXYCODONE 5 MG: 5 TABLET ORAL at 08:02

## 2021-02-11 RX ADMIN — CEFTAROLINE FOSAMIL 600 MG: 600 POWDER, FOR SOLUTION INTRAVENOUS at 01:02

## 2021-02-11 RX ADMIN — MIRTAZAPINE 15 MG: 15 TABLET, FILM COATED ORAL at 08:02

## 2021-02-11 RX ADMIN — HYDROMORPHONE HYDROCHLORIDE 0.5 MG: 1 INJECTION, SOLUTION INTRAMUSCULAR; INTRAVENOUS; SUBCUTANEOUS at 03:02

## 2021-02-11 RX ADMIN — CEFTAROLINE FOSAMIL 600 MG: 600 POWDER, FOR SOLUTION INTRAVENOUS at 02:02

## 2021-02-11 RX ADMIN — GABAPENTIN 300 MG: 300 CAPSULE ORAL at 09:02

## 2021-02-11 RX ADMIN — OXYCODONE HYDROCHLORIDE 10 MG: 10 TABLET ORAL at 06:02

## 2021-02-11 RX ADMIN — ROPIVACAINE HYDROCHLORIDE 6 ML/HR: 2 INJECTION, SOLUTION EPIDURAL; INFILTRATION at 03:02

## 2021-02-11 RX ADMIN — ACETAMINOPHEN 1000 MG: 500 TABLET ORAL at 05:02

## 2021-02-11 RX ADMIN — HEPARIN SODIUM AND DEXTROSE 21 UNITS/KG/HR: 10000; 5 INJECTION INTRAVENOUS at 05:02

## 2021-02-11 RX ADMIN — CEFTAROLINE FOSAMIL 600 MG: 600 POWDER, FOR SOLUTION INTRAVENOUS at 11:02

## 2021-02-11 RX ADMIN — GABAPENTIN 300 MG: 300 CAPSULE ORAL at 03:02

## 2021-02-11 RX ADMIN — ACETAMINOPHEN 1000 MG: 500 TABLET ORAL at 11:02

## 2021-02-11 RX ADMIN — CELECOXIB 200 MG: 100 CAPSULE ORAL at 09:02

## 2021-02-11 RX ADMIN — HEPARIN SODIUM AND DEXTROSE 60 UNITS/KG/HR: 10000; 5 INJECTION INTRAVENOUS at 08:02

## 2021-02-11 RX ADMIN — MIDAZOLAM HYDROCHLORIDE 1 MG: 1 INJECTION, SOLUTION INTRAMUSCULAR; INTRAVENOUS at 08:02

## 2021-02-11 RX ADMIN — HEPARIN SODIUM AND DEXTROSE 21 UNITS/KG/HR: 10000; 5 INJECTION INTRAVENOUS at 02:02

## 2021-02-12 LAB
ALBUMIN SERPL BCP-MCNC: 2.2 G/DL (ref 3.5–5.2)
ALP SERPL-CCNC: 70 U/L (ref 55–135)
ALT SERPL W/O P-5'-P-CCNC: 9 U/L (ref 10–44)
ANION GAP SERPL CALC-SCNC: 9 MMOL/L (ref 8–16)
ANISOCYTOSIS BLD QL SMEAR: SLIGHT
APTT BLDCRRT: 29.9 SEC (ref 21–32)
APTT BLDCRRT: 38.3 SEC (ref 21–32)
APTT BLDCRRT: 44.2 SEC (ref 21–32)
APTT BLDCRRT: 44.2 SEC (ref 21–32)
AST SERPL-CCNC: 14 U/L (ref 10–40)
BACTERIA SPEC AEROBE CULT: ABNORMAL
BACTERIA SPEC ANAEROBE CULT: NORMAL
BACTERIA SPEC ANAEROBE CULT: NORMAL
BASOPHILS # BLD AUTO: 0.09 K/UL (ref 0–0.2)
BASOPHILS NFR BLD: 1 % (ref 0–1.9)
BILIRUB SERPL-MCNC: 0.2 MG/DL (ref 0.1–1)
BUN SERPL-MCNC: 15 MG/DL (ref 6–20)
CALCIUM SERPL-MCNC: 8.2 MG/DL (ref 8.7–10.5)
CHLORIDE SERPL-SCNC: 109 MMOL/L (ref 95–110)
CO2 SERPL-SCNC: 23 MMOL/L (ref 23–29)
CREAT SERPL-MCNC: 0.9 MG/DL (ref 0.5–1.4)
DIFFERENTIAL METHOD: ABNORMAL
EOSINOPHIL # BLD AUTO: 0.1 K/UL (ref 0–0.5)
EOSINOPHIL NFR BLD: 0.9 % (ref 0–8)
ERYTHROCYTE [DISTWIDTH] IN BLOOD BY AUTOMATED COUNT: 16.8 % (ref 11.5–14.5)
EST. GFR  (AFRICAN AMERICAN): >60 ML/MIN/1.73 M^2
EST. GFR  (NON AFRICAN AMERICAN): >60 ML/MIN/1.73 M^2
GLUCOSE SERPL-MCNC: 96 MG/DL (ref 70–110)
HCT VFR BLD AUTO: 26.3 % (ref 37–48.5)
HGB BLD-MCNC: 7.8 G/DL (ref 12–16)
IMM GRANULOCYTES # BLD AUTO: 0.12 K/UL (ref 0–0.04)
IMM GRANULOCYTES NFR BLD AUTO: 1.4 % (ref 0–0.5)
LYMPHOCYTES # BLD AUTO: 2.4 K/UL (ref 1–4.8)
LYMPHOCYTES NFR BLD: 27.5 % (ref 18–48)
MAGNESIUM SERPL-MCNC: 2 MG/DL (ref 1.6–2.6)
MCH RBC QN AUTO: 25.7 PG (ref 27–31)
MCHC RBC AUTO-ENTMCNC: 29.7 G/DL (ref 32–36)
MCV RBC AUTO: 87 FL (ref 82–98)
MONOCYTES # BLD AUTO: 0.8 K/UL (ref 0.3–1)
MONOCYTES NFR BLD: 9.2 % (ref 4–15)
NEUTROPHILS # BLD AUTO: 5.1 K/UL (ref 1.8–7.7)
NEUTROPHILS NFR BLD: 60 % (ref 38–73)
NRBC BLD-RTO: 0 /100 WBC
OVALOCYTES BLD QL SMEAR: ABNORMAL
PHOSPHATE SERPL-MCNC: 3.7 MG/DL (ref 2.7–4.5)
PLATELET # BLD AUTO: 458 K/UL (ref 150–350)
PLATELET BLD QL SMEAR: ABNORMAL
PMV BLD AUTO: 9 FL (ref 9.2–12.9)
POLYCHROMASIA BLD QL SMEAR: ABNORMAL
POTASSIUM SERPL-SCNC: 3.7 MMOL/L (ref 3.5–5.1)
PROT SERPL-MCNC: 7.1 G/DL (ref 6–8.4)
RBC # BLD AUTO: 3.03 M/UL (ref 4–5.4)
SODIUM SERPL-SCNC: 141 MMOL/L (ref 136–145)
WBC # BLD AUTO: 8.58 K/UL (ref 3.9–12.7)

## 2021-02-12 PROCEDURE — 25000003 PHARM REV CODE 250: Performed by: STUDENT IN AN ORGANIZED HEALTH CARE EDUCATION/TRAINING PROGRAM

## 2021-02-12 PROCEDURE — 99499 NO LOS: ICD-10-PCS | Mod: ,,, | Performed by: THORACIC SURGERY (CARDIOTHORACIC VASCULAR SURGERY)

## 2021-02-12 PROCEDURE — 63600175 PHARM REV CODE 636 W HCPCS: Performed by: STUDENT IN AN ORGANIZED HEALTH CARE EDUCATION/TRAINING PROGRAM

## 2021-02-12 PROCEDURE — 36415 COLL VENOUS BLD VENIPUNCTURE: CPT

## 2021-02-12 PROCEDURE — 63600175 PHARM REV CODE 636 W HCPCS: Performed by: PHYSICIAN ASSISTANT

## 2021-02-12 PROCEDURE — 97802 MEDICAL NUTRITION INDIV IN: CPT

## 2021-02-12 PROCEDURE — 99231 SBSQ HOSP IP/OBS SF/LOW 25: CPT | Mod: ,,, | Performed by: ANESTHESIOLOGY

## 2021-02-12 PROCEDURE — 94761 N-INVAS EAR/PLS OXIMETRY MLT: CPT

## 2021-02-12 PROCEDURE — 99232 SBSQ HOSP IP/OBS MODERATE 35: CPT | Mod: ,,, | Performed by: INTERNAL MEDICINE

## 2021-02-12 PROCEDURE — 25000003 PHARM REV CODE 250: Performed by: PHYSICIAN ASSISTANT

## 2021-02-12 PROCEDURE — 97535 SELF CARE MNGMENT TRAINING: CPT

## 2021-02-12 PROCEDURE — 99499 UNLISTED E&M SERVICE: CPT | Mod: ,,, | Performed by: THORACIC SURGERY (CARDIOTHORACIC VASCULAR SURGERY)

## 2021-02-12 PROCEDURE — 63600175 PHARM REV CODE 636 W HCPCS: Mod: JG | Performed by: STUDENT IN AN ORGANIZED HEALTH CARE EDUCATION/TRAINING PROGRAM

## 2021-02-12 PROCEDURE — 99231 PR SUBSEQUENT HOSPITAL CARE,LEVL I: ICD-10-PCS | Mod: ,,, | Performed by: ANESTHESIOLOGY

## 2021-02-12 PROCEDURE — 84100 ASSAY OF PHOSPHORUS: CPT

## 2021-02-12 PROCEDURE — 99232 PR SUBSEQUENT HOSPITAL CARE,LEVL II: ICD-10-PCS | Mod: ,,, | Performed by: INTERNAL MEDICINE

## 2021-02-12 PROCEDURE — 85025 COMPLETE CBC W/AUTO DIFF WBC: CPT

## 2021-02-12 PROCEDURE — 83735 ASSAY OF MAGNESIUM: CPT

## 2021-02-12 PROCEDURE — 80053 COMPREHEN METABOLIC PANEL: CPT

## 2021-02-12 PROCEDURE — 85730 THROMBOPLASTIN TIME PARTIAL: CPT | Mod: 91

## 2021-02-12 PROCEDURE — 20600001 HC STEP DOWN PRIVATE ROOM

## 2021-02-12 PROCEDURE — 97530 THERAPEUTIC ACTIVITIES: CPT

## 2021-02-12 RX ORDER — ACETAMINOPHEN 500 MG
1000 TABLET ORAL EVERY 8 HOURS
Status: DISCONTINUED | OUTPATIENT
Start: 2021-02-12 | End: 2021-02-19 | Stop reason: HOSPADM

## 2021-02-12 RX ADMIN — HEPARIN SODIUM AND DEXTROSE 30 UNITS/KG/HR: 10000; 5 INJECTION INTRAVENOUS at 04:02

## 2021-02-12 RX ADMIN — CELECOXIB 200 MG: 100 CAPSULE ORAL at 08:02

## 2021-02-12 RX ADMIN — DAPTOMYCIN 490 MG: 350 INJECTION, POWDER, LYOPHILIZED, FOR SOLUTION INTRAVENOUS at 06:02

## 2021-02-12 RX ADMIN — GABAPENTIN 300 MG: 300 CAPSULE ORAL at 08:02

## 2021-02-12 RX ADMIN — MORPHINE SULFATE 2 MG: 2 INJECTION, SOLUTION INTRAMUSCULAR; INTRAVENOUS at 10:02

## 2021-02-12 RX ADMIN — MIRTAZAPINE 15 MG: 15 TABLET, FILM COATED ORAL at 09:02

## 2021-02-12 RX ADMIN — HEPARIN SODIUM AND DEXTROSE 26 UNITS/KG/HR: 10000; 5 INJECTION INTRAVENOUS at 10:02

## 2021-02-12 RX ADMIN — CEFTAROLINE FOSAMIL 600 MG: 600 POWDER, FOR SOLUTION INTRAVENOUS at 08:02

## 2021-02-12 RX ADMIN — ACETAMINOPHEN 1000 MG: 500 TABLET ORAL at 09:02

## 2021-02-12 RX ADMIN — OXYCODONE 5 MG: 5 TABLET ORAL at 05:02

## 2021-02-12 RX ADMIN — CEFTAROLINE FOSAMIL 600 MG: 600 POWDER, FOR SOLUTION INTRAVENOUS at 03:02

## 2021-02-12 RX ADMIN — OXYCODONE HYDROCHLORIDE 10 MG: 10 TABLET ORAL at 02:02

## 2021-02-12 RX ADMIN — GABAPENTIN 300 MG: 300 CAPSULE ORAL at 09:02

## 2021-02-12 RX ADMIN — OXYCODONE HYDROCHLORIDE 10 MG: 10 TABLET ORAL at 08:02

## 2021-02-12 RX ADMIN — OXYCODONE HYDROCHLORIDE 10 MG: 10 TABLET ORAL at 09:02

## 2021-02-12 RX ADMIN — GABAPENTIN 300 MG: 300 CAPSULE ORAL at 02:02

## 2021-02-12 RX ADMIN — HEPARIN SODIUM AND DEXTROSE 29 UNITS/KG/HR: 10000; 5 INJECTION INTRAVENOUS at 03:02

## 2021-02-12 RX ADMIN — ROPIVACAINE HYDROCHLORIDE 6 ML/HR: 2 INJECTION, SOLUTION EPIDURAL; INFILTRATION at 03:02

## 2021-02-13 LAB
ALBUMIN SERPL BCP-MCNC: 2.2 G/DL (ref 3.5–5.2)
ALP SERPL-CCNC: 72 U/L (ref 55–135)
ALT SERPL W/O P-5'-P-CCNC: 7 U/L (ref 10–44)
ANION GAP SERPL CALC-SCNC: 11 MMOL/L (ref 8–16)
APTT BLDCRRT: 36.9 SEC (ref 21–32)
APTT BLDCRRT: 40.4 SEC (ref 21–32)
APTT BLDCRRT: 45.6 SEC (ref 21–32)
AST SERPL-CCNC: 13 U/L (ref 10–40)
BACTERIA BLD CULT: NORMAL
BACTERIA BLD CULT: NORMAL
BASOPHILS # BLD AUTO: 0.12 K/UL (ref 0–0.2)
BASOPHILS NFR BLD: 1.3 % (ref 0–1.9)
BILIRUB SERPL-MCNC: 0.2 MG/DL (ref 0.1–1)
BUN SERPL-MCNC: 13 MG/DL (ref 6–20)
CALCIUM SERPL-MCNC: 8 MG/DL (ref 8.7–10.5)
CHLORIDE SERPL-SCNC: 108 MMOL/L (ref 95–110)
CO2 SERPL-SCNC: 22 MMOL/L (ref 23–29)
CREAT SERPL-MCNC: 0.8 MG/DL (ref 0.5–1.4)
DIFFERENTIAL METHOD: ABNORMAL
EOSINOPHIL # BLD AUTO: 0.1 K/UL (ref 0–0.5)
EOSINOPHIL NFR BLD: 1.2 % (ref 0–8)
ERYTHROCYTE [DISTWIDTH] IN BLOOD BY AUTOMATED COUNT: 17.8 % (ref 11.5–14.5)
EST. GFR  (AFRICAN AMERICAN): >60 ML/MIN/1.73 M^2
EST. GFR  (NON AFRICAN AMERICAN): >60 ML/MIN/1.73 M^2
GLUCOSE SERPL-MCNC: 87 MG/DL (ref 70–110)
HCT VFR BLD AUTO: 28.3 % (ref 37–48.5)
HGB BLD-MCNC: 8.4 G/DL (ref 12–16)
IMM GRANULOCYTES # BLD AUTO: 0.08 K/UL (ref 0–0.04)
IMM GRANULOCYTES NFR BLD AUTO: 0.8 % (ref 0–0.5)
LYMPHOCYTES # BLD AUTO: 2.6 K/UL (ref 1–4.8)
LYMPHOCYTES NFR BLD: 27.1 % (ref 18–48)
MAGNESIUM SERPL-MCNC: 2 MG/DL (ref 1.6–2.6)
MCH RBC QN AUTO: 26.1 PG (ref 27–31)
MCHC RBC AUTO-ENTMCNC: 29.7 G/DL (ref 32–36)
MCV RBC AUTO: 88 FL (ref 82–98)
MONOCYTES # BLD AUTO: 0.8 K/UL (ref 0.3–1)
MONOCYTES NFR BLD: 7.9 % (ref 4–15)
NEUTROPHILS # BLD AUTO: 5.8 K/UL (ref 1.8–7.7)
NEUTROPHILS NFR BLD: 61.7 % (ref 38–73)
NRBC BLD-RTO: 0 /100 WBC
PHOSPHATE SERPL-MCNC: 4.2 MG/DL (ref 2.7–4.5)
PLATELET # BLD AUTO: 548 K/UL (ref 150–350)
PMV BLD AUTO: 9.2 FL (ref 9.2–12.9)
POTASSIUM SERPL-SCNC: 3.7 MMOL/L (ref 3.5–5.1)
PROT SERPL-MCNC: 7.1 G/DL (ref 6–8.4)
RBC # BLD AUTO: 3.22 M/UL (ref 4–5.4)
SODIUM SERPL-SCNC: 141 MMOL/L (ref 136–145)
WBC # BLD AUTO: 9.44 K/UL (ref 3.9–12.7)

## 2021-02-13 PROCEDURE — 84100 ASSAY OF PHOSPHORUS: CPT

## 2021-02-13 PROCEDURE — 99232 SBSQ HOSP IP/OBS MODERATE 35: CPT | Mod: ,,, | Performed by: INTERNAL MEDICINE

## 2021-02-13 PROCEDURE — 25000003 PHARM REV CODE 250: Performed by: PHYSICIAN ASSISTANT

## 2021-02-13 PROCEDURE — 36415 COLL VENOUS BLD VENIPUNCTURE: CPT

## 2021-02-13 PROCEDURE — 85730 THROMBOPLASTIN TIME PARTIAL: CPT

## 2021-02-13 PROCEDURE — 25000003 PHARM REV CODE 250: Performed by: STUDENT IN AN ORGANIZED HEALTH CARE EDUCATION/TRAINING PROGRAM

## 2021-02-13 PROCEDURE — 99232 PR SUBSEQUENT HOSPITAL CARE,LEVL II: ICD-10-PCS | Mod: ,,, | Performed by: INTERNAL MEDICINE

## 2021-02-13 PROCEDURE — 83735 ASSAY OF MAGNESIUM: CPT

## 2021-02-13 PROCEDURE — 63600175 PHARM REV CODE 636 W HCPCS: Performed by: STUDENT IN AN ORGANIZED HEALTH CARE EDUCATION/TRAINING PROGRAM

## 2021-02-13 PROCEDURE — 80053 COMPREHEN METABOLIC PANEL: CPT

## 2021-02-13 PROCEDURE — 85025 COMPLETE CBC W/AUTO DIFF WBC: CPT

## 2021-02-13 PROCEDURE — 63600175 PHARM REV CODE 636 W HCPCS: Performed by: PHYSICIAN ASSISTANT

## 2021-02-13 PROCEDURE — 63600175 PHARM REV CODE 636 W HCPCS: Mod: JG | Performed by: STUDENT IN AN ORGANIZED HEALTH CARE EDUCATION/TRAINING PROGRAM

## 2021-02-13 PROCEDURE — 20600001 HC STEP DOWN PRIVATE ROOM

## 2021-02-13 RX ADMIN — OXYCODONE HYDROCHLORIDE 10 MG: 10 TABLET ORAL at 03:02

## 2021-02-13 RX ADMIN — OXYCODONE HYDROCHLORIDE 10 MG: 10 TABLET ORAL at 09:02

## 2021-02-13 RX ADMIN — GABAPENTIN 300 MG: 300 CAPSULE ORAL at 03:02

## 2021-02-13 RX ADMIN — HEPARIN SODIUM AND DEXTROSE 29 UNITS/KG/HR: 10000; 5 INJECTION INTRAVENOUS at 02:02

## 2021-02-13 RX ADMIN — MIRTAZAPINE 15 MG: 15 TABLET, FILM COATED ORAL at 09:02

## 2021-02-13 RX ADMIN — ACETAMINOPHEN 1000 MG: 500 TABLET ORAL at 03:02

## 2021-02-13 RX ADMIN — CEFTAROLINE FOSAMIL 600 MG: 600 POWDER, FOR SOLUTION INTRAVENOUS at 03:02

## 2021-02-13 RX ADMIN — OXYCODONE HYDROCHLORIDE 10 MG: 10 TABLET ORAL at 12:02

## 2021-02-13 RX ADMIN — ACETAMINOPHEN 1000 MG: 500 TABLET ORAL at 06:02

## 2021-02-13 RX ADMIN — CEFTAROLINE FOSAMIL 600 MG: 600 POWDER, FOR SOLUTION INTRAVENOUS at 08:02

## 2021-02-13 RX ADMIN — OXYCODONE HYDROCHLORIDE 10 MG: 10 TABLET ORAL at 08:02

## 2021-02-13 RX ADMIN — HEPARIN SODIUM AND DEXTROSE 31 UNITS/KG/HR: 10000; 5 INJECTION INTRAVENOUS at 09:02

## 2021-02-13 RX ADMIN — OXYCODONE HYDROCHLORIDE 10 MG: 10 TABLET ORAL at 06:02

## 2021-02-13 RX ADMIN — DAPTOMYCIN 490 MG: 350 INJECTION, POWDER, LYOPHILIZED, FOR SOLUTION INTRAVENOUS at 06:02

## 2021-02-13 RX ADMIN — GABAPENTIN 300 MG: 300 CAPSULE ORAL at 09:02

## 2021-02-13 RX ADMIN — ACETAMINOPHEN 1000 MG: 500 TABLET ORAL at 09:02

## 2021-02-13 RX ADMIN — CELECOXIB 200 MG: 100 CAPSULE ORAL at 08:02

## 2021-02-13 RX ADMIN — GABAPENTIN 300 MG: 300 CAPSULE ORAL at 08:02

## 2021-02-13 RX ADMIN — CEFTAROLINE FOSAMIL 600 MG: 600 POWDER, FOR SOLUTION INTRAVENOUS at 12:02

## 2021-02-14 LAB
ALBUMIN SERPL BCP-MCNC: 2.4 G/DL (ref 3.5–5.2)
ALP SERPL-CCNC: 69 U/L (ref 55–135)
ALT SERPL W/O P-5'-P-CCNC: 8 U/L (ref 10–44)
ANION GAP SERPL CALC-SCNC: 8 MMOL/L (ref 8–16)
APTT BLDCRRT: 44.1 SEC (ref 21–32)
AST SERPL-CCNC: 13 U/L (ref 10–40)
BASOPHILS # BLD AUTO: 0.13 K/UL (ref 0–0.2)
BASOPHILS NFR BLD: 1.4 % (ref 0–1.9)
BILIRUB SERPL-MCNC: 0.2 MG/DL (ref 0.1–1)
BUN SERPL-MCNC: 16 MG/DL (ref 6–20)
CALCIUM SERPL-MCNC: 8.4 MG/DL (ref 8.7–10.5)
CHLORIDE SERPL-SCNC: 108 MMOL/L (ref 95–110)
CO2 SERPL-SCNC: 22 MMOL/L (ref 23–29)
CREAT SERPL-MCNC: 0.8 MG/DL (ref 0.5–1.4)
DIFFERENTIAL METHOD: ABNORMAL
EOSINOPHIL # BLD AUTO: 0.2 K/UL (ref 0–0.5)
EOSINOPHIL NFR BLD: 1.9 % (ref 0–8)
ERYTHROCYTE [DISTWIDTH] IN BLOOD BY AUTOMATED COUNT: 17.9 % (ref 11.5–14.5)
EST. GFR  (AFRICAN AMERICAN): >60 ML/MIN/1.73 M^2
EST. GFR  (NON AFRICAN AMERICAN): >60 ML/MIN/1.73 M^2
GLUCOSE SERPL-MCNC: 106 MG/DL (ref 70–110)
HCT VFR BLD AUTO: 29.5 % (ref 37–48.5)
HGB BLD-MCNC: 8.4 G/DL (ref 12–16)
IMM GRANULOCYTES # BLD AUTO: 0.07 K/UL (ref 0–0.04)
IMM GRANULOCYTES NFR BLD AUTO: 0.7 % (ref 0–0.5)
LYMPHOCYTES # BLD AUTO: 2.1 K/UL (ref 1–4.8)
LYMPHOCYTES NFR BLD: 22.6 % (ref 18–48)
MAGNESIUM SERPL-MCNC: 2.1 MG/DL (ref 1.6–2.6)
MCH RBC QN AUTO: 25.5 PG (ref 27–31)
MCHC RBC AUTO-ENTMCNC: 28.5 G/DL (ref 32–36)
MCV RBC AUTO: 89 FL (ref 82–98)
MONOCYTES # BLD AUTO: 0.7 K/UL (ref 0.3–1)
MONOCYTES NFR BLD: 7.1 % (ref 4–15)
NEUTROPHILS # BLD AUTO: 6.2 K/UL (ref 1.8–7.7)
NEUTROPHILS NFR BLD: 66.3 % (ref 38–73)
NRBC BLD-RTO: 0 /100 WBC
PHOSPHATE SERPL-MCNC: 4.2 MG/DL (ref 2.7–4.5)
PLATELET # BLD AUTO: 498 K/UL (ref 150–350)
PMV BLD AUTO: 9 FL (ref 9.2–12.9)
POTASSIUM SERPL-SCNC: 4 MMOL/L (ref 3.5–5.1)
PROT SERPL-MCNC: 7.5 G/DL (ref 6–8.4)
RBC # BLD AUTO: 3.3 M/UL (ref 4–5.4)
SODIUM SERPL-SCNC: 138 MMOL/L (ref 136–145)
WBC # BLD AUTO: 9.34 K/UL (ref 3.9–12.7)

## 2021-02-14 PROCEDURE — 85025 COMPLETE CBC W/AUTO DIFF WBC: CPT

## 2021-02-14 PROCEDURE — 25000003 PHARM REV CODE 250: Performed by: PHYSICIAN ASSISTANT

## 2021-02-14 PROCEDURE — 99233 SBSQ HOSP IP/OBS HIGH 50: CPT | Mod: ,,, | Performed by: INTERNAL MEDICINE

## 2021-02-14 PROCEDURE — 25000003 PHARM REV CODE 250: Performed by: STUDENT IN AN ORGANIZED HEALTH CARE EDUCATION/TRAINING PROGRAM

## 2021-02-14 PROCEDURE — 63600175 PHARM REV CODE 636 W HCPCS: Performed by: STUDENT IN AN ORGANIZED HEALTH CARE EDUCATION/TRAINING PROGRAM

## 2021-02-14 PROCEDURE — 80053 COMPREHEN METABOLIC PANEL: CPT

## 2021-02-14 PROCEDURE — 63600175 PHARM REV CODE 636 W HCPCS: Mod: JG | Performed by: STUDENT IN AN ORGANIZED HEALTH CARE EDUCATION/TRAINING PROGRAM

## 2021-02-14 PROCEDURE — 20600001 HC STEP DOWN PRIVATE ROOM

## 2021-02-14 PROCEDURE — 63600175 PHARM REV CODE 636 W HCPCS: Performed by: PHYSICIAN ASSISTANT

## 2021-02-14 PROCEDURE — 83735 ASSAY OF MAGNESIUM: CPT

## 2021-02-14 PROCEDURE — 84100 ASSAY OF PHOSPHORUS: CPT

## 2021-02-14 PROCEDURE — 85730 THROMBOPLASTIN TIME PARTIAL: CPT

## 2021-02-14 PROCEDURE — 99233 PR SUBSEQUENT HOSPITAL CARE,LEVL III: ICD-10-PCS | Mod: ,,, | Performed by: INTERNAL MEDICINE

## 2021-02-14 RX ADMIN — MIRTAZAPINE 15 MG: 15 TABLET, FILM COATED ORAL at 08:02

## 2021-02-14 RX ADMIN — ACETAMINOPHEN 1000 MG: 500 TABLET ORAL at 03:02

## 2021-02-14 RX ADMIN — OXYCODONE HYDROCHLORIDE 10 MG: 10 TABLET ORAL at 01:02

## 2021-02-14 RX ADMIN — OXYCODONE HYDROCHLORIDE 10 MG: 10 TABLET ORAL at 03:02

## 2021-02-14 RX ADMIN — OXYCODONE HYDROCHLORIDE 10 MG: 10 TABLET ORAL at 06:02

## 2021-02-14 RX ADMIN — GABAPENTIN 300 MG: 300 CAPSULE ORAL at 09:02

## 2021-02-14 RX ADMIN — HEPARIN SODIUM AND DEXTROSE 31 UNITS/KG/HR: 10000; 5 INJECTION INTRAVENOUS at 12:02

## 2021-02-14 RX ADMIN — BACLOFEN 5 MG: 10 TABLET ORAL at 06:02

## 2021-02-14 RX ADMIN — GABAPENTIN 300 MG: 300 CAPSULE ORAL at 03:02

## 2021-02-14 RX ADMIN — OXYCODONE 5 MG: 5 TABLET ORAL at 08:02

## 2021-02-14 RX ADMIN — OXYCODONE HYDROCHLORIDE 10 MG: 10 TABLET ORAL at 09:02

## 2021-02-14 RX ADMIN — CEFTAROLINE FOSAMIL 600 MG: 600 POWDER, FOR SOLUTION INTRAVENOUS at 12:02

## 2021-02-14 RX ADMIN — CEFTAROLINE FOSAMIL 600 MG: 600 POWDER, FOR SOLUTION INTRAVENOUS at 05:02

## 2021-02-14 RX ADMIN — CEFTAROLINE FOSAMIL 600 MG: 600 POWDER, FOR SOLUTION INTRAVENOUS at 10:02

## 2021-02-14 RX ADMIN — GABAPENTIN 300 MG: 300 CAPSULE ORAL at 08:02

## 2021-02-14 RX ADMIN — ACETAMINOPHEN 1000 MG: 500 TABLET ORAL at 09:02

## 2021-02-14 RX ADMIN — DAPTOMYCIN 490 MG: 350 INJECTION, POWDER, LYOPHILIZED, FOR SOLUTION INTRAVENOUS at 06:02

## 2021-02-14 RX ADMIN — CELECOXIB 200 MG: 100 CAPSULE ORAL at 09:02

## 2021-02-15 LAB
ALBUMIN SERPL BCP-MCNC: 2.3 G/DL (ref 3.5–5.2)
ALP SERPL-CCNC: 65 U/L (ref 55–135)
ALT SERPL W/O P-5'-P-CCNC: 9 U/L (ref 10–44)
ANION GAP SERPL CALC-SCNC: 9 MMOL/L (ref 8–16)
APTT BLDCRRT: 50.4 SEC (ref 21–32)
AST SERPL-CCNC: 9 U/L (ref 10–40)
BACTERIA BLD CULT: NORMAL
BACTERIA SPEC ANAEROBE CULT: NORMAL
BACTERIA SPEC ANAEROBE CULT: NORMAL
BASOPHILS # BLD AUTO: 0.11 K/UL (ref 0–0.2)
BASOPHILS NFR BLD: 1.5 % (ref 0–1.9)
BILIRUB SERPL-MCNC: 0.2 MG/DL (ref 0.1–1)
BUN SERPL-MCNC: 15 MG/DL (ref 6–20)
CALCIUM SERPL-MCNC: 8.4 MG/DL (ref 8.7–10.5)
CHLORIDE SERPL-SCNC: 109 MMOL/L (ref 95–110)
CK SERPL-CCNC: 7 U/L (ref 20–180)
CO2 SERPL-SCNC: 22 MMOL/L (ref 23–29)
CREAT SERPL-MCNC: 0.8 MG/DL (ref 0.5–1.4)
DIFFERENTIAL METHOD: ABNORMAL
EOSINOPHIL # BLD AUTO: 0.2 K/UL (ref 0–0.5)
EOSINOPHIL NFR BLD: 2.8 % (ref 0–8)
ERYTHROCYTE [DISTWIDTH] IN BLOOD BY AUTOMATED COUNT: 18.5 % (ref 11.5–14.5)
EST. GFR  (AFRICAN AMERICAN): >60 ML/MIN/1.73 M^2
EST. GFR  (NON AFRICAN AMERICAN): >60 ML/MIN/1.73 M^2
GLUCOSE SERPL-MCNC: 99 MG/DL (ref 70–110)
HCT VFR BLD AUTO: 28.9 % (ref 37–48.5)
HGB BLD-MCNC: 8.6 G/DL (ref 12–16)
IMM GRANULOCYTES # BLD AUTO: 0.08 K/UL (ref 0–0.04)
IMM GRANULOCYTES NFR BLD AUTO: 1.1 % (ref 0–0.5)
LYMPHOCYTES # BLD AUTO: 2.2 K/UL (ref 1–4.8)
LYMPHOCYTES NFR BLD: 30 % (ref 18–48)
MAGNESIUM SERPL-MCNC: 2.1 MG/DL (ref 1.6–2.6)
MCH RBC QN AUTO: 26.6 PG (ref 27–31)
MCHC RBC AUTO-ENTMCNC: 29.8 G/DL (ref 32–36)
MCV RBC AUTO: 90 FL (ref 82–98)
MONOCYTES # BLD AUTO: 0.7 K/UL (ref 0.3–1)
MONOCYTES NFR BLD: 9 % (ref 4–15)
NEUTROPHILS # BLD AUTO: 4 K/UL (ref 1.8–7.7)
NEUTROPHILS NFR BLD: 55.6 % (ref 38–73)
NRBC BLD-RTO: 0 /100 WBC
PHOSPHATE SERPL-MCNC: 4.6 MG/DL (ref 2.7–4.5)
PLATELET # BLD AUTO: 470 K/UL (ref 150–350)
PMV BLD AUTO: 9.1 FL (ref 9.2–12.9)
POTASSIUM SERPL-SCNC: 3.8 MMOL/L (ref 3.5–5.1)
PROT SERPL-MCNC: 7.2 G/DL (ref 6–8.4)
RBC # BLD AUTO: 3.23 M/UL (ref 4–5.4)
SODIUM SERPL-SCNC: 140 MMOL/L (ref 136–145)
WBC # BLD AUTO: 7.21 K/UL (ref 3.9–12.7)

## 2021-02-15 PROCEDURE — 25000003 PHARM REV CODE 250: Performed by: PHYSICIAN ASSISTANT

## 2021-02-15 PROCEDURE — 80053 COMPREHEN METABOLIC PANEL: CPT

## 2021-02-15 PROCEDURE — 25000003 PHARM REV CODE 250: Performed by: STUDENT IN AN ORGANIZED HEALTH CARE EDUCATION/TRAINING PROGRAM

## 2021-02-15 PROCEDURE — 83735 ASSAY OF MAGNESIUM: CPT

## 2021-02-15 PROCEDURE — 20600001 HC STEP DOWN PRIVATE ROOM

## 2021-02-15 PROCEDURE — 99232 PR SUBSEQUENT HOSPITAL CARE,LEVL II: ICD-10-PCS | Mod: ,,, | Performed by: INTERNAL MEDICINE

## 2021-02-15 PROCEDURE — 99232 SBSQ HOSP IP/OBS MODERATE 35: CPT | Mod: ,,, | Performed by: INTERNAL MEDICINE

## 2021-02-15 PROCEDURE — 63600175 PHARM REV CODE 636 W HCPCS: Performed by: PHYSICIAN ASSISTANT

## 2021-02-15 PROCEDURE — 85730 THROMBOPLASTIN TIME PARTIAL: CPT

## 2021-02-15 PROCEDURE — 85025 COMPLETE CBC W/AUTO DIFF WBC: CPT

## 2021-02-15 PROCEDURE — 84100 ASSAY OF PHOSPHORUS: CPT

## 2021-02-15 PROCEDURE — 82550 ASSAY OF CK (CPK): CPT

## 2021-02-15 PROCEDURE — 63600175 PHARM REV CODE 636 W HCPCS: Performed by: STUDENT IN AN ORGANIZED HEALTH CARE EDUCATION/TRAINING PROGRAM

## 2021-02-15 PROCEDURE — 63600175 PHARM REV CODE 636 W HCPCS: Mod: JG | Performed by: STUDENT IN AN ORGANIZED HEALTH CARE EDUCATION/TRAINING PROGRAM

## 2021-02-15 RX ADMIN — DAPTOMYCIN 490 MG: 350 INJECTION, POWDER, LYOPHILIZED, FOR SOLUTION INTRAVENOUS at 06:02

## 2021-02-15 RX ADMIN — ACETAMINOPHEN 1000 MG: 500 TABLET ORAL at 09:02

## 2021-02-15 RX ADMIN — HEPARIN SODIUM AND DEXTROSE 31 UNITS/KG/HR: 10000; 5 INJECTION INTRAVENOUS at 06:02

## 2021-02-15 RX ADMIN — GABAPENTIN 300 MG: 300 CAPSULE ORAL at 02:02

## 2021-02-15 RX ADMIN — HEPARIN SODIUM AND DEXTROSE 31 UNITS/KG/HR: 10000; 5 INJECTION INTRAVENOUS at 08:02

## 2021-02-15 RX ADMIN — OXYCODONE HYDROCHLORIDE 10 MG: 10 TABLET ORAL at 02:02

## 2021-02-15 RX ADMIN — CELECOXIB 200 MG: 100 CAPSULE ORAL at 09:02

## 2021-02-15 RX ADMIN — OXYCODONE HYDROCHLORIDE 10 MG: 10 TABLET ORAL at 09:02

## 2021-02-15 RX ADMIN — CEFTAROLINE FOSAMIL 600 MG: 600 POWDER, FOR SOLUTION INTRAVENOUS at 12:02

## 2021-02-15 RX ADMIN — CEFTAROLINE FOSAMIL 600 MG: 600 POWDER, FOR SOLUTION INTRAVENOUS at 04:02

## 2021-02-15 RX ADMIN — CEFTAROLINE FOSAMIL 600 MG: 600 POWDER, FOR SOLUTION INTRAVENOUS at 10:02

## 2021-02-15 RX ADMIN — OXYCODONE HYDROCHLORIDE 10 MG: 10 TABLET ORAL at 04:02

## 2021-02-15 RX ADMIN — ACETAMINOPHEN 1000 MG: 500 TABLET ORAL at 06:02

## 2021-02-15 RX ADMIN — GABAPENTIN 300 MG: 300 CAPSULE ORAL at 09:02

## 2021-02-15 RX ADMIN — MIRTAZAPINE 15 MG: 15 TABLET, FILM COATED ORAL at 08:02

## 2021-02-15 RX ADMIN — OXYCODONE HYDROCHLORIDE 10 MG: 10 TABLET ORAL at 06:02

## 2021-02-15 RX ADMIN — GABAPENTIN 300 MG: 300 CAPSULE ORAL at 08:02

## 2021-02-15 RX ADMIN — OXYCODONE HYDROCHLORIDE 10 MG: 10 TABLET ORAL at 12:02

## 2021-02-16 LAB
ALBUMIN SERPL BCP-MCNC: 2.4 G/DL (ref 3.5–5.2)
ALP SERPL-CCNC: 74 U/L (ref 55–135)
ALT SERPL W/O P-5'-P-CCNC: 6 U/L (ref 10–44)
ANION GAP SERPL CALC-SCNC: 10 MMOL/L (ref 8–16)
APTT BLDCRRT: 55.6 SEC (ref 21–32)
AST SERPL-CCNC: 11 U/L (ref 10–40)
BASOPHILS # BLD AUTO: 0.15 K/UL (ref 0–0.2)
BASOPHILS NFR BLD: 2.1 % (ref 0–1.9)
BILIRUB SERPL-MCNC: 0.2 MG/DL (ref 0.1–1)
BUN SERPL-MCNC: 18 MG/DL (ref 6–20)
CALCIUM SERPL-MCNC: 8.9 MG/DL (ref 8.7–10.5)
CHLORIDE SERPL-SCNC: 107 MMOL/L (ref 95–110)
CO2 SERPL-SCNC: 22 MMOL/L (ref 23–29)
CREAT SERPL-MCNC: 0.8 MG/DL (ref 0.5–1.4)
DIFFERENTIAL METHOD: ABNORMAL
EOSINOPHIL # BLD AUTO: 0.2 K/UL (ref 0–0.5)
EOSINOPHIL NFR BLD: 2.6 % (ref 0–8)
ERYTHROCYTE [DISTWIDTH] IN BLOOD BY AUTOMATED COUNT: 18.5 % (ref 11.5–14.5)
EST. GFR  (AFRICAN AMERICAN): >60 ML/MIN/1.73 M^2
EST. GFR  (NON AFRICAN AMERICAN): >60 ML/MIN/1.73 M^2
GLUCOSE SERPL-MCNC: 101 MG/DL (ref 70–110)
HCT VFR BLD AUTO: 29.8 % (ref 37–48.5)
HGB BLD-MCNC: 8.6 G/DL (ref 12–16)
IMM GRANULOCYTES # BLD AUTO: 0.07 K/UL (ref 0–0.04)
IMM GRANULOCYTES NFR BLD AUTO: 1 % (ref 0–0.5)
LYMPHOCYTES # BLD AUTO: 2.2 K/UL (ref 1–4.8)
LYMPHOCYTES NFR BLD: 30.3 % (ref 18–48)
MAGNESIUM SERPL-MCNC: 2.1 MG/DL (ref 1.6–2.6)
MCH RBC QN AUTO: 26.1 PG (ref 27–31)
MCHC RBC AUTO-ENTMCNC: 28.9 G/DL (ref 32–36)
MCV RBC AUTO: 90 FL (ref 82–98)
MONOCYTES # BLD AUTO: 0.6 K/UL (ref 0.3–1)
MONOCYTES NFR BLD: 8.1 % (ref 4–15)
NEUTROPHILS # BLD AUTO: 4.1 K/UL (ref 1.8–7.7)
NEUTROPHILS NFR BLD: 55.9 % (ref 38–73)
NRBC BLD-RTO: 0 /100 WBC
PHOSPHATE SERPL-MCNC: 4.5 MG/DL (ref 2.7–4.5)
PLATELET # BLD AUTO: 437 K/UL (ref 150–350)
PMV BLD AUTO: 8.5 FL (ref 9.2–12.9)
POTASSIUM SERPL-SCNC: 3.9 MMOL/L (ref 3.5–5.1)
PROT SERPL-MCNC: 7.5 G/DL (ref 6–8.4)
RBC # BLD AUTO: 3.3 M/UL (ref 4–5.4)
SODIUM SERPL-SCNC: 139 MMOL/L (ref 136–145)
WBC # BLD AUTO: 7.29 K/UL (ref 3.9–12.7)

## 2021-02-16 PROCEDURE — 83735 ASSAY OF MAGNESIUM: CPT

## 2021-02-16 PROCEDURE — 25000003 PHARM REV CODE 250: Performed by: STUDENT IN AN ORGANIZED HEALTH CARE EDUCATION/TRAINING PROGRAM

## 2021-02-16 PROCEDURE — 85025 COMPLETE CBC W/AUTO DIFF WBC: CPT

## 2021-02-16 PROCEDURE — 99232 SBSQ HOSP IP/OBS MODERATE 35: CPT | Mod: ,,, | Performed by: INTERNAL MEDICINE

## 2021-02-16 PROCEDURE — 63600175 PHARM REV CODE 636 W HCPCS: Mod: JG | Performed by: STUDENT IN AN ORGANIZED HEALTH CARE EDUCATION/TRAINING PROGRAM

## 2021-02-16 PROCEDURE — 25000003 PHARM REV CODE 250: Performed by: INTERNAL MEDICINE

## 2021-02-16 PROCEDURE — 63600175 PHARM REV CODE 636 W HCPCS: Performed by: PHYSICIAN ASSISTANT

## 2021-02-16 PROCEDURE — 84100 ASSAY OF PHOSPHORUS: CPT

## 2021-02-16 PROCEDURE — 25000003 PHARM REV CODE 250: Performed by: PHYSICIAN ASSISTANT

## 2021-02-16 PROCEDURE — 80053 COMPREHEN METABOLIC PANEL: CPT

## 2021-02-16 PROCEDURE — 63600175 PHARM REV CODE 636 W HCPCS: Performed by: STUDENT IN AN ORGANIZED HEALTH CARE EDUCATION/TRAINING PROGRAM

## 2021-02-16 PROCEDURE — 85730 THROMBOPLASTIN TIME PARTIAL: CPT

## 2021-02-16 PROCEDURE — 99232 PR SUBSEQUENT HOSPITAL CARE,LEVL II: ICD-10-PCS | Mod: ,,, | Performed by: INTERNAL MEDICINE

## 2021-02-16 PROCEDURE — 63600175 PHARM REV CODE 636 W HCPCS: Mod: JG | Performed by: INTERNAL MEDICINE

## 2021-02-16 PROCEDURE — 20600001 HC STEP DOWN PRIVATE ROOM

## 2021-02-16 RX ORDER — MORPHINE SULFATE 30 MG/1
30 TABLET, FILM COATED, EXTENDED RELEASE ORAL EVERY 12 HOURS
Status: DISCONTINUED | OUTPATIENT
Start: 2021-02-16 | End: 2021-02-19

## 2021-02-16 RX ORDER — OXYCODONE HYDROCHLORIDE 5 MG/1
5 TABLET ORAL
Status: DISCONTINUED | OUTPATIENT
Start: 2021-02-16 | End: 2021-02-19 | Stop reason: HOSPADM

## 2021-02-16 RX ADMIN — ACETAMINOPHEN 1000 MG: 500 TABLET ORAL at 02:02

## 2021-02-16 RX ADMIN — HEPARIN SODIUM AND DEXTROSE 31 UNITS/KG/HR: 10000; 5 INJECTION INTRAVENOUS at 12:02

## 2021-02-16 RX ADMIN — OXYCODONE 5 MG: 5 TABLET ORAL at 12:02

## 2021-02-16 RX ADMIN — OXYCODONE HYDROCHLORIDE 10 MG: 10 TABLET ORAL at 09:02

## 2021-02-16 RX ADMIN — MORPHINE SULFATE 30 MG: 30 TABLET, FILM COATED, EXTENDED RELEASE ORAL at 08:02

## 2021-02-16 RX ADMIN — ACETAMINOPHEN 1000 MG: 500 TABLET ORAL at 08:02

## 2021-02-16 RX ADMIN — CELECOXIB 200 MG: 100 CAPSULE ORAL at 09:02

## 2021-02-16 RX ADMIN — GABAPENTIN 300 MG: 300 CAPSULE ORAL at 02:02

## 2021-02-16 RX ADMIN — MIRTAZAPINE 15 MG: 15 TABLET, FILM COATED ORAL at 08:02

## 2021-02-16 RX ADMIN — DAPTOMYCIN 490 MG: 350 INJECTION, POWDER, LYOPHILIZED, FOR SOLUTION INTRAVENOUS at 07:02

## 2021-02-16 RX ADMIN — GABAPENTIN 300 MG: 300 CAPSULE ORAL at 09:02

## 2021-02-16 RX ADMIN — CEFTAROLINE FOSAMIL 600 MG: 600 POWDER, FOR SOLUTION INTRAVENOUS at 08:02

## 2021-02-16 RX ADMIN — GABAPENTIN 300 MG: 300 CAPSULE ORAL at 08:02

## 2021-02-16 RX ADMIN — CEFTAROLINE FOSAMIL 600 MG: 600 POWDER, FOR SOLUTION INTRAVENOUS at 12:02

## 2021-02-16 RX ADMIN — OXYCODONE 5 MG: 5 TABLET ORAL at 02:02

## 2021-02-16 RX ADMIN — OXYCODONE HYDROCHLORIDE 10 MG: 10 TABLET ORAL at 06:02

## 2021-02-16 RX ADMIN — ACETAMINOPHEN 1000 MG: 500 TABLET ORAL at 06:02

## 2021-02-17 LAB
ALBUMIN SERPL BCP-MCNC: 2.3 G/DL (ref 3.5–5.2)
ALP SERPL-CCNC: 77 U/L (ref 55–135)
ALT SERPL W/O P-5'-P-CCNC: 7 U/L (ref 10–44)
ANION GAP SERPL CALC-SCNC: 9 MMOL/L (ref 8–16)
APTT BLDCRRT: 42.4 SEC (ref 21–32)
AST SERPL-CCNC: 9 U/L (ref 10–40)
BASOPHILS # BLD AUTO: 0.16 K/UL (ref 0–0.2)
BASOPHILS NFR BLD: 2.1 % (ref 0–1.9)
BILIRUB SERPL-MCNC: 0.2 MG/DL (ref 0.1–1)
BUN SERPL-MCNC: 22 MG/DL (ref 6–20)
CALCIUM SERPL-MCNC: 8.5 MG/DL (ref 8.7–10.5)
CHLORIDE SERPL-SCNC: 108 MMOL/L (ref 95–110)
CO2 SERPL-SCNC: 23 MMOL/L (ref 23–29)
CREAT SERPL-MCNC: 0.9 MG/DL (ref 0.5–1.4)
DIFFERENTIAL METHOD: ABNORMAL
EOSINOPHIL # BLD AUTO: 0.3 K/UL (ref 0–0.5)
EOSINOPHIL NFR BLD: 3.4 % (ref 0–8)
ERYTHROCYTE [DISTWIDTH] IN BLOOD BY AUTOMATED COUNT: 18.8 % (ref 11.5–14.5)
EST. GFR  (AFRICAN AMERICAN): >60 ML/MIN/1.73 M^2
EST. GFR  (NON AFRICAN AMERICAN): >60 ML/MIN/1.73 M^2
GLUCOSE SERPL-MCNC: 131 MG/DL (ref 70–110)
HCT VFR BLD AUTO: 30.9 % (ref 37–48.5)
HGB BLD-MCNC: 9.2 G/DL (ref 12–16)
IMM GRANULOCYTES # BLD AUTO: 0.07 K/UL (ref 0–0.04)
IMM GRANULOCYTES NFR BLD AUTO: 0.9 % (ref 0–0.5)
LYMPHOCYTES # BLD AUTO: 2.1 K/UL (ref 1–4.8)
LYMPHOCYTES NFR BLD: 27.4 % (ref 18–48)
MAGNESIUM SERPL-MCNC: 1.9 MG/DL (ref 1.6–2.6)
MCH RBC QN AUTO: 26.5 PG (ref 27–31)
MCHC RBC AUTO-ENTMCNC: 29.8 G/DL (ref 32–36)
MCV RBC AUTO: 89 FL (ref 82–98)
MONOCYTES # BLD AUTO: 0.5 K/UL (ref 0.3–1)
MONOCYTES NFR BLD: 6.8 % (ref 4–15)
NEUTROPHILS # BLD AUTO: 4.6 K/UL (ref 1.8–7.7)
NEUTROPHILS NFR BLD: 59.4 % (ref 38–73)
NRBC BLD-RTO: 0 /100 WBC
PHOSPHATE SERPL-MCNC: 4 MG/DL (ref 2.7–4.5)
PLATELET # BLD AUTO: 415 K/UL (ref 150–350)
PMV BLD AUTO: 8.4 FL (ref 9.2–12.9)
POTASSIUM SERPL-SCNC: 3.8 MMOL/L (ref 3.5–5.1)
PROT SERPL-MCNC: 7.6 G/DL (ref 6–8.4)
RBC # BLD AUTO: 3.47 M/UL (ref 4–5.4)
SODIUM SERPL-SCNC: 140 MMOL/L (ref 136–145)
WBC # BLD AUTO: 7.74 K/UL (ref 3.9–12.7)

## 2021-02-17 PROCEDURE — 63600175 PHARM REV CODE 636 W HCPCS: Mod: JG | Performed by: INTERNAL MEDICINE

## 2021-02-17 PROCEDURE — 63600175 PHARM REV CODE 636 W HCPCS: Performed by: STUDENT IN AN ORGANIZED HEALTH CARE EDUCATION/TRAINING PROGRAM

## 2021-02-17 PROCEDURE — 85730 THROMBOPLASTIN TIME PARTIAL: CPT

## 2021-02-17 PROCEDURE — 99233 PR SUBSEQUENT HOSPITAL CARE,LEVL III: ICD-10-PCS | Mod: ,,, | Performed by: NURSE PRACTITIONER

## 2021-02-17 PROCEDURE — 25000003 PHARM REV CODE 250: Performed by: STUDENT IN AN ORGANIZED HEALTH CARE EDUCATION/TRAINING PROGRAM

## 2021-02-17 PROCEDURE — 20600001 HC STEP DOWN PRIVATE ROOM

## 2021-02-17 PROCEDURE — 83735 ASSAY OF MAGNESIUM: CPT

## 2021-02-17 PROCEDURE — 85025 COMPLETE CBC W/AUTO DIFF WBC: CPT

## 2021-02-17 PROCEDURE — 99233 SBSQ HOSP IP/OBS HIGH 50: CPT | Mod: ,,, | Performed by: INTERNAL MEDICINE

## 2021-02-17 PROCEDURE — 80053 COMPREHEN METABOLIC PANEL: CPT

## 2021-02-17 PROCEDURE — 99233 SBSQ HOSP IP/OBS HIGH 50: CPT | Mod: ,,, | Performed by: NURSE PRACTITIONER

## 2021-02-17 PROCEDURE — 25000003 PHARM REV CODE 250: Performed by: INTERNAL MEDICINE

## 2021-02-17 PROCEDURE — 99233 PR SUBSEQUENT HOSPITAL CARE,LEVL III: ICD-10-PCS | Mod: ,,, | Performed by: INTERNAL MEDICINE

## 2021-02-17 PROCEDURE — 84100 ASSAY OF PHOSPHORUS: CPT

## 2021-02-17 RX ORDER — MIRTAZAPINE 15 MG/1
15 TABLET, FILM COATED ORAL NIGHTLY
Qty: 30 TABLET | Refills: 1 | Status: ON HOLD | OUTPATIENT
Start: 2021-02-17 | End: 2022-06-05 | Stop reason: HOSPADM

## 2021-02-17 RX ORDER — POLYETHYLENE GLYCOL 3350 17 G/17G
17 POWDER, FOR SOLUTION ORAL DAILY
Status: DISCONTINUED | OUTPATIENT
Start: 2021-02-17 | End: 2021-02-19 | Stop reason: HOSPADM

## 2021-02-17 RX ORDER — MORPHINE SULFATE 30 MG/1
30 TABLET, FILM COATED, EXTENDED RELEASE ORAL EVERY 12 HOURS
Qty: 60 TABLET | Refills: 0
Start: 2021-02-17 | End: 2021-02-18

## 2021-02-17 RX ORDER — VANCOMYCIN HCL IN 5 % DEXTROSE 1G/250ML
20 PLASTIC BAG, INJECTION (ML) INTRAVENOUS ONCE
Status: COMPLETED | OUTPATIENT
Start: 2021-02-17 | End: 2021-02-17

## 2021-02-17 RX ORDER — POLYETHYLENE GLYCOL 3350 17 G/17G
17 POWDER, FOR SOLUTION ORAL DAILY
Status: DISCONTINUED | OUTPATIENT
Start: 2021-02-18 | End: 2021-02-17

## 2021-02-17 RX ORDER — AMOXICILLIN 250 MG
1 CAPSULE ORAL DAILY PRN
Status: DISCONTINUED | OUTPATIENT
Start: 2021-02-17 | End: 2021-02-17

## 2021-02-17 RX ORDER — AMOXICILLIN 250 MG
1 CAPSULE ORAL 2 TIMES DAILY
Status: DISCONTINUED | OUTPATIENT
Start: 2021-02-17 | End: 2021-02-19 | Stop reason: HOSPADM

## 2021-02-17 RX ADMIN — GABAPENTIN 300 MG: 300 CAPSULE ORAL at 08:02

## 2021-02-17 RX ADMIN — ACETAMINOPHEN 1000 MG: 500 TABLET ORAL at 06:02

## 2021-02-17 RX ADMIN — CEFTAROLINE FOSAMIL 600 MG: 600 POWDER, FOR SOLUTION INTRAVENOUS at 11:02

## 2021-02-17 RX ADMIN — HEPARIN SODIUM AND DEXTROSE 30.96 UNITS/KG/HR: 10000; 5 INJECTION INTRAVENOUS at 07:02

## 2021-02-17 RX ADMIN — OXYCODONE 5 MG: 5 TABLET ORAL at 09:02

## 2021-02-17 RX ADMIN — DOCUSATE SODIUM 50MG AND SENNOSIDES 8.6MG 1 TABLET: 8.6; 5 TABLET, FILM COATED ORAL at 04:02

## 2021-02-17 RX ADMIN — ACETAMINOPHEN 1000 MG: 500 TABLET ORAL at 09:02

## 2021-02-17 RX ADMIN — CELECOXIB 200 MG: 100 CAPSULE ORAL at 08:02

## 2021-02-17 RX ADMIN — MORPHINE SULFATE 30 MG: 30 TABLET, FILM COATED, EXTENDED RELEASE ORAL at 08:02

## 2021-02-17 RX ADMIN — MORPHINE SULFATE 30 MG: 30 TABLET, FILM COATED, EXTENDED RELEASE ORAL at 09:02

## 2021-02-17 RX ADMIN — OXYCODONE 5 MG: 5 TABLET ORAL at 05:02

## 2021-02-17 RX ADMIN — MIRTAZAPINE 15 MG: 15 TABLET, FILM COATED ORAL at 09:02

## 2021-02-17 RX ADMIN — HEPARIN SODIUM AND DEXTROSE 31 UNITS/KG/HR: 10000; 5 INJECTION INTRAVENOUS at 03:02

## 2021-02-17 RX ADMIN — OXYCODONE 5 MG: 5 TABLET ORAL at 11:02

## 2021-02-17 RX ADMIN — VANCOMYCIN HYDROCHLORIDE 1000 MG: 1 INJECTION, POWDER, LYOPHILIZED, FOR SOLUTION INTRAVENOUS at 05:02

## 2021-02-17 RX ADMIN — CEFTAROLINE FOSAMIL 600 MG: 600 POWDER, FOR SOLUTION INTRAVENOUS at 04:02

## 2021-02-17 RX ADMIN — GABAPENTIN 300 MG: 300 CAPSULE ORAL at 09:02

## 2021-02-17 RX ADMIN — GABAPENTIN 300 MG: 300 CAPSULE ORAL at 02:02

## 2021-02-18 PROBLEM — H92.01 OTALGIA, RIGHT: Status: RESOLVED | Noted: 2020-11-23 | Resolved: 2021-02-18

## 2021-02-18 PROBLEM — U07.1 COVID-19 VIRUS DETECTED: Status: RESOLVED | Noted: 2020-11-12 | Resolved: 2021-02-18

## 2021-02-18 PROBLEM — N28.9 ACUTE RENAL INSUFFICIENCY: Status: RESOLVED | Noted: 2020-11-03 | Resolved: 2021-02-18

## 2021-02-18 PROBLEM — N17.9 AKI (ACUTE KIDNEY INJURY): Status: RESOLVED | Noted: 2021-02-02 | Resolved: 2021-02-18

## 2021-02-18 LAB — APTT BLDCRRT: 56.5 SEC (ref 21–32)

## 2021-02-18 PROCEDURE — 99233 PR SUBSEQUENT HOSPITAL CARE,LEVL III: ICD-10-PCS | Mod: ,,, | Performed by: HOSPITALIST

## 2021-02-18 PROCEDURE — 25000003 PHARM REV CODE 250: Performed by: STUDENT IN AN ORGANIZED HEALTH CARE EDUCATION/TRAINING PROGRAM

## 2021-02-18 PROCEDURE — 63600175 PHARM REV CODE 636 W HCPCS: Performed by: STUDENT IN AN ORGANIZED HEALTH CARE EDUCATION/TRAINING PROGRAM

## 2021-02-18 PROCEDURE — 20600001 HC STEP DOWN PRIVATE ROOM

## 2021-02-18 PROCEDURE — 99232 SBSQ HOSP IP/OBS MODERATE 35: CPT | Mod: ,,, | Performed by: PSYCHIATRY & NEUROLOGY

## 2021-02-18 PROCEDURE — 99232 PR SUBSEQUENT HOSPITAL CARE,LEVL II: ICD-10-PCS | Mod: ,,, | Performed by: PSYCHIATRY & NEUROLOGY

## 2021-02-18 PROCEDURE — 99233 SBSQ HOSP IP/OBS HIGH 50: CPT | Mod: ,,, | Performed by: HOSPITALIST

## 2021-02-18 PROCEDURE — 85730 THROMBOPLASTIN TIME PARTIAL: CPT

## 2021-02-18 RX ORDER — AMOXICILLIN 250 MG
1 CAPSULE ORAL 2 TIMES DAILY
Qty: 60 TABLET | Refills: 0 | Status: SHIPPED | OUTPATIENT
Start: 2021-02-18 | End: 2021-03-20

## 2021-02-18 RX ORDER — GABAPENTIN 300 MG/1
300 CAPSULE ORAL 3 TIMES DAILY
Status: CANCELLED | OUTPATIENT
Start: 2021-02-18

## 2021-02-18 RX ORDER — MORPHINE SULFATE 30 MG/1
30 TABLET, FILM COATED, EXTENDED RELEASE ORAL EVERY 12 HOURS
Qty: 60 TABLET | Refills: 0
Start: 2021-02-18 | End: 2021-02-19 | Stop reason: HOSPADM

## 2021-02-18 RX ORDER — AMOXICILLIN 250 MG
1 CAPSULE ORAL 2 TIMES DAILY
Status: CANCELLED | OUTPATIENT
Start: 2021-02-18

## 2021-02-18 RX ORDER — MORPHINE SULFATE 30 MG/1
30 TABLET, FILM COATED, EXTENDED RELEASE ORAL EVERY 12 HOURS
Status: CANCELLED | OUTPATIENT
Start: 2021-02-18 | End: 2021-02-23

## 2021-02-18 RX ORDER — POLYETHYLENE GLYCOL 3350 17 G/17G
17 POWDER, FOR SOLUTION ORAL DAILY
Qty: 510 G | Refills: 0 | Status: SHIPPED | OUTPATIENT
Start: 2021-02-19 | End: 2021-03-21

## 2021-02-18 RX ORDER — POLYETHYLENE GLYCOL 3350 17 G/17G
17 POWDER, FOR SOLUTION ORAL DAILY
Status: CANCELLED | OUTPATIENT
Start: 2021-02-19

## 2021-02-18 RX ORDER — LACTULOSE 10 G/15ML
20 SOLUTION ORAL
Status: DISPENSED | OUTPATIENT
Start: 2021-02-18 | End: 2021-02-18

## 2021-02-18 RX ORDER — ONDANSETRON 8 MG/1
8 TABLET, ORALLY DISINTEGRATING ORAL EVERY 8 HOURS PRN
Status: CANCELLED | OUTPATIENT
Start: 2021-02-18

## 2021-02-18 RX ORDER — MIRTAZAPINE 15 MG/1
15 TABLET, FILM COATED ORAL NIGHTLY
Status: CANCELLED | OUTPATIENT
Start: 2021-02-18

## 2021-02-18 RX ORDER — ACETAMINOPHEN 500 MG
1000 TABLET ORAL EVERY 8 HOURS
Status: CANCELLED | OUTPATIENT
Start: 2021-02-18

## 2021-02-18 RX ADMIN — VANCOMYCIN HYDROCHLORIDE 750 MG: 750 INJECTION, POWDER, LYOPHILIZED, FOR SOLUTION INTRAVENOUS at 08:02

## 2021-02-18 RX ADMIN — APIXABAN 10 MG: 5 TABLET, FILM COATED ORAL at 02:02

## 2021-02-18 RX ADMIN — DOCUSATE SODIUM 50MG AND SENNOSIDES 8.6MG 1 TABLET: 8.6; 5 TABLET, FILM COATED ORAL at 08:02

## 2021-02-18 RX ADMIN — MORPHINE SULFATE 30 MG: 30 TABLET, FILM COATED, EXTENDED RELEASE ORAL at 08:02

## 2021-02-18 RX ADMIN — HEPARIN SODIUM AND DEXTROSE 30.96 UNITS/KG/HR: 10000; 5 INJECTION INTRAVENOUS at 07:02

## 2021-02-18 RX ADMIN — MIRTAZAPINE 15 MG: 15 TABLET, FILM COATED ORAL at 08:02

## 2021-02-18 RX ADMIN — APIXABAN 10 MG: 5 TABLET, FILM COATED ORAL at 08:02

## 2021-02-18 RX ADMIN — GABAPENTIN 300 MG: 300 CAPSULE ORAL at 08:02

## 2021-02-18 RX ADMIN — VANCOMYCIN HYDROCHLORIDE 750 MG: 750 INJECTION, POWDER, LYOPHILIZED, FOR SOLUTION INTRAVENOUS at 01:02

## 2021-02-18 RX ADMIN — GABAPENTIN 300 MG: 300 CAPSULE ORAL at 02:02

## 2021-02-18 RX ADMIN — VANCOMYCIN HYDROCHLORIDE 750 MG: 750 INJECTION, POWDER, LYOPHILIZED, FOR SOLUTION INTRAVENOUS at 05:02

## 2021-02-18 RX ADMIN — ACETAMINOPHEN 1000 MG: 500 TABLET ORAL at 05:02

## 2021-02-18 RX ADMIN — CELECOXIB 200 MG: 100 CAPSULE ORAL at 08:02

## 2021-02-18 RX ADMIN — ACETAMINOPHEN 1000 MG: 500 TABLET ORAL at 02:02

## 2021-02-19 VITALS
HEIGHT: 62 IN | HEART RATE: 91 BPM | TEMPERATURE: 98 F | SYSTOLIC BLOOD PRESSURE: 95 MMHG | RESPIRATION RATE: 17 BRPM | WEIGHT: 107.56 LBS | BODY MASS INDEX: 19.79 KG/M2 | DIASTOLIC BLOOD PRESSURE: 59 MMHG | OXYGEN SATURATION: 97 %

## 2021-02-19 LAB — VANCOMYCIN TROUGH SERPL-MCNC: 31.2 UG/ML (ref 10–22)

## 2021-02-19 PROCEDURE — 99239 PR HOSPITAL DISCHARGE DAY,>30 MIN: ICD-10-PCS | Mod: ,,, | Performed by: HOSPITALIST

## 2021-02-19 PROCEDURE — 63600175 PHARM REV CODE 636 W HCPCS: Mod: JG | Performed by: HOSPITALIST

## 2021-02-19 PROCEDURE — 80202 ASSAY OF VANCOMYCIN: CPT

## 2021-02-19 PROCEDURE — 25000003 PHARM REV CODE 250: Performed by: STUDENT IN AN ORGANIZED HEALTH CARE EDUCATION/TRAINING PROGRAM

## 2021-02-19 PROCEDURE — 99239 HOSP IP/OBS DSCHRG MGMT >30: CPT | Mod: ,,, | Performed by: HOSPITALIST

## 2021-02-19 RX ORDER — MORPHINE SULFATE 15 MG/1
15 TABLET, FILM COATED, EXTENDED RELEASE ORAL EVERY 12 HOURS
Refills: 0
Start: 2021-02-19 | End: 2021-02-22

## 2021-02-19 RX ORDER — MORPHINE SULFATE 15 MG/1
15 TABLET, FILM COATED, EXTENDED RELEASE ORAL EVERY 12 HOURS
Status: CANCELLED | OUTPATIENT
Start: 2021-02-19 | End: 2021-02-23

## 2021-02-19 RX ORDER — OXYCODONE HYDROCHLORIDE 5 MG/1
5 TABLET ORAL
Status: CANCELLED | OUTPATIENT
Start: 2021-02-19

## 2021-02-19 RX ORDER — CELECOXIB 100 MG/1
200 CAPSULE ORAL DAILY
Status: CANCELLED | OUTPATIENT
Start: 2021-02-20

## 2021-02-19 RX ORDER — FAMOTIDINE 10 MG/ML
20 INJECTION INTRAVENOUS 2 TIMES DAILY
Status: CANCELLED | OUTPATIENT
Start: 2021-02-19

## 2021-02-19 RX ORDER — MORPHINE SULFATE 15 MG/1
15 TABLET, FILM COATED, EXTENDED RELEASE ORAL EVERY 12 HOURS
Status: DISCONTINUED | OUTPATIENT
Start: 2021-02-19 | End: 2021-02-19 | Stop reason: HOSPADM

## 2021-02-19 RX ORDER — HYDROXYZINE PAMOATE 25 MG/1
50 CAPSULE ORAL EVERY 6 HOURS PRN
Status: CANCELLED | OUTPATIENT
Start: 2021-02-19

## 2021-02-19 RX ADMIN — DOCUSATE SODIUM 50MG AND SENNOSIDES 8.6MG 1 TABLET: 8.6; 5 TABLET, FILM COATED ORAL at 09:02

## 2021-02-19 RX ADMIN — APIXABAN 10 MG: 5 TABLET, FILM COATED ORAL at 09:02

## 2021-02-19 RX ADMIN — ACETAMINOPHEN 1000 MG: 500 TABLET ORAL at 12:02

## 2021-02-19 RX ADMIN — MORPHINE SULFATE 30 MG: 30 TABLET, FILM COATED, EXTENDED RELEASE ORAL at 09:02

## 2021-02-19 RX ADMIN — VANCOMYCIN HYDROCHLORIDE 750 MG: 750 INJECTION, POWDER, LYOPHILIZED, FOR SOLUTION INTRAVENOUS at 02:02

## 2021-02-19 RX ADMIN — ALTEPLASE 2 MG: 2.2 INJECTION, POWDER, LYOPHILIZED, FOR SOLUTION INTRAVENOUS at 11:02

## 2021-02-19 RX ADMIN — GABAPENTIN 300 MG: 300 CAPSULE ORAL at 09:02

## 2021-02-19 RX ADMIN — CELECOXIB 200 MG: 100 CAPSULE ORAL at 09:02

## 2021-02-19 RX ADMIN — ACETAMINOPHEN 1000 MG: 500 TABLET ORAL at 06:02

## 2021-02-22 PROBLEM — M00.09: Status: ACTIVE | Noted: 2021-02-22

## 2021-02-26 ENCOUNTER — TELEPHONE (OUTPATIENT)
Dept: INFECTIOUS DISEASES | Facility: CLINIC | Age: 31
End: 2021-02-26

## 2021-03-01 ENCOUNTER — HOSPITAL ENCOUNTER (EMERGENCY)
Facility: HOSPITAL | Age: 31
Discharge: HOME | End: 2021-03-01
Attending: EMERGENCY MEDICINE
Payer: MEDICAID

## 2021-03-01 VITALS
HEART RATE: 98 BPM | TEMPERATURE: 98 F | OXYGEN SATURATION: 99 % | RESPIRATION RATE: 16 BRPM | SYSTOLIC BLOOD PRESSURE: 115 MMHG | DIASTOLIC BLOOD PRESSURE: 79 MMHG | WEIGHT: 95 LBS | BODY MASS INDEX: 15.83 KG/M2 | HEIGHT: 65 IN

## 2021-03-01 DIAGNOSIS — R06.02 SOB (SHORTNESS OF BREATH): ICD-10-CM

## 2021-03-01 LAB
ALBUMIN SERPL BCP-MCNC: 3.4 G/DL (ref 3.5–5.2)
ALP SERPL-CCNC: 482 U/L (ref 55–135)
ALT SERPL W/O P-5'-P-CCNC: 603 U/L (ref 10–44)
AMPHET+METHAMPHET UR QL: NORMAL
ANION GAP SERPL CALC-SCNC: 8 MMOL/L (ref 8–16)
AST SERPL-CCNC: 384 U/L (ref 10–40)
BACTERIA #/AREA URNS HPF: ABNORMAL /HPF
BARBITURATES UR QL SCN>200 NG/ML: NEGATIVE
BASOPHILS # BLD AUTO: 0.06 K/UL (ref 0–0.2)
BASOPHILS NFR BLD: 0.8 % (ref 0–1.9)
BENZODIAZ UR QL SCN>200 NG/ML: NEGATIVE
BILIRUB SERPL-MCNC: 0.7 MG/DL (ref 0.1–1)
BILIRUB UR QL STRIP: ABNORMAL
BUN SERPL-MCNC: 22 MG/DL (ref 6–20)
BZE UR QL SCN: NEGATIVE
CALCIUM SERPL-MCNC: 9.7 MG/DL (ref 8.7–10.5)
CANNABINOIDS UR QL SCN: NEGATIVE
CHLORIDE SERPL-SCNC: 107 MMOL/L (ref 95–110)
CLARITY UR: ABNORMAL
CO2 SERPL-SCNC: 25 MMOL/L (ref 23–29)
COLOR UR: YELLOW
CREAT SERPL-MCNC: 1 MG/DL (ref 0.5–1.4)
CREAT UR-MCNC: 75 MG/DL (ref 15–325)
CTP QC/QA: YES
DIFFERENTIAL METHOD: ABNORMAL
EOSINOPHIL # BLD AUTO: 0 K/UL (ref 0–0.5)
EOSINOPHIL NFR BLD: 0.4 % (ref 0–8)
ERYTHROCYTE [DISTWIDTH] IN BLOOD BY AUTOMATED COUNT: 18.3 % (ref 11.5–14.5)
EST. GFR  (AFRICAN AMERICAN): >60 ML/MIN/1.73 M^2
EST. GFR  (NON AFRICAN AMERICAN): >60 ML/MIN/1.73 M^2
GLUCOSE SERPL-MCNC: 102 MG/DL (ref 70–110)
GLUCOSE UR QL STRIP: NEGATIVE
HCT VFR BLD AUTO: 39.6 % (ref 37–48.5)
HGB BLD-MCNC: 12.4 G/DL (ref 12–16)
HGB UR QL STRIP: ABNORMAL
HYALINE CASTS #/AREA URNS LPF: 22 /LPF
IMM GRANULOCYTES # BLD AUTO: 0.02 K/UL (ref 0–0.04)
IMM GRANULOCYTES NFR BLD AUTO: 0.3 % (ref 0–0.5)
KETONES UR QL STRIP: NEGATIVE
LEUKOCYTE ESTERASE UR QL STRIP: ABNORMAL
LYMPHOCYTES # BLD AUTO: 2.5 K/UL (ref 1–4.8)
LYMPHOCYTES NFR BLD: 32.6 % (ref 18–48)
MAGNESIUM SERPL-MCNC: 2.4 MG/DL (ref 1.6–2.6)
MCH RBC QN AUTO: 26.5 PG (ref 27–31)
MCHC RBC AUTO-ENTMCNC: 31.3 G/DL (ref 32–36)
MCV RBC AUTO: 85 FL (ref 82–98)
METHADONE UR QL SCN>300 NG/ML: NEGATIVE
MICROSCOPIC COMMENT: ABNORMAL
MONOCYTES # BLD AUTO: 0.8 K/UL (ref 0.3–1)
MONOCYTES NFR BLD: 10.6 % (ref 4–15)
NEUTROPHILS # BLD AUTO: 4.3 K/UL (ref 1.8–7.7)
NEUTROPHILS NFR BLD: 55.3 % (ref 38–73)
NITRITE UR QL STRIP: POSITIVE
NRBC BLD-RTO: 0 /100 WBC
OPIATES UR QL SCN: NORMAL
PCP UR QL SCN>25 NG/ML: NEGATIVE
PH UR STRIP: 6 [PH] (ref 5–8)
PLATELET # BLD AUTO: 541 K/UL (ref 150–350)
PMV BLD AUTO: 8.2 FL (ref 9.2–12.9)
POTASSIUM SERPL-SCNC: 3.8 MMOL/L (ref 3.5–5.1)
PROT SERPL-MCNC: 10.1 G/DL (ref 6–8.4)
PROT UR QL STRIP: ABNORMAL
RBC # BLD AUTO: 4.68 M/UL (ref 4–5.4)
RBC #/AREA URNS HPF: 10 /HPF (ref 0–4)
SARS-COV-2 RDRP RESP QL NAA+PROBE: NEGATIVE
SODIUM SERPL-SCNC: 140 MMOL/L (ref 136–145)
SP GR UR STRIP: 1.02 (ref 1–1.03)
SQUAMOUS #/AREA URNS HPF: 13 /HPF
TOXICOLOGY INFORMATION: NORMAL
URN SPEC COLLECT METH UR: ABNORMAL
UROBILINOGEN UR STRIP-ACNC: 1 EU/DL
WBC # BLD AUTO: 7.8 K/UL (ref 3.9–12.7)
WBC #/AREA URNS HPF: 12 /HPF (ref 0–5)

## 2021-03-01 PROCEDURE — U0002 COVID-19 LAB TEST NON-CDC: HCPCS | Performed by: EMERGENCY MEDICINE

## 2021-03-01 PROCEDURE — 87186 SC STD MICRODIL/AGAR DIL: CPT | Mod: 59

## 2021-03-01 PROCEDURE — 81000 URINALYSIS NONAUTO W/SCOPE: CPT | Mod: 59

## 2021-03-01 PROCEDURE — 93005 ELECTROCARDIOGRAM TRACING: CPT

## 2021-03-01 PROCEDURE — 85025 COMPLETE CBC W/AUTO DIFF WBC: CPT

## 2021-03-01 PROCEDURE — 93010 EKG 12-LEAD: ICD-10-PCS | Mod: ,,, | Performed by: INTERNAL MEDICINE

## 2021-03-01 PROCEDURE — 80307 DRUG TEST PRSMV CHEM ANLYZR: CPT

## 2021-03-01 PROCEDURE — 80053 COMPREHEN METABOLIC PANEL: CPT

## 2021-03-01 PROCEDURE — 87088 URINE BACTERIA CULTURE: CPT

## 2021-03-01 PROCEDURE — 87077 CULTURE AEROBIC IDENTIFY: CPT

## 2021-03-01 PROCEDURE — 36415 COLL VENOUS BLD VENIPUNCTURE: CPT

## 2021-03-01 PROCEDURE — 87086 URINE CULTURE/COLONY COUNT: CPT

## 2021-03-01 PROCEDURE — 83735 ASSAY OF MAGNESIUM: CPT

## 2021-03-01 PROCEDURE — 87040 BLOOD CULTURE FOR BACTERIA: CPT | Mod: 59

## 2021-03-01 PROCEDURE — 99285 EMERGENCY DEPT VISIT HI MDM: CPT | Mod: 25

## 2021-03-01 PROCEDURE — 93010 ELECTROCARDIOGRAM REPORT: CPT | Mod: ,,, | Performed by: INTERNAL MEDICINE

## 2021-03-03 LAB
FUNGUS SPEC CULT: NORMAL

## 2021-03-04 LAB
BACTERIA UR CULT: ABNORMAL
BACTERIA UR CULT: ABNORMAL

## 2021-03-06 LAB
BACTERIA BLD CULT: NORMAL
BACTERIA BLD CULT: NORMAL

## 2021-03-08 ENCOUNTER — TELEPHONE (OUTPATIENT)
Dept: INFECTIOUS DISEASES | Facility: CLINIC | Age: 31
End: 2021-03-08

## 2021-03-09 LAB
FUNGUS SPEC CULT: NORMAL

## 2021-03-11 ENCOUNTER — TELEPHONE (OUTPATIENT)
Dept: INFECTIOUS DISEASES | Facility: CLINIC | Age: 31
End: 2021-03-11

## 2021-03-11 ENCOUNTER — OFFICE VISIT (OUTPATIENT)
Dept: INFECTIOUS DISEASES | Facility: CLINIC | Age: 31
End: 2021-03-11
Payer: MEDICAID

## 2021-03-11 ENCOUNTER — DOCUMENTATION ONLY (OUTPATIENT)
Dept: CARDIOTHORACIC SURGERY | Facility: CLINIC | Age: 31
End: 2021-03-11

## 2021-03-11 ENCOUNTER — OFFICE VISIT (OUTPATIENT)
Dept: ORTHOPEDICS | Facility: CLINIC | Age: 31
End: 2021-03-11
Payer: MEDICAID

## 2021-03-11 VITALS
HEART RATE: 95 BPM | TEMPERATURE: 99 F | WEIGHT: 107.13 LBS | BODY MASS INDEX: 19.71 KG/M2 | SYSTOLIC BLOOD PRESSURE: 146 MMHG | HEIGHT: 62 IN | DIASTOLIC BLOOD PRESSURE: 99 MMHG

## 2021-03-11 DIAGNOSIS — M00.09 STAPHYLOCOCCAL ARTHRITIS OF MULTIPLE SITES: Primary | ICD-10-CM

## 2021-03-11 DIAGNOSIS — Z98.890 POST-OPERATIVE STATE: ICD-10-CM

## 2021-03-11 DIAGNOSIS — F19.90 IVDU (INTRAVENOUS DRUG USER): ICD-10-CM

## 2021-03-11 DIAGNOSIS — I07.9 ENDOCARDITIS OF TRICUSPID VALVE: Primary | ICD-10-CM

## 2021-03-11 PROCEDURE — 99999 PR PBB SHADOW E&M-EST. PATIENT-LVL II: ICD-10-PCS | Mod: PBBFAC,,, | Performed by: NURSE PRACTITIONER

## 2021-03-11 PROCEDURE — 99215 OFFICE O/P EST HI 40 MIN: CPT | Mod: S$PBB,,, | Performed by: INTERNAL MEDICINE

## 2021-03-11 PROCEDURE — 99999 PR PBB SHADOW E&M-EST. PATIENT-LVL III: ICD-10-PCS | Mod: PBBFAC,,, | Performed by: INTERNAL MEDICINE

## 2021-03-11 PROCEDURE — 99999 PR PBB SHADOW E&M-EST. PATIENT-LVL II: CPT | Mod: PBBFAC,,, | Performed by: NURSE PRACTITIONER

## 2021-03-11 PROCEDURE — 99215 PR OFFICE/OUTPT VISIT, EST, LEVL V, 40-54 MIN: ICD-10-PCS | Mod: S$PBB,,, | Performed by: INTERNAL MEDICINE

## 2021-03-11 PROCEDURE — 99024 PR POST-OP FOLLOW-UP VISIT: ICD-10-PCS | Mod: ,,, | Performed by: NURSE PRACTITIONER

## 2021-03-11 PROCEDURE — 99024 POSTOP FOLLOW-UP VISIT: CPT | Mod: ,,, | Performed by: NURSE PRACTITIONER

## 2021-03-11 PROCEDURE — 99213 OFFICE O/P EST LOW 20 MIN: CPT | Mod: PBBFAC | Performed by: INTERNAL MEDICINE

## 2021-03-11 PROCEDURE — 99212 OFFICE O/P EST SF 10 MIN: CPT | Mod: PBBFAC,27 | Performed by: NURSE PRACTITIONER

## 2021-03-11 PROCEDURE — 99999 PR PBB SHADOW E&M-EST. PATIENT-LVL III: CPT | Mod: PBBFAC,,, | Performed by: INTERNAL MEDICINE

## 2021-03-11 RX ORDER — DOXYCYCLINE HYCLATE 100 MG
100 TABLET ORAL EVERY 12 HOURS
Qty: 60 TABLET | Refills: 3 | Status: ON HOLD | OUTPATIENT
Start: 2021-03-11 | End: 2022-06-05 | Stop reason: HOSPADM

## 2021-03-15 ENCOUNTER — TELEPHONE (OUTPATIENT)
Dept: INFECTIOUS DISEASES | Facility: CLINIC | Age: 31
End: 2021-03-15

## 2021-03-17 ENCOUNTER — OFFICE VISIT (OUTPATIENT)
Dept: CARDIOTHORACIC SURGERY | Facility: CLINIC | Age: 31
End: 2021-03-17
Payer: MEDICAID

## 2021-03-17 ENCOUNTER — DOCUMENTATION ONLY (OUTPATIENT)
Dept: CARDIOTHORACIC SURGERY | Facility: CLINIC | Age: 31
End: 2021-03-17

## 2021-03-17 ENCOUNTER — TELEPHONE (OUTPATIENT)
Dept: CARDIOTHORACIC SURGERY | Facility: CLINIC | Age: 31
End: 2021-03-17

## 2021-03-17 ENCOUNTER — TELEPHONE (OUTPATIENT)
Dept: INFECTIOUS DISEASES | Facility: CLINIC | Age: 31
End: 2021-03-17

## 2021-03-17 VITALS
BODY MASS INDEX: 20.16 KG/M2 | OXYGEN SATURATION: 96 % | TEMPERATURE: 98 F | SYSTOLIC BLOOD PRESSURE: 117 MMHG | HEIGHT: 62 IN | WEIGHT: 109.56 LBS | HEART RATE: 105 BPM | DIASTOLIC BLOOD PRESSURE: 77 MMHG

## 2021-03-17 DIAGNOSIS — F11.20 OPIOID USE DISORDER, SEVERE, DEPENDENCE: Primary | ICD-10-CM

## 2021-03-17 DIAGNOSIS — F19.90 IVDU (INTRAVENOUS DRUG USER): Primary | ICD-10-CM

## 2021-03-17 PROCEDURE — 99213 OFFICE O/P EST LOW 20 MIN: CPT | Mod: PBBFAC | Performed by: THORACIC SURGERY (CARDIOTHORACIC VASCULAR SURGERY)

## 2021-03-17 PROCEDURE — 99999 PR PBB SHADOW E&M-EST. PATIENT-LVL III: ICD-10-PCS | Mod: PBBFAC,,, | Performed by: THORACIC SURGERY (CARDIOTHORACIC VASCULAR SURGERY)

## 2021-03-17 PROCEDURE — 99499 UNLISTED E&M SERVICE: CPT | Mod: S$PBB,,, | Performed by: THORACIC SURGERY (CARDIOTHORACIC VASCULAR SURGERY)

## 2021-03-17 PROCEDURE — 99499 NO LOS: ICD-10-PCS | Mod: S$PBB,,, | Performed by: THORACIC SURGERY (CARDIOTHORACIC VASCULAR SURGERY)

## 2021-03-17 PROCEDURE — 99999 PR PBB SHADOW E&M-EST. PATIENT-LVL III: CPT | Mod: PBBFAC,,, | Performed by: THORACIC SURGERY (CARDIOTHORACIC VASCULAR SURGERY)

## 2021-03-25 ENCOUNTER — TELEPHONE (OUTPATIENT)
Dept: INFECTIOUS DISEASES | Facility: CLINIC | Age: 31
End: 2021-03-25

## 2021-04-06 LAB
ACID FAST MOD KINY STN SPEC: NORMAL
ACID FAST MOD KINY STN SPEC: NORMAL
MYCOBACTERIUM SPEC QL CULT: NORMAL
MYCOBACTERIUM SPEC QL CULT: NORMAL

## 2021-04-08 LAB
ACID FAST MOD KINY STN SPEC: NORMAL
MYCOBACTERIUM SPEC QL CULT: NORMAL

## 2021-04-11 LAB
ACID FAST MOD KINY STN SPEC: NORMAL
MYCOBACTERIUM SPEC QL CULT: NORMAL

## 2021-04-13 LAB
ACID FAST MOD KINY STN SPEC: NORMAL
MYCOBACTERIUM SPEC QL CULT: NORMAL

## 2021-09-05 ENCOUNTER — HOSPITAL ENCOUNTER (EMERGENCY)
Facility: HOSPITAL | Age: 31
Discharge: HOME | End: 2021-09-05
Attending: EMERGENCY MEDICINE
Payer: MEDICAID

## 2021-09-05 VITALS
HEIGHT: 62 IN | BODY MASS INDEX: 20.24 KG/M2 | SYSTOLIC BLOOD PRESSURE: 138 MMHG | HEART RATE: 72 BPM | RESPIRATION RATE: 18 BRPM | TEMPERATURE: 99 F | OXYGEN SATURATION: 99 % | WEIGHT: 110 LBS | DIASTOLIC BLOOD PRESSURE: 94 MMHG

## 2021-09-05 DIAGNOSIS — F19.10 IV DRUG ABUSE: Primary | ICD-10-CM

## 2021-09-05 PROCEDURE — 99282 EMERGENCY DEPT VISIT SF MDM: CPT

## 2022-05-29 ENCOUNTER — HOSPITAL ENCOUNTER (INPATIENT)
Facility: HOSPITAL | Age: 32
LOS: 7 days | Discharge: HOME | DRG: 917 | End: 2022-06-05
Attending: EMERGENCY MEDICINE | Admitting: EMERGENCY MEDICINE
Payer: MEDICAID

## 2022-05-29 DIAGNOSIS — T40.1X1A HEROIN OVERDOSE, ACCIDENTAL OR UNINTENTIONAL, INITIAL ENCOUNTER: ICD-10-CM

## 2022-05-29 DIAGNOSIS — J69.0 ASPIRATION PNEUMONIA OF BOTH LUNGS DUE TO GASTRIC SECRETIONS, UNSPECIFIED PART OF LUNG: ICD-10-CM

## 2022-05-29 DIAGNOSIS — T50.901A OVERDOSE: Primary | ICD-10-CM

## 2022-05-29 DIAGNOSIS — E87.29 RESPIRATORY ACIDOSIS: ICD-10-CM

## 2022-05-29 DIAGNOSIS — J69.0 ASPIRATION PNEUMONIA: ICD-10-CM

## 2022-05-29 DIAGNOSIS — R79.89 ELEVATED LACTIC ACID LEVEL: ICD-10-CM

## 2022-05-29 DIAGNOSIS — J69.0 ASPIRATION PNEUMONIA OF BOTH LUNGS: ICD-10-CM

## 2022-05-29 DIAGNOSIS — I33.0 VEGETATIVE ENDOCARDITIS OF MITRAL VALVE: ICD-10-CM

## 2022-05-29 DIAGNOSIS — J18.9 BILATERAL PNEUMONIA: ICD-10-CM

## 2022-05-29 DIAGNOSIS — E16.2 HYPOGLYCEMIA: ICD-10-CM

## 2022-05-29 DIAGNOSIS — R06.89 HYPERCAPNIA: ICD-10-CM

## 2022-05-29 DIAGNOSIS — I38 ENDOCARDITIS, UNSPECIFIED CHRONICITY, UNSPECIFIED ENDOCARDITIS TYPE: ICD-10-CM

## 2022-05-29 PROBLEM — J96.01 ACUTE RESPIRATORY FAILURE WITH HYPOXIA AND HYPERCARBIA: Status: ACTIVE | Noted: 2022-05-29

## 2022-05-29 PROBLEM — Z86.79 HX OF BACTERIAL ENDOCARDITIS: Status: ACTIVE | Noted: 2022-05-29

## 2022-05-29 PROBLEM — J96.02 ACUTE RESPIRATORY FAILURE WITH HYPOXIA AND HYPERCARBIA: Status: ACTIVE | Noted: 2022-05-29

## 2022-05-29 LAB
ALBUMIN SERPL BCP-MCNC: 3.6 G/DL (ref 3.5–5.2)
ALP SERPL-CCNC: 105 U/L (ref 55–135)
ALT SERPL W/O P-5'-P-CCNC: 158 U/L (ref 10–44)
AMPHET+METHAMPHET UR QL: ABNORMAL
ANION GAP SERPL CALC-SCNC: 10 MMOL/L (ref 8–16)
APAP SERPL-MCNC: <2 UG/ML (ref 10–20)
AST SERPL-CCNC: 315 U/L (ref 10–40)
BACTERIA #/AREA URNS HPF: ABNORMAL /HPF
BARBITURATES UR QL SCN>200 NG/ML: NEGATIVE
BASOPHILS # BLD AUTO: ABNORMAL K/UL (ref 0–0.2)
BASOPHILS NFR BLD: 0 % (ref 0–1.9)
BENZODIAZ UR QL SCN>200 NG/ML: NEGATIVE
BILIRUB SERPL-MCNC: 0.4 MG/DL (ref 0.1–1)
BILIRUB UR QL STRIP: NEGATIVE
BIPAP: ABNORMAL
BUN SERPL-MCNC: 18 MG/DL (ref 6–20)
BZE UR QL SCN: NEGATIVE
CALCIUM SERPL-MCNC: 8.3 MG/DL (ref 8.7–10.5)
CANNABINOIDS UR QL SCN: NEGATIVE
CHLORIDE SERPL-SCNC: 110 MMOL/L (ref 95–110)
CLARITY UR: ABNORMAL
CO2 SERPL-SCNC: 24 MMOL/L (ref 23–29)
COLOR UR: YELLOW
COMMENTS: ABNORMAL
CORRECTED TEMPERATURE (PCO2): 49.9 MMHG
CORRECTED TEMPERATURE (PCO2): 66.3 MMHG
CORRECTED TEMPERATURE (PH): 7.04
CORRECTED TEMPERATURE (PH): 7.28
CORRECTED TEMPERATURE (PO2): 268 MMHG
CORRECTED TEMPERATURE (PO2): 69.6 MMHG
CREAT SERPL-MCNC: 1.7 MG/DL (ref 0.5–1.4)
CREAT UR-MCNC: 139 MG/DL (ref 15–325)
CTP QC/QA: YES
DIFFERENTIAL METHOD: ABNORMAL
EOSINOPHIL # BLD AUTO: ABNORMAL K/UL (ref 0–0.5)
EOSINOPHIL NFR BLD: 1 % (ref 0–8)
ERYTHROCYTE [DISTWIDTH] IN BLOOD BY AUTOMATED COUNT: 16.1 % (ref 11.5–14.5)
EST. GFR  (AFRICAN AMERICAN): 45 ML/MIN/1.73 M^2
EST. GFR  (NON AFRICAN AMERICAN): 39.1 ML/MIN/1.73 M^2
ETHANOL SERPL-MCNC: <3 MG/DL
FIO2: 100 %
FIO2: 90 %
GLUCOSE SERPL-MCNC: 25 MG/DL (ref 70–110)
GLUCOSE UR QL STRIP: ABNORMAL
HCG INTACT+B SERPL-ACNC: <1 MIU/ML
HCT VFR BLD AUTO: 45.4 % (ref 37–48.5)
HGB BLD-MCNC: 14.5 G/DL (ref 12–16)
HGB UR QL STRIP: ABNORMAL
HYALINE CASTS #/AREA URNS LPF: 3.1 /LPF
IMM GRANULOCYTES # BLD AUTO: ABNORMAL K/UL (ref 0–0.04)
IMM GRANULOCYTES NFR BLD AUTO: ABNORMAL % (ref 0–0.5)
KETONES UR QL STRIP: NEGATIVE
LACTATE SERPL-SCNC: 2 MMOL/L (ref 0.5–2.2)
LACTATE SERPL-SCNC: 3.8 MMOL/L (ref 0.5–2.2)
LACTATE SERPL-SCNC: 9 MMOL/L (ref 0.5–2.2)
LEUKOCYTE ESTERASE UR QL STRIP: NEGATIVE
LPM: 15
LYMPHOCYTES # BLD AUTO: ABNORMAL K/UL (ref 1–4.8)
LYMPHOCYTES NFR BLD: 32 % (ref 18–48)
Lab: ABNORMAL
Lab: ABNORMAL
MCH RBC QN AUTO: 30.3 PG (ref 27–31)
MCHC RBC AUTO-ENTMCNC: 31.9 G/DL (ref 32–36)
MCV RBC AUTO: 95 FL (ref 82–98)
METAMYELOCYTES NFR BLD MANUAL: 3 %
METHADONE UR QL SCN>300 NG/ML: NEGATIVE
MICROSCOPIC COMMENT: ABNORMAL
MODIFIED ALLEN'S TEST: ABNORMAL
MODIFIED ALLEN'S TEST: POSITIVE
MONOCYTES # BLD AUTO: ABNORMAL K/UL (ref 0.3–1)
MONOCYTES NFR BLD: 0 % (ref 4–15)
MYELOCYTES NFR BLD MANUAL: 1 %
NEUTROPHILS NFR BLD: 38 % (ref 38–73)
NEUTS BAND NFR BLD MANUAL: 25 %
NITRITE UR QL STRIP: NEGATIVE
NOTIFIED BY: ABNORMAL
NOTIFIED BY: ABNORMAL
NRBC BLD-RTO: 0 /100 WBC
NT-PROBNP SERPL-MCNC: 276 PG/ML (ref 5–450)
O2DEVICE: ABNORMAL
O2DEVICE: ABNORMAL
OPIATES UR QL SCN: ABNORMAL
PCO2 BLDA: 49.9 MMHG (ref 35–45)
PCO2 BLDA: 66.3 MMHG (ref 35–45)
PCP UR QL SCN>25 NG/ML: NEGATIVE
PH SMN: 7.04 [PH] (ref 7.34–7.45)
PH SMN: 7.28 [PH] (ref 7.34–7.45)
PH UR STRIP: 5 [PH] (ref 5–8)
PLATELET # BLD AUTO: 347 K/UL (ref 150–450)
PMV BLD AUTO: 10.1 FL (ref 9.2–12.9)
PO2 BLDA: 268 MMHG (ref 80–100)
PO2 BLDA: 69.6 MMHG (ref 80–100)
POC BASE DEFICIT: -12.5 MMOL/L
POC BASE DEFICIT: -3.3 MMOL/L
POC HCO3: 14.2 MMOL/L
POC NOTIFIED NOTE: ABNORMAL
POC NOTIFIED NOTE: ABNORMAL
POC PERFORMED BY: ABNORMAL
POC PERFORMED BY: ABNORMAL
POC SATURATED O2: 89.3 %
POC TCO2: 17.8 MMOL/L
POC TEMPERATURE: 37 C
POC TEMPERATURE: 37 C
POCT GLUCOSE: 105 MG/DL (ref 70–110)
POCT GLUCOSE: 111 MG/DL (ref 70–110)
POCT GLUCOSE: 27 MG/DL (ref 70–110)
POCT GLUCOSE: 44 MG/DL (ref 70–110)
POCT GLUCOSE: 62 MG/DL (ref 70–110)
POCT GLUCOSE: 92 MG/DL (ref 70–110)
POCT GLUCOSE: 96 MG/DL (ref 70–110)
POTASSIUM SERPL-SCNC: 3.7 MMOL/L (ref 3.5–5.1)
PROCALCITONIN SERPL IA-MCNC: 3.02 NG/ML
PROT SERPL-MCNC: 6.9 G/DL (ref 6–8.4)
PROT UR QL STRIP: ABNORMAL
PROVIDER NOTIFIED: ABNORMAL
PROVIDER NOTIFIED: ABNORMAL
RBC # BLD AUTO: 4.79 M/UL (ref 4–5.4)
RBC #/AREA URNS HPF: 1 /HPF (ref 0–4)
SALICYLATES SERPL-MCNC: <1.7 MG/DL (ref 15–30)
SARS-COV-2 RDRP RESP QL NAA+PROBE: NEGATIVE
SODIUM SERPL-SCNC: 144 MMOL/L (ref 136–145)
SP GR UR STRIP: 1.02 (ref 1–1.03)
SPECIMEN SOURCE: ABNORMAL
SPECIMEN SOURCE: ABNORMAL
SQUAMOUS #/AREA URNS HPF: 5 /HPF
TOXICOLOGY INFORMATION: ABNORMAL
TROPONIN I SERPL DL<=0.01 NG/ML-MCNC: 125.1 PG/ML (ref 0–60)
TROPONIN I SERPL DL<=0.01 NG/ML-MCNC: 210.9 PG/ML (ref 0–60)
TROPONIN I SERPL DL<=0.01 NG/ML-MCNC: 229.6 PG/ML (ref 0–60)
URN SPEC COLLECT METH UR: ABNORMAL
UROBILINOGEN UR STRIP-ACNC: 1 EU/DL
WBC # BLD AUTO: 5.06 K/UL (ref 3.9–12.7)
WBC #/AREA URNS HPF: 18 /HPF (ref 0–5)
YEAST URNS QL MICRO: ABNORMAL

## 2022-05-29 PROCEDURE — 99900035 HC TECH TIME PER 15 MIN (STAT)

## 2022-05-29 PROCEDURE — 83880 ASSAY OF NATRIURETIC PEPTIDE: CPT | Performed by: EMERGENCY MEDICINE

## 2022-05-29 PROCEDURE — 96360 HYDRATION IV INFUSION INIT: CPT

## 2022-05-29 PROCEDURE — 80053 COMPREHEN METABOLIC PANEL: CPT | Performed by: EMERGENCY MEDICINE

## 2022-05-29 PROCEDURE — 87088 URINE BACTERIA CULTURE: CPT | Performed by: EMERGENCY MEDICINE

## 2022-05-29 PROCEDURE — 82803 BLOOD GASES ANY COMBINATION: CPT

## 2022-05-29 PROCEDURE — 94761 N-INVAS EAR/PLS OXIMETRY MLT: CPT

## 2022-05-29 PROCEDURE — 63600175 PHARM REV CODE 636 W HCPCS: Performed by: EMERGENCY MEDICINE

## 2022-05-29 PROCEDURE — 87077 CULTURE AEROBIC IDENTIFY: CPT | Performed by: EMERGENCY MEDICINE

## 2022-05-29 PROCEDURE — 80143 DRUG ASSAY ACETAMINOPHEN: CPT | Performed by: EMERGENCY MEDICINE

## 2022-05-29 PROCEDURE — 87389 HIV-1 AG W/HIV-1&-2 AB AG IA: CPT | Performed by: INTERNAL MEDICINE

## 2022-05-29 PROCEDURE — 36600 WITHDRAWAL OF ARTERIAL BLOOD: CPT

## 2022-05-29 PROCEDURE — 96375 TX/PRO/DX INJ NEW DRUG ADDON: CPT

## 2022-05-29 PROCEDURE — 94660 CPAP INITIATION&MGMT: CPT

## 2022-05-29 PROCEDURE — 25000003 PHARM REV CODE 250

## 2022-05-29 PROCEDURE — 93005 ELECTROCARDIOGRAM TRACING: CPT

## 2022-05-29 PROCEDURE — 99291 CRITICAL CARE FIRST HOUR: CPT | Mod: 25

## 2022-05-29 PROCEDURE — 25000003 PHARM REV CODE 250: Performed by: EMERGENCY MEDICINE

## 2022-05-29 PROCEDURE — 99900031 HC PATIENT EDUCATION (STAT)

## 2022-05-29 PROCEDURE — 36556 INSERT NON-TUNNEL CV CATH: CPT | Mod: RT

## 2022-05-29 PROCEDURE — 93010 ELECTROCARDIOGRAM REPORT: CPT | Mod: ,,, | Performed by: INTERNAL MEDICINE

## 2022-05-29 PROCEDURE — 87186 SC STD MICRODIL/AGAR DIL: CPT | Performed by: EMERGENCY MEDICINE

## 2022-05-29 PROCEDURE — 96365 THER/PROPH/DIAG IV INF INIT: CPT

## 2022-05-29 PROCEDURE — 85007 BL SMEAR W/DIFF WBC COUNT: CPT | Performed by: EMERGENCY MEDICINE

## 2022-05-29 PROCEDURE — 85027 COMPLETE CBC AUTOMATED: CPT | Performed by: EMERGENCY MEDICINE

## 2022-05-29 PROCEDURE — 82077 ASSAY SPEC XCP UR&BREATH IA: CPT | Performed by: EMERGENCY MEDICINE

## 2022-05-29 PROCEDURE — 27000190 HC CPAP FULL FACE MASK W/VALVE

## 2022-05-29 PROCEDURE — 87086 URINE CULTURE/COLONY COUNT: CPT | Performed by: EMERGENCY MEDICINE

## 2022-05-29 PROCEDURE — 81000 URINALYSIS NONAUTO W/SCOPE: CPT | Performed by: EMERGENCY MEDICINE

## 2022-05-29 PROCEDURE — 80074 ACUTE HEPATITIS PANEL: CPT | Performed by: INTERNAL MEDICINE

## 2022-05-29 PROCEDURE — 80307 DRUG TEST PRSMV CHEM ANLYZR: CPT | Performed by: EMERGENCY MEDICINE

## 2022-05-29 PROCEDURE — 63600175 PHARM REV CODE 636 W HCPCS

## 2022-05-29 PROCEDURE — 20000000 HC ICU ROOM

## 2022-05-29 PROCEDURE — 84484 ASSAY OF TROPONIN QUANT: CPT | Mod: 91 | Performed by: EMERGENCY MEDICINE

## 2022-05-29 PROCEDURE — 84145 PROCALCITONIN (PCT): CPT | Performed by: EMERGENCY MEDICINE

## 2022-05-29 PROCEDURE — 93010 EKG 12-LEAD: ICD-10-PCS | Mod: ,,, | Performed by: INTERNAL MEDICINE

## 2022-05-29 PROCEDURE — 36415 COLL VENOUS BLD VENIPUNCTURE: CPT | Performed by: EMERGENCY MEDICINE

## 2022-05-29 PROCEDURE — 25000003 PHARM REV CODE 250: Performed by: INTERNAL MEDICINE

## 2022-05-29 PROCEDURE — 84702 CHORIONIC GONADOTROPIN TEST: CPT | Performed by: EMERGENCY MEDICINE

## 2022-05-29 PROCEDURE — 87040 BLOOD CULTURE FOR BACTERIA: CPT | Mod: 59 | Performed by: EMERGENCY MEDICINE

## 2022-05-29 PROCEDURE — 27000221 HC OXYGEN, UP TO 24 HOURS

## 2022-05-29 PROCEDURE — U0002 COVID-19 LAB TEST NON-CDC: HCPCS | Performed by: EMERGENCY MEDICINE

## 2022-05-29 PROCEDURE — 83605 ASSAY OF LACTIC ACID: CPT | Performed by: EMERGENCY MEDICINE

## 2022-05-29 PROCEDURE — 80179 DRUG ASSAY SALICYLATE: CPT | Performed by: EMERGENCY MEDICINE

## 2022-05-29 PROCEDURE — 83605 ASSAY OF LACTIC ACID: CPT | Mod: 91 | Performed by: INTERNAL MEDICINE

## 2022-05-29 RX ORDER — LEVOFLOXACIN 5 MG/ML
750 INJECTION, SOLUTION INTRAVENOUS
Status: DISCONTINUED | OUTPATIENT
Start: 2022-05-29 | End: 2022-05-29

## 2022-05-29 RX ORDER — ACETAMINOPHEN 325 MG/1
650 TABLET ORAL EVERY 8 HOURS PRN
Status: DISCONTINUED | OUTPATIENT
Start: 2022-05-29 | End: 2022-06-02

## 2022-05-29 RX ORDER — LEVOFLOXACIN 5 MG/ML
INJECTION, SOLUTION INTRAVENOUS
Status: COMPLETED
Start: 2022-05-29 | End: 2022-05-29

## 2022-05-29 RX ORDER — FAMOTIDINE 10 MG/ML
20 INJECTION INTRAVENOUS DAILY
Status: DISCONTINUED | OUTPATIENT
Start: 2022-05-30 | End: 2022-06-02

## 2022-05-29 RX ORDER — DEXTROSE 50 % IN WATER (D50W) INTRAVENOUS SYRINGE
25
Status: COMPLETED | OUTPATIENT
Start: 2022-05-29 | End: 2022-05-29

## 2022-05-29 RX ORDER — SODIUM CHLORIDE 9 MG/ML
INJECTION, SOLUTION INTRAVENOUS CONTINUOUS
Status: DISCONTINUED | OUTPATIENT
Start: 2022-05-29 | End: 2022-06-02

## 2022-05-29 RX ORDER — LEVOFLOXACIN 5 MG/ML
500 INJECTION, SOLUTION INTRAVENOUS ONCE
Status: DISCONTINUED | OUTPATIENT
Start: 2022-05-29 | End: 2022-05-29

## 2022-05-29 RX ORDER — ONDANSETRON 2 MG/ML
4 INJECTION INTRAMUSCULAR; INTRAVENOUS EVERY 8 HOURS PRN
Status: DISCONTINUED | OUTPATIENT
Start: 2022-05-29 | End: 2022-06-05 | Stop reason: HOSPADM

## 2022-05-29 RX ORDER — LEVOFLOXACIN 5 MG/ML
250 INJECTION, SOLUTION INTRAVENOUS ONCE
Status: DISCONTINUED | OUTPATIENT
Start: 2022-05-29 | End: 2022-05-31 | Stop reason: ALTCHOICE

## 2022-05-29 RX ORDER — SODIUM CHLORIDE 0.9 % (FLUSH) 0.9 %
10 SYRINGE (ML) INJECTION
Status: DISCONTINUED | OUTPATIENT
Start: 2022-05-29 | End: 2022-06-05 | Stop reason: HOSPADM

## 2022-05-29 RX ORDER — LEVOFLOXACIN 5 MG/ML
500 INJECTION, SOLUTION INTRAVENOUS
Status: DISCONTINUED | OUTPATIENT
Start: 2022-05-29 | End: 2022-05-29

## 2022-05-29 RX ORDER — LEVOFLOXACIN 250 MG/1
250 TABLET ORAL ONCE
Status: DISCONTINUED | OUTPATIENT
Start: 2022-05-29 | End: 2022-05-29

## 2022-05-29 RX ORDER — FAMOTIDINE 10 MG/ML
20 INJECTION INTRAVENOUS 2 TIMES DAILY
Status: DISCONTINUED | OUTPATIENT
Start: 2022-05-29 | End: 2022-05-29

## 2022-05-29 RX ORDER — LEVOFLOXACIN 5 MG/ML
750 INJECTION, SOLUTION INTRAVENOUS
Status: DISCONTINUED | OUTPATIENT
Start: 2022-05-31 | End: 2022-05-31 | Stop reason: ALTCHOICE

## 2022-05-29 RX ORDER — SODIUM CHLORIDE 9 MG/ML
INJECTION, SOLUTION INTRAVENOUS ONCE
Status: COMPLETED | OUTPATIENT
Start: 2022-05-29 | End: 2022-05-29

## 2022-05-29 RX ADMIN — SODIUM CHLORIDE: 0.9 INJECTION, SOLUTION INTRAVENOUS at 09:05

## 2022-05-29 RX ADMIN — SODIUM CHLORIDE 250 ML: 0.9 INJECTION, SOLUTION INTRAVENOUS at 06:05

## 2022-05-29 RX ADMIN — PIPERACILLIN AND TAZOBACTAM 4.5 G: 4; .5 INJECTION, POWDER, LYOPHILIZED, FOR SOLUTION INTRAVENOUS; PARENTERAL at 12:05

## 2022-05-29 RX ADMIN — DEXTROSE MONOHYDRATE 25 G: 25 INJECTION, SOLUTION INTRAVENOUS at 01:05

## 2022-05-29 RX ADMIN — SODIUM CHLORIDE, SODIUM LACTATE, POTASSIUM CHLORIDE, AND CALCIUM CHLORIDE 1809 ML: .6; .31; .03; .02 INJECTION, SOLUTION INTRAVENOUS at 12:05

## 2022-05-29 RX ADMIN — VANCOMYCIN HYDROCHLORIDE 1250 MG: 1.25 INJECTION, POWDER, LYOPHILIZED, FOR SOLUTION INTRAVENOUS at 05:05

## 2022-05-29 RX ADMIN — SODIUM CHLORIDE: 0.9 INJECTION, SOLUTION INTRAVENOUS at 08:05

## 2022-05-29 RX ADMIN — LEVOFLOXACIN 500 MG: 5 INJECTION, SOLUTION INTRAVENOUS at 04:05

## 2022-05-29 RX ADMIN — SODIUM CHLORIDE: 0.9 INJECTION, SOLUTION INTRAVENOUS at 03:05

## 2022-05-29 RX ADMIN — DEXTROSE MONOHYDRATE 50 ML: 25 INJECTION, SOLUTION INTRAVENOUS at 04:05

## 2022-05-29 RX ADMIN — LEVOFLOXACIN 500 MG: 500 INJECTION, SOLUTION INTRAVENOUS at 04:05

## 2022-05-29 RX ADMIN — PIPERACILLIN AND TAZOBACTAM 4.5 G: 4; .5 INJECTION, POWDER, LYOPHILIZED, FOR SOLUTION INTRAVENOUS; PARENTERAL at 09:05

## 2022-05-29 RX ADMIN — LEVOFLOXACIN 500 MG: 500 INJECTION, SOLUTION INTRAVENOUS at 05:05

## 2022-05-29 NOTE — ED PROVIDER NOTES
Encounter Date: 2022       History     Chief Complaint   Patient presents with    Drug Overdose     EMS called for patient unresponsive, narcan used. Patient now awake upon arrival, admits to heroin use. Accidental overdose. Denies SI     32-year-old female with a history of chronic substance abuse, IV heroin abuse, history of endocarditis and septic arthritis from IV drug abuse, presents to the emergency department after an accidental overdose avoid EMS believes is heroin.  She was found unresponsive at hotel room, given Narcan, now awake, but hypoxia.  Upon arrival to the emergency department her oxygen saturation is 42% on room air, climbs nicely with a non-rebreather.  Patient denies suicidal ideations.  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 Bilatral;  Surgeon: Chauncey Garcia MD;  Location: [address];  Service: Orthopedics;  Laterality: Bilateral;     SECTION, LOW TRANSVERSE      x4    COSMETIC SURGERY      HAND ARTHROTOMY Right 2021    Procedure: ARTHROTOMY, HAND;  Surgeon: Chauncey Garcia MD;  Location: [address];  Service: Orthopedics;  Laterality: Right;    INCISION AND DRAINAGE OF HAND Right 2021     Procedure: INCISION AND DRAINAGE, HAND (Right) - hand table, cysto tubing, 9L saline, culture swabs;  Surgeon: Chauncey Garcia MD;  Location: [facility] OR [address] [place] [address];  Service: Orthopedics;  Laterality: Right;    IRRIGATION AND DEBRIDEMENT OF UPPER EXTREMITY Right 2/9/2021    Procedure: IRRIGATION AND DEBRIDEMENT, STERNOCLAVICULAR JOINT;  Surgeon: Frandy Molina MD;  Location: [facility] OR [address] [place] [address];  Service: Cardiothoracic;  Laterality: Right;     History reviewed. No pertinent family history.  Social History     Tobacco Use    Smoking status: Never Smoker    Smokeless tobacco: Never Used   Substance Use Topics    Alcohol use: Not Currently     Comment: occ    Drug use: Yes     Types: IV, Marijuana     Comment: heroine     Review of Systems   Constitutional: Negative for fever.   HENT: Negative for sore throat.    Respiratory: Positive for cough and shortness of breath.    Cardiovascular: Negative for chest pain.   Gastrointestinal: Negative for nausea.   Genitourinary: Negative for dysuria.   Musculoskeletal: Negative for back pain.   Skin: Negative for rash.   Neurological: Negative for weakness.   Hematological: Does not bruise/bleed easily.   Psychiatric/Behavioral: Negative for suicidal ideas.   All other systems reviewed and are negative.      Physical Exam     Initial Vitals   BP Pulse Resp Temp SpO2   05/29/22 1135 05/29/22 1120 05/29/22 1120 05/29/22 1232 05/29/22 1120   123/86 (!) 129 (!) 24 97 °F (36.1 °C) (!) 42 %      MAP       --                Physical Exam    Nursing note and vitals reviewed.  Constitutional: She is not diaphoretic.   Mild distress noted   HENT:   Head: Normocephalic and atraumatic.   Eyes: Conjunctivae and EOM are normal. Pupils are equal, round, and reactive to light.   Neck: Neck supple.   Normal range of motion.  Cardiovascular: Regular rhythm, normal heart sounds and intact distal pulses.   No murmur heard.  Tachycardic at 129 beats per minute   Pulmonary/Chest: No respiratory  distress. She has no wheezes. She has rhonchi. She has rales. She exhibits no tenderness.   Abdominal: Abdomen is soft. Bowel sounds are normal. She exhibits no distension.   Musculoskeletal:         General: No tenderness or edema. Normal range of motion.      Cervical back: Normal range of motion and neck supple.     Neurological: She is alert and oriented to person, place, and time. She has normal strength. No cranial nerve deficit. GCS score is 15. GCS eye subscore is 4. GCS verbal subscore is 5. GCS motor subscore is 6.   Skin: Skin is warm and dry. Capillary refill takes less than 2 seconds.   Psychiatric:   No suicidal ideations         ED Course   Critical Care    Date/Time: 5/29/2022 12:42 PM  Performed by: Bahvin Francis MD  Authorized by: Bhavin Francis MD   Direct patient critical care time: 15 minutes  Additional history critical care time: 15 minutes  Ordering / reviewing critical care time: 10 minutes  Documentation critical care time: 10 minutes  Consulting other physicians critical care time: 10 minutes  Total critical care time (exclusive of procedural time) : 60 minutes  Critical care was necessary to treat or prevent imminent or life-threatening deterioration of the following conditions: Accidental overdose with aspiration pneumonia and severe hypoxia needing BiPAP management.  Critical care was time spent personally by me on the following activities: review of old charts, pulse oximetry, ordering and review of laboratory studies, obtaining history from patient or surrogate, evaluation of patient's response to treatment, re-evaluation of patient's condition, ordering and review of radiographic studies, ordering and performing treatments and interventions, examination of patient and discussions with primary provider.    Central Line    Date/Time: 5/29/2022 2:23 PM  Performed by: Bhavin Francis MD  Authorized by: Bhavin Francis MD     Location procedure was performed:  [facility] EMERGENCY  DEPARTMENT  Consent Done ?:  Emergent Situation  Time out complete?: Verified correct patient, procedure, equipment, staff, and site/side    Indications:  Vascular access  Anesthesia:  Local infiltration  Local anesthetic:  Lidocaine 1% without epinephrine  Anesthetic total (ml):  4  Preparation:  Skin prepped with ChloraPrep  Skin prep agent dried: Skin prep agent completely dried prior to procedure    Sterile barriers: All five maximal sterile barriers used - gloves, gown, cap, mask and large sterile sheet    Hand hygiene: Hand hygiene performed immediately prior to central venous catheter insertion    Location:  Right femoral  Site selection rationale:  Emergency situation, hypoglycemic, hypoxia  Catheter type:  Triple lumen  Catheter size:  7 Fr  Ultrasound guidance: No    Manometry: No    Number of attempts:  1  Securement:  Line sutured, chlorhexidine patch, sterile dressing applied and blood return through all ports  Complications: No    Estimated blood loss (mL):  5  Adverse Events:  None      Labs Reviewed   CBC W/ AUTO DIFFERENTIAL - Abnormal; Notable for the following components:       Result Value    MCHC 31.9 (*)     RDW 16.1 (*)     Mono % 0.0 (*)     All other components within normal limits    Narrative:     Release to patient->Immediate   COMPREHENSIVE METABOLIC PANEL - Abnormal; Notable for the following components:    Glucose 25 (*)     Creatinine 1.7 (*)     Calcium 8.3 (*)      (*)      (*)     eGFR if  45.0 (*)     eGFR if non  39.1 (*)     All other components within normal limits    Narrative:     Release to patient->Immediate    Glucose critical result(s) called and verbal readback obtained from   Taty Gutierrez. by KEZIA 05/29/2022 13:19   LACTIC ACID, PLASMA - Abnormal; Notable for the following components:    Lactate (Lactic Acid) 9.0 (*)     All other components within normal limits    Narrative:     Release to patient->Immediate  Lactic Acid  critical result(s) called and verbal readback obtained   from Taty Gutierrez. by ABT 05/29/2022 13:19   URINALYSIS, REFLEX TO URINE CULTURE - Abnormal; Notable for the following components:    Appearance, UA Cloudy (*)     Protein, UA 2+ (*)     Glucose, UA 3+ (*)     Occult Blood UA 1+ (*)     All other components within normal limits    Narrative:     Preferred Collection Type->Urine, Clean Catch  Specimen Source->Urine   TROPONIN I HIGH SENSITIVITY - Abnormal; Notable for the following components:    Troponin I High Sensitivity 210.9 (*)     All other components within normal limits    Narrative:     Release to patient->Immediate   ACETAMINOPHEN LEVEL - Abnormal; Notable for the following components:    Acetaminophen (Tylenol), Serum <2.0 (*)     All other components within normal limits    Narrative:     Release to patient->Immediate   DRUG SCREEN PANEL, URINE EMERGENCY - Abnormal; Notable for the following components:    Opiate Scrn, Ur Presumptive Positive (*)     Amphetamine Screen, Ur Presumptive Positive (*)     All other components within normal limits    Narrative:     Preferred Collection Type->Urine, Clean Catch  Specimen Source->Urine   URINALYSIS MICROSCOPIC - Abnormal; Notable for the following components:    WBC, UA 18 (*)     Bacteria Many (*)     Hyaline Casts, UA 3.1 (*)     All other components within normal limits    Narrative:     Preferred Collection Type->Urine, Clean Catch  Specimen Source->Urine   CULTURE, BLOOD   CULTURE, BLOOD   CULTURE, URINE   NT-PRO NATRIURETIC PEPTIDE    Narrative:     Release to patient->Immediate   HCG, QUANTITATIVE    Narrative:     Release to patient->Immediate   ALCOHOL,MEDICAL (ETHANOL)    Narrative:     Release to patient->Immediate   LACTIC ACID, PLASMA   PROCALCITONIN   SALICYLATE LEVEL   SARS-COV-2 RDRP GENE     EKG Readings: (Independently Interpreted)   Initial Reading: No STEMI. Rhythm: Sinus Tachycardia. Heart Rate: 130. Ectopy: No Ectopy. Conduction:  RBBB. ST Segments: Normal ST Segments. T Waves: Normal. Axis: Normal. Clinical Impression: Sinus Tachycardia with RBBB     ECG Results          EKG 12-lead (Final result)  Result time 05/29/22 14:05:08    Final result by Interface, Lab In [facility] (05/29/22 14:05:08)                 Narrative:    Test Reason : T50.901A,    Vent. Rate : 130 BPM     Atrial Rate : 130 BPM     P-R Int : 130 ms          QRS Dur : 122 ms      QT Int : 312 ms       P-R-T Axes : 067 091 040 degrees     QTc Int : 459 ms    Baseline Artifact  Sinus tachycardia  Possible Left atrial enlargement  Right bundle branch block  Abnormal ECG  When compared with ECG of 01-MAR-2021 04:45,  Poor data quality in current ECG precludes serial comparison  Confirmed by Herbie Castellano MD ([id number]) on 5/29/2022 2:05:02 PM    Referred By: [email or web address]   SELF           Confirmed By:Herbie Castellano MD                            Imaging Results          X-Ray Chest AP Portable (In process)                  Medications   lactated ringers bolus 1,809 mL (1,809 mLs Intravenous New Bag 5/29/22 1210)   piperacillin-tazobactam 4.5 g in dextrose 5 % 100 mL IVPB (ready to mix system) (4.5 g Intravenous New Bag 5/29/22 1250)   dextrose 50 % in water (D50W) injection 25 g (25 g Intravenous Given 5/29/22 1323)     Medical Decision Making:   Differential Diagnosis:   Heroin overdose, aspiration pneumonia, hypoxia             ED Course as of 05/29/22 1425   Sun May 29, 2022   1217 Discussed case with Dr. Sosa - will admit to the ICU for continued care and monitoring.  Patient with overdose with respiratory acidosis.  Improved with BiPAP. [SD]   1218 Chest x-ray consistent with aspiration pneumonia, worse on the right [SD]   1236 Sepsis post fluid bolus tissue perfusion exam:    See current vital signs.    Cardiopulmonary:   Heart:  Tachycardic at 120 beats per minute, regular rhythm   Lungs:  Rhonchi and rales bilaterally    Capillary refill: < 3 seconds  Peripheral pulses: radial 2+  bilaterally  Skin: warm/dry/pink with good turgor      [SD]   1256 Patient with very poor veins, lab unable to get the 2nd set of blood cultures, I do not want to wait on antibiotic therapy and will start her antibiotics regardless of 2nd blood culture. [SD]   1258 Opiate Scrn, Ur(!): Presumptive Positive [SD]   1300 Amphetamine Screen, Ur(!): Presumptive Positive [SD]      ED Course User Index  [SD] Bhavin Francis MD             Clinical Impression:   Final diagnoses:  [T50.901A] Overdose (Primary)  [T40.1X1A] Heroin overdose, accidental or unintentional, initial encounter  [J69.0] Aspiration pneumonia of both lungs due to gastric secretions, unspecified part of lung  [E87.2] Respiratory acidosis  [R06.89] Hypercapnia  [R79.89] Elevated lactic acid level  [E16.2] Hypoglycemia          ED Disposition Condition    Admit               Bhavin Francis MD  05/29/22 1338       Bhavin Francis MD  05/29/22 1340       Bhavin Francis MD  05/29/22 8538

## 2022-05-29 NOTE — ASSESSMENT & PLAN NOTE
Patient with bilateral pneumonia on chest x-ray, reviewed, elevated lactic acid, white count, afebrile currently, patient likely had aspiration pneumonia secondary to drug overdose, was found to be unresponsive oxygen sat in the 40s, was given Narcan, patient became more alert, continued to be hypoxic, corrected with non-rebreather, patient on continued BiPAP therapy currently, ABG reviewed, hypoxia and hypercapnia noted, expected improved with BiPAP therapy, started on IV antibiotic, patient a history of endocarditis with septic emboli, monitor, may need CT to evaluate if not improved

## 2022-05-29 NOTE — H&P
[facility]  [facility] Medicine  History & Physical    Patient Name: Марина Britton  MRN: [id number]  Patient Class: IP- Inpatient  Admission Date: 2022  Attending Physician: Bhavin Francis MD   Primary Care Provider: Primary Doctor No         Patient information was obtained from EMS personnel, past medical records and ER records.     Subjective:     Principal Problem:Aspiration pneumonia of both lungs    Chief Complaint:   Chief Complaint   Patient presents with    Drug Overdose     EMS called for patient unresponsive, narcan used. Patient now awake upon arrival, admits to heroin use. Accidental overdose. Denies SI        HPI: 32-year-old female with a history of chronic substance abuse, IV heroin abuse, history of endocarditis and septic arthritis from IV drug abuse, presents to the emergency department after an accidental overdose which EMS believes to be heroin. She was found unresponsive at hotel room, given Narcan, now awake, but became hypoxic. Upon arrival to the emergency department her oxygen saturation is 42% on room air, trends up with a non-rebreather.  Patient denies suicidal ideations.  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 Bilatral;  Surgeon: Chauncey Garcia MD;  Location: [facility] OR [address] [place] [address];  Service: Orthopedics;  Laterality: Bilateral;     SECTION, LOW TRANSVERSE      x4    COSMETIC SURGERY      HAND ARTHROTOMY Right 2021     Procedure: ARTHROTOMY, HAND;  Surgeon: Chauncey Garcia MD;  Location: [facility] OR [address] [place] [address];  Service: Orthopedics;  Laterality: Right;    INCISION AND DRAINAGE OF HAND Right 2/8/2021    Procedure: INCISION AND DRAINAGE, HAND (Right) - hand table, cysto tubing, 9L saline, culture swabs;  Surgeon: Chauncey Garcia MD;  Location: [facility] OR [address] [place] [address];  Service: Orthopedics;  Laterality: Right;    IRRIGATION AND DEBRIDEMENT OF UPPER EXTREMITY Right 2/9/2021    Procedure: IRRIGATION AND DEBRIDEMENT, STERNOCLAVICULAR JOINT;  Surgeon: Frandy Molina MD;  Location: [facility] OR [address] [place] [address];  Service: Cardiothoracic;  Laterality: Right;       Review of patient's allergies indicates:   Allergen Reactions    Soap Rash     [facility] [facility] - Hospital supplied - cleanser shampoo & body wash gel  Ingredients - purified water, sodium laureth sulfate, sodium chloride, cocamide william, cocamidopropylamine oxide, fragrance, aloe barbadensis leaf juice, propylene alcohol, peg-150 distearate, diazolidinyl urea, ciric acid, methylparaben, & propulparaben       No current facility-administered medications on file prior to encounter.     Current Outpatient Medications on File Prior to Encounter   Medication Sig    acetaminophen (TYLENOL) 500 MG tablet Take 500 mg by mouth every 6 (six) hours as needed for Pain.    apixaban (ELIQUIS) 5 mg Tab Take 1 tablet (5 mg total) by mouth 2 (two) times daily. (Patient not taking: No sig reported)    doxycycline (VIBRA-TABS) 100 MG tablet Take 1 tablet (100 mg total) by mouth every 12 (twelve) hours.    gabapentin (NEURONTIN) 300 MG capsule Take 1 capsule (300 mg total) by mouth 3 (three) times daily. (Patient not taking: Reported on 3/17/2021)    mirtazapine (REMERON) 15 MG tablet Take 1 tablet (15 mg total) by mouth every evening. (Patient not taking: Reported on 3/17/2021)     Family History    None       Tobacco Use    Smoking status: Never Smoker    Smokeless tobacco: Never Used   Substance and Sexual  Activity    Alcohol use: Not Currently     Comment: occ    Drug use: Yes     Types: IV, Marijuana     Comment: heroine    Sexual activity: Yes     Partners: Male     Birth control/protection: Partner-Vasectomy     Review of Systems   Unable to perform ROS: Acuity of condition   Objective:     Vital Signs (Most Recent):  Temp: 97 °F (36.1 °C) (05/29/22 1232)  Pulse: 97 (05/29/22 1456)  Resp: 20 (05/29/22 1456)  BP: 98/60 (05/29/22 1456)  SpO2: 100 % (05/29/22 1456) Vital Signs (24h Range):  Temp:  [97 °F (36.1 °C)] 97 °F (36.1 °C)  Pulse:  [] 97  Resp:  [18-37] 20  SpO2:  [42 %-100 %] 100 %  BP: ()/(60-96) 98/60     Weight: 60.3 kg (132 lb 14.4 oz)  Body mass index is 24.31 kg/m².    Physical Exam  Vitals and nursing note reviewed.   Constitutional:       General: She is in acute distress.      Appearance: She is ill-appearing, toxic-appearing and diaphoretic.   HENT:      Head: Normocephalic and atraumatic.   Eyes:      Extraocular Movements: Extraocular movements intact.   Cardiovascular:      Rate and Rhythm: Normal rate.      Heart sounds: Murmur heard.   Pulmonary:      Effort: Respiratory distress present.      Breath sounds: Rhonchi and rales present. No wheezing.   Abdominal:      General: Bowel sounds are normal. There is no distension.      Palpations: Abdomen is soft.      Tenderness: There is no abdominal tenderness.   Musculoskeletal:      Cervical back: Normal range of motion.      Right lower leg: No edema.      Left lower leg: No edema.   Skin:     General: Skin is warm.      Comments: Multiple tattoos, self inflicted scars   Neurological:      Mental Status: She is alert. She is disoriented.           Significant Labs: All pertinent labs within the past 24 hours have been reviewed.  ABGs:   Recent Labs   Lab 05/29/22  1152   PH 7.044*   PCO2 66.3*   HCO3 14.2   POCSATURATED 89.3   PO2 69.6*     Blood Culture: No results for input(s): LABBLOO in the last 48 hours.  CBC:   Recent Labs    Lab 05/29/22  1200   WBC 5.06   HGB 14.5   HCT 45.4        CMP:   Recent Labs   Lab 05/29/22  1200      K 3.7      CO2 24   GLU 25*   BUN 18   CREATININE 1.7*   CALCIUM 8.3*   PROT 6.9   ALBUMIN 3.6   BILITOT 0.4   ALKPHOS 105   *   *   ANIONGAP 10   EGFRNONAA 39.1*     Lactic Acid:   Recent Labs   Lab 05/29/22  1200   LACTATE 9.0*     Troponin: No results for input(s): TROPONINI in the last 48 hours.  Urine Studies:   Recent Labs   Lab 05/29/22  1230   COLORU Yellow   APPEARANCEUA Cloudy*   PHUR 5.0   SPECGRAV 1.020   PROTEINUA 2+*   GLUCUA 3+*   KETONESU Negative   BILIRUBINUA Negative   OCCULTUA 1+*   NITRITE Negative   UROBILINOGEN 1.0   LEUKOCYTESUR Negative   RBCUA 1   WBCUA 18*   BACTERIA Many*   SQUAMEPITHEL 5   HYALINECASTS 3.1*     Recent Lab Results  (Last 5 results in the past 24 hours)        05/29/22  1446   05/29/22  1427   05/29/22  1247   05/29/22  1230   05/29/22  1200        Benzodiazepines       Negative         Methadone metabolites       Negative         Phencyclidine       Negative         Acetaminophen (Tylenol), Serum         <2.0  Comment: Toxic Levels:  Adults (4 hr post-ingestion).........>150 ug/mL  Adults (12 hr post-ingestion)........>40 ug/mL  Peds (2 hr post-ingestion, liquid)...>225 ug/mL         Albumin         3.6       Alcohol, Serum         <3       Alkaline Phosphatase         105       ALT         158       Amphetamine Screen, Ur       Presumptive Positive         Anion Gap         10       Appearance, UA       Cloudy         AST         315       Bacteria, UA       Many         Bands         25.0       Barbiturate Screen, Ur       Negative         Baso #         CANCELED  Comment: Result canceled by the ancillary.       Basophil %         0.0       Bilirubin (UA)       Negative         BILIRUBIN TOTAL         0.4  Comment: For infants and newborns, interpretation of results should be based  on gestational age, weight and in agreement with  clinical  observations.    Premature Infant recommended reference ranges:  Up to 24 hours.............<8.0 mg/dL  Up to 48 hours............<12.0 mg/dL  3-5 days..................<15.0 mg/dL  6-29 days.................<15.0 mg/dL    For patients on Eltrombopag therapy, use of Dimension [place] TBIL is   not   recommended.         BUN         18       Calcium         8.3       Chloride         110       CO2         24       Cocaine (Metab.)       Negative         Color, UA       Yellow         Creatinine         1.7       Creatinine, Urine       139.0         Differential Method         Manual  Comment: CORRECTED RESULT; previously reported as Automated on 05/29/2022 at   12:42.    [C]       eGFR if          45.0       eGFR if non          39.1  Comment: Calculation used to obtain the estimated glomerular filtration  rate (eGFR) is the CKD-EPI equation.          Eos #         CANCELED  Comment: Result canceled by the ancillary.       Eosinophil %         1.0       Glucose         25  Comment: Glucose critical result(s) called and verbal readback obtained from   Taty Gutierrez. by ABT 05/29/2022 13:19         Glucose, UA       3+         Gran %         38.0       HCG Quant         <1.0  Comment: ATTENTION: The use of Biotin Supplements may interfere with this   assay.  Quantitative HCG Reference Ranges:  Males........................<5.0 mIU/mL  Non-Pregnant Females.........<5.0 mIU/mL  Pregnancy:  Weeks post-LMP...............Range (mIU/mL)  1-10  weeks.....................202-231,000  11-15 weeks..................22,536-234,990  16-22 weeks...................8,007-50,064  23-40 weeks...................1,627-79,468    NOTE:  This assay is not FDA approved for tumor screening,   diagnosis, or monitoring.         Hematocrit         45.4       Hemoglobin         14.5       Hyaline Casts, UA       3.1         Immature Grans (Abs)         CANCELED  Comment: Mild elevation in immature  granulocytes is non specific and   can be seen in a variety of conditions including stress response,   acute inflammation, trauma and pregnancy. Correlation with other   laboratory and clinical findings is essential.    Result canceled by the ancillary.         Immature Granulocytes         CANCELED  Comment: Result canceled by the ancillary.       Ketones, UA       Negative         Lactate, Alf         9.0  Comment: Lactic Acid critical result(s) called and verbal readback obtained   from Taty Gutierrez. by ABT 05/29/2022 13:19         Leukocytes, UA       Negative         Lymph #         CANCELED  Comment: Result canceled by the ancillary.       Lymph %         32.0       MCH         30.3       MCHC         31.9       MCV         95       Metamyelocytes         3.0       Microscopic Comment       SEE COMMENT  Comment: Other formed elements not mentioned in the report are not   present in the microscopic examination.            Mono #         CANCELED  Comment: Result canceled by the ancillary.       Mono %         0.0       MPV         10.1       Myelocytes         1.0       NITRITE UA       Negative         nRBC         0       NT-proBNP         276       Occult Blood UA       1+         Opiate Scrn, Ur       Presumptive Positive         pH, UA       5.0         Platelets         347       POCT Glucose 111   27             Potassium         3.7       PROTEIN TOTAL         6.9       Protein, UA       2+  Comment: Recommend a 24 hour urine protein or a urine   protein/creatinine ratio if globulin induced proteinuria is  clinically suspected.            Acceptable     Yes           RBC         4.79       RBC, UA       1         RDW         16.1       SARS-CoV-2 RNA, Amplification, Qual     Negative           Sodium         144       Specific [place], UA       1.020         Specimen UA       Urine, Catheterized         Squam Epithel, UA       5         Marijuana (THC) Metabolite       Negative          Toxicology Information       SEE COMMENT  Comment: This screen includes the following classes of drugs at the   listed cut-off:    Benzodiazepines                  200 ng/ml  Methadone                        300 ng/ml  Cocaine metabolite               300 ng/ml  Opiates                         2000 ng/ml  Barbiturates                     200 ng/ml  Amphetamines                    1000 ng/ml  Marijuana metabs (THC)            50 ng/ml  Phencyclidine (PCP)               25 ng/ml    **Intended use : The UDS methods provide only a preliminary result.    A more   specific alternate chemical method must be used in order to obtain a   confirmed analytical result.  Gas chromatography mass spectrometry   (GC/MS)   is the preferred confirmatory method.  Clinical consideration and   professional judgement should be applied to any drug of abuse test   result.    Particularly when preliminary results are used.       ** Results:  Positive results are only preliminary and only suggest   the   sample may contain the analyte being tested.  Negative results   indicate that   the sample does not contain the analyte or the analyte is present in   concentrations below the cut-off level.           Troponin I High Sensitivity         210.9       UROBILINOGEN UA       1.0         WBC, UA       18         WBC         5.06       Yeast, UA       None                                 [C] - Corrected Result               Significant Imaging: I have reviewed all pertinent imaging results/findings within the past 24 hours.    Assessment/Plan:     * Aspiration pneumonia of both lungs  Patient with bilateral pneumonia on chest x-ray, reviewed, elevated lactic acid, white count, afebrile currently, patient likely had aspiration pneumonia secondary to drug overdose, was found to be unresponsive oxygen sat in the 40s, was given Narcan, patient became more alert, continued to be hypoxic, corrected with non-rebreather, patient on continued BiPAP therapy  currently, ABG reviewed, hypoxia and hypercapnia noted, expected improved with BiPAP therapy, started on IV antibiotic, patient a history of endocarditis with septic emboli, monitor, may need CT to evaluate if not improved      Elevated LFTs  Check acute hep panel and hiv      Hx of bacterial endocarditis  Continues with IVDU, check echo      Acute respiratory failure with hypoxia and hypercarbia  Patient with Hypercapnic and Hypoxic Respiratory failure which is Acute.  she is not on home oxygen. Supplemental oxygen was provided and noted- Oxygen Concentration (%):  [100] 100.   Signs/symptoms of respiratory failure include- respiratory distress and lethargy. Contributing diagnoses includes - Aspiration, Pneumonia and decreased respiration 2/2 drug overdose Labs and images were reviewed. Patient Has recent ABG, which has been reviewed. Will treat underlying causes and adjust management of respiratory failure as follows- BiPAP, abx, oxygen    Overdose  Recurrent IVDU, previous hx of endocarditis, responded to narcan      IVDU (intravenous drug user)   on cessation once able, offer rehab options and therapy        VTE Risk Mitigation (From admission, onward)    None            Critical Care Medicine Daily Checklist:    A: Awake: RASS Goal/Actual Goal:    Actual:     B: Spontaneous Breathing Trial Performed?     C: SAT & SBT Coordinated?  no                      D: Delirium: CAM-ICU     E: Early Mobility Performed? No   F: Feeding Goal:    Status:     Current Diet Order   No orders of the defined types were placed in this encounter.      AS: Analgesia/Sedation no   T: Thromboembolic Prophylaxis lovenox   H: HOB > 300 Yes   U: Stress Ulcer Prophylaxis (if needed) pepcid   G: Glucose Control accuchecks   B: Bowel Function     I: Indwelling Catheter (Lines & Adkins) Necessity purewick device, right femoral TLC   D: De-escalation of Antimicrobials/Pharmacotherapies Vanc, zosyn and levaquin    Plan for the day/ETD  Bipap, abx, repeat labs and ABG    Code Status:  Family/Goals of Care: Prior  Family notified by nurse and updated on condition     Critical Care Time: 75  Critical secondary to Patient has a condition that poses threat to life and bodily function: Severe Respiratory Distress      Critical care was time spent personally by me on the following activities: development of treatment plan with patient or surrogate and bedside caregivers, discussions with consultants, evaluation of patient's response to treatment, examination of patient, ordering and performing treatments and interventions, ordering and review of laboratory studies, ordering and review of radiographic studies, pulse oximetry, re-evaluation of patient's condition.  This critical care time did not overlap with that of any other provider or involve time for any procedures.     All questions answered to the best of my ability.       James Sosa MD  Department of Hospital Medicine   [place] - Emergency Department

## 2022-05-29 NOTE — CARE UPDATE
DR MALHOTRA AWARE OF LOWER BLOOD PRESSURE AND BLOOD SUGAR - CAN NOW HAVE CLEAR LIQUIDS - NORMAL SALINE BOLUS ORDERED

## 2022-05-29 NOTE — ED NOTES
Patient put into hospital gown.  Small clear bag with a peach substance found in patient's bra.  [facility] security was notified and turned it over to the police.  See [facility] report.

## 2022-05-29 NOTE — EICU
Rounding (Video Assessment):  Yes    Comments: Video rounds completed. Pt lying in bed w bipap intact. Hr 89, resp 33, bp 87/53 o2 sats 100% per monitor. No distress noted.

## 2022-05-29 NOTE — RESPIRATORY THERAPY
Bicarb and oxygen saturation did not calculate due to PO2 being above reportable range. A note needed to be added to ABG results. Chart correction ticket placed with Epic for note to be added. MILLY Santa aware.

## 2022-05-29 NOTE — EICU
Rounding (Video Assessment):  Yes    Intervention Initiated From:  [facility] / EICU    Comments: video rounds completed.  bipap intact.  VS:  88, 112/71, 26, 100%, ivf @ 127 cc/hr per alaris pump

## 2022-05-29 NOTE — ASSESSMENT & PLAN NOTE
Patient with Hypercapnic and Hypoxic Respiratory failure which is Acute.  she is not on home oxygen. Supplemental oxygen was provided and noted- Oxygen Concentration (%):  [100] 100.   Signs/symptoms of respiratory failure include- respiratory distress and lethargy. Contributing diagnoses includes - Aspiration, Pneumonia and decreased respiration 2/2 drug overdose Labs and images were reviewed. Patient Has recent ABG, which has been reviewed. Will treat underlying causes and adjust management of respiratory failure as follows- BiPAP, abx, oxygen

## 2022-05-29 NOTE — ED NOTES
One set of blood cultures obtained prior to abx admin. staff unable to retrieve second set. Per physician, start abx treatment in order to not delay pt care. Will continue with order for culture post fluid resuscitation.

## 2022-05-29 NOTE — ED NOTES
Estrellita, patient's sister calling for information.  OK per pt to speak about medical condition.  Estrellita notified, and phone #s left [phone] and for pt's mother [phone]

## 2022-05-29 NOTE — SUBJECTIVE & OBJECTIVE
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 Bilatral;  Surgeon: Chauncey Garcia MD;  Location: [address];  Service: Orthopedics;  Laterality: Bilateral;     SECTION, LOW TRANSVERSE      x4    COSMETIC SURGERY      HAND ARTHROTOMY Right 2021    Procedure: ARTHROTOMY, HAND;  Surgeon: Chauncey Garcia MD;  Location: [address];  Service: Orthopedics;  Laterality: Right;    INCISION AND DRAINAGE OF HAND Right 2021    Procedure: INCISION AND DRAINAGE, HAND (Right) - hand table, cysto tubing, 9L saline, culture swabs;  Surgeon: Chauncey Garcia MD;  Location: [address];  Service: Orthopedics;  Laterality: Right;    IRRIGATION AND DEBRIDEMENT OF UPPER EXTREMITY Right 2021    Procedure: IRRIGATION AND DEBRIDEMENT, STERNOCLAVICULAR JOINT;  Surgeon: Frandy Molina MD;  Location: [address];  Service: Cardiothoracic;  Laterality: Right;       Review of patient's allergies indicates:   Allergen Reactions    Soap Rash     [facility] - Hospital supplied - cleanser shampoo & body wash gel  Ingredients - purified water, sodium laureth sulfate, sodium chloride, cocamide william, cocamidopropylamine oxide, fragrance, aloe barbadensis leaf juice, propylene alcohol, peg-150 distearate, diazolidinyl urea, ciric acid, methylparaben, & propulparaben       No current facility-administered medications on file prior to encounter.     Current Outpatient Medications on File Prior to Encounter   Medication Sig    acetaminophen (TYLENOL) 500 MG tablet Take 500 mg by mouth every 6 (six) hours as needed for Pain.    apixaban (ELIQUIS) 5 mg Tab Take 1 tablet (5 mg total) by mouth 2 (two) times  daily. (Patient not taking: No sig reported)    doxycycline (VIBRA-TABS) 100 MG tablet Take 1 tablet (100 mg total) by mouth every 12 (twelve) hours.    gabapentin (NEURONTIN) 300 MG capsule Take 1 capsule (300 mg total) by mouth 3 (three) times daily. (Patient not taking: Reported on 3/17/2021)    mirtazapine (REMERON) 15 MG tablet Take 1 tablet (15 mg total) by mouth every evening. (Patient not taking: Reported on 3/17/2021)     Family History    None       Tobacco Use    Smoking status: Never Smoker    Smokeless tobacco: Never Used   Substance and Sexual Activity    Alcohol use: Not Currently     Comment: occ    Drug use: Yes     Types: IV, Marijuana     Comment: heroine    Sexual activity: Yes     Partners: Male     Birth control/protection: Partner-Vasectomy     Review of Systems   Unable to perform ROS: Acuity of condition   Objective:     Vital Signs (Most Recent):  Temp: 97 °F (36.1 °C) (05/29/22 1232)  Pulse: 97 (05/29/22 1456)  Resp: 20 (05/29/22 1456)  BP: 98/60 (05/29/22 1456)  SpO2: 100 % (05/29/22 1456) Vital Signs (24h Range):  Temp:  [97 °F (36.1 °C)] 97 °F (36.1 °C)  Pulse:  [] 97  Resp:  [18-37] 20  SpO2:  [42 %-100 %] 100 %  BP: ()/(60-96) 98/60     Weight: 60.3 kg (132 lb 14.4 oz)  Body mass index is 24.31 kg/m².    Physical Exam  Vitals and nursing note reviewed.   Constitutional:       General: She is in acute distress.      Appearance: She is ill-appearing, toxic-appearing and diaphoretic.   HENT:      Head: Normocephalic and atraumatic.   Eyes:      Extraocular Movements: Extraocular movements intact.   Cardiovascular:      Rate and Rhythm: Normal rate.      Heart sounds: Murmur heard.   Pulmonary:      Effort: Respiratory distress present.      Breath sounds: Rhonchi and rales present. No wheezing.   Abdominal:      General: Bowel sounds are normal. There is no distension.      Palpations: Abdomen is soft.      Tenderness: There is no abdominal tenderness.   Musculoskeletal:       Cervical back: Normal range of motion.      Right lower leg: No edema.      Left lower leg: No edema.   Skin:     General: Skin is warm.      Comments: Multiple tattoos, self inflicted scars   Neurological:      Mental Status: She is alert. She is disoriented.           Significant Labs: All pertinent labs within the past 24 hours have been reviewed.  ABGs:   Recent Labs   Lab 05/29/22  1152   PH 7.044*   PCO2 66.3*   HCO3 14.2   POCSATURATED 89.3   PO2 69.6*     Blood Culture: No results for input(s): LABBLOO in the last 48 hours.  CBC:   Recent Labs   Lab 05/29/22  1200   WBC 5.06   HGB 14.5   HCT 45.4        CMP:   Recent Labs   Lab 05/29/22  1200      K 3.7      CO2 24   GLU 25*   BUN 18   CREATININE 1.7*   CALCIUM 8.3*   PROT 6.9   ALBUMIN 3.6   BILITOT 0.4   ALKPHOS 105   *   *   ANIONGAP 10   EGFRNONAA 39.1*     Lactic Acid:   Recent Labs   Lab 05/29/22  1200   LACTATE 9.0*     Troponin: No results for input(s): TROPONINI in the last 48 hours.  Urine Studies:   Recent Labs   Lab 05/29/22  1230   COLORU Yellow   APPEARANCEUA Cloudy*   PHUR 5.0   SPECGRAV 1.020   PROTEINUA 2+*   GLUCUA 3+*   KETONESU Negative   BILIRUBINUA Negative   OCCULTUA 1+*   NITRITE Negative   UROBILINOGEN 1.0   LEUKOCYTESUR Negative   RBCUA 1   WBCUA 18*   BACTERIA Many*   SQUAMEPITHEL 5   HYALINECASTS 3.1*     Recent Lab Results  (Last 5 results in the past 24 hours)        05/29/22  1446   05/29/22  1427   05/29/22  1247   05/29/22  1230   05/29/22  1200        Benzodiazepines       Negative         Methadone metabolites       Negative         Phencyclidine       Negative         Acetaminophen (Tylenol), Serum         <2.0  Comment: Toxic Levels:  Adults (4 hr post-ingestion).........>150 ug/mL  Adults (12 hr post-ingestion)........>40 ug/mL  Peds (2 hr post-ingestion, liquid)...>225 ug/mL         Albumin         3.6       Alcohol, Serum         <3       Alkaline Phosphatase         105       ALT          158       Amphetamine Screen, Ur       Presumptive Positive         Anion Gap         10       Appearance, UA       Cloudy         AST         315       Bacteria, UA       Many         Bands         25.0       Barbiturate Screen, Ur       Negative         Baso #         CANCELED  Comment: Result canceled by the ancillary.       Basophil %         0.0       Bilirubin (UA)       Negative         BILIRUBIN TOTAL         0.4  Comment: For infants and newborns, interpretation of results should be based  on gestational age, weight and in agreement with clinical  observations.    Premature Infant recommended reference ranges:  Up to 24 hours.............<8.0 mg/dL  Up to 48 hours............<12.0 mg/dL  3-5 days..................<15.0 mg/dL  6-29 days.................<15.0 mg/dL    For patients on Eltrombopag therapy, use of Dimension [place] TBIL is   not   recommended.         BUN         18       Calcium         8.3       Chloride         110       CO2         24       Cocaine (Metab.)       Negative         Color, UA       Yellow         Creatinine         1.7       Creatinine, Urine       139.0         Differential Method         Manual  Comment: CORRECTED RESULT; previously reported as Automated on 05/29/2022 at   12:42.    [C]       eGFR if          45.0       eGFR if non          39.1  Comment: Calculation used to obtain the estimated glomerular filtration  rate (eGFR) is the CKD-EPI equation.          Eos #         CANCELED  Comment: Result canceled by the ancillary.       Eosinophil %         1.0       Glucose         25  Comment: Glucose critical result(s) called and verbal readback obtained from   Taty Gutierrez. by ABT 05/29/2022 13:19         Glucose, UA       3+         Gran %         38.0       HCG Quant         <1.0  Comment: ATTENTION: The use of Biotin Supplements may interfere with this   assay.  Quantitative HCG Reference Ranges:  Males........................<5.0  mIU/mL  Non-Pregnant Females.........<5.0 mIU/mL  Pregnancy:  Weeks post-LMP...............Range (mIU/mL)  1-10  weeks.....................202-231,000  11-15 weeks..................22,536-234,990  16-22 weeks...................8,007-50,064  23-40 weeks...................1,600-49,413    NOTE:  This assay is not FDA approved for tumor screening,   diagnosis, or monitoring.         Hematocrit         45.4       Hemoglobin         14.5       Hyaline Casts, UA       3.1         Immature Grans (Abs)         CANCELED  Comment: Mild elevation in immature granulocytes is non specific and   can be seen in a variety of conditions including stress response,   acute inflammation, trauma and pregnancy. Correlation with other   laboratory and clinical findings is essential.    Result canceled by the ancillary.         Immature Granulocytes         CANCELED  Comment: Result canceled by the ancillary.       Ketones, UA       Negative         Lactate, Alf         9.0  Comment: Lactic Acid critical result(s) called and verbal readback obtained   from Taty Gutierrez. by ABT 05/29/2022 13:19         Leukocytes, UA       Negative         Lymph #         CANCELED  Comment: Result canceled by the ancillary.       Lymph %         32.0       MCH         30.3       MCHC         31.9       MCV         95       Metamyelocytes         3.0       Microscopic Comment       SEE COMMENT  Comment: Other formed elements not mentioned in the report are not   present in the microscopic examination.            Mono #         CANCELED  Comment: Result canceled by the ancillary.       Mono %         0.0       MPV         10.1       Myelocytes         1.0       NITRITE UA       Negative         nRBC         0       NT-proBNP         276       Occult Blood UA       1+         Opiate Scrn, Ur       Presumptive Positive         pH, UA       5.0         Platelets         347       POCT Glucose 111   27             Potassium         3.7       PROTEIN TOTAL          6.9       Protein, UA       2+  Comment: Recommend a 24 hour urine protein or a urine   protein/creatinine ratio if globulin induced proteinuria is  clinically suspected.            Acceptable     Yes           RBC         4.79       RBC, UA       1         RDW         16.1       SARS-CoV-2 RNA, Amplification, Qual     Negative           Sodium         144       Specific [place], UA       1.020         Specimen UA       Urine, Catheterized         Squam Epithel, UA       5         Marijuana (THC) Metabolite       Negative         Toxicology Information       SEE COMMENT  Comment: This screen includes the following classes of drugs at the   listed cut-off:    Benzodiazepines                  200 ng/ml  Methadone                        300 ng/ml  Cocaine metabolite               300 ng/ml  Opiates                         2000 ng/ml  Barbiturates                     200 ng/ml  Amphetamines                    1000 ng/ml  Marijuana metabs (THC)            50 ng/ml  Phencyclidine (PCP)               25 ng/ml    **Intended use : The UDS methods provide only a preliminary result.    A more   specific alternate chemical method must be used in order to obtain a   confirmed analytical result.  Gas chromatography mass spectrometry   (GC/MS)   is the preferred confirmatory method.  Clinical consideration and   professional judgement should be applied to any drug of abuse test   result.    Particularly when preliminary results are used.       ** Results:  Positive results are only preliminary and only suggest   the   sample may contain the analyte being tested.  Negative results   indicate that   the sample does not contain the analyte or the analyte is present in   concentrations below the cut-off level.           Troponin I High Sensitivity         210.9       UROBILINOGEN UA       1.0         WBC, UA       18         WBC         5.06       Yeast, UA       None                                 [C] - Corrected  Result               Significant Imaging: I have reviewed all pertinent imaging results/findings within the past 24 hours.

## 2022-05-29 NOTE — PROGRESS NOTES
Pharmacokinetic Initial Assessment: IV Vancomycin    Assessment/Plan:    Initiate intravenous vancomycin with loading dose of 1250 mg once with subsequent doses when random concentrations are less than 20 mcg/mL  Desired empiric serum trough concentration is 15 to 20 mcg/mL  Draw vancomycin random level on 5/30/22 at 16:30.  Pharmacy will continue to follow and monitor vancomycin.      Please contact pharmacy at extension [phone] with any questions regarding this assessment.     Thank you for the consult,   Brandt Calderonh       Patient brief summary:  Марина Britton is a 32 y.o. female initiated on antimicrobial therapy with IV Vancomycin for treatment of suspected Pneumonia    Drug Allergies:   Review of patient's allergies indicates:   Allergen Reactions    Soap Rash     [facility] - Hospital supplied - cleanser shampoo & body wash gel  Ingredients - purified water, sodium laureth sulfate, sodium chloride, cocamide william, cocamidopropylamine oxide, fragrance, aloe barbadensis leaf juice, propylene alcohol, peg-150 distearate, diazolidinyl urea, ciric acid, methylparaben, & propulparaben       Actual Body Weight:   60.3 kg    Renal Function:   Estimated Creatinine Clearance: 40.3 mL/min (A) (based on SCr of 1.7 mg/dL (H)).,     Dialysis Method (if applicable):  N/A    CBC (last 72 hours):  Recent Labs   Lab Result Units 05/29/22  1200   WBC K/uL 5.06   Hemoglobin g/dL 14.5   Hematocrit % 45.4   Platelets K/uL 347   Gran % % 38.0   Lymph % % 32.0   Mono % % 0.0*   Eosinophil % % 1.0   Basophil % % 0.0   Differential Method  Manual       Metabolic Panel (last 72 hours):  Recent Labs   Lab Result Units 05/29/22  1200 05/29/22  1230   Sodium mmol/L 144  --    Potassium mmol/L 3.7  --    Chloride mmol/L 110  --    CO2 mmol/L 24  --    Glucose mg/dL 25*  --    Glucose, UA   --  3+*   BUN mg/dL 18  --    Creatinine mg/dL 1.7*  --    Creatinine, Urine mg/dL  --  139.0   Albumin g/dL 3.6  --    Total Bilirubin mg/dL 0.4  --     Alkaline Phosphatase U/L 105  --    AST U/L 315*  --    ALT U/L 158*  --        Drug levels (last 3 results):  No results for input(s): VANCOMYCINRA, VANCORANDOM, VANCOMYCINPE, VANCOPEAK, VANCOMYCINTR, VANCOTROUGH in the last 72 hours.    Microbiologic Results:  Microbiology Results (last 7 days)     Procedure Component Value Units Date/Time    Blood culture x two cultures. Draw prior to antibiotics. [[id number]] Collected: 05/29/22 1526    Order Status: Sent Specimen: Blood Updated: 05/29/22 1526    Urine culture [[id number]] Collected: 05/29/22 1230    Order Status: No result Specimen: Urine Updated: 05/29/22 1258    Blood culture x two cultures. Draw prior to antibiotics. [[id number]] Collected: 05/29/22 1200    Order Status: Sent Specimen: Blood from Antecubital, Left Updated: 05/29/22 1211

## 2022-05-29 NOTE — HPI
32-year-old female with a history of chronic substance abuse, IV heroin abuse, history of endocarditis and septic arthritis from IV drug abuse, presents to the emergency department after an accidental overdose which EMS believes to be heroin. She was found unresponsive at hotel room, given Narcan, now awake, but became hypoxic. Upon arrival to the emergency department her oxygen saturation is 42% on room air, trends up with a non-rebreather.  Patient denies suicidal ideations.  Patient with repetitive behavior, had had multiple overdoses in the past, last year hospitalized or endocarditis, was supposed to have valve replacement however had not completed, not compliant with medication

## 2022-05-30 LAB
ALBUMIN SERPL BCP-MCNC: 2.7 G/DL (ref 3.5–5.2)
ALP SERPL-CCNC: 40 U/L (ref 55–135)
ALT SERPL W/O P-5'-P-CCNC: 111 U/L (ref 10–44)
ANION GAP SERPL CALC-SCNC: 8 MMOL/L (ref 8–16)
AST SERPL-CCNC: 113 U/L (ref 10–40)
BASOPHILS # BLD AUTO: ABNORMAL K/UL (ref 0–0.2)
BASOPHILS NFR BLD: 0 % (ref 0–1.9)
BILIRUB SERPL-MCNC: 1 MG/DL (ref 0.1–1)
BUN SERPL-MCNC: 14 MG/DL (ref 6–20)
CALCIUM SERPL-MCNC: 7.4 MG/DL (ref 8.7–10.5)
CHLORIDE SERPL-SCNC: 109 MMOL/L (ref 95–110)
CO2 SERPL-SCNC: 24 MMOL/L (ref 23–29)
CREAT SERPL-MCNC: 1 MG/DL (ref 0.5–1.4)
DIFFERENTIAL METHOD: ABNORMAL
EOSINOPHIL # BLD AUTO: ABNORMAL K/UL (ref 0–0.5)
EOSINOPHIL NFR BLD: 0 % (ref 0–8)
ERYTHROCYTE [DISTWIDTH] IN BLOOD BY AUTOMATED COUNT: 15.3 % (ref 11.5–14.5)
EST. GFR  (AFRICAN AMERICAN): >60 ML/MIN/1.73 M^2
EST. GFR  (NON AFRICAN AMERICAN): >60 ML/MIN/1.73 M^2
GLUCOSE SERPL-MCNC: 91 MG/DL (ref 70–110)
HAV IGM SERPL QL IA: NEGATIVE
HBV CORE IGM SERPL QL IA: NEGATIVE
HBV SURFACE AG SERPL QL IA: NEGATIVE
HCT VFR BLD AUTO: 31.3 % (ref 37–48.5)
HCV AB SERPL QL IA: POSITIVE
HGB BLD-MCNC: 10.7 G/DL (ref 12–16)
HIV 1+2 AB+HIV1 P24 AG SERPL QL IA: NEGATIVE
IMM GRANULOCYTES # BLD AUTO: ABNORMAL K/UL (ref 0–0.04)
IMM GRANULOCYTES NFR BLD AUTO: ABNORMAL % (ref 0–0.5)
LYMPHOCYTES # BLD AUTO: ABNORMAL K/UL (ref 1–4.8)
LYMPHOCYTES NFR BLD: 4 % (ref 18–48)
MCH RBC QN AUTO: 30.4 PG (ref 27–31)
MCHC RBC AUTO-ENTMCNC: 34.2 G/DL (ref 32–36)
MCV RBC AUTO: 89 FL (ref 82–98)
MONOCYTES # BLD AUTO: ABNORMAL K/UL (ref 0.3–1)
MONOCYTES NFR BLD: 4 % (ref 4–15)
NEUTROPHILS NFR BLD: 59 % (ref 38–73)
NEUTS BAND NFR BLD MANUAL: 33 %
NRBC BLD-RTO: 0 /100 WBC
PLATELET # BLD AUTO: 217 K/UL (ref 150–450)
PLATELET BLD QL SMEAR: ABNORMAL
PMV BLD AUTO: 9.3 FL (ref 9.2–12.9)
POCT GLUCOSE: 103 MG/DL (ref 70–110)
POCT GLUCOSE: 123 MG/DL (ref 70–110)
POCT GLUCOSE: 60 MG/DL (ref 70–110)
POCT GLUCOSE: 73 MG/DL (ref 70–110)
POCT GLUCOSE: 93 MG/DL (ref 70–110)
POTASSIUM SERPL-SCNC: 4 MMOL/L (ref 3.5–5.1)
PROT SERPL-MCNC: 5.2 G/DL (ref 6–8.4)
RBC # BLD AUTO: 3.52 M/UL (ref 4–5.4)
SODIUM SERPL-SCNC: 141 MMOL/L (ref 136–145)
VANCOMYCIN SERPL-MCNC: 3.5 UG/ML
WBC # BLD AUTO: 12.3 K/UL (ref 3.9–12.7)

## 2022-05-30 PROCEDURE — 94660 CPAP INITIATION&MGMT: CPT

## 2022-05-30 PROCEDURE — 99233 SBSQ HOSP IP/OBS HIGH 50: CPT | Mod: ,,, | Performed by: STUDENT IN AN ORGANIZED HEALTH CARE EDUCATION/TRAINING PROGRAM

## 2022-05-30 PROCEDURE — 99900035 HC TECH TIME PER 15 MIN (STAT)

## 2022-05-30 PROCEDURE — 94761 N-INVAS EAR/PLS OXIMETRY MLT: CPT

## 2022-05-30 PROCEDURE — 25000003 PHARM REV CODE 250: Performed by: INTERNAL MEDICINE

## 2022-05-30 PROCEDURE — 20000000 HC ICU ROOM

## 2022-05-30 PROCEDURE — 80202 ASSAY OF VANCOMYCIN: CPT | Performed by: EMERGENCY MEDICINE

## 2022-05-30 PROCEDURE — 85007 BL SMEAR W/DIFF WBC COUNT: CPT | Performed by: EMERGENCY MEDICINE

## 2022-05-30 PROCEDURE — 27000221 HC OXYGEN, UP TO 24 HOURS

## 2022-05-30 PROCEDURE — 99900031 HC PATIENT EDUCATION (STAT)

## 2022-05-30 PROCEDURE — 36415 COLL VENOUS BLD VENIPUNCTURE: CPT | Performed by: EMERGENCY MEDICINE

## 2022-05-30 PROCEDURE — 63600175 PHARM REV CODE 636 W HCPCS: Performed by: STUDENT IN AN ORGANIZED HEALTH CARE EDUCATION/TRAINING PROGRAM

## 2022-05-30 PROCEDURE — 85027 COMPLETE CBC AUTOMATED: CPT | Performed by: EMERGENCY MEDICINE

## 2022-05-30 PROCEDURE — 25000003 PHARM REV CODE 250: Performed by: EMERGENCY MEDICINE

## 2022-05-30 PROCEDURE — 63600175 PHARM REV CODE 636 W HCPCS: Performed by: EMERGENCY MEDICINE

## 2022-05-30 PROCEDURE — 80053 COMPREHEN METABOLIC PANEL: CPT | Performed by: EMERGENCY MEDICINE

## 2022-05-30 PROCEDURE — 63600175 PHARM REV CODE 636 W HCPCS: Performed by: INTERNAL MEDICINE

## 2022-05-30 PROCEDURE — 99233 PR SUBSEQUENT HOSPITAL CARE,LEVL III: ICD-10-PCS | Mod: ,,, | Performed by: STUDENT IN AN ORGANIZED HEALTH CARE EDUCATION/TRAINING PROGRAM

## 2022-05-30 RX ORDER — MUPIROCIN 20 MG/G
OINTMENT TOPICAL 2 TIMES DAILY
Status: COMPLETED | OUTPATIENT
Start: 2022-05-30 | End: 2022-06-04

## 2022-05-30 RX ORDER — ENOXAPARIN SODIUM 100 MG/ML
40 INJECTION SUBCUTANEOUS EVERY 24 HOURS
Status: DISCONTINUED | OUTPATIENT
Start: 2022-05-30 | End: 2022-06-05 | Stop reason: HOSPADM

## 2022-05-30 RX ORDER — HYDROXYZINE HYDROCHLORIDE 25 MG/1
50 TABLET, FILM COATED ORAL NIGHTLY PRN
Status: DISCONTINUED | OUTPATIENT
Start: 2022-05-30 | End: 2022-06-05 | Stop reason: HOSPADM

## 2022-05-30 RX ORDER — METHOCARBAMOL 500 MG/1
500 TABLET, FILM COATED ORAL 4 TIMES DAILY PRN
Status: DISCONTINUED | OUTPATIENT
Start: 2022-05-30 | End: 2022-06-05 | Stop reason: HOSPADM

## 2022-05-30 RX ORDER — DICYCLOMINE HYDROCHLORIDE 10 MG/1
10 CAPSULE ORAL 4 TIMES DAILY PRN
Status: DISCONTINUED | OUTPATIENT
Start: 2022-05-30 | End: 2022-06-05 | Stop reason: HOSPADM

## 2022-05-30 RX ADMIN — PIPERACILLIN AND TAZOBACTAM 4.5 G: 4; .5 INJECTION, POWDER, LYOPHILIZED, FOR SOLUTION INTRAVENOUS; PARENTERAL at 05:05

## 2022-05-30 RX ADMIN — FAMOTIDINE 20 MG: 10 INJECTION INTRAVENOUS at 09:05

## 2022-05-30 RX ADMIN — SODIUM CHLORIDE: 0.9 INJECTION, SOLUTION INTRAVENOUS at 05:05

## 2022-05-30 RX ADMIN — PIPERACILLIN AND TAZOBACTAM 4.5 G: 4; .5 INJECTION, POWDER, LYOPHILIZED, FOR SOLUTION INTRAVENOUS; PARENTERAL at 09:05

## 2022-05-30 RX ADMIN — SODIUM CHLORIDE: 0.9 INJECTION, SOLUTION INTRAVENOUS at 10:05

## 2022-05-30 RX ADMIN — PIPERACILLIN AND TAZOBACTAM 4.5 G: 4; .5 INJECTION, POWDER, LYOPHILIZED, FOR SOLUTION INTRAVENOUS; PARENTERAL at 01:05

## 2022-05-30 RX ADMIN — MUPIROCIN: 20 OINTMENT TOPICAL at 09:05

## 2022-05-30 RX ADMIN — VANCOMYCIN HYDROCHLORIDE 1750 MG: 1 INJECTION, POWDER, LYOPHILIZED, FOR SOLUTION INTRAVENOUS at 07:05

## 2022-05-30 RX ADMIN — ENOXAPARIN SODIUM 40 MG: 40 INJECTION SUBCUTANEOUS at 05:05

## 2022-05-30 RX ADMIN — DEXTROSE MONOHYDRATE 12.5 G: 25 INJECTION, SOLUTION INTRAVENOUS at 01:05

## 2022-05-30 NOTE — PROGRESS NOTES
Attempted to see patient for psychiatric consultation, however patient was on bipap, sleeping, unable to participate at this time. Will attempt to see patient tomorrow.

## 2022-05-30 NOTE — HOSPITAL COURSE
"5/30: No acute events overnight. Patient slept through. She is started on IV antibiotics for bilateral multifocal pneumonia. Patient slept well on BiPAP for CO2 retention. No complaints of diarrhea, vomiting or nausea, restlessness. Does complain of mild abdominal pain, joint aches. Patient recalls where she was last found in motel where she was alone "snorting heroin." She agrees to meet with psychiatry to help manage her addiction and withdrawal symptoms for when she develops them.   Consult cardiology for tricuspid leaflet vegetation, which patient has not had follow up. Continue with current broad spectrum antibiotics.    5/31: Per cardiology, HARI showing severe right sided heart failure, no vegetations found over tricuspid valve. Blood cultures NGTD x2 days, urine culture + for gram negative >100k, stable bilateral infiltrate on CXR, zosyn discontinued and will continue ceftriaxone and vancomycin.   6/1: Psychiatry return on Friday with further discussion of detox. Patient states she gets "clean" in the hospital and can never continue once she leaves the facility. Exhibiting more signs of withdrawal with abdominal cramping, diarrhea. Mild elevation in BP likely contributed by withdrawal and chest pain associated with breathing, which becomes more prominent on deep inhalation. Continue D4 vancomycin and ceftriaxone.    6/2: Leukocytosis resolved, intermittent coughing, will continue with pulm hygiene care per respiratory. Adjust maintenance IVF to D51/2NS. Continue ceftriaxone and vancomycin. Withdrawal symptoms controlled at this time. Patient states feeling better from day before. Encourage out of bed into chair activity. Will downgrade from ICU care to step down.   6/3: Overall remains stable, with good control of withdrawal symptoms, diarrhea episodes diminishing, off supplemental oxygen, no chest pain complaints, tolerating PO intake and getting in and out of bed without assistance. Given severity of heart " function decline, patient will need to agree to detox for any cardiovascular intervention. Psychiatry to f/u and assist with disposition.   6/4:D7 abx with vanc+ Rocephin (completed vanc+zosyn x first 2 days), vital signs stable, patient endorses she has decided against IP detox. Complains of skin crawling, non-pruritic sensation with strong urge to use. Also admits to using small amount of heroin she still had in her purse two days ago. Original plan for Vivitrol injection on Monday will need to be postponed.   6/5: Completed 8 days of IV antibiotics, adequately treated for UTI and pneumonia. Minimal complaint, endorses two loose bowels movements with no abdominal cramping, tolerated the clonidine to further support withdrawal signs. Patient endorses she will be following up with cardiology locally, but still adamant that detox regimen is not for her. She states she was told without corrective intervention, her heart could completely fail within the year. Per her request Thu signed and filed into patient's chart. She requests narcan on discharge.

## 2022-05-30 NOTE — PLAN OF CARE
"[facility] - Intensive Care  Discharge Assessment    Primary Care Provider: Primary Doctor No     Discharge Assessment (most recent)     BRIEF DISCHARGE ASSESSMENT - 05/30/22 1037        Discharge Planning    Assessment Type Discharge Planning Brief Assessment     Discharge Plan A Other     Discharge Plan B Rehab;[facility] hospital             Attempted to complete an assessment with the patient, but she refused. She stated "not right now.'     Will try back later.         "

## 2022-05-30 NOTE — CARE UPDATE
Spoke with pt regarding contacting a family or friend with this admission.   States ok to contact her mother this morning.   States mother's name is Tri Malone.     Pt also acknowledged beginning to feel symptoms of withdrawal. Encouraged to speak with her MD this am.   No obvious signs of withdrawal noted at this time.

## 2022-05-30 NOTE — SUBJECTIVE & OBJECTIVE
Interval History: No acute events overnight on BiPAP. Awake and oriented. Patient sleepy, but answers to questions appropriately.     Review of Systems   Constitutional:  Positive for fatigue. Negative for activity change, appetite change, fever and unexpected weight change.   HENT:  Negative for congestion, hearing loss, sore throat, tinnitus and trouble swallowing.    Eyes:  Negative for pain.   Respiratory:  Negative for chest tightness, shortness of breath, wheezing and stridor.    Cardiovascular:  Negative for chest pain, palpitations and leg swelling.   Gastrointestinal:  Negative for abdominal pain, diarrhea, nausea and vomiting.   Genitourinary:  Negative for difficulty urinating and dysuria.   Musculoskeletal:  Negative for back pain.   Skin:         Facial brusing   Neurological:  Positive for weakness. Negative for dizziness, tremors, seizures and headaches.   Psychiatric/Behavioral:  Negative for agitation, behavioral problems, confusion, dysphoric mood, hallucinations, self-injury, sleep disturbance and suicidal ideas. The patient is not nervous/anxious and is not hyperactive.    Objective:     Vital Signs (Most Recent):  Temp: 98.3 °F (36.8 °C) (05/30/22 1102)  Pulse: 95 (05/30/22 1330)  Resp: (!) 39 (05/30/22 1330)  BP: 117/75 (05/30/22 1330)  SpO2: 100 % (05/30/22 1330)   Vital Signs (24h Range):  Temp:  [97.4 °F (36.3 °C)-98.3 °F (36.8 °C)] 98.3 °F (36.8 °C)  Pulse:  [] 95  Resp:  [25-45] 39  SpO2:  [90 %-100 %] 100 %  BP: ()/(52-75) 117/75     Weight: 60.3 kg (132 lb 15 oz)  Body mass index is 24.31 kg/m².    Intake/Output Summary (Last 24 hours) at 5/30/2022 1522  Last data filed at 5/30/2022 0551  Gross per 24 hour   Intake 2248 ml   Output 1700 ml   Net 548 ml      Physical Exam  Vitals reviewed.   Constitutional:       General: She is not in acute distress.     Appearance: She is not ill-appearing or toxic-appearing.   HENT:      Right Ear: External ear normal.      Left Ear:  "External ear normal.      Mouth/Throat:      Mouth: Mucous membranes are dry.   Eyes:      Extraocular Movements: Extraocular movements intact.   Cardiovascular:      Rate and Rhythm: Normal rate and regular rhythm.      Pulses: Normal pulses.   Pulmonary:      Effort: Pulmonary effort is normal. No respiratory distress.      Breath sounds: No wheezing.   Chest:      Chest wall: No tenderness.   Abdominal:      General: There is no distension.      Tenderness: There is no abdominal tenderness. There is no right CVA tenderness, left CVA tenderness or guarding.   Musculoskeletal:         General: Normal range of motion.      Cervical back: Normal range of motion. No rigidity or tenderness.      Right lower leg: No edema.      Left lower leg: No edema.   Lymphadenopathy:      Cervical: No cervical adenopathy.   Skin:     Comments: Multiple healed laceration "cutting marks" over right forearm.    Neurological:      Mental Status: She is oriented to person, place, and time.      Motor: Weakness present.      Gait: Gait normal.   Psychiatric:         Thought Content: Thought content normal.       Significant Labs: All pertinent labs within the past 24 hours have been reviewed.  BMP:   Recent Labs   Lab 05/30/22  0354   GLU 91      K 4.0      CO2 24   BUN 14   CREATININE 1.0   CALCIUM 7.4*     CBC:   Recent Labs   Lab 05/29/22  1200 05/30/22  0354   WBC 5.06 12.30   HGB 14.5 10.7*   HCT 45.4 31.3*    217     CMP:   Recent Labs   Lab 05/29/22  1200 05/30/22  0354    141   K 3.7 4.0    109   CO2 24 24   GLU 25* 91   BUN 18 14   CREATININE 1.7* 1.0   CALCIUM 8.3* 7.4*   PROT 6.9 5.2*   ALBUMIN 3.6 2.7*   BILITOT 0.4 1.0   ALKPHOS 105 40*   * 113*   * 111*   ANIONGAP 10 8   EGFRNONAA 39.1* >60.0     Significant Imaging: I have reviewed all pertinent imaging results/findings within the past 24 hours.  "

## 2022-05-30 NOTE — CARE UPDATE
Hair care done at pt request.   Pt became anxious, dyspneic, and tachypneic.   SPO2 down to 90%.  BIPAP resumed. SPO2 rebound to 99%.

## 2022-05-30 NOTE — RESPIRATORY THERAPY
attemtped abg in right brachial. No blood return. Patient refused another needle stick at this time. bandar Martinez notified

## 2022-05-31 ENCOUNTER — CLINICAL SUPPORT (OUTPATIENT)
Dept: CARDIOLOGY | Facility: HOSPITAL | Age: 32
DRG: 917 | End: 2022-05-31
Attending: INTERNAL MEDICINE
Payer: MEDICAID

## 2022-05-31 VITALS
WEIGHT: 132 LBS | HEIGHT: 62 IN | BODY MASS INDEX: 24.29 KG/M2 | SYSTOLIC BLOOD PRESSURE: 133 MMHG | DIASTOLIC BLOOD PRESSURE: 92 MMHG

## 2022-05-31 PROBLEM — J18.9 BILATERAL PNEUMONIA: Status: ACTIVE | Noted: 2022-05-29

## 2022-05-31 LAB
ALBUMIN SERPL BCP-MCNC: 2.4 G/DL (ref 3.5–5.2)
ALP SERPL-CCNC: 56 U/L (ref 55–135)
ALT SERPL W/O P-5'-P-CCNC: 72 U/L (ref 10–44)
ANION GAP SERPL CALC-SCNC: 5 MMOL/L (ref 8–16)
AORTIC ROOT ANNULUS: 3.12 CM
AORTIC VALVE CUSP SEPERATION: 1.69 CM
AST SERPL-CCNC: 30 U/L (ref 10–40)
AV INDEX (PROSTH): 0.97
AV MEAN GRADIENT: 3 MMHG
AV PEAK GRADIENT: 4 MMHG
AV VALVE AREA: 3.08 CM2
AV VELOCITY RATIO: 0.94
BASOPHILS # BLD AUTO: ABNORMAL K/UL (ref 0–0.2)
BASOPHILS NFR BLD: 0 % (ref 0–1.9)
BILIRUB SERPL-MCNC: 1.2 MG/DL (ref 0.1–1)
BSA FOR ECHO PROCEDURE: 1.62 M2
BUN SERPL-MCNC: 10 MG/DL (ref 6–20)
CALCIUM SERPL-MCNC: 7.7 MG/DL (ref 8.7–10.5)
CHLORIDE SERPL-SCNC: 113 MMOL/L (ref 95–110)
CO2 SERPL-SCNC: 27 MMOL/L (ref 23–29)
CREAT SERPL-MCNC: 1 MG/DL (ref 0.5–1.4)
CV ECHO LV RWT: 0.35 CM
DIFFERENTIAL METHOD: ABNORMAL
DOP CALC AO PEAK VEL: 1.06 M/S
DOP CALC AO VTI: 19.67 CM
DOP CALC LVOT AREA: 3.2 CM2
DOP CALC LVOT DIAMETER: 2.01 CM
DOP CALC LVOT PEAK VEL: 1 M/S
DOP CALC LVOT STROKE VOLUME: 60.61 CM3
DOP CALCLVOT PEAK VEL VTI: 19.11 CM
E WAVE DECELERATION TIME: 204.36 MSEC
E/A RATIO: 0.99
E/E' RATIO: 6.45 M/S
ECHO LV POSTERIOR WALL: 0.7 CM (ref 0.6–1.1)
EJECTION FRACTION: 55 %
EOSINOPHIL # BLD AUTO: ABNORMAL K/UL (ref 0–0.5)
EOSINOPHIL NFR BLD: 1 % (ref 0–8)
ERYTHROCYTE [DISTWIDTH] IN BLOOD BY AUTOMATED COUNT: 15.5 % (ref 11.5–14.5)
EST. GFR  (AFRICAN AMERICAN): >60 ML/MIN/1.73 M^2
EST. GFR  (NON AFRICAN AMERICAN): >60 ML/MIN/1.73 M^2
FRACTIONAL SHORTENING: 28 % (ref 28–44)
GLUCOSE SERPL-MCNC: 102 MG/DL (ref 70–110)
HCT VFR BLD AUTO: 30 % (ref 37–48.5)
HGB BLD-MCNC: 10.1 G/DL (ref 12–16)
IMM GRANULOCYTES # BLD AUTO: ABNORMAL K/UL (ref 0–0.04)
IMM GRANULOCYTES NFR BLD AUTO: ABNORMAL % (ref 0–0.5)
INTERVENTRICULAR SEPTUM: 0.79 CM (ref 0.6–1.1)
IVRT: 131.49 MSEC
LEFT ATRIUM SIZE: 4.2 CM
LEFT INTERNAL DIMENSION IN SYSTOLE: 2.9 CM (ref 2.1–4)
LEFT VENTRICLE DIASTOLIC VOLUME INDEX: 45.06 ML/M2
LEFT VENTRICLE DIASTOLIC VOLUME: 72.1 ML
LEFT VENTRICLE MASS INDEX: 54 G/M2
LEFT VENTRICLE SYSTOLIC VOLUME INDEX: 20.2 ML/M2
LEFT VENTRICLE SYSTOLIC VOLUME: 32.26 ML
LEFT VENTRICULAR INTERNAL DIMENSION IN DIASTOLE: 4.05 CM (ref 3.5–6)
LEFT VENTRICULAR MASS: 86.8 G
LV LATERAL E/E' RATIO: 5.46 M/S
LV SEPTAL E/E' RATIO: 7.89 M/S
LYMPHOCYTES # BLD AUTO: ABNORMAL K/UL (ref 1–4.8)
LYMPHOCYTES NFR BLD: 8 % (ref 18–48)
MCH RBC QN AUTO: 30.2 PG (ref 27–31)
MCHC RBC AUTO-ENTMCNC: 33.7 G/DL (ref 32–36)
MCV RBC AUTO: 90 FL (ref 82–98)
MONOCYTES # BLD AUTO: ABNORMAL K/UL (ref 0.3–1)
MONOCYTES NFR BLD: 2 % (ref 4–15)
MV A" WAVE DURATION": 16.61 MSEC
MV PEAK A VEL: 0.72 M/S
MV PEAK E VEL: 0.71 M/S
MV STENOSIS PRESSURE HALF TIME: 59.26 MS
MV VALVE AREA P 1/2 METHOD: 3.71 CM2
NEUTROPHILS NFR BLD: 74 % (ref 38–73)
NEUTS BAND NFR BLD MANUAL: 15 %
NRBC BLD-RTO: 0 /100 WBC
PISA TR MAX VEL: 3.16 M/S
PLATELET # BLD AUTO: 227 K/UL (ref 150–450)
PMV BLD AUTO: 9.4 FL (ref 9.2–12.9)
POCT GLUCOSE: 105 MG/DL (ref 70–110)
POCT GLUCOSE: 115 MG/DL (ref 70–110)
POCT GLUCOSE: 126 MG/DL (ref 70–110)
POCT GLUCOSE: 95 MG/DL (ref 70–110)
POTASSIUM SERPL-SCNC: 3.2 MMOL/L (ref 3.5–5.1)
PROT SERPL-MCNC: 5.2 G/DL (ref 6–8.4)
PV PEAK VELOCITY: 0.45 CM/S
RA MAJOR: 6.37 CM
RA PRESSURE: 15 MMHG
RA WIDTH: 5.87 CM
RBC # BLD AUTO: 3.34 M/UL (ref 4–5.4)
RIGHT VENTRICULAR END-DIASTOLIC DIMENSION: 4.07 CM
SODIUM SERPL-SCNC: 145 MMOL/L (ref 136–145)
TDI LATERAL: 0.13 M/S
TDI SEPTAL: 0.09 M/S
TDI: 0.11 M/S
TR MAX PG: 40 MMHG
TRICUSPID ANNULAR PLANE SYSTOLIC EXCURSION: 2.42 CM
TV REST PULMONARY ARTERY PRESSURE: 55 MMHG
WBC # BLD AUTO: 18.27 K/UL (ref 3.9–12.7)

## 2022-05-31 PROCEDURE — 85007 BL SMEAR W/DIFF WBC COUNT: CPT | Performed by: STUDENT IN AN ORGANIZED HEALTH CARE EDUCATION/TRAINING PROGRAM

## 2022-05-31 PROCEDURE — 27000221 HC OXYGEN, UP TO 24 HOURS

## 2022-05-31 PROCEDURE — 99233 PR SUBSEQUENT HOSPITAL CARE,LEVL III: ICD-10-PCS | Mod: ,,, | Performed by: STUDENT IN AN ORGANIZED HEALTH CARE EDUCATION/TRAINING PROGRAM

## 2022-05-31 PROCEDURE — 25000003 PHARM REV CODE 250: Performed by: EMERGENCY MEDICINE

## 2022-05-31 PROCEDURE — 25000003 PHARM REV CODE 250: Performed by: STUDENT IN AN ORGANIZED HEALTH CARE EDUCATION/TRAINING PROGRAM

## 2022-05-31 PROCEDURE — 99233 SBSQ HOSP IP/OBS HIGH 50: CPT | Mod: ,,, | Performed by: STUDENT IN AN ORGANIZED HEALTH CARE EDUCATION/TRAINING PROGRAM

## 2022-05-31 PROCEDURE — 87522 HEPATITIS C REVRS TRNSCRPJ: CPT | Performed by: STUDENT IN AN ORGANIZED HEALTH CARE EDUCATION/TRAINING PROGRAM

## 2022-05-31 PROCEDURE — 93306 TTE W/DOPPLER COMPLETE: CPT

## 2022-05-31 PROCEDURE — 99900035 HC TECH TIME PER 15 MIN (STAT)

## 2022-05-31 PROCEDURE — 99233 PR SUBSEQUENT HOSPITAL CARE,LEVL III: ICD-10-PCS | Mod: 95,,, | Performed by: PSYCHIATRY & NEUROLOGY

## 2022-05-31 PROCEDURE — 20000000 HC ICU ROOM

## 2022-05-31 PROCEDURE — 99233 SBSQ HOSP IP/OBS HIGH 50: CPT | Mod: 95,,, | Performed by: PSYCHIATRY & NEUROLOGY

## 2022-05-31 PROCEDURE — 63600175 PHARM REV CODE 636 W HCPCS: Performed by: INTERNAL MEDICINE

## 2022-05-31 PROCEDURE — 99900031 HC PATIENT EDUCATION (STAT)

## 2022-05-31 PROCEDURE — 25000003 PHARM REV CODE 250: Performed by: PSYCHIATRY & NEUROLOGY

## 2022-05-31 PROCEDURE — 25000003 PHARM REV CODE 250: Performed by: INTERNAL MEDICINE

## 2022-05-31 PROCEDURE — 63600175 PHARM REV CODE 636 W HCPCS: Performed by: EMERGENCY MEDICINE

## 2022-05-31 PROCEDURE — 63600175 PHARM REV CODE 636 W HCPCS: Performed by: STUDENT IN AN ORGANIZED HEALTH CARE EDUCATION/TRAINING PROGRAM

## 2022-05-31 PROCEDURE — 36415 COLL VENOUS BLD VENIPUNCTURE: CPT | Performed by: STUDENT IN AN ORGANIZED HEALTH CARE EDUCATION/TRAINING PROGRAM

## 2022-05-31 PROCEDURE — 85027 COMPLETE CBC AUTOMATED: CPT | Performed by: STUDENT IN AN ORGANIZED HEALTH CARE EDUCATION/TRAINING PROGRAM

## 2022-05-31 PROCEDURE — 94761 N-INVAS EAR/PLS OXIMETRY MLT: CPT

## 2022-05-31 PROCEDURE — 80053 COMPREHEN METABOLIC PANEL: CPT | Performed by: STUDENT IN AN ORGANIZED HEALTH CARE EDUCATION/TRAINING PROGRAM

## 2022-05-31 RX ORDER — IBUPROFEN 400 MG/1
400 TABLET ORAL EVERY 6 HOURS PRN
Status: DISCONTINUED | OUTPATIENT
Start: 2022-05-31 | End: 2022-05-31

## 2022-05-31 RX ORDER — POTASSIUM CHLORIDE 750 MG/1
10 TABLET, EXTENDED RELEASE ORAL ONCE
Status: COMPLETED | OUTPATIENT
Start: 2022-05-31 | End: 2022-05-31

## 2022-05-31 RX ORDER — POTASSIUM CHLORIDE 750 MG/1
30 TABLET, EXTENDED RELEASE ORAL 3 TIMES DAILY
Status: DISCONTINUED | OUTPATIENT
Start: 2022-05-31 | End: 2022-06-02

## 2022-05-31 RX ORDER — LOPERAMIDE HYDROCHLORIDE 2 MG/1
2 CAPSULE ORAL
Status: DISCONTINUED | OUTPATIENT
Start: 2022-05-31 | End: 2022-06-05 | Stop reason: HOSPADM

## 2022-05-31 RX ORDER — OXYMETAZOLINE HCL 0.05 %
2 SPRAY, NON-AEROSOL (ML) NASAL 2 TIMES DAILY
Status: COMPLETED | OUTPATIENT
Start: 2022-05-31 | End: 2022-06-02

## 2022-05-31 RX ORDER — MIRTAZAPINE 15 MG/1
15 TABLET, FILM COATED ORAL NIGHTLY
Status: DISCONTINUED | OUTPATIENT
Start: 2022-05-31 | End: 2022-06-03

## 2022-05-31 RX ADMIN — MUPIROCIN: 20 OINTMENT TOPICAL at 08:05

## 2022-05-31 RX ADMIN — METHOCARBAMOL 500 MG: 500 TABLET ORAL at 05:05

## 2022-05-31 RX ADMIN — DICYCLOMINE HYDROCHLORIDE 10 MG: 10 CAPSULE ORAL at 12:05

## 2022-05-31 RX ADMIN — HYDROXYZINE HYDROCHLORIDE 50 MG: 25 TABLET, FILM COATED ORAL at 09:05

## 2022-05-31 RX ADMIN — Medication 2 SPRAY: at 09:05

## 2022-05-31 RX ADMIN — POTASSIUM CHLORIDE 10 MEQ: 750 TABLET, FILM COATED, EXTENDED RELEASE ORAL at 05:05

## 2022-05-31 RX ADMIN — POTASSIUM CHLORIDE 30 MEQ: 750 TABLET, FILM COATED, EXTENDED RELEASE ORAL at 09:05

## 2022-05-31 RX ADMIN — PIPERACILLIN AND TAZOBACTAM 4.5 G: 4; .5 INJECTION, POWDER, LYOPHILIZED, FOR SOLUTION INTRAVENOUS; PARENTERAL at 04:05

## 2022-05-31 RX ADMIN — METHOCARBAMOL 500 MG: 500 TABLET ORAL at 12:05

## 2022-05-31 RX ADMIN — FAMOTIDINE 20 MG: 10 INJECTION INTRAVENOUS at 08:05

## 2022-05-31 RX ADMIN — ACETAMINOPHEN 650 MG: 325 TABLET ORAL at 09:05

## 2022-05-31 RX ADMIN — CEFTRIAXONE 1 G: 1 INJECTION, SOLUTION INTRAVENOUS at 11:05

## 2022-05-31 RX ADMIN — VANCOMYCIN HYDROCHLORIDE 1750 MG: 1 INJECTION, POWDER, LYOPHILIZED, FOR SOLUTION INTRAVENOUS at 06:05

## 2022-05-31 RX ADMIN — MUPIROCIN: 20 OINTMENT TOPICAL at 09:05

## 2022-05-31 RX ADMIN — SODIUM CHLORIDE: 0.9 INJECTION, SOLUTION INTRAVENOUS at 06:05

## 2022-05-31 RX ADMIN — LOPERAMIDE HYDROCHLORIDE 2 MG: 2 CAPSULE ORAL at 04:05

## 2022-05-31 RX ADMIN — ENOXAPARIN SODIUM 40 MG: 40 INJECTION SUBCUTANEOUS at 04:05

## 2022-05-31 RX ADMIN — Medication 2 SPRAY: at 08:05

## 2022-05-31 RX ADMIN — MIRTAZAPINE 15 MG: 15 TABLET, FILM COATED ORAL at 09:05

## 2022-05-31 RX ADMIN — DICYCLOMINE HYDROCHLORIDE 10 MG: 10 CAPSULE ORAL at 04:05

## 2022-05-31 NOTE — ASSESSMENT & PLAN NOTE
Patient with bilateral pneumonia on chest x-ray, reviewed, elevated lactic acid, white count, afebrile currently, patient likely had aspiration pneumonia secondary to drug overdose, was found to be unresponsive oxygen sat in the 40s, was given Narcan, patient became more alert, continued to be hypoxic, corrected with non-rebreather, patient on continued BiPAP therapy currently, ABG reviewed, hypoxia and hypercapnia noted, expected improved with BiPAP therapy, started on IV antibiotic, patient a history of endocarditis with septic emboli, monitor, may need CT to evaluate if not improved  - continue D2 zosyn, levaquin, vancomycin

## 2022-05-31 NOTE — PLAN OF CARE
[facility] - Intensive Care  Initial Discharge Assessment       Primary Care Provider: Primary Doctor No    Admission Diagnosis: Respiratory acidosis [E87.2]  Hypercapnia [R06.89]  Overdose [T50.901A]  Hypoglycemia [E16.2]  Elevated lactic acid level [R79.89]  Heroin overdose, accidental or unintentional, initial encounter [T40.1X1A]  Aspiration pneumonia of both lungs due to gastric secretions, unspecified part of lung [J69.0]    Admission Date: 5/29/2022  Expected Discharge Date:     Discharge Barriers Identified: Substance Abuse    Payor: MEDICAID / Plan: [place] [employer] / Product Type: Managed Medicaid /     Extended Emergency Contact Information  Primary Emergency Contact: Carmel Chen  Mobile Phone: [phone]  Relation: Mother   needed? No  Secondary Emergency Contact: TOMI CHEN  Mobile Phone: [phone]  Relation: Father  Preferred language: English   needed? No    Discharge Plan A: Home  Discharge Plan B: Home with family      [facility] [place] [address]  Phone: [phone] Fax: [phone]    [id number] [place], [place] - [address] AT [facility] [address]  [address]  [facility] [address]  Phone: [phone] Fax: [phone]      Initial Assessment (most recent)     Adult Discharge Assessment - 05/31/22 1529        Discharge Assessment    Assessment Type Discharge Planning Assessment     Confirmed/corrected address, phone number and insurance Yes     Confirmed Demographics Correct on Facesheet     Source of Information patient     Reason For Admission Aspiration pneumonia of both lungs     Lives With significant other   The patient stated that she lives in a mobile home with her significant other.    Do you expect to return to your current living situation? --   TBD    Do you have help at home or someone to help you manage your care at home? --   The patient  is independent.    Current cognitive status: Alert/Oriented     Walking or Climbing Stairs Difficulty none     Dressing/Bathing Difficulty none     Home Layout Able to live on 1st floor     Equipment Currently Used at Home none     Readmission within 30 days? No     Patient currently being followed by outpatient case management? No     Do you currently have service(s) that help you manage your care at home? No     Do you take prescription medications? No     Are you on dialysis? No     Do you take coumadin? No     Discharge Plan A Home     Discharge Plan B Home with family     Discharge Plan discussed with: Patient     Discharge Barriers Identified Substance Abuse             Initial discharge assessment is completed. SW went to see the patient in the ICU. She was awake, alert, and oriented to the questions being asked. The patient stated that she lives with her boyfriend in a mobile home. She is independent, and she does not require any assistance with her ADLs. Attempted to offer the patient resources for substance use, and she declined. She also refused treatment at a rehab facility. SW left contact information with the patient if she has any questions or concerns.      Case management/SW will remain available until the patient is discharged. Please contact case management if the patient needs any assistance. [phone]

## 2022-05-31 NOTE — ASSESSMENT & PLAN NOTE
on cessation once able, offer rehab options and therapy, patient agreeable to speak with psychiatry for treatment options.

## 2022-05-31 NOTE — ASSESSMENT & PLAN NOTE
CMP  Sodium   Date Value Ref Range Status   05/30/2022 141 136 - 145 mmol/L Final     Potassium   Date Value Ref Range Status   05/30/2022 4.0 3.5 - 5.1 mmol/L Final     Chloride   Date Value Ref Range Status   05/30/2022 109 95 - 110 mmol/L Final     CO2   Date Value Ref Range Status   05/30/2022 24 23 - 29 mmol/L Final     Glucose   Date Value Ref Range Status   05/30/2022 91 70 - 110 mg/dL Final     BUN   Date Value Ref Range Status   05/30/2022 14 6 - 20 mg/dL Final     Creatinine   Date Value Ref Range Status   05/30/2022 1.0 0.5 - 1.4 mg/dL Final     Calcium   Date Value Ref Range Status   05/30/2022 7.4 (L) 8.7 - 10.5 mg/dL Final     Total Protein   Date Value Ref Range Status   05/30/2022 5.2 (L) 6.0 - 8.4 g/dL Final     Albumin   Date Value Ref Range Status   05/30/2022 2.7 (L) 3.5 - 5.2 g/dL Final     Total Bilirubin   Date Value Ref Range Status   05/30/2022 1.0 0.1 - 1.0 mg/dL Final     Comment:     For infants and newborns, interpretation of results should be based  on gestational age, weight and in agreement with clinical  observations.    Premature Infant recommended reference ranges:  Up to 24 hours.............<8.0 mg/dL  Up to 48 hours............<12.0 mg/dL  3-5 days..................<15.0 mg/dL  6-29 days.................<15.0 mg/dL    For patients on Eltrombopag therapy, use of Dimension [place] TBIL is   not   recommended.       Alkaline Phosphatase   Date Value Ref Range Status   05/30/2022 40 (L) 55 - 135 U/L Final     AST   Date Value Ref Range Status   05/30/2022 113 (H) 10 - 40 U/L Final     ALT   Date Value Ref Range Status   05/30/2022 111 (H) 10 - 44 U/L Final     Anion Gap   Date Value Ref Range Status   05/30/2022 8 8 - 16 mmol/L Final     eGFR if    Date Value Ref Range Status   05/30/2022 >60.0 >60 mL/min/1.73 m^2 Final     eGFR if non    Date Value Ref Range Status   05/30/2022 >60.0 >60 mL/min/1.73 m^2 Final     Comment:     Calculation used to obtain  the estimated glomerular filtration  rate (eGFR) is the CKD-EPI equation.        Downtrending, HIV negative, HepC antibody positive, follow up HepC RNA.

## 2022-05-31 NOTE — CONSULTS
[facility] - Intensive Care  Cardiology  Consult Note    Patient Name: Марина Britton  MRN: [id number]  Admission Date: 5/29/2022  Hospital Length of Stay: 2 days  Code Status: Full Code   Attending Provider: Josue Mcdonald DO   Consulting Provider: Gabrielel Woo NP  Primary Care Physician: Primary Doctor No  Principal Problem:Aspiration pneumonia of both lungs    Patient information was obtained from past medical records and ER records.     Inpatient consult to Cardiology  Consult performed by: Chase Garrison MD  Consult ordered by: Cally Chandra MD        Subjective:     Chief Complaint:  Overdose     HPI:   32-year-old female with a history of chronic substance abuse, IV heroin abuse, history of endocarditis and septic arthritis from IV drug abuse, presents to the emergency department after an accidental overdose which EMS believes to be heroin. She was found unresponsive at hotel room, given Narcan, now awake, but became hypoxic. Upon arrival to the emergency department her oxygen saturation is 42% on room air, trends up with a non-rebreather.  Patient denies suicidal ideations.  Patient with repetitive behavior, had had multiple overdoses in the past, last year hospitalized or endocarditis, was supposed to have valve replacement however had not completed, not compliant with medication.   Pt has been started on IV antibiotics for bilateral multifocal pneumonia and on BiPAP for CO2 retention. No complaints of diarrhea, vomiting or nausea, restlessness. CIS consulted for tricuspid leaflet vegetation for which patient has not had followed up.       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 Bilatral;  Surgeon: Chauncey Garcia MD;  Location: [address];  Service: Orthopedics;  Laterality: Bilateral;     SECTION, LOW TRANSVERSE      x4    COSMETIC SURGERY      HAND ARTHROTOMY Right 2021    Procedure: ARTHROTOMY, HAND;  Surgeon: Chauncey Garcia MD;  Location: [facility] OR [address] [place] [address];  Service: Orthopedics;  Laterality: Right;    INCISION AND DRAINAGE OF HAND Right 2021    Procedure: INCISION AND DRAINAGE, HAND (Right) - hand table, cysto tubing, 9L saline, culture swabs;  Surgeon: Chauncey Garcia MD;  Location: [address];  Service: Orthopedics;  Laterality: Right;    IRRIGATION AND DEBRIDEMENT OF UPPER EXTREMITY Right 2021    Procedure: IRRIGATION AND DEBRIDEMENT, STERNOCLAVICULAR JOINT;  Surgeon: Frandy Molina MD;  Location: [address];  Service: Cardiothoracic;  Laterality: Right;       Review of patient's allergies indicates:   Allergen Reactions    Soap Rash     [facility] - Hospital supplied - cleanser shampoo & body wash gel  Ingredients - purified water, sodium laureth sulfate, sodium chloride, cocamide william, cocamidopropylamine oxide, fragrance, aloe barbadensis leaf juice, propylene alcohol, peg-150 distearate, diazolidinyl urea, ciric acid, methylparaben, & propulparaben       No current facility-administered medications on file prior to encounter.     Current Outpatient Medications on File Prior to Encounter   Medication Sig    acetaminophen (TYLENOL) 500 MG tablet Take 500 mg by mouth every 6 (six) hours as needed for Pain.    apixaban (ELIQUIS) 5 mg Tab Take 1 tablet (5 mg total) by mouth 2 (two) times daily. (Patient not taking: No sig reported)    doxycycline (VIBRA-TABS) 100 MG tablet Take 1 tablet (100 mg total) by mouth every 12 (twelve) hours.    gabapentin (NEURONTIN) 300 MG capsule Take 1 capsule (300 mg total) by mouth 3 (three) times daily. (Patient not taking: Reported on 3/17/2021)    mirtazapine (REMERON) 15 MG tablet  Take 1 tablet (15 mg total) by mouth every evening. (Patient not taking: Reported on 3/17/2021)     Family History    None       Tobacco Use    Smoking status: Never Smoker    Smokeless tobacco: Never Used   Substance and Sexual Activity    Alcohol use: Not Currently     Comment: occ    Drug use: Yes     Types: IV, Marijuana     Comment: heroine    Sexual activity: Yes     Partners: Male     Birth control/protection: Partner-Vasectomy     Review of Systems:  Review of Systems   Constitutional: Positive for fatigue.   Neurological: Positive for weakness.   All other systems reviewed and are negative.      Objective:     Vital Signs (Most Recent):  Temp: 98.5 °F (36.9 °C) (05/31/22 0800)  Pulse: 81 (05/31/22 0900)  Resp: (!) 33 (05/31/22 0900)  BP: 127/86 (05/31/22 0900)  SpO2: 100 % (05/31/22 0900) Vital Signs (24h Range):  Temp:  [97.3 °F (36.3 °C)-99 °F (37.2 °C)] 98.5 °F (36.9 °C)  Pulse:  [] 81  Resp:  [26-44] 33  SpO2:  [96 %-100 %] 100 %  BP: (106-137)/(56-95) 127/86     Weight: 60.3 kg (132 lb 15 oz)  Body mass index is 24.31 kg/m².    SpO2: 100 %  O2 Device (Oxygen Therapy): nasal cannula      Intake/Output Summary (Last 24 hours) at 5/31/2022 0948  Last data filed at 5/31/2022 0500  Gross per 24 hour   Intake 4593 ml   Output 2475 ml   Net 2118 ml       Lines/Drains/Airways     Central Venous Catheter Line  Duration           Percutaneous Central Line Insertion/Assessment - Triple Lumen  05/29/22 1447 right femoral vein 1 day          Peripheral Intravenous Line  Duration                Peripheral IV - Single Lumen 05/29/22 1209 24 G Right Hand 1 day                  Significant Labs:  Recent Lab Results  (Last 5 results in the past 24 hours)      05/31/22  0838   05/30/22  1641   05/30/22  1613   05/30/22  1346   05/30/22  1259        Albumin 2.4               Alkaline Phosphatase 56               ALT 72               Anion Gap 5               AST 30               Bands 15.0               Baso #  CANCELED  Comment: Result canceled by the ancillary.               Basophil % 0.0               BILIRUBIN TOTAL 1.2  Comment: For infants and newborns, interpretation of results should be based  on gestational age, weight and in agreement with clinical  observations.    Premature Infant recommended reference ranges:  Up to 24 hours.............<8.0 mg/dL  Up to 48 hours............<12.0 mg/dL  3-5 days..................<15.0 mg/dL  6-29 days.................<15.0 mg/dL    For patients on Eltrombopag therapy, use of Dimension [place] TBIL is   not   recommended.                 BUN 10               Calcium 7.7               Chloride 113               CO2 27               Creatinine 1.0               Differential Method Manual               eGFR if  >60.0               eGFR if non  >60.0  Comment: Calculation used to obtain the estimated glomerular filtration  rate (eGFR) is the CKD-EPI equation.                  Eos # CANCELED  Comment: Result canceled by the ancillary.               Eosinophil % 1.0               Glucose 102               Gran % 74.0               Hematocrit 30.0               Hemoglobin 10.1               Immature Grans (Abs) CANCELED  Comment: Mild elevation in immature granulocytes is non specific and   can be seen in a variety of conditions including stress response,   acute inflammation, trauma and pregnancy. Correlation with other   laboratory and clinical findings is essential.    Result canceled by the ancillary.                 Immature Granulocytes CANCELED  Comment: Result canceled by the ancillary.               Lymph # CANCELED  Comment: Result canceled by the ancillary.               Lymph % 8.0               MCH 30.2               MCHC 33.7               MCV 90               Mono # CANCELED  Comment: Result canceled by the ancillary.               Mono % 2.0               MPV 9.4               nRBC 0               Platelets 227               POCT Glucose      93   123   60       Potassium 3.2               PROTEIN TOTAL 5.2               RBC 3.34               RDW 15.5               Sodium 145               Vancomycin, Random   3.5             WBC 18.27                                      Significant Imaging:  Echo pending   EKG:    Results for orders placed or performed during the hospital encounter of 05/29/22   EKG 12-lead    Narrative    Test Reason : T50.901A,    Vent. Rate : 130 BPM     Atrial Rate : 130 BPM     P-R Int : 130 ms          QRS Dur : 122 ms      QT Int : 312 ms       P-R-T Axes : 067 091 040 degrees     QTc Int : 459 ms    Baseline Artifact  Sinus tachycardia  Possible Left atrial enlargement  Right bundle branch block  Abnormal ECG  When compared with ECG of 01-MAR-2021 04:45,  Poor data quality in current ECG precludes serial comparison  Confirmed by Herbie Castellano MD ([id number]) on 5/29/2022 2:05:02 PM    Referred By: TIFFANI   SELF           Confirmed By:Herbie Castellano MD         Physical Exam  Constitutional:       Appearance: She is ill-appearing.   HENT:      Head: Normocephalic and atraumatic.      Mouth/Throat:      Mouth: Mucous membranes are moist.      Pharynx: Oropharynx is clear.   Eyes:      Extraocular Movements: Extraocular movements intact.      Conjunctiva/sclera: Conjunctivae normal.      Pupils: Pupils are equal, round, and reactive to light.   Cardiovascular:      Rate and Rhythm: Normal rate and regular rhythm.      Heart sounds: Murmur heard.   Pulmonary:      Effort: Pulmonary effort is normal.      Breath sounds: Normal breath sounds.   Abdominal:      General: Abdomen is flat. Bowel sounds are normal.      Palpations: Abdomen is soft.   Musculoskeletal:         General: Normal range of motion.      Cervical back: Normal range of motion and neck supple.   Skin:     Capillary Refill: Capillary refill takes 2 to 3 seconds.      Comments: Lacerations to right forearm   Neurological:      General: No focal deficit present.      Mental  Status: She is alert and oriented to person, place, and time. Mental status is at baseline.   Psychiatric:         Mood and Affect: Mood normal.         Home Medications:   No current facility-administered medications on file prior to encounter.     Current Outpatient Medications on File Prior to Encounter   Medication Sig Dispense Refill    acetaminophen (TYLENOL) 500 MG tablet Take 500 mg by mouth every 6 (six) hours as needed for Pain.      apixaban (ELIQUIS) 5 mg Tab Take 1 tablet (5 mg total) by mouth 2 (two) times daily. (Patient not taking: No sig reported) 60 tablet 3    doxycycline (VIBRA-TABS) 100 MG tablet Take 1 tablet (100 mg total) by mouth every 12 (twelve) hours. 60 tablet 3    gabapentin (NEURONTIN) 300 MG capsule Take 1 capsule (300 mg total) by mouth 3 (three) times daily. (Patient not taking: Reported on 3/17/2021) 90 capsule 11    mirtazapine (REMERON) 15 MG tablet Take 1 tablet (15 mg total) by mouth every evening. (Patient not taking: Reported on 3/17/2021) 30 tablet 1       Current Inpatient Medications:    Current Facility-Administered Medications:     0.9%  NaCl infusion, , Intravenous, Continuous, Bhavin Francis MD, Last Rate: 127 mL/hr at 05/31/22 0647, New Bag at 05/31/22 0647    acetaminophen tablet 650 mg, 650 mg, Oral, Q8H PRN, Bhavin Francis MD    dextrose 10% bolus 125 mL, 12.5 g, Intravenous, PRN, James Sosa MD    dextrose 50% injection 12.5 g, 12.5 g, Intravenous, PRN, James Sosa MD, 12.5 g at 05/30/22 1309    dicyclomine capsule 10 mg, 10 mg, Oral, QID PRN, Josue Mcdonald DO    enoxaparin injection 40 mg, 40 mg, Subcutaneous, Daily, Josue Mcdonald DO, 40 mg at 05/30/22 1725    famotidine (PF) injection 20 mg, 20 mg, Intravenous, Daily, Bhavin Francis MD, 20 mg at 05/31/22 0831    hydrOXYzine HCL tablet 50 mg, 50 mg, Oral, Nightly PRN, Kazumi G. Yoshinaga, DO    levoFLOXacin 500 mg/100 mL IVPB 250 mg, 250 mg, Intravenous, Once, James  MD Sheila    levoFLOXacin 750 mg/150 mL IVPB 750 mg, 750 mg, Intravenous, Q48H, Bhavin Francis MD    methocarbamoL tablet 500 mg, 500 mg, Oral, QID PRN, Josue Mcdonald DO    mupirocin 2 % ointment, , Nasal, BID, Cally Chandra MD, Given at 05/31/22 0831    ondansetron injection 4 mg, 4 mg, Intravenous, Q8H PRN, Bhavin Francis MD    oxymetazoline 0.05 % nasal spray 2 spray, 2 spray, Each Nostril, BID, Josue Mcdonald DO, 2 spray at 05/31/22 0839    piperacillin-tazobactam 4.5 g in dextrose 5 % 100 mL IVPB (ready to mix system), 4.5 g, Intravenous, Q8H, Bhavin Francis MD, Stopped at 05/31/22 0856    sodium chloride 0.9% flush 10 mL, 10 mL, Intravenous, PRN, Bhavin Francis MD    vancomycin (VANCOCIN) 1,750 mg in dextrose 5 % 500 mL IVPB, 1,750 mg, Intravenous, Q24H, Cally Chandra MD, Stopped at 05/30/22 2110    Pharmacy to dose Vancomycin consult, , , Once **AND** vancomycin - pharmacy to dose, , Intravenous, pharmacy to manage frequency, Bhavin Francis MD         VTE Risk Mitigation (From admission, onward)         Ordered     enoxaparin injection 40 mg  Daily         05/30/22 1526     IP VTE HIGH RISK PATIENT  Once         05/29/22 1540     Place sequential compression device  Until discontinued         05/29/22 1540     Place KATHIA hose  Until discontinued         05/29/22 1540                Assessment:     Active Diagnoses:    Diagnosis Date Noted POA    PRINCIPAL PROBLEM:  Aspiration pneumonia of both lungs [J69.0] 05/29/2022 Yes    Overdose [T50.901A] 05/29/2022 Yes    Acute respiratory failure with hypoxia and hypercarbia [J96.01, J96.02] 05/29/2022 Yes    Hx of bacterial endocarditis [Z86.79] 05/29/2022 Not Applicable    Elevated LFTs [R79.89] 05/29/2022 Yes    IVDU (intravenous drug user) [F19.90] 10/26/2020 Yes     Chronic      Problems Resolved During this Admission:     Overdose  IV drug use  Known endocarditis without completion of  treatment    PLAN  Transthoracic echocardiogram did not show any evidence of valvular vegetations; however, the tricuspid valve is degenerative with prolapse and flail segments and severe to torrential tricuspid regurgitation. RV function is diminished.    Continue supportive care. Patient will need tricuspid valve replacement when she has shown consistent abstinence from drugs.      Thank you for your consult. Reconsult as needed.                Gabrielle Woo NP-scribed for Dr. Garrison  Cardiology  [place] - Intensive Care  05/31/2022 9:48 AM

## 2022-05-31 NOTE — NURSING
Pt has family calling to check on her, voices will see how she feels tomorrow in regards to having anyone visit. Fly informed of above stated. Pt asked to put on BiPap and refused at this time. Inst to call if needed. Pt agrees. Calm behavior. No c/o voiced. No acute distress noted. Continue to observe.

## 2022-05-31 NOTE — CONSULTS
[facility]  Psychiatry  Consult Note      Consultation from Psychiatry started: 5/31/2022 at 10:36 AM  The chief complaint leading to psychiatric consultation is: Drug Overdose (EMS called for patient unresponsive, narcan used. Patient now awake upon arrival, admits to heroin use. Accidental overdose. Denies SI)  This consultation was requested by   The location of the consulting psychiatric nurse practitioner is [place], [place]  The patient location is [facility]    Also present with the patient at the time of the consultation: RN    Patient Identification:   Марина Britton is a 32 y.o. female.    Patient information was obtained from patient and parent.  Patient presented voluntarily to the Emergency Department.    Inpatient consult to Psychiatry  Consult performed by: Matt Moreira NP  Consult ordered by: Cally Chandra MD         Subjective:     History of Present Illness:  Per ER note:  EMS called for patient unresponsive, narcan used. Patient now awake upon arrival, admits to heroin use. Accidental overdose. Denies SI      32-year-old female with a history of chronic substance abuse, IV heroin abuse, history of endocarditis and septic arthritis from IV drug abuse, presents to the emergency department after an accidental overdose avoid EMS believes is heroin.  She was found unresponsive at hotel room, given Narcan, now awake, but hypoxia.  Upon arrival to the emergency department her oxygen saturation is 42% on room air, climbs nicely with a non-rebreather.  Patient denies suicidal ideations.  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    MRSA due to IV heroin    PTSD (post-traumatic stress disorder)     Substance abuse        Psychiatric History:   Previous Psychiatric Hospitalizations: Yes, hospitalized for 6 days at age of 20 due to  ""depression"  Previous Medication Trials: Yes Ability, Lexapro, paxil, Prozac, remeron  Previous Suicide Attempts: no   History of Violence: No  History of Depression:Yes   History of Lisandra: No  History of Auditory/Visual Hallucination: Yes, per patient "meth induced"  History of Delusions: No  Outpatient psychiatrist (current & past): No    Neurologic History:  Seizures: No  Head trauma: No    Substance Abuse History:  Tobacco: No  Alcohol: No  Illicit Substances: Yes, heroin, methamphetamine  Detox/Rehab: Reports history of outpatient rehab, history of suboxone prescription. States "I don't like that medicine, I would rather just detox on my own before taking that again."    Legal History: Past charges/incarcerations: Yes, released from detention earlier this month after serving sentence for possession of heroin.     Family History:   History reviewed. No pertinent family history.    Social History:  Developmental/Childhood: Achieved all developmental milestones timely  Education: GED  Employment Status/Finances: Unemployed   Relationship Status/Sexual Orientation: In a relationship, lives with boyfriend.   Children: Patient has six children and has no custody or contact.   Housing Status: Lives in house with boyfriend.    history: NO  Access to gun: NO  Latter-day: Non-spiritual  Recreational activities: not much anymore"    Psychiatric Mental Status Exam:  Arousal: alert  Sensorium/Orientation: oriented to grossly intact, person, place, situation, month of year, year  Behavior/Cooperation: normal, cooperative   Speech: normal tone, normal rate, normal pitch, normal volume  Language: grossly intact  Mood: "Alright"   Affect: blunted and constricted  Thought Process: normal and logical  Thought Content: WNL  Auditory hallucinations: NO  Visual hallucinations: NO  Paranoia: NO  Delusions:  NO  Suicidal ideation: NO  Homicidal ideation: NO  Attention/Concentration: spelled "WORLD" forwards and backwards  Fund of " Knowledge: Aware of current events   Insight: has awareness of illness  Judgment: limited    Laboratory Data:   Labs Reviewed   CBC W/ AUTO DIFFERENTIAL - Abnormal; Notable for the following components:       Result Value    MCHC 31.9 (*)     RDW 16.1 (*)     Mono % 0.0 (*)     All other components within normal limits    Narrative:     Release to patient->Immediate   COMPREHENSIVE METABOLIC PANEL - Abnormal; Notable for the following components:    Glucose 25 (*)     Creatinine 1.7 (*)     Calcium 8.3 (*)      (*)      (*)     eGFR if  45.0 (*)     eGFR if non  39.1 (*)     All other components within normal limits    Narrative:     Release to patient->Immediate    Glucose critical result(s) called and verbal readback obtained from   "[employer]. by [facility] 05/29/2022 13:19   LACTIC ACID, PLASMA - Abnormal; Notable for the following components:    Lactate (Lactic Acid) 9.0 (*)     All other components within normal limits    Narrative:     Release to patient->Immediate  Lactic Acid critical result(s) called and verbal readback obtained   from "[employer]. by [facility] 05/29/2022 13:19   URINALYSIS, REFLEX TO URINE CULTURE - Abnormal; Notable for the following components:    Appearance, UA Cloudy (*)     Protein, UA 2+ (*)     Glucose, UA 3+ (*)     Occult Blood UA 1+ (*)     All other components within normal limits    Narrative:     Preferred Collection Type->Urine, Clean Catch  Specimen Source->Urine   TROPONIN I HIGH SENSITIVITY - Abnormal; Notable for the following components:    Troponin I High Sensitivity 210.9 (*)     All other components within normal limits    Narrative:     Release to patient->Immediate   ACETAMINOPHEN LEVEL - Abnormal; Notable for the following components:    Acetaminophen (Tylenol), Serum <2.0 (*)     All other components within normal limits    Narrative:     Release to patient->Immediate   DRUG SCREEN PANEL, URINE EMERGENCY - Abnormal;  Notable for the following components:    Opiate Scrn, Ur Presumptive Positive (*)     Amphetamine Screen, Ur Presumptive Positive (*)     All other components within normal limits    Narrative:     Preferred Collection Type->Urine, Clean Catch  Specimen Source->Urine   URINALYSIS MICROSCOPIC - Abnormal; Notable for the following components:    WBC, UA 18 (*)     Bacteria Many (*)     Hyaline Casts, UA 3.1 (*)     All other components within normal limits    Narrative:     Preferred Collection Type->Urine, Clean Catch  Specimen Source->Urine   POCT GLUCOSE - Abnormal; Notable for the following components:    POCT Glucose 27 (*)     All other components within normal limits   POCT GLUCOSE - Abnormal; Notable for the following components:    POCT Glucose 111 (*)     All other components within normal limits   NT-PRO NATRIURETIC PEPTIDE    Narrative:     Release to patient->Immediate   HCG, QUANTITATIVE    Narrative:     Release to patient->Immediate   ALCOHOL,MEDICAL (ETHANOL)    Narrative:     Release to patient->Immediate   SARS-COV-2 RDRP GENE        Allergies:  Soap     Medications:  Scheduled Meds:   enoxaparin  40 mg Subcutaneous Daily    famotidine (PF)  20 mg Intravenous Daily    levoFLOXacin  250 mg Intravenous Once    levoFLOXacin  750 mg Intravenous Q48H    mupirocin   Nasal BID    oxymetazoline  2 spray Each Nostril BID    piperacillin-tazobactam (ZOSYN) IVPB  4.5 g Intravenous Q8H    vancomycin (VANCOCIN) IVPB  1,750 mg Intravenous Q24H     Continuous Infusions:   sodium chloride 0.9% 127 mL/hr at 05/31/22 0647     PRN Meds:.acetaminophen, dextrose 10%, dextrose 50%, dicyclomine, hydrOXYzine HCL, methocarbamoL, ondansetron, sodium chloride 0.9%, Pharmacy to dose Vancomycin consult **AND** vancomycin - pharmacy to dose    Assessment - Diagnosis - Goals:     Diagnosis/Impression:  Opioid use disorder, severe    Patient seen today following referral for psychiatric assessment. She is accompanied by  "her mother for most of the assessment.    Per patient, "I accidentally overdosed on heroin. I snorted too much. My tolerance is decreased because I've been in snf."     Patient awake, alert, oriented to person, place, time and situation. Reports mood is "alright", affect blunted and constricted. Patient states several times during assessment that overdose was not intentional and not a suicide attempt. States that several years ago, she underwent a "forensic psychological examination" as part of legal proceedings to determine fitness for custody of children and was diagnosed with major depression and borderline personality disorder. Reports trials of various SSRIs as noted above, states "I don't like those, none of them worked." Reports that recently, she was prescribed abilify (dose unknown) and states she "couldn't feel any emotions" while taking it, leading to discontinuation.  She reports intermittent anxiety with history of panic attacks, stating that some are precipitated by external events and some "come out of nowhere."  Reports that xanax and klonopin have been effective in managing anxiety.    As noted, patient has been incarcerated for several months due to heroin possession charges. States she was released the first week of May 2022 and has used heroin and methamphetamine "a handful of times." Last meth use was this past Saturday. She states that she has been using heroin for about four years and has overdosed several times. Patient was recently scheduled for cardiac valve replacement surgery, but states that surgeon was unable to operate "because I was still using drugs." Of note, echocardiogram technician present during a portion of assessment as cardiology is again considering possible surgical intervention.     Patient states several times throughout assessment that she is not interested in inpatient or outpatient rehab at this time, states "I just don't want to to do that." She is able to identify " "alternatives, including medication-assisted treatment. Reports that she has tried suboxone and subutex in the past and is not interested in pursuing further treatment due to side effects, noting "they made me feel terrible." Has not tried methadone due to lack of access in [employer]. Patient verbalizes understanding of risks of refusing treatment, including "overdosing again", "infections", and "dying."  When asked if she would be willing to undergo valve replacement surgery if indicated, patient stated "Yeah, I don't really have a choice in that."    When asked if she is currently feeling depressed, patient states "I don't know." She does endorse frequent insomnia, lack of interest in recreational activities, poor appetite, and anxiety, noting that these symptoms have been at least intermittently present for 12 years, "probably longer." Discussed treatment options- as noted, patient symptoms responded suboptimally to numerous SSRIs. Discussed trial of SNRI, however she states "I don't want to try any medication that might make it harder for me to sleep." Discussed trial of remeron. Patient states she "Was on remeron at some point", noting that she found it quite helpful in aiding with insomnia, and is willing to trial it a second time. Discussed potential side effects including increased appetite/weight gain, sedation, dizziness. She verbalized understanding.     During assessment, patient complained of some opioid withdrawal symptoms, including "feeling really hot and then cold, stuffy nose, body aches." Patient appears pale and slightly diaphoretic. COWS assessment was completed, results, which indicated mild withdrawal, are noted below.     Rec:     Opioid use disorder:  -Patient counseled  -Rehab strongly encouraged, however patient is not interested at this time.   -Prescribe narcan at discharge    Depression:  -Patient counseled  -Start trial of Remeron 15 mg qhs for depression and off-label for insomnia. "     Opioid withdrawal:  -Continue PRN regimen, including Tylenol, bentyl, robaxin, zofran    Time with patient: 50 min      More than 50% of the time was spent counseling/coordinating care    Consulting clinician was informed of the encounter and consult note.    COWS Score for Opiate Withdrawal      RESULT SUMMARY:  10 points  Mild withdrawal      INPUTS:  Resting Pulse Rate (BPM) --> 0 = ?80  Sweating --> 2 = Flushed or observable moistness on face  Restlessness observation during assessment --> 1 = Reports difficulty sitting still, but is able to do so  Pupil size --> 1 = Pupils possibly larger than normal for room light  Bone or joint aches --> 1 = Mild diffuse discomfort  Runny nose or tearing --> 1 = Nasal stuffiness or unusually moist eyes  GI Upset --> 2 = Nausea or loose stool  Tremor observation of outstretched hands --> 0 = No tremor  Yawning observation during assessment --> 1 = Yawning once or twice during assessment  Anxiety or irritability --> 1 = Patient reports increasing irritability or anxiousness  Gooseflesh skin --> 0 = Skin is smooth    Matt Moreira NP  Psychiatry  [facility]

## 2022-05-31 NOTE — EICU
Rounding (Video Assessment):  Yes    Intervention Initiated From:  [facility] / EICU    Comments: video rounds complete. Patient appears to be sleeping at this time. No alarms, or distress noted. Call light within reach.

## 2022-05-31 NOTE — PROGRESS NOTES
"[facility] Medicine  Progress Note    Patient Name: Марина Britton  MRN: [id number]  Patient Class: IP- Inpatient   Admission Date: 5/29/2022  Length of Stay: 2 days  Attending Physician: Cally Chandra MD  Primary Care Provider: Primary Doctor No    Subjective:     Principal Problem:Aspiration pneumonia of both lungs    HPI:  32-year-old female with a history of chronic substance abuse, IV heroin abuse, history of endocarditis and septic arthritis from IV drug abuse, presents to the emergency department after an accidental overdose which EMS believes to be heroin. She was found unresponsive at hotel room, given Narcan, now awake, but became hypoxic. Upon arrival to the emergency department her oxygen saturation is 42% on room air, trends up with a non-rebreather.  Patient denies suicidal ideations.  Patient with repetitive behavior, had had multiple overdoses in the past, last year hospitalized or endocarditis, was supposed to have valve replacement however had not completed, not compliant with medication      Overview/Hospital Course:  5/30: No acute events overnight. Patient slept through. She is started on IV antibiotics for bilateral multifocal pneumonia. Patient slept well on BiPAP for CO2 retention. No complaints of diarrhea, vomiting or nausea, restlessness. Does complain of mild abdominal pain, joint aches. Patient recalls where she was last found in [facility] where she was alone "snorting heroin." She agrees to meet with psychiatry to help manage her addiction and withdrawal symptoms for when she develops them.   Will place consult with cardiology for tricuspid leaflet vegetation patient has not had followed up. Continue with current broad spectrum antibiotics.        Interval History: No acute events overnight on BiPAP. Awake and oriented. Patient sleepy, but answers to questions appropriately.     Review of Systems   Constitutional:  Positive for fatigue. Negative for activity " change, appetite change, fever and unexpected weight change.   HENT:  Negative for congestion, hearing loss, sore throat, tinnitus and trouble swallowing.    Eyes:  Negative for pain.   Respiratory:  Negative for chest tightness, shortness of breath, wheezing and stridor.    Cardiovascular:  Negative for chest pain, palpitations and leg swelling.   Gastrointestinal:  Negative for abdominal pain, diarrhea, nausea and vomiting.   Genitourinary:  Negative for difficulty urinating and dysuria.   Musculoskeletal:  Negative for back pain.   Skin:         Facial brusing   Neurological:  Positive for weakness. Negative for dizziness, tremors, seizures and headaches.   Psychiatric/Behavioral:  Negative for agitation, behavioral problems, confusion, dysphoric mood, hallucinations, self-injury, sleep disturbance and suicidal ideas. The patient is not nervous/anxious and is not hyperactive.    Objective:     Vital Signs (Most Recent):  Temp: 98.3 °F (36.8 °C) (05/30/22 1102)  Pulse: 95 (05/30/22 1330)  Resp: (!) 39 (05/30/22 1330)  BP: 117/75 (05/30/22 1330)  SpO2: 100 % (05/30/22 1330)   Vital Signs (24h Range):  Temp:  [97.4 °F (36.3 °C)-98.3 °F (36.8 °C)] 98.3 °F (36.8 °C)  Pulse:  [] 95  Resp:  [25-45] 39  SpO2:  [90 %-100 %] 100 %  BP: ()/(52-75) 117/75     Weight: 60.3 kg (132 lb 15 oz)  Body mass index is 24.31 kg/m².    Intake/Output Summary (Last 24 hours) at 5/30/2022 1522  Last data filed at 5/30/2022 0551  Gross per 24 hour   Intake 2248 ml   Output 1700 ml   Net 548 ml      Physical Exam  Vitals reviewed.   Constitutional:       General: She is not in acute distress.     Appearance: She is not ill-appearing or toxic-appearing.   HENT:      Right Ear: External ear normal.      Left Ear: External ear normal.      Mouth/Throat:      Mouth: Mucous membranes are dry.   Eyes:      Extraocular Movements: Extraocular movements intact.   Cardiovascular:      Rate and Rhythm: Normal rate and regular rhythm.       "Pulses: Normal pulses.   Pulmonary:      Effort: Pulmonary effort is normal. No respiratory distress.      Breath sounds: No wheezing.   Chest:      Chest wall: No tenderness.   Abdominal:      General: There is no distension.      Tenderness: There is no abdominal tenderness. There is no right CVA tenderness, left CVA tenderness or guarding.   Musculoskeletal:         General: Normal range of motion.      Cervical back: Normal range of motion. No rigidity or tenderness.      Right lower leg: No edema.      Left lower leg: No edema.   Lymphadenopathy:      Cervical: No cervical adenopathy.   Skin:     Comments: Multiple healed laceration "cutting marks" over right forearm.    Neurological:      Mental Status: She is oriented to person, place, and time.      Motor: Weakness present.      Gait: Gait normal.   Psychiatric:         Thought Content: Thought content normal.       Significant Labs: All pertinent labs within the past 24 hours have been reviewed.  BMP:   Recent Labs   Lab 05/30/22  0354   GLU 91      K 4.0      CO2 24   BUN 14   CREATININE 1.0   CALCIUM 7.4*     CBC:   Recent Labs   Lab 05/29/22  1200 05/30/22  0354   WBC 5.06 12.30   HGB 14.5 10.7*   HCT 45.4 31.3*    217     CMP:   Recent Labs   Lab 05/29/22  1200 05/30/22  0354    141   K 3.7 4.0    109   CO2 24 24   GLU 25* 91   BUN 18 14   CREATININE 1.7* 1.0   CALCIUM 8.3* 7.4*   PROT 6.9 5.2*   ALBUMIN 3.6 2.7*   BILITOT 0.4 1.0   ALKPHOS 105 40*   * 113*   * 111*   ANIONGAP 10 8   EGFRNONAA 39.1* >60.0     Significant Imaging: I have reviewed all pertinent imaging results/findings within the past 24 hours.      Assessment/Plan:      * Aspiration pneumonia of both lungs  Patient with bilateral pneumonia on chest x-ray, reviewed, elevated lactic acid, white count, afebrile currently, patient likely had aspiration pneumonia secondary to drug overdose, was found to be unresponsive oxygen sat in the 40s, was " given Narcan, patient became more alert, continued to be hypoxic, corrected with non-rebreather, patient on continued BiPAP therapy currently, ABG reviewed, hypoxia and hypercapnia noted, expected improved with BiPAP therapy, started on IV antibiotic, patient a history of endocarditis with septic emboli, monitor, may need CT to evaluate if not improved  - continue D2 zosyn, levaquin, vancomycin      Elevated LFTs  Downtrending. HIV negative. HepC antibody positive. Follow up HepC RNA.      Hx of bacterial endocarditis  Continues with IVDU, check echo  - f/u blood cultures x2  - f/u urine culture      Acute respiratory failure with hypoxia and hypercarbia  Patient with Hypercapnic and Hypoxic Respiratory failure which is Acute.  she is not on home oxygen. Supplemental oxygen was provided and noted- Oxygen Concentration (%):  [100] 100.   Signs/symptoms of respiratory failure include- respiratory distress and lethargy. Contributing diagnoses includes - Aspiration, Pneumonia and decreased respiration 2/2 drug overdose Labs and images were reviewed. Patient Has recent ABG, which has been reviewed. Will treat underlying causes and adjust management of respiratory failure as follows- BiPAP, abx, oxygen    Overdose  Recurrent IVDU, previous hx of endocarditis, responded to narcan.      IVDU (intravenous drug user)   on cessation once able, offer rehab options and therapy, patient agreeable to speak with psychiatry for treatment options.         VTE Risk Mitigation (From admission, onward)         Ordered     enoxaparin injection 40 mg  Daily         05/30/22 1526     IP VTE HIGH RISK PATIENT  Once         05/29/22 1540     Place sequential compression device  Until discontinued         05/29/22 1540     Place KATHIA hose  Until discontinued         05/29/22 1540                Discharge Planning   VIANNEY:      Code Status: Full Code   Is the patient medically ready for discharge?:     Reason for patient still in hospital  (select all that apply): Patient trending condition, Consult recommendations and Pending disposition  Discharge Plan A: Other        Josue Mcdonald DO  Department of [facility] Medicine   [place] [facility]

## 2022-05-31 NOTE — PLAN OF CARE
Problem: Adult Inpatient Plan of Care  Goal: Plan of Care Review  5/31/2022 0304 by Yajaira Penn RN  Outcome: Ongoing, Progressing  5/30/2022 2314 by Yajaira Penn RN  Outcome: Ongoing, Progressing  Goal: Patient-Specific Goal (Individualized)  5/31/2022 0304 by Yajaira Penn RN  Outcome: Ongoing, Progressing  5/30/2022 2314 by Yajaira Penn RN  Outcome: Ongoing, Progressing  Goal: Absence of Hospital-Acquired Illness or Injury  5/31/2022 0304 by Yajaira Penn RN  Outcome: Ongoing, Progressing  5/30/2022 2314 by Yajaira Penn RN  Outcome: Ongoing, Progressing  Goal: Optimal Comfort and Wellbeing  5/31/2022 0304 by Yajaira Penn RN  Outcome: Ongoing, Progressing  5/30/2022 2314 by Yajaira Penn RN  Outcome: Ongoing, Progressing  Goal: Readiness for Transition of Care  5/31/2022 0304 by Yajaira Penn RN  Outcome: Ongoing, Progressing  5/30/2022 2314 by Yajaira Penn RN  Outcome: Ongoing, Progressing     Problem: Adjustment to Illness (Sepsis/Septic Shock)  Goal: Optimal Coping  5/31/2022 0304 by Yajaira Penn RN  Outcome: Ongoing, Progressing  5/30/2022 2314 by Yajaira Penn RN  Outcome: Ongoing, Progressing     Problem: Bleeding (Sepsis/Septic Shock)  Goal: Absence of Bleeding  5/31/2022 0304 by Yajaira Penn RN  Outcome: Ongoing, Progressing  5/30/2022 2314 by Yajaira Penn RN  Outcome: Ongoing, Progressing     Problem: Glycemic Control Impaired (Sepsis/Septic Shock)  Goal: Blood Glucose Level Within Desired Range  5/31/2022 0304 by Yajaira Penn RN  Outcome: Ongoing, Progressing  5/30/2022 2314 by Yajaira Penn RN  Outcome: Ongoing, Progressing     Problem: Infection Progression (Sepsis/Septic Shock)  Goal: Absence of Infection Signs and Symptoms  5/31/2022 0304 by Yajaira Penn RN  Outcome: Ongoing, Progressing  5/30/2022 2314 by Yajaira Penn RN  Outcome: Ongoing, Progressing     Problem: Nutrition Impaired (Sepsis/Septic Shock)  Goal: Optimal Nutrition Intake  5/31/2022  0304 by Yajaira Penn RN  Outcome: Ongoing, Progressing  5/30/2022 2314 by Yajaira Penn RN  Outcome: Ongoing, Progressing     Problem: Infection  Goal: Absence of Infection Signs and Symptoms  5/31/2022 0304 by Yajaira Penn RN  Outcome: Ongoing, Progressing  5/30/2022 2314 by Yajaira Penn RN  Outcome: Ongoing, Progressing     Problem: Skin Injury Risk Increased  Goal: Skin Health and Integrity  5/31/2022 0304 by Yajaira Penn RN  Outcome: Ongoing, Progressing  5/30/2022 2314 by Yajaira Penn RN  Outcome: Ongoing, Progressing

## 2022-05-31 NOTE — NURSING
Pt resting without s/s of withdrawal. No c/o voiced. Occ dry cough with stuffy and runny nose noted without resp distress noted. Right hand IV and right femoral TLC. Pt call for assist to the bedside commode. Voiding without diff. Yellow urine. No s/s of SE or AR from ABT therapy. Oxygen sat ranging b/t %. Pt refused to use the BiPap and apply KATHIA hose, SCD maintained. Continue to observe.

## 2022-06-01 LAB
ALBUMIN SERPL BCP-MCNC: 2.3 G/DL (ref 3.5–5.2)
ALP SERPL-CCNC: 179 U/L (ref 55–135)
ALT SERPL W/O P-5'-P-CCNC: 54 U/L (ref 10–44)
ANION GAP SERPL CALC-SCNC: 5 MMOL/L (ref 8–16)
AST SERPL-CCNC: 13 U/L (ref 10–40)
BACTERIA UR CULT: ABNORMAL
BASOPHILS # BLD AUTO: 0.07 K/UL (ref 0–0.2)
BASOPHILS NFR BLD: 0.4 % (ref 0–1.9)
BILIRUB SERPL-MCNC: 0.5 MG/DL (ref 0.1–1)
BUN SERPL-MCNC: 7 MG/DL (ref 6–20)
CALCIUM SERPL-MCNC: 7.7 MG/DL (ref 8.7–10.5)
CHLORIDE SERPL-SCNC: 117 MMOL/L (ref 95–110)
CO2 SERPL-SCNC: 25 MMOL/L (ref 23–29)
CREAT SERPL-MCNC: 1 MG/DL (ref 0.5–1.4)
DIFFERENTIAL METHOD: ABNORMAL
EOSINOPHIL # BLD AUTO: 0.2 K/UL (ref 0–0.5)
EOSINOPHIL NFR BLD: 1.3 % (ref 0–8)
ERYTHROCYTE [DISTWIDTH] IN BLOOD BY AUTOMATED COUNT: 15.8 % (ref 11.5–14.5)
EST. GFR  (AFRICAN AMERICAN): >60 ML/MIN/1.73 M^2
EST. GFR  (NON AFRICAN AMERICAN): >60 ML/MIN/1.73 M^2
GLUCOSE SERPL-MCNC: 91 MG/DL (ref 70–110)
HCT VFR BLD AUTO: 30.8 % (ref 37–48.5)
HGB BLD-MCNC: 10.3 G/DL (ref 12–16)
IMM GRANULOCYTES # BLD AUTO: 0.44 K/UL (ref 0–0.04)
IMM GRANULOCYTES NFR BLD AUTO: 2.4 % (ref 0–0.5)
LYMPHOCYTES # BLD AUTO: 2.2 K/UL (ref 1–4.8)
LYMPHOCYTES NFR BLD: 11.8 % (ref 18–48)
MCH RBC QN AUTO: 30 PG (ref 27–31)
MCHC RBC AUTO-ENTMCNC: 33.4 G/DL (ref 32–36)
MCV RBC AUTO: 90 FL (ref 82–98)
MONOCYTES # BLD AUTO: 0.7 K/UL (ref 0.3–1)
MONOCYTES NFR BLD: 3.6 % (ref 4–15)
NEUTROPHILS # BLD AUTO: 14.6 K/UL (ref 1.8–7.7)
NEUTROPHILS NFR BLD: 80.5 % (ref 38–73)
NRBC BLD-RTO: 0 /100 WBC
PLATELET # BLD AUTO: 264 K/UL (ref 150–450)
PMV BLD AUTO: 9.4 FL (ref 9.2–12.9)
POCT GLUCOSE: 105 MG/DL (ref 70–110)
POTASSIUM SERPL-SCNC: 3.5 MMOL/L (ref 3.5–5.1)
PROT SERPL-MCNC: 5.2 G/DL (ref 6–8.4)
RBC # BLD AUTO: 3.43 M/UL (ref 4–5.4)
SODIUM SERPL-SCNC: 147 MMOL/L (ref 136–145)
VANCOMYCIN TROUGH SERPL-MCNC: 9.1 UG/ML (ref 10–22)
WBC # BLD AUTO: 18.16 K/UL (ref 3.9–12.7)

## 2022-06-01 PROCEDURE — 27000646 HC AEROBIKA DEVICE

## 2022-06-01 PROCEDURE — 99900035 HC TECH TIME PER 15 MIN (STAT)

## 2022-06-01 PROCEDURE — 94761 N-INVAS EAR/PLS OXIMETRY MLT: CPT

## 2022-06-01 PROCEDURE — 99900031 HC PATIENT EDUCATION (STAT)

## 2022-06-01 PROCEDURE — 94664 DEMO&/EVAL PT USE INHALER: CPT

## 2022-06-01 PROCEDURE — 25000003 PHARM REV CODE 250: Performed by: PSYCHIATRY & NEUROLOGY

## 2022-06-01 PROCEDURE — 20000000 HC ICU ROOM

## 2022-06-01 PROCEDURE — 63600175 PHARM REV CODE 636 W HCPCS: Performed by: STUDENT IN AN ORGANIZED HEALTH CARE EDUCATION/TRAINING PROGRAM

## 2022-06-01 PROCEDURE — 25000003 PHARM REV CODE 250: Performed by: EMERGENCY MEDICINE

## 2022-06-01 PROCEDURE — 27000221 HC OXYGEN, UP TO 24 HOURS

## 2022-06-01 PROCEDURE — 80053 COMPREHEN METABOLIC PANEL: CPT | Performed by: STUDENT IN AN ORGANIZED HEALTH CARE EDUCATION/TRAINING PROGRAM

## 2022-06-01 PROCEDURE — 25000003 PHARM REV CODE 250: Performed by: STUDENT IN AN ORGANIZED HEALTH CARE EDUCATION/TRAINING PROGRAM

## 2022-06-01 PROCEDURE — 99233 SBSQ HOSP IP/OBS HIGH 50: CPT | Mod: ,,, | Performed by: STUDENT IN AN ORGANIZED HEALTH CARE EDUCATION/TRAINING PROGRAM

## 2022-06-01 PROCEDURE — 80202 ASSAY OF VANCOMYCIN: CPT | Performed by: INTERNAL MEDICINE

## 2022-06-01 PROCEDURE — 99233 PR SUBSEQUENT HOSPITAL CARE,LEVL III: ICD-10-PCS | Mod: ,,, | Performed by: STUDENT IN AN ORGANIZED HEALTH CARE EDUCATION/TRAINING PROGRAM

## 2022-06-01 PROCEDURE — 85025 COMPLETE CBC W/AUTO DIFF WBC: CPT | Performed by: STUDENT IN AN ORGANIZED HEALTH CARE EDUCATION/TRAINING PROGRAM

## 2022-06-01 RX ORDER — BENZONATATE 100 MG/1
100 CAPSULE ORAL 3 TIMES DAILY
Status: DISPENSED | OUTPATIENT
Start: 2022-06-01 | End: 2022-06-04

## 2022-06-01 RX ORDER — ELECTROLYTES/DEXTROSE
SOLUTION, ORAL ORAL 2 TIMES DAILY
Status: DISCONTINUED | OUTPATIENT
Start: 2022-06-01 | End: 2022-06-05 | Stop reason: HOSPADM

## 2022-06-01 RX ORDER — GUAIFENESIN/DEXTROMETHORPHAN 100-10MG/5
10 SYRUP ORAL EVERY 6 HOURS
Status: COMPLETED | OUTPATIENT
Start: 2022-06-01 | End: 2022-06-04

## 2022-06-01 RX ORDER — VANCOMYCIN HCL IN 5 % DEXTROSE 1G/250ML
1000 PLASTIC BAG, INJECTION (ML) INTRAVENOUS
Status: DISCONTINUED | OUTPATIENT
Start: 2022-06-01 | End: 2022-06-05 | Stop reason: HOSPADM

## 2022-06-01 RX ORDER — HYDRALAZINE HYDROCHLORIDE 20 MG/ML
10 INJECTION INTRAMUSCULAR; INTRAVENOUS ONCE
Status: COMPLETED | OUTPATIENT
Start: 2022-06-01 | End: 2022-06-01

## 2022-06-01 RX ADMIN — POTASSIUM CHLORIDE 30 MEQ: 750 TABLET, FILM COATED, EXTENDED RELEASE ORAL at 08:06

## 2022-06-01 RX ADMIN — DICYCLOMINE HYDROCHLORIDE 10 MG: 10 CAPSULE ORAL at 03:06

## 2022-06-01 RX ADMIN — VANCOMYCIN HYDROCHLORIDE 1000 MG: 1 INJECTION, POWDER, LYOPHILIZED, FOR SOLUTION INTRAVENOUS at 07:06

## 2022-06-01 RX ADMIN — MIRTAZAPINE 15 MG: 15 TABLET, FILM COATED ORAL at 08:06

## 2022-06-01 RX ADMIN — SODIUM CHLORIDE: 0.9 INJECTION, SOLUTION INTRAVENOUS at 12:06

## 2022-06-01 RX ADMIN — SODIUM CHLORIDE: 0.9 INJECTION, SOLUTION INTRAVENOUS at 10:06

## 2022-06-01 RX ADMIN — LOPERAMIDE HYDROCHLORIDE 2 MG: 2 CAPSULE ORAL at 11:06

## 2022-06-01 RX ADMIN — Medication 2 SPRAY: at 09:06

## 2022-06-01 RX ADMIN — LOPERAMIDE HYDROCHLORIDE 2 MG: 2 CAPSULE ORAL at 04:06

## 2022-06-01 RX ADMIN — METHOCARBAMOL 500 MG: 500 TABLET ORAL at 12:06

## 2022-06-01 RX ADMIN — HYDROCORTISONE: 0.01 CREAM TOPICAL at 08:06

## 2022-06-01 RX ADMIN — FAMOTIDINE 20 MG: 10 INJECTION INTRAVENOUS at 09:06

## 2022-06-01 RX ADMIN — Medication 2 SPRAY: at 08:06

## 2022-06-01 RX ADMIN — HYDRALAZINE HYDROCHLORIDE 10 MG: 20 INJECTION, SOLUTION INTRAMUSCULAR; INTRAVENOUS at 05:06

## 2022-06-01 RX ADMIN — CEFTRIAXONE 1 G: 1 INJECTION, SOLUTION INTRAVENOUS at 12:06

## 2022-06-01 RX ADMIN — MUPIROCIN: 20 OINTMENT TOPICAL at 09:06

## 2022-06-01 RX ADMIN — DICYCLOMINE HYDROCHLORIDE 10 MG: 10 CAPSULE ORAL at 10:06

## 2022-06-01 RX ADMIN — BENZONATATE 100 MG: 100 CAPSULE ORAL at 08:06

## 2022-06-01 RX ADMIN — HYDROXYZINE HYDROCHLORIDE 50 MG: 25 TABLET, FILM COATED ORAL at 10:06

## 2022-06-01 RX ADMIN — POTASSIUM CHLORIDE 30 MEQ: 750 TABLET, FILM COATED, EXTENDED RELEASE ORAL at 03:06

## 2022-06-01 RX ADMIN — ISOSORBIDE DINITRATE: 20 TABLET ORAL at 09:06

## 2022-06-01 RX ADMIN — METHOCARBAMOL 500 MG: 500 TABLET ORAL at 08:06

## 2022-06-01 RX ADMIN — ENOXAPARIN SODIUM 40 MG: 40 INJECTION SUBCUTANEOUS at 05:06

## 2022-06-01 RX ADMIN — METHOCARBAMOL 500 MG: 500 TABLET ORAL at 05:06

## 2022-06-01 RX ADMIN — POTASSIUM CHLORIDE 30 MEQ: 750 TABLET, FILM COATED, EXTENDED RELEASE ORAL at 09:06

## 2022-06-01 RX ADMIN — GUAIFENESIN AND DEXTROMETHORPHAN 10 ML: 100; 10 SYRUP ORAL at 05:06

## 2022-06-01 RX ADMIN — MUPIROCIN: 20 OINTMENT TOPICAL at 08:06

## 2022-06-01 NOTE — ASSESSMENT & PLAN NOTE
Patient with Hypercapnic and Hypoxic Respiratory failure which is Acute.  she is not on home oxygen. Supplemental oxygen was provided and noted- Oxygen Concentration (%):  [28] 28.   Signs/symptoms of respiratory failure include- respiratory distress and lethargy. Contributing diagnoses includes - Aspiration, Pneumonia and decreased respiration 2/2 drug overdose Labs and images were reviewed. Patient Has recent ABG, which has been reviewed. Will treat underlying causes and adjust management of respiratory failure as follows- oxygen delivery transitioned to nasal cannula from BiPAP, IV abx.

## 2022-06-01 NOTE — ASSESSMENT & PLAN NOTE
Continues with IVDU, TTE report from cardiology with no findings of large vegetation over tricuspid valve, but indicate patient with severe right heart failure.   - f/u blood cultures x2 NGTD  - f/u urine culture grew E.coli >100K await sensitivity study

## 2022-06-01 NOTE — SUBJECTIVE & OBJECTIVE
Interval History: No acute events overnight. Patient refused to keep BiPAP on, transitioned to 2LNC. No new complaints.     Review of Systems   Constitutional:  Positive for fatigue. Negative for activity change, appetite change, fever and unexpected weight change.   HENT:  Negative for congestion, hearing loss, sore throat, tinnitus and trouble swallowing.    Eyes:  Negative for pain.   Respiratory:  Negative for chest tightness, shortness of breath, wheezing and stridor.    Cardiovascular:  Negative for chest pain, palpitations and leg swelling.   Gastrointestinal:  Negative for abdominal pain, diarrhea, nausea and vomiting.   Genitourinary:  Negative for difficulty urinating and dysuria.   Musculoskeletal:  Negative for back pain.   Neurological:  Positive for weakness. Negative for dizziness, tremors, seizures and headaches.   Psychiatric/Behavioral:  Negative for agitation, behavioral problems, confusion, dysphoric mood, hallucinations, self-injury, sleep disturbance and suicidal ideas. The patient is not nervous/anxious and is not hyperactive.    Objective:     Vital Signs (Most Recent):  Temp: 98.5 °F (36.9 °C) (05/31/22 1621)  Pulse: 70 (05/31/22 1900)  Resp: (!) 36 (05/31/22 1900)  BP: (!) 138/91 (05/31/22 1900)  SpO2: 100 % (05/31/22 1900)   Vital Signs (24h Range):  Temp:  [97.3 °F (36.3 °C)-98.9 °F (37.2 °C)] 98.5 °F (36.9 °C)  Pulse:  [64-95] 70  Resp:  [29-44] 36  SpO2:  [91 %-100 %] 100 %  BP: (110-143)/(65-98) 138/91     Weight: 60.3 kg (132 lb 15 oz)  Body mass index is 24.31 kg/m².    Intake/Output Summary (Last 24 hours) at 5/31/2022 1951  Last data filed at 5/31/2022 1621  Gross per 24 hour   Intake 4465.78 ml   Output 900 ml   Net 3565.78 ml      Physical Exam  Vitals reviewed.   Constitutional:       General: She is not in acute distress.     Appearance: Normal appearance. She is not ill-appearing or toxic-appearing.   HENT:      Right Ear: External ear normal.      Left Ear: External ear  "normal.      Mouth/Throat:      Mouth: Mucous membranes are dry.   Eyes:      Extraocular Movements: Extraocular movements intact.   Cardiovascular:      Rate and Rhythm: Normal rate and regular rhythm.      Pulses: Normal pulses.      Heart sounds: Murmur heard.   Pulmonary:      Effort: Pulmonary effort is normal. No respiratory distress.      Breath sounds: Wheezing (scattered distant wheezing) present.      Comments: On continuous O2 via NC  Chest:      Chest wall: No tenderness.   Abdominal:      General: There is no distension.      Tenderness: There is no abdominal tenderness. There is no right CVA tenderness, left CVA tenderness or guarding.   Musculoskeletal:         General: No swelling or tenderness. Normal range of motion.      Cervical back: Normal range of motion. No rigidity or tenderness.      Right lower leg: No edema.      Left lower leg: No edema.   Lymphadenopathy:      Cervical: No cervical adenopathy.   Skin:     General: Skin is warm and dry.      Comments: Multiple healed laceration "cutting marks" over right forearm. Multiple facial bruising.    Neurological:      Mental Status: She is alert and oriented to person, place, and time.      Motor: Weakness present.      Gait: Gait normal.   Psychiatric:         Thought Content: Thought content normal.     Significant Labs: All pertinent labs within the past 24 hours have been reviewed.  BMP:   Recent Labs   Lab 05/31/22  0838         K 3.2*   *   CO2 27   BUN 10   CREATININE 1.0   CALCIUM 7.7*     CBC:   Recent Labs   Lab 05/30/22  0354 05/31/22  0838   WBC 12.30 18.27*   HGB 10.7* 10.1*   HCT 31.3* 30.0*    227     CMP:   Recent Labs   Lab 05/30/22  0354 05/31/22  0838    145   K 4.0 3.2*    113*   CO2 24 27   GLU 91 102   BUN 14 10   CREATININE 1.0 1.0   CALCIUM 7.4* 7.7*   PROT 5.2* 5.2*   ALBUMIN 2.7* 2.4*   BILITOT 1.0 1.2*   ALKPHOS 40* 56   * 30   * 72*   ANIONGAP 8 5*   EGFRNONAA >60.0 " >60.0

## 2022-06-01 NOTE — ASSESSMENT & PLAN NOTE
Recurrent IVDU, previous hx of endocarditis, responded to narcan.  Late afternoon, patient started to complain of diarrhea, likely multifactorial with current multiple IV antibiotics and withdrawal symptoms.   - symptomatic management PRN

## 2022-06-01 NOTE — ASSESSMENT & PLAN NOTE
on cessation once able, offer rehab options and therapy, patient agreeable to speak with psychiatry for treatment options. Per psychiatry, patient currently is not interested in rehab facility for detox.   - open discussion for patient to agree on voluntarily detox for cardiac repair in the future as there will be no surgeon who will be operating on active

## 2022-06-01 NOTE — RESPIRATORY THERAPY
06/01/22 1644   Patient Assessment/Suction   Level of Consciousness (AVPU) alert   Respiratory Effort Normal;Unlabored   Expansion/Accessory Muscles/Retractions expansion symmetric;no retractions   Rhythm/Pattern, Respiratory pattern regular;unlabored   PRE-TX-O2   O2 Device (Oxygen Therapy) (S)  nasal cannula   $ Is the patient on Low Flow Oxygen? Yes   Flow (L/min) (S)  1   Oxygen Concentration (%) 24   SpO2 97 %   Pulse Oximetry Type Continuous   $ Pulse Oximetry - Multiple Charge Pulse Oximetry - Multiple   Pulse 72   Resp 18   Positioning HOB elevated 45 degrees   Respiratory Evaluation   $ Care Plan Tech Time 15 min     Weaned patient's Oxygen from 2lpm nasal cannula to 1lpm nasal cannula at this time. No distress noted. Stephanie ATKINS notified

## 2022-06-01 NOTE — PROGRESS NOTES
"[facility] Medicine  Progress Note    Patient Name: Марина Britton  MRN: [id number]  Patient Class: IP- Inpatient   Admission Date: 5/29/2022  Length of Stay: 3 days  Attending Physician: Josue Mcdonald DO  Primary Care Provider: Primary Doctor No    Subjective:     Principal Problem:Bilateral pneumonia    HPI:  32-year-old female with a history of chronic substance abuse, IV heroin abuse, history of endocarditis and septic arthritis from IV drug abuse, presents to the emergency department after an accidental overdose which EMS believes to be heroin. She was found unresponsive at hotel room, given Narcan, now awake, but became hypoxic. Upon arrival to the emergency department her oxygen saturation is 42% on room air, trends up with a non-rebreather.  Patient denies suicidal ideations.  Patient with repetitive behavior, had had multiple overdoses in the past, last year hospitalized or endocarditis, was supposed to have valve replacement however had not completed, not compliant with medication    Overview/Hospital Course:  5/30: No acute events overnight. Patient slept through. She is started on IV antibiotics for bilateral multifocal pneumonia. Patient slept well on BiPAP for CO2 retention. No complaints of diarrhea, vomiting or nausea, restlessness. Does complain of mild abdominal pain, joint aches. Patient recalls where she was last found in [facility] where she was alone "snorting heroin." She agrees to meet with psychiatry to help manage her addiction and withdrawal symptoms for when she develops them.   Consult cardiology for tricuspid leaflet vegetation, which patient has not had follow up. Continue with current broad spectrum antibiotics.    5/31: Per cardiology, HARI showing severe right sided heart failure, no vegetations found over tricuspid valve. Blood cultures NGTD x2 days, urine culture + for gram negative >100k, stable bilateral infiltrate on CXR, zosyn discontinued and will " "continue ceftriaxone and vancomycin.   6/1: Psychiatry return on Friday with further discussion of detox. Patient states she gets "clean" in the hospital and can never continue once she leaves the facility. Exhibiting more signs of withdrawal with abdominal cramping, diarrhea. Mild elevation in BP likely contributed by withdrawal and chest pain associated with breathing, which becomes more prominent on deep inhalation. Continue D4 vancomycin and ceftriaxone.        Interval History: No acute events overnight. Started to complain of withdrawal symptoms of diarrhea in the afternoon yesterday.   Cardiology reports no definite endocarditis seen on the Tricuspid valve, but a flail resulting in severe regurgitation.   Psychiatry will continue to encourage detox provided patient might be more willing with a definitive time frame of required detox for corrective surgery/procedure of her heart. At this time, patient exhibiting capacity declining further intervention with detox.   Leukocytosis presenting at day 3 of starting IV antibiotics, likely delayed reactive effects seen from current IV antibiotics therapy. VSS, afebrile.   Respiration has improved, O2 saturation 100% on 2L NC.  Continue with IV antibiotics and supportive therapy for withdrawal        Review of Systems   Constitutional:  Positive for appetite change (diminished) and fatigue. Negative for fever and unexpected weight change.        "Have no energy"   HENT:  Negative for congestion, hearing loss, sore throat, tinnitus and trouble swallowing.    Eyes:  Negative for pain.   Respiratory:  Positive for cough (intermittent). Negative for choking, chest tightness, shortness of breath, wheezing and stridor.    Cardiovascular:  Positive for chest pain. Negative for palpitations and leg swelling.   Gastrointestinal:  Negative for abdominal pain, blood in stool, diarrhea, nausea and vomiting.   Genitourinary:  Negative for difficulty urinating and dysuria. "   Musculoskeletal:  Negative for back pain, joint swelling and neck pain.   Neurological:  Positive for weakness. Negative for dizziness, tremors, seizures and headaches.   Psychiatric/Behavioral:  Negative for agitation, behavioral problems, confusion, dysphoric mood, hallucinations, self-injury, sleep disturbance and suicidal ideas. The patient is not nervous/anxious and is not hyperactive.    Objective:     Vital Signs (Most Recent):  Temp: 97.8 °F (36.6 °C) (06/01/22 0715)  Pulse: (!) 55 (06/01/22 1000)  Resp: (!) 22 (06/01/22 1000)  BP: (!) 139/97 (06/01/22 1000)  SpO2: 100 % (06/01/22 1000)   Vital Signs (24h Range):  Temp:  [97.3 °F (36.3 °C)-100.6 °F (38.1 °C)] 97.8 °F (36.6 °C)  Pulse:  [53-85] 55  Resp:  [18-38] 22  SpO2:  [91 %-100 %] 100 %  BP: (120-159)/(77-98) 139/97     Weight: 60.3 kg (132 lb 15 oz)  Body mass index is 24.31 kg/m².    Intake/Output Summary (Last 24 hours) at 6/1/2022 1052  Last data filed at 6/1/2022 0805  Gross per 24 hour   Intake 3841.45 ml   Output --   Net 3841.45 ml      Physical Exam  Vitals reviewed.   Constitutional:       General: She is not in acute distress.     Appearance: Normal appearance. She is not ill-appearing or toxic-appearing.   HENT:      Right Ear: External ear normal.      Left Ear: External ear normal.      Mouth/Throat:      Mouth: Mucous membranes are dry.   Eyes:      Extraocular Movements: Extraocular movements intact.   Cardiovascular:      Rate and Rhythm: Normal rate and regular rhythm.      Pulses: Normal pulses.      Heart sounds: Murmur heard.   Pulmonary:      Effort: Pulmonary effort is normal. No respiratory distress.      Breath sounds: Wheezing (scattered distant wheezing) present.      Comments: On continuous O2 via NC  Chest:      Chest wall: No tenderness.   Abdominal:      General: There is no distension.      Tenderness: There is no abdominal tenderness. There is no right CVA tenderness, left CVA tenderness or guarding.   Musculoskeletal:  "        General: No swelling or tenderness. Normal range of motion.      Cervical back: Normal range of motion. No rigidity or tenderness.      Right lower leg: No edema.      Left lower leg: No edema.   Lymphadenopathy:      Cervical: No cervical adenopathy.   Skin:     General: Skin is warm and dry.      Comments: Multiple healed laceration "cutting marks" over right forearm. Multiple facial bruising.    Neurological:      Mental Status: She is alert and oriented to person, place, and time.      Motor: Weakness present.      Gait: Gait normal.   Psychiatric:         Thought Content: Thought content normal.     Significant Labs: All pertinent labs within the past 24 hours have been reviewed.  Bilirubin:   Recent Labs   Lab 05/29/22  1200 05/30/22  0354 05/31/22  0838 06/01/22  0410   BILITOT 0.4 1.0 1.2* 0.5     BMP:   Recent Labs   Lab 06/01/22  0410   GLU 91   *   K 3.5   *   CO2 25   BUN 7   CREATININE 1.0   CALCIUM 7.7*     CBC:   Recent Labs   Lab 05/31/22  0838 06/01/22  0410   WBC 18.27* 18.16*   HGB 10.1* 10.3*   HCT 30.0* 30.8*    264     CMP:   Recent Labs   Lab 05/31/22  0838 06/01/22  0410    147*   K 3.2* 3.5   * 117*   CO2 27 25    91   BUN 10 7   CREATININE 1.0 1.0   CALCIUM 7.7* 7.7*   PROT 5.2* 5.2*   ALBUMIN 2.4* 2.3*   BILITOT 1.2* 0.5   ALKPHOS 56 179*   AST 30 13   ALT 72* 54*   ANIONGAP 5* 5*   EGFRNONAA >60.0 >60.0     Trans thoracic echocardiogram (5/31/22)  · The left ventricle is normal in size with normal systolic function.  · The estimated ejection fraction is 55%.  · Moderate right ventricular enlargement. Moderate to severe right ventricuar systolic dysfunction.  · Mild left atrial enlargement.  · Severe right atrial enlargement.  · Interatrial septum bows to left, consider elevated right atrial pressure.  · Mild aortic regurgitation.  · Mild mitral regurgitation.  · There is flail motion of the anterior tricuspid leaflet.  · Severe tricuspid " regurgitation that is degenerative due to previous endocarditis. No obvious valvular vegetations seen.  · The estimated PA systolic pressure is 55 mmHg.  · There is pulmonary hypertension.     No definite endocarditis seen on the Tricuspid valve. The tricuspid valve appears to have a flail anterior leaflet with severe regurgitation, probably due to prior endocarditis.  Per Dr. Garrison, cardiology  Significant Imaging: I have reviewed all pertinent imaging results/findings within the past 24 hours.      Assessment/Plan:      * Bilateral pneumonia  Aspiration pneumonia vs CAP. Chest x-ray, elevated lactic acid, white count, afebrile currently, patient likely had aspiration pneumonia secondary to drug overdose, was found to be unresponsive oxygen sat in the 40s, was given Narcan, patient became more alert, continued to be hypoxic, corrected with non-rebreather, patient on continued BiPAP therapy currently, ABG reviewed, hypoxia and hypercapnia noted, expected improved with BiPAP therapy, started on IV antibiotic, patient a history of endocarditis with septic emboli, monitor, may need CT to evaluate if not improved  - completed zosyn, levaquin, vancomycin x 2days  - continue antibiotics for possible CAP and E.coli UTI with ceftriaxone and vancomycin        Elevated LFTs  Downtrending. HIV negative. Hep C antibody positive. Follow up Hep C RNA.      Hx of bacterial endocarditis  Continues with IVDU, TTE report from cardiology with no findings of large vegetation over tricuspid valve, but indicate patient with severe right heart failure.   - f/u blood cultures x2 NGTD  - f/u urine culture grew E.coli >100K await sensitivity study      Acute respiratory failure with hypoxia and hypercarbia  Patient with Hypercapnic and Hypoxic Respiratory failure which is Acute.  she is not on home oxygen. Supplemental oxygen was provided and noted- Oxygen Concentration (%):  [28] 28.   Signs/symptoms of respiratory failure include-  respiratory distress and lethargy. Contributing diagnoses includes - Aspiration, Pneumonia and decreased respiration 2/2 drug overdose Labs and images were reviewed. Patient Has recent ABG, which has been reviewed. Will treat underlying causes and adjust management of respiratory failure as follows- oxygen delivery transitioned to nasal cannula from BiPAP, IV abx.    Overdose  Recurrent IVDU, previous hx of endocarditis, responded to narcan.  Late afternoon, patient started to complain of diarrhea, likely multifactorial with current multiple IV antibiotics and withdrawal symptoms.   - symptomatic management PRN       IVDU (intravenous drug user)   on cessation once able, offer rehab options and therapy, patient agreeable to speak with psychiatry for treatment options. Per psychiatry, patient currently is not interested in rehab facility for detox.   - open discussion for patient to agree on voluntarily detox for cardiac repair in the future as there will be no surgeon who will be operating on active           VTE Risk Mitigation (From admission, onward)         Ordered     enoxaparin injection 40 mg  Daily         05/30/22 1526     IP VTE HIGH RISK PATIENT  Once         05/29/22 1540     Place sequential compression device  Until discontinued         05/29/22 1540     Place KATHIA hose  Until discontinued         05/29/22 1540              Discharge Planning   VIANNEY:      Code Status: Full Code   Is the patient medically ready for discharge?:     Reason for patient still in hospital (select all that apply): Patient trending condition, Treatment and Consult recommendations  Discharge Plan A: Home          Josue Mcdonald DO  Department of Hospital Medicine   [place] - Intensive [facility], [place]

## 2022-06-01 NOTE — PLAN OF CARE
Patient is ambulating to bedside commode with no complaints of shortness of breathe. VSS, no arrhythmias noted. She rested fairly well last night. O2 sat have been great on 2 LPM N/C.

## 2022-06-01 NOTE — SUBJECTIVE & OBJECTIVE
"Interval History: No acute events overnight. Started to complain of withdrawal symptoms of diarrhea in the afternoon yesterday.   Cardiology reports no definite endocarditis seen on the Tricuspid valve, but a flail resulting in severe regurgitation.   Psychiatry will continue to encourage detox provided patient might be more willing with a definitive time frame of required detox for corrective surgery/procedure of her heart. At this time, patient exhibiting capacity declining further intervention with detox.   Leukocytosis presenting at day 3 of starting IV antibiotics, likely delayed reactive effects seen from current IV antibiotics therapy. VSS, afebrile.   Respiration has improved, O2 saturation 100% on 2L NC.  Continue with IV antibiotics and supportive therapy for withdrawal        Review of Systems   Constitutional:  Positive for appetite change (diminished) and fatigue. Negative for fever and unexpected weight change.        "Have no energy"   HENT:  Negative for congestion, hearing loss, sore throat, tinnitus and trouble swallowing.    Eyes:  Negative for pain.   Respiratory:  Positive for cough (intermittent). Negative for choking, chest tightness, shortness of breath, wheezing and stridor.    Cardiovascular:  Positive for chest pain. Negative for palpitations and leg swelling.   Gastrointestinal:  Negative for abdominal pain, blood in stool, diarrhea, nausea and vomiting.   Genitourinary:  Negative for difficulty urinating and dysuria.   Musculoskeletal:  Negative for back pain, joint swelling and neck pain.   Neurological:  Positive for weakness. Negative for dizziness, tremors, seizures and headaches.   Psychiatric/Behavioral:  Negative for agitation, behavioral problems, confusion, dysphoric mood, hallucinations, self-injury, sleep disturbance and suicidal ideas. The patient is not nervous/anxious and is not hyperactive.    Objective:     Vital Signs (Most Recent):  Temp: 97.8 °F (36.6 °C) (06/01/22 " "0715)  Pulse: (!) 55 (06/01/22 1000)  Resp: (!) 22 (06/01/22 1000)  BP: (!) 139/97 (06/01/22 1000)  SpO2: 100 % (06/01/22 1000)   Vital Signs (24h Range):  Temp:  [97.3 °F (36.3 °C)-100.6 °F (38.1 °C)] 97.8 °F (36.6 °C)  Pulse:  [53-85] 55  Resp:  [18-38] 22  SpO2:  [91 %-100 %] 100 %  BP: (120-159)/(77-98) 139/97     Weight: 60.3 kg (132 lb 15 oz)  Body mass index is 24.31 kg/m².    Intake/Output Summary (Last 24 hours) at 6/1/2022 1052  Last data filed at 6/1/2022 0805  Gross per 24 hour   Intake 3841.45 ml   Output --   Net 3841.45 ml      Physical Exam  Vitals reviewed.   Constitutional:       General: She is not in acute distress.     Appearance: Normal appearance. She is not ill-appearing or toxic-appearing.   HENT:      Right Ear: External ear normal.      Left Ear: External ear normal.      Mouth/Throat:      Mouth: Mucous membranes are dry.   Eyes:      Extraocular Movements: Extraocular movements intact.   Cardiovascular:      Rate and Rhythm: Normal rate and regular rhythm.      Pulses: Normal pulses.      Heart sounds: Murmur heard.   Pulmonary:      Effort: Pulmonary effort is normal. No respiratory distress.      Breath sounds: Wheezing (scattered distant wheezing) present.      Comments: On continuous O2 via NC  Chest:      Chest wall: No tenderness.   Abdominal:      General: There is no distension.      Tenderness: There is no abdominal tenderness. There is no right CVA tenderness, left CVA tenderness or guarding.   Musculoskeletal:         General: No swelling or tenderness. Normal range of motion.      Cervical back: Normal range of motion. No rigidity or tenderness.      Right lower leg: No edema.      Left lower leg: No edema.   Lymphadenopathy:      Cervical: No cervical adenopathy.   Skin:     General: Skin is warm and dry.      Comments: Multiple healed laceration "cutting marks" over right forearm. Multiple facial bruising.    Neurological:      Mental Status: She is alert and oriented to " person, place, and time.      Motor: Weakness present.      Gait: Gait normal.   Psychiatric:         Thought Content: Thought content normal.     Significant Labs: All pertinent labs within the past 24 hours have been reviewed.  Bilirubin:   Recent Labs   Lab 05/29/22  1200 05/30/22  0354 05/31/22  0838 06/01/22  0410   BILITOT 0.4 1.0 1.2* 0.5     BMP:   Recent Labs   Lab 06/01/22  0410   GLU 91   *   K 3.5   *   CO2 25   BUN 7   CREATININE 1.0   CALCIUM 7.7*     CBC:   Recent Labs   Lab 05/31/22  0838 06/01/22  0410   WBC 18.27* 18.16*   HGB 10.1* 10.3*   HCT 30.0* 30.8*    264     CMP:   Recent Labs   Lab 05/31/22  0838 06/01/22  0410    147*   K 3.2* 3.5   * 117*   CO2 27 25    91   BUN 10 7   CREATININE 1.0 1.0   CALCIUM 7.7* 7.7*   PROT 5.2* 5.2*   ALBUMIN 2.4* 2.3*   BILITOT 1.2* 0.5   ALKPHOS 56 179*   AST 30 13   ALT 72* 54*   ANIONGAP 5* 5*   EGFRNONAA >60.0 >60.0     Trans thoracic echocardiogram (5/31/22)  The left ventricle is normal in size with normal systolic function.  The estimated ejection fraction is 55%.  Moderate right ventricular enlargement. Moderate to severe right ventricuar systolic dysfunction.  Mild left atrial enlargement.  Severe right atrial enlargement.  Interatrial septum bows to left, consider elevated right atrial pressure.  Mild aortic regurgitation.  Mild mitral regurgitation.  There is flail motion of the anterior tricuspid leaflet.  Severe tricuspid regurgitation that is degenerative due to previous endocarditis. No obvious valvular vegetations seen.  The estimated PA systolic pressure is 55 mmHg.  There is pulmonary hypertension.     No definite endocarditis seen on the Tricuspid valve. The tricuspid valve appears to have a flail anterior leaflet with severe regurgitation, probably due to prior endocarditis.  Per Dr. Garrison, cardiology  Significant Imaging: I have reviewed all pertinent imaging results/findings within the past 24 hours.

## 2022-06-01 NOTE — PROGRESS NOTES
"[facility] Medicine  Progress Note    Patient Name: Марина Britton  MRN: [id number]  Patient Class: IP- Inpatient   Admission Date: 5/29/2022  Length of Stay: 2 days  Attending Physician: Josue Mcdonald DO  Primary Care Provider: Primary Doctor No    Subjective:     Principal Problem:Bilateral pneumonia    HPI:  32-year-old female with a history of chronic substance abuse, IV heroin abuse, history of endocarditis and septic arthritis from IV drug abuse, presents to the emergency department after an accidental overdose which EMS believes to be heroin. She was found unresponsive at hotel room, given Narcan, now awake, but became hypoxic. Upon arrival to the emergency department her oxygen saturation is 42% on room air, trends up with a non-rebreather.  Patient denies suicidal ideations.  Patient with repetitive behavior, had had multiple overdoses in the past, last year hospitalized or endocarditis, was supposed to have valve replacement however had not completed, not compliant with medication    Overview/Hospital Course:  5/30: No acute events overnight. Patient slept through. She is started on IV antibiotics for bilateral multifocal pneumonia. Patient slept well on BiPAP for CO2 retention. No complaints of diarrhea, vomiting or nausea, restlessness. Does complain of mild abdominal pain, joint aches. Patient recalls where she was last found in [facility] where she was alone "snorting heroin." She agrees to meet with psychiatry to help manage her addiction and withdrawal symptoms for when she develops them.   Consult cardiology for tricuspid leaflet vegetation, which patient has not had follow up. Continue with current broad spectrum antibiotics.    5/31: Per cardiology, HARI showing severe right sided heart failure, no vegetations found over tricuspid valve. Blood cultures NGTD x2 days, urine culture + for gram negative >100k, stable bilateral infiltrate on CXR, zosyn discontinued and will " continue ceftriaxone and vancomycin.         Interval History: No acute events overnight. Patient refused to keep BiPAP on, transitioned to 2LNC. No new complaints.     Review of Systems   Constitutional:  Positive for fatigue. Negative for activity change, appetite change, fever and unexpected weight change.   HENT:  Negative for congestion, hearing loss, sore throat, tinnitus and trouble swallowing.    Eyes:  Negative for pain.   Respiratory:  Negative for chest tightness, shortness of breath, wheezing and stridor.    Cardiovascular:  Negative for chest pain, palpitations and leg swelling.   Gastrointestinal:  Negative for abdominal pain, diarrhea, nausea and vomiting.   Genitourinary:  Negative for difficulty urinating and dysuria.   Musculoskeletal:  Negative for back pain.   Neurological:  Positive for weakness. Negative for dizziness, tremors, seizures and headaches.   Psychiatric/Behavioral:  Negative for agitation, behavioral problems, confusion, dysphoric mood, hallucinations, self-injury, sleep disturbance and suicidal ideas. The patient is not nervous/anxious and is not hyperactive.    Objective:     Vital Signs (Most Recent):  Temp: 98.5 °F (36.9 °C) (05/31/22 1621)  Pulse: 70 (05/31/22 1900)  Resp: (!) 36 (05/31/22 1900)  BP: (!) 138/91 (05/31/22 1900)  SpO2: 100 % (05/31/22 1900)   Vital Signs (24h Range):  Temp:  [97.3 °F (36.3 °C)-98.9 °F (37.2 °C)] 98.5 °F (36.9 °C)  Pulse:  [64-95] 70  Resp:  [29-44] 36  SpO2:  [91 %-100 %] 100 %  BP: (110-143)/(65-98) 138/91     Weight: 60.3 kg (132 lb 15 oz)  Body mass index is 24.31 kg/m².    Intake/Output Summary (Last 24 hours) at 5/31/2022 1951  Last data filed at 5/31/2022 1621  Gross per 24 hour   Intake 4465.78 ml   Output 900 ml   Net 3565.78 ml      Physical Exam  Vitals reviewed.   Constitutional:       General: She is not in acute distress.     Appearance: Normal appearance. She is not ill-appearing or toxic-appearing.   HENT:      Right Ear: External  "ear normal.      Left Ear: External ear normal.      Mouth/Throat:      Mouth: Mucous membranes are dry.   Eyes:      Extraocular Movements: Extraocular movements intact.   Cardiovascular:      Rate and Rhythm: Normal rate and regular rhythm.      Pulses: Normal pulses.      Heart sounds: Murmur heard.   Pulmonary:      Effort: Pulmonary effort is normal. No respiratory distress.      Breath sounds: Wheezing (scattered distant wheezing) present.      Comments: On continuous O2 via NC  Chest:      Chest wall: No tenderness.   Abdominal:      General: There is no distension.      Tenderness: There is no abdominal tenderness. There is no right CVA tenderness, left CVA tenderness or guarding.   Musculoskeletal:         General: No swelling or tenderness. Normal range of motion.      Cervical back: Normal range of motion. No rigidity or tenderness.      Right lower leg: No edema.      Left lower leg: No edema.   Lymphadenopathy:      Cervical: No cervical adenopathy.   Skin:     General: Skin is warm and dry.      Comments: Multiple healed laceration "cutting marks" over right forearm. Multiple facial bruising.    Neurological:      Mental Status: She is alert and oriented to person, place, and time.      Motor: Weakness present.      Gait: Gait normal.   Psychiatric:         Thought Content: Thought content normal.     Significant Labs: All pertinent labs within the past 24 hours have been reviewed.  BMP:   Recent Labs   Lab 05/31/22  0838         K 3.2*   *   CO2 27   BUN 10   CREATININE 1.0   CALCIUM 7.7*     CBC:   Recent Labs   Lab 05/30/22  0354 05/31/22  0838   WBC 12.30 18.27*   HGB 10.7* 10.1*   HCT 31.3* 30.0*    227     CMP:   Recent Labs   Lab 05/30/22  0354 05/31/22  0838    145   K 4.0 3.2*    113*   CO2 24 27   GLU 91 102   BUN 14 10   CREATININE 1.0 1.0   CALCIUM 7.4* 7.7*   PROT 5.2* 5.2*   ALBUMIN 2.7* 2.4*   BILITOT 1.0 1.2*   ALKPHOS 40* 56   * 30   ALT " 111* 72*   ANIONGAP 8 5*   EGFRNONAA >60.0 >60.0       Assessment/Plan:      * Bilateral pneumonia  Aspiration pneumonia vs CAP. Chest x-ray, elevated lactic acid, white count, afebrile currently, patient likely had aspiration pneumonia secondary to drug overdose, was found to be unresponsive oxygen sat in the 40s, was given Narcan, patient became more alert, continued to be hypoxic, corrected with non-rebreather, patient on continued BiPAP therapy currently, ABG reviewed, hypoxia and hypercapnia noted, expected improved with BiPAP therapy, started on IV antibiotic, patient a history of endocarditis with septic emboli, monitor, may need CT to evaluate if not improved  - completed zosyn, levaquin, vancomycin x 2days  - continue antibiotics for possible CAP and E.coli UTI with ceftriaxone and vancomycin        Elevated LFTs  Downtrending. HIV negative. HepC antibody positive. Follow up HepC RNA.      Hx of bacterial endocarditis  Continues with IVDU, TTE report from cardiology with no findings of large vegetation over tricuspid valve, but indicate patient with severe right heart failure.   - f/u blood cultures x2 NGTD  - f/u urine culture grew E.coli >100K await sensitivity study      Acute respiratory failure with hypoxia and hypercarbia  Patient with Hypercapnic and Hypoxic Respiratory failure which is Acute.  she is not on home oxygen. Supplemental oxygen was provided and noted- Oxygen Concentration (%):  [28] 28.   Signs/symptoms of respiratory failure include- respiratory distress and lethargy. Contributing diagnoses includes - Aspiration, Pneumonia and decreased respiration 2/2 drug overdose Labs and images were reviewed. Patient Has recent ABG, which has been reviewed. Will treat underlying causes and adjust management of respiratory failure as follows- oxygen delivery transitioned to nasal cannula from BiPAP, IV abx.    Overdose  Recurrent IVDU, previous hx of endocarditis, responded to narcan.  Late  afternoon, patient started to complain of diarrhea, likely multifactorial with current multiple IV antibiotics and withdrawal symptoms.   - symptomatic management PRN       IVDU (intravenous drug user)   on cessation once able, offer rehab options and therapy, patient agreeable to speak with psychiatry for treatment options. Per psychiatry, patient currently is not interested in rehab facility for detox.   - open discussion for patient to agree on voluntarily detox for cardiac repair in the future as there will be no surgeon who will be operating on active           VTE Risk Mitigation (From admission, onward)         Ordered     enoxaparin injection 40 mg  Daily         05/30/22 1526     IP VTE HIGH RISK PATIENT  Once         05/29/22 1540     Place sequential compression device  Until discontinued         05/29/22 1540     Place KATHIA hose  Until discontinued         05/29/22 1540                Discharge Planning   VIANNEY:      Code Status: Full Code   Is the patient medically ready for discharge?:     Reason for patient still in hospital (select all that apply): Patient trending condition, Treatment and Consult recommendations  Discharge Plan A: Home        Josue Mcdonald DO  Department of Hospital Medicine   [place] [facility], [place]

## 2022-06-01 NOTE — ASSESSMENT & PLAN NOTE
Aspiration pneumonia vs CAP. Chest x-ray, elevated lactic acid, white count, afebrile currently, patient likely had aspiration pneumonia secondary to drug overdose, was found to be unresponsive oxygen sat in the 40s, was given Narcan, patient became more alert, continued to be hypoxic, corrected with non-rebreather, patient on continued BiPAP therapy currently, ABG reviewed, hypoxia and hypercapnia noted, expected improved with BiPAP therapy, started on IV antibiotic, patient a history of endocarditis with septic emboli, monitor, may need CT to evaluate if not improved  - completed zosyn, levaquin, vancomycin x 2days  - continue antibiotics for possible CAP and E.coli UTI with ceftriaxone and vancomycin

## 2022-06-01 NOTE — PLAN OF CARE
DBP-90's-100's- Hyrdralizine IV X 1 dose given. Accuchecks dc'd. IVF decreased to 80 ml/hr. Non-productive cough noted. Robitussin and Tessalon Perles ordered. C/o abdominal cramping and diarrhea. Bentyl and Imodium given. Robaxin for muscle cramping. Afebrile. SOB noted with exertion. Oxygen weaned to 1 L NC

## 2022-06-02 PROBLEM — F19.939 DRUG WITHDRAWAL: Status: ACTIVE | Noted: 2022-05-31

## 2022-06-02 PROBLEM — E87.0 HYPERNATREMIA: Status: ACTIVE | Noted: 2022-06-02

## 2022-06-02 PROBLEM — L20.82 FLEXURAL ECZEMA: Status: ACTIVE | Noted: 2022-06-02

## 2022-06-02 LAB
ALBUMIN SERPL BCP-MCNC: 2.3 G/DL (ref 3.5–5.2)
ALP SERPL-CCNC: 72 U/L (ref 55–135)
ALT SERPL W/O P-5'-P-CCNC: 39 U/L (ref 10–44)
ANION GAP SERPL CALC-SCNC: 5 MMOL/L (ref 8–16)
AST SERPL-CCNC: 7 U/L (ref 10–40)
BASOPHILS # BLD AUTO: 0.1 K/UL (ref 0–0.2)
BASOPHILS NFR BLD: 0.8 % (ref 0–1.9)
BILIRUB SERPL-MCNC: 0.4 MG/DL (ref 0.1–1)
BUN SERPL-MCNC: 6 MG/DL (ref 6–20)
CALCIUM SERPL-MCNC: 7.7 MG/DL (ref 8.7–10.5)
CHLORIDE SERPL-SCNC: 117 MMOL/L (ref 95–110)
CO2 SERPL-SCNC: 25 MMOL/L (ref 23–29)
CREAT SERPL-MCNC: 1 MG/DL (ref 0.5–1.4)
DIFFERENTIAL METHOD: ABNORMAL
EOSINOPHIL # BLD AUTO: 0.3 K/UL (ref 0–0.5)
EOSINOPHIL NFR BLD: 2.5 % (ref 0–8)
ERYTHROCYTE [DISTWIDTH] IN BLOOD BY AUTOMATED COUNT: 15.4 % (ref 11.5–14.5)
EST. GFR  (AFRICAN AMERICAN): >60 ML/MIN/1.73 M^2
EST. GFR  (NON AFRICAN AMERICAN): >60 ML/MIN/1.73 M^2
GLUCOSE SERPL-MCNC: 95 MG/DL (ref 70–110)
HCT VFR BLD AUTO: 30.3 % (ref 37–48.5)
HCV RNA SERPL NAA+PROBE-ACNC: 72 IU/ML
HGB BLD-MCNC: 10 G/DL (ref 12–16)
IMM GRANULOCYTES # BLD AUTO: 0.15 K/UL (ref 0–0.04)
IMM GRANULOCYTES NFR BLD AUTO: 1.3 % (ref 0–0.5)
LYMPHOCYTES # BLD AUTO: 2 K/UL (ref 1–4.8)
LYMPHOCYTES NFR BLD: 16.5 % (ref 18–48)
MAGNESIUM SERPL-MCNC: 2 MG/DL (ref 1.6–2.6)
MCH RBC QN AUTO: 29.9 PG (ref 27–31)
MCHC RBC AUTO-ENTMCNC: 33 G/DL (ref 32–36)
MCV RBC AUTO: 90 FL (ref 82–98)
MONOCYTES # BLD AUTO: 0.6 K/UL (ref 0.3–1)
MONOCYTES NFR BLD: 4.7 % (ref 4–15)
NEUTROPHILS # BLD AUTO: 8.8 K/UL (ref 1.8–7.7)
NEUTROPHILS NFR BLD: 74.2 % (ref 38–73)
NRBC BLD-RTO: 1 /100 WBC
PLATELET # BLD AUTO: 316 K/UL (ref 150–450)
PMV BLD AUTO: 9.2 FL (ref 9.2–12.9)
POTASSIUM SERPL-SCNC: 3.9 MMOL/L (ref 3.5–5.1)
PROT SERPL-MCNC: 5.1 G/DL (ref 6–8.4)
RBC # BLD AUTO: 3.35 M/UL (ref 4–5.4)
SODIUM SERPL-SCNC: 147 MMOL/L (ref 136–145)
WBC # BLD AUTO: 11.81 K/UL (ref 3.9–12.7)

## 2022-06-02 PROCEDURE — 83735 ASSAY OF MAGNESIUM: CPT | Performed by: STUDENT IN AN ORGANIZED HEALTH CARE EDUCATION/TRAINING PROGRAM

## 2022-06-02 PROCEDURE — 25000003 PHARM REV CODE 250: Performed by: STUDENT IN AN ORGANIZED HEALTH CARE EDUCATION/TRAINING PROGRAM

## 2022-06-02 PROCEDURE — 99233 SBSQ HOSP IP/OBS HIGH 50: CPT | Mod: ,,, | Performed by: STUDENT IN AN ORGANIZED HEALTH CARE EDUCATION/TRAINING PROGRAM

## 2022-06-02 PROCEDURE — 25000003 PHARM REV CODE 250: Performed by: PSYCHIATRY & NEUROLOGY

## 2022-06-02 PROCEDURE — 94664 DEMO&/EVAL PT USE INHALER: CPT

## 2022-06-02 PROCEDURE — 36415 COLL VENOUS BLD VENIPUNCTURE: CPT | Performed by: STUDENT IN AN ORGANIZED HEALTH CARE EDUCATION/TRAINING PROGRAM

## 2022-06-02 PROCEDURE — 63600175 PHARM REV CODE 636 W HCPCS: Performed by: STUDENT IN AN ORGANIZED HEALTH CARE EDUCATION/TRAINING PROGRAM

## 2022-06-02 PROCEDURE — 27000221 HC OXYGEN, UP TO 24 HOURS

## 2022-06-02 PROCEDURE — 80053 COMPREHEN METABOLIC PANEL: CPT | Performed by: STUDENT IN AN ORGANIZED HEALTH CARE EDUCATION/TRAINING PROGRAM

## 2022-06-02 PROCEDURE — 20000000 HC ICU ROOM

## 2022-06-02 PROCEDURE — 94761 N-INVAS EAR/PLS OXIMETRY MLT: CPT

## 2022-06-02 PROCEDURE — 99900031 HC PATIENT EDUCATION (STAT)

## 2022-06-02 PROCEDURE — 99233 PR SUBSEQUENT HOSPITAL CARE,LEVL III: ICD-10-PCS | Mod: ,,, | Performed by: STUDENT IN AN ORGANIZED HEALTH CARE EDUCATION/TRAINING PROGRAM

## 2022-06-02 PROCEDURE — S5010 5% DEXTROSE AND 0.45% SALINE: HCPCS | Performed by: STUDENT IN AN ORGANIZED HEALTH CARE EDUCATION/TRAINING PROGRAM

## 2022-06-02 PROCEDURE — 99900035 HC TECH TIME PER 15 MIN (STAT)

## 2022-06-02 PROCEDURE — 85025 COMPLETE CBC W/AUTO DIFF WBC: CPT | Performed by: STUDENT IN AN ORGANIZED HEALTH CARE EDUCATION/TRAINING PROGRAM

## 2022-06-02 RX ORDER — DEXTROSE MONOHYDRATE AND SODIUM CHLORIDE 5; .45 G/100ML; G/100ML
INJECTION, SOLUTION INTRAVENOUS CONTINUOUS
Status: DISCONTINUED | OUTPATIENT
Start: 2022-06-02 | End: 2022-06-03

## 2022-06-02 RX ORDER — FAMOTIDINE 20 MG/1
20 TABLET, FILM COATED ORAL 2 TIMES DAILY
Status: DISCONTINUED | OUTPATIENT
Start: 2022-06-02 | End: 2022-06-05 | Stop reason: HOSPADM

## 2022-06-02 RX ORDER — LOSARTAN POTASSIUM 25 MG/1
25 TABLET ORAL DAILY
Status: DISCONTINUED | OUTPATIENT
Start: 2022-06-02 | End: 2022-06-04

## 2022-06-02 RX ORDER — ACETAMINOPHEN 325 MG/1
650 TABLET ORAL EVERY 8 HOURS PRN
Status: DISCONTINUED | OUTPATIENT
Start: 2022-06-02 | End: 2022-06-02

## 2022-06-02 RX ORDER — IBUPROFEN 400 MG/1
400 TABLET ORAL ONCE
Status: COMPLETED | OUTPATIENT
Start: 2022-06-02 | End: 2022-06-02

## 2022-06-02 RX ORDER — EMPAGLIFLOZIN 10 MG/1
10 TABLET, FILM COATED ORAL DAILY
Qty: 30 TABLET | Refills: 0 | Status: SHIPPED | OUTPATIENT
Start: 2022-06-02 | End: 2022-06-05 | Stop reason: HOSPADM

## 2022-06-02 RX ORDER — IBUPROFEN 400 MG/1
400 TABLET ORAL EVERY 6 HOURS PRN
Status: DISCONTINUED | OUTPATIENT
Start: 2022-06-02 | End: 2022-06-05 | Stop reason: HOSPADM

## 2022-06-02 RX ORDER — DIPHENHYDRAMINE HCL 25 MG
25 CAPSULE ORAL EVERY 6 HOURS PRN
Status: DISCONTINUED | OUTPATIENT
Start: 2022-06-02 | End: 2022-06-05 | Stop reason: HOSPADM

## 2022-06-02 RX ORDER — ACETAMINOPHEN 325 MG/1
650 TABLET ORAL EVERY 6 HOURS PRN
Status: DISCONTINUED | OUTPATIENT
Start: 2022-06-02 | End: 2022-06-05 | Stop reason: HOSPADM

## 2022-06-02 RX ADMIN — CEFTRIAXONE 1 G: 1 INJECTION, SOLUTION INTRAVENOUS at 11:06

## 2022-06-02 RX ADMIN — DICYCLOMINE HYDROCHLORIDE 10 MG: 10 CAPSULE ORAL at 09:06

## 2022-06-02 RX ADMIN — MUPIROCIN: 20 OINTMENT TOPICAL at 08:06

## 2022-06-02 RX ADMIN — GUAIFENESIN AND DEXTROMETHORPHAN 10 ML: 100; 10 SYRUP ORAL at 12:06

## 2022-06-02 RX ADMIN — ISOSORBIDE DINITRATE: 20 TABLET ORAL at 02:06

## 2022-06-02 RX ADMIN — HYDROCORTISONE: 0.01 CREAM TOPICAL at 08:06

## 2022-06-02 RX ADMIN — CEFTRIAXONE 1 G: 1 INJECTION, SOLUTION INTRAVENOUS at 12:06

## 2022-06-02 RX ADMIN — GUAIFENESIN AND DEXTROMETHORPHAN 10 ML: 100; 10 SYRUP ORAL at 05:06

## 2022-06-02 RX ADMIN — POTASSIUM CHLORIDE 30 MEQ: 1500 TABLET, EXTENDED RELEASE ORAL at 02:06

## 2022-06-02 RX ADMIN — BENZONATATE 100 MG: 100 CAPSULE ORAL at 08:06

## 2022-06-02 RX ADMIN — VANCOMYCIN HYDROCHLORIDE 1000 MG: 1 INJECTION, POWDER, LYOPHILIZED, FOR SOLUTION INTRAVENOUS at 07:06

## 2022-06-02 RX ADMIN — LOPERAMIDE HYDROCHLORIDE 2 MG: 2 CAPSULE ORAL at 09:06

## 2022-06-02 RX ADMIN — Medication 2 SPRAY: at 08:06

## 2022-06-02 RX ADMIN — POTASSIUM CHLORIDE 30 MEQ: 750 TABLET, FILM COATED, EXTENDED RELEASE ORAL at 08:06

## 2022-06-02 RX ADMIN — ENOXAPARIN SODIUM 40 MG: 40 INJECTION SUBCUTANEOUS at 05:06

## 2022-06-02 RX ADMIN — GUAIFENESIN AND DEXTROMETHORPHAN 10 ML: 100; 10 SYRUP ORAL at 06:06

## 2022-06-02 RX ADMIN — DEXTROSE AND SODIUM CHLORIDE: 5; .45 INJECTION, SOLUTION INTRAVENOUS at 08:06

## 2022-06-02 RX ADMIN — ACETAMINOPHEN 650 MG: 325 TABLET ORAL at 05:06

## 2022-06-02 RX ADMIN — FAMOTIDINE 20 MG: 20 TABLET ORAL at 08:06

## 2022-06-02 RX ADMIN — BENZONATATE 100 MG: 100 CAPSULE ORAL at 02:06

## 2022-06-02 RX ADMIN — LOSARTAN POTASSIUM 25 MG: 25 TABLET, FILM COATED ORAL at 06:06

## 2022-06-02 RX ADMIN — HYDROCORTISONE: 0.01 CREAM TOPICAL at 06:06

## 2022-06-02 RX ADMIN — ACETAMINOPHEN 650 MG: 325 TABLET ORAL at 08:06

## 2022-06-02 RX ADMIN — METHOCARBAMOL 500 MG: 500 TABLET ORAL at 11:06

## 2022-06-02 RX ADMIN — POTASSIUM CHLORIDE 30 MEQ: 1500 TABLET, EXTENDED RELEASE ORAL at 08:06

## 2022-06-02 RX ADMIN — LOPERAMIDE HYDROCHLORIDE 2 MG: 2 CAPSULE ORAL at 08:06

## 2022-06-02 RX ADMIN — IBUPROFEN 400 MG: 400 TABLET, FILM COATED ORAL at 05:06

## 2022-06-02 RX ADMIN — ISOSORBIDE DINITRATE: 20 TABLET ORAL at 08:06

## 2022-06-02 RX ADMIN — DICYCLOMINE HYDROCHLORIDE 10 MG: 10 CAPSULE ORAL at 08:06

## 2022-06-02 RX ADMIN — SODIUM CHLORIDE: 0.9 INJECTION, SOLUTION INTRAVENOUS at 12:06

## 2022-06-02 RX ADMIN — IBUPROFEN 400 MG: 400 TABLET, FILM COATED ORAL at 01:06

## 2022-06-02 RX ADMIN — GUAIFENESIN AND DEXTROMETHORPHAN 10 ML: 100; 10 SYRUP ORAL at 11:06

## 2022-06-02 RX ADMIN — MIRTAZAPINE 15 MG: 15 TABLET, FILM COATED ORAL at 08:06

## 2022-06-02 RX ADMIN — HYDROCORTISONE: 0.01 CREAM TOPICAL at 09:06

## 2022-06-02 NOTE — RESPIRATORY THERAPY
06/02/22 0822   Patient Assessment/Suction   Level of Consciousness (AVPU) alert   Respiratory Effort Normal;Unlabored   Expansion/Accessory Muscles/Retractions expansion symmetric;no retractions   All Lung Fields Breath Sounds diminished   Rhythm/Pattern, Respiratory pattern regular;unlabored   Cough Frequency infrequent   Cough Type nonproductive   PRE-TX-O2   O2 Device (Oxygen Therapy) (S)  room air   $ Is the patient on Low Flow Oxygen? Yes  (Found pt on room air at this time with 1lpm nasal cannula running at bedside. No distress noted on room air, turning off O2. Stephanie ATKINS notified)   SpO2 96 %   Pulse Oximetry Type Continuous   $ Pulse Oximetry - Multiple Charge Pulse Oximetry - Multiple   Pulse 77   Resp 18   Positioning HOB elevated 30 degrees   Vibratory PEP Therapy   $ Vibratory PEP Charges Aerobika Therapy   $ Vibratory PEP Tech Time Charges 15 min   Daily Review of Necessity (PEP Therapy) completed   Type (PEP Therapy) vibratory/oscillatory   Device (PEP Therapy) flutter   Route (PEP Therapy) mouthpiece;proper technique demonstrated   Breaths per Cycle (PEP Therapy) 10   Cycles (PEP Therapy) 1   Settings (PEP Therapy) PEP 3   Patient Position (PEP Therapy) HOB elevated   Post Treatment Assessment (PEP) breath sounds unchanged   Signs of Intolerance (PEP Therapy) none

## 2022-06-02 NOTE — ASSESSMENT & PLAN NOTE
Aspiration pneumonia vs CAP. Chest x-ray, elevated lactic acid, white count, afebrile currently, patient likely had aspiration pneumonia secondary to drug overdose, was found to be unresponsive oxygen sat in the 40s, was given Narcan, patient became more alert, continued to be hypoxic, corrected with non-rebreather, patient on continued BiPAP therapy currently, ABG reviewed, hypoxia and hypercapnia noted, expected improved with BiPAP therapy, started on IV antibiotic, patient a history of endocarditis with septic emboli, monitor, may need CT to evaluate if not improved.  - completed zosyn, levaquin, vancomycin x 2days  - 6/2 leukocytosis resolved, afebrile  - vanc dose adjusted per pharmacy  - continue antibiotics for CAP and E.coli UTI with ceftriaxone and vancomycin(D4)

## 2022-06-02 NOTE — PROGRESS NOTES
Pharmacist Intervention IV to PO Note    Марина Britton is a 32 y.o. female being treated with IV medication famotidine    Patient Data:    Vital Signs (Most Recent):  Temp: 98.2 °F (36.8 °C) (06/02/22 0703)  Pulse: 77 (06/02/22 0700)  Resp: (!) 29 (06/02/22 0700)  BP: (!) 153/97 (06/02/22 0700)  SpO2: 98 % (06/02/22 0700)   Vital Signs (72h Range):  Temp:  [97.3 °F (36.3 °C)-100.6 °F (38.1 °C)]   Pulse:  []   Resp:  [18-45]   BP: (104-159)/()   SpO2:  [91 %-100 %]      CBC:  Recent Labs   Lab 05/31/22  0838 06/01/22  0410 06/02/22  0422   WBC 18.27* 18.16* 11.81   RBC 3.34* 3.43* 3.35*   HGB 10.1* 10.3* 10.0*   HCT 30.0* 30.8* 30.3*    264 316   MCV 90 90 90   MCH 30.2 30.0 29.9   MCHC 33.7 33.4 33.0     CMP:     Recent Labs   Lab 05/31/22  0838 06/01/22  0410 06/02/22  0422    91 95   CALCIUM 7.7* 7.7* 7.7*   ALBUMIN 2.4* 2.3* 2.3*   PROT 5.2* 5.2* 5.1*    147* 147*   K 3.2* 3.5 3.9   CO2 27 25 25   * 117* 117*   BUN 10 7 6   CREATININE 1.0 1.0 1.0   ALKPHOS 56 179* 72   ALT 72* 54* 39   AST 30 13 7*   BILITOT 1.2* 0.5 0.4       Dietary Orders:  Diet Orders  Report           Regular (IDDSI 7): Regular IDDSI 7 starting at 05/31 0843    Dietary nutrition supplements [facility]; ENSURE starting at 05/30 1800            Based on the following criteria, this patient qualifies for intravenous to oral conversion:  [x] The patients gastrointestinal tract is functioning (tolerating medications via oral or enteral route for 24 hours and tolerating food or enteral feeds for 24 hours).  [x] The patient is hemodynamically stable for 24 hours (heart rate <100 beats per minute, systolic blood pressure >99 mm Hg, and respiratory rate <20 breaths per minute).  [x] The patient shows clinical improvement (afebrile for at least 24 hours and white blood cell count downtrending or normalized). Additionally, the patient must be non-neutropenic (absolute neutrophil count >500  cells/mm3).  [x] For antimicrobials, the patient has received IV therapy for at least 24 hours.    IV medication famotidine will be changed to oral medication famotidine    Pharmacist's Name: Beverley Weeks  Pharmacist's Extension: [phone]

## 2022-06-02 NOTE — PROGRESS NOTES
Pharmacist Renal Dose Adjustment Note    Марина Britton is a 32 y.o. female being treated with the medication famotidine    Patient Data:    Vital Signs (Most Recent):  Temp: 98.2 °F (36.8 °C) (06/02/22 0703)  Pulse: 77 (06/02/22 0700)  Resp: (!) 29 (06/02/22 0700)  BP: (!) 153/97 (06/02/22 0700)  SpO2: 98 % (06/02/22 0700)   Vital Signs (72h Range):  Temp:  [97.3 °F (36.3 °C)-100.6 °F (38.1 °C)]   Pulse:  []   Resp:  [18-45]   BP: (104-159)/()   SpO2:  [91 %-100 %]      Recent Labs   Lab 05/31/22  0838 06/01/22  0410 06/02/22  0422   CREATININE 1.0 1.0 1.0     Serum creatinine: 1 mg/dL 06/02/22 0422  Estimated creatinine clearance: 68.5 mL/min    Medication:famotidine dose: 20mg frequency daily will be changed to medication:famotidine dose:20mg frequency:twice daily    Pharmacist's Name: Beverley Weeks  Pharmacist's Extension: [phone]

## 2022-06-02 NOTE — EICU
Rounding (Video Assessment):  Yes        Comments: video rounding complete, appears asleep, no critical care drips at this time, 1L NC on/in place NAD, VSS

## 2022-06-02 NOTE — EICU
Rounding (Video Assessment):  Yes      Comments: Video rounding completed. Sats 100% w/ 1 L NC in place. VSS. Awake and alert. No distress noted.

## 2022-06-02 NOTE — ASSESSMENT & PLAN NOTE
Continues with IVDU, TTE report from cardiology with no findings of large vegetation over tricuspid valve, but indicate patient with severe right heart failure.   - f/u blood cultures x3 NGTD  - f/u urine culture grew E.coli >100K await sensitivity study

## 2022-06-02 NOTE — ASSESSMENT & PLAN NOTE
Recurrent IVDU, previous hx of endocarditis, responded to narcan.  D3 since start of mild withdrawal symptoms of diarrhea, sniffling, abdominal cramps.   - symptomatic management PRN

## 2022-06-02 NOTE — PROGRESS NOTES
Pharmacokinetic Assessment Follow Up: IV Vancomycin    Vancomycin serum concentration assessment(s):    The trough level was drawn correctly and can be used to guide therapy at this time. The measurement is below the desired definitive target range of 15 to 20 mcg/mL.    Vancomycin Regimen Plan:    Change regimen to Vancomycin 1000 mg IV every 12 hours with next serum trough concentration measured at 0600 prior to 4th dose on 6/3/22    Drug levels (last 3 results):  Recent Labs   Lab Result Units 05/30/22  1641 06/01/22  1800   Vancomycin, Random ug/mL 3.5  --    Vancomycin-Trough ug/mL  --  9.1*       Pharmacy will continue to follow and monitor vancomycin.    Please contact pharmacy at extension [phone] for questions regarding this assessment.    Thank you for the consult,   Beverley Weeks       Patient brief summary:  Марина Britton is a 32 y.o. female initiated on antimicrobial therapy with IV Vancomycin for treatment of lower respiratory infection    The patient's current regimen is Vancomycin 1780 mg q24    Drug Allergies:   Review of patient's allergies indicates:   Allergen Reactions    Soap Rash     [facility] - Hospital supplied - cleanser shampoo & body wash gel  Ingredients - purified water, sodium laureth sulfate, sodium chloride, cocamide william, cocamidopropylamine oxide, fragrance, aloe barbadensis leaf juice, propylene alcohol, peg-150 distearate, diazolidinyl urea, ciric acid, methylparaben, & propulparaben       Actual Body Weight:   80.3 kg    Renal Function:   Estimated Creatinine Clearance: 68.5 mL/min (based on SCr of 1 mg/dL).,     Dialysis Method (if applicable):  N/A    CBC (last 72 hours):  Recent Labs   Lab Result Units 05/30/22  0354 05/31/22  0838 06/01/22  0410   WBC K/uL 12.30 18.27* 18.16*   Hemoglobin g/dL 10.7* 10.1* 10.3*   Hematocrit % 31.3* 30.0* 30.8*   Platelets K/uL 217 227 264   Gran % % 59.0 74.0* 80.5*   Lymph % % 4.0* 8.0* 11.8*   Mono % % 4.0 2.0* 3.6*    Eosinophil % % 0.0 1.0 1.3   Basophil % % 0.0 0.0 0.4   Differential Method  Manual Manual Automated       Metabolic Panel (last 72 hours):  Recent Labs   Lab Result Units 05/30/22  0354 05/31/22  0838 06/01/22  0410   Sodium mmol/L 141 145 147*   Potassium mmol/L 4.0 3.2* 3.5   Chloride mmol/L 109 113* 117*   CO2 mmol/L 24 27 25   Glucose mg/dL 91 102 91   BUN mg/dL 14 10 7   Creatinine mg/dL 1.0 1.0 1.0   Albumin g/dL 2.7* 2.4* 2.3*   Total Bilirubin mg/dL 1.0 1.2* 0.5   Alkaline Phosphatase U/L 40* 56 179*   AST U/L 113* 30 13   ALT U/L 111* 72* 54*       Vancomycin Administrations:  vancomycin given in the last 96 hours                     vancomycin (VANCOCIN) 1,750 mg in dextrose 5 % 500 mL IVPB (mg) 1,750 mg New Bag 05/31/22 1801     1,750 mg New Bag 05/30/22 1910    vancomycin 1.25 g in dextrose 5% 250 mL IVPB (ready to mix) (mg) 1,250 mg New Bag 05/29/22 1705                    Microbiologic Results:  Microbiology Results (last 7 days)       Procedure Component Value Units Date/Time    Urine culture [[id number]]  (Abnormal)  (Susceptibility) Collected: 05/29/22 1230    Order Status: Completed Specimen: Urine Updated: 06/01/22 0356     Urine Culture, Routine ESCHERICHIA COLI  > 100,000 cfu/ml      Narrative:      Preferred Collection Type->Urine, Clean Catch  Specimen Source->Urine    Blood culture x two cultures. Draw prior to antibiotics. [[id number]] Collected: 05/29/22 1200    Order Status: Completed Specimen: Blood from Antecubital, Left Updated: 05/31/22 2212     Blood Culture, Routine No Growth to date      No Growth to date      No Growth to date    Narrative:      Aerobic and anaerobic    Blood culture x two cultures. Draw prior to antibiotics. [[id number]] Collected: 05/29/22 1526    Order Status: Completed Specimen: Blood Updated: 05/31/22 2212     Blood Culture, Routine No Growth to date      No Growth to date      No Growth to date    Narrative:      Aerobic and anaerobic

## 2022-06-02 NOTE — SUBJECTIVE & OBJECTIVE
"Interval History: No acute events overnight. Patient remains tachypneic, but resting comfortably with minimal chest pain complaint from day prior. VSS, afebrile. Still having diarrhea and abdominal cramping.    Review of Systems   Constitutional:  Positive for appetite change (diminished) and fatigue. Negative for fever and unexpected weight change.        "Have no energy"   HENT:  Negative for congestion, hearing loss, sore throat, tinnitus and trouble swallowing.    Eyes:  Negative for pain.   Respiratory:  Positive for cough (intermittent, no productive). Negative for choking, chest tightness, shortness of breath, wheezing and stridor.    Cardiovascular:  Negative for chest pain, palpitations and leg swelling.   Gastrointestinal:  Positive for diarrhea (abdominal cramps). Negative for abdominal pain, blood in stool, nausea and vomiting.   Genitourinary:  Negative for difficulty urinating and dysuria.   Musculoskeletal:  Negative for back pain, joint swelling and neck pain.   Skin:  Positive for rash (left inner calf itchiness).   Neurological:  Positive for weakness. Negative for dizziness, tremors, seizures and headaches.   Psychiatric/Behavioral:  Negative for agitation, behavioral problems, confusion, dysphoric mood, hallucinations, self-injury, sleep disturbance and suicidal ideas. The patient is not nervous/anxious and is not hyperactive.    Objective:     Vital Signs (Most Recent):  Temp: 98 °F (36.7 °C) (06/02/22 0400)  Pulse: 82 (06/02/22 0600)  Resp: (!) 33 (06/02/22 0600)  BP: (!) 140/91 (06/02/22 0600)  SpO2: 99 % (06/02/22 0600)   Vital Signs (24h Range):  Temp:  [97.6 °F (36.4 °C)-98 °F (36.7 °C)] 98 °F (36.7 °C)  Pulse:  [53-99] 82  Resp:  [18-45] 33  SpO2:  [97 %-100 %] 99 %  BP: (118-159)/() 140/91     Weight: 60.3 kg (132 lb 15 oz)  Body mass index is 24.31 kg/m².    Intake/Output Summary (Last 24 hours) at 6/2/2022 0696  Last data filed at 6/2/2022 0539  Gross per 24 hour   Intake 3115 ml "   Output --   Net 3115 ml      Physical Exam    Significant Labs: All pertinent labs within the past 24 hours have been reviewed.  BMP:   Recent Labs   Lab 06/02/22 0422   GLU 95   *   K 3.9   *   CO2 25   BUN 6   CREATININE 1.0   CALCIUM 7.7*   MG 2.0     CBC:   Recent Labs   Lab 05/31/22  0838 06/01/22  0410 06/02/22 0422   WBC 18.27* 18.16* 11.81   HGB 10.1* 10.3* 10.0*   HCT 30.0* 30.8* 30.3*    264 316     CMP:   Recent Labs   Lab 05/31/22  0838 06/01/22  0410 06/02/22 0422    147* 147*   K 3.2* 3.5 3.9   * 117* 117*   CO2 27 25 25    91 95   BUN 10 7 6   CREATININE 1.0 1.0 1.0   CALCIUM 7.7* 7.7* 7.7*   PROT 5.2* 5.2* 5.1*   ALBUMIN 2.4* 2.3* 2.3*   BILITOT 1.2* 0.5 0.4   ALKPHOS 56 179* 72   AST 30 13 7*   ALT 72* 54* 39   ANIONGAP 5* 5* 5*   EGFRNONAA >60.0 >60.0 >60.0     X-Ray Chest AP Portable  Narrative: EXAMINATION:  XR CHEST AP PORTABLE    CLINICAL HISTORY:  Pneumonitis due to inhalation of food and vomit    COMPARISON:  05/31/2022    FINDINGS:  similar to improved bilateral infiltrates. Enlarged cardiac silhouette, similar. No new finding/detrimental interval change  Impression: Some improved aeration of both lungs since yesterday's exam    No detrimental interval changes    Electronically signed by: Rubi Douglas MD  Date:    06/01/2022  Time:    08:43   Significant Imaging: I have reviewed all pertinent imaging results/findings within the past 24 hours.

## 2022-06-02 NOTE — PLAN OF CARE
"Patient"s pulmonary status is slightly improved upon auscultation. She has been coughing more today, medicated, which seems to be working. She is now on one liter of oxygen, O2 sat has been  %. Resp. Rate has been in the 30's even while asleep. Patient is becoming more agitated, possibly withdraws, she has had several diarrhea stools today but not tonight. She is constantly sniffing.    "

## 2022-06-02 NOTE — PLAN OF CARE
C/o Headache. Tylenol and Ibprofen given. Adjsuted B/P meds. Losartan given. IVF changed. Tolerating RA. IVF changed to D 5 1/2 NS @ 75 ml/hr. Awaiting bed on med surg floor. Mom and Boyfriend visited. CVC Dressing changed.

## 2022-06-02 NOTE — PROGRESS NOTES
"[facility] Medicine  Progress Note    Patient Name: Марина Britton  MRN: [id number]  Patient Class: IP- Inpatient   Admission Date: 5/29/2022  Length of Stay: 4 days  Attending Physician: Josue Mcdonald DO  Primary Care Provider: Primary Doctor No    Subjective:     Principal Problem:Bilateral pneumonia  HPI:  32-year-old female with a history of chronic substance abuse, IV heroin abuse, history of endocarditis and septic arthritis from IV drug abuse, presents to the emergency department after an accidental overdose which EMS believes to be heroin. She was found unresponsive at hotel room, given Narcan, now awake, but became hypoxic. Upon arrival to the emergency department her oxygen saturation is 42% on room air, trends up with a non-rebreather.  Patient denies suicidal ideations.  Patient with repetitive behavior, had had multiple overdoses in the past, last year hospitalized or endocarditis, was supposed to have valve replacement however had not completed, not compliant with medication    Overview/Hospital Course:  5/30: No acute events overnight. Patient slept through. She is started on IV antibiotics for bilateral multifocal pneumonia. Patient slept well on BiPAP for CO2 retention. No complaints of diarrhea, vomiting or nausea, restlessness. Does complain of mild abdominal pain, joint aches. Patient recalls where she was last found in [facility] where she was alone "snorting heroin." She agrees to meet with psychiatry to help manage her addiction and withdrawal symptoms for when she develops them.   Consult cardiology for tricuspid leaflet vegetation, which patient has not had follow up. Continue with current broad spectrum antibiotics.    5/31: Per cardiology, HARI showing severe right sided heart failure, no vegetations found over tricuspid valve. Blood cultures NGTD x2 days, urine culture + for gram negative >100k, stable bilateral infiltrate on CXR, zosyn discontinued and will " "continue ceftriaxone and vancomycin.   6/1: Psychiatry return on Friday with further discussion of detox. Patient states she gets "clean" in the hospital and can never continue once she leaves the facility. Exhibiting more signs of withdrawal with abdominal cramping, diarrhea. Mild elevation in BP likely contributed by withdrawal and chest pain associated with breathing, which becomes more prominent on deep inhalation. Continue D4 vancomycin and ceftriaxone.    6/2: Leukocytosis resolved, intermittent coughing, will continue with pulm hygiene care per respiratory. Adjust maintenance IVF to D51/2NS. Continue ceftriaxone and vancomycin. Withdrawal symptoms controlled at this time. Patient states feeling better from day before. Encourage out of bed into chair activity. Will downgrade from ICU care to step down.       Interval History: No acute events overnight. Patient remains tachypneic, but resting comfortably with minimal chest pain complaint from day prior. VSS, afebrile. Still having diarrhea and abdominal cramping.    Review of Systems   Constitutional:  Positive for appetite change (diminished) and fatigue. Negative for fever and unexpected weight change.        "Have no energy"   HENT:  Negative for congestion, hearing loss, sore throat, tinnitus and trouble swallowing.    Eyes:  Negative for pain.   Respiratory:  Positive for cough (intermittent, no productive). Negative for choking, chest tightness, shortness of breath, wheezing and stridor.    Cardiovascular:  Negative for chest pain, palpitations and leg swelling.   Gastrointestinal:  Positive for diarrhea (abdominal cramps). Negative for abdominal pain, blood in stool, nausea and vomiting.   Genitourinary:  Negative for difficulty urinating and dysuria.   Musculoskeletal:  Negative for back pain, joint swelling and neck pain.   Skin:  Positive for rash (left inner calf itchiness).   Neurological:  Positive for weakness. Negative for dizziness, tremors, " seizures and headaches.   Psychiatric/Behavioral:  Negative for agitation, behavioral problems, confusion, dysphoric mood, hallucinations, self-injury, sleep disturbance and suicidal ideas. The patient is not nervous/anxious and is not hyperactive.    Objective:     Vital Signs (Most Recent):  Temp: 98 °F (36.7 °C) (06/02/22 0400)  Pulse: 82 (06/02/22 0600)  Resp: (!) 33 (06/02/22 0600)  BP: (!) 140/91 (06/02/22 0600)  SpO2: 99 % (06/02/22 0600)   Vital Signs (24h Range):  Temp:  [97.6 °F (36.4 °C)-98 °F (36.7 °C)] 98 °F (36.7 °C)  Pulse:  [53-99] 82  Resp:  [18-45] 33  SpO2:  [97 %-100 %] 99 %  BP: (118-159)/() 140/91     Weight: 60.3 kg (132 lb 15 oz)  Body mass index is 24.31 kg/m².    Intake/Output Summary (Last 24 hours) at 6/2/2022 0656  Last data filed at 6/2/2022 0539  Gross per 24 hour   Intake 3115 ml   Output --   Net 3115 ml      Physical Exam    Significant Labs: All pertinent labs within the past 24 hours have been reviewed.  BMP:   Recent Labs   Lab 06/02/22 0422   GLU 95   *   K 3.9   *   CO2 25   BUN 6   CREATININE 1.0   CALCIUM 7.7*   MG 2.0     CBC:   Recent Labs   Lab 05/31/22  0838 06/01/22  0410 06/02/22 0422   WBC 18.27* 18.16* 11.81   HGB 10.1* 10.3* 10.0*   HCT 30.0* 30.8* 30.3*    264 316     CMP:   Recent Labs   Lab 05/31/22  0838 06/01/22 0410 06/02/22 0422    147* 147*   K 3.2* 3.5 3.9   * 117* 117*   CO2 27 25 25    91 95   BUN 10 7 6   CREATININE 1.0 1.0 1.0   CALCIUM 7.7* 7.7* 7.7*   PROT 5.2* 5.2* 5.1*   ALBUMIN 2.4* 2.3* 2.3*   BILITOT 1.2* 0.5 0.4   ALKPHOS 56 179* 72   AST 30 13 7*   ALT 72* 54* 39   ANIONGAP 5* 5* 5*   EGFRNONAA >60.0 >60.0 >60.0     X-Ray Chest AP Portable  Narrative: EXAMINATION:  XR CHEST AP PORTABLE    CLINICAL HISTORY:  Pneumonitis due to inhalation of food and vomit    COMPARISON:  05/31/2022    FINDINGS:  similar to improved bilateral infiltrates. Enlarged cardiac silhouette, similar. No new  finding/detrimental interval change  Impression: Some improved aeration of both lungs since yesterday's exam    No detrimental interval changes    Electronically signed by: Rubi Douglas MD  Date:    06/01/2022  Time:    08:43   Significant Imaging: I have reviewed all pertinent imaging results/findings within the past 24 hours.      Assessment/Plan:      * Bilateral pneumonia  Aspiration pneumonia vs CAP. Chest x-ray, elevated lactic acid, white count, afebrile currently, patient likely had aspiration pneumonia secondary to drug overdose, was found to be unresponsive oxygen sat in the 40s, was given Narcan, patient became more alert, continued to be hypoxic, corrected with non-rebreather, patient on continued BiPAP therapy currently, ABG reviewed, hypoxia and hypercapnia noted, expected improved with BiPAP therapy, started on IV antibiotic, patient a history of endocarditis with septic emboli, monitor, may need CT to evaluate if not improved.  - completed zosyn, levaquin, vancomycin x 2days  - 6/2 leukocytosis resolved, afebrile  - vanc dose adjusted per pharmacy  - continue antibiotics for CAP and E.coli UTI with ceftriaxone and vancomycin(D4)         Hypernatremia  Switch IVF maintenance to D5 1/2NS rate 75/hr.      Flexural eczema  Quarter size dry scaly skin patch, pruritic, will add benadryl and topical steroids.     Drug withdrawal  See above IVDU, continue with supportive symptom management.       Elevated LFTs  Downtrending. HIV negative. Hep C antibody positive. Hep C RNA in process.       Hx of bacterial endocarditis  Continues with IVDU, TTE report from cardiology with no findings of large vegetation over tricuspid valve, but indicate patient with severe right heart failure.   - f/u blood cultures x3 NGTD  - f/u urine culture grew E.coli >100K await sensitivity study      Acute respiratory failure with hypoxia and hypercarbia  Patient with Hypercapnic and Hypoxic Respiratory failure which is Acute.   she is not on home oxygen. Supplemental oxygen was provided and noted- Oxygen Concentration (%):  [24-28] 24.   Signs/symptoms of respiratory failure include- respiratory distress and lethargy. Contributing diagnoses includes - Aspiration, Pneumonia and decreased respiration 2/2 drug overdose Labs and images were reviewed. Patient Has recent ABG, which has been reviewed. Will treat underlying causes and adjust management of respiratory failure as follows- oxygen delivery transitioned to nasal cannula from BiPAP, IV abx.  6/2: improving, now on 1L pulse oximetry readings %, has been tachypneic >35, partly contributed by withdrawal symptoms, chest pain complaint minimal today     Overdose  Recurrent IVDU, previous hx of endocarditis, responded to narcan.  D3 since start of mild withdrawal symptoms of diarrhea, sniffling, abdominal cramps.   - symptomatic management PRN       IVDU (intravenous drug user)   on cessation once able, offer rehab options and therapy, patient agreeable to speak with psychiatry for treatment options. Per psychiatry, patient currently is not interested in rehab facility for detox.   - open discussion for patient to agree on voluntarily detox for cardiac repair in the future as there will be no surgeon who will be operating on active             VTE Risk Mitigation (From admission, onward)         Ordered     enoxaparin injection 40 mg  Daily         05/30/22 1526     IP VTE HIGH RISK PATIENT  Once         05/29/22 1540     Place sequential compression device  Until discontinued         05/29/22 1540     Place KATHIA hose  Until discontinued         05/29/22 1540                Discharge Planning   VIANNEY:      Code Status: Full Code   Is the patient medically ready for discharge?:     Reason for patient still in hospital (select all that apply): Patient trending condition, Treatment, Consult recommendations and Pending disposition  Discharge Plan A: Home        Josue Mcdonald,    Department of Hospital Medicine   [place] - Intensive Care

## 2022-06-02 NOTE — ASSESSMENT & PLAN NOTE
Patient with Hypercapnic and Hypoxic Respiratory failure which is Acute.  she is not on home oxygen. Supplemental oxygen was provided and noted- Oxygen Concentration (%):  [24-28] 24.   Signs/symptoms of respiratory failure include- respiratory distress and lethargy. Contributing diagnoses includes - Aspiration, Pneumonia and decreased respiration 2/2 drug overdose Labs and images were reviewed. Patient Has recent ABG, which has been reviewed. Will treat underlying causes and adjust management of respiratory failure as follows- oxygen delivery transitioned to nasal cannula from BiPAP, IV abx.  6/2: improving, now on 1L pulse oximetry readings %, has been tachypneic >35, partly contributed by withdrawal symptoms, chest pain complaint minimal today

## 2022-06-03 LAB
ANION GAP SERPL CALC-SCNC: 5 MMOL/L (ref 8–16)
BACTERIA BLD CULT: NORMAL
BACTERIA BLD CULT: NORMAL
BUN SERPL-MCNC: 6 MG/DL (ref 6–20)
CALCIUM SERPL-MCNC: 7.2 MG/DL (ref 8.7–10.5)
CHLORIDE SERPL-SCNC: 114 MMOL/L (ref 95–110)
CO2 SERPL-SCNC: 25 MMOL/L (ref 23–29)
CREAT SERPL-MCNC: 1 MG/DL (ref 0.5–1.4)
ERYTHROCYTE [DISTWIDTH] IN BLOOD BY AUTOMATED COUNT: 15.3 % (ref 11.5–14.5)
EST. GFR  (AFRICAN AMERICAN): >60 ML/MIN/1.73 M^2
EST. GFR  (NON AFRICAN AMERICAN): >60 ML/MIN/1.73 M^2
GLUCOSE SERPL-MCNC: 109 MG/DL (ref 70–110)
HCT VFR BLD AUTO: 29.5 % (ref 37–48.5)
HGB BLD-MCNC: 9.7 G/DL (ref 12–16)
MCH RBC QN AUTO: 29.9 PG (ref 27–31)
MCHC RBC AUTO-ENTMCNC: 32.9 G/DL (ref 32–36)
MCV RBC AUTO: 91 FL (ref 82–98)
PLATELET # BLD AUTO: 268 K/UL (ref 150–450)
PMV BLD AUTO: 9.6 FL (ref 9.2–12.9)
POTASSIUM SERPL-SCNC: 3.9 MMOL/L (ref 3.5–5.1)
RBC # BLD AUTO: 3.24 M/UL (ref 4–5.4)
SODIUM SERPL-SCNC: 144 MMOL/L (ref 136–145)
VANCOMYCIN TROUGH SERPL-MCNC: 17.6 UG/ML (ref 10–22)
WBC # BLD AUTO: 7.73 K/UL (ref 3.9–12.7)

## 2022-06-03 PROCEDURE — 90833 PR PSYCHOTHERAPY W/PATIENT W/E&M, 30 MIN (ADD ON): ICD-10-PCS | Mod: ,,, | Performed by: PSYCHIATRY & NEUROLOGY

## 2022-06-03 PROCEDURE — 99233 SBSQ HOSP IP/OBS HIGH 50: CPT | Mod: ,,, | Performed by: STUDENT IN AN ORGANIZED HEALTH CARE EDUCATION/TRAINING PROGRAM

## 2022-06-03 PROCEDURE — 25000003 PHARM REV CODE 250: Performed by: STUDENT IN AN ORGANIZED HEALTH CARE EDUCATION/TRAINING PROGRAM

## 2022-06-03 PROCEDURE — 94761 N-INVAS EAR/PLS OXIMETRY MLT: CPT

## 2022-06-03 PROCEDURE — S5010 5% DEXTROSE AND 0.45% SALINE: HCPCS | Performed by: STUDENT IN AN ORGANIZED HEALTH CARE EDUCATION/TRAINING PROGRAM

## 2022-06-03 PROCEDURE — 63600175 PHARM REV CODE 636 W HCPCS: Performed by: STUDENT IN AN ORGANIZED HEALTH CARE EDUCATION/TRAINING PROGRAM

## 2022-06-03 PROCEDURE — 99900035 HC TECH TIME PER 15 MIN (STAT)

## 2022-06-03 PROCEDURE — 99233 PR SUBSEQUENT HOSPITAL CARE,LEVL III: ICD-10-PCS | Mod: ,,, | Performed by: PSYCHIATRY & NEUROLOGY

## 2022-06-03 PROCEDURE — 27000646 HC AEROBIKA DEVICE

## 2022-06-03 PROCEDURE — 36415 COLL VENOUS BLD VENIPUNCTURE: CPT | Performed by: STUDENT IN AN ORGANIZED HEALTH CARE EDUCATION/TRAINING PROGRAM

## 2022-06-03 PROCEDURE — 80202 ASSAY OF VANCOMYCIN: CPT | Performed by: STUDENT IN AN ORGANIZED HEALTH CARE EDUCATION/TRAINING PROGRAM

## 2022-06-03 PROCEDURE — 80048 BASIC METABOLIC PNL TOTAL CA: CPT | Performed by: STUDENT IN AN ORGANIZED HEALTH CARE EDUCATION/TRAINING PROGRAM

## 2022-06-03 PROCEDURE — 99233 SBSQ HOSP IP/OBS HIGH 50: CPT | Mod: ,,, | Performed by: PSYCHIATRY & NEUROLOGY

## 2022-06-03 PROCEDURE — 20000000 HC ICU ROOM

## 2022-06-03 PROCEDURE — 85027 COMPLETE CBC AUTOMATED: CPT | Performed by: STUDENT IN AN ORGANIZED HEALTH CARE EDUCATION/TRAINING PROGRAM

## 2022-06-03 PROCEDURE — 94664 DEMO&/EVAL PT USE INHALER: CPT

## 2022-06-03 PROCEDURE — 25000003 PHARM REV CODE 250: Performed by: PSYCHIATRY & NEUROLOGY

## 2022-06-03 PROCEDURE — 99900031 HC PATIENT EDUCATION (STAT)

## 2022-06-03 PROCEDURE — 90833 PSYTX W PT W E/M 30 MIN: CPT | Mod: ,,, | Performed by: PSYCHIATRY & NEUROLOGY

## 2022-06-03 PROCEDURE — 99233 PR SUBSEQUENT HOSPITAL CARE,LEVL III: ICD-10-PCS | Mod: ,,, | Performed by: STUDENT IN AN ORGANIZED HEALTH CARE EDUCATION/TRAINING PROGRAM

## 2022-06-03 RX ORDER — MIRTAZAPINE 15 MG/1
30 TABLET, FILM COATED ORAL NIGHTLY
Status: DISCONTINUED | OUTPATIENT
Start: 2022-06-04 | End: 2022-06-05 | Stop reason: HOSPADM

## 2022-06-03 RX ORDER — AMLODIPINE BESYLATE 5 MG/1
5 TABLET ORAL DAILY
Status: DISCONTINUED | OUTPATIENT
Start: 2022-06-03 | End: 2022-06-05 | Stop reason: HOSPADM

## 2022-06-03 RX ORDER — HYDRALAZINE HYDROCHLORIDE 20 MG/ML
10 INJECTION INTRAMUSCULAR; INTRAVENOUS EVERY 6 HOURS PRN
Status: DISCONTINUED | OUTPATIENT
Start: 2022-06-03 | End: 2022-06-04

## 2022-06-03 RX ADMIN — HYDROCORTISONE: 0.01 CREAM TOPICAL at 08:06

## 2022-06-03 RX ADMIN — MUPIROCIN: 20 OINTMENT TOPICAL at 08:06

## 2022-06-03 RX ADMIN — FAMOTIDINE 20 MG: 20 TABLET ORAL at 08:06

## 2022-06-03 RX ADMIN — VANCOMYCIN HYDROCHLORIDE 1000 MG: 1 INJECTION, POWDER, LYOPHILIZED, FOR SOLUTION INTRAVENOUS at 07:06

## 2022-06-03 RX ADMIN — POTASSIUM CHLORIDE 30 MEQ: 1500 TABLET, EXTENDED RELEASE ORAL at 08:06

## 2022-06-03 RX ADMIN — MIRTAZAPINE 15 MG: 15 TABLET, FILM COATED ORAL at 08:06

## 2022-06-03 RX ADMIN — POTASSIUM CHLORIDE 30 MEQ: 1500 TABLET, EXTENDED RELEASE ORAL at 02:06

## 2022-06-03 RX ADMIN — DEXTROSE AND SODIUM CHLORIDE: 5; .45 INJECTION, SOLUTION INTRAVENOUS at 12:06

## 2022-06-03 RX ADMIN — AMLODIPINE BESYLATE 5 MG: 5 TABLET ORAL at 02:06

## 2022-06-03 RX ADMIN — CEFTRIAXONE 1 G: 1 INJECTION, SOLUTION INTRAVENOUS at 12:06

## 2022-06-03 RX ADMIN — BENZONATATE 100 MG: 100 CAPSULE ORAL at 08:06

## 2022-06-03 RX ADMIN — LOSARTAN POTASSIUM 25 MG: 25 TABLET, FILM COATED ORAL at 08:06

## 2022-06-03 RX ADMIN — GUAIFENESIN AND DEXTROMETHORPHAN 10 ML: 100; 10 SYRUP ORAL at 12:06

## 2022-06-03 RX ADMIN — GUAIFENESIN AND DEXTROMETHORPHAN 10 ML: 100; 10 SYRUP ORAL at 05:06

## 2022-06-03 RX ADMIN — GUAIFENESIN AND DEXTROMETHORPHAN 10 ML: 100; 10 SYRUP ORAL at 11:06

## 2022-06-03 RX ADMIN — ENOXAPARIN SODIUM 40 MG: 40 INJECTION SUBCUTANEOUS at 05:06

## 2022-06-03 RX ADMIN — BENZONATATE 100 MG: 100 CAPSULE ORAL at 02:06

## 2022-06-03 RX ADMIN — CEFTRIAXONE 1 G: 1 INJECTION, SOLUTION INTRAVENOUS at 11:06

## 2022-06-03 NOTE — ASSESSMENT & PLAN NOTE
Downtrending. HIV negative.   5/31: Hep C antibody positive. Hep C RNA positive.  Hepatitis C Virus (HCV) RNA Detection/Quantification RT-PCR Undetected IU/mL 72 Abnormal     Comment: Result in log IU/mL is 1.86.      Patient is asymptomatic.   Patient understands she will need to follow up with GI/hepatology for further work up to start a curative 8-12 week long antiviral therapy.  - f/u LFTs

## 2022-06-03 NOTE — ASSESSMENT & PLAN NOTE
Quarter size dry scaly skin patch, pruritic, will add benadryl and topical steroids.   Skin is clearing, no complaint.

## 2022-06-03 NOTE — NURSING
Pt voices feeling better, positive responses to questions about any c/o: headache, diarrhea, eczema, or itching. Smiling. No c/o anxiety or withdrawals at this time. Sitting up watching TV. Snack offered and accepted. Eating popsicles and drinking juices. IVF as ordered and ABT therapy without SE or AR. PRN effective. Resting. Continue to observe.

## 2022-06-03 NOTE — SUBJECTIVE & OBJECTIVE
Interval History:     Review of Systems   Constitutional:  Negative for activity change, appetite change, chills, fatigue and fever.   HENT:  Negative for rhinorrhea, sneezing and sore throat.    Eyes:  Negative for pain and redness.   Respiratory:  Positive for cough (intermittent, no sputum). Negative for choking, chest tightness, shortness of breath and wheezing.    Cardiovascular:  Negative for chest pain, palpitations and leg swelling.   Gastrointestinal:  Positive for diarrhea (fewer). Negative for abdominal pain, blood in stool, nausea and vomiting.   Genitourinary:  Negative for difficulty urinating, dyspareunia, dysuria and flank pain.   Musculoskeletal:  Negative for arthralgias, back pain, gait problem and myalgias.   Neurological:  Negative for dizziness, seizures, light-headedness and headaches.   Psychiatric/Behavioral:  Negative for agitation, behavioral problems, confusion, sleep disturbance and suicidal ideas. The patient is not nervous/anxious.    Objective:     Vital Signs (Most Recent):  Temp: 97.8 °F (36.6 °C) (06/03/22 0702)  Pulse: 87 (06/03/22 0900)  Resp: (!) 26 (06/03/22 0900)  BP: (!) 133/92 (06/03/22 0900)  SpO2: 99 % (06/03/22 0900)   Vital Signs (24h Range):  Temp:  [97.6 °F (36.4 °C)-98 °F (36.7 °C)] 97.8 °F (36.6 °C)  Pulse:  [55-95] 87  Resp:  [13-32] 26  SpO2:  [97 %-100 %] 99 %  BP: (113-149)/() 133/92     Weight: 60.3 kg (132 lb 15 oz)  Body mass index is 24.31 kg/m².    Intake/Output Summary (Last 24 hours) at 6/3/2022 0930  Last data filed at 6/3/2022 0500  Gross per 24 hour   Intake 3724.5 ml   Output 800 ml   Net 2924.5 ml      Physical Exam  Vitals reviewed.   Constitutional:       General: She is not in acute distress.     Appearance: Normal appearance. She is not ill-appearing or toxic-appearing.   HENT:      Head: Normocephalic.      Right Ear: External ear normal.      Left Ear: External ear normal.      Nose: Nose normal. No congestion.      Mouth/Throat:       "Mouth: Mucous membranes are dry.   Eyes:      Extraocular Movements: Extraocular movements intact.   Cardiovascular:      Rate and Rhythm: Normal rate and regular rhythm.      Pulses: Normal pulses.      Heart sounds: Murmur heard.   Pulmonary:      Effort: Pulmonary effort is normal. No respiratory distress.      Breath sounds: No wheezing or rales.   Chest:      Chest wall: No tenderness.   Abdominal:      General: Abdomen is flat. There is no distension.      Palpations: Abdomen is soft.      Tenderness: There is no abdominal tenderness. There is no right CVA tenderness, left CVA tenderness or guarding.   Musculoskeletal:         General: No swelling or tenderness. Normal range of motion.      Cervical back: Normal range of motion. No rigidity or tenderness.      Right lower leg: No edema.      Left lower leg: No edema.   Lymphadenopathy:      Cervical: No cervical adenopathy.   Skin:     General: Skin is warm and dry.      Comments: Right femoral line in place, no induration, erythema, or drainage. Multiple healed laceration "cutting marks" over right forearm. Multiple facial bruising clearing.   Neurological:      General: No focal deficit present.      Mental Status: She is alert and oriented to person, place, and time.      Motor: No weakness.      Gait: Gait normal.   Psychiatric:         Thought Content: Thought content normal.     Significant Labs: All pertinent labs within the past 24 hours have been reviewed.  BMP:   Recent Labs   Lab 06/02/22  0422 06/03/22  0410   GLU 95 109   * 144   K 3.9 3.9   * 114*   CO2 25 25   BUN 6 6   CREATININE 1.0 1.0   CALCIUM 7.7* 7.2*   MG 2.0  --      CBC:   Recent Labs   Lab 06/02/22  0422 06/03/22  0410   WBC 11.81 7.73   HGB 10.0* 9.7*   HCT 30.3* 29.5*    268     CMP:   Recent Labs   Lab 06/02/22  0422 06/03/22  0410   * 144   K 3.9 3.9   * 114*   CO2 25 25   GLU 95 109   BUN 6 6   CREATININE 1.0 1.0   CALCIUM 7.7* 7.2*   PROT 5.1*  --  "   ALBUMIN 2.3*  --    BILITOT 0.4  --    ALKPHOS 72  --    AST 7*  --    ALT 39  --    ANIONGAP 5* 5*   EGFRNONAA >60.0 >60.0     Significant Imaging: I have reviewed all pertinent imaging results/findings within the past 24 hours.

## 2022-06-03 NOTE — ASSESSMENT & PLAN NOTE
TTE report from cardiology with no findings of large vegetation over tricuspid valve. Patient with severe right heart failure and corrective intervention is required early.    - 5/29 Blood cultures x2 NGTD x5 days  - 5/29 urine culture grew E.coli >100K, resistant to cipro, levofloxacin and bactrim

## 2022-06-03 NOTE — PROGRESS NOTES
"[facility] Medicine  Progress Note    Patient Name: Марина Britton  MRN: [id number]  Patient Class: IP- Inpatient   Admission Date: 5/29/2022  Length of Stay: 5 days  Attending Physician: Josue Mcdonald DO  Primary Care Provider: Primary Doctor No    Subjective:     Principal Problem:Bilateral pneumonia    HPI:  32-year-old female with a history of chronic substance abuse, IV heroin abuse, history of endocarditis and septic arthritis from IV drug abuse, presents to the emergency department after an accidental overdose which EMS believes to be heroin. She was found unresponsive at hotel room, given Narcan, now awake, but became hypoxic. Upon arrival to the emergency department her oxygen saturation is 42% on room air, trends up with a non-rebreather.  Patient denies suicidal ideations.  Patient with repetitive behavior, had had multiple overdoses in the past, last year hospitalized or endocarditis, was supposed to have valve replacement however had not completed, not compliant with medication    Overview/Hospital Course:  5/30: No acute events overnight. Patient slept through. She is started on IV antibiotics for bilateral multifocal pneumonia. Patient slept well on BiPAP for CO2 retention. No complaints of diarrhea, vomiting or nausea, restlessness. Does complain of mild abdominal pain, joint aches. Patient recalls where she was last found in [facility] where she was alone "snorting heroin." She agrees to meet with psychiatry to help manage her addiction and withdrawal symptoms for when she develops them.   Consult cardiology for tricuspid leaflet vegetation, which patient has not had follow up. Continue with current broad spectrum antibiotics.    5/31: Per cardiology, HARI showing severe right sided heart failure, no vegetations found over tricuspid valve. Blood cultures NGTD x2 days, urine culture + for gram negative >100k, stable bilateral infiltrate on CXR, zosyn discontinued and will " "continue ceftriaxone and vancomycin.   6/1: Psychiatry return on Friday with further discussion of detox. Patient states she gets "clean" in the hospital and can never continue once she leaves the facility. Exhibiting more signs of withdrawal with abdominal cramping, diarrhea. Mild elevation in BP likely contributed by withdrawal and chest pain associated with breathing, which becomes more prominent on deep inhalation. Continue D4 vancomycin and ceftriaxone.    6/2: Leukocytosis resolved, intermittent coughing, will continue with pulm hygiene care per respiratory. Adjust maintenance IVF to D51/2NS. Continue ceftriaxone and vancomycin. Withdrawal symptoms controlled at this time. Patient states feeling better from day before. Encourage out of bed into chair activity. Will downgrade from ICU care to step down.   6/3: Overall remains stable, with good control of withdrawal symptoms, diarrhea episodes diminishing, off supplemental oxygen, no chest pain complaints, tolerating PO intake and getting in and out of bed without assistance. Given severity of heart function decline, patient will need to agree to detox for any cardiovascular intervention. Psychiatry to f/u and assist with disposition.       Interval History:     Review of Systems   Constitutional:  Negative for activity change, appetite change, chills, fatigue and fever.   HENT:  Negative for rhinorrhea, sneezing and sore throat.    Eyes:  Negative for pain and redness.   Respiratory:  Positive for cough (intermittent, no sputum). Negative for choking, chest tightness, shortness of breath and wheezing.    Cardiovascular:  Negative for chest pain, palpitations and leg swelling.   Gastrointestinal:  Positive for diarrhea (fewer). Negative for abdominal pain, blood in stool, nausea and vomiting.   Genitourinary:  Negative for difficulty urinating, dyspareunia, dysuria and flank pain.   Musculoskeletal:  Negative for arthralgias, back pain, gait problem and " myalgias.   Neurological:  Negative for dizziness, seizures, light-headedness and headaches.   Psychiatric/Behavioral:  Negative for agitation, behavioral problems, confusion, sleep disturbance and suicidal ideas. The patient is not nervous/anxious.    Objective:     Vital Signs (Most Recent):  Temp: 97.8 °F (36.6 °C) (06/03/22 0702)  Pulse: 87 (06/03/22 0900)  Resp: (!) 26 (06/03/22 0900)  BP: (!) 133/92 (06/03/22 0900)  SpO2: 99 % (06/03/22 0900)   Vital Signs (24h Range):  Temp:  [97.6 °F (36.4 °C)-98 °F (36.7 °C)] 97.8 °F (36.6 °C)  Pulse:  [55-95] 87  Resp:  [13-32] 26  SpO2:  [97 %-100 %] 99 %  BP: (113-149)/() 133/92     Weight: 60.3 kg (132 lb 15 oz)  Body mass index is 24.31 kg/m².    Intake/Output Summary (Last 24 hours) at 6/3/2022 0930  Last data filed at 6/3/2022 0500  Gross per 24 hour   Intake 3724.5 ml   Output 800 ml   Net 2924.5 ml      Physical Exam  Vitals reviewed.   Constitutional:       General: She is not in acute distress.     Appearance: Normal appearance. She is not ill-appearing or toxic-appearing.   HENT:      Head: Normocephalic.      Right Ear: External ear normal.      Left Ear: External ear normal.      Nose: Nose normal. No congestion.      Mouth/Throat:      Mouth: Mucous membranes are dry.   Eyes:      Extraocular Movements: Extraocular movements intact.   Cardiovascular:      Rate and Rhythm: Normal rate and regular rhythm.      Pulses: Normal pulses.      Heart sounds: Murmur heard.   Pulmonary:      Effort: Pulmonary effort is normal. No respiratory distress.      Breath sounds: No wheezing or rales.   Chest:      Chest wall: No tenderness.   Abdominal:      General: Abdomen is flat. There is no distension.      Palpations: Abdomen is soft.      Tenderness: There is no abdominal tenderness. There is no right CVA tenderness, left CVA tenderness or guarding.   Musculoskeletal:         General: No swelling or tenderness. Normal range of motion.      Cervical back: Normal  "range of motion. No rigidity or tenderness.      Right lower leg: No edema.      Left lower leg: No edema.   Lymphadenopathy:      Cervical: No cervical adenopathy.   Skin:     General: Skin is warm and dry.      Comments: Right femoral line in place, no induration, erythema, or drainage. Multiple healed laceration "cutting marks" over right forearm. Multiple facial bruising clearing.   Neurological:      General: No focal deficit present.      Mental Status: She is alert and oriented to person, place, and time.      Motor: No weakness.      Gait: Gait normal.   Psychiatric:         Thought Content: Thought content normal.     Significant Labs: All pertinent labs within the past 24 hours have been reviewed.  BMP:   Recent Labs   Lab 06/02/22  0422 06/03/22  0410   GLU 95 109   * 144   K 3.9 3.9   * 114*   CO2 25 25   BUN 6 6   CREATININE 1.0 1.0   CALCIUM 7.7* 7.2*   MG 2.0  --      CBC:   Recent Labs   Lab 06/02/22 0422 06/03/22  0410   WBC 11.81 7.73   HGB 10.0* 9.7*   HCT 30.3* 29.5*    268     CMP:   Recent Labs   Lab 06/02/22  0422 06/03/22  0410   * 144   K 3.9 3.9   * 114*   CO2 25 25   GLU 95 109   BUN 6 6   CREATININE 1.0 1.0   CALCIUM 7.7* 7.2*   PROT 5.1*  --    ALBUMIN 2.3*  --    BILITOT 0.4  --    ALKPHOS 72  --    AST 7*  --    ALT 39  --    ANIONGAP 5* 5*   EGFRNONAA >60.0 >60.0     Significant Imaging: I have reviewed all pertinent imaging results/findings within the past 24 hours.      Assessment/Plan:      * Bilateral pneumonia  Aspiration pneumonia vs CAP. Chest x-ray, elevated lactic acid, white count, afebrile currently, patient likely had aspiration pneumonia secondary to drug overdose, was found to be unresponsive oxygen sat in the 40s, was given Narcan, patient became more alert, continued to be hypoxic, corrected with non-rebreather, patient on continued BiPAP therapy currently, ABG reviewed, hypoxia and hypercapnia noted, expected improved with BiPAP " therapy, started on IV antibiotic, patient a history of endocarditis with septic emboli, monitor, may need CT to evaluate if not improved.  - completed zosyn, levaquin, vancomycin x 2days  - 6/2 leukocytosis resolved, afebrile  - vanc dose adjusted per pharmacy  - continue antibiotics for CAP and E.coli UTI with ceftriaxone and vancomycin (D5)         Hypernatremia  Corrected.   PO intake much improved, IVF discontinued in the PM.       Flexural eczema  Quarter size dry scaly skin patch, pruritic, will add benadryl and topical steroids.   Skin is clearing, no complaint.    Drug withdrawal  Continue with supportive symptom management.       Elevated LFTs  Downtrending. HIV negative.   5/31: Hep C antibody positive. Hep C RNA positive.  Hepatitis C Virus (HCV) RNA Detection/Quantification RT-PCR Undetected IU/mL 72 Abnormal     Comment: Result in log IU/mL is 1.86.      Patient is asymptomatic.   Patient understands she will need to follow up with GI/hepatology for further work up to start a curative 8-12 week long antiviral therapy.  - f/u LFTs      Hx of bacterial endocarditis  TTE report from cardiology with no findings of large vegetation over tricuspid valve. Patient with severe right heart failure and corrective intervention is required early.    - 5/29 Blood cultures x2 NGTD x5 days  - 5/29 urine culture grew E.coli >100K, resistant to cipro, levofloxacin and bactrim    Acute respiratory failure with hypoxia and hypercarbia  Patient with Hypercapnic and Hypoxic Respiratory failure which is Acute.  she is not on home oxygen. Supplemental oxygen was provided and noted-  .   Signs/symptoms of respiratory failure include- respiratory distress and lethargy. Contributing diagnoses includes - Aspiration, Pneumonia and decreased respiration 2/2 drug overdose Labs and images were reviewed. Patient Has recent ABG, which has been reviewed. Will treat underlying causes and adjust management of respiratory failure as  follows - oxygen delivery transitioned to nasal cannula from BiPAP.   6/2: start of tapering of O2 delivery. Today, pulse oximetry readings % on room air. Minimal chest pain complaints and endorses less coughing.      Overdose  Recurrent IVDU, previous hx of endocarditis, responded to narcan.  D4 since start of mild withdrawal symptoms of diarrhea, sniffling, abdominal cramps.   Diarrhea frequency diminishing, patient continues to improve PO intake  - symptomatic management PRN       Chronic hepatitis C virus infection        IVDU (intravenous drug user)  Per psychiatry counseling on detox being discussed, but patient does not want inpatient detox.  She has knowledge that without detox followed by cardiovascular procedure, patient will die from heart failure if not from possibly another overdose.  - outpatient detox proposed by psychiatry involves keeping patient opiate free for 7 days and then administering Vivitrol outpatient available at [facility] in [place]   - prior to Vivitrol administration, patient will require narcan challenge to observe for withdrawal symptoms  - then give her Naltrexone to observe tolerability  - the following day will involve coordinating discharge to outpatient [facility] to get the Vivitrol injection          VTE Risk Mitigation (From admission, onward)         Ordered     enoxaparin injection 40 mg  Daily         05/30/22 1526     IP VTE HIGH RISK PATIENT  Once         05/29/22 1540     Place sequential compression device  Until discontinued         05/29/22 1540     Place KATHIA hose  Until discontinued         05/29/22 1540              Discharge Planning   VIANNEY:      Code Status: Full Code   Is the patient medically ready for discharge?:     Reason for patient still in hospital (select all that apply): Patient trending condition, Treatment, Consult recommendations and Pending disposition  Discharge Plan A: Home        Josue Mcdonald,    Department of Hospital Medicine   [place] - Intensive Care

## 2022-06-03 NOTE — PROGRESS NOTES
Pharmacokinetic Assessment Follow Up: IV Vancomycin    Vancomycin serum concentration assessment(s):    The trough level was drawn correctly and can be used to guide therapy at this time. The measurement is within the desired definitive target range of 15 to 20 mcg/mL.    Vancomycin Regimen Plan:    Continue regimen to Vancomycin 1000 mg IV every 12 hours with next serum trough concentration measured at 0600 prior to a dose on 6/6/22    Drug levels (last 3 results):  Recent Labs   Lab Result Units 06/01/22  1800 06/03/22  0630   Vancomycin-Trough ug/mL 9.1* 17.6       Pharmacy will continue to follow and monitor vancomycin.    Please contact pharmacy at extension [phone] for questions regarding this assessment.    Thank you for the consult,   Beverley Weeks       Patient brief summary:  Марина Britton is a 32 y.o. female initiated on antimicrobial therapy with IV Vancomycin for treatment of lower respiratory infection    The patient's current regimen is 1000 mg q12h    Drug Allergies:   Review of patient's allergies indicates:   Allergen Reactions    Soap Rash     [facility] - Hospital supplied - cleanser shampoo & body wash gel  Ingredients - purified water, sodium laureth sulfate, sodium chloride, cocamide william, cocamidopropylamine oxide, fragrance, aloe barbadensis leaf juice, propylene alcohol, peg-150 distearate, diazolidinyl urea, ciric acid, methylparaben, & propulparaben       Actual Body Weight:   60.3 kg    Renal Function:   Estimated Creatinine Clearance: 68.5 mL/min (based on SCr of 1 mg/dL).,     Dialysis Method (if applicable):  N/A    CBC (last 72 hours):  Recent Labs   Lab Result Units 05/31/22  0838 06/01/22  0410 06/02/22  0422 06/03/22  0410   WBC K/uL 18.27* 18.16* 11.81 7.73   Hemoglobin g/dL 10.1* 10.3* 10.0* 9.7*   Hematocrit % 30.0* 30.8* 30.3* 29.5*   Platelets K/uL 227 264 316 268   Gran % % 74.0* 80.5* 74.2*  --    Lymph % % 8.0* 11.8* 16.5*  --    Mono % % 2.0* 3.6* 4.7  --     Eosinophil % % 1.0 1.3 2.5  --    Basophil % % 0.0 0.4 0.8  --    Differential Method  Manual Automated Automated  --        Metabolic Panel (last 72 hours):  Recent Labs   Lab Result Units 05/31/22  0838 06/01/22  0410 06/02/22  0422 06/03/22  0410   Sodium mmol/L 145 147* 147* 144   Potassium mmol/L 3.2* 3.5 3.9 3.9   Chloride mmol/L 113* 117* 117* 114*   CO2 mmol/L 27 25 25 25   Glucose mg/dL 102 91 95 109   BUN mg/dL 10 7 6 6   Creatinine mg/dL 1.0 1.0 1.0 1.0   Albumin g/dL 2.4* 2.3* 2.3*  --    Total Bilirubin mg/dL 1.2* 0.5 0.4  --    Alkaline Phosphatase U/L 56 179* 72  --    AST U/L 30 13 7*  --    ALT U/L 72* 54* 39  --    Magnesium mg/dL  --   --  2.0  --        Vancomycin Administrations:  vancomycin given in the last 96 hours                     vancomycin in dextrose 5 % 1 gram/250 mL IVPB 1,000 mg (mg) 1,000 mg New Bag 06/03/22 0747     1,000 mg New Bag 06/02/22 1902     1,000 mg New Bag  0732     1,000 mg New Bag 06/01/22 1924    vancomycin (VANCOCIN) 1,750 mg in dextrose 5 % 500 mL IVPB (mg) 1,750 mg New Bag 05/31/22 1801     1,750 mg New Bag 05/30/22 1910                    Microbiologic Results:  Microbiology Results (last 7 days)       Procedure Component Value Units Date/Time    Blood culture x two cultures. Draw prior to antibiotics. [[id number]] Collected: 05/29/22 1200    Order Status: Completed Specimen: Blood from Antecubital, Left Updated: 06/02/22 2212     Blood Culture, Routine No Growth to date      No Growth to date      No Growth to date      No Growth to date      No Growth to date    Narrative:      Aerobic and anaerobic    Blood culture x two cultures. Draw prior to antibiotics. [[id number]] Collected: 05/29/22 1526    Order Status: Completed Specimen: Blood Updated: 06/02/22 2212     Blood Culture, Routine No Growth to date      No Growth to date      No Growth to date      No Growth to date      No Growth to date    Narrative:      Aerobic and anaerobic    Urine culture  [[id number]]  (Abnormal)  (Susceptibility) Collected: 05/29/22 1230    Order Status: Completed Specimen: Urine Updated: 06/01/22 0356     Urine Culture, Routine ESCHERICHIA COLI  > 100,000 cfu/ml      Narrative:      Preferred Collection Type->Urine, Clean Catch  Specimen Source->Urine

## 2022-06-03 NOTE — ASSESSMENT & PLAN NOTE
Patient with Hypercapnic and Hypoxic Respiratory failure which is Acute.  she is not on home oxygen. Supplemental oxygen was provided and noted-  .   Signs/symptoms of respiratory failure include- respiratory distress and lethargy. Contributing diagnoses includes - Aspiration, Pneumonia and decreased respiration 2/2 drug overdose Labs and images were reviewed. Patient Has recent ABG, which has been reviewed. Will treat underlying causes and adjust management of respiratory failure as follows - oxygen delivery transitioned to nasal cannula from BiPAP.   6/2: start of tapering of O2 delivery. Today, pulse oximetry readings % on room air. Minimal chest pain complaints and endorses less coughing.

## 2022-06-03 NOTE — ASSESSMENT & PLAN NOTE
Recurrent IVDU, previous hx of endocarditis, responded to narcan.  D4 since start of mild withdrawal symptoms of diarrhea, sniffling, abdominal cramps.   Diarrhea frequency diminishing, patient continues to improve PO intake  - symptomatic management PRN

## 2022-06-03 NOTE — ASSESSMENT & PLAN NOTE
Aspiration pneumonia vs CAP. Chest x-ray, elevated lactic acid, white count, afebrile currently, patient likely had aspiration pneumonia secondary to drug overdose, was found to be unresponsive oxygen sat in the 40s, was given Narcan, patient became more alert, continued to be hypoxic, corrected with non-rebreather, patient on continued BiPAP therapy currently, ABG reviewed, hypoxia and hypercapnia noted, expected improved with BiPAP therapy, started on IV antibiotic, patient a history of endocarditis with septic emboli, monitor, may need CT to evaluate if not improved.  - completed zosyn, levaquin, vancomycin x 2days  - 6/2 leukocytosis resolved, afebrile  - vanc dose adjusted per pharmacy  - continue antibiotics for CAP and E.coli UTI with ceftriaxone and vancomycin (D5)

## 2022-06-03 NOTE — PLAN OF CARE
Problem: Adult Inpatient Plan of Care  Goal: Plan of Care Review  Outcome: Ongoing, Progressing  Goal: Patient-Specific Goal (Individualized)  Outcome: Ongoing, Progressing  Goal: Absence of Hospital-Acquired Illness or Injury  Outcome: Ongoing, Progressing  Goal: Optimal Comfort and Wellbeing  Outcome: Ongoing, Progressing  Goal: Readiness for Transition of Care  Outcome: Ongoing, Progressing     Problem: Adjustment to Illness (Sepsis/Septic Shock)  Goal: Optimal Coping  Outcome: Ongoing, Progressing     Problem: Bleeding (Sepsis/Septic Shock)  Goal: Absence of Bleeding  Outcome: Ongoing, Progressing     Problem: Glycemic Control Impaired (Sepsis/Septic Shock)  Goal: Blood Glucose Level Within Desired Range  Outcome: Ongoing, Progressing     Problem: Infection Progression (Sepsis/Septic Shock)  Goal: Absence of Infection Signs and Symptoms  Outcome: Ongoing, Progressing     Problem: Nutrition Impaired (Sepsis/Septic Shock)  Goal: Optimal Nutrition Intake  Outcome: Ongoing, Progressing     Problem: Infection  Goal: Absence of Infection Signs and Symptoms  Outcome: Ongoing, Progressing     Problem: Skin Injury Risk Increased  Goal: Skin Health and Integrity  Outcome: Ongoing, Progressing     Problem: Fluid Imbalance (Pneumonia)  Goal: Fluid Balance  Outcome: Ongoing, Progressing     Problem: Infection (Pneumonia)  Goal: Resolution of Infection Signs and Symptoms  Outcome: Ongoing, Progressing     Problem: Respiratory Compromise (Pneumonia)  Goal: Effective Oxygenation and Ventilation  Outcome: Ongoing, Progressing

## 2022-06-03 NOTE — ASSESSMENT & PLAN NOTE
Per psychiatry counseling on detox being discussed, but patient does not want inpatient detox.  She has knowledge that without detox followed by cardiovascular procedure, patient will die from heart failure if not from possibly another overdose.  - outpatient detox proposed by psychiatry involves keeping patient opiate free for 7 days and then administering Vivitrol outpatient available at [facility] in [place]   - prior to Vivitrol administration, patient will require narcan challenge to observe for withdrawal symptoms  - then give her Naltrexone to observe tolerability  - the following day will involve coordinating discharge to outpatient [facility] to get the Vivitrol injection

## 2022-06-04 PROBLEM — B18.2 CHRONIC HEPATITIS C WITHOUT HEPATIC COMA: Status: ACTIVE | Noted: 2022-05-29

## 2022-06-04 LAB
AMPHET+METHAMPHET UR QL: NEGATIVE
ANION GAP SERPL CALC-SCNC: 9 MMOL/L (ref 8–16)
BARBITURATES UR QL SCN>200 NG/ML: NEGATIVE
BENZODIAZ UR QL SCN>200 NG/ML: NEGATIVE
BUN SERPL-MCNC: 8 MG/DL (ref 6–20)
BZE UR QL SCN: NEGATIVE
CALCIUM SERPL-MCNC: 8.5 MG/DL (ref 8.7–10.5)
CANNABINOIDS UR QL SCN: NEGATIVE
CHLORIDE SERPL-SCNC: 105 MMOL/L (ref 95–110)
CO2 SERPL-SCNC: 26 MMOL/L (ref 23–29)
CREAT SERPL-MCNC: 1.1 MG/DL (ref 0.5–1.4)
CREAT UR-MCNC: 16 MG/DL (ref 15–325)
EST. GFR  (AFRICAN AMERICAN): >60 ML/MIN/1.73 M^2
EST. GFR  (NON AFRICAN AMERICAN): >60 ML/MIN/1.73 M^2
GLUCOSE SERPL-MCNC: 109 MG/DL (ref 70–110)
METHADONE UR QL SCN>300 NG/ML: NEGATIVE
OPIATES UR QL SCN: NEGATIVE
PCP UR QL SCN>25 NG/ML: NEGATIVE
POTASSIUM SERPL-SCNC: 4.3 MMOL/L (ref 3.5–5.1)
SODIUM SERPL-SCNC: 140 MMOL/L (ref 136–145)
TOXICOLOGY INFORMATION: NORMAL

## 2022-06-04 PROCEDURE — 20000000 HC ICU ROOM

## 2022-06-04 PROCEDURE — 80354 DRUG SCREENING FENTANYL: CPT | Performed by: STUDENT IN AN ORGANIZED HEALTH CARE EDUCATION/TRAINING PROGRAM

## 2022-06-04 PROCEDURE — 80307 DRUG TEST PRSMV CHEM ANLYZR: CPT | Performed by: STUDENT IN AN ORGANIZED HEALTH CARE EDUCATION/TRAINING PROGRAM

## 2022-06-04 PROCEDURE — 25000003 PHARM REV CODE 250: Performed by: PSYCHIATRY & NEUROLOGY

## 2022-06-04 PROCEDURE — 94761 N-INVAS EAR/PLS OXIMETRY MLT: CPT

## 2022-06-04 PROCEDURE — 63600175 PHARM REV CODE 636 W HCPCS: Performed by: STUDENT IN AN ORGANIZED HEALTH CARE EDUCATION/TRAINING PROGRAM

## 2022-06-04 PROCEDURE — 25000003 PHARM REV CODE 250: Performed by: STUDENT IN AN ORGANIZED HEALTH CARE EDUCATION/TRAINING PROGRAM

## 2022-06-04 PROCEDURE — 99232 SBSQ HOSP IP/OBS MODERATE 35: CPT | Mod: ,,, | Performed by: STUDENT IN AN ORGANIZED HEALTH CARE EDUCATION/TRAINING PROGRAM

## 2022-06-04 PROCEDURE — 99232 PR SUBSEQUENT HOSPITAL CARE,LEVL II: ICD-10-PCS | Mod: ,,, | Performed by: STUDENT IN AN ORGANIZED HEALTH CARE EDUCATION/TRAINING PROGRAM

## 2022-06-04 PROCEDURE — 94664 DEMO&/EVAL PT USE INHALER: CPT

## 2022-06-04 PROCEDURE — 99900035 HC TECH TIME PER 15 MIN (STAT)

## 2022-06-04 PROCEDURE — 99900031 HC PATIENT EDUCATION (STAT)

## 2022-06-04 PROCEDURE — 80048 BASIC METABOLIC PNL TOTAL CA: CPT | Performed by: STUDENT IN AN ORGANIZED HEALTH CARE EDUCATION/TRAINING PROGRAM

## 2022-06-04 RX ORDER — DIAZEPAM 5 MG/1
5 TABLET ORAL EVERY 8 HOURS PRN
Status: DISCONTINUED | OUTPATIENT
Start: 2022-06-04 | End: 2022-06-04

## 2022-06-04 RX ORDER — LOSARTAN POTASSIUM 50 MG/1
50 TABLET ORAL DAILY
Status: DISCONTINUED | OUTPATIENT
Start: 2022-06-04 | End: 2022-06-05 | Stop reason: HOSPADM

## 2022-06-04 RX ORDER — CLONIDINE HYDROCHLORIDE 0.1 MG/1
0.2 TABLET ORAL EVERY 6 HOURS PRN
Status: DISCONTINUED | OUTPATIENT
Start: 2022-06-04 | End: 2022-06-04

## 2022-06-04 RX ORDER — CLONIDINE HYDROCHLORIDE 0.1 MG/1
0.1 TABLET ORAL EVERY 6 HOURS PRN
Status: DISCONTINUED | OUTPATIENT
Start: 2022-06-04 | End: 2022-06-05 | Stop reason: HOSPADM

## 2022-06-04 RX ADMIN — BENZONATATE 100 MG: 100 CAPSULE ORAL at 09:06

## 2022-06-04 RX ADMIN — POTASSIUM CHLORIDE 30 MEQ: 1500 TABLET, EXTENDED RELEASE ORAL at 08:06

## 2022-06-04 RX ADMIN — HYDRALAZINE HYDROCHLORIDE 10 MG: 20 INJECTION, SOLUTION INTRAMUSCULAR; INTRAVENOUS at 11:06

## 2022-06-04 RX ADMIN — CEFTRIAXONE 1 G: 1 INJECTION, SOLUTION INTRAVENOUS at 11:06

## 2022-06-04 RX ADMIN — CLONIDINE HYDROCHLORIDE 0.1 MG: 0.1 TABLET ORAL at 08:06

## 2022-06-04 RX ADMIN — HYDROCORTISONE: 0.01 CREAM TOPICAL at 08:06

## 2022-06-04 RX ADMIN — POTASSIUM CHLORIDE 30 MEQ: 1500 TABLET, EXTENDED RELEASE ORAL at 09:06

## 2022-06-04 RX ADMIN — MUPIROCIN: 20 OINTMENT TOPICAL at 09:06

## 2022-06-04 RX ADMIN — VANCOMYCIN HYDROCHLORIDE 1000 MG: 1 INJECTION, POWDER, LYOPHILIZED, FOR SOLUTION INTRAVENOUS at 07:06

## 2022-06-04 RX ADMIN — LOSARTAN POTASSIUM 50 MG: 50 TABLET, FILM COATED ORAL at 09:06

## 2022-06-04 RX ADMIN — MIRTAZAPINE 30 MG: 15 TABLET, FILM COATED ORAL at 08:06

## 2022-06-04 RX ADMIN — ACETAMINOPHEN 650 MG: 325 TABLET ORAL at 04:06

## 2022-06-04 RX ADMIN — FAMOTIDINE 20 MG: 20 TABLET ORAL at 09:06

## 2022-06-04 RX ADMIN — HYDROCORTISONE: 0.01 CREAM TOPICAL at 09:06

## 2022-06-04 RX ADMIN — GUAIFENESIN AND DEXTROMETHORPHAN 10 ML: 100; 10 SYRUP ORAL at 05:06

## 2022-06-04 RX ADMIN — FAMOTIDINE 20 MG: 20 TABLET ORAL at 08:06

## 2022-06-04 RX ADMIN — AMLODIPINE BESYLATE 5 MG: 5 TABLET ORAL at 09:06

## 2022-06-04 RX ADMIN — IBUPROFEN 400 MG: 400 TABLET, FILM COATED ORAL at 04:06

## 2022-06-04 RX ADMIN — METHOCARBAMOL 500 MG: 500 TABLET ORAL at 08:06

## 2022-06-04 RX ADMIN — ENOXAPARIN SODIUM 40 MG: 40 INJECTION SUBCUTANEOUS at 04:06

## 2022-06-04 RX ADMIN — VANCOMYCIN HYDROCHLORIDE 1000 MG: 1 INJECTION, POWDER, LYOPHILIZED, FOR SOLUTION INTRAVENOUS at 08:06

## 2022-06-04 RX ADMIN — POTASSIUM CHLORIDE 30 MEQ: 1500 TABLET, EXTENDED RELEASE ORAL at 04:06

## 2022-06-04 NOTE — ASSESSMENT & PLAN NOTE
Recurrent IVDU, previous hx of endocarditis, responded to narcan.  D4 since start of mild withdrawal symptoms of diarrhea, sniffling, abdominal cramps.   Diarrhea frequency diminishing, patient continues to improve PO intake, now these improvements are muddled with patient admitting to use of left over heroin in her purse two days ago. Will obtain urine studies and consult psychiatry.   - symptomatic management PRN

## 2022-06-04 NOTE — PROGRESS NOTES
"PSYCHIATRY DAILY INPATIENT PROGRESS NOTE  SUBSEQUENT HOSPITAL VISIT-ICU consult    ENCOUNTER DATE: 6/3/2022  SITE: [facility]    DATE OF ADMISSION: 5/29/2022 11:19 AM  LENGTH OF STAY: 5 days    CHIEF COMPLAINT   Марина Britton is a 32 y.o. female, seen during daily ayers rounds on the inpatient unit.  Марина Britton presented with the chief complaint of aspiration pneumonia secondary to unintential opioid overdose    The patient was seen and examined. The chart was reviewed.     Reviewed notes from Rns and MD and labs from the last 24 hours.    The patient's case was discussed with the treatment team/care providers today including Rns, MD and     Staff reports no behavioral or management issues.     The patient has been compliant with treatment.    Subjective 06/03/2022    Patient seen today for psychiatric follow up. Discussed prospect of voluntary admission to inpatient psychiatric unit for monitored opioid detoxification. At this time, patient remains unwilling to undergo any further hospital treatment for substance abuse. NP expressed high concern for death, either due to heart failure, overdose, or other complications stemming form opioid use. Patient verbalized understanding of concerns and potential consequences. Per patient, "I don't think I'll ever get sober. The longest I've been sober recently was for ten months, but I relapsed. I know so many people who have gone through rehab and meds, and they just relapse." Patient states she is uninterested in possibility of methadone, stating "I just don't believe in that. I think it's substituting one thing for another."    Patient states that while she does not intend to start using again, she acknowledges that she likely will.     Patient did verbalize interest in Vivitrol. Discussed medication with patient, including potential side effects. Also discussed that in order to maximize potential for successful remission, outpatient counseling, at a " minimum, is strongly advised.     Again, patient denies suicidal ideation and maintains that overdose was accidental. Denies auditory or visual hallucinations. Reports that withdrawal symptoms are currently well controlled with PRN medications. Reports sleep has improved some since restarting remeron.     The patient denies any side effects to medications.    Psychiatric ROS (observed, reported, or endorsed/denied):  Depressed mood - fluctuating  Interest/pleasure/anhedonia: No  Guilt/hopelessness/worthlessness - endorses  Changes in Sleep - less  Changes in Appetite - less  Changes in Concentration - No  Changes in Energy - No  PMA/R- No  Suicidal- active/passive ideations - No  Homicidal ideations: active/passive ideations - No    Hallucinations - No  Delusions - No  Disorganized behavior - No  Disorganized speech - No  Negative symptoms - No    Elevated mood - No  Decreased need for sleep - No  Grandiosity - No  Racing thoughts - No  Impulsivity - No  Irritability- No  Increased energy - No  Distractibility - No  Increase in goal-directed activity or PMA- No    Symptoms of JOVANY - less  Symptoms of Panic Disorder- No  Symptoms of PTSD - No    Overall progress: Patient is showing mild improvement     Psychotherapy:  · Target symptoms: depression, substance abuse  · Why chosen therapy is appropriate versus another modality: relevant to diagnosis, evidence based practice  · Outcome monitoring methods: self-report, observation  · Therapeutic intervention type: insight oriented psychotherapy, supportive psychotherapy  · Topics discussed/themes: building skills sets for symptom management, symptom recognition, substance abuse  · The patient's response to the intervention is reluctant. The patient's progress toward treatment goals is fair.   · Duration of intervention: 20 minutes.    Medical ROS  Review of Systems   Constitutional: Positive for malaise/fatigue.   HENT: Negative.    Eyes: Negative.    Respiratory: Positive  for shortness of breath.    Cardiovascular: Negative.    Gastrointestinal: Negative.    Genitourinary: Negative.    Musculoskeletal: Negative.    Skin: Negative.    Neurological: Negative.    Endo/Heme/Allergies: Negative.    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    PTSD (post-traumatic stress disorder)     Substance abuse        PSYCHOTROPIC MEDICATIONS   Scheduled Meds:   amLODIPine  5 mg Oral Daily    benzonatate  100 mg Oral TID    cefTRIAXone (ROCEPHIN) IVPB  1 g Intravenous Q12H    dextromethorphan-guaiFENesin  mg/5 ml  10 mL Oral Q6H    enoxaparin  40 mg Subcutaneous Daily    famotidine  20 mg Oral BID    hydrocortisone-aloe vera   Topical (Top) BID    losartan  25 mg Oral Daily    mirtazapine  15 mg Oral QHS    mupirocin   Nasal BID    potassium chloride SA  30 mEq Oral TID    vancomycin (VANCOCIN) IVPB  1,000 mg Intravenous Q12H     Continuous Infusions:  PRN Meds:.acetaminophen, dextrose 10%, dextrose 50%, dicyclomine, diphenhydrAMINE, hydrALAZINE, hydrOXYzine HCL, ibuprofen, loperamide, methocarbamoL, ondansetron, sodium chloride 0.9%, Pharmacy to dose Vancomycin consult **AND** vancomycin - pharmacy to dose    EXAMINATION    VITALS   Vitals:    06/03/22 1927 06/03/22 2000 06/03/22 2127 06/03/22 2256   BP:  (!) 158/97     BP Location:  Right arm     Patient Position:  Lying     Pulse:  (!) 55     Resp:  (!) 32     Temp:  98.1 °F (36.7 °C)     TempSrc:  Temporal     SpO2: 100% 98% 98% 98%   Weight:       Height:           Body mass index is 24.31 kg/m².    CONSTITUTIONAL  General Appearance: older than stated age, disheveled, lying in bed    MUSCULOSKELETAL  Muscle Strength and Tone:no tremor, no tic  Abnormal Involuntary Movements: No  Gait and Station: In bed    PSYCHIATRIC   Level of  "Consciousness: awake, alert  and oriented  Orientation: person, place, time and situation  Grooming: Disheveled and Hospital garb  Psychomotor Behavior: normal, cooperative  Speech: normal tone, normal rate, normal pitch, normal volume  Language: grossly intact  Mood: "Ok"  Affect: Blunted and Constricted  Thought Process: linear, logical  Associations: intact   Thought Content: DENIES suicidal ideation, DENIES homicidal ideation and no delusions  Perceptions: denies hallucinations  Memory: Able to recall past events, Remote intact and Recent intact  Attention:Attends to interview without distraction  Fund of Knowledge: Aware of current events and Vocabulary appropriate   Estimate if Intelligence:  Average based on work/education history, vocabulary and mental status exam  Insight: has awareness of illness  Judgment: limited    DIAGNOSTIC TESTING   Laboratory Results  Recent Results (from the past 24 hour(s))   CBC Without Differential    Collection Time: 06/03/22  4:10 AM   Result Value Ref Range    WBC 7.73 3.90 - 12.70 K/uL    RBC 3.24 (L) 4.00 - 5.40 M/uL    Hemoglobin 9.7 (L) 12.0 - 16.0 g/dL    Hematocrit 29.5 (L) 37.0 - 48.5 %    MCV 91 82 - 98 fL    MCH 29.9 27.0 - 31.0 pg    MCHC 32.9 32.0 - 36.0 g/dL    RDW 15.3 (H) 11.5 - 14.5 %    Platelets 268 150 - 450 K/uL    MPV 9.6 9.2 - 12.9 fL   Basic Metabolic Panel    Collection Time: 06/03/22  4:10 AM   Result Value Ref Range    Sodium 144 136 - 145 mmol/L    Potassium 3.9 3.5 - 5.1 mmol/L    Chloride 114 (H) 95 - 110 mmol/L    CO2 25 23 - 29 mmol/L    Glucose 109 70 - 110 mg/dL    BUN 6 6 - 20 mg/dL    Creatinine 1.0 0.5 - 1.4 mg/dL    Calcium 7.2 (L) 8.7 - 10.5 mg/dL    Anion Gap 5 (L) 8 - 16 mmol/L    eGFR if African American >60.0 >60 mL/min/1.73 m^2    eGFR if non African American >60.0 >60 mL/min/1.73 m^2   VANCOMYCIN, TROUGH    Collection Time: 06/03/22  6:30 AM   Result Value Ref Range    Vancomycin-Trough 17.6 10.0 - 22.0 ug/mL       MEDICAL DECISION " MAKING     ASSESSMENT:    Depression  Opioid use disorder    PROBLEM LIST AND MANAGEMENT PLANS    Depression:  -Patient counseled  -Reports some improvement in anxiety and insomnia. Affect remains blunted, tearful at times,  however patient more engaged in conversation.  -Increase remeron to 30 mg QHS    Opioid Use disorder:  -Patient counseled  -Discussed naltrexone/vivitrol.   - Discussed plan with Dr. Mcdonald as well as Tracey Monk LMSW:  Prior to discharge, will administer naloxone challenge. If no withdrawal symptoms, will administer naltrexone PO. If this is also well-tolerated, will plan to coordinate with [facility] to administer vivitrol injection.   - Plan to meet with the patient as well as Dr. Mcdonald and MARTIN Monk LMSW on Monday to confirm that this is how the patient wishes to proceed.     Discussed diagnosis, risks and benefits of proposed treatment vs alternative treatments vs no treatment, potential side effects of these treatments and the inherent unpredictability of treatment. The patient expresses understanding of the above and displays the capacity to agree with this treatment given said understanding. Patient also agrees that, currently, the benefits outweigh the risks and would like to pursue/continue treatment at this time.    DISCHARGE PLANNING  Expected Disposition Plan: Home or Self Care    NEED FOR CONTINUED HOSPITALIZATION      STAFF:   Matt Moreira, FLEICITY  Psychiatry

## 2022-06-04 NOTE — ASSESSMENT & PLAN NOTE
TTE report from cardiology with no findings of large vegetation over tricuspid valve, large tricupid flail reported.   Patient with severe right heart failure and corrective intervention is required early.    - 5/29 Blood cultures x2 NGTD x5 days  - 5/29 urine culture grew E.coli >100K, resistant to cipro, levofloxacin and bactrim

## 2022-06-04 NOTE — ASSESSMENT & PLAN NOTE
Patient with Hypercapnic and Hypoxic Respiratory failure which is Acute.  she is not on home oxygen. Supplemental oxygen was provided and noted-  .   Signs/symptoms of respiratory failure include- respiratory distress and lethargy. Contributing diagnoses includes - Aspiration, Pneumonia and decreased respiration 2/2 drug overdose Labs and images were reviewed. Patient Has recent ABG, which has been reviewed. Will treat underlying causes and adjust management of respiratory failure as follows - oxygen delivery transitioned to nasal cannula from BiPAP.   6/2: start of tapering of O2. Today, pulse oximetry readings % on room air. Minimal chest pain complaints and endorses less coughing.    Remains stable on room air. Continuing daily pulmonary hygiene per respiratory.

## 2022-06-04 NOTE — NURSING
"Pt disclosed she "cannot live like this" when asked what she referred to she stated she was referring to her being sober. She wanted clarification on her discharge time tomorrow otherwise she will be leaving on her own account. Pt questioned about the vivitrol medication she had wanted to use to remain sober. She stated she" didn't want that anymore because she is miserable".    MD notified no new orders.   "

## 2022-06-04 NOTE — ASSESSMENT & PLAN NOTE
Continue with supportive symptom management.  Patient endorses strong urge to use with skin crawling sensation that cannot be resolved with any medications other than to use. Will consult psychiatry with recommendations.

## 2022-06-04 NOTE — PROGRESS NOTES
"[facility] Medicine  Progress Note    Patient Name: Марина Britton  MRN: [id number]  Patient Class: IP- Inpatient   Admission Date: 5/29/2022  Length of Stay: 6 days  Attending Physician: Josue Mcdonald DO  Primary Care Provider: Primary Doctor No        Subjective:     Principal Problem:Bilateral pneumonia  HPI:  32-year-old female with a history of chronic substance abuse, IV heroin abuse, history of endocarditis and septic arthritis from IV drug abuse, presents to the emergency department after an accidental overdose which EMS believes to be heroin. She was found un[facility], given Narcan, now awake, but became hypoxic. Upon arrival to the emergency department her oxygen saturation is 42% on room air, trends up with a non-rebreather.  Patient denies suicidal ideations.  Patient with repetitive behavior, had had multiple overdoses in the past, last year hospitalized or endocarditis, was supposed to have valve replacement however had not completed, not compliant with medication    Overview/Hospital Course:  5/30: No acute events overnight. Patient slept through. She is started on IV antibiotics for bilateral multifocal pneumonia. Patient slept well on BiPAP for CO2 retention. No complaints of diarrhea, vomiting or nausea, restlessness. Does complain of mild abdominal pain, joint aches. Patient recalls where she was last found in [facility] where she was alone "snorting heroin." She agrees to meet with psychiatry to help manage her addiction and withdrawal symptoms for when she develops them.   Consult cardiology for tricuspid leaflet vegetation, which patient has not had follow up. Continue with current broad spectrum antibiotics.    5/31: Per cardiology, HARI showing severe right sided heart failure, no vegetations found over tricuspid valve. Blood cultures NGTD x2 days, urine culture + for gram negative >100k, stable bilateral infiltrate on CXR, zosyn discontinued and will " "continue ceftriaxone and vancomycin.   6/1: Psychiatry return on Friday with further discussion of detox. Patient states she gets "clean" in the hospital and can never continue once she leaves the facility. Exhibiting more signs of withdrawal with abdominal cramping, diarrhea. Mild elevation in BP likely contributed by withdrawal and chest pain associated with breathing, which becomes more prominent on deep inhalation. Continue D4 vancomycin and ceftriaxone.    6/2: Leukocytosis resolved, intermittent coughing, will continue with pulm hygiene care per respiratory. Adjust maintenance IVF to D51/2NS. Continue ceftriaxone and vancomycin. Withdrawal symptoms controlled at this time. Patient states feeling better from day before. Encourage out of bed into chair activity. Will downgrade from ICU care to step down.   6/3: Overall remains stable, with good control of withdrawal symptoms, diarrhea episodes diminishing, off supplemental oxygen, no chest pain complaints, tolerating PO intake and getting in and out of bed without assistance. Given severity of heart function decline, patient will need to agree to detox for any cardiovascular intervention. Psychiatry to f/u and assist with disposition.   6/4:D7 abx with vanc+ Rocephin (completed vanc+zosyn x first 2 days), vital signs stable, patient endorses she has decided against IP detox. Complains of skin crawling, non-pruritic sensation with strong urge to use. Also admits to using small amount of heroin she still had in her purse two days ago. Original plan for Vivitrol injection on Monday will need to be postponed. Will consult psychiatry for further recommendations.         Interval History: Patient rested well overnight. BP meds adjustment, Bidil discontinued secondary to headaches. Tolerating losartan and amlodipine with headache resolving.   This morning, patient endorses having used a little bit of heroin left over in her purse.     Review of Systems " "  Constitutional:  Negative for activity change, appetite change, chills, fatigue and fever.   HENT:  Negative for rhinorrhea, sneezing and sore throat.    Eyes:  Negative for pain and redness.   Respiratory:  Positive for cough (less compared to day before). Negative for choking, chest tightness, shortness of breath and wheezing.    Cardiovascular:  Negative for chest pain, palpitations and leg swelling.   Gastrointestinal:  Positive for diarrhea. Negative for abdominal pain, blood in stool, nausea and vomiting.   Genitourinary:  Negative for difficulty urinating, dyspareunia, dysuria and flank pain.   Musculoskeletal:  Negative for arthralgias, back pain, gait problem and myalgias.   Neurological:  Negative for dizziness, seizures, light-headedness and headaches.   Psychiatric/Behavioral:  Negative for agitation, behavioral problems, confusion, sleep disturbance and suicidal ideas. The patient is not nervous/anxious.         "Wants to use," there is constant "don't want to be in my own skin."    Objective:     Vital Signs (Most Recent):  Temp: 97.6 °F (36.4 °C) (06/04/22 0713)  Pulse: (!) 49 (06/04/22 0700)  Resp: (!) 28 (06/04/22 0700)  BP: (!) 151/95 (06/04/22 0700)  SpO2: 95 % (06/04/22 0700)   Vital Signs (24h Range):  Temp:  [97.6 °F (36.4 °C)-98.4 °F (36.9 °C)] 97.6 °F (36.4 °C)  Pulse:  [47-87] 49  Resp:  [20-35] 28  SpO2:  [94 %-100 %] 95 %  BP: (129-166)/() 151/95     Weight: 60.3 kg (132 lb 15 oz)  Body mass index is 24.31 kg/m².    Intake/Output Summary (Last 24 hours) at 6/4/2022 0802  Last data filed at 6/4/2022 0500  Gross per 24 hour   Intake 420 ml   Output 250 ml   Net 170 ml        Physical Exam  Vitals reviewed.   Constitutional:       General: She is not in acute distress.     Appearance: Normal appearance. She is not ill-appearing or toxic-appearing.   HENT:      Head: Normocephalic.      Right Ear: External ear normal.      Left Ear: External ear normal.      Nose: Nose normal. No " "congestion.      Mouth/Throat:      Mouth: Mucous membranes are dry.   Eyes:      Extraocular Movements: Extraocular movements intact.   Cardiovascular:      Rate and Rhythm: Normal rate and regular rhythm.      Pulses: Normal pulses.      Heart sounds: Murmur heard.   Pulmonary:      Effort: Pulmonary effort is normal. No respiratory distress.      Breath sounds: No wheezing or rales.   Chest:      Chest wall: No tenderness.   Abdominal:      General: Abdomen is flat. There is no distension.      Palpations: Abdomen is soft.      Tenderness: There is no abdominal tenderness. There is no right CVA tenderness, left CVA tenderness or guarding.   Musculoskeletal:         General: No swelling or tenderness. Normal range of motion.      Cervical back: Normal range of motion. No rigidity or tenderness.      Right lower leg: No edema.      Left lower leg: No edema.   Lymphadenopathy:      Cervical: No cervical adenopathy.   Skin:     General: Skin is warm and dry.      Comments: Right femoral line in place, no induration, erythema, or drainage. Multiple healed laceration "cutting marks" over right forearm. Multiple facial bruising clearing.  Dry scaly patch clearing over left inner calf   Neurological:      General: No focal deficit present.      Mental Status: She is alert and oriented to person, place, and time.      Motor: No weakness.      Gait: Gait normal.   Psychiatric:         Thought Content: Thought content normal.     Significant Labs: All pertinent labs within the past 24 hours have been reviewed.    Significant Imaging: I have reviewed all pertinent imaging results/findings within the past 24 hours.      Assessment/Plan:      * Bilateral pneumonia  Aspiration pneumonia vs CAP. Chest x-ray, elevated lactic acid, white count, afebrile currently, patient likely had aspiration pneumonia secondary to drug overdose, was found to be unresponsive oxygen sat in the 40s, was given Narcan, patient became more alert, " continued to be hypoxic, corrected with non-rebreather, patient on continued BiPAP therapy currently, ABG reviewed, hypoxia and hypercapnia noted, expected improved with BiPAP therapy, started on IV antibiotic, patient a history of endocarditis with septic emboli, monitor, may need CT to evaluate if not improved.  - completed zosyn, levaquin, vancomycin x 2days  - 6/2 leukocytosis resolved, afebrile, last Tmax on 5/31 100.6F  - vanc dose adjusted per pharmacy  - continue antibiotics for CAP and E.coli UTI with ceftriaxone and vancomycin (D5 on this regimen)       Hypernatremia  Corrected.   PO intake much improved, IVF discontinued in the PM.     Flexural eczema  Quarter size dry scaly skin patch, pruritic, will add benadryl and topical steroids.   Skin is clearing, continue with topical cream.    Drug withdrawal  Continue with supportive symptom management.  Patient endorses strong urge to use with skin crawling sensation that cannot be resolved with any medications other than to use. Will consult psychiatry with recommendations.     Chronic hepatitis C without hepatic coma  Initial LFT elevation likely reactive to pneumonia and UTI. Now within normal limits.   HIV negative.   5/31: Hep C antibody positive. Hep C RNA positive.  Hepatitis C Virus (HCV) RNA Detection/Quantification RT-PCR Undetected IU/mL 72 Abnormal     Comment: Result in log IU/mL is 1.86.      Patient is asymptomatic.   Patient understands she will need to follow up with GI/hepatology for further work up to start a curative 8-12 week long antiviral therapy.  - f/u LFTs    Hx of bacterial endocarditis  TTE report from cardiology with no findings of large vegetation over tricuspid valve, large tricupid flail reported.   Patient with severe right heart failure and corrective intervention is required early.    - 5/29 Blood cultures x2 NGTD x5 days  - 5/29 urine culture grew E.coli >100K, resistant to cipro, levofloxacin and bactrim    Acute  respiratory failure with hypoxia and hypercarbia  Patient with Hypercapnic and Hypoxic Respiratory failure which is Acute.  she is not on home oxygen. Supplemental oxygen was provided and noted-  .   Signs/symptoms of respiratory failure include- respiratory distress and lethargy. Contributing diagnoses includes - Aspiration, Pneumonia and decreased respiration 2/2 drug overdose Labs and images were reviewed. Patient Has recent ABG, which has been reviewed. Will treat underlying causes and adjust management of respiratory failure as follows - oxygen delivery transitioned to nasal cannula from BiPAP.   6/2: start of tapering of O2. Today, pulse oximetry readings % on room air. Minimal chest pain complaints and endorses less coughing.    Remains stable on room air. Continuing daily pulmonary hygiene per respiratory.    Overdose  Recurrent IVDU, previous hx of endocarditis, responded to narcan.  D4 since start of mild withdrawal symptoms of diarrhea, sniffling, abdominal cramps.   Diarrhea frequency diminishing, patient continues to improve PO intake, now these improvements are muddled with patient admitting to use of left over heroin in her purse two days ago. Will obtain urine studies and consult psychiatry.   - symptomatic management PRN     IVDU (intravenous drug user)  Per psychiatry counseling on detox being discussed, but patient does not want inpatient detox.  She has knowledge that without detox followed by cardiovascular procedure, patient will die from heart failure if not from possibly another overdose.  - outpatient detox proposed by psychiatry involves keeping patient opiate free for 7 days and then administering Vivitrol outpatient available at [facility] in [place]   - prior to Vivitrol administration, patient will require narcan challenge to observe for withdrawal symptoms  - then give her Naltrexone to observe tolerability  - the following day will involve coordinating  "discharge to outpatient [facility] to get the Vivitrol injection    6/4: Early morning, while discussing plans for Vivitrol injection on Monday morning patient endorses having used "left over drug" in her purse two days ago. She states she continues to have strong urge to use that will "never go away" for her.  - f/u psychiatry recommendations        VTE Risk Mitigation (From admission, onward)         Ordered     enoxaparin injection 40 mg  Daily         05/30/22 1526     IP VTE HIGH RISK PATIENT  Once         05/29/22 1540     Place sequential compression device  Until discontinued         05/29/22 1540     Place KATHIA hose  Until discontinued         05/29/22 1540              Discharge Planning   VIANNEY:      Code Status: Full Code   Is the patient medically ready for discharge?:     Reason for patient still in hospital (select all that apply): Treatment and Consult recommendations  Discharge Plan A: Home        Josue Mcdonald DO  Department of Hospital Medicine   [place] - Intensive Care  "

## 2022-06-04 NOTE — ASSESSMENT & PLAN NOTE
Initial LFT elevation likely reactive to pneumonia and UTI. Now within normal limits.   HIV negative.   5/31: Hep C antibody positive. Hep C RNA positive.  Hepatitis C Virus (HCV) RNA Detection/Quantification RT-PCR Undetected IU/mL 72 Abnormal     Comment: Result in log IU/mL is 1.86.      Patient is asymptomatic.   Patient understands she will need to follow up with GI/hepatology for further work up to start a curative 8-12 week long antiviral therapy.  - f/u LFTs

## 2022-06-04 NOTE — NURSING
Resting. No c/o voiced. Right femoral TLC. Amb to and from BR without assist. No s/s of  withdrawal. No c/o diarrhea. Continue to observe.

## 2022-06-04 NOTE — ASSESSMENT & PLAN NOTE
"Per psychiatry counseling on detox being discussed, but patient does not want inpatient detox.  She has knowledge that without detox followed by cardiovascular procedure, patient will die from heart failure if not from possibly another overdose.  - outpatient detox proposed by psychiatry involves keeping patient opiate free for 7 days and then administering Vivitrol outpatient available at [facility] in [place]   - prior to Vivitrol administration, patient will require narcan challenge to observe for withdrawal symptoms  - then give her Naltrexone to observe tolerability  - the following day will involve coordinating discharge to outpatient [facility] to get the Vivitrol injection    6/4: Early morning, while discussing plans for Vivitrol injection on Monday morning patient endorses having used "left over drug" in her purse two days ago. She states she continues to have strong urge to use that will "never go away" for her.  - f/u psychiatry recommendations        "

## 2022-06-04 NOTE — SUBJECTIVE & OBJECTIVE
"Interval History: Patient rested well overnight. BP meds adjustment, Bidil discontinued secondary to headaches. Tolerating losartan and amlodipine with headache resolving.   This morning, patient endorses having used a little bit of heroin left over in her purse.     Review of Systems   Constitutional:  Negative for activity change, appetite change, chills, fatigue and fever.   HENT:  Negative for rhinorrhea, sneezing and sore throat.    Eyes:  Negative for pain and redness.   Respiratory:  Positive for cough (less compared to day before). Negative for choking, chest tightness, shortness of breath and wheezing.    Cardiovascular:  Negative for chest pain, palpitations and leg swelling.   Gastrointestinal:  Positive for diarrhea. Negative for abdominal pain, blood in stool, nausea and vomiting.   Genitourinary:  Negative for difficulty urinating, dyspareunia, dysuria and flank pain.   Musculoskeletal:  Negative for arthralgias, back pain, gait problem and myalgias.   Neurological:  Negative for dizziness, seizures, light-headedness and headaches.   Psychiatric/Behavioral:  Negative for agitation, behavioral problems, confusion, sleep disturbance and suicidal ideas. The patient is not nervous/anxious.         "Wants to use," there is constant "don't want to be in my own skin."    Objective:     Vital Signs (Most Recent):  Temp: 97.6 °F (36.4 °C) (06/04/22 0713)  Pulse: (!) 49 (06/04/22 0700)  Resp: (!) 28 (06/04/22 0700)  BP: (!) 151/95 (06/04/22 0700)  SpO2: 95 % (06/04/22 0700)   Vital Signs (24h Range):  Temp:  [97.6 °F (36.4 °C)-98.4 °F (36.9 °C)] 97.6 °F (36.4 °C)  Pulse:  [47-87] 49  Resp:  [20-35] 28  SpO2:  [94 %-100 %] 95 %  BP: (129-166)/() 151/95     Weight: 60.3 kg (132 lb 15 oz)  Body mass index is 24.31 kg/m².    Intake/Output Summary (Last 24 hours) at 6/4/2022 0802  Last data filed at 6/4/2022 0500  Gross per 24 hour   Intake 420 ml   Output 250 ml   Net 170 ml        Physical Exam  Vitals " "reviewed.   Constitutional:       General: She is not in acute distress.     Appearance: Normal appearance. She is not ill-appearing or toxic-appearing.   HENT:      Head: Normocephalic.      Right Ear: External ear normal.      Left Ear: External ear normal.      Nose: Nose normal. No congestion.      Mouth/Throat:      Mouth: Mucous membranes are dry.   Eyes:      Extraocular Movements: Extraocular movements intact.   Cardiovascular:      Rate and Rhythm: Normal rate and regular rhythm.      Pulses: Normal pulses.      Heart sounds: Murmur heard.   Pulmonary:      Effort: Pulmonary effort is normal. No respiratory distress.      Breath sounds: No wheezing or rales.   Chest:      Chest wall: No tenderness.   Abdominal:      General: Abdomen is flat. There is no distension.      Palpations: Abdomen is soft.      Tenderness: There is no abdominal tenderness. There is no right CVA tenderness, left CVA tenderness or guarding.   Musculoskeletal:         General: No swelling or tenderness. Normal range of motion.      Cervical back: Normal range of motion. No rigidity or tenderness.      Right lower leg: No edema.      Left lower leg: No edema.   Lymphadenopathy:      Cervical: No cervical adenopathy.   Skin:     General: Skin is warm and dry.      Comments: Right femoral line in place, no induration, erythema, or drainage. Multiple healed laceration "cutting marks" over right forearm. Multiple facial bruising clearing.  Dry scaly patch clearing over left inner calf   Neurological:      General: No focal deficit present.      Mental Status: She is alert and oriented to person, place, and time.      Motor: No weakness.      Gait: Gait normal.   Psychiatric:         Thought Content: Thought content normal.     Significant Labs: All pertinent labs within the past 24 hours have been reviewed.    Significant Imaging: I have reviewed all pertinent imaging results/findings within the past 24 hours.  "

## 2022-06-04 NOTE — ASSESSMENT & PLAN NOTE
Aspiration pneumonia vs CAP. Chest x-ray, elevated lactic acid, white count, afebrile currently, patient likely had aspiration pneumonia secondary to drug overdose, was found to be unresponsive oxygen sat in the 40s, was given Narcan, patient became more alert, continued to be hypoxic, corrected with non-rebreather, patient on continued BiPAP therapy currently, ABG reviewed, hypoxia and hypercapnia noted, expected improved with BiPAP therapy, started on IV antibiotic, patient a history of endocarditis with septic emboli, monitor, may need CT to evaluate if not improved.  - completed zosyn, levaquin, vancomycin x 2days  - 6/2 leukocytosis resolved, afebrile, last Tmax on 5/31 100.6F  - vanc dose adjusted per pharmacy  - continue antibiotics for CAP and E.coli UTI with ceftriaxone and vancomycin (D5 on this regimen)

## 2022-06-04 NOTE — NURSING
Pt more talkative tonight. Smiling and speaking about mother and mutual interest in  current TV show she's watching. No c/o nausea, abd cramping or diarrhea at this time. Inst to call if med needed. No c/o HA.Voices drug started 2018, personal difficulties and downward spiral from them. Pt speaks positively about reunion with children. Attentive listening. Pt amb to and from the BR numerous time throughout the night. Generalized edema. No resp distress noted. O2 ranging b/t %. Continue to observe.

## 2022-06-04 NOTE — ASSESSMENT & PLAN NOTE
Quarter size dry scaly skin patch, pruritic, will add benadryl and topical steroids.   Skin is clearing, continue with topical cream.

## 2022-06-05 VITALS
WEIGHT: 132.94 LBS | HEART RATE: 83 BPM | OXYGEN SATURATION: 94 % | DIASTOLIC BLOOD PRESSURE: 82 MMHG | RESPIRATION RATE: 28 BRPM | BODY MASS INDEX: 24.46 KG/M2 | HEIGHT: 62 IN | TEMPERATURE: 98 F | SYSTOLIC BLOOD PRESSURE: 123 MMHG

## 2022-06-05 PROBLEM — J96.02 ACUTE RESPIRATORY FAILURE WITH HYPOXIA AND HYPERCARBIA: Status: RESOLVED | Noted: 2022-05-29 | Resolved: 2022-06-05

## 2022-06-05 PROBLEM — T50.901A OVERDOSE: Status: RESOLVED | Noted: 2022-05-29 | Resolved: 2022-06-05

## 2022-06-05 PROBLEM — J96.01 ACUTE RESPIRATORY FAILURE WITH HYPOXIA AND HYPERCARBIA: Status: RESOLVED | Noted: 2022-05-29 | Resolved: 2022-06-05

## 2022-06-05 PROBLEM — E87.0 HYPERNATREMIA: Status: RESOLVED | Noted: 2022-06-02 | Resolved: 2022-06-05

## 2022-06-05 LAB
ANION GAP SERPL CALC-SCNC: 5 MMOL/L (ref 8–16)
BUN SERPL-MCNC: 13 MG/DL (ref 6–20)
CALCIUM SERPL-MCNC: 8.2 MG/DL (ref 8.7–10.5)
CHLORIDE SERPL-SCNC: 112 MMOL/L (ref 95–110)
CO2 SERPL-SCNC: 25 MMOL/L (ref 23–29)
CREAT SERPL-MCNC: 1.1 MG/DL (ref 0.5–1.4)
EST. GFR  (AFRICAN AMERICAN): >60 ML/MIN/1.73 M^2
EST. GFR  (NON AFRICAN AMERICAN): >60 ML/MIN/1.73 M^2
GLUCOSE SERPL-MCNC: 92 MG/DL (ref 70–110)
POTASSIUM SERPL-SCNC: 4.4 MMOL/L (ref 3.5–5.1)
SODIUM SERPL-SCNC: 142 MMOL/L (ref 136–145)

## 2022-06-05 PROCEDURE — 63600175 PHARM REV CODE 636 W HCPCS: Performed by: STUDENT IN AN ORGANIZED HEALTH CARE EDUCATION/TRAINING PROGRAM

## 2022-06-05 PROCEDURE — 25000003 PHARM REV CODE 250: Performed by: STUDENT IN AN ORGANIZED HEALTH CARE EDUCATION/TRAINING PROGRAM

## 2022-06-05 PROCEDURE — 99238 HOSP IP/OBS DSCHRG MGMT 30/<: CPT | Mod: ,,, | Performed by: STUDENT IN AN ORGANIZED HEALTH CARE EDUCATION/TRAINING PROGRAM

## 2022-06-05 PROCEDURE — 36415 COLL VENOUS BLD VENIPUNCTURE: CPT | Performed by: STUDENT IN AN ORGANIZED HEALTH CARE EDUCATION/TRAINING PROGRAM

## 2022-06-05 PROCEDURE — 99900035 HC TECH TIME PER 15 MIN (STAT)

## 2022-06-05 PROCEDURE — 99900031 HC PATIENT EDUCATION (STAT)

## 2022-06-05 PROCEDURE — 99238 PR HOSPITAL DISCHARGE DAY,<30 MIN: ICD-10-PCS | Mod: ,,, | Performed by: STUDENT IN AN ORGANIZED HEALTH CARE EDUCATION/TRAINING PROGRAM

## 2022-06-05 PROCEDURE — 80048 BASIC METABOLIC PNL TOTAL CA: CPT | Performed by: STUDENT IN AN ORGANIZED HEALTH CARE EDUCATION/TRAINING PROGRAM

## 2022-06-05 PROCEDURE — 94761 N-INVAS EAR/PLS OXIMETRY MLT: CPT

## 2022-06-05 RX ORDER — LOPERAMIDE HYDROCHLORIDE 2 MG/1
2 CAPSULE ORAL
Qty: 20 CAPSULE | Refills: 0 | Status: SHIPPED | OUTPATIENT
Start: 2022-06-05 | End: 2022-06-15

## 2022-06-05 RX ORDER — METHOCARBAMOL 500 MG/1
500 TABLET, FILM COATED ORAL 3 TIMES DAILY
Qty: 21 TABLET | Refills: 0 | Status: SHIPPED | OUTPATIENT
Start: 2022-06-05 | End: 2022-06-12

## 2022-06-05 RX ORDER — MIRTAZAPINE 30 MG/1
30 TABLET, FILM COATED ORAL NIGHTLY
Qty: 30 TABLET | Refills: 2 | Status: SHIPPED | OUTPATIENT
Start: 2022-06-05 | End: 2022-07-05

## 2022-06-05 RX ORDER — AMLODIPINE BESYLATE 5 MG/1
5 TABLET ORAL DAILY
Qty: 30 TABLET | Refills: 2 | Status: SHIPPED | OUTPATIENT
Start: 2022-06-05 | End: 2022-07-05

## 2022-06-05 RX ORDER — HYDROCORTISONE 1 %
CREAM (GRAM) TOPICAL 3 TIMES DAILY
Qty: 20 G | Refills: 0 | Status: SHIPPED | OUTPATIENT
Start: 2022-06-05 | End: 2022-06-10

## 2022-06-05 RX ORDER — LOSARTAN POTASSIUM 50 MG/1
50 TABLET ORAL DAILY
Qty: 30 TABLET | Refills: 2 | Status: SHIPPED | OUTPATIENT
Start: 2022-06-06 | End: 2022-07-06

## 2022-06-05 RX ORDER — CLONIDINE HYDROCHLORIDE 0.1 MG/1
0.1 TABLET ORAL 2 TIMES DAILY
Qty: 6 TABLET | Refills: 0 | Status: SHIPPED | OUTPATIENT
Start: 2022-06-05 | End: 2022-06-08

## 2022-06-05 RX ORDER — NALOXONE HYDROCHLORIDE 4 MG/.1ML
1 SPRAY NASAL ONCE
Qty: 2 EACH | Refills: 0 | Status: SHIPPED | OUTPATIENT
Start: 2022-06-05 | End: 2022-06-05

## 2022-06-05 RX ADMIN — AMLODIPINE BESYLATE 5 MG: 5 TABLET ORAL at 09:06

## 2022-06-05 RX ADMIN — LOSARTAN POTASSIUM 50 MG: 50 TABLET, FILM COATED ORAL at 08:06

## 2022-06-05 RX ADMIN — POTASSIUM CHLORIDE 30 MEQ: 1500 TABLET, EXTENDED RELEASE ORAL at 08:06

## 2022-06-05 RX ADMIN — FAMOTIDINE 20 MG: 20 TABLET ORAL at 08:06

## 2022-06-05 RX ADMIN — VANCOMYCIN HYDROCHLORIDE 1000 MG: 1 INJECTION, POWDER, LYOPHILIZED, FOR SOLUTION INTRAVENOUS at 07:06

## 2022-06-05 NOTE — DISCHARGE INSTRUCTIONS
Please follow up with PCP within 2-3 days.   You have been placed on two new blood pressure medicines.   Losartan and amlodipine to be taken daily.   Please make a follow up appointment with cardiologist.   On most recent heart ultrasound, there is severe tricuspid valve regurgitation that results in complete heart failure.   You will need to be further evaluated to discuss treatment options.  Please follow up with [facility] to help with your heroin addiction.   Narcan will be prescribed as requested.

## 2022-06-05 NOTE — ASSESSMENT & PLAN NOTE
Patient endorses strong urge to use with skin crawling sensation that cannot be resolved with any medications other than to use.   Still endorses couple loose bowel movements, but overall improving.   - on discharge will provide course of clonidine and robaxin

## 2022-06-05 NOTE — ASSESSMENT & PLAN NOTE
Aspiration pneumonia vs CAP. Chest x-ray, elevated lactic acid, white count, afebrile currently, patient likely had aspiration pneumonia secondary to drug overdose, was found to be unresponsive oxygen sat in the 40s, was given Narcan, patient became more alert, continued to be hypoxic, corrected with non-rebreather, patient on continued BiPAP therapy currently, ABG reviewed, hypoxia and hypercapnia noted, expected improved with BiPAP therapy, started on IV antibiotic, patient a history of endocarditis with septic emboli, monitor, may need CT to evaluate if not improved.  - completed zosyn, levaquin, vancomycin x 2days  - 6/2 leukocytosis resolved, afebrile, last Tmax on 5/31 100.6F  - completed 8 days of IV antibiotics, adequately covering for E.coli UTI and pneumonia with ceftriaxone and vancomycin

## 2022-06-05 NOTE — NURSING
Pt asked to have advance directive witnessed. Writer assessed of pt understand info on the form and pt agrees with stated info. Staff assisted.

## 2022-06-05 NOTE — PLAN OF CARE
Problem: Adult Inpatient Plan of Care  Goal: Plan of Care Review  Outcome: Met  Goal: Patient-Specific Goal (Individualized)  Outcome: Met  Goal: Absence of Hospital-Acquired Illness or Injury  Outcome: Met  Goal: Optimal Comfort and Wellbeing  Outcome: Met  Goal: Readiness for Transition of Care  Outcome: Met     Problem: Adjustment to Illness (Sepsis/Septic Shock)  Goal: Optimal Coping  Outcome: Met     Problem: Bleeding (Sepsis/Septic Shock)  Goal: Absence of Bleeding  Outcome: Met     Problem: Glycemic Control Impaired (Sepsis/Septic Shock)  Goal: Blood Glucose Level Within Desired Range  Outcome: Met     Problem: Infection Progression (Sepsis/Septic Shock)  Goal: Absence of Infection Signs and Symptoms  Outcome: Met     Problem: Nutrition Impaired (Sepsis/Septic Shock)  Goal: Optimal Nutrition Intake  Outcome: Met     Problem: Infection  Goal: Absence of Infection Signs and Symptoms  Outcome: Met     Problem: Skin Injury Risk Increased  Goal: Skin Health and Integrity  Outcome: Met     Problem: Fluid Imbalance (Pneumonia)  Goal: Fluid Balance  Outcome: Met     Problem: Infection (Pneumonia)  Goal: Resolution of Infection Signs and Symptoms  Outcome: Met     Problem: Respiratory Compromise (Pneumonia)  Goal: Effective Oxygenation and Ventilation  Outcome: Met

## 2022-06-05 NOTE — ASSESSMENT & PLAN NOTE
"Per psychiatry counseling on detox being discussed, but patient does not want inpatient detox.  She has knowledge that without detox followed by cardiovascular procedure, patient will die from heart failure if not from possibly another overdose.  - outpatient detox proposed by psychiatry involves keeping patient opiate free for 7 days and then administering Vivitrol outpatient available at [facility] in [place].   6/4: Early morning, while discussing plans for Vivitrol injection on Monday morning patient endorses having used "left over drug" in her purse two days ago. She states she continues to have strong urge to use that will "never go away" for her.  Patient fully aware of her current substance abuse and repeatedly states she is not prepared for any detox regimen. Declines meeting with specialists at [facility].   - on discharge Narcan to fill with pharmacy        "

## 2022-06-05 NOTE — DISCHARGE SUMMARY
"[facility] Medicine  Discharge Summary      Patient Name: Марина Britton  MRN: [id number]  Patient Class: IP- Inpatient  Admission Date: 5/29/2022  Hospital Length of Stay: 7 days  Discharge Date and Time:  06/05/2022 9:13 AM  Attending Physician: Josue Mcdonald DO   Discharging Provider: Josue Mcdonald DO  Primary Care Provider: Primary Doctor No      HPI:   32-year-old female with a history of chronic substance abuse, IV heroin abuse, history of endocarditis and septic arthritis from IV drug abuse, presents to the emergency department after an accidental overdose which EMS believes to be heroin. She was found unresponsive at hotel room, given Narcan, now awake, but became hypoxic. Upon arrival to the emergency department her oxygen saturation is 42% on room air, trends up with a non-rebreather.  Patient denies suicidal ideations.  Patient with repetitive behavior, had had multiple overdoses in the past, last year hospitalized or endocarditis, was supposed to have valve replacement however had not completed, not compliant with medication      * No surgery found *      Hospital Course:   5/30: No acute events overnight. Patient slept through. She is started on IV antibiotics for bilateral multifocal pneumonia. Patient slept well on BiPAP for CO2 retention. No complaints of diarrhea, vomiting or nausea, restlessness. Does complain of mild abdominal pain, joint aches. Patient recalls where she was last found in [facility] where she was alone "snorting heroin." She agrees to meet with psychiatry to help manage her addiction and withdrawal symptoms for when she develops them.   Consult cardiology for tricuspid leaflet vegetation, which patient has not had follow up. Continue with current broad spectrum antibiotics.    5/31: Per cardiology, HARI showing severe right sided heart failure, no vegetations found over tricuspid valve. Blood cultures NGTD x2 days, urine culture + for gram negative " ">100k, stable bilateral infiltrate on CXR, zosyn discontinued and will continue ceftriaxone and vancomycin.   6/1: Psychiatry return on Friday with further discussion of detox. Patient states she gets "clean" in the hospital and can never continue once she leaves the facility. Exhibiting more signs of withdrawal with abdominal cramping, diarrhea. Mild elevation in BP likely contributed by withdrawal and chest pain associated with breathing, which becomes more prominent on deep inhalation. Continue D4 vancomycin and ceftriaxone.    6/2: Leukocytosis resolved, intermittent coughing, will continue with pulm hygiene care per respiratory. Adjust maintenance IVF to D51/2NS. Continue ceftriaxone and vancomycin. Withdrawal symptoms controlled at this time. Patient states feeling better from day before. Encourage out of bed into chair activity. Will downgrade from ICU care to step down.   6/3: Overall remains stable, with good control of withdrawal symptoms, diarrhea episodes diminishing, off supplemental oxygen, no chest pain complaints, tolerating PO intake and getting in and out of bed without assistance. Given severity of heart function decline, patient will need to agree to detox for any cardiovascular intervention. Psychiatry to f/u and assist with disposition.   6/4:D7 abx with vanc+ Rocephin (completed vanc+zosyn x first 2 days), vital signs stable, patient endorses she has decided against IP detox. Complains of skin crawling, non-pruritic sensation with strong urge to use. Also admits to using small amount of heroin she still had in her purse two days ago. Original plan for Vivitrol injection on Monday will need to be postponed.   6/5: Completed 8 days of IV antibiotics, adequately treated for UTI and pneumonia. Minimal complaint, endorses two loose bowels movements with no abdominal cramping, tolerated the clonidine to further support withdrawal signs. Patient endorses she will be following up with cardiology " "locally, but still adamant that detox regimen is not for her. She states she was told without corrective intervention, her heart could completely fail within the year. Per her request Thu signed and filed into patient's chart. She requests narcan on discharge.       Goals of Care Treatment Preferences:  Code Status: DNR      Consults:   Consults (From admission, onward)        Status Ordering Provider     Inpatient consult to Psychiatry  Once        Provider:  Carlito Horn III, MD    Completed ELIN MADRID     Inpatient consult to Cardiology  Once        Provider:  Chase Garrison MD    Completed ELIN MADRID     Pharmacy to dose Vancomycin consult  Once        Provider:  (Not yet assigned)   "And" Linked Group Details    Acknowledged HOWIE CURTIS          * Bilateral pneumonia  Aspiration pneumonia vs CAP. Chest x-ray, elevated lactic acid, white count, afebrile currently, patient likely had aspiration pneumonia secondary to drug overdose, was found to be unresponsive oxygen sat in the 40s, was given Narcan, patient became more alert, continued to be hypoxic, corrected with non-rebreather, patient on continued BiPAP therapy currently, ABG reviewed, hypoxia and hypercapnia noted, expected improved with BiPAP therapy, started on IV antibiotic, patient a history of endocarditis with septic emboli, monitor, may need CT to evaluate if not improved.  - completed zosyn, levaquin, vancomycin x 2days  - 6/2 leukocytosis resolved, afebrile, last Tmax on 5/31 100.6F  - completed 8 days of IV antibiotics, adequately covering for E.coli UTI and pneumonia with ceftriaxone and vancomycin        Flexural eczema  Quarter size dry scaly skin patch, pruritic, will add benadryl and topical steroids.   Skin is clearing, continue with topical cream.     Drug withdrawal  Patient endorses strong urge to use with skin crawling sensation that cannot be resolved with any medications other than to use.   Still " "endorses couple loose bowel movements, but overall improving.   - on discharge will provide course of clonidine and robaxin      Chronic hepatitis C without hepatic coma  Initial LFT elevation likely reactive to pneumonia and UTI. Now within normal limits.   HIV negative.   5/31: Hep C antibody positive. Hep C RNA positive.  Hepatitis C Virus (HCV) RNA Detection/Quantification RT-PCR Undetected IU/mL 72 Abnormal     Comment: Result in log IU/mL is 1.86.      Patient is asymptomatic.   Patient understands she will need to follow up with GI/hepatology for further work up to start a curative 8-12 week long antiviral therapy.  - f/u LFTs      Hx of bacterial endocarditis  TTE report from cardiology with no findings of large vegetation over tricuspid valve, large tricupid flail reported. No evidence of endocarditis.   - 5/29 Blood cultures x2 NGTD x5 days  - 5/29 urine culture grew E.coli >100K, resistant to cipro, levofloxacin and bactrim  Patient with severe right heart failure and corrective intervention is required early.   Patient endorses wanting to follow up with cardiologist locally to discuss treatment options.       Opioid use disorder, severe, dependence        IVDU (intravenous drug user)  Per psychiatry counseling on detox being discussed, but patient does not want inpatient detox.  She has knowledge that without detox followed by cardiovascular procedure, patient will die from heart failure if not from possibly another overdose.  - outpatient detox proposed by psychiatry involves keeping patient opiate free for 7 days and then administering Vivitrol outpatient available at [facility] in [place].   6/4: Early morning, while discussing plans for Vivitrol injection on Monday morning patient endorses having used "left over drug" in her purse two days ago. She states she continues to have strong urge to use that will "never go away" for her.  Patient fully aware of her current substance abuse " and repeatedly states she is not prepared for any detox regimen. Declines meeting with specialists at [facility].   - on discharge Narcan to fill with pharmacy            Final Active Diagnoses:    Diagnosis Date Noted POA    PRINCIPAL PROBLEM:  Bilateral pneumonia [J18.9] 05/29/2022 Yes    Flexural eczema [L20.82] 06/02/2022 Yes    Drug withdrawal [F19.239] 05/31/2022 No    Hx of bacterial endocarditis [Z86.79] 05/29/2022 Not Applicable    Chronic hepatitis C without hepatic coma [B18.2] 05/29/2022 Yes    IVDU (intravenous drug user) [F19.90] 10/26/2020 Yes     Chronic      Problems Resolved During this Admission:    Diagnosis Date Noted Date Resolved POA    Hypernatremia [E87.0] 06/02/2022 06/05/2022 No    Overdose [T50.901A] 05/29/2022 06/05/2022 Yes    Acute respiratory failure with hypoxia and hypercarbia [J96.01, J96.02] 05/29/2022 06/05/2022 Yes       Discharged Condition: stable    Disposition: Home or Self Care    Follow Up:   Follow-up Information     Primary Doctor No Follow up in 2 day(s).           Chase Garrison MD. Schedule an appointment as soon as possible for a visit in 1 day(s).    Specialty: Cardiology  Contact information:  [facility] [id number] [phone]                       Patient Instructions:   No discharge procedures on file.    Significant Diagnostic Studies: Labs:   Lab Results   Component Value Date    WBC 7.73 06/03/2022    HGB 9.7 (L) 06/03/2022    HCT 29.5 (L) 06/03/2022    MCV 91 06/03/2022     06/03/2022     BMP:   Recent Labs   Lab 06/04/22  0804 06/05/22  0417    92    142   K 4.3 4.4    112*   CO2 26 25   BUN 8 13   CREATININE 1.1 1.1   CALCIUM 8.5* 8.2*   , CMP   Recent Labs   Lab 06/04/22  0804 06/05/22  0417    142   K 4.3 4.4    112*   CO2 26 25    92   BUN 8 13   CREATININE 1.1 1.1   CALCIUM 8.5* 8.2*   ANIONGAP 9 5*   ESTGFRAFRICA >60.0 >60.0   EGFRNONAA >60.0 >60.0  "    Lab Results   Component Value Date    INR 1.2 03/17/2021    INR 1.2 02/10/2021    INR 1.3 (H) 02/05/2021   , Troponin No results for input(s): TROPONINI in the last 168 hours. and A1C: No results for input(s): HGBA1C in the last 4320 hours.  Radiology: X-Ray: CXR: X-Ray Chest 1 View (CXR): No results found for this visit on 05/29/22.  Echocardiogram:   Transthoracic echo (TTE) complete (Cupid Only):   Results for orders placed or performed during the hospital encounter of 05/29/22   Echo Saline Bubble? No   Result Value Ref Range    BSA 1.62 m2    TDI SEPTAL 0.09 m/s    LV LATERAL E/E' RATIO 5.46 m/s    LV SEPTAL E/E' RATIO 7.89 m/s    Right Atrial Pressure (from IVC) 15 mmHg    EF 55 %    AORTIC VALVE CUSP SEPERATION 1.69 cm    TDI LATERAL 0.13 m/s    PV PEAK VELOCITY 0.45 cm/s    LVIDd 4.05 3.5 - 6.0 cm    IVS 0.79 0.6 - 1.1 cm    Posterior Wall 0.70 0.6 - 1.1 cm    Ao root annulus 3.12 cm    LVIDs 2.90 2.1 - 4.0 cm    FS 28 28 - 44 %    LV mass 86.80 g    LA size 4.20 cm    RVDD 4.07 cm    TAPSE 2.42 cm    Left Ventricle Relative Wall Thickness 0.35 cm    AV mean gradient 3 mmHg    AV valve area 3.08 cm2    AV Velocity Ratio 0.94     AV index (prosthetic) 0.97     MV valve area p 1/2 method 3.71 cm2    E/A ratio 0.99     Mean e' 0.11 m/s    E wave deceleration time 204.36 msec    IVRT 131.49 msec    MV "A" wave duration 16.61 msec    LVOT diameter 2.01 cm    LVOT area 3.2 cm2    LVOT peak wiley 1.00 m/s    LVOT peak VTI 19.11 cm    Ao peak wiley 1.06 m/s    Ao VTI 19.67 cm    LVOT stroke volume 60.61 cm3    AV peak gradient 4 mmHg    TV rest pulmonary artery pressure 55 mmHg    E/E' ratio 6.45 m/s    MV Peak E Wiley 0.71 m/s    TR Max Wiley 3.16 m/s    MV stenosis pressure 1/2 time 59.26 ms    MV Peak A Wiley 0.72 m/s    LV Systolic Volume 32.26 mL    LV Systolic Volume Index 20.2 mL/m2    LV Diastolic Volume 72.10 mL    LV Diastolic Volume Index 45.06 mL/m2    LV Mass Index 54 g/m2    RA Major Axis 6.37 cm    " Triscuspid Valve Regurgitation Peak Gradient 40 mmHg    RA Width 5.87 cm    Narrative    · The left ventricle is normal in size with normal systolic function.  · The estimated ejection fraction is 55%.  · Moderate right ventricular enlargement. Moderate to severe right   ventricuar systolic dysfunction.  · Mild left atrial enlargement.  · Severe right atrial enlargement.  · Interatrial septum bows to left, consider elevated right atrial   pressure.  · Mild aortic regurgitation.  · Mild mitral regurgitation.  · There is flail motion of the anterior tricuspid leaflet.  · Severe tricuspid regurgitation that is degenerative due to previous   endocarditis. No obvious valvular vegetations seen.  · The estimated PA systolic pressure is 55 mmHg.  · There is pulmonary hypertension.     No definite endocarditis seen on the Tricuspid valve. The tricuspid valve   appears to have a flail anterior leaflet with severe regurgitation,   probably due to prior endocarditis.       Medications:  Reconciled Home Medications:      Medication List      START taking these medications    amLODIPine 5 MG tablet  Commonly known as: NORVASC  Take 1 tablet (5 mg total) by mouth once daily.     cloNIDine 0.1 MG tablet  Commonly known as: CATAPRES  Take 1 tablet (0.1 mg total) by mouth 2 (two) times daily. for 3 days     hydrocortisone 1 % cream  Apply topically 3 (three) times daily. for 5 days     loperamide 2 mg capsule  Commonly known as: IMODIUM  Take 1 capsule (2 mg total) by mouth as needed for Diarrhea (after each loose stool for a maximum of 16 mg per 24 hours).     losartan 50 MG tablet  Commonly known as: COZAAR  Take 1 tablet (50 mg total) by mouth once daily.  Start taking on: June 6, 2022     methocarbamoL 500 MG Tab  Commonly known as: ROBAXIN  Take 1 tablet (500 mg total) by mouth 3 (three) times daily. for 7 days        CHANGE how you take these medications    mirtazapine 30 MG tablet  Commonly known as: REMERON  Take 1 tablet (30  mg total) by mouth every evening.  What changed:   · medication strength  · how much to take        STOP taking these medications    acetaminophen 500 MG tablet  Commonly known as: TYLENOL     apixaban 5 mg Tab  Commonly known as: ELIQUIS     doxycycline 100 MG tablet  Commonly known as: VIBRA-TABS     gabapentin 300 MG capsule  Commonly known as: NEURONTIN            Indwelling Lines/Drains at time of discharge:   Lines/Drains/Airways     None               Time spent on the discharge of patient: 30 minutes    Josue Mcdonald DO  Department of Hospital Medicine  [place] [facility] [place] [facility]

## 2022-06-05 NOTE — NURSING
Discharge instructions given to patient, she VU and denied any needs at this time. Pts belongings at bedside. Brought downstairs on wheelchair, accompanied by mother and nurse denied any needs. Pt stable. Lisa Greenwood RN

## 2022-06-05 NOTE — ASSESSMENT & PLAN NOTE
TTE report from cardiology with no findings of large vegetation over tricuspid valve, large tricupid flail reported. No evidence of endocarditis.   - 5/29 Blood cultures x2 NGTD x5 days  - 5/29 urine culture grew E.coli >100K, resistant to cipro, levofloxacin and bactrim  Patient with severe right heart failure and corrective intervention is required early.   Patient endorses wanting to follow up with cardiologist locally to discuss treatment options.

## 2022-06-05 NOTE — PLAN OF CARE
Problem: Adult Inpatient Plan of Care  Goal: Plan of Care Review  6/5/2022 0422 by Yajaira Penn RN  Outcome: Ongoing, Progressing  6/5/2022 0421 by Yajaira Penn RN  Outcome: Ongoing, Progressing  Goal: Patient-Specific Goal (Individualized)  6/5/2022 0422 by Yajaira Penn RN  Outcome: Ongoing, Progressing  6/5/2022 0421 by Yajaira Penn RN  Outcome: Ongoing, Progressing  Goal: Absence of Hospital-Acquired Illness or Injury  6/5/2022 0422 by Yajaira Penn RN  Outcome: Ongoing, Progressing  6/5/2022 0421 by Yajaira Penn RN  Outcome: Ongoing, Progressing  Goal: Optimal Comfort and Wellbeing  6/5/2022 0422 by Yajaira Penn RN  Outcome: Ongoing, Progressing  6/5/2022 0421 by Yajaira Penn RN  Outcome: Ongoing, Progressing  Goal: Readiness for Transition of Care  6/5/2022 0422 by Yajaira Penn RN  Outcome: Ongoing, Progressing  6/5/2022 0421 by Yajaira Penn RN  Outcome: Ongoing, Progressing     Problem: Adjustment to Illness (Sepsis/Septic Shock)  Goal: Optimal Coping  6/5/2022 0422 by Yajaira Penn RN  Outcome: Ongoing, Progressing  6/5/2022 0421 by Yajaira Penn RN  Outcome: Ongoing, Progressing     Problem: Bleeding (Sepsis/Septic Shock)  Goal: Absence of Bleeding  6/5/2022 0422 by Yajaira Penn RN  Outcome: Ongoing, Progressing  6/5/2022 0421 by Yajaira Penn RN  Outcome: Ongoing, Progressing     Problem: Glycemic Control Impaired (Sepsis/Septic Shock)  Goal: Blood Glucose Level Within Desired Range  6/5/2022 0422 by Yajaira Penn RN  Outcome: Ongoing, Progressing  6/5/2022 0421 by Yajaira Penn RN  Outcome: Ongoing, Progressing     Problem: Infection Progression (Sepsis/Septic Shock)  Goal: Absence of Infection Signs and Symptoms  6/5/2022 0422 by Yajaira Penn RN  Outcome: Ongoing, Progressing  6/5/2022 0421 by Yajaira Penn RN  Outcome: Ongoing, Progressing     Problem: Nutrition Impaired (Sepsis/Septic Shock)  Goal: Optimal Nutrition Intake  6/5/2022 0422 by Yajaira  MILLY Penn  Outcome: Ongoing, Progressing  6/5/2022 0421 by Yajaira Penn RN  Outcome: Ongoing, Progressing     Problem: Infection  Goal: Absence of Infection Signs and Symptoms  6/5/2022 0422 by Yajaira Penn RN  Outcome: Ongoing, Progressing  6/5/2022 0421 by Yajaira Penn RN  Outcome: Ongoing, Progressing     Problem: Skin Injury Risk Increased  Goal: Skin Health and Integrity  6/5/2022 0422 by Yajaira Penn RN  Outcome: Ongoing, Progressing  6/5/2022 0421 by Yajaira Penn RN  Outcome: Ongoing, Progressing     Problem: Fluid Imbalance (Pneumonia)  Goal: Fluid Balance  6/5/2022 0422 by Yajaira Penn RN  Outcome: Ongoing, Progressing  6/5/2022 0421 by Yajaira Penn RN  Outcome: Ongoing, Progressing     Problem: Infection (Pneumonia)  Goal: Resolution of Infection Signs and Symptoms  6/5/2022 0422 by Yajaira Penn RN  Outcome: Ongoing, Progressing  6/5/2022 0421 by Yajaira Penn RN  Outcome: Ongoing, Progressing     Problem: Respiratory Compromise (Pneumonia)  Goal: Effective Oxygenation and Ventilation  6/5/2022 0422 by Yajaira Penn RN  Outcome: Ongoing, Progressing  6/5/2022 0421 by Yajaira Penn RN  Outcome: Ongoing, Progressing

## 2022-06-05 NOTE — SUBJECTIVE & OBJECTIVE
Interval History: No acute events overnight. Patient requests to be discharged from hospital as she no longer desires Vivitrol treatment as discussed with psychiatry and  on Friday.     Review of Systems   Constitutional:  Negative for activity change, appetite change, chills, fatigue and fever.   HENT:  Negative for rhinorrhea, sneezing and sore throat.    Eyes:  Negative for pain and redness.   Respiratory:  Positive for cough (still intermittent). Negative for choking, chest tightness, shortness of breath and wheezing.    Cardiovascular:  Negative for chest pain, palpitations and leg swelling.   Gastrointestinal:  Positive for diarrhea (1-2x overnight). Negative for abdominal pain, blood in stool, nausea and vomiting.   Genitourinary:  Negative for difficulty urinating, dyspareunia, dysuria and flank pain.   Musculoskeletal:  Negative for arthralgias, back pain, gait problem and myalgias.   Neurological:  Negative for dizziness, seizures, light-headedness and headaches.   Psychiatric/Behavioral:  Negative for agitation, behavioral problems, confusion, sleep disturbance and suicidal ideas. The patient is not nervous/anxious.    Objective:     Vital Signs (Most Recent):  Temp: 97.8 °F (36.6 °C) (06/05/22 0800)  Pulse: 74 (06/05/22 0816)  Resp: (!) 21 (06/05/22 0816)  BP: (!) 146/104 (06/05/22 0816)  SpO2: 100 % (06/05/22 0816)   Vital Signs (24h Range):  Temp:  [97.8 °F (36.6 °C)-98.9 °F (37.2 °C)] 97.8 °F (36.6 °C)  Pulse:  [47-89] 74  Resp:  [16-38] 21  SpO2:  [92 %-100 %] 100 %  BP: (114-162)/() 146/104     Weight: 60.3 kg (132 lb 15 oz)  Body mass index is 24.31 kg/m².    Intake/Output Summary (Last 24 hours) at 6/5/2022 0850  Last data filed at 6/5/2022 0500  Gross per 24 hour   Intake 720.05 ml   Output --   Net 720.05 ml      Physical Exam  Vitals reviewed.   Constitutional:       General: She is not in acute distress.     Appearance: Normal appearance. She is not ill-appearing or  "toxic-appearing.   HENT:      Head: Normocephalic.      Right Ear: External ear normal.      Left Ear: External ear normal.      Nose: Nose normal. No congestion.      Mouth/Throat:      Mouth: Mucous membranes are dry.   Eyes:      Extraocular Movements: Extraocular movements intact.   Cardiovascular:      Rate and Rhythm: Normal rate and regular rhythm.      Pulses: Normal pulses.      Heart sounds: Murmur heard.   Pulmonary:      Effort: Pulmonary effort is normal. No respiratory distress.      Breath sounds: No wheezing or rales.   Chest:      Chest wall: No tenderness.   Abdominal:      General: Abdomen is flat. There is no distension.      Palpations: Abdomen is soft.      Tenderness: There is no abdominal tenderness. There is no right CVA tenderness, left CVA tenderness or guarding.   Musculoskeletal:         General: No swelling or tenderness. Normal range of motion.      Cervical back: Normal range of motion. No rigidity or tenderness.      Right lower leg: No edema.      Left lower leg: No edema.   Lymphadenopathy:      Cervical: No cervical adenopathy.   Skin:     General: Skin is warm and dry.      Comments: Right femoral line in place, no induration, erythema, or drainage. Multiple healed laceration "cutting marks" over right forearm. Multiple facial bruising clearing.  Dry scaly patch clearing over left inner calf   Neurological:      General: No focal deficit present.      Mental Status: She is alert and oriented to person, place, and time.      Motor: No weakness.      Gait: Gait normal.   Psychiatric:         Thought Content: Thought content normal.       Significant Labs: All pertinent labs within the past 24 hours have been reviewed.  BMP:   Recent Labs   Lab 06/05/22  0417   GLU 92      K 4.4   *   CO2 25   BUN 13   CREATININE 1.1   CALCIUM 8.2*     Recent Labs   Lab 06/04/22  0804 06/05/22  0417    142   K 4.3 4.4    112*   CO2 26 25    92   BUN 8 13   CREATININE " 1.1 1.1   CALCIUM 8.5* 8.2*   ANIONGAP 9 5*   EGFRNONAA >60.0 >60.0     Significant Imaging: I have reviewed all pertinent imaging results/findings within the past 24 hours.

## 2022-06-11 LAB
FENTANYL UR CFM-MCNC: NEGATIVE NG/ML
NORFENTANYL UR CFM-MCNC: 2.2 NG/ML
TOXICOLOGIST REVIEW: NORMAL

## 2022-08-10 NOTE — PROGRESS NOTES
[facility] - ICU 15 [facility] Medicine  Telemedicine Progress Note    Patient Name: Марина Britton  MRN: [id number]  Patient Class: IP- Inpatient   Admission Date: 10/26/2020  Length of Stay: 22 days  Attending Physician: Tila Saez MD  Primary Care Provider: Luis Felipe Jose Iii, MD    [facility] Medicine Team: [facility] VIRTUAL TEAM 10 Tila Saez MD  Virtual Telemedicine Progress Note  Start time: 1420  Chief complaint: Endocarditis of tricuspid valve  The patient location is: [id number] A  The patient arrived at: 10/26/2020  7:45 PM  Present with the patient at the time of the telemed/virtual assessment: n/a  End time:  1425  Total time spent with patient: 5 min  I have assessed findings virtually using a telemedicine platform and with assistance of the bedside nurse or telemedicine presenter.  The attending portion of this evaluation, treatment, and documentation was performed per Tila Saez MD via audiovisual.    Patient was transferred to the telemedicine service on: 11/15/2020    Subjective:     Admission CC:   Chief Complaint   Patient presents with    Foot Pain       Fever and left foot pain for the past 3 days. Foot pain worse today Here 3 days ago for foot abscess, prescribed clindamycin.    Chest Pain       Sharp chest pain when taking a deep breath that started earlier today, Denies n/v. +SOB     Follow up visit for: Endocarditis of tricuspid valve    Interval History / Events Overnight:   The patient is able to provide adequate history. Additional history was obtained from past medical records and chart review. No significant events reported by Nursing.  Patient complains of dyspnea and nonproductive cough. Symptoms have been unchanged since yesterday. Associated symptoms include: pleuritic chest pain and fatigue. Symptoms are increasing in both severity and frequency. Alleviating factors include: nothing.  SpO2 98% on RA    Data reviewed 11/17/2020: Lab test(s)  Call one of the offices below to establish a primary care provider.  If you are unable to get an appointment and feel it is an emergency and need to be seen immediately please return to the Emergency Department.    Call one of the office below to set up a primary care provider.    Dr. Luis Alcantara                                                                                                       [address] [facility] [address]  [phone]    Dr. Sofia, Dr. STEPH Pollock, Dr. SARAH Pollock ([facility])  [address] [facility] [address]  [phone]    Dr. Pulliam, Dr. Jason, Dr. Lopez ([facility])  [address] [facility] [address]  [phone]    Dr. Mar  [address] [facility] [address]  [phone]    Dr. Choudhary, Dr. Morgan, Dr. Schmitz, Dr. Koenig ([facility])  [address] [place] [address] [address]  [facility] [address]  [phone]    Dr. Ariane Mejia  [address] [facility] [place] [address]  [phone]    Dr. Ana Smith  [address]   [address]  [facility] [address]  [phone]    Dr. Alcantara  [address] [facility] [address]  [phone]    Dr. Block, Dr. Boston  [address] [facility] [place] [address]  [phone]    Dr. Garza  [address]                                                              [address]  [facility] [address]  [phone]    Dr. Macedo  [address] [facility] [address]  [phone]    Dr. Bernie Pendleton  [address]  [address]  [place] [address]  [phone]    Dr. Carlos and [facility]   [address] [facility]  [address]  [place] [address]  [phone]                  reviewed: H&H stable    Review of Systems   Constitutional: Positive for fever. Negative for appetite change.   Respiratory: Positive for cough, chest tightness and shortness of breath.    Cardiovascular: Positive for chest pain.   Gastrointestinal: Negative for diarrhea.     Objective:     Vital Signs (Most Recent):  Temp: 100.1 °F (37.8 °C) (11/17/20 1457)  Pulse: (!) 124 (11/17/20 1123)  Resp: 18 (11/17/20 1123)  BP: 125/86 (11/17/20 1123)  SpO2: (!) 94 % (11/17/20 1123) Vital Signs (24h Range):  Temp:  [98.8 °F (37.1 °C)-100.1 °F (37.8 °C)] 100.1 °F (37.8 °C)  Pulse:  [] 124  Resp:  [18-20] 18  SpO2:  [88 %-100 %] 94 %  BP: (114-130)/(76-94) 125/86     Weight: 88.5 kg (195 lb)  Body mass index is 35.67 kg/m².  No intake or output data in the 24 hours ending 11/17/20 1639   Physical Exam  Constitutional:       General: She is not in acute distress.     Appearance: Normal appearance. She is not diaphoretic.   Eyes:      General: Lids are normal. No scleral icterus.        Right eye: No discharge.         Left eye: No discharge.      Conjunctiva/sclera: Conjunctivae normal.   Cardiovascular:      Rate and Rhythm: Tachycardia present.   Pulmonary:      Effort: Pulmonary effort is normal. No tachypnea, accessory muscle usage or respiratory distress.   Abdominal:      General: There is no distension.   Musculoskeletal:      Right lower leg: Edema present.      Left lower leg: Edema present.   Skin:     Coloration: Skin is not cyanotic.   Neurological:      Mental Status: She is alert. She is not disoriented.   Psychiatric:         Attention and Perception: Attention normal.         Mood and Affect: Affect normal.         Speech: Speech normal.         Behavior: Behavior is cooperative.         Significant Labs:     Recent Labs   Lab 11/11/20  1257 11/12/20  0643 11/16/20  0353   WBC 12.47 14.47* 12.84*   HGB 8.6* 8.0* 8.9*   HCT 28.1* 28.0* 31.0*    258 375*     Recent Labs   Lab 11/11/20  1257  11/12/20  0643 11/16/20  0353   GRAN 79.0*  9.9* 80.0*  11.6* 76.8*  9.9*   LYMPH 10.1*  1.3 9.6*  1.4 15.2*  2.0   MONO 5.8  0.7 6.9  1.0 5.5  0.7   EOS 0.3 0.2 0.1     Recent Labs   Lab 11/12/20  0643 11/14/20  0821 11/16/20  0353   * 138 142   K 4.2 4.0 3.3*    106 103   CO2 22* 22* 30*   BUN 24* 34* 28*   CREATININE 1.3 0.9 1.0   GLU 99 123* 80   CALCIUM 7.5* 8.2* 8.0*   ALBUMIN 1.5* 1.8* 1.7*   MG 1.6 1.7 1.6     Recent Labs   Lab 11/12/20  0643 11/14/20  0821 11/16/20  0353   ALKPHOS 110 105 169*   ALT 9* 19 67*   AST 15 21 62*   PROT 6.3 6.7 6.4   BILITOT 0.5 0.3 0.5     Procalcitonin (ng/mL)   Date Value   11/16/2020 0.10   11/12/2020 0.22     Lactate (Lactic Acid) (mmol/L)   Date Value   10/26/2020 1.8     BNP (pg/mL)   Date Value   11/13/2020 557 (H)     CRP   Date Value   11/16/2020 54.0 mg/L (H)   11/12/2020 111.2 mg/L (H)   11/07/2020 174.2 mg/L (H)   10/24/2020 31.00 mg/dL (H)     D-Dimer (mg/L FEU)   Date Value   11/16/2020 5.13 (H)   11/14/2020 7.36 (H)   11/12/2020 5.98 (H)   10/24/2020 8.22 (H)     Ferritin (ng/mL)   Date Value   11/16/2020 567 (H)   11/14/2020 767 (H)   11/10/2020 686 (H)     LD (U/L)   Date Value   11/16/2020 233   10/28/2020 382 (H)     Troponin I (ng/mL)   Date Value   10/24/2020 <0.020   10/24/2020 0.055 (H)     CPK (U/L)   Date Value   10/29/2020 55   10/24/2020 17 (L)   10/24/2020 17 (L)     Results for orders placed or performed during the hospital encounter of 10/26/20   Vitamin D   Result Value Ref Range    Vit D, 25-Hydroxy 9 (L) 30 - 96 ng/mL     SARS-CoV2 (COVID-19) Qualitative PCR (no units)   Date Value   11/11/2020 Detected (A)     SARS-CoV-2 RNA, Amplification, Qual (no units)   Date Value   11/17/2020 Negative   10/26/2020 Negative   10/24/2020 Negative       Echo Color Flow Doppler? Yes  · The left ventricle is normal in size with normal systolic function. The   estimated ejection fraction is 60%.  · Moderate right ventricular enlargement  with normal right ventricular   systolic function.  · Normal left ventricular diastolic function.  · Severe right atrial enlargement.  · The anterior leaflet of the tricuspid valve is completely flail with   resultant severe regurgitation. Valve and chordal tissue prolapsing well   back into the right atrium likely have associated vegetation, but   distinguishing between vegetation, valve, and chord is difficult, even   with these good images.  · The estimated PA systolic pressure is 54 mmHg.  · Elevated central venous pressure (15 mmHg).       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] 11/03/2020 Yes     Chronic    Substance induced mood disorder [F19.94] 11/03/2020 Yes     Chronic    Acute renal insufficiency [N28.9] 11/03/2020 No    MRSA bacteremia [R78.81, B95.62] 10/27/2020 Yes    Pulmonary emboli [I26.99] 10/26/2020 Yes    IVDU (intravenous drug user) [F19.90] 10/26/2020 Yes     Chronic    Pneumonia of both lungs due to infectious organism [J18.9] 10/24/2020 Yes      Problems Resolved During this Admission:    Diagnosis Date Noted Date Resolved POA    Hyperkalemia [E87.5] 11/04/2020 11/15/2020 No    Sepsis with acute hypoxic respiratory failure [A41.9, R65.20, J96.01] 10/27/2020 10/27/2020 Yes    Severe sepsis [A41.9, R65.20] 10/26/2020 11/04/2020 Yes    Tachypnea [R06.82] 10/26/2020 11/04/2020 Yes       Overview / ICU Course:    Марина Britton is a 30 y.o. female admitted for Endocarditis of  tricuspid valve. Patient with HCV, IVDU who was transferred from [facility] where she presented with chest pain and was diagnosed with MRSA bacteremia associated with TV IE with septic emboli, acute hypoxic respiratory failure, septic/metabolic encephalopathy, septic PE, septic shock, and acute anemia. She [place] [facility] on 10[place]27 and was intubated for respiratory failure. She has subsequently been evaluated by ID, CTS, and Psychiatry. She required pRBC transfusion on 10/29, and norepi on 10/30. She developed acute renal insufficiency which subsequently resolved. She was extubated on 11/2, then transferred to Hospital Medicine on 11/3 for further care, planning on long-term IV antibiotics with vancomycin and repeat CTS evaluation as an outpatient. Subsequently her acute renal insufficiency returned, and again febrile for which BCx were obtained on 11/4 and 11/5 and have remained NGTD. ID was reconsulted on 11/6, and cefepime was added to her regimen. CT C/A/P obtained shows continued infectious lung processes. TTE shows progressive valvular damage with flail valve and severe MR, for which she is being diuresed.  Covid testing ordered for placement resulted positive 11/11, patient transferred to COVID19 isolation unit. Monitoring for signs/symptoms of COVID-19 and awaiting LTAC placement.    Inpatient Medications Prescribed for Management of Current Problems:     Scheduled Meds:    amLODIPine  10 mg Oral Daily    ascorbic acid (vitamin C)  500 mg Oral BID    dextromethorphan-guaifenesin  mg  1 tablet Oral BID    enoxaparin  40 mg Subcutaneous Q24H    furosemide  40 mg Oral Daily    gabapentin  300 mg Oral TID    magnesium oxide  400 mg Oral BID    mirtazapine  15 mg Oral QHS    mupirocin   Nasal BID    oxyCODONE  20 mg Per OG tube Q6H    polyethylene glycol  17 g Oral Daily    remdesivir infusion  200 mg Intravenous Once    Followed by    [START ON 11/18/2020] remdesivir infusion  " 100 mg Intravenous Q24H    senna-docusate 8.6-50 mg  2 tablet Oral BID    sodium chloride 0.9%  10 mL Intravenous Q6H    [START ON 11/18/2020] vancomycin (VANCOCIN) IVPB  1,500 mg Intravenous Q24H    vitamin D  1,000 Units Oral Daily     Continuous Infusions:   As Needed: sodium chloride, acetaminophen, acetaminophen, acetaminophen, calcium carbonate, diphenhydrAMINE, hydrOXYzine HCL, melatonin, oxyCODONE, polyethylene glycol, sodium chloride 0.9%, Flushing PICC Protocol **AND** sodium chloride 0.9% **AND** sodium chloride 0.9%, valproate sodium (DEPACON) IVPB, vancomycin - pharmacy to dose    Assessment and Plan by Problem    MRSA bacteremia  Pneumonia of both lungs due to infectious organism  Pulmonary emboli (septic)  Sepsis  Endocarditis of tricuspid valve  - Febrile intermittently, BCx from 11/4 and 11/5 NGTD. Per ID, she will likely continue to have intermittent fevers from her infectious process  - Continue vancomycin (pharmacy dosing). She has completed five days of cefepime  - PT/OT following and recommending IPR, LTAC referrals out  - Needs 6 weeks IV antibiotics, then follow up with CTS     Flail tricuspid valve  Severe tricuspid regurgitation  Lower extremity edema  - Repeat TTE shows more advanced disease, tricuspid valve now flail, but also "distinguishing between vegetation, valve, and chord is difficult, even with these good images" per interpreting Cardiologist. Also shows elevated CVP.   - LE edema improving with Lasix  - Continue IV diuresis  - Previous provider discussed findings and ongoing fevers with Dr. Pritchett on 11/10 who recommended continued medical management as her current deconditioned status is a contraindication to surgical intervention     COVID infection  Stopped Dexamethasone 11/13, since not requiring O2 at this time.  - COVID-19 testing: Collection Date: 11/11/2020 Collection Time:  11:09 AM   - Isolation: Airborne/Droplet. Surgical mask on patient. Notify Infection " Control  - Diagnostics: Trend Q48hrs if stable, more frequently if patient decompensating        Laboratory/Study Frequency Result   CBC Admit & Q48h     CMP Admit & Q48h     Magnesium level Admit     D-dimer Admit & Q48h 8.2 > 5.9   Ferritin Admit & Q48h 686   CRP Admit & Q48h 31 > 174 > 111   CPK Admit & Q24h if elevated     LDH Admit & Q48h 382   Vitamin D Admit ordered   BNP Admit ordered   Troponin Admit & Q6h if elevated     Glucose-6-phos dehydrogenase Admit     Procalcitonin Admit 0.22   Lipid Panel If using statin     Rapid influenza Admit     Resp Infection Panel Admit if BMT/organ transplant     Legionella Antigen Admit     Sputum Culture Admit     Blood Culture Admit     Urinalysis & Culture Admit     ECG Admit & Q48h if on HCQ     CXR Admit     Lymphopenia, hyponatremia, hyperferritinemia, elevated troponin, elevated d-dimer, age, and medical comorbidities are significant predictors of poor clinical outcome     - Management: per [facility] COVID Treatment Protocol (4/15/20)    - Monitoring:              - Telemetry & Continuous Pulse Oximetry    - Nutrition:               - Multivitamin PO daily              - Add Boost supplement              - Vitamin D 1000IU daily as she is Vitamin D deficient              - Ascorbic acid 500mg PO bid    - Supportive Care:              - acetaminophen 650mg PO Q6hr PRN fever/headache              - loperamide PRN viral diarrhea              - IVF if indicated, restrictive strategy preferred, no maintenance IV if able              - VTE PPx: enoxaparin or heparin SQ unless contraindicated    - Antibiotics:  - if indications, CXR findings, elevated procal. See protocol for alternatives.   - Discontinue early if low concern for bacterial co-infection              - ceftriaxone 1g Q24h x 5 days - not started  - azithromycin 500mg po x1, then 250mg po daily x 4 days - not started    - Therapies:              - If patient meets criteria  - atorvastatin 40mg po daily  -  Consult for RDV; febrile, meets criteria - RDV started 11/17     Acute renal insufficiency  Hyperkalemia  Hyponatremia  - sCr stable, steady with diuresis   - Etiology likely multifactorial with recent sepsis and vancomycin and poor PO intake  - Urine studies consistent with pre-renal (though cardiorenal would have a similar picture)     IVDU (intravenous drug user)  Opioid use disorder, severe, dependence  - Psych evaluated earlier this admission  - Would benefit from LTAC disposition     Substance induced mood disorder  -continue mirtazepine 15mg HS  -gabapentin 300 TID     Opioid Use disorder  Continue oxycodone 20mg q6 pRN  Continue discussion MAT with patient  Consider Addiction Psych     Chronic inflammatory anemia  - Hemoglobin 6.9 g/dL 11/10, given one unit of pRBCs  - Iron studies consistent with inflammatory process, in keeping with her systemic infection  - Monitor     Wounds  Left foot and sacral spine  Wound care following. Appreciate recs.     LE edema  Mixed etiology, IV fluids and low albumin  Elevate feet, improve nutritional status  Lasix daily. Transition to PO, goal is euvolemia     Malnutrition  - Nutrition following  - Added Boost to all meals      Diet: Diet Adult Regular (IDDSI Level 7)  GI Prophylaxis: Not indicated  Significant LDAs:   IV Access Type: PICC  Urinary Catheter Indication if present: Patient Does Not Have Urinary Catheter  Other Lines/Tubes/Drains:    HIGH RISK CONDITION(S):   Patient is currently on drug therapy requiring intensive monitoring for toxicity: Vancomycin     Goals of Care:    Previous admission:  10/24/20  Likely prognosis:  Poor  Code Status: Full Code  Comfort Only: No  Hospice: No  Goals at discharge: remain at home, with physician follow-up    Discharge Planning   VIANNEY: 11/19/2020     Code Status: Full Code   Is the patient medically ready for discharge?: No    Reason for patient still in hospital (select all that apply): Patient trending condition and Pending  disposition  Discharge Plan A: Long-term acute care facility (LTAC)   Discharge Delays: None known at this time    VTE Risk Mitigation (From admission, onward)         Ordered     enoxaparin injection 40 mg  Every 24 hours      11/08/20 0838     Place sequential compression device  Until discontinued      10/26/20 2023                   Tila Saez MD  Department of Hospital Medicine   [facility] - ICU 15 [facility]

## 2022-10-31 ENCOUNTER — HOSPITAL ENCOUNTER (EMERGENCY)
Facility: HOSPITAL | Age: 32
Discharge: HOME | End: 2022-10-31
Attending: EMERGENCY MEDICINE
Payer: MEDICAID

## 2022-10-31 VITALS
OXYGEN SATURATION: 99 % | DIASTOLIC BLOOD PRESSURE: 72 MMHG | TEMPERATURE: 98 F | SYSTOLIC BLOOD PRESSURE: 111 MMHG | RESPIRATION RATE: 16 BRPM | HEART RATE: 96 BPM | BODY MASS INDEX: 23.92 KG/M2 | HEIGHT: 62 IN | WEIGHT: 130 LBS

## 2022-10-31 DIAGNOSIS — M54.30 SCIATICA, UNSPECIFIED LATERALITY: Primary | ICD-10-CM

## 2022-10-31 DIAGNOSIS — N39.0 URINARY TRACT INFECTION WITHOUT HEMATURIA, SITE UNSPECIFIED: ICD-10-CM

## 2022-10-31 LAB
B-HCG UR QL: NEGATIVE
BACTERIA #/AREA URNS HPF: ABNORMAL /HPF
BILIRUB UR QL STRIP: ABNORMAL
CLARITY UR: ABNORMAL
COLOR UR: YELLOW
GLUCOSE UR QL STRIP: NEGATIVE
HGB UR QL STRIP: ABNORMAL
HYALINE CASTS #/AREA URNS LPF: 0 /LPF
KETONES UR QL STRIP: NEGATIVE
LEUKOCYTE ESTERASE UR QL STRIP: ABNORMAL
MICROSCOPIC COMMENT: ABNORMAL
NITRITE UR QL STRIP: POSITIVE
PH UR STRIP: 6 [PH] (ref 5–8)
PROT UR QL STRIP: ABNORMAL
RBC #/AREA URNS HPF: 3 /HPF (ref 0–4)
SP GR UR STRIP: 1.02 (ref 1–1.03)
SQUAMOUS #/AREA URNS HPF: >36 /HPF
URN SPEC COLLECT METH UR: ABNORMAL
UROBILINOGEN UR STRIP-ACNC: ABNORMAL EU/DL
WBC #/AREA URNS HPF: >100 /HPF (ref 0–5)
YEAST URNS QL MICRO: ABNORMAL

## 2022-10-31 PROCEDURE — 87077 CULTURE AEROBIC IDENTIFY: CPT | Performed by: NURSE PRACTITIONER

## 2022-10-31 PROCEDURE — 81000 URINALYSIS NONAUTO W/SCOPE: CPT | Performed by: NURSE PRACTITIONER

## 2022-10-31 PROCEDURE — 96372 THER/PROPH/DIAG INJ SC/IM: CPT | Performed by: NURSE PRACTITIONER

## 2022-10-31 PROCEDURE — 81025 URINE PREGNANCY TEST: CPT | Performed by: NURSE PRACTITIONER

## 2022-10-31 PROCEDURE — 87088 URINE BACTERIA CULTURE: CPT | Performed by: NURSE PRACTITIONER

## 2022-10-31 PROCEDURE — 87186 SC STD MICRODIL/AGAR DIL: CPT | Performed by: NURSE PRACTITIONER

## 2022-10-31 PROCEDURE — 99284 EMERGENCY DEPT VISIT MOD MDM: CPT

## 2022-10-31 PROCEDURE — 87086 URINE CULTURE/COLONY COUNT: CPT | Performed by: NURSE PRACTITIONER

## 2022-10-31 PROCEDURE — 63600175 PHARM REV CODE 636 W HCPCS: Performed by: NURSE PRACTITIONER

## 2022-10-31 RX ORDER — METHOCARBAMOL 500 MG/1
1000 TABLET, FILM COATED ORAL 2 TIMES DAILY PRN
Qty: 20 TABLET | Refills: 0 | Status: SHIPPED | OUTPATIENT
Start: 2022-10-31 | End: 2022-11-05

## 2022-10-31 RX ORDER — CEPHALEXIN 500 MG/1
500 CAPSULE ORAL 4 TIMES DAILY
Qty: 28 CAPSULE | Refills: 0 | Status: ON HOLD | OUTPATIENT
Start: 2022-10-31 | End: 2022-11-09

## 2022-10-31 RX ORDER — KETOROLAC TROMETHAMINE 30 MG/ML
30 INJECTION, SOLUTION INTRAMUSCULAR; INTRAVENOUS
Status: COMPLETED | OUTPATIENT
Start: 2022-10-31 | End: 2022-10-31

## 2022-10-31 RX ORDER — DICLOFENAC SODIUM 75 MG/1
75 TABLET, DELAYED RELEASE ORAL 2 TIMES DAILY
Qty: 10 TABLET | Refills: 0 | Status: SHIPPED | OUTPATIENT
Start: 2022-10-31 | End: 2022-11-05

## 2022-10-31 RX ADMIN — KETOROLAC TROMETHAMINE 30 MG: 30 INJECTION, SOLUTION INTRAMUSCULAR at 01:10

## 2022-10-31 NOTE — ED PROVIDER NOTES
"Encounter Date: 10/31/2022       History     Chief Complaint   Patient presents with    Leg Pain    Back Pain     Pt stated that for the past week since moving a "bird bath" she has been experiencing lower back pain radiating to right thigh.      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  Patient reports that she began with gradual onset right lower back pain radiating into the right buttock and right posterior thigh after lifting a bird bath.  She reports the pain is intermittent in nature, exacerbated by movement and ambulation, relieved with rest.  She states that back pain has improved, however continues to experience pain in the right buttock and right thigh.  She also reports concerns regarding possible urinary tract infection due to experiencing burning upon urination.  She denies known fever, vomiting, bladder/bowel incontinence, or numbness/tingling.    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 Bilatral;  Surgeon: Chauncey Garcia MD;  Location: [facility] OR [address] [place] [address];  Service: Orthopedics;  Laterality: Bilateral;     SECTION, LOW TRANSVERSE      x4    COSMETIC SURGERY      " HAND ARTHROTOMY Right 2/8/2021    Procedure: ARTHROTOMY, HAND;  Surgeon: Chauncey Garcia MD;  [facility];  Service: Orthopedics;  Laterality: Right;    INCISION AND DRAINAGE OF HAND Right 2/8/2021    Procedure: INCISION AND DRAINAGE, HAND (Right) - hand table, cysto tubing, 9L saline, culture swabs;  Surgeon: Chauncey Garcia MD;  [facility];  Service: Orthopedics;  Laterality: Right;    IRRIGATION AND DEBRIDEMENT OF UPPER EXTREMITY Right 2/9/2021    Procedure: IRRIGATION AND DEBRIDEMENT, STERNOCLAVICULAR JOINT;  Surgeon: Frandy Molina MD;  [facility];  Service: Cardiothoracic;  Laterality: Right;     No family history on file.  Social History     Tobacco Use    Smoking status: Never    Smokeless tobacco: Never   Substance Use Topics    Alcohol use: Not Currently     Comment: occ    Drug use: Yes     Types: IV, Marijuana     Comment: heroine     Review of Systems   Constitutional:  Positive for chills and diaphoresis. Negative for fever.   HENT: Negative.     Respiratory: Negative.     Cardiovascular: Negative.    Gastrointestinal: Negative.    Genitourinary:  Positive for dysuria.   Musculoskeletal:  Positive for back pain and gait problem.   Skin: Negative.    Neurological:  Negative for numbness.     Physical Exam     Initial Vitals [10/31/22 1237]   BP Pulse Resp Temp SpO2   111/72 96 16 98.2 °F (36.8 °C) 99 %      MAP       --         Physical Exam    Nursing note and vitals reviewed.  Constitutional: She appears well-developed and well-nourished. She is active. No distress.   HENT:   Head: Normocephalic and atraumatic.   Eyes: EOM are normal. Pupils are equal, round, and reactive to light.   Neck: Neck supple.   Normal range of motion.  Cardiovascular:  Normal rate.           Pulmonary/Chest: No respiratory distress.   Musculoskeletal:         General: Normal range of motion.      Cervical back: Normal range of motion and neck supple.      Comments: Low back  appears normal upon inspection, no rash or discoloration noted. patient is nontender to palpation.  Muscle strength to bilateral lower extremities is 5/5.     Neurological: She is alert and oriented to person, place, and time. GCS score is 15. GCS eye subscore is 4. GCS verbal subscore is 5. GCS motor subscore is 6.   Skin: Skin is warm and dry. Capillary refill takes less than 2 seconds.   Psychiatric: She has a normal mood and affect. Her behavior is normal. Thought content normal.       ED Course   Procedures  Labs Reviewed   URINALYSIS, REFLEX TO URINE CULTURE - Abnormal; Notable for the following components:       Result Value    Appearance, UA Cloudy (*)     Protein, UA 1+ (*)     Bilirubin (UA) 1+ (*)     Occult Blood UA 3+ (*)     Nitrite, UA Positive (*)     Urobilinogen, UA 4.0-6.0 (*)     Leukocytes, UA 3+ (*)     All other components within normal limits    Narrative:     Preferred Collection Type->Urine, Clean Catch  Specimen Source->Urine   URINALYSIS MICROSCOPIC - Abnormal; Notable for the following components:    WBC, UA >100 (*)     Bacteria Many (*)     All other components within normal limits    Narrative:     Preferred Collection Type->Urine, Clean Catch  Specimen Source->Urine   CULTURE, URINE   PREGNANCY TEST, URINE RAPID    Narrative:     Specimen Source->Urine          Imaging Results    None          Medications   ketorolac injection 30 mg (30 mg Intramuscular Given 10/31/22 1304)                 ED Course as of 10/31/22 1323   Mon Oct 31, 2022   1320 Urinalysis reveals evidence of UTI with findings of positive nitrites, greater than 100 WBCs, in the presence of bacteria.  Will treat empirically for UTI with culture pending. [CB]      ED Course User Index  [CB] Emily Feliciano NP                 Clinical Impression:   Final diagnoses:  [M54.30] Sciatica, unspecified laterality (Primary)  [N39.0] Urinary tract infection without hematuria, site unspecified      ED Disposition Condition     Discharge Stable          ED Prescriptions       Medication Sig Dispense Start Date End Date Auth. Provider    cephALEXin (KEFLEX) 500 MG capsule Take 1 capsule (500 mg total) by mouth 4 (four) times daily. for 7 days 28 capsule 10/31/2022 11/7/2022 Emily Feliciano NP    diclofenac (VOLTAREN) 75 MG EC tablet Take 1 tablet (75 mg total) by mouth 2 (two) times daily. for 5 days 10 tablet 10/31/2022 11/5/2022 Emily Feliciano NP    methocarbamoL (ROBAXIN) 500 MG Tab Take 2 tablets (1,000 mg total) by mouth 2 (two) times daily as needed. 20 tablet 10/31/2022 11/5/2022 Emily Feliciano NP          Follow-up Information       Follow up With Specialties Details Why Contact Info    PCP Follow UP  Schedule an appointment as soon as possible for a visit in 2 days for follow-up, for re-evaluation of today's complaint              Emily Feliciano NP  10/31/22 4243

## 2022-10-31 NOTE — DISCHARGE INSTRUCTIONS
Be sure to finish all antibiotics.  Drink plenty of fluids and empty your bladder frequently.  Follow up with your primary doctor in 1-2 days and return to the emergency room for worsening condition.

## 2022-10-31 NOTE — Clinical Note
"Марина Parkerbarb Britton was seen and treated in our emergency department on 10/31/2022.  She may return to work on 11/02/2022.       If you have any questions or concerns, please don't hesitate to call.      Emily Feliciano NP"

## 2022-11-01 ENCOUNTER — PATIENT OUTREACH (OUTPATIENT)
Dept: EMERGENCY MEDICINE | Facility: HOSPITAL | Age: 32
End: 2022-11-01
Payer: MEDICAID

## 2022-11-02 ENCOUNTER — HOSPITAL ENCOUNTER (EMERGENCY)
Facility: HOSPITAL | Age: 32
Discharge: HOME | End: 2022-11-02
Attending: EMERGENCY MEDICINE
Payer: MEDICAID

## 2022-11-02 VITALS
SYSTOLIC BLOOD PRESSURE: 111 MMHG | WEIGHT: 119 LBS | OXYGEN SATURATION: 97 % | HEIGHT: 62 IN | TEMPERATURE: 100 F | HEART RATE: 89 BPM | DIASTOLIC BLOOD PRESSURE: 71 MMHG | BODY MASS INDEX: 21.9 KG/M2 | RESPIRATION RATE: 18 BRPM

## 2022-11-02 DIAGNOSIS — F19.10 IV DRUG ABUSE: ICD-10-CM

## 2022-11-02 DIAGNOSIS — N39.0 URINARY TRACT INFECTION WITHOUT HEMATURIA, SITE UNSPECIFIED: ICD-10-CM

## 2022-11-02 DIAGNOSIS — M54.17 LUMBOSACRAL RADICULOPATHY: Primary | ICD-10-CM

## 2022-11-02 LAB
ALBUMIN SERPL BCP-MCNC: 2.9 G/DL (ref 3.5–5.2)
ALP SERPL-CCNC: 189 U/L (ref 55–135)
ALT SERPL W/O P-5'-P-CCNC: 39 U/L (ref 10–44)
AMPHET+METHAMPHET UR QL: ABNORMAL
ANION GAP SERPL CALC-SCNC: 3 MMOL/L (ref 8–16)
AST SERPL-CCNC: 20 U/L (ref 10–40)
B-HCG UR QL: NEGATIVE
BACTERIA #/AREA URNS HPF: NEGATIVE /HPF
BACTERIA UR CULT: ABNORMAL
BARBITURATES UR QL SCN>200 NG/ML: NEGATIVE
BASOPHILS # BLD AUTO: 0.05 K/UL (ref 0–0.2)
BASOPHILS NFR BLD: 0.3 % (ref 0–1.9)
BENZODIAZ UR QL SCN>200 NG/ML: ABNORMAL
BILIRUB SERPL-MCNC: 0.3 MG/DL (ref 0.1–1)
BILIRUB UR QL STRIP: ABNORMAL
BUN SERPL-MCNC: 16 MG/DL (ref 6–20)
BZE UR QL SCN: ABNORMAL
CALCIUM SERPL-MCNC: 8.7 MG/DL (ref 8.7–10.5)
CANNABINOIDS UR QL SCN: NEGATIVE
CAOX CRY URNS QL MICRO: ABNORMAL
CHLORIDE SERPL-SCNC: 104 MMOL/L (ref 95–110)
CLARITY UR: CLEAR
CO2 SERPL-SCNC: 28 MMOL/L (ref 23–29)
COLOR UR: YELLOW
CREAT SERPL-MCNC: 0.8 MG/DL (ref 0.5–1.4)
CREAT UR-MCNC: 251 MG/DL (ref 15–325)
CRP SERPL-MCNC: 6.9 MG/DL (ref 0–0.75)
DIFFERENTIAL METHOD: ABNORMAL
EOSINOPHIL # BLD AUTO: 0.1 K/UL (ref 0–0.5)
EOSINOPHIL NFR BLD: 0.8 % (ref 0–8)
ERYTHROCYTE [DISTWIDTH] IN BLOOD BY AUTOMATED COUNT: 15.2 % (ref 11.5–14.5)
ERYTHROCYTE [SEDIMENTATION RATE] IN BLOOD: 63 MM/HR (ref 0–20)
EST. GFR  (NO RACE VARIABLE): >60 ML/MIN/1.73 M^2
GLUCOSE SERPL-MCNC: 83 MG/DL (ref 70–110)
GLUCOSE UR QL STRIP: NEGATIVE
HCT VFR BLD AUTO: 33.3 % (ref 37–48.5)
HGB BLD-MCNC: 10.4 G/DL (ref 12–16)
HGB UR QL STRIP: NEGATIVE
HYALINE CASTS #/AREA URNS LPF: 28.2 /LPF
IMM GRANULOCYTES # BLD AUTO: 0.11 K/UL (ref 0–0.04)
IMM GRANULOCYTES NFR BLD AUTO: 0.7 % (ref 0–0.5)
KETONES UR QL STRIP: ABNORMAL
LEUKOCYTE ESTERASE UR QL STRIP: ABNORMAL
LYMPHOCYTES # BLD AUTO: 1.3 K/UL (ref 1–4.8)
LYMPHOCYTES NFR BLD: 7.7 % (ref 18–48)
MCH RBC QN AUTO: 25.1 PG (ref 27–31)
MCHC RBC AUTO-ENTMCNC: 31.2 G/DL (ref 32–36)
MCV RBC AUTO: 80 FL (ref 82–98)
METHADONE UR QL SCN>300 NG/ML: NEGATIVE
MICROSCOPIC COMMENT: ABNORMAL
MONOCYTES # BLD AUTO: 0.7 K/UL (ref 0.3–1)
MONOCYTES NFR BLD: 4 % (ref 4–15)
NEUTROPHILS # BLD AUTO: 14.2 K/UL (ref 1.8–7.7)
NEUTROPHILS NFR BLD: 86.5 % (ref 38–73)
NITRITE UR QL STRIP: NEGATIVE
NRBC BLD-RTO: 0 /100 WBC
OPIATES UR QL SCN: NEGATIVE
PCP UR QL SCN>25 NG/ML: NEGATIVE
PH UR STRIP: 6 [PH] (ref 5–8)
PLATELET # BLD AUTO: 483 K/UL (ref 150–450)
PMV BLD AUTO: 9 FL (ref 9.2–12.9)
POTASSIUM SERPL-SCNC: 4.1 MMOL/L (ref 3.5–5.1)
PROT SERPL-MCNC: 7.9 G/DL (ref 6–8.4)
PROT UR QL STRIP: ABNORMAL
RBC # BLD AUTO: 4.14 M/UL (ref 4–5.4)
RBC #/AREA URNS HPF: ABNORMAL /HPF (ref 0–4)
SODIUM SERPL-SCNC: 135 MMOL/L (ref 136–145)
SP GR UR STRIP: >=1.03 (ref 1–1.03)
SQUAMOUS #/AREA URNS HPF: 2 /HPF
TOXICOLOGY INFORMATION: ABNORMAL
URN SPEC COLLECT METH UR: ABNORMAL
UROBILINOGEN UR STRIP-ACNC: 1 EU/DL
WBC # BLD AUTO: 16.45 K/UL (ref 3.9–12.7)
WBC #/AREA URNS HPF: 23 /HPF (ref 0–5)

## 2022-11-02 PROCEDURE — 87040 BLOOD CULTURE FOR BACTERIA: CPT | Performed by: EMERGENCY MEDICINE

## 2022-11-02 PROCEDURE — 85025 COMPLETE CBC W/AUTO DIFF WBC: CPT | Performed by: EMERGENCY MEDICINE

## 2022-11-02 PROCEDURE — 86140 C-REACTIVE PROTEIN: CPT | Performed by: EMERGENCY MEDICINE

## 2022-11-02 PROCEDURE — 87086 URINE CULTURE/COLONY COUNT: CPT | Performed by: EMERGENCY MEDICINE

## 2022-11-02 PROCEDURE — 99285 EMERGENCY DEPT VISIT HI MDM: CPT | Mod: 25

## 2022-11-02 PROCEDURE — 81000 URINALYSIS NONAUTO W/SCOPE: CPT | Mod: 59 | Performed by: EMERGENCY MEDICINE

## 2022-11-02 PROCEDURE — 80307 DRUG TEST PRSMV CHEM ANLYZR: CPT | Performed by: EMERGENCY MEDICINE

## 2022-11-02 PROCEDURE — 25500020 PHARM REV CODE 255: Performed by: EMERGENCY MEDICINE

## 2022-11-02 PROCEDURE — 96372 THER/PROPH/DIAG INJ SC/IM: CPT | Performed by: EMERGENCY MEDICINE

## 2022-11-02 PROCEDURE — 80053 COMPREHEN METABOLIC PANEL: CPT | Performed by: EMERGENCY MEDICINE

## 2022-11-02 PROCEDURE — 81025 URINE PREGNANCY TEST: CPT | Performed by: EMERGENCY MEDICINE

## 2022-11-02 PROCEDURE — 85651 RBC SED RATE NONAUTOMATED: CPT | Performed by: EMERGENCY MEDICINE

## 2022-11-02 PROCEDURE — 63600175 PHARM REV CODE 636 W HCPCS: Performed by: EMERGENCY MEDICINE

## 2022-11-02 RX ORDER — KETOROLAC TROMETHAMINE 30 MG/ML
15 INJECTION, SOLUTION INTRAMUSCULAR; INTRAVENOUS
Status: COMPLETED | OUTPATIENT
Start: 2022-11-02 | End: 2022-11-02

## 2022-11-02 RX ADMIN — KETOROLAC TROMETHAMINE 15 MG: 30 INJECTION, SOLUTION INTRAMUSCULAR at 07:11

## 2022-11-02 RX ADMIN — IOHEXOL 100 ML: 350 INJECTION, SOLUTION INTRAVENOUS at 08:11

## 2022-11-02 NOTE — ED PROVIDER NOTES
"EMERGENCY DEPARTMENT HISTORY AND PHYSICAL EXAM     This note is dictated on [employer] word recognition program.  There are word recognition mistakes and grammatical errors that are occasionally missed on review.     Date: 11/2/2022   Patient Name: Марина Britton       History of Presenting Illness           Chief Complaint   Patient presents with    Leg Pain     Right sided sciatic pain x 10 days. Attempted heroin, meth, clonopin and baclofen & "some other med" today but pain continues "between doses"         History Provided By: Patient    1813   Марина Britton is a 32 y.o. female with PMHX of IV drug abuse, endocarditis (2020), MRSA bactermia (2021), hip abscess (2020), hand abscess, PE (2020) who presents to the emergency department C/O sciatica.    Patient reports she has had right-sided sciatic pain times 10 days after lips eating a bird bath.  Reports pain radiates down right leg down to knee sometimes bit more distal.  No relief with IV heroin, methamphetamine, Klonopin, and back within.  Patient seen in ED a couple days ago and diagnosed with sciatica and UTI treated with antibiotic course which she reports she is taking.  Reports dark urine.  No bladder or bowel incontinence.  Reports subjective fevers and chills 3 times.      PCP: Primary Doctor No        No current facility-administered medications for this encounter.     Current Outpatient Medications   Medication Sig Dispense Refill    amLODIPine (NORVASC) 5 MG tablet Take 1 tablet (5 mg total) by mouth once daily. 30 tablet 2    cephALEXin (KEFLEX) 500 MG capsule Take 1 capsule (500 mg total) by mouth 4 (four) times daily. for 7 days 28 capsule 0    cloNIDine (CATAPRES) 0.1 MG tablet Take 1 tablet (0.1 mg total) by mouth 2 (two) times daily. for 3 days 6 tablet 0    diclofenac (VOLTAREN) 75 MG EC tablet Take 1 tablet (75 mg total) by mouth 2 (two) times daily. for 5 days 10 tablet 0    hydrocortisone 1 % cream Apply topically 3 (three) times daily. " for 5 days 20 g 0    losartan (COZAAR) 50 MG tablet Take 1 tablet (50 mg total) by mouth once daily. 30 tablet 2    methocarbamoL (ROBAXIN) 500 MG Tab Take 2 tablets (1,000 mg total) by mouth 2 (two) times daily as needed. 20 tablet 0    mirtazapine (REMERON) 30 MG tablet Take 1 tablet (30 mg total) by mouth every evening. 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 Bilatral;  Surgeon: Chauncey Garcia MD;  Location: [address];  Service: Orthopedics;  Laterality: Bilateral;     SECTION, LOW TRANSVERSE      x4    COSMETIC SURGERY      HAND ARTHROTOMY Right 2021    Procedure: ARTHROTOMY, HAND;  Surgeon: Chauncey Garcia MD;  Location: [address];  Service: Orthopedics;  Laterality: Right;    INCISION AND DRAINAGE OF HAND Right 2021    Procedure: INCISION AND DRAINAGE, HAND (Right) - hand table, cysto tubing, 9L saline, culture swabs;  Surgeon: Chauncey Garcia MD;  Location: [address];  Service: Orthopedics;  Laterality: Right;    IRRIGATION AND DEBRIDEMENT OF UPPER EXTREMITY Right 2021    Procedure: IRRIGATION AND DEBRIDEMENT, STERNOCLAVICULAR JOINT;  Surgeon: Frandy Molina MD;  Location: [address];  Service: Cardiothoracic;  Laterality: Right;        Family History:   No family history on file.     Social History:   Social History     Tobacco Use    Smoking status: Never    Smokeless tobacco: Never   Substance Use Topics    Alcohol use: Not Currently     Comment: occ    Drug use: Yes     Types: IV, Marijuana     Comment: heroine        Allergies:   Review of patient's  "allergies indicates:   Allergen Reactions    Soap Rash     [employer] - Hospital supplied - cleanser shampoo & body wash gel  Ingredients - purified water, sodium laureth sulfate, sodium chloride, cocamide william, cocamidopropylamine oxide, fragrance, aloe barbadensis leaf juice, propylene alcohol, peg-150 distearate, diazolidinyl urea, ciric acid, methylparaben, & propulparaben          Review of Systems   Review of Systems   Constitutional:  Positive for chills and fever (subjective).   HENT:  Negative for sore throat.    Respiratory:  Negative for shortness of breath.    Cardiovascular:  Negative for chest pain.   Gastrointestinal:  Negative for nausea.   Genitourinary:  Negative for dysuria.   Musculoskeletal:  Positive for back pain and myalgias.   Skin:  Negative for rash.   Neurological:  Negative for dizziness, weakness, numbness and headaches.   Hematological:  Does not bruise/bleed easily.   All other systems reviewed and are negative.             Physical Exam     Vitals:    11/02/22 1752 11/02/22 1754   BP: 116/72    Pulse: (!) 115    Resp: 18    Temp: 99.8 °F (37.7 °C)    SpO2: 98%    Weight:  54 kg (119 lb)   Height: 5' 2" (1.575 m) 5' 2" (1.575 m)      Physical Exam  Vitals and nursing note reviewed.   Constitutional:       General: She is not in acute distress.     Appearance: Normal appearance. She is not ill-appearing.   HENT:      Head: Normocephalic and atraumatic.      Right Ear: External ear normal.      Left Ear: External ear normal.      Nose: Nose normal. No congestion or rhinorrhea.      Mouth/Throat:      Mouth: Mucous membranes are moist.   Eyes:      Conjunctiva/sclera: Conjunctivae normal.      Pupils: Pupils are equal, round, and reactive to light.   Cardiovascular:      Rate and Rhythm: Regular rhythm. Tachycardia present.      Pulses: Normal pulses.      Heart sounds: Normal heart sounds.   Pulmonary:      Effort: Pulmonary effort is normal. No respiratory distress.      Breath " sounds: Normal breath sounds.   Abdominal:      General: There is no distension.      Palpations: Abdomen is soft.      Tenderness: There is no abdominal tenderness. There is no guarding or rebound.   Musculoskeletal:         General: No deformity.      Cervical back: Normal range of motion and neck supple. No rigidity.      Thoracic back: Normal.      Lumbar back: No swelling, deformity, tenderness or bony tenderness. Positive right straight leg raise test and positive left straight leg raise test (pain on RLE).   Skin:     General: Skin is dry.      Comments: Multiple track marks, old scars   Neurological:      General: No focal deficit present.      Mental Status: She is alert and oriented to person, place, and time. Mental status is at baseline.      GCS: GCS eye subscore is 4. GCS verbal subscore is 5. GCS motor subscore is 6.   Psychiatric:         Mood and Affect: Mood normal.         Behavior: Behavior normal.            Diagnostic Study Results      Labs -   No results found for this or any previous visit (from the past 12 hour(s)).     Radiologic Studies -    No orders to display        Medications given in the ED-   Medications - No data to display        Medical Decision Making    I am the first provider for this patient.     I reviewed the vital signs, available nursing notes, past medical history, past surgical history, family history and social history.     Vital Signs:  Reviewed the patient's vital signs.     Pulse Oximetry Analysis and Interpretation:    97% on Room Air, normal      Records Reviewed: Old medical records.  Nursing notes.        Provider Notes (Medical Decision Making): Марина Britton is a 32 y.o. female with sciatica type pain and radiculopathy setting of IV drug abuse and prior bacteremia and endocarditis     She denies weakness, bladder or bowel incontinence, and does report midline lower back pain without focal tenderness      Procedures:   Procedures      ED Course:    Labs  ordered demonstrate leukocytosis, mildly elevated sed rate and ESR.  This may be secondary to UTI however primary concern would be an epidural abscess given 10 days of back pain and subjective fevers  As we do not have MRI at this time of day so CT with contrast of T and L-spine was ordered.  Reviewed direct radiology report which was negative for acute abnormality and with normal soft tissue  Given lack of findings of CT on this time we can hold off on transferring patient for MRI/ortho eval  I discussed with her.  She is aware of epidural abscess as she reports she had a friend he was paralyzed from spinal infection.  I strongly encouraged her to discontinue all IV drug use.  Discussed symptoms of epidural abscess and patient understands to return to ED if her symptoms persist or get worse or she develops new concerning symptoms.  Patient expressed understanding of plan.      ADDENDUM  I Spoke with Dr. Rizzo, Radiology    He over-read CT L-Spine study and notified me of findings concerning for right iliopsoas abscess.    The direct radiology report available to me prior to was negative for acute findings.    At time of notification patient had already been discharged.  We attempted to reach patient by phone number that was given to us and reached her mother.  We advised patient to return to ED for re-evaluation.  Will continue to try to get in contact with patient.           Diagnosis and Disposition     Critical Care:      DISCHARGE NOTE:       Марина LATOYA Britton's  results have been reviewed with her.  She has been counseled regarding her diagnosis, treatment, and plan.  She verbally conveys understanding and agreement of the signs, symptoms, diagnosis, treatment and prognosis and additionally agrees to follow up as discussed.  She also agrees with the care-plan and conveys that all of her questions have been answered.  I have also provided discharge instructions for her that include: educational information  regarding their diagnosis and treatment, and list of reasons why they would want to return to the ED prior to their follow-up appointment, should her condition change. She has been provided with education for proper emergency department utilization.         CLINICAL IMPRESSION:         1. Lumbosacral radiculopathy    2. Urinary tract infection without hematuria, site unspecified    3. IV drug abuse              PLAN:   1. Discharge Home  2.      Medication List        ASK your doctor about these medications      amLODIPine 5 MG tablet  Commonly known as: NORVASC  Take 1 tablet (5 mg total) by mouth once daily.     cephALEXin 500 MG capsule  Commonly known as: KEFLEX  Take 1 capsule (500 mg total) by mouth 4 (four) times daily. for 7 days     cloNIDine 0.1 MG tablet  Commonly known as: CATAPRES  Take 1 tablet (0.1 mg total) by mouth 2 (two) times daily. for 3 days     diclofenac 75 MG EC tablet  Commonly known as: VOLTAREN  Take 1 tablet (75 mg total) by mouth 2 (two) times daily. for 5 days     hydrocortisone 1 % cream  Apply topically 3 (three) times daily. for 5 days     losartan 50 MG tablet  Commonly known as: COZAAR  Take 1 tablet (50 mg total) by mouth once daily.     methocarbamoL 500 MG Tab  Commonly known as: ROBAXIN  Take 2 tablets (1,000 mg total) by mouth 2 (two) times daily as needed.     mirtazapine 30 MG tablet  Commonly known as: REMERON  Take 1 tablet (30 mg total) by mouth every evening.             3. No follow-up provider specified.     _______________________________     Please note that this dictation was completed with [employer], the computer voice recognition software.  Quite often unanticipated grammatical, syntax, homophones, and other interpretive errors are inadvertently transcribed by the computer software.  Please disregard these errors.  Please excuse any errors that have escaped final proofreading.             Frandy Lo MD  11/02/22 0037       Frandy Lo,  MD  11/02/22 4297

## 2022-11-03 ENCOUNTER — TELEPHONE (OUTPATIENT)
Dept: EMERGENCY MEDICINE | Facility: HOSPITAL | Age: 32
End: 2022-11-03
Payer: MEDICAID

## 2022-11-03 NOTE — PROGRESS NOTES
ED Navigator attempted to contact patient on 3 or more separate occasions, patient is unable to reach. ED Navigator to close encounter at this time.     Leah Gaffney  ED Navigator- [place]  [phone]

## 2022-11-03 NOTE — ED NOTES
Attempted tp contact patient via cell phone at [phone], rings once and goes to voicemail that is not set up, called patient mother and she stated she is the pts mother but does not know where she is and verified that the number above is the pts cell

## 2022-11-04 LAB — BACTERIA UR CULT: NORMAL

## 2022-11-04 NOTE — ED NOTES
Got in touch with bin, pts significant other and I told him that she needs to come back to the hospital to be admitted, he said he was getting a ride home and would try to convince her to go

## 2022-11-08 ENCOUNTER — HOSPITAL ENCOUNTER (OUTPATIENT)
Facility: HOSPITAL | Age: 32
Discharge: HOME | End: 2022-11-09
Attending: STUDENT IN AN ORGANIZED HEALTH CARE EDUCATION/TRAINING PROGRAM | Admitting: SURGERY
Payer: MEDICAID

## 2022-11-08 DIAGNOSIS — K35.33: Primary | ICD-10-CM

## 2022-11-08 DIAGNOSIS — K68.12 ILIOPSOAS ABSCESS ON RIGHT: ICD-10-CM

## 2022-11-08 LAB
ALBUMIN SERPL BCP-MCNC: 2.9 G/DL (ref 3.5–5.2)
ALP SERPL-CCNC: 136 U/L (ref 55–135)
ALT SERPL W/O P-5'-P-CCNC: 26 U/L (ref 10–44)
ANION GAP SERPL CALC-SCNC: 2 MMOL/L (ref 8–16)
ANION GAP SERPL CALC-SCNC: 8 MMOL/L (ref 8–16)
AST SERPL-CCNC: 16 U/L (ref 10–40)
BACTERIA BLD CULT: NORMAL
BASOPHILS # BLD AUTO: 0.04 K/UL (ref 0–0.2)
BASOPHILS NFR BLD: 0.5 % (ref 0–1.9)
BILIRUB SERPL-MCNC: 0.5 MG/DL (ref 0.1–1)
BUN SERPL-MCNC: 19 MG/DL (ref 6–20)
BUN SERPL-MCNC: 26 MG/DL (ref 6–20)
CALCIUM SERPL-MCNC: 8 MG/DL (ref 8.7–10.5)
CALCIUM SERPL-MCNC: 8.5 MG/DL (ref 8.7–10.5)
CHLORIDE SERPL-SCNC: 104 MMOL/L (ref 95–110)
CHLORIDE SERPL-SCNC: 110 MMOL/L (ref 95–110)
CO2 SERPL-SCNC: 24 MMOL/L (ref 23–29)
CO2 SERPL-SCNC: 26 MMOL/L (ref 23–29)
CREAT SERPL-MCNC: 0.9 MG/DL (ref 0.5–1.4)
CREAT SERPL-MCNC: 1.5 MG/DL (ref 0.5–1.4)
CTP QC/QA: YES
DIFFERENTIAL METHOD: ABNORMAL
EOSINOPHIL # BLD AUTO: 0.1 K/UL (ref 0–0.5)
EOSINOPHIL NFR BLD: 1.2 % (ref 0–8)
ERYTHROCYTE [DISTWIDTH] IN BLOOD BY AUTOMATED COUNT: 15.2 % (ref 11.5–14.5)
EST. GFR  (NO RACE VARIABLE): 46.9 ML/MIN/1.73 M^2
EST. GFR  (NO RACE VARIABLE): >60 ML/MIN/1.73 M^2
GLUCOSE SERPL-MCNC: 115 MG/DL (ref 70–110)
GLUCOSE SERPL-MCNC: 158 MG/DL (ref 70–110)
HCT VFR BLD AUTO: 36.6 % (ref 37–48.5)
HGB BLD-MCNC: 11.7 G/DL (ref 12–16)
IMM GRANULOCYTES # BLD AUTO: 0.04 K/UL (ref 0–0.04)
IMM GRANULOCYTES NFR BLD AUTO: 0.5 % (ref 0–0.5)
LACTATE SERPL-SCNC: 1.2 MMOL/L (ref 0.5–2.2)
LACTATE SERPL-SCNC: 2.5 MMOL/L (ref 0.5–2.2)
LYMPHOCYTES # BLD AUTO: 1.7 K/UL (ref 1–4.8)
LYMPHOCYTES NFR BLD: 22.4 % (ref 18–48)
MCH RBC QN AUTO: 24.9 PG (ref 27–31)
MCHC RBC AUTO-ENTMCNC: 32 G/DL (ref 32–36)
MCV RBC AUTO: 78 FL (ref 82–98)
MONOCYTES # BLD AUTO: 0.5 K/UL (ref 0.3–1)
MONOCYTES NFR BLD: 6.5 % (ref 4–15)
NEUTROPHILS # BLD AUTO: 5.1 K/UL (ref 1.8–7.7)
NEUTROPHILS NFR BLD: 68.9 % (ref 38–73)
NRBC BLD-RTO: 0 /100 WBC
PLATELET # BLD AUTO: 570 K/UL (ref 150–450)
PMV BLD AUTO: 8.1 FL (ref 9.2–12.9)
POTASSIUM SERPL-SCNC: 3.2 MMOL/L (ref 3.5–5.1)
POTASSIUM SERPL-SCNC: 3.9 MMOL/L (ref 3.5–5.1)
PROT SERPL-MCNC: 8 G/DL (ref 6–8.4)
RBC # BLD AUTO: 4.7 M/UL (ref 4–5.4)
SARS-COV-2 RDRP RESP QL NAA+PROBE: NEGATIVE
SODIUM SERPL-SCNC: 136 MMOL/L (ref 136–145)
SODIUM SERPL-SCNC: 138 MMOL/L (ref 136–145)
WBC # BLD AUTO: 7.42 K/UL (ref 3.9–12.7)

## 2022-11-08 PROCEDURE — 96361 HYDRATE IV INFUSION ADD-ON: CPT

## 2022-11-08 PROCEDURE — 85025 COMPLETE CBC W/AUTO DIFF WBC: CPT | Performed by: STUDENT IN AN ORGANIZED HEALTH CARE EDUCATION/TRAINING PROGRAM

## 2022-11-08 PROCEDURE — 96365 THER/PROPH/DIAG IV INF INIT: CPT

## 2022-11-08 PROCEDURE — 63600175 PHARM REV CODE 636 W HCPCS: Performed by: STUDENT IN AN ORGANIZED HEALTH CARE EDUCATION/TRAINING PROGRAM

## 2022-11-08 PROCEDURE — 80053 COMPREHEN METABOLIC PANEL: CPT | Performed by: STUDENT IN AN ORGANIZED HEALTH CARE EDUCATION/TRAINING PROGRAM

## 2022-11-08 PROCEDURE — 83605 ASSAY OF LACTIC ACID: CPT | Performed by: STUDENT IN AN ORGANIZED HEALTH CARE EDUCATION/TRAINING PROGRAM

## 2022-11-08 PROCEDURE — 25000003 PHARM REV CODE 250: Performed by: SURGERY

## 2022-11-08 PROCEDURE — G0378 HOSPITAL OBSERVATION PER HR: HCPCS

## 2022-11-08 PROCEDURE — 25000003 PHARM REV CODE 250: Performed by: STUDENT IN AN ORGANIZED HEALTH CARE EDUCATION/TRAINING PROGRAM

## 2022-11-08 PROCEDURE — 80048 BASIC METABOLIC PNL TOTAL CA: CPT | Mod: XB | Performed by: SURGERY

## 2022-11-08 PROCEDURE — 36415 COLL VENOUS BLD VENIPUNCTURE: CPT | Performed by: SURGERY

## 2022-11-08 PROCEDURE — C1751 CATH, INF, PER/CENT/MIDLINE: HCPCS

## 2022-11-08 PROCEDURE — 83605 ASSAY OF LACTIC ACID: CPT | Mod: 91 | Performed by: SURGERY

## 2022-11-08 PROCEDURE — 36415 COLL VENOUS BLD VENIPUNCTURE: CPT | Performed by: STUDENT IN AN ORGANIZED HEALTH CARE EDUCATION/TRAINING PROGRAM

## 2022-11-08 PROCEDURE — 99285 EMERGENCY DEPT VISIT HI MDM: CPT | Mod: 25

## 2022-11-08 PROCEDURE — 36410 VNPNXR 3YR/> PHY/QHP DX/THER: CPT

## 2022-11-08 PROCEDURE — 96366 THER/PROPH/DIAG IV INF ADDON: CPT

## 2022-11-08 PROCEDURE — 96375 TX/PRO/DX INJ NEW DRUG ADDON: CPT

## 2022-11-08 PROCEDURE — 87635 SARS-COV-2 COVID-19 AMP PRB: CPT | Performed by: STUDENT IN AN ORGANIZED HEALTH CARE EDUCATION/TRAINING PROGRAM

## 2022-11-08 PROCEDURE — 76937 US GUIDE VASCULAR ACCESS: CPT

## 2022-11-08 RX ORDER — PROCHLORPERAZINE EDISYLATE 5 MG/ML
10 INJECTION INTRAMUSCULAR; INTRAVENOUS EVERY 6 HOURS PRN
Status: DISCONTINUED | OUTPATIENT
Start: 2022-11-08 | End: 2022-11-09 | Stop reason: HOSPADM

## 2022-11-08 RX ORDER — TALC
6 POWDER (GRAM) TOPICAL NIGHTLY PRN
Status: DISCONTINUED | OUTPATIENT
Start: 2022-11-08 | End: 2022-11-09 | Stop reason: HOSPADM

## 2022-11-08 RX ORDER — KETOROLAC TROMETHAMINE 30 MG/ML
15 INJECTION, SOLUTION INTRAMUSCULAR; INTRAVENOUS EVERY 6 HOURS PRN
Status: DISCONTINUED | OUTPATIENT
Start: 2022-11-08 | End: 2022-11-09 | Stop reason: HOSPADM

## 2022-11-08 RX ORDER — SODIUM CHLORIDE 0.9 % (FLUSH) 0.9 %
10 SYRINGE (ML) INJECTION
Status: DISCONTINUED | OUTPATIENT
Start: 2022-11-08 | End: 2022-11-09 | Stop reason: HOSPADM

## 2022-11-08 RX ORDER — ONDANSETRON 2 MG/ML
4 INJECTION INTRAMUSCULAR; INTRAVENOUS EVERY 8 HOURS PRN
Status: DISCONTINUED | OUTPATIENT
Start: 2022-11-08 | End: 2022-11-09 | Stop reason: HOSPADM

## 2022-11-08 RX ORDER — NALOXONE HYDROCHLORIDE 4 MG/.1ML
4 SPRAY NASAL
COMMUNITY
Start: 2022-06-06

## 2022-11-08 RX ADMIN — PIPERACILLIN AND TAZOBACTAM 4.5 G: 4; .5 INJECTION, POWDER, LYOPHILIZED, FOR SOLUTION INTRAVENOUS; PARENTERAL at 02:11

## 2022-11-08 RX ADMIN — PIPERACILLIN AND TAZOBACTAM 4.5 G: 4; .5 INJECTION, POWDER, LYOPHILIZED, FOR SOLUTION INTRAVENOUS; PARENTERAL at 06:11

## 2022-11-08 RX ADMIN — KETOROLAC TROMETHAMINE 15 MG: 30 INJECTION, SOLUTION INTRAMUSCULAR at 06:11

## 2022-11-08 RX ADMIN — SODIUM CHLORIDE 1000 ML: 9 INJECTION, SOLUTION INTRAVENOUS at 08:11

## 2022-11-08 RX ADMIN — PIPERACILLIN AND TAZOBACTAM 4.5 G: 4; .5 INJECTION, POWDER, LYOPHILIZED, FOR SOLUTION INTRAVENOUS; PARENTERAL at 09:11

## 2022-11-08 RX ADMIN — SODIUM CHLORIDE 1000 ML: 9 INJECTION, SOLUTION INTRAVENOUS at 02:11

## 2022-11-08 NOTE — ED PROVIDER NOTES
History  Chief Complaint   Patient presents with    Abnormal Lab results     Patient reports she received a letter regarding abnormal lab results after a CT was performed four days ago. States she was told to come to the ER for admission.   Currently taking Baclofen for a UTI     Pt is a 33 y/o female who presents due to an abnormal CT scan.  Pt had CT scan 6 days ago that was notable for an iliopsoas abscess.  Multiple attempts to reach patient were unsuccessful until today. Pt presenting for IV abx and admission.  Pt is endorsing sciatica like pain that radiates down the leg. Pain is noted to be intermittent, worse with walking/ambulation.       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 Bilatral;  Surgeon: Chauncey Garcia MD;  Location: [address];  Service: Orthopedics;  Laterality: Bilateral;     SECTION, LOW TRANSVERSE      x4    COSMETIC SURGERY      HAND ARTHROTOMY Right 2021    Procedure: ARTHROTOMY, HAND;  Surgeon: Chauncey Garcia MD;  Location: [address];  Service: Orthopedics;  Laterality: Right;    INCISION AND DRAINAGE OF HAND Right 2021    Procedure: INCISION AND DRAINAGE, HAND (Right) - hand table, cysto tubing, 9L saline, culture swabs;  Surgeon: Chauncey Garcia MD;  Location: [address];  Service: Orthopedics;  Laterality: Right;    IRRIGATION AND DEBRIDEMENT OF UPPER EXTREMITY Right 2021    Procedure: IRRIGATION AND DEBRIDEMENT, STERNOCLAVICULAR JOINT;  Surgeon: Frandy Molina MD;  Location: [address];  Service: Cardiothoracic;  Laterality: Right;       No family history on file.    Social History     Tobacco Use    Smoking status: Never     "Smokeless tobacco: Never   Substance Use Topics    Alcohol use: Not Currently     Comment: occ    Drug use: Yes     Types: IV, Marijuana     Comment: heroine       ROS  Review of Systems   Constitutional:  Negative for fever.   HENT:  Negative for congestion.    Eyes:  Negative for visual disturbance.   Respiratory:  Negative for cough and shortness of breath.    Cardiovascular:  Negative for chest pain.   Gastrointestinal:  Positive for abdominal pain. Negative for nausea and vomiting.   Genitourinary:  Negative for dysuria.   Musculoskeletal:  Negative for arthralgias.   Skin:  Negative for color change.   Allergic/Immunologic: Negative for immunocompromised state.   Neurological:  Negative for headaches.   Hematological:  Negative for adenopathy. Does not bruise/bleed easily.     Physical Exam  BP (!) 156/99 (BP Location: Right arm, Patient Position: Lying)   Pulse 76   Temp 99 °F (37.2 °C) (Oral)   Resp 18   Ht 5' 2" (1.575 m)   Wt 51.3 kg (113 lb)   LMP 10/15/2022   SpO2 100%   Breastfeeding No   BMI 20.67 kg/m²   Physical Exam    Constitutional: She appears well-developed and well-nourished. She is cooperative.   HENT:   Head: Normocephalic and atraumatic.   Eyes: Conjunctivae, EOM and lids are normal. Pupils are equal, round, and reactive to light.   Neck: Phonation normal.   Normal range of motion.  Cardiovascular:  Normal rate, regular rhythm and intact distal pulses.           Pulmonary/Chest: Breath sounds normal. No stridor. No respiratory distress.   Abdominal: There is abdominal tenderness.   Musculoskeletal:         General: No tenderness. Normal range of motion.      Cervical back: Normal range of motion.     Neurological: She is alert and oriented to person, place, and time.   Skin: Skin is warm and dry.   Psychiatric: She has a normal mood and affect. Her speech is normal and behavior is normal.             Labs Reviewed   CBC W/ AUTO DIFFERENTIAL - Abnormal; Notable for the following " components:       Result Value    Hemoglobin 11.7 (*)     Hematocrit 36.6 (*)     MCV 78 (*)     MCH 24.9 (*)     RDW 15.2 (*)     Platelets 570 (*)     MPV 8.1 (*)     All other components within normal limits   COMPREHENSIVE METABOLIC PANEL - Abnormal; Notable for the following components:    Potassium 3.2 (*)     Glucose 158 (*)     BUN 26 (*)     Creatinine 1.5 (*)     Calcium 8.5 (*)     Albumin 2.9 (*)     Alkaline Phosphatase 136 (*)     eGFR 46.9 (*)     All other components within normal limits   LACTIC ACID, PLASMA - Abnormal; Notable for the following components:    Lactate (Lactic Acid) 2.5 (*)     All other components within normal limits   SARS-COV-2 RDRP GENE    Narrative:     This test utilizes isothermal nucleic acid amplification   technology to detect the SARS-CoV-2 RdRp nucleic acid segment.   The analytical sensitivity (limit of detection) is 125 genome   equivalents/mL.   A POSITIVE result implies infection with the SARS-CoV-2 virus;   the patient is presumed to be contagious.     A NEGATIVE result means that SARS-CoV-2 nucleic acids are not   present above the limit of detection. A NEGATIVE result should be   treated as presumptive. It does not rule out the possibility of   COVID-19 and should not be the sole basis for treatment decisions.   If COVID-19 is strongly suspected based on clinical and exposure   history, re-testing using an alternate molecular assay should be   considered.   This test is only for use under the Food and Drug   Administration s Emergency Use Authorization (EUA).   Commercial kits are provided by [employer].   Performance characteristics of the EUA have been independently   verified by [facility] Department of   Pathology and Laboratory Medicine.   _________________________________________________________________   The authorized Fact Sheet for Healthcare Providers and the authorized Fact   Sheet for Patients of the ID NOW COVID-19 are available on  the FDA   website:     https://www.fda.gov/media/014195/download  https://www.fda.gov/media/497710/download                                  Procedures    ED Course as of 12/22/22 0346   Tue Nov 08, 2022   0231 Lactate, Alf(!): 2.5 [NA]   0421 WBC: 7.42 [NA]   0421 Impression:     No acute finding identified of the lumbar spine.     Small tubular rim enhancing collections within the right iliopsoas muscle, suspicious for abscesses.  This finding was telephoned to the ER physician on duty at the time of this dictation.        Electronically signed by: Giovani Nagel MD  Date:                                            11/02/2022  Time:                                           23:28    GIVEN PREVIOUSLY ELEVATED WBC COUNT, NOW LACTATE COMBINED WITH PATIENTS HX OF IV DRUG ABUSE AND ENDOCARDITIS, DECISION WAS MADE TO ADMIT FOR FURTHER CARE OF ILIOPSOAS ABSCESS THAT WAS MENTIONED ON CT [NA]      ED Course User Index  [NA] Conchis Smiley MD            MDM        Clinical Impression  The primary encounter diagnosis was Abscess of right iliac fossa. A diagnosis of Iliopsoas abscess on right was also pertinent to this visit.       Conchis Smiley MD  11/08/22 0423       Conchis Smiley MD  12/22/22 0346

## 2022-11-08 NOTE — NURSING
Arrived to [facility] via W/C per MILLY Hollis. Pt ambulated to bed from chair, no deficits noted at this time, pt states she has been having problems with her sciatic nerve affecting her (R) leg, Pt is AAO x 4, appears well kept, noted healed sores to BUE due to IV drug use, denies pain/needs at this time. Will continue to monitor.

## 2022-11-08 NOTE — Clinical Note
Diagnosis: Iliopsoas abscess on right [[id number]]   Future Attending Provider: SANTIAGO ROTHMAN [[id number]]   Admitting Provider:: SANTIAGO ROTHMAN [[id number]]

## 2022-11-09 VITALS
HEIGHT: 62 IN | TEMPERATURE: 99 F | SYSTOLIC BLOOD PRESSURE: 156 MMHG | DIASTOLIC BLOOD PRESSURE: 99 MMHG | OXYGEN SATURATION: 100 % | BODY MASS INDEX: 20.8 KG/M2 | WEIGHT: 113 LBS | HEART RATE: 76 BPM | RESPIRATION RATE: 18 BRPM

## 2022-11-09 PROBLEM — K35.33: Status: ACTIVE | Noted: 2022-11-09

## 2022-11-09 LAB
ALBUMIN SERPL BCP-MCNC: 2.2 G/DL (ref 3.5–5.2)
ALP SERPL-CCNC: 100 U/L (ref 55–135)
ALT SERPL W/O P-5'-P-CCNC: 32 U/L (ref 10–44)
ANION GAP SERPL CALC-SCNC: 5 MMOL/L (ref 8–16)
AST SERPL-CCNC: 28 U/L (ref 10–40)
BASOPHILS # BLD AUTO: 0.05 K/UL (ref 0–0.2)
BASOPHILS NFR BLD: 1 % (ref 0–1.9)
BILIRUB SERPL-MCNC: 0.2 MG/DL (ref 0.1–1)
BUN SERPL-MCNC: 20 MG/DL (ref 6–20)
CALCIUM SERPL-MCNC: 8 MG/DL (ref 8.7–10.5)
CHLORIDE SERPL-SCNC: 110 MMOL/L (ref 95–110)
CO2 SERPL-SCNC: 24 MMOL/L (ref 23–29)
CREAT SERPL-MCNC: 1.1 MG/DL (ref 0.5–1.4)
DIFFERENTIAL METHOD: ABNORMAL
EOSINOPHIL # BLD AUTO: 0.3 K/UL (ref 0–0.5)
EOSINOPHIL NFR BLD: 6.9 % (ref 0–8)
ERYTHROCYTE [DISTWIDTH] IN BLOOD BY AUTOMATED COUNT: 15.8 % (ref 11.5–14.5)
EST. GFR  (NO RACE VARIABLE): >60 ML/MIN/1.73 M^2
GLUCOSE SERPL-MCNC: 112 MG/DL (ref 70–110)
HCT VFR BLD AUTO: 29.3 % (ref 37–48.5)
HGB BLD-MCNC: 9.4 G/DL (ref 12–16)
IMM GRANULOCYTES # BLD AUTO: 0.02 K/UL (ref 0–0.04)
IMM GRANULOCYTES NFR BLD AUTO: 0.4 % (ref 0–0.5)
LYMPHOCYTES # BLD AUTO: 1.7 K/UL (ref 1–4.8)
LYMPHOCYTES NFR BLD: 33.7 % (ref 18–48)
MAGNESIUM SERPL-MCNC: 2.2 MG/DL (ref 1.6–2.6)
MCH RBC QN AUTO: 25.5 PG (ref 27–31)
MCHC RBC AUTO-ENTMCNC: 32.1 G/DL (ref 32–36)
MCV RBC AUTO: 79 FL (ref 82–98)
MONOCYTES # BLD AUTO: 0.4 K/UL (ref 0.3–1)
MONOCYTES NFR BLD: 8.6 % (ref 4–15)
NEUTROPHILS # BLD AUTO: 2.4 K/UL (ref 1.8–7.7)
NEUTROPHILS NFR BLD: 49.4 % (ref 38–73)
NRBC BLD-RTO: 0 /100 WBC
PLATELET # BLD AUTO: 383 K/UL (ref 150–450)
PMV BLD AUTO: 8.7 FL (ref 9.2–12.9)
POTASSIUM SERPL-SCNC: 4.3 MMOL/L (ref 3.5–5.1)
PROT SERPL-MCNC: 6 G/DL (ref 6–8.4)
RBC # BLD AUTO: 3.69 M/UL (ref 4–5.4)
SODIUM SERPL-SCNC: 139 MMOL/L (ref 136–145)
WBC # BLD AUTO: 4.9 K/UL (ref 3.9–12.7)

## 2022-11-09 PROCEDURE — 63600175 PHARM REV CODE 636 W HCPCS: Performed by: STUDENT IN AN ORGANIZED HEALTH CARE EDUCATION/TRAINING PROGRAM

## 2022-11-09 PROCEDURE — 85025 COMPLETE CBC W/AUTO DIFF WBC: CPT | Performed by: SURGERY

## 2022-11-09 PROCEDURE — 96361 HYDRATE IV INFUSION ADD-ON: CPT

## 2022-11-09 PROCEDURE — 36415 COLL VENOUS BLD VENIPUNCTURE: CPT | Performed by: SURGERY

## 2022-11-09 PROCEDURE — 96366 THER/PROPH/DIAG IV INF ADDON: CPT

## 2022-11-09 PROCEDURE — 25500020 PHARM REV CODE 255: Performed by: SURGERY

## 2022-11-09 PROCEDURE — 96376 TX/PRO/DX INJ SAME DRUG ADON: CPT | Mod: 59

## 2022-11-09 PROCEDURE — G0378 HOSPITAL OBSERVATION PER HR: HCPCS

## 2022-11-09 PROCEDURE — 25000003 PHARM REV CODE 250: Performed by: SURGERY

## 2022-11-09 PROCEDURE — 80053 COMPREHEN METABOLIC PANEL: CPT | Performed by: SURGERY

## 2022-11-09 PROCEDURE — 83735 ASSAY OF MAGNESIUM: CPT | Performed by: SURGERY

## 2022-11-09 PROCEDURE — G0378 HOSPITAL OBSERVATION PER HR: HCPCS | Mod: OBSCO

## 2022-11-09 RX ORDER — DICLOFENAC SODIUM 50 MG/1
50 TABLET, DELAYED RELEASE ORAL 2 TIMES DAILY
Qty: 60 TABLET | Refills: 0 | Status: SHIPPED | OUTPATIENT
Start: 2022-11-09

## 2022-11-09 RX ORDER — CEPHALEXIN 500 MG/1
500 CAPSULE ORAL 4 TIMES DAILY
Qty: 28 CAPSULE | Refills: 0 | Status: SHIPPED | OUTPATIENT
Start: 2022-11-09 | End: 2022-11-16

## 2022-11-09 RX ORDER — DOCUSATE SODIUM 100 MG/1
100 CAPSULE, LIQUID FILLED ORAL 2 TIMES DAILY
Qty: 60 CAPSULE | Refills: 2 | Status: SHIPPED | OUTPATIENT
Start: 2022-11-09

## 2022-11-09 RX ADMIN — PIPERACILLIN AND TAZOBACTAM 4.5 G: 4; .5 INJECTION, POWDER, LYOPHILIZED, FOR SOLUTION INTRAVENOUS; PARENTERAL at 05:11

## 2022-11-09 RX ADMIN — SODIUM CHLORIDE 1000 ML: 0.9 INJECTION, SOLUTION INTRAVENOUS at 02:11

## 2022-11-09 RX ADMIN — PIPERACILLIN AND TAZOBACTAM 4.5 G: 4; .5 INJECTION, POWDER, LYOPHILIZED, FOR SOLUTION INTRAVENOUS; PARENTERAL at 02:11

## 2022-11-09 RX ADMIN — KETOROLAC TROMETHAMINE 15 MG: 30 INJECTION, SOLUTION INTRAMUSCULAR at 07:11

## 2022-11-09 RX ADMIN — IOHEXOL 100 ML: 350 INJECTION, SOLUTION INTRAVENOUS at 11:11

## 2022-11-09 RX ADMIN — PIPERACILLIN AND TAZOBACTAM 4.5 G: 4; .5 INJECTION, POWDER, LYOPHILIZED, FOR SOLUTION INTRAVENOUS; PARENTERAL at 11:11

## 2022-11-09 NOTE — H&P
[facility]  General Surgery  History & Physical    Patient Name: Марина Britton  MRN: [id number]  Admission Date: 11/8/2022  Attending Physician: Jocelyne Darnell MD   Primary Care Provider: Primary Doctor No    Patient information was obtained from patient, spouse/SO, past medical records, and ER records.     Subjective:     Chief Complaint/Reason for Admission: back pain    History of Present Illness:  Patient is a 32 y.o. female presents with new onset back pain after moving a bird bath a few weeks ago.  Thought she injured her back.  Pain has been uncontrollable despite her illicit IV drugs and THC use.  Presented to ER complaining of this pain and was noted to have ARF and lactic acidosis so was admitted for dehydration.  Few months ago, pt treated for endocarditis and needs valve but surgeon refusing due to IV drug use.  Pt aware of stipulations.  She is uninterested in treatment at current.      No current facility-administered medications on file prior to encounter.     Current Outpatient Medications on File Prior to Encounter   Medication Sig    amLODIPine (NORVASC) 5 MG tablet Take 1 tablet (5 mg total) by mouth once daily.    cloNIDine (CATAPRES) 0.1 MG tablet Take 1 tablet (0.1 mg total) by mouth 2 (two) times daily. for 3 days    hydrocortisone 1 % cream Apply topically 3 (three) times daily. for 5 days    losartan (COZAAR) 50 MG tablet Take 1 tablet (50 mg total) by mouth once daily.    mirtazapine (REMERON) 30 MG tablet Take 1 tablet (30 mg total) by mouth every evening.    NARCAN 4 mg/actuation Spry 4 mg by Nasal route as needed.    [DISCONTINUED] gabapentin (NEURONTIN) 300 MG capsule Take 1 capsule (300 mg total) by mouth 3 (three) times daily. 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 Bilatral;  Surgeon: Chauncey Garcia MD;  Location: [address];  Service: Orthopedics;  Laterality: Bilateral;     SECTION, LOW TRANSVERSE      x4    COSMETIC SURGERY      HAND ARTHROTOMY Right 2021    Procedure: ARTHROTOMY, HAND;  Surgeon: Chauncey Garcia MD;  Location: [address];  Service: Orthopedics;  Laterality: Right;    INCISION AND DRAINAGE OF HAND Right 2021    Procedure: INCISION AND DRAINAGE, HAND (Right) - hand table, cysto tubing, 9L saline, culture swabs;  Surgeon: Chauncey Garcia MD;  Location: [address];  Service: Orthopedics;  Laterality: Right;    IRRIGATION AND DEBRIDEMENT OF UPPER EXTREMITY Right 2021    Procedure: IRRIGATION AND DEBRIDEMENT, STERNOCLAVICULAR JOINT;  Surgeon: Frandy Molina MD;  Location: [address];  Service: Cardiothoracic;  Laterality: Right;     Family History    None       Tobacco Use    Smoking status: Never    Smokeless tobacco: Never   Substance and Sexual Activity    Alcohol use: Not Currently     Comment: occ    Drug use: Yes     Types: IV, Marijuana     Comment: heroine    Sexual activity: Yes     Partners: Male     Birth control/protection: Partner-Vasectomy     Review of Systems   Constitutional:  Positive for activity change, appetite change, chills and fatigue.   Respiratory:  Negative for cough, choking and shortness of breath.    Gastrointestinal:  Positive  for nausea. Negative for abdominal pain and vomiting.   Genitourinary:  Positive for difficulty urinating and urgency.   Musculoskeletal:  Positive for arthralgias, back pain, gait problem and myalgias.   Objective:     Vital Signs (Most Recent):  Temp: 98 °F (36.7 °C) (11/09/22 0531)  Pulse: 73 (11/09/22 0531)  Resp: 20 (11/09/22 0531)  BP: (!) 81/50 (11/09/22 0531)  SpO2: 99 % (11/09/22 0531)   Vital Signs (24h Range):  Temp:  [97.4 °F (36.3 °C)-98.5 °F (36.9 °C)] 98 °F (36.7 °C)  Pulse:  [65-81] 73  Resp:  [20] 20  SpO2:  [96 %-100 %] 99 %  BP: ()/(50-72) 81/50     Weight: 51.3 kg (113 lb)  Body mass index is 20.67 kg/m².    Physical Exam  Vitals and nursing note reviewed.   Constitutional:       General: She is not in acute distress.     Appearance: She is not ill-appearing.   HENT:      Head: Normocephalic and atraumatic.      Nose: Nose normal.      Mouth/Throat:      Mouth: Mucous membranes are moist.      Pharynx: Oropharynx is clear.   Cardiovascular:      Rate and Rhythm: Normal rate and regular rhythm.      Pulses: Normal pulses.      Heart sounds: Normal heart sounds.   Pulmonary:      Effort: Pulmonary effort is normal. No respiratory distress.      Breath sounds: Normal breath sounds. No wheezing, rhonchi or rales.   Abdominal:      General: Abdomen is flat. Bowel sounds are normal. There is no distension.      Palpations: Abdomen is soft.      Tenderness: There is no abdominal tenderness. There is no guarding or rebound.   Musculoskeletal:         General: No swelling or tenderness. Normal range of motion.      Cervical back: Normal range of motion and neck supple.   Skin:     Findings: Lesion (diffuse sores in various stages of healing) present.      Comments: Track marks bilateral UE marysol in forearms   Neurological:      General: No focal deficit present.      Mental Status: She is alert and oriented to person, place, and time. Mental status is at baseline.   Psychiatric:         Mood and  Affect: Mood normal.         Behavior: Behavior normal.         Thought Content: Thought content normal.         Judgment: Judgment normal.       Significant Labs:  I have reviewed all pertinent lab results within the past 24 hours.    Significant Diagnostics:  I have reviewed all pertinent imaging results/findings within the past 24 hours.    Assessment/Plan:     Lactic acidosis   Hydration    Iliopsoas abscess   IV antibiotics   Discuss CT with radiology.  May need repeat pelvis    VTE Risk Mitigation (From admission, onward)           Ordered     IP VTE HIGH RISK PATIENT  Once         11/08/22 0244     Place sequential compression device  Until discontinued         11/08/22 0244                    Jocelyne Darnell MD  General Surgery  [facility]

## 2022-11-09 NOTE — PLAN OF CARE
Problem: Adult Inpatient Plan of Care  Goal: Plan of Care Review  Outcome: Ongoing, Progressing  Goal: Patient-Specific Goal (Individualized)  Outcome: Ongoing, Progressing  Goal: Absence of Hospital-Acquired Illness or Injury  Outcome: Ongoing, Progressing  Goal: Optimal Comfort and Wellbeing  Outcome: Ongoing, Progressing  Goal: Readiness for Transition of Care  Outcome: Ongoing, Progressing     Problem: Adjustment to Illness (Sepsis/Septic Shock)  Goal: Optimal Coping  Outcome: Ongoing, Progressing     Problem: Bleeding (Sepsis/Septic Shock)  Goal: Absence of Bleeding  Outcome: Ongoing, Progressing     Problem: Glycemic Control Impaired (Sepsis/Septic Shock)  Goal: Blood Glucose Level Within Desired Range  Outcome: Ongoing, Progressing     Problem: Infection Progression (Sepsis/Septic Shock)  Goal: Absence of Infection Signs and Symptoms  Outcome: Ongoing, Progressing     Problem: Nutrition Impaired (Sepsis/Septic Shock)  Goal: Optimal Nutrition Intake  Outcome: Ongoing, Progressing     Problem: Fluid Imbalance (Pneumonia)  Goal: Fluid Balance  Outcome: Ongoing, Progressing     Problem: Infection (Pneumonia)  Goal: Resolution of Infection Signs and Symptoms  Outcome: Ongoing, Progressing     Problem: Respiratory Compromise (Pneumonia)  Goal: Effective Oxygenation and Ventilation  Outcome: Ongoing, Progressing     Problem: Infection  Goal: Absence of Infection Signs and Symptoms  Outcome: Ongoing, Progressing     Problem: Activity and Energy Impairment (Excessive Substance Use)  Goal: Optimized Energy Level (Excessive Substance Use)  Outcome: Ongoing, Progressing     Problem: Behavior Regulation Impairment (Excessive Substance Use)  Goal: Improved Behavioral Control (Excessive Substance Use)  Outcome: Ongoing, Progressing     Problem: Decreased Participation and Engagement (Excessive Substance Use)  Goal: Increased Participation and Engagement (Excessive Substance Use)  Outcome: Ongoing, Progressing     Problem:  Physiologic Impairment (Excessive Substance Use)  Goal: Improved Physiologic Symptoms (Excessive Substance Use)  Outcome: Ongoing, Progressing     Problem: Social, Occupational or Functional Impairment (Excessive Substance Use)  Goal: Enhanced Social, Occupational or Functional Skills (Excessive Substance Use)  Outcome: Ongoing, Progressing

## 2022-11-10 NOTE — NURSING
1900  DISCHARGE INSTRUCTIONS GIVEN TO PATIENT.  MIDLINE CATHETER D/C'D WITH CATHETER INTACT.   VOICED COMPLETE UNDERSTANDING OF ALL INSTRUCTIONS.   ARMEN HARMON, RICHAN

## 2022-11-10 NOTE — DISCHARGE SUMMARY
[facility]  General Surgery  Discharge Summary      Patient Name: Марина Britton  MRN: [id number]  Admission Date: 11/8/2022  Hospital Length of Stay: 0 days  Discharge Date and Time: 11/9/2022  7:40 PM  Attending Physician: Jocelyne Darnell MD   Discharging Provider: Jocelyne Darnell MD  Primary Care Provider: Primary Doctor No     HPI:  Admitted for lactic acidosis and abscess    * No surgery found *     Hospital Course:  Patient with a known history of IV drug abuse and previous endocarditis presented to the emergency room with back pain.  CT of her spine the few days prior revealed an abscess near the iliopsoas muscle.  White blood cell count was unremarkable.  Pain progressively improved with nonsteroidal use.  She was trying to treat her pain with her IV drugs at home with day were not controlling it.  This resulted in a lactic acidosis upon admission and some acute renal failure.  She was admitted for hydration and IV antibiotics.  Films were discussed with radiology and the decision was made to repeat the scan of her pelvis with IV contrast once her renal function returned to normal.  This revealed essentially an unchanged fluid collection that was not accessible to percutaneous drainage.  The decision was made to continue oral antibiotics at home and patient instructed on ceasing IV drug abuse to avoid further exacerbation of the problem.    Consults:   None    Significant Diagnostic Studies: Labs: All labs within the past 24 hours have been reviewed  Radiology: CT scan: CT PELVIS WITH CONTRAST:  See report    Pending Diagnostic Studies:       None          Final Active Diagnoses:    Diagnosis Date Noted POA    PRINCIPAL PROBLEM:  Abscess of right iliac fossa [K35.33] 11/09/2022 Yes      Problems Resolved During this Admission:      Discharged Condition: good    Disposition: Home or Self Care    Follow Up:   Follow-up Information       Jocelyne Darnell MD Follow up in 2 day(s).    Specialty:  General Surgery  Contact information:  [address]  [place] [address]  [phone]                           Patient Instructions:      Diet Adult Regular     Notify your health care provider if you experience any of the following:  temperature >100.4     Notify your health care provider if you experience any of the following:  persistent nausea and vomiting or diarrhea     Notify your health care provider if you experience any of the following:  severe uncontrolled pain     Notify your health care provider if you experience any of the following:  difficulty breathing or increased cough     Notify your health care provider if you experience any of the following:  persistent dizziness, light-headedness, or visual disturbances     Notify your health care provider if you experience any of the following:  increased confusion or weakness     Activity as tolerated     Medications:  Reconciled Home Medications:      Medication List        START taking these medications      diclofenac 50 MG EC tablet  Commonly known as: VOLTAREN  Take 1 tablet (50 mg total) by mouth 2 (two) times daily.     docusate sodium 100 MG capsule  Commonly known as: COLACE  Take 1 capsule (100 mg total) by mouth 2 (two) times daily.            CONTINUE taking these medications      amLODIPine 5 MG tablet  Commonly known as: NORVASC  Take 1 tablet (5 mg total) by mouth once daily.     cephALEXin 500 MG capsule  Commonly known as: KEFLEX  Take 1 capsule (500 mg total) by mouth 4 (four) times daily. for 7 days     cloNIDine 0.1 MG tablet  Commonly known as: CATAPRES  Take 1 tablet (0.1 mg total) by mouth 2 (two) times daily. for 3 days     hydrocortisone 1 % cream  Apply topically 3 (three) times daily. for 5 days     losartan 50 MG tablet  Commonly known as: COZAAR  Take 1 tablet (50 mg total) by mouth once daily.     mirtazapine 30 MG tablet  Commonly known as: REMERON  Take 1 tablet (30 mg total) by mouth every evening.      NARCAN 4 mg/actuation Spry  Generic drug: naloxone  4 mg by Nasal route as needed.              Jocelyne Darnell MD  General Surgery  [facility]

## 2023-09-08 NOTE — PLAN OF CARE
11/19/20 1145   Post-Acute Status   Post-Acute Authorization Placement   Post-Acute Placement Status Pending Payor Review     SW spoke with Thao coordinator with [facility] and she reports that she will submit for insurance auth today to began the [facility] contract needed to approval . Patient was declined by  [facility] and [facility] due to unable to meet patient needs.      Updated 2:10pm  SW received a call from representative Valeria with from Medicaid [phone] in regard to LTAC placement for patient and she is stating that they are wanting to patient to exhaust as many in network facilities as possible before moving forward with the [facility] contract. SW is awaiting a call from Alpa coordinator with [facility] . REJI also contacted Debbie [phone] or [phone] coordinator with [facility] and she request SW to cancel the referral and resend due to her not seeing it in [facility].         Padmini Myers, SHAN  [facility]   [id number]     Chief Complaint   Patient presents with   • Medicare Wellness Visit     Patient here for subsequent MWV and exam and 6 mo f/u         Gloria Lyle is a 65 year old female presenting for medicare wellness exam and follow up on chronic health issues.  She has no specific concerns today.   Follow-up exam    Essential hypertension  Hypokalemia  - On Verapamil  mg daily, Maxzide 75-50 daily and potassium 20 meq tid.  Blood pressure today is 120/80.  Pulse was 77.  No side effects from medications.      Creatinine (mg/dL)   Date Value   03/24/2023 0.75       BUN (mg/dL)   Date Value   03/24/2023 29 (H)       Potassium (mmol/L)   Date Value   03/24/2023 3.9       Paroxysmal atrial fibrillation (CMD)  On continuous oral anticoagulation  - on Eliquis 5 mg every 12 hours.   - follows with EP and has appointment with Ej Whitlock NP on 11/14/2023.    Hypothyroidism, unspecified type  - on Synthroid 150 mcg daily.  Takes regularly.   TSH (mcUnits/mL)   Date Value   08/19/2023 0.791       Obstructive sleep apnea on CPAP  - Uses CPAP regularly.  Is beneficial.      Lymphedema of both lower extremities  - doing well.  Wearing support hose    Status post bariatric surgery  - seeing Pinky Ness NP on 9/18/2023  - On Protonix 40 mg daily.  Reflux symptoms have been well controlled.       Review of systems:  Constitutional: No fever, gained about 20 lbs from total weight loss over the last 1.5 years.  She is seeing bariatric surgery to address.  No chronic fatigue. No dizziness.  Ears:  No pain  Nose:  Is not runny,congested and is not sneezing.  Throat:  Is not sore and can swallow with no problems.    Neck: Good range of motion.  Cardiac: No chest pain, no palpitations.  Respiratory: No cough, no shortness of breath.  Gastrointestinal: No nausea or vomiting, no abdominal pain, no change in bowel pattern, no blood in stool. Appetite is good  Genitourinary: No dysuria, no frequency.   Patient has had a hysterectomy..  No  vaginal bleeding.   Musculoskeletal:  No back pains.  Right knee pain and had injection again. Considering knee replacement in 3/2024 when she is retired.  The shots do help pain meanwhile.    Mental health :  Has been good.    ALLERGIES:   Allergen Reactions   • Diltiazem Hcl [Cardizem] HIVES       Current Outpatient Medications   Medication Sig   • levothyroxine (Synthroid) 150 MCG tablet Take 1 tablet by mouth daily.   • potassium CHLORIDE (KLOR-CON M) 20 MEQ michelle ER tablet Take 1 tablet by mouth in the morning and 1 tablet at noon and 1 tablet before bedtime.   • apixaBAN (Eliquis) 5 MG Tab Take 1 tablet by mouth every 12 hours.   • verapamil (CALAN SR) 240 MG CR tablet Take 1 tablet by mouth every evening.   • triamterene-hydrochlorothiazide (MAXZIDE) 75-50 MG per tablet Take 1 tablet by mouth daily.   • pantoprazole (Protonix) 40 MG tablet Take 1 tablet by mouth daily.   • acetaminophen (Tylenol) 325 MG tablet Take 2 tablets by mouth 4 times daily.   • ferrous sulfate 325 (65 FE) MG tablet Take 325 mg by mouth nightly.    • vitamin B-12 (CYANOCOBALAMIN) 1000 MCG tablet Take 1,000 mcg by mouth every other day.    • Multiple Vitamins-Iron (MULTIVITAMIN/IRON PO) Take 1 tablet by mouth 2 times daily.    • Calcium Citrate-Vitamin D (Calcium Citrate Chewy Bite) 500-500 MG-UNIT Chew Tab Chew by mouth 3 times daily.   • Cholecalciferol (VITAMIN D-3 PO) Take 2,000 Int'l Units by mouth daily.      No current facility-administered medications for this visit.           Objective:  Well-developed, Body mass index is 33.08 kg/m². in no acute distress. Patient is appropriately groomed and dressed with good eye contact.  Speech is clear.  Thought process is logical.      Vitals:    09/08/23 0933   BP: 120/80   Pulse: 77   Resp: 16   Temp: 96.9 °F (36.1 °C)   SpO2: 98%   Weight: 90.2 kg (198 lb 12.8 oz)   Height: 5' 5\" (1.651 m)     Neck: Supple, no anterior, posterior or supraclavicular nodes.  Thyroid: Smooth, nontender with  no organomegaly.   Cardiac: S1 and S2 with no murmur. Rate is regular.  Respiratory: Lungs are clear to auscultation with unlabored respiratory pattern. There is no use of accessory muscles.  Abdomen: Positive bowel sounds, soft and nontender. There is no appreciable hepatosplenomegaly.  Extremities: No edema.  Gait is stable.      ASSESSMENT AND PLAN     Follow-up exam    Essential hypertension  -Good control  -Continue same medications.   - Basic Metabolic Panel; Future  - triamterene-hydrochlorothiazide (MAXZIDE) 75-50 MG per tablet; Take 1 tablet by mouth daily.  Dispense: 90 tablet; Refill: 1    Hypokalemia  - on supplemental potassium   - Basic Metabolic Panel; Future    Paroxysmal atrial fibrillation (CMD)  On continuous oral anticoagulation  - follows with EP with next appointment 11/14/2023..    - Rate controlled.   - on Eliquis    Hypothyroidism, unspecified type  -Good control  -Continue same medications.     Obstructive sleep apnea on CPAP  - uses regularly    Lymphedema of both lower extremities  - doing well with pressure stockings and the prior weight loss.    Status post bariatric surgery  - has done very well and still working with the bariatric team.      Routine follow up    Patient is to call if worse, new symptoms, concerns or problems.      Sasha Quintero, DEONDRE

## 2023-09-12 NOTE — PLAN OF CARE
11/23/20 1016   Post-Acute Status   Post-Acute Authorization Placement   Post-Acute Placement Status Pending Payor Review     REJI spoke to [facility] admissions coordinator with [facility] and she states they are not accepting patient with COV 19 at this time. She reports that she will contact Medicaid and provide Valeria, representative with this information.    Updated 12:08pm   REJI contacted Valeria, coordinator with  Medicaid to follow up and inform her that patient has been denied by [facility] and [facility] and she reports that she will have to inform the director of this information and contact  with their decision. Patient is accepted to [facility] pending insurance/[facility] contract at this time.         Padmini Myers LMSW  [facility]   [id number]     CC:   Chief Complaint   Patient presents with   • Cough   • Congestion     X 11 days        SUBJECTIVE:    Eric Donis III is a 51 year old male who presents to Immediate Care with respiratory symptoms which have been present for 11 days. Symptoms include:, Congestion, fatigue and mild fever . Multiple negative home COVID tests    denies: abdominal pain, chest pain, diarrhea, headache, nausea or vomiting.       OBJECTIVE:    Vitals:    09/11/23 1850   BP: 120/74   Pulse: 67   Resp: 18   Temp: 98.1 °F (36.7 °C)   TempSrc: Temporal   SpO2: 98%      General appearance: Alert, well hydrated, in no apparent distress  Ear Exam: TM's intact without erythema, bulging, retraction, or fluid level - external auditory canal clear  Conjunctiva: Clear without injection or discharge; lids clear  Nose: nasal mucosa moist, pink, without rhinorrhea or sinus tenderness  Throat: erythematous and moist, but without edema or exudates  Neck: supple without cervical lymphadenopathy.  No meningismus.  Heart: regular rate and rhythm and no murmurs, clicks or gallops  Lungs: course rhonchi without wheezes or rales; normal respiratory effort  Skin: smooth without rash or edema and capillary refill is normal    ASSESSMENT/PLAN:    BRONCHITIS    Antibiotics and cough medication    Advised to return to Immediate Care or follow-up with primary care provider for reevaluation if symptoms worsen or are not improving in 5-7 days.    Diagnosis and prognosis, treatment and side-effects were explained and all questions were answered satisfactorily and there were no further questions upon discharge

## 2023-12-31 ENCOUNTER — HOSPITAL ENCOUNTER (EMERGENCY)
Facility: HOSPITAL | Age: 33
Discharge: HOME | End: 2023-12-31
Attending: EMERGENCY MEDICINE
Payer: MEDICAID

## 2023-12-31 VITALS
HEART RATE: 87 BPM | TEMPERATURE: 98 F | DIASTOLIC BLOOD PRESSURE: 91 MMHG | HEIGHT: 62 IN | SYSTOLIC BLOOD PRESSURE: 147 MMHG | RESPIRATION RATE: 16 BRPM | OXYGEN SATURATION: 100 % | BODY MASS INDEX: 21.16 KG/M2 | WEIGHT: 115 LBS

## 2023-12-31 DIAGNOSIS — T50.901A ACCIDENTAL DRUG OVERDOSE, INITIAL ENCOUNTER: Primary | ICD-10-CM

## 2023-12-31 PROCEDURE — 99283 EMERGENCY DEPT VISIT LOW MDM: CPT

## 2023-12-31 RX ORDER — NALOXONE HYDROCHLORIDE 4 MG/.1ML
SPRAY NASAL
Qty: 1 EACH | Refills: 11 | Status: SHIPPED | OUTPATIENT
Start: 2023-12-31

## 2023-12-31 NOTE — ED PROVIDER NOTES
EMERGENCY DEPARTMENT HISTORY AND PHYSICAL EXAM     This note is dictated on M*Modal word recognition program.  There are word recognition mistakes and grammatical errors that are occasionally missed on review.     Date: 12/31/2023   Patient Name: Марина Britton       History of Presenting Illness      Chief Complaint   Patient presents with    Drug Overdose     Per ems patient was found in a car breathing about 4 times a mintute, 2mg narcan given IN, pt now has a gcs of 15, cbg 86. Patient admits to do doing fentanyl and xanax bars tonight. Pt has known history of drug abuse. Upon arrival pt awake and alert.         0455   Марина Britton is a 33 y.o. female with PMHX of IV drug abuse, endocarditis, MRSA bacteremia, abscess who presents to the emergency department C/O overdose.    Patient found in car obtunded.  Given 2 mg intranasal Narcan.  Patient now alert oriented x4.  Reports using nasal fentanyl, smoked meth and oral Xanax tonight.      Patient reports she has been clean for past 10 months and relapsed tonight.    PCP: No, Primary Doctor        No current facility-administered medications for this encounter.     Current Outpatient Medications   Medication Sig Dispense Refill    amLODIPine (NORVASC) 5 MG tablet Take 1 tablet (5 mg total) by mouth once daily. 30 tablet 2    cloNIDine (CATAPRES) 0.1 MG tablet Take 1 tablet (0.1 mg total) by mouth 2 (two) times daily. for 3 days 6 tablet 0    diclofenac (VOLTAREN) 50 MG EC tablet Take 1 tablet (50 mg total) by mouth 2 (two) times daily. 60 tablet 0    docusate sodium (COLACE) 100 MG capsule Take 1 capsule (100 mg total) by mouth 2 (two) times daily. 60 capsule 2    hydrocortisone 1 % cream Apply topically 3 (three) times daily. for 5 days 20 g 0    losartan (COZAAR) 50 MG tablet Take 1 tablet (50 mg total) by mouth once daily. 30 tablet 2    mirtazapine (REMERON) 30 MG tablet Take 1 tablet (30 mg total) by mouth every evening. 30 tablet 2    naloxone  (NARCAN) 4 mg/actuation Spry 4mg by nasal route as needed for opioid overdose; may repeat every 2-3 minutes in alternating nostrils until medical help arrives. Call 911 1 each 11    NARCAN 4 mg/actuation Spry 4 mg by Nasal route as needed.             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 Bilatral;  Surgeon: Chauncey Garcia MD;  Location: [facility] OR [address] [place] [address];  Service: Orthopedics;  Laterality: Bilateral;     SECTION, LOW TRANSVERSE      x4    COSMETIC SURGERY      HAND ARTHROTOMY Right 2021    Procedure: ARTHROTOMY, HAND;  Surgeon: Chauncey Garcia MD;  Location: [facility] OR [address] [place] [address];  Service: Orthopedics;  Laterality: Right;    INCISION AND DRAINAGE OF HAND Right 2021    Procedure: INCISION AND DRAINAGE, HAND (Right) - hand table, cysto tubing, 9L saline, culture swabs;  Surgeon: Chauncey Garcia MD;  Location: [address];  Service: Orthopedics;  Laterality: Right;    IRRIGATION AND DEBRIDEMENT OF UPPER EXTREMITY Right 2021    Procedure: IRRIGATION AND DEBRIDEMENT, STERNOCLAVICULAR JOINT;  Surgeon: Frandy Molina MD;  Location: [address];  Service: Cardiothoracic;  Laterality: Right;        Family History:   History reviewed. No pertinent family history.     Social History:   Social History     Tobacco Use    Smoking status: Never    Smokeless tobacco: Never   Substance Use Topics    Alcohol use: Not Currently     Comment: occ    Drug use: Yes     Types: IV, Marijuana, Methamphetamines     Comment: heroine        Allergies:   Review of patient's allergies indicates:   Allergen Reactions    Shellfish containing  "products Anaphylaxis and Hives    Soap Rash     [facility] - Hospital supplied - cleanser shampoo & body wash gel  Ingredients - purified water, sodium laureth sulfate, sodium chloride, cocamide william, cocamidopropylamine oxide, fragrance, aloe barbadensis leaf juice, propylene alcohol, peg-150 distearate, diazolidinyl urea, ciric acid, methylparaben, & propulparaben          Review of Systems   Review of Systems   See HPI for pertinent positives and negatives       Physical Exam     Vitals:    12/31/23 0459 12/31/23 0500 12/31/23 0534   BP:  (!) 134/95 (!) 141/93   Pulse:  94 88   Resp:  16    Temp:  97.7 °F (36.5 °C)    SpO2:  100% 100%   Weight: 52.2 kg (115 lb)     Height: 5' 2" (1.575 m)        Physical Exam  Vitals and nursing note reviewed.   Constitutional:       General: She is not in acute distress.     Appearance: Normal appearance. She is not ill-appearing.   HENT:      Head: Normocephalic and atraumatic.      Right Ear: External ear normal.      Left Ear: External ear normal.      Nose: Nose normal. No congestion or rhinorrhea.      Mouth/Throat:      Mouth: Mucous membranes are moist.   Eyes:      Conjunctiva/sclera: Conjunctivae normal.      Pupils: Pupils are equal, round, and reactive to light.   Pulmonary:      Effort: Pulmonary effort is normal. No respiratory distress.   Musculoskeletal:         General: No deformity. Normal range of motion.      Cervical back: Normal range of motion. No rigidity.   Skin:     General: Skin is dry.   Neurological:      General: No focal deficit present.      Mental Status: She is alert and oriented to person, place, and time. Mental status is at baseline.   Psychiatric:         Mood and Affect: Mood normal.         Behavior: Behavior normal.              Diagnostic Study Results      Labs -   No results found for this or any previous visit (from the past 12 hour(s)).     Radiologic Studies -    No orders to display        Medications given in the ED- "   Medications - No data to display        Medical Decision Making    I am the first provider for this patient.     I reviewed the vital signs, available nursing notes, past medical history, past surgical history, family history and social history.     Vital Signs:  Reviewed the patient's vital signs.     Pulse Oximetry Analysis and Interpretation:    100% on Room Air, normal      EKG Interpretation: (Per my independent interpretation, pending formal read)   Interpreted by Frandy Lo MD at 0530   Normal sinus rhythm rate of 93, incomplete right bundle-branch block pattern,       External Test Results (Pertinent to encounter):    Records Reviewed: Nursing Notes    History Obtained By: Patient    Provider Notes: Марина Britton is a 33 y.o. female with polysubstance overdose    Co-morbidities Considered:     Differential Diagnosis:  drug overdose      ED Course:    Patient awake alert, oriented, desires discharge.  No SI.  Stable for discharge with family member         Problems Addressed:      Procedures:   Procedures       Diagnosis and Disposition     Critical Care:      DISCHARGE NOTE:       Марина Britton's  results have been reviewed with her.  She has been counseled regarding her diagnosis, treatment, and plan.  She verbally conveys understanding and agreement of the signs, symptoms, diagnosis, treatment and prognosis and additionally agrees to follow up as discussed.  She also agrees with the care-plan and conveys that all of her questions have been answered.  I have also provided discharge instructions for her that include: educational information regarding their diagnosis and treatment, and list of reasons why they would want to return to the ED prior to their follow-up appointment, should her condition change. She has been provided with education for proper emergency department utilization.         CLINICAL IMPRESSION:         1. Accidental drug overdose, initial encounter              PLAN:   1.    2.      Medication List        CHANGE how you take these medications      * NARCAN 4 mg/actuation Spry  Generic drug: naloxone  What changed: Another medication with the same name was added. Make sure you understand how and when to take each.     * naloxone 4 mg/actuation Spry  Commonly known as: NARCAN  4mg by nasal route as needed for opioid overdose; may repeat every 2-3 minutes in alternating nostrils until medical help arrives. Call 911  What changed: You were already taking a medication with the same name, and this prescription was added. Make sure you understand how and when to take each.           * This list has 2 medication(s) that are the same as other medications prescribed for you. Read the directions carefully, and ask your doctor or other care provider to review them with you.                ASK your doctor about these medications      amLODIPine 5 MG tablet  Commonly known as: NORVASC  Take 1 tablet (5 mg total) by mouth once daily.     cloNIDine 0.1 MG tablet  Commonly known as: CATAPRES  Take 1 tablet (0.1 mg total) by mouth 2 (two) times daily. for 3 days     diclofenac 50 MG EC tablet  Commonly known as: VOLTAREN  Take 1 tablet (50 mg total) by mouth 2 (two) times daily.     docusate sodium 100 MG capsule  Commonly known as: COLACE  Take 1 capsule (100 mg total) by mouth 2 (two) times daily.     hydrocortisone 1 % cream  Apply topically 3 (three) times daily. for 5 days     losartan 50 MG tablet  Commonly known as: COZAAR  Take 1 tablet (50 mg total) by mouth once daily.     mirtazapine 30 MG tablet  Commonly known as: REMERON  Take 1 tablet (30 mg total) by mouth every evening.               Where to Get Your Medications        These medications were sent to [id number] - [place], [place] - [address] [facility] [address]  [address], [facility] [address]      Phone: [phone]   naloxone 4 mg/actuation Rosanky        [address]. [facility]  [address]  [facility] [address]  [phone]  Schedule an appointment as soon as possible for a visit       [facility]  [address]  [facility] [address]  [phone]  Call in 1 day      [place] - Emergency Department  [address] [facility] [id number] [phone]  Go to   If symptoms worsen       _______________________________     Please note that this dictation was completed with Good Thing, the computer voice recognition software.  Quite often unanticipated grammatical, syntax, homophones, and other interpretive errors are inadvertently transcribed by the computer software.  Please disregard these errors.  Please excuse any errors that have escaped final proofreading.             Bhavin Francis MD  12/31/23 0643

## 2024-09-13 ENCOUNTER — ANESTHESIA EVENT (OUTPATIENT)
Dept: SURGERY | Facility: HOSPITAL | Age: 34
End: 2024-09-13
Payer: MEDICAID

## 2024-09-13 ENCOUNTER — ANESTHESIA (OUTPATIENT)
Dept: SURGERY | Facility: HOSPITAL | Age: 34
End: 2024-09-13
Payer: MEDICAID

## 2024-09-13 ENCOUNTER — HOSPITAL ENCOUNTER (INPATIENT)
Facility: HOSPITAL | Age: 34
LOS: 2 days | Discharge: HOME | DRG: 494 | End: 2024-09-15
Attending: EMERGENCY MEDICINE | Admitting: SURGERY
Payer: MEDICAID

## 2024-09-13 DIAGNOSIS — S82.201C TYPE III OPEN FRACTURE OF RIGHT TIBIA AND FIBULA, INITIAL ENCOUNTER: Primary | ICD-10-CM

## 2024-09-13 DIAGNOSIS — S82.401C TYPE III OPEN FRACTURE OF RIGHT TIBIA AND FIBULA, INITIAL ENCOUNTER: Primary | ICD-10-CM

## 2024-09-13 DIAGNOSIS — B99.9 TRICUSPID VALVE REGURGITATION DUE TO INFECTION: ICD-10-CM

## 2024-09-13 DIAGNOSIS — I07.1 TRICUSPID VALVE REGURGITATION DUE TO INFECTION: ICD-10-CM

## 2024-09-13 PROBLEM — S82.251C: Status: ACTIVE | Noted: 2024-09-13

## 2024-09-13 LAB
B-HCG UR QL: NEGATIVE
CTP QC/QA: YES

## 2024-09-13 PROCEDURE — 99223 1ST HOSP IP/OBS HIGH 75: CPT | Mod: ,,, | Performed by: SURGERY

## 2024-09-13 PROCEDURE — 99285 EMERGENCY DEPT VISIT HI MDM: CPT | Mod: 25

## 2024-09-13 PROCEDURE — 37000008 HC ANESTHESIA 1ST 15 MINUTES: Performed by: ORTHOPAEDIC SURGERY

## 2024-09-13 PROCEDURE — C1769 GUIDE WIRE: HCPCS | Performed by: ORTHOPAEDIC SURGERY

## 2024-09-13 PROCEDURE — 96375 TX/PRO/DX INJ NEW DRUG ADDON: CPT

## 2024-09-13 PROCEDURE — 25000003 PHARM REV CODE 250: Performed by: NURSE ANESTHETIST, CERTIFIED REGISTERED

## 2024-09-13 PROCEDURE — 11012 DEB SKIN BONE AT FX SITE: CPT | Mod: 51,,, | Performed by: ORTHOPAEDIC SURGERY

## 2024-09-13 PROCEDURE — 11000001 HC ACUTE MED/SURG PRIVATE ROOM

## 2024-09-13 PROCEDURE — P9047 ALBUMIN (HUMAN), 25%, 50ML: HCPCS | Mod: JZ,JG | Performed by: NURSE ANESTHETIST, CERTIFIED REGISTERED

## 2024-09-13 PROCEDURE — 99223 1ST HOSP IP/OBS HIGH 75: CPT | Mod: 57,,, | Performed by: ORTHOPAEDIC SURGERY

## 2024-09-13 PROCEDURE — 71000039 HC RECOVERY, EACH ADD'L HOUR: Performed by: ORTHOPAEDIC SURGERY

## 2024-09-13 PROCEDURE — 37000009 HC ANESTHESIA EA ADD 15 MINS: Performed by: ORTHOPAEDIC SURGERY

## 2024-09-13 PROCEDURE — 25000003 PHARM REV CODE 250: Performed by: EMERGENCY MEDICINE

## 2024-09-13 PROCEDURE — 71000033 HC RECOVERY, INTIAL HOUR: Performed by: ORTHOPAEDIC SURGERY

## 2024-09-13 PROCEDURE — 63600175 PHARM REV CODE 636 W HCPCS: Performed by: EMERGENCY MEDICINE

## 2024-09-13 PROCEDURE — 63600175 PHARM REV CODE 636 W HCPCS: Performed by: NURSE ANESTHETIST, CERTIFIED REGISTERED

## 2024-09-13 PROCEDURE — 36000711: Performed by: ORTHOPAEDIC SURGERY

## 2024-09-13 PROCEDURE — 20700 MNL PREP&INSJ DP RX DLVR DEV: CPT | Mod: ,,, | Performed by: ORTHOPAEDIC SURGERY

## 2024-09-13 PROCEDURE — 36000710: Performed by: ORTHOPAEDIC SURGERY

## 2024-09-13 PROCEDURE — 27759 TREATMENT OF TIBIA FRACTURE: CPT | Mod: RT,,, | Performed by: ORTHOPAEDIC SURGERY

## 2024-09-13 PROCEDURE — 27201423 OPTIME MED/SURG SUP & DEVICES STERILE SUPPLY: Performed by: ORTHOPAEDIC SURGERY

## 2024-09-13 PROCEDURE — C1713 ANCHOR/SCREW BN/BN,TIS/BN: HCPCS | Performed by: ORTHOPAEDIC SURGERY

## 2024-09-13 PROCEDURE — 96374 THER/PROPH/DIAG INJ IV PUSH: CPT

## 2024-09-13 PROCEDURE — 63600175 PHARM REV CODE 636 W HCPCS: Mod: JZ,JG | Performed by: ORTHOPAEDIC SURGERY

## 2024-09-13 DEVICE — CEMENT BONE ANTIBIO SIMPLEX P: Type: IMPLANTABLE DEVICE | Site: TIBIA | Status: FUNCTIONAL

## 2024-09-13 DEVICE — SCREW LP LOCKING XL25 30MM: Type: IMPLANTABLE DEVICE | Site: TIBIA | Status: FUNCTIONAL

## 2024-09-13 DEVICE — SCREW LOK LP 5.0X34MM: Type: IMPLANTABLE DEVICE | Site: TIBIA | Status: FUNCTIONAL

## 2024-09-13 DEVICE — IMPLANTABLE DEVICE: Type: IMPLANTABLE DEVICE | Site: TIBIA | Status: FUNCTIONAL

## 2024-09-13 DEVICE — SCREW XL25 IM 36X5MM: Type: IMPLANTABLE DEVICE | Site: TIBIA | Status: FUNCTIONAL

## 2024-09-13 DEVICE — SCREW XL25 IM LOCK 32X5MM: Type: IMPLANTABLE DEVICE | Site: TIBIA | Status: FUNCTIONAL

## 2024-09-13 RX ORDER — PROCHLORPERAZINE EDISYLATE 5 MG/ML
5 INJECTION INTRAMUSCULAR; INTRAVENOUS EVERY 30 MIN PRN
Status: DISCONTINUED | OUTPATIENT
Start: 2024-09-13 | End: 2024-09-14 | Stop reason: HOSPADM

## 2024-09-13 RX ORDER — GLUCAGON 1 MG
1 KIT INJECTION
Status: DISCONTINUED | OUTPATIENT
Start: 2024-09-13 | End: 2024-09-14 | Stop reason: HOSPADM

## 2024-09-13 RX ORDER — OXYCODONE AND ACETAMINOPHEN 5; 325 MG/1; MG/1
1 TABLET ORAL EVERY 4 HOURS PRN
Status: DISCONTINUED | OUTPATIENT
Start: 2024-09-13 | End: 2024-09-15 | Stop reason: HOSPADM

## 2024-09-13 RX ORDER — VANCOMYCIN HYDROCHLORIDE 1 G/20ML
INJECTION, POWDER, LYOPHILIZED, FOR SOLUTION INTRAVENOUS
Status: DISCONTINUED | OUTPATIENT
Start: 2024-09-13 | End: 2024-09-13 | Stop reason: HOSPADM

## 2024-09-13 RX ORDER — ENOXAPARIN SODIUM 100 MG/ML
40 INJECTION SUBCUTANEOUS EVERY 12 HOURS
Status: DISCONTINUED | OUTPATIENT
Start: 2024-09-14 | End: 2024-09-15 | Stop reason: HOSPADM

## 2024-09-13 RX ORDER — PROPOFOL 10 MG/ML
VIAL (ML) INTRAVENOUS
Status: DISCONTINUED | OUTPATIENT
Start: 2024-09-13 | End: 2024-09-13

## 2024-09-13 RX ORDER — HYDROMORPHONE HYDROCHLORIDE 2 MG/ML
0.2 INJECTION, SOLUTION INTRAMUSCULAR; INTRAVENOUS; SUBCUTANEOUS EVERY 5 MIN PRN
Status: DISCONTINUED | OUTPATIENT
Start: 2024-09-13 | End: 2024-09-14 | Stop reason: HOSPADM

## 2024-09-13 RX ORDER — LIDOCAINE HYDROCHLORIDE 20 MG/ML
INJECTION, SOLUTION EPIDURAL; INFILTRATION; INTRACAUDAL; PERINEURAL
Status: DISCONTINUED | OUTPATIENT
Start: 2024-09-13 | End: 2024-09-13

## 2024-09-13 RX ORDER — ONDANSETRON 4 MG/1
4 TABLET, ORALLY DISINTEGRATING ORAL EVERY 8 HOURS PRN
Status: DISCONTINUED | OUTPATIENT
Start: 2024-09-13 | End: 2024-09-15 | Stop reason: HOSPADM

## 2024-09-13 RX ORDER — KETOROLAC TROMETHAMINE 30 MG/ML
15 INJECTION, SOLUTION INTRAMUSCULAR; INTRAVENOUS EVERY 6 HOURS
Status: COMPLETED | OUTPATIENT
Start: 2024-09-14 | End: 2024-09-14

## 2024-09-13 RX ORDER — METHOCARBAMOL 750 MG/1
750 TABLET, FILM COATED ORAL 3 TIMES DAILY
Status: DISCONTINUED | OUTPATIENT
Start: 2024-09-13 | End: 2024-09-15 | Stop reason: HOSPADM

## 2024-09-13 RX ORDER — GABAPENTIN 300 MG/1
300 CAPSULE ORAL NIGHTLY
Status: DISCONTINUED | OUTPATIENT
Start: 2024-09-13 | End: 2024-09-15 | Stop reason: HOSPADM

## 2024-09-13 RX ORDER — ROCURONIUM BROMIDE 10 MG/ML
INJECTION, SOLUTION INTRAVENOUS
Status: DISCONTINUED | OUTPATIENT
Start: 2024-09-13 | End: 2024-09-13

## 2024-09-13 RX ORDER — GLYCOPYRROLATE 0.2 MG/ML
INJECTION INTRAMUSCULAR; INTRAVENOUS
Status: DISCONTINUED | OUTPATIENT
Start: 2024-09-13 | End: 2024-09-13

## 2024-09-13 RX ORDER — ALBUMIN HUMAN 250 G/1000ML
SOLUTION INTRAVENOUS
Status: DISCONTINUED | OUTPATIENT
Start: 2024-09-13 | End: 2024-09-13

## 2024-09-13 RX ORDER — ONDANSETRON HYDROCHLORIDE 2 MG/ML
4 INJECTION, SOLUTION INTRAVENOUS DAILY PRN
Status: DISCONTINUED | OUTPATIENT
Start: 2024-09-13 | End: 2024-09-14 | Stop reason: HOSPADM

## 2024-09-13 RX ORDER — MORPHINE SULFATE 4 MG/ML
4 INJECTION, SOLUTION INTRAMUSCULAR; INTRAVENOUS
Status: DISCONTINUED | OUTPATIENT
Start: 2024-09-13 | End: 2024-09-15 | Stop reason: HOSPADM

## 2024-09-13 RX ORDER — ONDANSETRON HYDROCHLORIDE 2 MG/ML
INJECTION, SOLUTION INTRAMUSCULAR; INTRAVENOUS
Status: DISCONTINUED | OUTPATIENT
Start: 2024-09-13 | End: 2024-09-13

## 2024-09-13 RX ORDER — METRONIDAZOLE 500 MG/100ML
500 INJECTION, SOLUTION INTRAVENOUS
Status: DISPENSED | OUTPATIENT
Start: 2024-09-13 | End: 2024-09-15

## 2024-09-13 RX ORDER — SODIUM CHLORIDE 0.9 % (FLUSH) 0.9 %
10 SYRINGE (ML) INJECTION
Status: DISCONTINUED | OUTPATIENT
Start: 2024-09-13 | End: 2024-09-14 | Stop reason: HOSPADM

## 2024-09-13 RX ORDER — FENTANYL CITRATE 50 UG/ML
INJECTION, SOLUTION INTRAMUSCULAR; INTRAVENOUS
Status: DISCONTINUED | OUTPATIENT
Start: 2024-09-13 | End: 2024-09-13

## 2024-09-13 RX ORDER — ONDANSETRON HYDROCHLORIDE 2 MG/ML
4 INJECTION, SOLUTION INTRAVENOUS
Status: COMPLETED | OUTPATIENT
Start: 2024-09-13 | End: 2024-09-13

## 2024-09-13 RX ORDER — MUPIROCIN 20 MG/G
OINTMENT TOPICAL 2 TIMES DAILY
Status: DISCONTINUED | OUTPATIENT
Start: 2024-09-16 | End: 2024-09-15 | Stop reason: HOSPADM

## 2024-09-13 RX ORDER — DEXAMETHASONE SODIUM PHOSPHATE 4 MG/ML
INJECTION, SOLUTION INTRA-ARTICULAR; INTRALESIONAL; INTRAMUSCULAR; INTRAVENOUS; SOFT TISSUE
Status: DISCONTINUED | OUTPATIENT
Start: 2024-09-13 | End: 2024-09-13

## 2024-09-13 RX ORDER — MEPERIDINE HYDROCHLORIDE 25 MG/ML
12.5 INJECTION INTRAMUSCULAR; INTRAVENOUS; SUBCUTANEOUS EVERY 10 MIN PRN
Status: DISCONTINUED | OUTPATIENT
Start: 2024-09-13 | End: 2024-09-14 | Stop reason: HOSPADM

## 2024-09-13 RX ORDER — OXYCODONE AND ACETAMINOPHEN 10; 325 MG/1; MG/1
1 TABLET ORAL EVERY 4 HOURS PRN
Status: DISCONTINUED | OUTPATIENT
Start: 2024-09-13 | End: 2024-09-15 | Stop reason: HOSPADM

## 2024-09-13 RX ORDER — ACETAMINOPHEN 10 MG/ML
INJECTION, SOLUTION INTRAVENOUS
Status: DISCONTINUED | OUTPATIENT
Start: 2024-09-13 | End: 2024-09-13

## 2024-09-13 RX ORDER — KETAMINE HYDROCHLORIDE 100 MG/ML
INJECTION, SOLUTION INTRAMUSCULAR; INTRAVENOUS
Status: DISCONTINUED | OUTPATIENT
Start: 2024-09-13 | End: 2024-09-13

## 2024-09-13 RX ORDER — KETOROLAC TROMETHAMINE 30 MG/ML
INJECTION, SOLUTION INTRAMUSCULAR; INTRAVENOUS
Status: DISCONTINUED | OUTPATIENT
Start: 2024-09-13 | End: 2024-09-13

## 2024-09-13 RX ORDER — HYDROMORPHONE HYDROCHLORIDE 2 MG/ML
1 INJECTION, SOLUTION INTRAMUSCULAR; INTRAVENOUS; SUBCUTANEOUS
Status: COMPLETED | OUTPATIENT
Start: 2024-09-13 | End: 2024-09-13

## 2024-09-13 RX ORDER — MUPIROCIN 20 MG/G
OINTMENT TOPICAL 2 TIMES DAILY
Status: CANCELLED | OUTPATIENT
Start: 2024-09-13 | End: 2024-09-18

## 2024-09-13 RX ORDER — MIDAZOLAM HYDROCHLORIDE 1 MG/ML
INJECTION INTRAMUSCULAR; INTRAVENOUS
Status: DISCONTINUED | OUTPATIENT
Start: 2024-09-13 | End: 2024-09-13

## 2024-09-13 RX ORDER — DOCUSATE SODIUM 100 MG/1
100 CAPSULE, LIQUID FILLED ORAL DAILY
Status: DISCONTINUED | OUTPATIENT
Start: 2024-09-14 | End: 2024-09-15 | Stop reason: HOSPADM

## 2024-09-13 RX ORDER — ENOXAPARIN SODIUM 100 MG/ML
40 INJECTION SUBCUTANEOUS EVERY 24 HOURS
Status: DISCONTINUED | OUTPATIENT
Start: 2024-09-13 | End: 2024-09-13

## 2024-09-13 RX ADMIN — GLYCOPYRROLATE 0.2 MG: 0.2 INJECTION INTRAMUSCULAR; INTRAVENOUS at 07:09

## 2024-09-13 RX ADMIN — FENTANYL CITRATE 50 MCG: 50 INJECTION, SOLUTION INTRAMUSCULAR; INTRAVENOUS at 09:09

## 2024-09-13 RX ADMIN — VANCOMYCIN HYDROCHLORIDE 1000 MG: 1 INJECTION, POWDER, LYOPHILIZED, FOR SOLUTION INTRAVENOUS at 07:09

## 2024-09-13 RX ADMIN — KETAMINE HYDROCHLORIDE 25 MG: 100 INJECTION, SOLUTION, CONCENTRATE INTRAMUSCULAR; INTRAVENOUS at 08:09

## 2024-09-13 RX ADMIN — FENTANYL CITRATE 100 MCG: 50 INJECTION, SOLUTION INTRAMUSCULAR; INTRAVENOUS at 08:09

## 2024-09-13 RX ADMIN — LIDOCAINE HYDROCHLORIDE 60 MG: 20 INJECTION, SOLUTION INTRAVENOUS at 07:09

## 2024-09-13 RX ADMIN — METRONIDAZOLE 500 MG: 5 INJECTION, SOLUTION INTRAVENOUS at 11:09

## 2024-09-13 RX ADMIN — SODIUM CHLORIDE, SODIUM GLUCONATE, SODIUM ACETATE, POTASSIUM CHLORIDE AND MAGNESIUM CHLORIDE: 526; 502; 368; 37; 30 INJECTION, SOLUTION INTRAVENOUS at 07:09

## 2024-09-13 RX ADMIN — HYDROMORPHONE HYDROCHLORIDE 1 MG: 2 INJECTION INTRAMUSCULAR; INTRAVENOUS; SUBCUTANEOUS at 06:09

## 2024-09-13 RX ADMIN — ALBUMIN (HUMAN) 100 ML: 12.5 SOLUTION INTRAVENOUS at 08:09

## 2024-09-13 RX ADMIN — ACETAMINOPHEN 1000 MG: 10 INJECTION, SOLUTION INTRAVENOUS at 09:09

## 2024-09-13 RX ADMIN — SODIUM CHLORIDE, POTASSIUM CHLORIDE, SODIUM LACTATE AND CALCIUM CHLORIDE 1000 ML: 600; 310; 30; 20 INJECTION, SOLUTION INTRAVENOUS at 07:09

## 2024-09-13 RX ADMIN — FENTANYL CITRATE 100 MCG: 50 INJECTION, SOLUTION INTRAMUSCULAR; INTRAVENOUS at 07:09

## 2024-09-13 RX ADMIN — KETOROLAC TROMETHAMINE 30 MG: 30 INJECTION, SOLUTION INTRAMUSCULAR; INTRAVENOUS at 09:09

## 2024-09-13 RX ADMIN — ONDANSETRON 4 MG: 2 INJECTION INTRAMUSCULAR; INTRAVENOUS at 06:09

## 2024-09-13 RX ADMIN — ONDANSETRON 4 MG: 2 INJECTION INTRAMUSCULAR; INTRAVENOUS at 08:09

## 2024-09-13 RX ADMIN — KETAMINE HYDROCHLORIDE 25 MG: 100 INJECTION, SOLUTION, CONCENTRATE INTRAMUSCULAR; INTRAVENOUS at 07:09

## 2024-09-13 RX ADMIN — SODIUM CHLORIDE, SODIUM GLUCONATE, SODIUM ACETATE, POTASSIUM CHLORIDE AND MAGNESIUM CHLORIDE: 526; 502; 368; 37; 30 INJECTION, SOLUTION INTRAVENOUS at 08:09

## 2024-09-13 RX ADMIN — SUGAMMADEX 200 MG: 100 INJECTION, SOLUTION INTRAVENOUS at 09:09

## 2024-09-13 RX ADMIN — PROPOFOL 140 MG: 10 INJECTION, EMULSION INTRAVENOUS at 07:09

## 2024-09-13 RX ADMIN — ROCURONIUM BROMIDE 50 MG: 10 SOLUTION INTRAVENOUS at 07:09

## 2024-09-13 RX ADMIN — MIDAZOLAM HYDROCHLORIDE 2 MG: 1 INJECTION, SOLUTION INTRAMUSCULAR; INTRAVENOUS at 07:09

## 2024-09-13 RX ADMIN — PIPERACILLIN AND TAZOBACTAM 4.5 G: 4; .5 INJECTION, POWDER, LYOPHILIZED, FOR SOLUTION INTRAVENOUS; PARENTERAL at 07:09

## 2024-09-13 RX ADMIN — DEXAMETHASONE SODIUM PHOSPHATE 4 MG: 4 INJECTION, SOLUTION INTRA-ARTICULAR; INTRALESIONAL; INTRAMUSCULAR; INTRAVENOUS; SOFT TISSUE at 08:09

## 2024-09-13 NOTE — Clinical Note
Diagnosis: DKA (diabetic ketoacidosis) [558711]   Future Attending Provider: KEITH SUAREZ [[id number]]   Admit to which facility:: [facility] [[id number]]   Reason for IP Medical Treatment  (Clinical interventions that can only be accomplished in the IP setting? ) :: Needs inpatient managment   Estimated Length of Stay:: 3-4 midnights   Plans for Post-Acute care--if anticipated (pick the single best option):: A. No post acute care anticipated at this time   Special Needs:: No Special Needs [1]
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
## 2024-09-14 LAB
ALBUMIN SERPL-MCNC: 3.7 G/DL (ref 3.5–5)
ALBUMIN/GLOB SERPL: 1.3 RATIO (ref 1.1–2)
ALP SERPL-CCNC: 56 UNIT/L (ref 40–150)
ALT SERPL-CCNC: 16 UNIT/L (ref 0–55)
ANION GAP SERPL CALC-SCNC: 7 MEQ/L
APICAL FOUR CHAMBER EJECTION FRACTION: 58 %
APICAL TWO CHAMBER EJECTION FRACTION: 78 %
AST SERPL-CCNC: 30 UNIT/L (ref 5–34)
AV INDEX (PROSTH): 0.84
AV MEAN GRADIENT: 7 MMHG
AV PEAK GRADIENT: 13 MMHG
AV VALVE AREA BY VELOCITY RATIO: 2.59 CM²
AV VALVE AREA: 2.65 CM²
AV VELOCITY RATIO: 0.82
B-HCG SERPL QL: NEGATIVE
B-HCG UR QL: NEGATIVE
BACTERIA #/AREA URNS AUTO: ABNORMAL /HPF
BASOPHILS # BLD AUTO: 0.01 X10(3)/MCL
BASOPHILS NFR BLD AUTO: 0.1 %
BILIRUB SERPL-MCNC: 0.5 MG/DL
BILIRUB UR QL STRIP.AUTO: NEGATIVE
BSA FOR ECHO PROCEDURE: 1.51 M2
BUN SERPL-MCNC: 11.3 MG/DL (ref 7–18.7)
CALCIUM SERPL-MCNC: 8.3 MG/DL (ref 8.4–10.2)
CHLORIDE SERPL-SCNC: 109 MMOL/L (ref 98–107)
CLARITY UR: CLEAR
CO2 SERPL-SCNC: 20 MMOL/L (ref 22–29)
COLOR UR AUTO: ABNORMAL
CREAT SERPL-MCNC: 0.94 MG/DL (ref 0.55–1.02)
CREAT/UREA NIT SERPL: 12
CV ECHO LV RWT: 0.45 CM
DOP CALC AO PEAK VEL: 1.77 M/S
DOP CALC AO VTI: 30.7 CM
DOP CALC LVOT AREA: 3.1 CM2
DOP CALC LVOT DIAMETER: 2 CM
DOP CALC LVOT PEAK VEL: 1.46 M/S
DOP CALC LVOT STROKE VOLUME: 81.33 CM3
DOP CALC MV VTI: 27.1 CM
DOP CALCLVOT PEAK VEL VTI: 25.9 CM
E WAVE DECELERATION TIME: 206 MSEC
E/A RATIO: 0.97
E/E' RATIO: 5.09 M/S
ECHO LV POSTERIOR WALL: 0.85 CM (ref 0.6–1.1)
EOSINOPHIL # BLD AUTO: 0 X10(3)/MCL (ref 0–0.9)
EOSINOPHIL NFR BLD AUTO: 0 %
ERYTHROCYTE [DISTWIDTH] IN BLOOD BY AUTOMATED COUNT: 12.9 % (ref 11.5–17)
FRACTIONAL SHORTENING: 44 % (ref 28–44)
GFR SERPLBLD CREATININE-BSD FMLA CKD-EPI: >60 ML/MIN/1.73/M2
GLOBULIN SER-MCNC: 2.9 GM/DL (ref 2.4–3.5)
GLUCOSE SERPL-MCNC: 225 MG/DL (ref 74–100)
GLUCOSE UR QL STRIP: NORMAL
HCT VFR BLD AUTO: 30.5 % (ref 37–47)
HGB BLD-MCNC: 9.9 G/DL (ref 12–16)
HGB UR QL STRIP: ABNORMAL
HR MV ECHO: 86 BPM
IMM GRANULOCYTES # BLD AUTO: 0.03 X10(3)/MCL (ref 0–0.04)
IMM GRANULOCYTES NFR BLD AUTO: 0.3 %
INTERVENTRICULAR SEPTUM: 0.89 CM (ref 0.6–1.1)
KETONES UR QL STRIP: ABNORMAL
LEFT ATRIUM AREA SYSTOLIC (APICAL 2 CHAMBER): 17.9 CM2
LEFT ATRIUM AREA SYSTOLIC (APICAL 4 CHAMBER): 17.7 CM2
LEFT ATRIUM SIZE: 4.1 CM
LEFT ATRIUM VOLUME INDEX MOD: 32.5 ML/M2
LEFT ATRIUM VOLUME MOD: 49.1 CM3
LEFT INTERNAL DIMENSION IN SYSTOLE: 2.11 CM (ref 2.1–4)
LEFT VENTRICLE DIASTOLIC VOLUME INDEX: 40.53 ML/M2
LEFT VENTRICLE DIASTOLIC VOLUME: 61.2 ML
LEFT VENTRICLE END DIASTOLIC VOLUME APICAL 2 CHAMBER: 95 ML
LEFT VENTRICLE END DIASTOLIC VOLUME APICAL 4 CHAMBER: 55.1 ML
LEFT VENTRICLE END SYSTOLIC VOLUME APICAL 2 CHAMBER: 49.3 ML
LEFT VENTRICLE END SYSTOLIC VOLUME APICAL 4 CHAMBER: 48.7 ML
LEFT VENTRICLE MASS INDEX: 63 G/M2
LEFT VENTRICLE SYSTOLIC VOLUME INDEX: 9.7 ML/M2
LEFT VENTRICLE SYSTOLIC VOLUME: 14.6 ML
LEFT VENTRICULAR INTERNAL DIMENSION IN DIASTOLE: 3.78 CM (ref 3.5–6)
LEFT VENTRICULAR MASS: 95.6 G
LEUKOCYTE ESTERASE UR QL STRIP: NEGATIVE
LV LATERAL E/E' RATIO: 3.56 M/S
LV SEPTAL E/E' RATIO: 8.9 M/S
LVED V (TEICH): 61.2 ML
LVES V (TEICH): 14.6 ML
LVOT MG: 4 MMHG
LVOT MV: 0.96 CM/S
LYMPHOCYTES # BLD AUTO: 0.51 X10(3)/MCL (ref 0.6–4.6)
LYMPHOCYTES NFR BLD AUTO: 5.2 %
MCH RBC QN AUTO: 29.5 PG (ref 27–31)
MCHC RBC AUTO-ENTMCNC: 32.5 G/DL (ref 33–36)
MCV RBC AUTO: 90.8 FL (ref 80–94)
MONOCYTES # BLD AUTO: 0.24 X10(3)/MCL (ref 0.1–1.3)
MONOCYTES NFR BLD AUTO: 2.5 %
MUCOUS THREADS URNS QL MICRO: ABNORMAL /LPF
MV MEAN GRADIENT: 3 MMHG
MV PEAK A VEL: 0.92 M/S
MV PEAK E VEL: 0.89 M/S
MV PEAK GRADIENT: 5 MMHG
MV STENOSIS PRESSURE HALF TIME: 109 MS
MV VALVE AREA BY CONTINUITY EQUATION: 3 CM2
MV VALVE AREA P 1/2 METHOD: 2.02 CM2
NEUTROPHILS # BLD AUTO: 8.96 X10(3)/MCL (ref 2.1–9.2)
NEUTROPHILS NFR BLD AUTO: 91.9 %
NITRITE UR QL STRIP: NEGATIVE
NRBC BLD AUTO-RTO: 0 %
OHS LV EJECTION FRACTION SIMPSONS BIPLANE MOD: 69 %
PH UR STRIP: 5 [PH]
PISA TR MAX VEL: 3.7 M/S
PLATELET # BLD AUTO: 491 X10(3)/MCL (ref 130–400)
PMV BLD AUTO: 9.5 FL (ref 7.4–10.4)
POTASSIUM SERPL-SCNC: 4.3 MMOL/L (ref 3.5–5.1)
PROT SERPL-MCNC: 6.6 GM/DL (ref 6.4–8.3)
PROT UR QL STRIP: NEGATIVE
PV PEAK GRADIENT: 5 MMHG
PV PEAK VELOCITY: 1.16 M/S
RA PRESSURE ESTIMATED: 3 MMHG
RBC # BLD AUTO: 3.36 X10(6)/MCL (ref 4.2–5.4)
RBC #/AREA URNS AUTO: ABNORMAL /HPF
RV TB RVSP: 7 MMHG
RV TISSUE DOPPLER FREE WALL SYSTOLIC VELOCITY 1 (APICAL 4 CHAMBER VIEW): 25.3 CM/S
SODIUM SERPL-SCNC: 136 MMOL/L (ref 136–145)
SP GR UR STRIP.AUTO: 1.02 (ref 1–1.03)
SQUAMOUS #/AREA URNS LPF: ABNORMAL /HPF
TASV: 25.3 CM/S
TDI LATERAL: 0.25 M/S
TDI SEPTAL: 0.1 M/S
TDI: 0.18 M/S
TR MAX PG: 55 MMHG
TRICUSPID ANNULAR PLANE SYSTOLIC EXCURSION: 2.74 CM
TV REST PULMONARY ARTERY PRESSURE: 58 MMHG
UROBILINOGEN UR STRIP-ACNC: NORMAL
VANCOMYCIN SERPL-MCNC: 6.1 UG/ML (ref 15–20)
WBC # BLD AUTO: 9.75 X10(3)/MCL (ref 4.5–11.5)
WBC #/AREA URNS AUTO: ABNORMAL /HPF
Z-SCORE OF LEFT VENTRICULAR DIMENSION IN END DIASTOLE: -1.61
Z-SCORE OF LEFT VENTRICULAR DIMENSION IN END SYSTOLE: -2.12

## 2024-09-14 PROCEDURE — 63600175 PHARM REV CODE 636 W HCPCS

## 2024-09-14 PROCEDURE — 80053 COMPREHEN METABOLIC PANEL: CPT | Performed by: EMERGENCY MEDICINE

## 2024-09-14 PROCEDURE — 85025 COMPLETE CBC W/AUTO DIFF WBC: CPT | Performed by: EMERGENCY MEDICINE

## 2024-09-14 PROCEDURE — 63600175 PHARM REV CODE 636 W HCPCS: Performed by: SURGERY

## 2024-09-14 PROCEDURE — 25000003 PHARM REV CODE 250: Performed by: SURGERY

## 2024-09-14 PROCEDURE — 63600175 PHARM REV CODE 636 W HCPCS: Mod: JZ,JG | Performed by: ORTHOPAEDIC SURGERY

## 2024-09-14 PROCEDURE — 81025 URINE PREGNANCY TEST: CPT | Performed by: EMERGENCY MEDICINE

## 2024-09-14 PROCEDURE — 99900035 HC TECH TIME PER 15 MIN (STAT)

## 2024-09-14 PROCEDURE — 94799 UNLISTED PULMONARY SVC/PX: CPT

## 2024-09-14 PROCEDURE — 84703 CHORIONIC GONADOTROPIN ASSAY: CPT | Performed by: EMERGENCY MEDICINE

## 2024-09-14 PROCEDURE — 80202 ASSAY OF VANCOMYCIN: CPT | Performed by: SURGERY

## 2024-09-14 PROCEDURE — 11000001 HC ACUTE MED/SURG PRIVATE ROOM

## 2024-09-14 PROCEDURE — 36415 COLL VENOUS BLD VENIPUNCTURE: CPT | Performed by: SURGERY

## 2024-09-14 PROCEDURE — 97161 PT EVAL LOW COMPLEX 20 MIN: CPT

## 2024-09-14 PROCEDURE — 81001 URINALYSIS AUTO W/SCOPE: CPT | Performed by: EMERGENCY MEDICINE

## 2024-09-14 PROCEDURE — 0QSG06Z REPOSITION RIGHT TIBIA WITH INTRAMEDULLARY INTERNAL FIXATION DEVICE, OPEN APPROACH: ICD-10-PCS | Performed by: ORTHOPAEDIC SURGERY

## 2024-09-14 PROCEDURE — 99233 SBSQ HOSP IP/OBS HIGH 50: CPT | Mod: ,,, | Performed by: SURGERY

## 2024-09-14 PROCEDURE — 25000003 PHARM REV CODE 250: Performed by: ORTHOPAEDIC SURGERY

## 2024-09-14 PROCEDURE — 99900031 HC PATIENT EDUCATION (STAT)

## 2024-09-14 PROCEDURE — 63600175 PHARM REV CODE 636 W HCPCS: Performed by: ORTHOPAEDIC SURGERY

## 2024-09-14 RX ORDER — VANCOMYCIN HCL IN 5 % DEXTROSE 1G/250ML
1000 PLASTIC BAG, INJECTION (ML) INTRAVENOUS
Status: DISCONTINUED | OUTPATIENT
Start: 2024-09-14 | End: 2024-09-15 | Stop reason: HOSPADM

## 2024-09-14 RX ADMIN — OXYCODONE AND ACETAMINOPHEN 1 TABLET: 10; 325 TABLET ORAL at 09:09

## 2024-09-14 RX ADMIN — MORPHINE SULFATE 4 MG: 4 INJECTION, SOLUTION INTRAMUSCULAR; INTRAVENOUS at 03:09

## 2024-09-14 RX ADMIN — METRONIDAZOLE 500 MG: 5 INJECTION, SOLUTION INTRAVENOUS at 04:09

## 2024-09-14 RX ADMIN — GABAPENTIN 300 MG: 300 CAPSULE ORAL at 12:09

## 2024-09-14 RX ADMIN — OXYCODONE AND ACETAMINOPHEN 1 TABLET: 10; 325 TABLET ORAL at 06:09

## 2024-09-14 RX ADMIN — MORPHINE SULFATE 4 MG: 4 INJECTION, SOLUTION INTRAMUSCULAR; INTRAVENOUS at 04:09

## 2024-09-14 RX ADMIN — METRONIDAZOLE 500 MG: 5 INJECTION, SOLUTION INTRAVENOUS at 09:09

## 2024-09-14 RX ADMIN — METHOCARBAMOL 750 MG: 750 TABLET ORAL at 02:09

## 2024-09-14 RX ADMIN — DOCUSATE SODIUM 100 MG: 100 CAPSULE, LIQUID FILLED ORAL at 09:09

## 2024-09-14 RX ADMIN — GABAPENTIN 300 MG: 300 CAPSULE ORAL at 08:09

## 2024-09-14 RX ADMIN — KETOROLAC TROMETHAMINE 15 MG: 30 INJECTION, SOLUTION INTRAMUSCULAR at 12:09

## 2024-09-14 RX ADMIN — PIPERACILLIN AND TAZOBACTAM 4.5 G: 4; .5 INJECTION, POWDER, LYOPHILIZED, FOR SOLUTION INTRAVENOUS; PARENTERAL at 11:09

## 2024-09-14 RX ADMIN — OXYCODONE AND ACETAMINOPHEN 1 TABLET: 10; 325 TABLET ORAL at 02:09

## 2024-09-14 RX ADMIN — METRONIDAZOLE 500 MG: 5 INJECTION, SOLUTION INTRAVENOUS at 11:09

## 2024-09-14 RX ADMIN — ENOXAPARIN SODIUM 40 MG: 40 INJECTION SUBCUTANEOUS at 09:09

## 2024-09-14 RX ADMIN — METHOCARBAMOL 750 MG: 750 TABLET ORAL at 09:09

## 2024-09-14 RX ADMIN — MORPHINE SULFATE 4 MG: 4 INJECTION, SOLUTION INTRAMUSCULAR; INTRAVENOUS at 11:09

## 2024-09-14 RX ADMIN — PIPERACILLIN AND TAZOBACTAM 4.5 G: 4; .5 INJECTION, POWDER, LYOPHILIZED, FOR SOLUTION INTRAVENOUS; PARENTERAL at 06:09

## 2024-09-14 RX ADMIN — OXYCODONE AND ACETAMINOPHEN 1 TABLET: 10; 325 TABLET ORAL at 01:09

## 2024-09-14 RX ADMIN — METHOCARBAMOL 750 MG: 750 TABLET ORAL at 12:09

## 2024-09-14 RX ADMIN — MORPHINE SULFATE 4 MG: 4 INJECTION, SOLUTION INTRAMUSCULAR; INTRAVENOUS at 08:09

## 2024-09-14 RX ADMIN — OXYCODONE AND ACETAMINOPHEN 1 TABLET: 10; 325 TABLET ORAL at 11:09

## 2024-09-14 RX ADMIN — KETOROLAC TROMETHAMINE 15 MG: 30 INJECTION, SOLUTION INTRAMUSCULAR at 05:09

## 2024-09-14 RX ADMIN — VANCOMYCIN HYDROCHLORIDE 1000 MG: 1 INJECTION, POWDER, LYOPHILIZED, FOR SOLUTION INTRAVENOUS at 11:09

## 2024-09-14 RX ADMIN — KETOROLAC TROMETHAMINE 15 MG: 30 INJECTION, SOLUTION INTRAMUSCULAR at 06:09

## 2024-09-14 RX ADMIN — PIPERACILLIN AND TAZOBACTAM 4.5 G: 4; .5 INJECTION, POWDER, LYOPHILIZED, FOR SOLUTION INTRAVENOUS; PARENTERAL at 03:09

## 2024-09-14 RX ADMIN — METHOCARBAMOL 750 MG: 750 TABLET ORAL at 08:09

## 2024-09-14 RX ADMIN — KETOROLAC TROMETHAMINE 15 MG: 30 INJECTION, SOLUTION INTRAMUSCULAR at 11:09

## 2024-09-14 RX ADMIN — ENOXAPARIN SODIUM 40 MG: 40 INJECTION SUBCUTANEOUS at 08:09

## 2024-09-14 NOTE — NURSING
Nurses Note -- 4 Eyes      9/14/2024   7:45 AM      Skin assessed during: Q Shift Change      [] No Altered Skin Integrity Present    [x]Prevention Measures Documented      [x] Yes- Altered Skin Integrity Present or Discovered   [] LDA Added if Not in Epic (Describe Wound)   [] New Altered Skin Integrity was Present on Admit and Documented in LDA   [] Wound Image Taken    Wound Care Consulted? No    Attending Nurse:  Carlos ATKINS    Second RN/Staff Member:  Maribel ATKINS
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
## 2024-09-14 NOTE — ANESTHESIA PROCEDURE NOTES
Intubation    Date/Time: 9/13/2024 7:52 PM    Performed by: Bud Love CRNA  Authorized by: Bud Love CRNA    Intubation:     Induction:  Rapid sequence induction    Intubated:  Postinduction    Mask Ventilation:  Easy with oral airway    Attempts:  1    Attempted By:  CRNA    Method of Intubation:  Direct    Blade:  Yoo 2    Laryngeal View Grade: Grade IIA - cords partially seen      Difficult Airway Encountered?: No      Complications:  None    Airway Device:  Oral endotracheal tube    Airway Device Size:  7.5    Style/Cuff Inflation:  Cuffed (inflated to minimal occlusive pressure)    Inflation Amount (mL):  6    Tube secured:  21    Secured at:  The lips    Placement Verified By:  Capnometry    Complicating Factors:  None    Findings Post-Intubation:  BS equal bilateral and atraumatic/condition of teeth unchanged

## 2024-09-14 NOTE — TRANSFER OF CARE
"Anesthesia Transfer of Care Note    Patient: Марина Britton    Procedure(s) Performed: Procedure(s) (LRB):  INSERTION, INTRAMEDULLARY MAT, TIBIA (Right)  INCISION AND DRAINAGE, LOWER EXTREMITY (Right)    Patient location: PACU    Anesthesia Type: general    Transport from OR: Transported from OR on room air with adequate spontaneous ventilation    Post pain: adequate analgesia    Post assessment: no apparent anesthetic complications    Post vital signs: stable    Level of consciousness: responds to stimulation    Nausea/Vomiting: no nausea/vomiting    Complications: none    Transfer of care protocol was followed    Last vitals: Visit Vitals  BP (!) 143/95 (BP Location: Right arm, Patient Position: Lying)   Pulse 84   Temp 36.7 °C (98.1 °F) (Temporal)   Resp 18   Ht 5' 2" (1.575 m)   Wt 52.2 kg (115 lb)   SpO2 100%   BMI 21.03 kg/m²     "

## 2024-09-14 NOTE — BRIEF OP NOTE
[facility] - Periop Services  Brief Operative Note    SUMMARY     Surgery Date: 9/13/2024     Surgeons and Role:     * Nico Kurtz MD - Primary    Assisting Surgeon: None    Pre-op Diagnosis:  Other type I or II open fracture of distal end of left tibia, initial encounter [S82.294A]    Post-op Diagnosis:  Post-Op Diagnosis Codes:     * Other type I or II open fracture of distal end of left tibia, initial encounter [L40.175H]    Procedure(s) (LRB):  INSERTION, INTRAMEDULLARY MAT, TIBIA (Right)  INCISION AND DRAINAGE OPEN FX INCL. BONE, (Right)  Apllication of abx delivery device RLE    Anesthesia: Choice    Implants:  Implant Name Type Inv. Item Serial No.  Lot No. LRB No. Used Action   CEMENT BONE ANTIBIO SIMPLEX P - [id number]  CEMENT BONE ANTIBIO SIMPLEX P  [employer]. [id number] Right 1 Implanted   SYNTHES TIBIA NAIL REF # [id number]  SIZE 9MM X 285MM    SYNTHES [id number] Right 1 Implanted   SCREW LP LOCKING XL25 30MM - [id number]  SCREW LP LOCKING XL25 30MM  [employer] [id number] Right 1 Implanted   SCREW SLIM LP 5.0X34MM - [id number]  SCREW SLIM LP 5.0X34MM  [employer] [id number] Right 1 Implanted   SCREW XL25 IM 36X5MM - [id number]  SCREW XL25 IM 36X5MM  [employer] [id number] Right 1 Implanted   SCREW LP LOCKING XL25 30MM - [id number]  SCREW LP LOCKING XL25 30MM  [employer] [id number] Right 1 Implanted   SCREW XL25 IM LOCK 32X5MM - [id number]  SCREW XL25 IM LOCK 32X5MM  [employer] [id number] Right 1 Implanted       Operative Findings: see op report    Estimated Blood Loss: 100mL    Estimated Blood Loss has been documented.         Specimens:   Specimen (24h ago, onward)      None            [id number]  A/P: Tolerated procedure well. Admit to floor. Will need IV abx for 48hrs due to contamination. Vanc due to hx of staph, Zosyn/Flagyl due to debris and contamination. OK to WB in boot with walker. Keep elevated while at rest. Dressing change Sunday prior to d/c.      Nico Kurtz,  MD  Orthopedic Trauma  [facility]

## 2024-09-14 NOTE — NURSING
Nurses Note -- 4 Eyes      9/14/2024   3:02 AM      Skin assessed during: Admit      [] No Altered Skin Integrity Present    []Prevention Measures Documented      [x] Yes- Altered Skin Integrity Present or Discovered   [] LDA Added if Not in Epic (Describe Wound)   [] New Altered Skin Integrity was Present on Admit and Documented in LDA   [] Wound Image Taken    Wound Care Consulted? No, surgery incision. Surgery is on the case    Attending Nurse:  Maribel Anders RN/Staff Member:  Deirdre

## 2024-09-14 NOTE — ANESTHESIA POSTPROCEDURE EVALUATION
Anesthesia Post Evaluation    Patient: Марина Britton    Procedure(s) Performed: Procedure(s) (LRB):  INSERTION, INTRAMEDULLARY MAT, TIBIA (Right)  INCISION AND DRAINAGE, LOWER EXTREMITY (Right)    Final Anesthesia Type: general      Patient location during evaluation: PACU  Patient participation: Yes- Able to Participate  Level of consciousness: awake and alert  Post-procedure vital signs: reviewed and stable  Pain management: adequate  Airway patency: patent  AUTUMN mitigation strategies: Multimodal analgesia  PONV status at discharge: No PONV  Anesthetic complications: no      Cardiovascular status: blood pressure returned to baseline and hemodynamically stable  Respiratory status: unassisted and spontaneous ventilation  Hydration status: euvolemic  Follow-up not needed.            Vitals Value Taken Time   /78 09/13/24 2211   Temp  09/13/24 2217   Pulse 83 09/13/24 2217   Resp 18 09/13/24 2217   SpO2 100 % 09/13/24 2217   Vitals shown include unfiled device data.      No case tracking events are documented in the log.      Pain/Edward Score: Pain Rating Prior to Med Admin: 10 (9/13/2024  6:53 PM)
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
## 2024-09-14 NOTE — PROGRESS NOTES
Pharmacokinetic Assessment Follow Up: IV Vancomycin    Vancomycin serum concentration assessment(s):    The random level was drawn correctly and can be used to guide therapy at this time. The measurement is below the desired definitive target range of 15 to 20 mcg/mL.    Vancomycin Regimen Plan:    Change regimen to Vancomycin 1000 mg IV every 12 hours with next serum trough concentration measured at 2300 prior to 0000 dose on 9/16    Scheduled Administration Times        Drug levels (last 3 results):  Recent Labs   Lab Result Units 09/14/24  0826   Vancomycin Random ug/ml 6.1*       Vancomycin Administrations:  vancomycin given in the last 96 hours                     vancomycin injection (g) 1 g Given 09/13/24 2012    vancomycin (VANCOCIN) 1,000 mg in D5W 250 mL IVPB (mg) 1,000 mg New Bag 09/13/24 1954                    Pharmacy will continue to follow and monitor vancomycin.    Please contact pharmacy at extension [phone] for questions regarding this assessment.    Thank you for the consult,   Walt Bhandari       Patient brief summary:  Марина Britton is a 34 y.o. female initiated on antimicrobial therapy with IV Vancomycin for treatment of  surgical prophylaxis    The patient's current regimen is 1000 mg q12h    Drug Allergies:   Review of patient's allergies indicates:   Allergen Reactions    Shellfish containing products Anaphylaxis and Hives    Soap Rash     [facility] Rem[facility] - Hospital supplied - cleanser shampoo & body wash gel  Ingredients - purified water, sodium laureth sulfate, sodium chloride, cocamide william, cocamidopropylamine oxide, fragrance, aloe barbadensis leaf juice, propylene alcohol, peg-150 distearate, diazolidinyl urea, ciric acid, methylparaben, & propulparaben       Actual Body Weight:  Wt Readings from Last 1 Encounters:   09/14/24 52.2 kg (115 lb)       Renal Function:   Estimated Creatinine Clearance: 66.1 mL/min (based on SCr of 0.94 mg/dL).,     Dialysis Method (if  applicable):  N/A    CBC (last 72 hours):  Recent Labs   Lab Result Units 09/14/24  0826   WBC x10(3)/mcL 9.75   Hgb g/dL 9.9*   Hct % 30.5*   Platelet x10(3)/mcL 491*   Mono % % 2.5   Eos % % 0.0   Basophil % % 0.1       Metabolic Panel (last 72 hours):  Recent Labs   Lab Result Units 09/14/24  0054 09/14/24  0826   Sodium mmol/L  --  136   Potassium mmol/L  --  4.3   Chloride mmol/L  --  109*   CO2 mmol/L  --  20*   Glucose mg/dL  --  225*   Glucose, UA  Normal  --    Blood Urea Nitrogen mg/dL  --  11.3   Creatinine mg/dL  --  0.94   Albumin g/dL  --  3.7   Bilirubin Total mg/dL  --  0.5   ALP unit/L  --  56   AST unit/L  --  30   ALT unit/L  --  16       Microbiologic Results:  Microbiology Results (last 7 days)       ** No results found for the last 168 hours. **
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
## 2024-09-14 NOTE — ANESTHESIA PREPROCEDURE EVALUATION
09/13/2024  Марина Britton is a 34 y.o., female.      Pre-op Assessment    I have reviewed the Patient Summary Reports.     I have reviewed the Nursing Notes. I have reviewed the NPO Status.   I have reviewed the Medications.     Review of Systems  Anesthesia Hx:  No problems with previous Anesthesia                Social:  Smoker, Alcohol Use, Recreational Drugs       Hematology/Oncology:  Hematology Normal   Oncology Normal                                   EENT/Dental:  EENT/Dental Normal           Cardiovascular:                    H/o endocarditis and PE                         Pulmonary:  Pneumonia                      Renal/:  Renal/ Normal                 Hepatic/GI:      Liver Disease, Hepatitis, C           Musculoskeletal:  Musculoskeletal Normal                Neurological:  Neurology Normal                                      Endocrine:  Endocrine Normal            Psych:  Psychiatric History              Physical Exam  General: Well nourished, Cooperative, Alert and Oriented    Airway:  Mallampati: II   Mouth Opening: Normal  TM Distance: Normal  Tongue: Normal  Neck ROM: Normal ROM    Dental:  Intact    Chest/Lungs:  Clear to auscultation    Heart:  Rate: Normal    Anesthesia Plan  Type of Anesthesia, risks & benefits discussed:    Anesthesia Type: Gen ETT  Intra-op Monitoring Plan: Standard ASA Monitors  Post Op Pain Control Plan: multimodal analgesia  Induction:  IV and rapid sequence  Airway Plan: Direct  Informed Consent: Informed consent signed with the Patient and all parties understand the risks and agree with anesthesia plan.  All questions answered.   ASA Score: 3 Emergent  Day of Surgery Review of History & Physical: H&P Update referred to the surgeon/provider.I have interviewed and examined the patient. I have reviewed the patient's H&P dated:     Ready For Surgery From  Anesthesia Perspective.   .

## 2024-09-14 NOTE — PROGRESS NOTES
Patient Name: Марина Britton  MRN: [id number]  Admission Date: 9/13/2024  Hospital Length of Stay: 1 days  Attending Provider: Paulo Caro MD  Primary Care Provider: Heather, Primary Doctor  Follow-up For: Procedure(s) (LRB):  INSERTION, INTRAMEDULLARY MAT, TIBIA (Right)  INCISION AND DRAINAGE, LOWER EXTREMITY (Right)    Post-Operative Day: 1 Day Post-Op  Subjective:       Principal Orthopedic Problem: 1 Day Post-Op       Interval History:  Postoperative day 1. Status post intramedullary nailing right tibia with irrigation debridement of open fracture and application of antibiotic spacer.      Resting comfortably this morning.  Reports that she has some soreness and pain in her leg as expected.  Limb is elevated on pillows.  Her dressings are clean and dry.    Review of patient's allergies indicates:   Allergen Reactions    Shellfish containing products Anaphylaxis and Hives    Soap Rash     [facility] [facility] - Hospital supplied - cleanser shampoo & body wash gel  Ingredients - purified water, sodium laureth sulfate, sodium chloride, cocamide william, cocamidopropylamine oxide, fragrance, aloe barbadensis leaf juice, propylene alcohol, peg-150 distearate, diazolidinyl urea, ciric acid, methylparaben, & propulparaben       Current Facility-Administered Medications   Medication    docusate sodium capsule 100 mg    enoxaparin injection 40 mg    gabapentin capsule 300 mg    ketorolac injection 15 mg    methocarbamoL tablet 750 mg    metronidazole IVPB 500 mg    morphine injection 4 mg    [START ON 9/16/2024] mupirocin 2 % ointment    ondansetron disintegrating tablet 4 mg    oxyCODONE-acetaminophen  mg per tablet 1 tablet    oxyCODONE-acetaminophen 5-325 mg per tablet 1 tablet    piperacillin-tazobactam (ZOSYN) 4.5 g in D5W 100 mL IVPB (MB+)    vancomycin - pharmacy to dose     Objective:     Vital Signs (Most Recent):  Temp: 98.7 °F (37.1 °C) (09/14/24 0731)  Pulse: 71 (09/14/24 0731)  Resp: 16 (09/14/24 0930)  BP: (!)  "150/99 (09/14/24 0731)  SpO2: 99 % (09/14/24 0731) Vital Signs (24h Range):  Temp:  [96 °F (35.6 °C)-98.7 °F (37.1 °C)] 98.7 °F (37.1 °C)  Pulse:  [66-91] 71  Resp:  [10-18] 16  SpO2:  [99 %-100 %] 99 %  BP: (113-153)/() 150/99     Weight: 52.2 kg (115 lb)  Height: 5' 2" (157.5 cm)  Body mass index is 21.03 kg/m².      Intake/Output Summary (Last 24 hours) at 9/14/2024 1017  Last data filed at 9/14/2024 0213  Gross per 24 hour   Intake 2350 ml   Output 870 ml   Net 1480 ml       Physical Exam:   Ortho/SPM Exam    General the patient is alert and oriented x3 no acute distress nontoxic-appearing appropriate affect.    Constitutional: Vital signs are examined and stable.  Resp: No signs of labored breathing    Musculoskeletal Exam:  Right lower extremity:  Dressings clean dry and intact.  Cam boot in place.  Brisk capillary refill to all digits.  Moving her digits well.  Tolerates gentle passive range motion of the right knee.    Diagnostic Findings:   Significant Labs: BMP:   Recent Labs   Lab 09/14/24  0826      K 4.3   *   CO2 20*   BUN 11.3   CREATININE 0.94   CALCIUM 8.3*     CBC:   Recent Labs   Lab 09/14/24  0826   WBC 9.75   HGB 9.9*   HCT 30.5*   *     CMP:   Recent Labs   Lab 09/14/24  0826      K 4.3   *   CO2 20*   BUN 11.3   CREATININE 0.94   CALCIUM 8.3*   ALBUMIN 3.7   BILITOT 0.5   ALKPHOS 56   AST 30   ALT 16     All pertinent labs within the past 24 hours have been reviewed.        Significant Imaging: I have reviewed and interpreted all pertinent imaging results/findings.     Assessment/Plan:     Active Diagnoses:    Diagnosis Date Noted POA    PRINCIPAL PROBLEM:  Displaced comminuted fracture of shaft of right tibia, initial encounter for open fracture type IIIA, IIIB, or IIIC [S82.251C] 09/13/2024 Yes      Problems Resolved During this Admission:   H/H is down as expected postop due to acute blood loss anemia.  Her pain is controlled she was resting comfortably. "  Begin dressing changes tomorrow.  We will continue antibiotics for today.  She can work with PT, weight bear as tolerated in the boot with a walker.  Full range motion of the right knee.  Lovenox for DVT prophylaxis, enteric-coated aspirin 81 mg p.o. b.i.d. upon discharge.  Once she was completed 48 hours of antibiotics she can be discharged home and follow up in the office in 2 weeks.  She was at high risk for developing an infection in the limb due to the 3rd and contamination from her open fracture.  We will need to discuss future operative intervention for removal of her antibiotic spacer and bone grafting due to bone loss experienced at the time of her injury.  All questions and concerns were addressed and she understands and agrees with our plan.      This note/OR report was created with the assistance of  voice recognition software or phone  dictation.  There may be transcription errors as a result of using this technology however minimal. Effort has been made to assure accuracy of transcription but any obvious errors or omissions should be clarified with the author of the document.           Nico Kurtz MD  Orthopedic Trauma Surgery  [facility] [address]

## 2024-09-14 NOTE — H&P
Trauma Surgery   History and Physical Note    Patient Name: Марина Britton  YOB: 1990  Date: 2024 10:51 PM  Date of Admission: 2024  HD#0  POD#Day of Surgery    PRESENTING HISTORY   Chief Complaint/Reason for Admission: Displaced comminuted fracture of shaft of right tibia, initial encounter for open fracture type IIIA, IIIB, or IIIC    History of Present Illness:  Patient is a 33 yo female with PMHx IVDU, endocarditis (not currently on antibiotics), anxiety, borderline personality disorder who presents after tree branch fell on her right leg. Patient found to have open right tibia-fibular fracture and was taken to OR urgently with orthopedic surgery. She reports she has endocarditis but is not on any antibiotics. She is an active IVDU and she last used heroin 3 days ago.      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 Bilatral;  Surgeon: Chauncey Garcia MD;  Location: [address];  Service: Orthopedics;  Laterality: Bilateral;     SECTION, LOW TRANSVERSE      x4    COSMETIC SURGERY      HAND ARTHROTOMY Right 2021    Procedure: ARTHROTOMY, HAND;  Surgeon: Chauncey Garcia MD;  Location: [address];  Service: Orthopedics;  Laterality: Right;    INCISION AND DRAINAGE OF HAND Right 2021    Procedure: INCISION AND DRAINAGE, HAND (Right) - hand table, cysto tubing, 9L saline, culture swabs;  Surgeon: Chauncey Garcia MD;  Location: [address];  Service: Orthopedics;  Laterality: Right;    IRRIGATION AND DEBRIDEMENT OF  UPPER EXTREMITY Right 2/9/2021    Procedure: IRRIGATION AND DEBRIDEMENT, STERNOCLAVICULAR JOINT;  Surgeon: Frandy Molina MD;  Location: [facility] OR [address] [place] [address];  Service: Cardiothoracic;  Laterality: Right;       Family history:  No family history on file.    Social history:  Social History     Socioeconomic History    Marital status: Single   Tobacco Use    Smoking status: Never    Smokeless tobacco: Never   Substance and Sexual Activity    Alcohol use: Not Currently     Comment: occ    Drug use: Yes     Types: IV, Marijuana, Methamphetamines     Comment: heroine    Sexual activity: Yes     Partners: Male     Birth control/protection: Partner-Vasectomy     Social History     Tobacco Use   Smoking Status Never   Smokeless Tobacco Never      Social History     Substance and Sexual Activity   Alcohol Use Not Currently    Comment: occ        MEDICATIONS & ALLERGIES:   Allergies:   Review of patient's allergies indicates:   Allergen Reactions    Shellfish containing products Anaphylaxis and Hives    Soap Rash     [facility] - Hospital supplied - cleanser shampoo & body wash gel  Ingredients - purified water, sodium laureth sulfate, sodium chloride, cocamide william, cocamidopropylamine oxide, fragrance, aloe barbadensis leaf juice, propylene alcohol, peg-150 distearate, diazolidinyl urea, ciric acid, methylparaben, & propulparaben     Home Meds:   Current Outpatient Medications   Medication Instructions    amLODIPine (NORVASC) 5 mg, Oral, Daily    cloNIDine (CATAPRES) 0.1 mg, Oral, 2 times daily    diclofenac (VOLTAREN) 50 mg, Oral, 2 times daily    docusate sodium (COLACE) 100 mg, Oral, 2 times daily    hydrocortisone 1 % cream Topical (Top), 3 times daily    losartan (COZAAR) 50 mg, Oral, Daily    mirtazapine (REMERON) 30 mg, Oral, Nightly    naloxone (NARCAN) 4 mg/actuation Spry 4mg by nasal route as needed for opioid overdose; may repeat every 2-3 minutes in alternating nostrils until medical help arrives. Call 911     NARCAN 4 mg, Nasal, As needed (PRN)      No current facility-administered medications on file prior to encounter.     Current Outpatient Medications on File Prior to Encounter   Medication Sig Dispense Refill    amLODIPine (NORVASC) 5 MG tablet Take 1 tablet (5 mg total) by mouth once daily. 30 tablet 2    cloNIDine (CATAPRES) 0.1 MG tablet Take 1 tablet (0.1 mg total) by mouth 2 (two) times daily. for 3 days 6 tablet 0    diclofenac (VOLTAREN) 50 MG EC tablet Take 1 tablet (50 mg total) by mouth 2 (two) times daily. 60 tablet 0    docusate sodium (COLACE) 100 MG capsule Take 1 capsule (100 mg total) by mouth 2 (two) times daily. 60 capsule 2    hydrocortisone 1 % cream Apply topically 3 (three) times daily. for 5 days 20 g 0    losartan (COZAAR) 50 MG tablet Take 1 tablet (50 mg total) by mouth once daily. 30 tablet 2    mirtazapine (REMERON) 30 MG tablet Take 1 tablet (30 mg total) by mouth every evening. 30 tablet 2    naloxone (NARCAN) 4 mg/actuation Spry 4mg by nasal route as needed for opioid overdose; may repeat every 2-3 minutes in alternating nostrils until medical help arrives. Call 911 1 each 11    NARCAN 4 mg/actuation Spry 4 mg by Nasal route as needed.      [DISCONTINUED] gabapentin (NEURONTIN) 300 MG capsule Take 1 capsule (300 mg total) by mouth 3 (three) times daily. (Patient not taking: Reported on 3/17/2021) 90 capsule 11      No current facility-administered medications on file prior to encounter.     Current Outpatient Medications on File Prior to Encounter   Medication Sig    amLODIPine (NORVASC) 5 MG tablet Take 1 tablet (5 mg total) by mouth once daily.    cloNIDine (CATAPRES) 0.1 MG tablet Take 1 tablet (0.1 mg total) by mouth 2 (two) times daily. for 3 days    diclofenac (VOLTAREN) 50 MG EC tablet Take 1 tablet (50 mg total) by mouth 2 (two) times daily.    docusate sodium (COLACE) 100 MG capsule Take 1 capsule (100 mg total) by mouth 2 (two) times daily.    hydrocortisone 1 % cream Apply  topically 3 (three) times daily. for 5 days    losartan (COZAAR) 50 MG tablet Take 1 tablet (50 mg total) by mouth once daily.    mirtazapine (REMERON) 30 MG tablet Take 1 tablet (30 mg total) by mouth every evening.    naloxone (NARCAN) 4 mg/actuation Spry 4mg by nasal route as needed for opioid overdose; may repeat every 2-3 minutes in alternating nostrils until medical help arrives. Call 911    NARCAN 4 mg/actuation Spry 4 mg by Nasal route as needed.    [DISCONTINUED] gabapentin (NEURONTIN) 300 MG capsule Take 1 capsule (300 mg total) by mouth 3 (three) times daily. (Patient not taking: Reported on 3/17/2021)     Scheduled Meds:   [START ON 9/14/2024] docusate sodium  100 mg Oral Daily    enoxaparin  40 mg Subcutaneous Daily    gabapentin  300 mg Oral QHS    [START ON 9/14/2024] ketorolac  15 mg Intravenous Q6H    methocarbamoL  750 mg Oral TID    metroNIDAZOLE IV (PEDS and ADULTS)  500 mg Intravenous Q8H    [START ON 9/16/2024] mupirocin   Nasal BID    piperacillin-tazobactam (Zosyn) IV (PEDS and ADULTS) (extended infusion is not appropriate)  4.5 g Intravenous Q8H     Continuous Infusions:  PRN Meds:  Current Facility-Administered Medications:     dextrose 50%, 12.5 g, Intravenous, PRN    glucagon (human recombinant), 1 mg, Intramuscular, PRN    HYDROmorphone, 0.2 mg, Intravenous, Q5 Min PRN    meperidine, 12.5 mg, Intravenous, Q10 Min PRN    morphine, 4 mg, Intravenous, Q3H PRN    ondansetron, 4 mg, Oral, Q8H PRN    ondansetron, 4 mg, Intravenous, Daily PRN    oxyCODONE-acetaminophen, 1 tablet, Oral, Q4H PRN    oxyCODONE-acetaminophen, 1 tablet, Oral, Q4H PRN    prochlorperazine, 5 mg, Intravenous, Q30 Min PRN    sodium chloride 0.9%, 10 mL, Intravenous, PRN    Pharmacy to dose Vancomycin consult, , , Once **AND** vancomycin - pharmacy to dose, , Intravenous, pharmacy to manage frequency    OBJECTIVE:   Vital Signs:  VITAL SIGNS: 24 HR MIN & MAX LAST   Temp  Min: 96.8 °F (36 °C)  Max: 98.1 °F (36.7 °C)  96.8  "°F (36 °C) (Forced air warmer placed on patient)   BP  Min: 113/79  Max: 143/95  (!) 140/95    Pulse  Min: 66  Max: 89  81    Resp  Min: 14  Max: 18  17    SpO2  Min: 99 %  Max: 100 %  100 %      HT: 5' 2" (157.5 cm)  WT: 52.2 kg (115 lb)  BMI: 21   Intake/output: I/O this shift:  In: 2350 [I.V.:200; IV Piggyback:2150]  Out: -      Lines/drains/airway:       Peripheral IV - Single Lumen 09/13/24 20 G Left Antecubital (Active)   Site Assessment Clean;Dry;Intact;No redness;No swelling;No warmth 09/13/24 2210   Extremity Assessment Distal to IV No abnormal discoloration;No redness;No swelling;No warmth 09/13/24 2210   Line Status Flushed;Saline locked 09/13/24 2210   Dressing Status Clean;Dry;Intact 09/13/24 2210   Dressing Intervention Integrity maintained 09/13/24 2210   Number of days: 0            Peripheral IV - Single Lumen 09/13/24 18 G Right Antecubital (Active)   Site Assessment Clean;Dry;Intact;No redness;No swelling;No warmth 09/13/24 2210   Extremity Assessment Distal to IV No abnormal discoloration;No redness;No swelling;No warmth 09/13/24 2210   Line Status Flushed;Saline locked 09/13/24 2210   Dressing Status Clean;Dry;Intact 09/13/24 2210   Dressing Intervention Integrity maintained 09/13/24 2210   Number of days: 0       Physical Exam:  General:  NAD  HEENT:  Normocephalic, atraumatic  CV:  RR  Resp/chest: NWOB on RA  GI:  Abdomen soft, non-tender, non-distended  MSK: open right tibia-fibular fracture with gross contamination, motor and sensation intact to right foot  Neuro: GCS 15  Skin/Wounds: warm ,dry    Labs:  No new labs    Diagnostic Results:  SURG FL Surgery Fluoro Usage   Final Result      Xray Previous   Final Result      Xray Previous   Final Result      Xray Previous   Final Result      X-Ray Tibia Fibula 2 View Right    (Results Pending)       ASSESSMENT & PLAN:    33 yo female with PMHx IVDU, endocarditis (not currently on antibiotics), anxiety, borderline personality disorder who " presents after tree branch fell on her right leg found to have open right tibia-fibular fracture.    Admit to trauma surgery  S/p RLE IMN with orthopedic surgery, appreciate recs  CLD  Daily labs   MM pain control  PRN antiemetics  Started on Zosyn, Flagyl, and Vanc per ortho  IS  Physical Therapy  Prophylactic Lovenox 40mg BID   home med rec    Evelyne Jenkins MD  [employer] General Surgery PGY-2

## 2024-09-14 NOTE — PT/OT/SLP EVAL
Physical Therapy Evaluation    Patient Name:  Марина Britton   MRN:  [id number]    Recommendations:     Discharge therapy intensity: Low Intensity Therapy   Discharge Equipment Recommendations:  (either crutches or RW)   Barriers to discharge: None    Assessment:     Марина Britton is a 34 y.o. female admitted with a medical diagnosis of distal right tibia fx sp IM rodding to distal tibia.  She presents with the following impairments/functional limitations: impaired endurance, impaired self care skills, impaired functional mobility, gait instability, impaired balance .    Rehab Prognosis: Good; patient would benefit from acute skilled PT services to address these deficits and reach maximum level of function.    Recent Surgery: Procedure(s) (LRB):  INSERTION, INTRAMEDULLARY MAT, TIBIA (Right)  INCISION AND DRAINAGE, LOWER EXTREMITY (Right) 1 Day Post-Op    Plan:     During this hospitalization, patient would benefit from acute PT services 5 x/week to address the identified rehab impairments via gait training, therapeutic activities, therapeutic exercises and progress toward the following goals:    Plan of Care Expires:       Subjective     Chief Complaint:   Patient/Family Comments/goals:   Pain/Comfort:       Patients cultural, spiritual, Buddhism conflicts given the current situation:      Living Environment:  Pt lives with her  in a house with no steps  Prior to admission, patients level of function was ind.  Equipment used at home: none.  DME owned (not currently used): .  Upon discharge, patient will have assistance from spouse.    Objective:     Communicated with nurse prior to session.  Patient found supine with PureWick, peripheral IV  upon PT entry to room.    General Precautions: Standard, fall  Orthopedic Precautions:RLE weight bearing as tolerated (wbat in boot)   Braces:  (boot)  Respiratory Status: Room air  Blood Pressure:       Exams:  LUE Strength: WFL  RLE ROM: WFL  RLE Strength:  WFL  LLE ROM: WFL  Skin integrity: Visible skin intact      Functional Mobility:  Bed Mobility:     Scooting: stand by assistance  Supine to Sit: stand by assistance  Sit to Supine: stand by assistance  Transfers:     Sit to Stand:  contact guard assistance with rolling walker  Bed to Chair: contact guard assistance with  rolling walker  using  Step Transfer  Gait: pt ambulated with rw and RLE boot WBAT RLE for 100ft with cga/sba      AM-PAC 6 CLICK MOBILITY  Total Score:        Treatment & Education:      Patient provided with verbal education education regarding PT role/goals/POC.  Understanding was verbalized.     Patient left supine with all lines intact and call button in reach.    GOALS:   Multidisciplinary Problems       Physical Therapy Goals          Problem: Physical Therapy    Goal Priority Disciplines Outcome Goal Variances Interventions   Physical Therapy Goal     PT, PT/OT Progressing     Description: Pt will be seen for the following goals  1. Pt will be ind with bed mobility  2. Pt will be mod ind with transfers  3. 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 Bilatral;  Surgeon: Chauncey Garcia MD;  Location: [address];  Service: Orthopedics;  Laterality: Bilateral;     SECTION, LOW TRANSVERSE      x4    COSMETIC SURGERY      HAND ARTHROTOMY Right 2021    Procedure: ARTHROTOMY, HAND;  Surgeon: Chauncey Garcia MD;  Location: [address];  Service: Orthopedics;  Laterality: Right;    INCISION AND DRAINAGE OF HAND Right 2021    Procedure: INCISION AND DRAINAGE, HAND (Right) - hand table,  cysto tubing, 9L saline, culture swabs;  Surgeon: Chauncey Garcia MD;  Location: [facility] OR [address] [place] [address];  Service: Orthopedics;  Laterality: Right;    IRRIGATION AND DEBRIDEMENT OF UPPER EXTREMITY Right 2/9/2021    Procedure: IRRIGATION AND DEBRIDEMENT, STERNOCLAVICULAR JOINT;  Surgeon: Frandy Molina MD;  Location: [facility] OR [address] [place] [address];  Service: Cardiothoracic;  Laterality: Right;       Time Tracking:     PT Received On:    PT Start Time: 1020     PT Stop Time: 1044  PT Total Time (min): 24 min     Billable Minutes: Evaluation 24 09/14/2024

## 2024-09-15 VITALS
RESPIRATION RATE: 16 BRPM | DIASTOLIC BLOOD PRESSURE: 93 MMHG | HEART RATE: 91 BPM | TEMPERATURE: 99 F | BODY MASS INDEX: 21.16 KG/M2 | WEIGHT: 115 LBS | HEIGHT: 62 IN | OXYGEN SATURATION: 100 % | SYSTOLIC BLOOD PRESSURE: 142 MMHG

## 2024-09-15 LAB
ANION GAP SERPL CALC-SCNC: 6 MEQ/L
BASOPHILS # BLD AUTO: 0.05 X10(3)/MCL
BASOPHILS NFR BLD AUTO: 0.7 %
BUN SERPL-MCNC: 16.7 MG/DL (ref 7–18.7)
CALCIUM SERPL-MCNC: 8.3 MG/DL (ref 8.4–10.2)
CHLORIDE SERPL-SCNC: 110 MMOL/L (ref 98–107)
CO2 SERPL-SCNC: 23 MMOL/L (ref 22–29)
CREAT SERPL-MCNC: 0.84 MG/DL (ref 0.55–1.02)
CREAT/UREA NIT SERPL: 20
EOSINOPHIL # BLD AUTO: 0.05 X10(3)/MCL (ref 0–0.9)
EOSINOPHIL NFR BLD AUTO: 0.7 %
ERYTHROCYTE [DISTWIDTH] IN BLOOD BY AUTOMATED COUNT: 13.1 % (ref 11.5–17)
GFR SERPLBLD CREATININE-BSD FMLA CKD-EPI: >60 ML/MIN/1.73/M2
GLUCOSE SERPL-MCNC: 118 MG/DL (ref 74–100)
HCT VFR BLD AUTO: 26.2 % (ref 37–47)
HGB BLD-MCNC: 8.5 G/DL (ref 12–16)
IMM GRANULOCYTES # BLD AUTO: 0.04 X10(3)/MCL (ref 0–0.04)
IMM GRANULOCYTES NFR BLD AUTO: 0.5 %
LYMPHOCYTES # BLD AUTO: 1.87 X10(3)/MCL (ref 0.6–4.6)
LYMPHOCYTES NFR BLD AUTO: 24.5 %
MCH RBC QN AUTO: 29.7 PG (ref 27–31)
MCHC RBC AUTO-ENTMCNC: 32.4 G/DL (ref 33–36)
MCV RBC AUTO: 91.6 FL (ref 80–94)
MONOCYTES # BLD AUTO: 0.67 X10(3)/MCL (ref 0.1–1.3)
MONOCYTES NFR BLD AUTO: 8.8 %
NEUTROPHILS # BLD AUTO: 4.95 X10(3)/MCL (ref 2.1–9.2)
NEUTROPHILS NFR BLD AUTO: 64.8 %
NRBC BLD AUTO-RTO: 0 %
PLATELET # BLD AUTO: 370 X10(3)/MCL (ref 130–400)
PMV BLD AUTO: 9.9 FL (ref 7.4–10.4)
POTASSIUM SERPL-SCNC: 3.8 MMOL/L (ref 3.5–5.1)
RBC # BLD AUTO: 2.86 X10(6)/MCL (ref 4.2–5.4)
SODIUM SERPL-SCNC: 139 MMOL/L (ref 136–145)
WBC # BLD AUTO: 7.63 X10(3)/MCL (ref 4.5–11.5)

## 2024-09-15 PROCEDURE — 99239 HOSP IP/OBS DSCHRG MGMT >30: CPT | Mod: ,,, | Performed by: SURGERY

## 2024-09-15 PROCEDURE — 63600175 PHARM REV CODE 636 W HCPCS: Performed by: SURGERY

## 2024-09-15 PROCEDURE — 63600175 PHARM REV CODE 636 W HCPCS: Mod: JZ,JG | Performed by: ORTHOPAEDIC SURGERY

## 2024-09-15 PROCEDURE — 80048 BASIC METABOLIC PNL TOTAL CA: CPT | Performed by: NURSE PRACTITIONER

## 2024-09-15 PROCEDURE — 63600175 PHARM REV CODE 636 W HCPCS

## 2024-09-15 PROCEDURE — 36415 COLL VENOUS BLD VENIPUNCTURE: CPT | Performed by: NURSE PRACTITIONER

## 2024-09-15 PROCEDURE — 25000003 PHARM REV CODE 250: Performed by: SURGERY

## 2024-09-15 PROCEDURE — 25000003 PHARM REV CODE 250: Performed by: ORTHOPAEDIC SURGERY

## 2024-09-15 PROCEDURE — 85025 COMPLETE CBC W/AUTO DIFF WBC: CPT | Performed by: NURSE PRACTITIONER

## 2024-09-15 RX ORDER — GABAPENTIN 300 MG/1
300 CAPSULE ORAL NIGHTLY
Qty: 14 CAPSULE | Refills: 0 | Status: SHIPPED | OUTPATIENT
Start: 2024-09-15 | End: 2024-09-29

## 2024-09-15 RX ORDER — METHOCARBAMOL 750 MG/1
750 TABLET, FILM COATED ORAL 3 TIMES DAILY
Qty: 30 TABLET | Refills: 0 | Status: SHIPPED | OUTPATIENT
Start: 2024-09-15 | End: 2024-09-25

## 2024-09-15 RX ORDER — OXYCODONE AND ACETAMINOPHEN 10; 325 MG/1; MG/1
1 TABLET ORAL EVERY 4 HOURS PRN
Qty: 21 TABLET | Refills: 0 | Status: SHIPPED | OUTPATIENT
Start: 2024-09-15 | End: 2024-09-22

## 2024-09-15 RX ORDER — OXYCODONE AND ACETAMINOPHEN 10; 325 MG/1; MG/1
1 TABLET ORAL EVERY 4 HOURS PRN
Qty: 21 TABLET | Refills: 0 | Status: SHIPPED | OUTPATIENT
Start: 2024-09-15 | End: 2024-09-15

## 2024-09-15 RX ORDER — METHOCARBAMOL 750 MG/1
750 TABLET, FILM COATED ORAL 3 TIMES DAILY
Qty: 30 TABLET | Refills: 0 | Status: CANCELLED | OUTPATIENT
Start: 2024-09-15 | End: 2024-09-25

## 2024-09-15 RX ORDER — GABAPENTIN 300 MG/1
300 CAPSULE ORAL NIGHTLY
Qty: 30 CAPSULE | Refills: 11 | Status: SHIPPED | OUTPATIENT
Start: 2024-09-15 | End: 2024-09-15

## 2024-09-15 RX ADMIN — PIPERACILLIN AND TAZOBACTAM 4.5 G: 4; .5 INJECTION, POWDER, LYOPHILIZED, FOR SOLUTION INTRAVENOUS; PARENTERAL at 02:09

## 2024-09-15 RX ADMIN — METRONIDAZOLE 500 MG: 5 INJECTION, SOLUTION INTRAVENOUS at 08:09

## 2024-09-15 RX ADMIN — MORPHINE SULFATE 4 MG: 4 INJECTION, SOLUTION INTRAMUSCULAR; INTRAVENOUS at 08:09

## 2024-09-15 RX ADMIN — OXYCODONE AND ACETAMINOPHEN 1 TABLET: 10; 325 TABLET ORAL at 05:09

## 2024-09-15 RX ADMIN — METHOCARBAMOL 750 MG: 750 TABLET ORAL at 08:09

## 2024-09-15 RX ADMIN — DOCUSATE SODIUM 100 MG: 100 CAPSULE, LIQUID FILLED ORAL at 08:09

## 2024-09-15 RX ADMIN — OXYCODONE AND ACETAMINOPHEN 1 TABLET: 10; 325 TABLET ORAL at 11:09

## 2024-09-15 RX ADMIN — PIPERACILLIN AND TAZOBACTAM 4.5 G: 4; .5 INJECTION, POWDER, LYOPHILIZED, FOR SOLUTION INTRAVENOUS; PARENTERAL at 11:09

## 2024-09-15 RX ADMIN — MORPHINE SULFATE 4 MG: 4 INJECTION, SOLUTION INTRAMUSCULAR; INTRAVENOUS at 12:09

## 2024-09-15 RX ADMIN — VANCOMYCIN HYDROCHLORIDE 1000 MG: 1 INJECTION, POWDER, LYOPHILIZED, FOR SOLUTION INTRAVENOUS at 11:09

## 2024-09-15 RX ADMIN — ENOXAPARIN SODIUM 40 MG: 40 INJECTION SUBCUTANEOUS at 08:09

## 2024-09-15 NOTE — HOSPITAL COURSE
Марина Britton is a 34-year-old female admitted to Trauma floor following right open tibia fibula fracture.  Evaluated by Orthopedic surgery.  Underwent intramedullary nailing with Orthopedic surgery.  Antibiotics completed.  Pain controlled.  Meeting discharge criteria for home.  Follow up outpatient with Orthopedic surgery.

## 2024-09-15 NOTE — NURSING
Nurses Note -- 4 Eyes      9/14/2024   9:22 PM      Skin assessed during: Q Shift Change      [] No Altered Skin Integrity Present    []Prevention Measures Documented      [x] Yes- Altered Skin Integrity Present or Discovered   [] LDA Added if Not in Epic (Describe Wound)   [] New Altered Skin Integrity was Present on Admit and Documented in LDA   [] Wound Image Taken    Wound Care Consulted? No    Attending Nurse:  Maribel Anders RN/Staff Member:  MILLY Gonzalez

## 2024-09-15 NOTE — NURSING
Nurses Note -- 4 Eyes      9/15/2024   7:44 AM      Skin assessed during: Q Shift Change      [] No Altered Skin Integrity Present    [x]Prevention Measures Documented      [x] Yes- Altered Skin Integrity Present or Discovered   [] LDA Added if Not in Epic (Describe Wound)   [] New Altered Skin Integrity was Present on Admit and Documented in LDA   [] Wound Image Taken    Wound Care Consulted? No    Attending Nurse:  Carlos ATKINS    Second RN/Staff Member:  Maribel ATKINS

## 2024-09-15 NOTE — PROGRESS NOTES
Patient Name: Марина Britton  MRN: [id number]  Admission Date: 9/13/2024  Hospital Length of Stay: 2 days  Attending Provider: Paulo Caro MD  Primary Care Provider: Heather, Primary Doctor  Follow-up For: Procedure(s) (LRB):  INSERTION, INTRAMEDULLARY MAT, TIBIA (Right)  INCISION AND DRAINAGE, LOWER EXTREMITY (Right)    Post-Operative Day: 2 Days Post-Op  Subjective:       Principal Orthopedic Problem: 2 Days Post-Op       Interval History:  Patient is sitting up in bed resting comfortably.  Significant another at the bedside.  States that the leg is hurting but it is manageable.  She feels the pain over the traumatic wound but states that her knee is not hurting her at all.    Review of patient's allergies indicates:   Allergen Reactions    Shellfish containing products Anaphylaxis and Hives    Soap Rash     [facility] - Hospital supplied - cleanser shampoo & body wash gel  Ingredients - purified water, sodium laureth sulfate, sodium chloride, cocamide william, cocamidopropylamine oxide, fragrance, aloe barbadensis leaf juice, propylene alcohol, peg-150 distearate, diazolidinyl urea, ciric acid, methylparaben, & propulparaben       Current Facility-Administered Medications   Medication    docusate sodium capsule 100 mg    enoxaparin injection 40 mg    gabapentin capsule 300 mg    methocarbamoL tablet 750 mg    metronidazole IVPB 500 mg    morphine injection 4 mg    [START ON 9/16/2024] mupirocin 2 % ointment    ondansetron disintegrating tablet 4 mg    oxyCODONE-acetaminophen  mg per tablet 1 tablet    oxyCODONE-acetaminophen 5-325 mg per tablet 1 tablet    piperacillin-tazobactam (ZOSYN) 4.5 g in D5W 100 mL IVPB (MB+)    vancomycin - pharmacy to dose    vancomycin in dextrose 5 % 1 gram/250 mL IVPB 1,000 mg     Objective:     Vital Signs (Most Recent):  Temp: 98.3 °F (36.8 °C) (09/15/24 0546)  Pulse: 80 (09/15/24 0546)  Resp: 16 (09/15/24 0546)  BP: 112/73 (09/15/24 0546)  SpO2: 100 % (09/15/24 0546) Vital  "Signs (24h Range):  Temp:  [98.3 °F (36.8 °C)-99.4 °F (37.4 °C)] 98.3 °F (36.8 °C)  Pulse:  [] 80  Resp:  [16-17] 16  SpO2:  [99 %-100 %] 100 %  BP: (112-151)/(73-88) 112/73     Weight: 52.2 kg (115 lb)  Height: 5' 2" (157.5 cm)  Body mass index is 21.03 kg/m².    No intake or output data in the 24 hours ending 09/15/24 0733    Physical Exam:   Ortho/SPM Exam    General the patient is alert and oriented x3 no acute distress nontoxic-appearing appropriate affect.    Constitutional: Vital signs are examined and stable.  Resp: No signs of labored breathing    Musculoskeletal Exam:  Right lower extremity:  Dressings are all clean and dry, cam boot in place.  Lower limb compartments soft and compressible.  Tolerates gentle passive range motion of the knee without significant pain.  Brisk capillary refill to all digits.  Palpable DP pulse.  Moving her digits well.    Diagnostic Findings:   Significant Labs: BMP:   Recent Labs   Lab 09/14/24  0826      K 4.3   *   CO2 20*   BUN 11.3   CREATININE 0.94   CALCIUM 8.3*     CBC:   Recent Labs   Lab 09/14/24  0826   WBC 9.75   HGB 9.9*   HCT 30.5*   *     CMP:   Recent Labs   Lab 09/14/24  0826      K 4.3   *   CO2 20*   BUN 11.3   CREATININE 0.94   CALCIUM 8.3*   ALBUMIN 3.7   BILITOT 0.5   ALKPHOS 56   AST 30   ALT 16     All pertinent labs within the past 24 hours have been reviewed.        Significant Imaging: I have reviewed and interpreted all pertinent imaging results/findings.     Assessment/Plan:     Active Diagnoses:    Diagnosis Date Noted POA    PRINCIPAL PROBLEM:  Displaced comminuted fracture of shaft of right tibia, initial encounter for open fracture type IIIA, IIIB, or IIIC [S82.251C] 09/13/2024 Yes      Problems Resolved During this Admission:   No new labs back today.  She is doing very well and her pain is well-controlled.  She states that she has been able to stand and put low pressure on the leg with assistance.  She is " ready to go home today.  Orders placed for dry dressing change, remove boot Ace bandage and gauze replace with dry dressings and rewrapped with Ace bandage and reapply the cam boot today and daily thereafter.  Okay to shower once her incisions are all clean and dry for 24 hours.  No submerging in water.  She can continue to attempt weight-bearing to the right lower extremity with a walker.  Keep limb elevated while at rest.  Follow up with me in 2 weeks for removal of her sutures and staples, we will continue to monitor her for any signs or symptoms of infection and plan for future bone grafting if she continues to heal uneventfully..  She understands and agrees with all that we have discussed and all questions and concerns were addressed.      This note/OR report was created with the assistance of  voice recognition software or phone  dictation.  There may be transcription errors as a result of using this technology however minimal. Effort has been made to assure accuracy of transcription but any obvious errors or omissions should be clarified with the author of the document.           Nico Kurtz MD  Orthopedic Trauma Surgery  [facility] - [address]

## 2024-09-15 NOTE — PROGRESS NOTES
Trauma Surgery   Progress Note  Admit Date: 9/13/2024  HD#2  POD#2 Days Post-Op    Subjective:   Interval history:  AFVSS. Labs pending. Pain controlled   Is established with surgeon in [place] for valve replacement but stopped seeing them     Home Meds:   Current Outpatient Medications   Medication Instructions    amLODIPine (NORVASC) 5 mg, Oral, Daily    cloNIDine (CATAPRES) 0.1 mg, Oral, 2 times daily    diclofenac (VOLTAREN) 50 mg, Oral, 2 times daily    docusate sodium (COLACE) 100 mg, Oral, 2 times daily    hydrocortisone 1 % cream Topical (Top), 3 times daily    losartan (COZAAR) 50 mg, Oral, Daily    mirtazapine (REMERON) 30 mg, Oral, Nightly    naloxone (NARCAN) 4 mg/actuation Spry 4mg by nasal route as needed for opioid overdose; may repeat every 2-3 minutes in alternating nostrils until medical help arrives. Call 911    NARCAN 4 mg, Nasal, As needed (PRN)      Scheduled Meds:   docusate sodium  100 mg Oral Daily    enoxaparin  40 mg Subcutaneous Q12H    gabapentin  300 mg Oral QHS    methocarbamoL  750 mg Oral TID    metroNIDAZOLE IV (PEDS and ADULTS)  500 mg Intravenous Q8H    [START ON 9/16/2024] mupirocin   Nasal BID    piperacillin-tazobactam (Zosyn) IV (PEDS and ADULTS) (extended infusion is not appropriate)  4.5 g Intravenous Q8H    vancomycin (VANCOCIN) IV (PEDS and ADULTS)  1,000 mg Intravenous Q12H     Continuous Infusions:  PRN Meds:  Current Facility-Administered Medications:     morphine, 4 mg, Intravenous, Q3H PRN    ondansetron, 4 mg, Oral, Q8H PRN    oxyCODONE-acetaminophen, 1 tablet, Oral, Q4H PRN    oxyCODONE-acetaminophen, 1 tablet, Oral, Q4H PRN    Pharmacy to dose Vancomycin consult, , , Once **AND** vancomycin - pharmacy to dose, , Intravenous, pharmacy to manage frequency     Objective:     VITAL SIGNS: 24 HR MIN & MAX LAST   Temp  Min: 98.3 °F (36.8 °C)  Max: 99.4 °F (37.4 °C)  98.3 °F (36.8 °C)   BP  Min: 112/73  Max: 151/88  112/73    Pulse  Min: 80  Max: 100  80    Resp   "Min: 16  Max: 17  16    SpO2  Min: 99 %  Max: 100 %  100 %      HT: 5' 2" (157.5 cm)  WT: 52.2 kg (115 lb)  BMI: 21     Intake/output:  Intake/Output - Last 3 Shifts         09/13 0700  09/14 0659 09/14 0700  09/15 0659 09/15 0700  09/16 0659    I.V. (mL/kg) 200 (3.8)      IV Piggyback 2150      Total Intake(mL/kg) 2350 (45)      Urine (mL/kg/hr) 870      Total Output 870      Net +1480                   No intake or output data in the 24 hours ending 09/15/24 0754      Lines/drains/airway:       Peripheral IV - Single Lumen 09/13/24 20 G Left Antecubital (Active)   Site Assessment Clean;Dry;Intact 09/15/24 0331   Extremity Assessment Distal to IV No abnormal discoloration 09/14/24 2008   Line Status Infusing 09/15/24 0331   Dressing Status Clean;Dry;Intact 09/15/24 0331   Dressing Intervention Integrity maintained 09/15/24 0331   Number of days: 2            Peripheral IV - Single Lumen 09/13/24 18 G Right Antecubital (Active)   Site Assessment Clean;Dry;Intact 09/15/24 0330   Extremity Assessment Distal to IV No abnormal discoloration 09/14/24 2008   Line Status Saline locked;Capped 09/15/24 0330   Dressing Status Clean;Dry;Intact 09/15/24 0330   Dressing Intervention Integrity maintained 09/15/24 0330   Number of days: 2       Physical Exam  Constitutional:       Appearance: Normal appearance.   HENT:      Head: Normocephalic and atraumatic.      Nose: Nose normal.   Eyes:      Pupils: Pupils are equal, round, and reactive to light.   Cardiovascular:      Rate and Rhythm: Normal rate.      Pulses: Normal pulses.      Comments: Normal peripheral pulses  Pulmonary:      Effort: Pulmonary effort is normal. No respiratory distress.   Chest:      Chest wall: No tenderness.   Abdominal:      General: Abdomen is flat. Bowel sounds are normal. There is no distension.      Palpations: Abdomen is soft.      Tenderness: There is no abdominal tenderness.   Musculoskeletal:         General: Tenderness and signs of injury " "present. No swelling or deformity.      Cervical back: Normal range of motion and neck supple. No tenderness.   Skin:     General: Skin is warm and dry.      Capillary Refill: Capillary refill takes less than 2 seconds.      Findings: No lesion.   Neurological:      General: No focal deficit present.      Mental Status: She is alert and oriented to person, place, and time. Mental status is at baseline.   Psychiatric:         Mood and Affect: Mood normal.         Behavior: Behavior normal.         Thought Content: Thought content normal.         Judgment: Judgment normal.     Labs:  Renal:  Recent Labs     09/14/24 0826   BUN 11.3   CREATININE 0.94     No results for input(s): "LACTIC" in the last 72 hours.  FEN/GI:  Recent Labs     09/14/24 0826      K 4.3   *   CO2 20*   CALCIUM 8.3*   ALBUMIN 3.7   BILITOT 0.5   AST 30   ALKPHOS 56   ALT 16     Heme:  Recent Labs     09/14/24 0826   HGB 9.9*   HCT 30.5*   *     ID:  Recent Labs     09/14/24 0826   WBC 9.75     CBG:  Recent Labs     09/14/24 0826   GLUCOSE 225*      No results for input(s): "POCTGLUCOSE" in the last 72 hours.   Cardiovascular:  No results for input(s): "TROPONINI", "CKTOTAL", "CKMB", "BNP" in the last 168 hours.  I have reviewed all pertinent lab results within the past 24 hours.    Imaging:  X-Ray Tibia Fibula 2 View Right   Final Result      As above.         Electronically signed by: Leobardo Delcid   Date:    09/13/2024   Time:    23:09      SURG [facility] Surgery Fluoro Usage   Final Result      Xray Previous   Final Result      Xray Previous   Final Result      Xray Previous   Final Result         I have reviewed all pertinent imaging results/findings within the past 24 hours.    Micro/Path/Other:  Microbiology Results (last 7 days)       ** No results found for the last 168 hours. 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    Abscess of right sternoclavicular joint 02/09/2021    Abscess of right hand 02/07/2021    Chronic hepatitis C virus infection 02/04/2021    Chronic septic pulmonary embolism 02/04/2021    Pyogenic arthritis of left knee joint 02/04/2021    Sepsis 02/02/2021    Pyogenic arthritis 02/02/2021    Bilateral knee pain 02/02/2021    Tricuspid leaflet flail     Tricuspid valve regurgitation due to infection     Vitamin D deficiency 11/15/2020    Opioid use disorder, severe, dependence 11/03/2020    Cannabis use disorder, moderate, dependence 11/03/2020    Sedative, hypnotic or anxiolytic use disorder, severe, dependence 11/03/2020    Substance induced mood disorder 11/03/2020    MRSA bacteremia 10/27/2020    IVDU (intravenous drug user) 10/26/2020    Pulmonary emboli 10/26/2020    Endocarditis of tricuspid valve 10/26/2020     MRSA due to IV heroin          Assessment & Plan:   33 yo female with PMHx IVDU, endocarditis (not currently on antibiotics), anxiety, borderline personality disorder who presents after tree branch fell on her right leg found to have open right tibia-fibular fracture.     48 hours of IV antibiotics  to complete today   Lovenox b.i.d.   Weightbearing as tolerated in boot   PTOT   Multimodal pain control   Regular diet  Outpatient follow up with established surgeon     Pending labs, will evaluate for discharge planning     [facility]   Trauma/Acute Care Surgery  [facility]  C: [phone]

## 2024-09-15 NOTE — DISCHARGE SUMMARY
"[address]  General Surgery  Discharge Summary      Patient Name: Марина Britton  MRN: [id number]  Admission Date: 9/13/2024  Hospital Length of Stay: 2 days  Discharge Date and Time:  09/15/2024 2:22 PM  Attending Physician: Paulo Caro MD   Discharging Provider: Renee Pearson PA-C  Primary Care Provider: Heather, Primary Doctor    HPI:   No notes on file    Procedure(s) (LRB):  INSERTION, INTRAMEDULLARY MAT, TIBIA (Right)  INCISION AND DRAINAGE, LOWER EXTREMITY (Right)      Indwelling Lines/Drains at time of discharge:   Lines/Drains/Airways       None                 Hospital Course: Марина Britton is a 34-year-old female admitted to Trauma floor following right open tibia fibula fracture.  Evaluated by Orthopedic surgery.  Underwent intramedullary nailing with Orthopedic surgery.  Antibiotics completed.  Pain controlled.  Meeting discharge criteria for home.  Follow up outpatient with Orthopedic surgery.    Goals of Care Treatment Preferences:  Code Status: Full Code    Living Will: Yes  LaPOST: Yes           Consults:   Consults (From admission, onward)          Status Ordering Provider     Pharmacy to dose Vancomycin consult  Once        Provider:  (Not yet assigned)   Placed in "And" Linked Group    Acknowledged NICO KURTZ            Significant Diagnostic Studies: N/A    Pending Diagnostic Studies:       None          Final Active Diagnoses:    Diagnosis Date Noted POA    PRINCIPAL PROBLEM:  Displaced comminuted fracture of shaft of right tibia, initial encounter for open fracture type IIIA, IIIB, or IIIC [S82.251C] 09/13/2024 Yes      Problems Resolved During this Admission:      Discharged Condition: good    Disposition: Home or Self Care    Follow Up:   Follow-up Information       No, Primary Doctor Follow up.               Nico Kurtz MD. Schedule an appointment as soon as possible for a visit.    Specialty: Orthopedic Surgery  Contact information:  [address] [facility] [address]  [phone]                           Patient Instructions:   No discharge procedures on file.  Medications:  Reconciled Home Medications:      Medication List        START taking these medications      gabapentin 300 MG capsule  Commonly known as: NEURONTIN  Take 1 capsule (300 mg total) by mouth every evening.     oxyCODONE-acetaminophen  mg per tablet  Commonly known as: PERCOCET  Take 1 tablet by mouth every 4 (four) hours as needed for Pain.            CONTINUE taking these medications      amLODIPine 5 MG tablet  Commonly known as: NORVASC  Take 1 tablet (5 mg total) by mouth once daily.     cloNIDine 0.1 MG tablet  Commonly known as: CATAPRES  Take 1 tablet (0.1 mg total) by mouth 2 (two) times daily. for 3 days     diclofenac 50 MG EC tablet  Commonly known as: VOLTAREN  Take 1 tablet (50 mg total) by mouth 2 (two) times daily.     docusate sodium 100 MG capsule  Commonly known as: COLACE  Take 1 capsule (100 mg total) by mouth 2 (two) times daily.     hydrocortisone 1 % cream  Apply topically 3 (three) times daily. for 5 days     losartan 50 MG tablet  Commonly known as: COZAAR  Take 1 tablet (50 mg total) by mouth once daily.     mirtazapine 30 MG tablet  Commonly known as: REMERON  Take 1 tablet (30 mg total) by mouth every evening.     * NARCAN 4 mg/actuation Spry  Generic drug: naloxone  4 mg by Nasal route as needed.     * naloxone 4 mg/actuation Spry  Commonly known as: NARCAN  4mg by nasal route as needed for opioid overdose; may repeat every 2-3 minutes in alternating nostrils until medical help arrives. Call 911           * This list has 2 medication(s) that are the same as other medications prescribed for you. Read the directions carefully, and ask your doctor or other care provider to review them with you.                    Renee Pearson PA-C  General Surgery  [facility] - [address] [place] [address] Surg

## 2024-10-22 ENCOUNTER — PATIENT MESSAGE (OUTPATIENT)
Dept: RESEARCH | Facility: HOSPITAL | Age: 34
End: 2024-10-22
Payer: MEDICAID

## 2024-10-29 ENCOUNTER — OFFICE VISIT (OUTPATIENT)
Dept: ORTHOPEDICS | Facility: CLINIC | Age: 34
End: 2024-10-29
Payer: MEDICAID

## 2024-10-29 ENCOUNTER — HOSPITAL ENCOUNTER (OUTPATIENT)
Dept: RADIOLOGY | Facility: CLINIC | Age: 34
Discharge: HOME | End: 2024-10-29
Attending: ORTHOPAEDIC SURGERY
Payer: MEDICAID

## 2024-10-29 VITALS
WEIGHT: 115.06 LBS | SYSTOLIC BLOOD PRESSURE: 145 MMHG | HEIGHT: 62 IN | RESPIRATION RATE: 18 BRPM | HEART RATE: 103 BPM | BODY MASS INDEX: 21.17 KG/M2 | DIASTOLIC BLOOD PRESSURE: 106 MMHG

## 2024-10-29 DIAGNOSIS — S82.251E TYPE I OR II OPEN DISPLACED COMMINUTED FRACTURE OF SHAFT OF RIGHT TIBIA WITH ROUTINE HEALING, SUBSEQUENT ENCOUNTER: ICD-10-CM

## 2024-10-29 DIAGNOSIS — S82.251E TYPE I OR II OPEN DISPLACED COMMINUTED FRACTURE OF SHAFT OF RIGHT TIBIA WITH ROUTINE HEALING, SUBSEQUENT ENCOUNTER: Primary | ICD-10-CM

## 2024-10-29 PROCEDURE — 1159F MED LIST DOCD IN RCRD: CPT | Mod: CPTII,,,

## 2024-10-29 PROCEDURE — 73590 X-RAY EXAM OF LOWER LEG: CPT | Mod: RT,,, | Performed by: ORTHOPAEDIC SURGERY

## 2024-10-29 PROCEDURE — 3080F DIAST BP >= 90 MM HG: CPT | Mod: CPTII,,,

## 2024-10-29 PROCEDURE — 99024 POSTOP FOLLOW-UP VISIT: CPT | Mod: ,,,

## 2024-10-29 PROCEDURE — 1160F RVW MEDS BY RX/DR IN RCRD: CPT | Mod: CPTII,,,

## 2024-10-29 PROCEDURE — 3077F SYST BP >= 140 MM HG: CPT | Mod: CPTII,,,

## 2024-10-29 RX ORDER — SULFAMETHOXAZOLE AND TRIMETHOPRIM 800; 160 MG/1; MG/1
1 TABLET ORAL 2 TIMES DAILY
Qty: 20 TABLET | Refills: 0 | Status: SHIPPED | OUTPATIENT
Start: 2024-10-29 | End: 2024-11-08

## 2024-10-29 RX ORDER — KETOROLAC TROMETHAMINE 10 MG/1
10 TABLET, FILM COATED ORAL EVERY 6 HOURS PRN
Qty: 20 TABLET | Refills: 0 | Status: SHIPPED | OUTPATIENT
Start: 2024-10-29 | End: 2024-11-03

## 2024-12-30 ENCOUNTER — TELEPHONE (OUTPATIENT)
Dept: ORTHOPEDICS | Facility: CLINIC | Age: 34
End: 2024-12-30

## 2024-12-30 NOTE — TELEPHONE ENCOUNTER
Called patient to see if she was on her way to her appt here scheduled for 2:30pm.  She states that her first ride did not show up to pick her up.  She has another ride that just got there, but she is 1.5 hours away.  Rescheduled patient for Tues, 1/3 at 10:15, she is requesting rx for abx.  Instructed to go to UC, or PCP to cover her until f/u.  Dr. Kurtz is unable to prescribe abx since he has not evaluated.   Verbalized understanding.

## 2025-01-07 ENCOUNTER — LAB VISIT (OUTPATIENT)
Dept: LAB | Facility: HOSPITAL | Age: 35
End: 2025-01-07
Attending: ORTHOPAEDIC SURGERY
Payer: MEDICAID

## 2025-01-07 ENCOUNTER — HOSPITAL ENCOUNTER (OUTPATIENT)
Dept: RADIOLOGY | Facility: CLINIC | Age: 35
Discharge: HOME | End: 2025-01-07
Attending: ORTHOPAEDIC SURGERY
Payer: MEDICAID

## 2025-01-07 ENCOUNTER — OFFICE VISIT (OUTPATIENT)
Dept: ORTHOPEDICS | Facility: CLINIC | Age: 35
End: 2025-01-07
Payer: MEDICAID

## 2025-01-07 VITALS
HEIGHT: 62 IN | DIASTOLIC BLOOD PRESSURE: 101 MMHG | BODY MASS INDEX: 21.1 KG/M2 | WEIGHT: 114.63 LBS | SYSTOLIC BLOOD PRESSURE: 164 MMHG | HEART RATE: 81 BPM

## 2025-01-07 DIAGNOSIS — S82.251E TYPE I OR II OPEN DISPLACED COMMINUTED FRACTURE OF SHAFT OF RIGHT TIBIA WITH ROUTINE HEALING, SUBSEQUENT ENCOUNTER: Primary | ICD-10-CM

## 2025-01-07 DIAGNOSIS — S82.251E TYPE I OR II OPEN DISPLACED COMMINUTED FRACTURE OF SHAFT OF RIGHT TIBIA WITH ROUTINE HEALING, SUBSEQUENT ENCOUNTER: ICD-10-CM

## 2025-01-07 DIAGNOSIS — Z01.818 PREOP TESTING: ICD-10-CM

## 2025-01-07 LAB
ALBUMIN SERPL-MCNC: 3.7 G/DL (ref 3.5–5)
ALBUMIN/GLOB SERPL: 0.8 RATIO (ref 1.1–2)
ALP SERPL-CCNC: 102 UNIT/L (ref 40–150)
ALT SERPL-CCNC: 10 UNIT/L (ref 0–55)
ANION GAP SERPL CALC-SCNC: 7 MEQ/L
AST SERPL-CCNC: 14 UNIT/L (ref 5–34)
BASOPHILS # BLD AUTO: 0.03 X10(3)/MCL
BASOPHILS NFR BLD AUTO: 0.5 %
BILIRUB SERPL-MCNC: 0.4 MG/DL
BUN SERPL-MCNC: 18 MG/DL (ref 7–18.7)
CALCIUM SERPL-MCNC: 9.5 MG/DL (ref 8.4–10.2)
CHLORIDE SERPL-SCNC: 105 MMOL/L (ref 98–107)
CO2 SERPL-SCNC: 26 MMOL/L (ref 22–29)
CREAT SERPL-MCNC: 0.92 MG/DL (ref 0.55–1.02)
CREAT/UREA NIT SERPL: 20
EOSINOPHIL # BLD AUTO: 0.22 X10(3)/MCL (ref 0–0.9)
EOSINOPHIL NFR BLD AUTO: 4 %
ERYTHROCYTE [DISTWIDTH] IN BLOOD BY AUTOMATED COUNT: 16.6 % (ref 11.5–17)
GFR SERPLBLD CREATININE-BSD FMLA CKD-EPI: >60 ML/MIN/1.73/M2
GLOBULIN SER-MCNC: 4.9 GM/DL (ref 2.4–3.5)
GLUCOSE SERPL-MCNC: 82 MG/DL (ref 74–100)
HCT VFR BLD AUTO: 39.5 % (ref 37–47)
HGB BLD-MCNC: 11.7 G/DL (ref 12–16)
IMM GRANULOCYTES # BLD AUTO: 0.01 X10(3)/MCL (ref 0–0.04)
IMM GRANULOCYTES NFR BLD AUTO: 0.2 %
LYMPHOCYTES # BLD AUTO: 1.17 X10(3)/MCL (ref 0.6–4.6)
LYMPHOCYTES NFR BLD AUTO: 21.1 %
MCH RBC QN AUTO: 22.2 PG (ref 27–31)
MCHC RBC AUTO-ENTMCNC: 29.6 G/DL (ref 33–36)
MCV RBC AUTO: 75 FL (ref 80–94)
MONOCYTES # BLD AUTO: 0.38 X10(3)/MCL (ref 0.1–1.3)
MONOCYTES NFR BLD AUTO: 6.9 %
NEUTROPHILS # BLD AUTO: 3.73 X10(3)/MCL (ref 2.1–9.2)
NEUTROPHILS NFR BLD AUTO: 67.3 %
NRBC BLD AUTO-RTO: 0 %
PLATELET # BLD AUTO: 521 X10(3)/MCL (ref 130–400)
PMV BLD AUTO: 8.5 FL (ref 7.4–10.4)
POTASSIUM SERPL-SCNC: 4.4 MMOL/L (ref 3.5–5.1)
PROT SERPL-MCNC: 8.6 GM/DL (ref 6.4–8.3)
RBC # BLD AUTO: 5.27 X10(6)/MCL (ref 4.2–5.4)
SODIUM SERPL-SCNC: 138 MMOL/L (ref 136–145)
WBC # BLD AUTO: 5.54 X10(3)/MCL (ref 4.5–11.5)

## 2025-01-07 PROCEDURE — 36415 COLL VENOUS BLD VENIPUNCTURE: CPT

## 2025-01-07 PROCEDURE — 73590 X-RAY EXAM OF LOWER LEG: CPT | Mod: RT,,, | Performed by: ORTHOPAEDIC SURGERY

## 2025-01-07 PROCEDURE — 3077F SYST BP >= 140 MM HG: CPT | Mod: CPTII,,,

## 2025-01-07 PROCEDURE — 99213 OFFICE O/P EST LOW 20 MIN: CPT | Mod: ,,,

## 2025-01-07 PROCEDURE — 1160F RVW MEDS BY RX/DR IN RCRD: CPT | Mod: CPTII,,,

## 2025-01-07 PROCEDURE — 3008F BODY MASS INDEX DOCD: CPT | Mod: CPTII,,,

## 2025-01-07 PROCEDURE — 1159F MED LIST DOCD IN RCRD: CPT | Mod: CPTII,,,

## 2025-01-07 PROCEDURE — 3080F DIAST BP >= 90 MM HG: CPT | Mod: CPTII,,,

## 2025-01-07 PROCEDURE — 85025 COMPLETE CBC W/AUTO DIFF WBC: CPT

## 2025-01-07 PROCEDURE — 80053 COMPREHEN METABOLIC PANEL: CPT

## 2025-01-07 RX ORDER — MUPIROCIN 20 MG/G
OINTMENT TOPICAL
OUTPATIENT
Start: 2025-01-07

## 2025-01-07 NOTE — PROGRESS NOTES
[facility]      Name: Марина Britton  : 1990  MRN: [id number]  Date: 2025    Chief Complaint   Patient presents with    Right Lower Leg - Follow-up      3.5 month f/u from IMN right tibia. Ambulates without assistive devices. Reports rash around area. Reports increase in pain.        Subjective:      Chief Complaint   Patient presents with    Right Lower Leg - Follow-up      3.5 month f/u from IMN right tibia. Ambulates without assistive devices. Reports rash around area. Reports increase in pain.         Follow-up  Марина Britton is a 34 y.o. female presents to clinic for 3.5 month follow up status post intramedullary nail of right tibia.  He has been ambulating in normal shoes.  She has been weight-bearing as tolerated.  Reports she does have pain near fracture site.  Reports she was unable to follow up at her regularly scheduled appointment.  Was able to come in today.  We did discuss removal of antibiotic spacer at previous visits.  She is agreeable to this at this time.  Denies drainage from site.  Friend/family member with her today.    ROS:  Constitutional: Denies fever chills  Cardiopulmonary: No chest pain or difficulty breathing  MSK: Pain evident at site of injury located in HPI,   Integ: No signs of abrasions or lacerations  Neuro: No numbness or tingling  Lymphatic: No swelling outside the area of injury     Current Outpatient Medications   Medication Instructions    amLODIPine (NORVASC) 5 mg, Oral, Daily    cloNIDine (CATAPRES) 0.1 mg, Oral, 2 times daily    diclofenac (VOLTAREN) 50 mg, Oral, 2 times daily    docusate sodium (COLACE) 100 mg, Oral, 2 times daily    gabapentin (NEURONTIN) 300 mg, Oral, Nightly    hydrocortisone 1 % cream Topical (Top), 3 times daily    losartan (COZAAR) 50 mg, Oral, Daily    mirtazapine (REMERON) 30 mg, Oral, Nightly    naloxone (NARCAN) 4 mg/actuation Spry 4mg by nasal route as needed for opioid overdose; may repeat every 2-3  "minutes in alternating nostrils until medical help arrives. Call 911    NARCAN 4 mg, As needed (PRN)        Objective:     Visit Vitals  BP (!) 164/101   Pulse 81   Ht 5' 2" (1.575 m)   Wt 52 kg (114 lb 10.2 oz)   BMI 20.97 kg/m²     Physical Exam    General the patient is alert and oriented x3 no acute distress nontoxic-appearing appropriate affect.    Constitutional: Vital signs are examined and stable.  Resp: No signs of labored breathing                   Left lower extremity:           -Skin:  All incisions and wounds have healed.  There is a segment of discoloration to her lower leg on the anterior aspect mild purplish erythema in nature.  No open wounds.           -MSK: Hip and Knee F/E, EHL/FHL, Gastroc/Tib anterior motor intact.  No painful or prominent hardware.  Ambulates without assistive devices in normal shoes.            -Neuro:  Sensation intact to light touch L3-S1 dermatomes           -Lymphatic:  Nonpitting edema at surgical site           -CV:  Posterior tibialis pulse palpable.  Compartments soft and compressible        Body mass index is 20.97 kg/m².  Ideal body weight: 50.1 kg (110 lb 7.2 oz)  Adjusted ideal body weight: 50.9 kg (112 lb 2 oz)  Hgb   Date Value Ref Range Status   01/07/2025 11.7 (L) 12.0 - 16.0 g/dL Final   09/15/2024 8.5 (L) 12.0 - 16.0 g/dL Final     Hemoglobin   Date Value Ref Range Status   11/09/2022 9.4 (L) 12.0 - 16.0 g/dL Final   11/08/2022 11.7 (L) 12.0 - 16.0 g/dL Final     Hematocrit   Date Value Ref Range Status   11/09/2022 29.3 (L) 37.0 - 48.5 % Final   11/08/2022 36.6 (L) 37.0 - 48.5 % Final     Hct   Date Value Ref Range Status   01/07/2025 39.5 37.0 - 47.0 % Final   09/15/2024 26.2 (L) 37.0 - 47.0 % Final     Vit D, 25-Hydroxy   Date Value Ref Range Status   11/13/2020 9 (L) 30 - 96 ng/mL Final     Comment:     Vitamin D deficiency.........<10 ng/mL                              Vitamin D insufficiency......10-29 ng/mL       Vitamin D sufficiency........> or " equal to 30 ng/mL  Vitamin D toxicity............>100 ng/mL       WBC   Date Value Ref Range Status   01/07/2025 5.54 4.50 - 11.50 x10(3)/mcL Final   09/15/2024 7.63 4.50 - 11.50 x10(3)/mcL Final   11/09/2022 4.90 3.90 - 12.70 K/uL Final   11/08/2022 7.42 3.90 - 12.70 K/uL Final       Radiology:   X-ray right tibia fibula:  Hardware intact without evidence of failure.  Fracture maintains alignment.  Evidence of bone callus formation noted.    Assessment:       ICD-10-CM ICD-9-CM   1. Type I or II open displaced comminuted fracture of shaft of right tibia with routine healing, subsequent encounter  S82.251E V54.16   2. Preop testing  Z01.818 V72.84       Plan:     1. Type I or II open displaced comminuted fracture of shaft of right tibia with routine healing, subsequent encounter  -     X-Ray Tibia Fibula 2 View Right; Future; Expected date: 01/07/2025  -     CBC Auto Differential; Future; Expected date: 01/07/2025  -     Comprehensive Metabolic Panel; Future; Expected date: 01/07/2025    2. Preop testing  -     CBC Auto Differential; Future; Expected date: 01/07/2025  -     Comprehensive Metabolic Panel; Future; Expected date: 01/07/2025    Patient has antibiotic spacer that needs removal.  Discussed procedure with patient.  Plan to take pt to OR next Thursday for outpatient procedure.  Plan for bone graft donor site at ipsilateral distal femur.  Pt verbalizes understanding and agrees with tx plan. Pt to call clinic with any questions/concerns.      She will need f/u after surgery approximately 2-3 weeks from surgery date.     The above findings, diagnostics, and treatment plan were discussed with Dr. Kurtz who is in agreement with the plan of care except as stated in additional documentation.     Ashley Gunther, PA-C [facility]    No future appointments.      This note was created with the assistance of voice recognition software or phone dictation. There may be transcription errors as a  result of using this technology however minimal. Effort has been made to assure accuracy of transcription but any obvious errors or omissions should be clarified with the author of the document.

## 2025-01-16 ENCOUNTER — TELEPHONE (OUTPATIENT)
Dept: ORTHOPEDICS | Facility: CLINIC | Age: 35
End: 2025-01-16
Payer: MEDICAID

## 2025-02-26 ENCOUNTER — OFFICE VISIT (OUTPATIENT)
Dept: URGENT CARE | Facility: CLINIC | Age: 35
End: 2025-02-26
Payer: MEDICAID

## 2025-02-26 VITALS
TEMPERATURE: 98 F | HEIGHT: 62 IN | WEIGHT: 115 LBS | DIASTOLIC BLOOD PRESSURE: 116 MMHG | RESPIRATION RATE: 20 BRPM | HEART RATE: 89 BPM | SYSTOLIC BLOOD PRESSURE: 163 MMHG | BODY MASS INDEX: 21.16 KG/M2 | OXYGEN SATURATION: 97 %

## 2025-02-26 DIAGNOSIS — J40 BRONCHITIS: ICD-10-CM

## 2025-02-26 DIAGNOSIS — N76.0 ACUTE VAGINITIS: Primary | ICD-10-CM

## 2025-02-26 DIAGNOSIS — L25.9 CONTACT DERMATITIS, UNSPECIFIED CONTACT DERMATITIS TYPE, UNSPECIFIED TRIGGER: ICD-10-CM

## 2025-02-26 PROCEDURE — 99213 OFFICE O/P EST LOW 20 MIN: CPT | Mod: S$GLB,,, | Performed by: PHYSICIAN ASSISTANT

## 2025-02-26 PROCEDURE — 81515 NFCT DS BV&VAGINITIS DNA ALG: CPT | Performed by: PHYSICIAN ASSISTANT

## 2025-02-26 PROCEDURE — 87491 CHLMYD TRACH DNA AMP PROBE: CPT | Performed by: PHYSICIAN ASSISTANT

## 2025-02-26 RX ORDER — ALBUTEROL SULFATE 90 UG/1
2 INHALANT RESPIRATORY (INHALATION) EVERY 6 HOURS PRN
Qty: 6.7 G | Refills: 0 | Status: SHIPPED | OUTPATIENT
Start: 2025-02-26 | End: 2026-02-26

## 2025-02-26 RX ORDER — HYDROCORTISONE 1 %
CREAM (GRAM) TOPICAL 2 TIMES DAILY
Qty: 30 G | Refills: 0 | Status: SHIPPED | OUTPATIENT
Start: 2025-02-26

## 2025-02-26 RX ORDER — BENZONATATE 200 MG/1
200 CAPSULE ORAL 3 TIMES DAILY PRN
Qty: 20 CAPSULE | Refills: 0 | Status: SHIPPED | OUTPATIENT
Start: 2025-02-26 | End: 2025-03-08

## 2025-02-26 RX ORDER — PREDNISONE 20 MG/1
20 TABLET ORAL DAILY
Qty: 5 TABLET | Refills: 0 | Status: SHIPPED | OUTPATIENT
Start: 2025-02-26 | End: 2025-03-03

## 2025-02-26 RX ORDER — METRONIDAZOLE 500 MG/1
500 TABLET ORAL 2 TIMES DAILY
Qty: 14 TABLET | Refills: 0 | Status: SHIPPED | OUTPATIENT
Start: 2025-02-26 | End: 2025-03-05

## 2025-02-26 NOTE — PROGRESS NOTES
"Subjective:      Patient ID: Марина Britton is a 35 y.o. female.    Vitals:  height is 5' 2" (1.575 m) and weight is 52.2 kg (115 lb). Her oral temperature is 98.2 °F (36.8 °C). Her blood pressure is 163/116 (abnormal) and her pulse is 89. Her respiration is 20 and oxygen saturation is 97%.     Chief Complaint: Cough and Rash    Pt reports having a cough for a couple weeks and a rash that started last night. Reports that she thinks she may have come in contact with poison ivy. She also reports that she forgot a menstrual cup in her vaginal canal while on her cycle and now feels like she has BV as well. Vaginitis symptoms x 2 days    Cough  This is a new problem. The current episode started 1 to 4 weeks ago. The problem has been unchanged. The problem occurs every few minutes. The cough is Productive of sputum (green sputum). Associated symptoms include nasal congestion, a rash and rhinorrhea. Pertinent negatives include no chills, ear congestion, ear pain, fever, headaches or sore throat. The symptoms are aggravated by lying down. She has tried nothing for the symptoms. The treatment provided no relief. Her past medical history is significant for asthma. There is no history of bronchitis or pneumonia.   Rash  This is a new problem. The current episode started yesterday. The problem has been gradually worsening since onset. The affected locations include the face. The rash is characterized by itchiness and redness. She was exposed to nothing. Associated symptoms include coughing and rhinorrhea. Pertinent negatives include no fatigue, fever or sore throat. Past treatments include nothing. The treatment provided no relief. Her past medical history is significant for asthma.       Constitution: Negative for chills, fatigue and fever.   HENT:  Negative for ear pain and sore throat.    Respiratory:  Positive for cough.    Skin:  Positive for rash.   Neurological:  Negative for headaches.      Objective:     Physical " Exam   Constitutional: She is oriented to person, place, and time. She appears well-developed. She is cooperative.  Non-toxic appearance. She does not appear ill. No distress.   HENT:   Head: Normocephalic and atraumatic.   Ears:   Right Ear: Hearing, external ear and ear canal normal. Tympanic membrane is not erythematous, not retracted and not bulging. A middle ear effusion is present. no impacted cerumen  Left Ear: Hearing, external ear and ear canal normal. Tympanic membrane is not erythematous, not retracted and not bulging. A middle ear effusion is present. no impacted cerumen  Nose: No rhinorrhea or congestion.   Mouth/Throat: Mucous membranes are moist. No oropharyngeal exudate or posterior oropharyngeal erythema. Oropharynx is clear.   Eyes: Conjunctivae and lids are normal. No scleral icterus.   Neck: Phonation normal. Neck supple.   Cardiovascular: Normal rate, regular rhythm and normal heart sounds.   No murmur heard.Exam reveals no gallop and no friction rub.   Pulmonary/Chest: Effort normal and breath sounds normal. No stridor. No respiratory distress. She has no decreased breath sounds. She has no wheezes. She has no rhonchi. She has no rales.         Comments: + cough    Abdominal: Normal appearance.   Neurological: She is alert and oriented to person, place, and time. Coordination normal.   Skin: Skin is intact, not diaphoretic, not pale and rash (erythematous maculopapular rash to right cheek).   Psychiatric: Her speech is normal and behavior is normal. Judgment and thought content normal.   Nursing note and vitals reviewed.      Assessment:     1. Acute vaginitis    2. Contact dermatitis, unspecified contact dermatitis type, unspecified trigger    3. Bronchitis        Plan:       Acute vaginitis  -     Vaginosis Screen by DNA Probe  -     C. trachomatis/N. gonorrhoeae by AMP DNA [facility]; Vagina  -     metroNIDAZOLE (FLAGYL) 500 MG tablet; Take 1 tablet (500 mg total) by mouth 2 (two) times daily.  for 7 days  Dispense: 14 tablet; Refill: 0    Contact dermatitis, unspecified contact dermatitis type, unspecified trigger  -     predniSONE (DELTASONE) 20 MG tablet; Take 1 tablet (20 mg total) by mouth once daily. for 5 days  Dispense: 5 tablet; Refill: 0  -     hydrocortisone 1 % cream; Apply topically 2 (two) times daily.  Dispense: 30 g; Refill: 0    Bronchitis  -     predniSONE (DELTASONE) 20 MG tablet; Take 1 tablet (20 mg total) by mouth once daily. for 5 days  Dispense: 5 tablet; Refill: 0  -     albuterol (PROVENTIL HFA) 90 mcg/actuation inhaler; Inhale 2 puffs into the lungs every 6 (six) hours as needed. Rescue  Dispense: 6.7 g; Refill: 0  -     benzonatate (TESSALON) 200 MG capsule; Take 1 capsule (200 mg total) by mouth 3 (three) times daily as needed for Cough.  Dispense: 20 capsule; Refill: 0      Alternate ibuprofen and tylenol as needed for fever/pain/body aches every 4-6 hours. Rest, increase PO fluids.   Discussed with patient the importance of f/u with their primary care provider. Urged to go to the ER for any worsening signs or symptoms.

## 2025-03-04 ENCOUNTER — RESULTS FOLLOW-UP (OUTPATIENT)
Dept: URGENT CARE | Facility: CLINIC | Age: 35
End: 2025-03-04
Payer: MEDICAID

## 2025-03-04 NOTE — TELEPHONE ENCOUNTER
Attempted to reach patient regarding results of vaginosis screening. Voicemail not set up.  Results + for trich, BV and candida.  Patient has already been given Flagyl.  Needs Diflucan for yeast infection.

## 2025-03-05 ENCOUNTER — TELEPHONE (OUTPATIENT)
Dept: URGENT CARE | Facility: CLINIC | Age: 35
End: 2025-03-05
Payer: MEDICAID

## 2025-03-07 ENCOUNTER — TELEPHONE (OUTPATIENT)
Dept: URGENT CARE | Facility: CLINIC | Age: 35
End: 2025-03-07
Payer: MEDICAID

## 2025-03-07 NOTE — TELEPHONE ENCOUNTER
Attempted to contact patient and discuss lab results. Bacterial vaginosis + detected, Candida glabrata/krusei + detected, Trichomonas vaginalis + detected. Treated with Flagyl at initial visit. Needs Diflucan for candida. 3 attempts made by phone.

## 2025-03-08 ENCOUNTER — TELEPHONE (OUTPATIENT)
Dept: URGENT CARE | Facility: CLINIC | Age: 35
End: 2025-03-08
Payer: MEDICAID

## 2025-03-08 NOTE — TELEPHONE ENCOUNTER
4th attempt to contact patient to discuss lab results.  No answer and voicemail not set up.  Will send certified letter.

## 2025-04-06 ENCOUNTER — TELEPHONE (OUTPATIENT)
Dept: URGENT CARE | Facility: CLINIC | Age: 35
End: 2025-04-06
Payer: MEDICAID

## 2025-04-06 NOTE — TELEPHONE ENCOUNTER
Certified letter sent out came back as returned to sender stating wrong address.  Attempted to call the patient but there was no answer and unable to leave voicemail.  Relayed to provider on duty.

## 2025-04-23 NOTE — PLAN OF CARE
Problem: Adult Inpatient Plan of Care  Goal: Plan of Care Review  Outcome: Progressing  Goal: Patient-Specific Goal (Individualized)  Outcome: Progressing  Goal: Absence of Hospital-Acquired Illness or Injury  Outcome: Progressing  Goal: Optimal Comfort and Wellbeing  Outcome: Progressing  Goal: Readiness for Transition of Care  Outcome: Progressing     Problem: Sepsis/Septic Shock  Goal: Optimal Coping  Outcome: Progressing  Goal: Absence of Bleeding  Outcome: Progressing  Goal: Blood Glucose Level Within Targeted Range  Outcome: Progressing  Goal: Absence of Infection Signs and Symptoms  Outcome: Progressing  Goal: Optimal Nutrition Intake  Outcome: Progressing     Problem: Pneumonia  Goal: Fluid Balance  Outcome: Progressing  Goal: Resolution of Infection Signs and Symptoms  Outcome: Progressing  Goal: Effective Oxygenation and Ventilation  Outcome: Progressing     Problem: Wound  Goal: Optimal Coping  Outcome: Progressing  Goal: Optimal Functional Ability  Outcome: Progressing  Goal: Absence of Infection Signs and Symptoms  Outcome: Progressing  Goal: Improved Oral Intake  Outcome: Progressing  Goal: Optimal Pain Control and Function  Outcome: Progressing  Goal: Skin Health and Integrity  Outcome: Progressing  Goal: Optimal Wound Healing  Outcome: Progressing     
  Problem: Physical Therapy  Goal: Physical Therapy Goal  Description: Pt will be seen for the following goals  1. Pt will be ind with bed mobility  2. Pt will be mod ind with transfers  3. Pt will be ind with AD 200ft wbat RLE in boot  Outcome: Progressing     
Vaccine status unknown

## 2025-05-31 NOTE — PT/OT/SLP PROGRESS
Physical Therapy Co-Treatment    Patient Name:  Марина Britton   MRN:  [id number]  Admitting Diagnosis:  Endocarditis of tricuspid valve   Recent Surgery: * No surgery found *    Admit Date: 10/26/2020  Length of Stay: 22 days    Recommendations:     Discharge Recommendations:  home health PT, home health OT   Discharge Equipment Recommendations: bedside commode, walker, rolling, shower chair   Barriers to discharge: None    Assessment:     Марина Britton is a 30 y.o. female admitted with a medical diagnosis of Endocarditis of tricuspid valve.  She presents with the following impairments/functional limitations:  weakness, impaired endurance, impaired self care skills, impaired functional mobilty, impaired cardiopulmonary response to activity, pain. Pt tolerated session well today. Pt was able to demonstrate improve functional strength, gait quality, and activity tolerance as seen by increased distance ambulated and decreased level of assist required. Due to performance today pt does not require IPR stay. PT recommends pt d/c with HHPT/OT. Pt will continue to benefit from skilled PT services during this hospital admit to continue to improve transfer ability and efficiency as well as continue to progress pt's ambulation distance and cardiopulmonary endurance to maximize pt's functional independence and return to PLOF.      Rehab Prognosis: Good; patient would benefit from acute skilled PT services to address these deficits and reach maximum level of function.    Recent Surgery: * No surgery found *      Plan:     During this hospitalization, patient to be seen 3 x/week to address the identified rehab impairments via gait training, therapeutic activities, therapeutic exercises and progress toward the following goals:    · Plan of Care Expires:  12/03/20    Subjective     RN notified prior to session. No family/friends present upon PT entrance into room.    Chief Complaint: Pt reports feeling bad because she has a  fever  Patient/Family Comments/goals: feel better  Pain/Comfort:  · Pain Rating 1: other (see comments)(did not rate)  · Location - Side 1: Left  · Location 1: hip  · Pain Addressed 1: Distraction, Cessation of Activity  · Pain Rating Post-Intervention 1: 0/10      Objective:     Additional staff present: OT for cotx due to pt's multiple deficits req'ing two skilled therapists to appropriately progress pt's musculoskeletal strength and endurance due to tenuous medical status from COVID-19 diagnosis. Pt does not have the activity tolerance or endurance to tolerate multiple therapy sessions due to complications from co-morbidities and COVID-19 virus infection.    Patient found HOB elevated with: pulse ox (continuous)   Mental Status: Patient is oriented to AxOx4 and follows multistep commands. Patient is Alert and Cooperative during session.    General Precautions: Standard, Cardiac airborne, contact, droplet, fall   Orthopedic Precautions:N/A   Braces: N/A   Body mass index is 35.67 kg/m².  Oxygen Device: Room Air    Outcome Measures:  AM-PAC 6 CLICK MOBILITY  Turning over in bed (including adjusting bedclothes, sheets and blankets)?: 4  Sitting down on and standing up from a chair with arms (e.g., wheelchair, bedside commode, etc.): 4  Moving from lying on back to sitting on the side of the bed?: 4  Moving to and from a bed to a chair (including a wheelchair)?: 3  Need to walk in hospital room?: 3  Climbing 3-5 steps with a railing?: 3  Basic Mobility Total Score: 21     Functional Mobility:    Bed Mobility:   · Supine to Sit: modified independence; from Rt side of bed  · Scooting anteriorly to EOB to have both feet planted on floor: modified independence  · Sit to Supine: modified independence; to Rt side of bed    Sitting Balance at Edge of Bed:   Assistance Level Required: [place]    Transfers:   · Sit <> Stand Transfer: supervision with rolling walker   · Stand <> Sit Transfer: supervision with rolling  walker   · [id number] from EOB and [id number] from toliet    Standing Balance:   Assistance Level Required: Supervision   Patient used: rolling walker     Gait:  · Patient ambulated: 12 ft; 140 ft (toileting between trials)   · Patient required: standy by assistance  · Patient used:  rolling walker   · Gait Pattern observed: reciprocal gait  · Gait Deviation(s): decreased step length, steady gait and decreased michel  · Impairments due to: decreased endurance  · all lines remained intact throughout ambulation trial  · Mask donned for out of room ambulation  · Comments: Pt was able to perform vertical/horizontal head turns, 180 degree turns while ambulating, and avoid hallway obstacles without LOB.    Education:   Time provided for education, counseling and discussion of health disposition in regards to patient's current status   All questions answered within PT scope of practice and to patient's satisfaction   PT role in POC to address current functional deficits   Pt educated on proper body mechanics, safety techniques, and energy conservation with PT facilitation and cueing throughout session   Call nursing/pct to transfer to chair/use bathroom. Pt stated understanding.   RW ordered for in room use with nursing staff   Whiteboard updated with pt's current mobility status documented above   Safe to perform step transfer to/from chair/bedside commode SBA and RW w/ nursing/PCT present   Pt to ambulate 2x/day SBA and RW with nsg/PCT in order to maintain functional mobility   Importance of OOB tolerance 8-10 hrs/day to improve lung ventilation and expansion as well as strengthen postural musculature    Patient left HOB elevated with all lines intact, call button in reach and RN notified.    GOALS:   Multidisciplinary Problems     Physical Therapy Goals        Problem: Physical Therapy Goal    Goal Priority Disciplines Outcome Goal Variances Interventions   Physical Therapy Goal     PT, PT/OT Ongoing,  Progressing     Description: Goals to be met by: 20 - extended to     Patient will increase functional independence with mobility by performin. Supine to sit with Modified [place]  2. Sit to supine with Modified [place]  3. Sit to stand transfer with Stand-by Assistance - met   3a. Sit to stand transfer with Mod I and LRAd  4. Gait  x 50 feet with Contact Guard Assistance using LRAD, if needed. - met   4a. Gait x 200 feet with Mod I and LRAD  5. Ascend/descend 3 stairs with left Handrails Contact Guard Assistance.                    Time Tracking:     PT Received On: 20  PT Start Time: 1422     PT Stop Time: 1439  PT Total Time (min): 17 min       Billable Minutes:   · Therapeutic Exercise 8    Treatment Type: Treatment  PT/PTA: PT       Sonali Sutherland PT, DPT  2020  Pager: [phone]       Doctor's office

## (undated) DEVICE — Device

## (undated) DEVICE — TRAY ARTHROSCOPY 2/CS

## (undated) DEVICE — SUT ETHILON 3-0 PS2 18 BLK

## (undated) DEVICE — SUT 2/0 30IN PROLENE MONO

## (undated) DEVICE — BLADE SHAVER LANZA 4.2X13CM

## (undated) DEVICE — ADHESIVE MASTISOL VIAL 48/BX

## (undated) DEVICE — NDL SPINAL 18GX3.5 SPINOCAN

## (undated) DEVICE — TOURNIQUET SB QC DP 18X4IN

## (undated) DEVICE — GAUZE SPONGE 4X4 12 PLY NS

## (undated) DEVICE — BIT DRILL 4.2MM 3 FLUTD 145MM

## (undated) DEVICE — COVER SHOE FLUID RESISTANT XL

## (undated) DEVICE — DRAPE SURGICAL STERI IRRG PCH

## (undated) DEVICE — SOL NACL IRR 3000ML

## (undated) DEVICE — SEE MEDLINE ITEM 152515

## (undated) DEVICE — DRAPE STERI U-SHAPED 47X51IN

## (undated) DEVICE — SEE MEDLINE ITEM 157148

## (undated) DEVICE — PLEURY PATIENT STARTER KIT

## (undated) DEVICE — KIT COLLECTION E SWAB REGULAR

## (undated) DEVICE — GLOVE PROTEXIS LTX MICRO 8

## (undated) DEVICE — TOURNIQUET SB QC DP 34X4IN

## (undated) DEVICE — SEE MEDLINE ITEM 157173

## (undated) DEVICE — SET IRR URLGY 2LINE UNIV SPIKE

## (undated) DEVICE — GLOVE SENSICARE PI GRN 6.5

## (undated) DEVICE — SUT ETHILON 3/0 18IN PS-1

## (undated) DEVICE — BANDAGE ACE ELASTIC 6"

## (undated) DEVICE — TRAY MINOR GEN SURG

## (undated) DEVICE — STAPLER SKIN PROXIMATE WIDE

## (undated) DEVICE — DRAPE ORTH SPLIT 77X108IN

## (undated) DEVICE — SEE MEDLINE ITEM 157117

## (undated) DEVICE — ELECTRODE REM POLYHESIVE II

## (undated) DEVICE — SPONGE GAUZE 16PLY 4X4

## (undated) DEVICE — SUT PDS PLUS MONO 1 CT 36IN

## (undated) DEVICE — BLADE SURG CARBON STEEL SZ11

## (undated) DEVICE — SUT CTD VICRYL CT-1 27

## (undated) DEVICE — SEE MEDLINE ITEM 146313

## (undated) DEVICE — DRESSING TRANS 2X2 TEGADERM

## (undated) DEVICE — DRAPE C-ARMOR EQUIPMENT COVER

## (undated) DEVICE — APPLICATOR CHLORAPREP ORN 26ML

## (undated) DEVICE — GLOVE 8 PROTEXIS PI ORTHO

## (undated) DEVICE — SEE MEDLINE ITEM 157172

## (undated) DEVICE — TAPE SILK 3IN

## (undated) DEVICE — DRESSING XEROFORM 5X9IN

## (undated) DEVICE — SPONGE IV DRAIN 4X4 STERILE

## (undated) DEVICE — SEE MEDLINE ITEM 152529

## (undated) DEVICE — PAD CAST SPECIALIST STRL 4

## (undated) DEVICE — BLADE SCALP OPHTL BEVEL STR

## (undated) DEVICE — DRESSING XEROFORM FOIL PK 1X8

## (undated) DEVICE — GLOVE SURG PLYSPHRN ORTH SZ 8

## (undated) DEVICE — DRAPE STERI-DRAPE 1000 17X11IN

## (undated) DEVICE — GAUZE SPONGE 4X4 12PLY

## (undated) DEVICE — TAPE SURG DURAPORE 2 X10YD

## (undated) DEVICE — ELECTRODE BLADE INSULATED 1 IN

## (undated) DEVICE — GOWN SMART IMP BREATHABLE XXLG

## (undated) DEVICE — KIT SURGICAL TURNOVER

## (undated) DEVICE — NDL SPINAL 20GX3.5 HUB

## (undated) DEVICE — SEE MEDLINE ITEM 146417

## (undated) DEVICE — GLOVE SIGNATURE MICRO LTX 6.5

## (undated) DEVICE — SPONGE DERMACEA 4X4IN 12PLY

## (undated) DEVICE — SET CYSTO IRR DRP CHMBR 84IN

## (undated) DEVICE — ELECTRODE PATIENT RETURN DISP

## (undated) DEVICE — SUT BLK MONO 3-0 CUT 18IN F

## (undated) DEVICE — SYR 30CC LUER LOCK

## (undated) DEVICE — SEE MEDLINE ITEM 157216

## (undated) DEVICE — DRAPE ABDOMINAL TIBURON 14X11

## (undated) DEVICE — TUBE SET INFLOW/OUTFLOW

## (undated) DEVICE — SHEET DRAPE FAN-FOLDED 3/4

## (undated) DEVICE — TRAY BETADINE PREP NLA

## (undated) DEVICE — PAD CAST SPECIALIST STRL 6

## (undated) DEVICE — DRESSING XEROFORM 1X8IN

## (undated) DEVICE — SLEEVE OTR PROTCT STRL 12MM

## (undated) DEVICE — SUT VICRYL 2-0 36 CT-1

## (undated) DEVICE — DRESSING TRANS 4X4 3/4

## (undated) DEVICE — CULTSWAB+ AMIES W/O CHARC DBL

## (undated) DEVICE — SOL IRR NACL .9% 3000ML

## (undated) DEVICE — SUT ABSRB MONO 2-0 CT-2 27IN

## (undated) DEVICE — SEE MEDLINE ITEM 152622

## (undated) DEVICE — SUT VICRYL BR 1 GEN 27 CT-1

## (undated) DEVICE — ELECTRODE REM PLYHSV RETURN 9

## (undated) DEVICE — BANDAGE KERLIX P/P 2.25IN STER

## (undated) DEVICE — SEE MEDLINE ITEM 157150

## (undated) DEVICE — DRAPE PLASTIC U 60X72

## (undated) DEVICE — TRAY MINOR ORTHO

## (undated) DEVICE — DRAPE INCISE IOBAN 2 23X17IN

## (undated) DEVICE — PADDDING CAST WEBRIL 4YDX3IN

## (undated) DEVICE — DRESSING TELFA N ADH 3X8

## (undated) DEVICE — BANDAGE ACE NON LATEX 2IN

## (undated) DEVICE — COVER TABLE HVY DTY 60X90IN

## (undated) DEVICE — COVER FULLGUARD SHOE HIGH-TOP

## (undated) DEVICE — SPONGE COTTON TRAY 4X4IN

## (undated) DEVICE — TOWEL OR DISP STRL BLUE 4/PK

## (undated) DEVICE — BOOT WALKER ROCKER SMALL

## (undated) DEVICE — BIT DRILL XLN 4.2MM

## (undated) DEVICE — DRESSING TRANS 4X4 TEGADERM

## (undated) DEVICE — PAD CAST 2 IN X 4YDS STERILE

## (undated) DEVICE — GLOVE PI ULTRA TOUCH G SURGEON

## (undated) DEVICE — NDL ECLIPSE SAFETY 18GX1-1/2IN

## (undated) DEVICE — GOWN SMARTSLEEVE AAMI LVL4 XL